# Patient Record
Sex: FEMALE | Race: WHITE | NOT HISPANIC OR LATINO | Employment: OTHER | URBAN - METROPOLITAN AREA
[De-identification: names, ages, dates, MRNs, and addresses within clinical notes are randomized per-mention and may not be internally consistent; named-entity substitution may affect disease eponyms.]

---

## 2020-05-01 ENCOUNTER — OFFICE VISIT - HEALTHEAST (OUTPATIENT)
Dept: FAMILY MEDICINE | Facility: CLINIC | Age: 65
End: 2020-05-01

## 2020-05-01 DIAGNOSIS — M10.9 ACUTE GOUT OF RIGHT KNEE, UNSPECIFIED CAUSE: ICD-10-CM

## 2021-06-04 VITALS
HEART RATE: 102 BPM | RESPIRATION RATE: 16 BRPM | DIASTOLIC BLOOD PRESSURE: 82 MMHG | OXYGEN SATURATION: 95 % | TEMPERATURE: 98.3 F | SYSTOLIC BLOOD PRESSURE: 172 MMHG | WEIGHT: 105 LBS

## 2021-06-18 NOTE — PATIENT INSTRUCTIONS - HE
Patient Instructions by Сергей Hopson PA-C at 5/1/2020  1:30 PM     Author: Сергей Hopson PA-C Service: -- Author Type: Physician Assistant    Filed: 5/1/2020  1:57 PM Encounter Date: 5/1/2020 Status: Signed    : Сергей Hopson PA-C (Physician Assistant)       You were seen today for acute gout. With treatment, symptoms should be improving in 48-72 hours (or longer if symptoms have been present for several days).     Symptom management:  - If already taking a urate-lowering medication, may continue without interruption.  - Take prescribed prednisone as instructed  - May apply ice to the affected area for 10-15 minutes each hour as needed.  - Rest involved joint.    Reasons to return for immediate re-evaluation:  - Develop a fever of 100.4 or higher  - No improvement of symptoms after 5 days  - Redness and/or pain continues to spread  - Develop drainage from the knee    Patient Education     Gout Diet  Gout is a painful condition caused by an excess of uric acid, a waste product made by the body. Uric acid forms crystals that collect in the joints. The immune response to these crystals brings on symptoms of joint pain and swelling. This is called a gout attack. Often, medications and diet changes are combined to manage gout. Below are some guidelines for changing your diet to help you manage gout and prevent attacks. Your health care provider will help you determine the best eating plan for you.     Eating to manage gout  Weight loss for those who are overweight may help reduce gout attacks.  Eat less of these foods  Eating too many foods containing purines may raise the levels of uric acid in your body. This raises your risk for a gout attack. Try to limit these foods and drinks:    Alcohol, such as beer and red wine. You may be told to avoid alcohol completely.    Soft drinks that contain sugar or high fructose corn syrup    Certain fish, including anchovies, sardines, fish eggs, and  herring    Shellfish    Certain meats, such as red meat, hot dogs, luncheon meats, and turkey    Organ meats, such as liver, kidneys, and sweetbreads    Legumes, such as dried beans and peas    Other high fat foods such as gravy, whole milk, and high fat cheeses    Vegetables such as asparagus, cauliflower, spinach, and mushrooms used to be thought to contribute to an increased risk for a gout attack, but recent studies show that high purine vegetables don't increase the risk for a gout attack.  Eat more of these foods  Other foods may be helpful for people with gout. Add some of these foods to your diet:    Cherries contain chemicals that may lower uric acid.    Omega fatty acids. These are found in some fatty fish such as salmon, certain oils (flax, olive, or nut), and nuts themselves. Omega fatty acids may help prevent inflammation due to gout.    Dairy products that are low-fat or fat-free, such as cheese and yogurt    Complex carbohydrate foods, including whole grains, brown rice, oats, and beans    Coffee, in moderation    Water, approximately 64 ounces per day  Follow-up care  Follow up with your healthcare provider as advised.  When to seek medical advice  Call your healthcare provider right away if any of these occur:    Return of gout symptoms, usually at night:    Severe pain, swelling, and heat in a joint, especially the base of the big toe    Affected joint is hard to move    Skin of the affected joint is purple or red    Fever of 100.4 F (38 C) or higher    Pain that doesn't get better even with prescribed medicine   Date Last Reviewed: 1/12/2016 2000-2017 [employer]. [address] [place] [address], [place] [address]. All rights reserved. This information is not intended as a substitute for professional medical care. Always follow your healthcare professional's instructions.

## 2021-06-28 ENCOUNTER — COMMUNICATION - HEALTHEAST (OUTPATIENT)
Dept: SCHEDULING | Facility: CLINIC | Age: 66
End: 2021-06-28

## 2021-06-29 DIAGNOSIS — J44.9 COPD (CHRONIC OBSTRUCTIVE PULMONARY DISEASE) (H): Primary | ICD-10-CM

## 2021-06-29 NOTE — PROGRESS NOTES
Progress Notes by Сергей Hopson PA-C at 5/1/2020  1:30 PM     Author: Сергей Hopson PA-C Service: -- Author Type: Physician Assistant    Filed: 5/1/2020  3:20 PM Encounter Date: 5/1/2020 Status: Signed    : Сергей Hopson PA-C (Physician Assistant)         Assessment & Plan:       1. Acute gout of right knee, unspecified cause  predniSONE (DELTASONE) 20 MG tablet      Medical Decision Making  Patient presents with right knee pain most consistent with a gout flare.  She has no initial trauma or injury to the right knee to suspect a fracture.  She further has no joint laxity to suggest a torn or injured ligament or meniscus.  Did also consider possible DVT, however concern is low as patient has no significant swelling or tenderness in the posterior calf or the popliteal region of the knee.  Patient's tenderness is instead localized to the anterior medial knee with increased warmth to touch consistent with gout.  There are no signs for cellulitis as there is no significant erythema in the region as well as no red flag symptoms for septic arthritis such as fevers, nausea, vomiting, or significant loss of range of motion in the knee joint.  Will treat patient with oral prednisone.  Continue with conservative management of over-the-counter acetaminophen, cold compresses, light compression, and leg elevation.  Did provide education materials and discussed appropriate diet.  Discussed signs of worsening symptoms and when to follow-up with PCP if no symptom improvement.    Patient Instructions   You were seen today for acute gout. With treatment, symptoms should be improving in 48-72 hours (or longer if symptoms have been present for several days).     Symptom management:  - If already taking a urate-lowering medication, may continue without interruption.  - Take prescribed prednisone as instructed  - May apply ice to the affected area for 10-15 minutes each hour as needed.  - Rest involved  joint.    Reasons to return for immediate re-evaluation:  - Develop a fever of 100.4 or higher  - No improvement of symptoms after 5 days  - Redness and/or pain continues to spread  - Develop drainage from the knee    Patient Education     Gout Diet  Gout is a painful condition caused by an excess of uric acid, a waste product made by the body. Uric acid forms crystals that collect in the joints. The immune response to these crystals brings on symptoms of joint pain and swelling. This is called a gout attack. Often, medications and diet changes are combined to manage gout. Below are some guidelines for changing your diet to help you manage gout and prevent attacks. Your health care provider will help you determine the best eating plan for you.     Eating to manage gout  Weight loss for those who are overweight may help reduce gout attacks.  Eat less of these foods  Eating too many foods containing purines may raise the levels of uric acid in your body. This raises your risk for a gout attack. Try to limit these foods and drinks:    Alcohol, such as beer and red wine. You may be told to avoid alcohol completely.    Soft drinks that contain sugar or high fructose corn syrup    Certain fish, including anchovies, sardines, fish eggs, and herring    Shellfish    Certain meats, such as red meat, hot dogs, luncheon meats, and turkey    Organ meats, such as liver, kidneys, and sweetbreads    Legumes, such as dried beans and peas    Other high fat foods such as gravy, whole milk, and high fat cheeses    Vegetables such as asparagus, cauliflower, spinach, and mushrooms used to be thought to contribute to an increased risk for a gout attack, but recent studies show that high purine vegetables don't increase the risk for a gout attack.  Eat more of these foods  Other foods may be helpful for people with gout. Add some of these foods to your diet:    Cherries contain chemicals that may lower uric acid.    Omega fatty acids.  These are found in some fatty fish such as salmon, certain oils (flax, olive, or nut), and nuts themselves. Omega fatty acids may help prevent inflammation due to gout.    Dairy products that are low-fat or fat-free, such as cheese and yogurt    Complex carbohydrate foods, including whole grains, brown rice, oats, and beans    Coffee, in moderation    Water, approximately 64 ounces per day  Follow-up care  Follow up with your healthcare provider as advised.  When to seek medical advice  Call your healthcare provider right away if any of these occur:    Return of gout symptoms, usually at night:    Severe pain, swelling, and heat in a joint, especially the base of the big toe    Affected joint is hard to move    Skin of the affected joint is purple or red    Fever of 100.4 F (38 C) or higher    Pain that doesn't get better even with prescribed medicine   Date Last Reviewed: 1/12/2016 2000-2017 [employer]. [address] [place] [address]. All rights reserved. This information is not intended as a substitute for professional medical care. Always follow your healthcare professional's instructions.                   Subjective:       Jasmyn Green is a 64 y.o. female here for evaluation of right knee pain.  Onset of symptoms was 2 weeks ago and has been unchanged since then.  Patient states that she woke up one morning with the pain, and does not recall any injury or trauma to the right knee.  She has not had symptoms like this before.  She has no history of knee fracture or knee surgery in the past.  Associated symptoms include swelling at the right knee, tenderness to palpation, and increased warmth to touch.  She denies erythematous rash and fevers.  The pain is provoked when patient is bearing weight or and the patient is flexing/extending the right knee.  She has been alternating Tylenol ibuprofen with mild improvement of symptoms.  Patient is also tried cold compresses and wrapping  the knee with an Ace wrap.  She has no history of gout in the past.    The following portions of the patient's history were reviewed and updated as appropriate: allergies, current medications and problem list.    Review of Systems  A 12 point comprehensive review of systems was negative except as noted.     Allergies  No Known Allergies    No family history on file.    Social History     Socioeconomic History   ? Marital status:      Spouse name: None   ? Number of children: None   ? Years of education: None   ? Highest education level: None   Occupational History   ? None   Social Needs   ? Financial resource strain: None   ? Food insecurity     Worry: None     Inability: None   ? Transportation needs     Medical: None     Non-medical: None   Tobacco Use   ? Smoking status: Current Every Day Smoker   ? Smokeless tobacco: Never Used   Substance and Sexual Activity   ? Alcohol use: None   ? Drug use: None   ? Sexual activity: None   Lifestyle   ? Physical activity     Days per week: None     Minutes per session: None   ? Stress: None   Relationships   ? Social connections     Talks on phone: None     Gets together: None     Attends Taoism service: None     Active member of club or organization: None     Attends meetings of clubs or organizations: None     Relationship status: None   ? Intimate partner violence     Fear of current or ex partner: None     Emotionally abused: None     Physically abused: None     Forced sexual activity: None   Other Topics Concern   ? None   Social History Narrative   ? None         Objective:       /82 (Patient Site: Left Arm, Patient Position: Sitting, Cuff Size: Adult Regular)   Pulse (!) 102   Temp 98.3  F (36.8  C) (Oral)   Resp 16   Wt 105 lb (47.6 kg)   SpO2 95%   General appearance: alert, appears stated age, cooperative, no distress and non-toxic  Extremities: Right lower extremity: Tenderness to palpation over the medial anterior knee; no joint laxity;  full range of motion of the right knee with pain during flexion and extension; patient denies significant tenderness to the right calf and to the posterior knee  Skin: Right knee: Moderate increased swelling and increased warmth to touch over the medial knee, no erythema, skin intact, no drainage; no other rashes or lesions

## 2021-07-01 ENCOUNTER — COMMUNICATION - HEALTHEAST (OUTPATIENT)
Dept: SCHEDULING | Facility: CLINIC | Age: 66
End: 2021-07-01

## 2021-07-02 ENCOUNTER — COMMUNICATION - HEALTHEAST (OUTPATIENT)
Dept: RESPIRATORY THERAPY | Facility: HOSPITAL | Age: 66
End: 2021-07-02

## 2021-07-04 NOTE — TELEPHONE ENCOUNTER
Telephone Encounter by Lacey Johnson at 7/1/2021 11:30 AM     Author: Lacey Johnson Service: -- Author Type: Patient Access    Filed: 7/1/2021 11:33 AM Encounter Date: 7/1/2021 Status: Signed    : Lacey Johnson (Patient Access)       New Appointment Needed  What is the reason for the visit:    Inpatient/ED Follow Up Appt Request  At what hospital or facility were you seen?: [facility]   What is the reason you were seen?: COPD   What date were you admitted?: date: 6/27/2021  What date were you discharged?: date: 7/01/2021  What was the recommended timeframe for your follow up appointment?: 7 days  Provider Preference: recommended Dr Barrios per  at [facility]   How soon do you need to be seen?: next week  Waitlist offered?: No  Okay to leave a detailed message:  Yes

## 2021-07-04 NOTE — TELEPHONE ENCOUNTER
Telephone Encounter by Lorna Rosenthal MA at 7/1/2021  1:44 PM     Author: Lorna Rosenthal MA Service: -- Author Type: Medical Assistant    Filed: 7/1/2021  1:45 PM Encounter Date: 7/1/2021 Status: Signed    : Lorna Rosenthal MA (Medical Assistant)       Pt scheduled for INF with J Luis Calle on 7/6. Pt will also need an establish care appt. Left detailed message for patient on her VM.

## 2021-07-04 NOTE — TELEPHONE ENCOUNTER
Telephone Encounter by Minerva Decker LRT at 7/2/2021 12:23 PM     Author: Minerva Decker LRT Service: -- Author Type: Respiratory Therapist    Filed: 7/2/2021 12:27 PM Encounter Date: 7/2/2021 Status: Signed    : Minerva Decker LRT (Respiratory Therapist)       Spoke with Jasmyn. She is feeling well today, back to her norm as far as her breathing is concerned,and in her green zone. She is able to get and take her medications and is compliant in taking them.  Reviewed COPD Action Plan.  Jasmyn had no problems or questions for me today.    MARCELO Chatman, TTS, Chronic Pulmonary Disease Specialist

## 2021-07-05 ENCOUNTER — COMMUNICATION - HEALTHEAST (OUTPATIENT)
Dept: RESPIRATORY THERAPY | Facility: HOSPITAL | Age: 66
End: 2021-07-05

## 2021-07-05 PROBLEM — J96.01 ACUTE RESPIRATORY FAILURE WITH HYPOXIA (H): Status: ACTIVE | Noted: 2021-06-28

## 2021-07-05 PROBLEM — I16.0 ASYMPTOMATIC HYPERTENSIVE URGENCY: Status: ACTIVE | Noted: 2021-06-30

## 2021-07-05 PROBLEM — D72.820 LYMPHOCYTOSIS: Status: ACTIVE | Noted: 2021-06-28

## 2021-07-05 PROBLEM — Z87.39 HISTORY OF GOUT: Status: ACTIVE | Noted: 2021-06-27

## 2021-07-05 PROBLEM — F17.200 SMOKER: Chronic | Status: ACTIVE | Noted: 2021-06-27

## 2021-07-05 PROBLEM — I25.10 CORONARY ARTERY CALCIFICATION: Chronic | Status: ACTIVE | Noted: 2021-06-27

## 2021-07-05 NOTE — TELEPHONE ENCOUNTER
Telephone Encounter by Minerva Decker LRT at 7/5/2021  5:09 PM     Author: Minerva Decker LRT Service: -- Author Type: Respiratory Therapist    Filed: 7/5/2021  5:16 PM Encounter Date: 7/5/2021 Status: Signed    : Minerva Decker LRT (Respiratory Therapist)       Spoke with Jasmyn . She is feeling well today and in the green zone. She has not gotten her new inhalers yet due to excessive cost. Her daughter is checking into ordering them from [employer] and she has an appointment with her doctor tomorrow to try to figure something out. Reviewed COPD Action Plan.

## 2021-07-08 ENCOUNTER — COMMUNICATION - HEALTHEAST (OUTPATIENT)
Dept: RESPIRATORY THERAPY | Facility: HOSPITAL | Age: 66
End: 2021-07-08

## 2021-07-08 NOTE — TELEPHONE ENCOUNTER
Telephone Encounter by Aidee Luna LRT at 7/8/2021 10:35 AM     Author: Aidee Luna LRT Service: -- Author Type: Respiratory Therapist    Filed: 7/8/2021 10:35 AM Encounter Date: 7/8/2021 Status: Signed    : Aidee Luna LRT (Respiratory Therapist)       Left message for them call back with any questions they may have, our phone number, reminders, and when we plan to call again.    MARCELO Whitfield, Chronic Pulmonary Disease Specialist

## 2021-07-13 ENCOUNTER — PATIENT OUTREACH (OUTPATIENT)
Dept: NURSING | Facility: CLINIC | Age: 66
End: 2021-07-13

## 2021-07-13 NOTE — LETTER
[facility]  Complex Care Plan  About Me:    Patient Name:  Jasmyn Gong    YOB: 1955  Age:         65 year old   [place] MRN:    [id number] Telephone Information:  Home Phone [phone]   Mobile [phone]       Address:  [address] [address] Email address:  No e-mail address on record      Emergency Contact(s)    Name Relationship Lgl Grd Work Phone Home Phone Mobile Phone   1. RADHA GONG* Other   [phone]    2. DECLINE,PER PT Declined               Primary language:  English     needed? Data Unavailable   [place] [facility]:  [phone] op. 1  Other communication barriers: None  Preferred Method of Communication:     Current living arrangement: I live in a private home  Mobility Status/ Medical Equipment:      Health Maintenance  Health Maintenance Reviewed: Up to date    My Access Plan  Medical Emergency 911   Primary Clinic Line [facility] [phone]   24 Hour Appointment Line [phone] or  [phone] ([phone]) (toll-free)   24 Hour Nurse Line [phone] (toll-free)   Preferred Urgent Care     Preferred Hospital [facility]  [phone]   Preferred Pharmacy No Pharmacies Listed   Behavioral Health Crisis Line The National Suicide Prevention Lifeline at 1-833.789.7078 or 777             My Care Team Members  Patient Care Team       Relationship Specialty Notifications Start End    Jean Marie Calle, CNP PCP - General Nurse Practitioner - Family  7/13/21      [address] [facility] [address]    Jaz Gallagher, HU Lead Care Coordinator Primary Care - [facility] Admissions 7/13/21     Phone:          Edelmira Morales Community Health Worker  Admissions 7/13/21     [phone]            My Care Plans  Self Management and Treatment Plan  Goals and (Comments)  Goals        General     Financial Wellbeing (pt-stated)      Notes - Note created  7/16/2021   9:50 AM by Jaz Gallagher RN     Goal Statement: I would like to apply for Medicare in the next 3 months.  Date Goal set: 7/13/21  Barriers: resources  Strengths: motivated and family support  Date to Achieve By: 3 months  Patient expressed understanding of goal: yes  Action steps to achieve this goal:  1. My daughter/family will call [facility] [phone] to get information regarding insurance plans.  I understand that [facility] is unable to recommend insurance plans.  My family will help me with this process.  I do not have to wait until open enrollment.         Improve chronic symptoms (pt-stated)      Notes - Note created  7/16/2021  9:44 AM by Jaz Gallagher, RN     Goal Statement: I would like to take my blood pressure on a daily basis over the next 2 weeks to bring to my 7/26 doctors appointment.  Date Goal set: 7/13/21  Barriers:   Strengths: motivated  Date to Achieve By: 7/26/21  Patient expressed understanding of goal: yes  Action steps to achieve this goal:  1. I will take my blood pressure on a daily basis and keep a log to bring to my appointment on 7/26.  Jacqueline Donaldson will notify me if I need to continue writing my blood pressure down after 7/26.         Medical (pt-stated)      Notes - Note edited  7/16/2021  9:37 AM by Jaz Gallagher RN     Goal Statement: I would like to Establish Care with Jacqueline Donaldson 7/26 @ 7:00.  Date Goal set: 7/13/21  Barriers:   Strengths: motivated  Date to Achieve By: 7/26/21  Patient expressed understanding of goal: yes  Action steps to achieve this goal:  1. I will attend my 7/26/21 appointment to establish care with a new PCP.  2. I will speak with my Community Healthcare Worker and or RN regarding any recommendations made by Jacqueline for follow up.           Other (pt-stated)      Notes - Note created  7/16/2021  9:40 AM by Jaz Gallagher, HU     Goal Statement: I would like to schedule the following recommended tests I am overdue for as recommended by my PCP  within the next year.  Date Goal set: 7/13/21  Barriers: recent hospital admission, works  Strengths: family support  Date to Achieve By: 1 year  Patient expressed understanding of goal: yes  Action steps to achieve this goal:  1. I will schedule a colonoscopy.  2. I will schedule a mammogram.  3. I will speak with my Community Healthcare Worker at outreach telephone calls to notify her if I am having difficulty getting these tests scheduled.              Action Plans on File:                       Advance Care Plans/Directives Type:        My Medical and Care Information  Problem List   Patient Active Problem List   Diagnosis     COPD exacerbation (H)     History of gout     Smoker     Coronary artery calcification     Acute respiratory failure with hypoxia (H)     Lymphocytosis     Essential hypertension     Asymptomatic hypertensive urgency      Current Medications and Allergies:  See printed Medication Report.    Care Coordination Start Date: 7/13/2021   Frequency of Care Coordination: monthly   Form Last Updated: 07/16/2021

## 2021-07-13 NOTE — LETTER
[facility] CARE COORDINATION  [address]  [place] [address]    July 16, 2021    Jasmyn Green  [address] [address]      Dear Jasmyn,    I am a clinic care coordinator who works with Jean Marie Calle CNP at [facility]. I wanted to thank you for spending the time to talk with me.  Below is a description of clinic care coordination and how I can further assist you.      The clinic care coordination team is made up of a registered nurse,  and community health worker who understand the health care system. The goal of clinic care coordination is to help you manage your health and improve access to the health care system in the most efficient manner. The team can assist you in meeting your health care goals by providing education, coordinating services, strengthening the communication among your providers and supporting you with any resource needs.    Please feel free to contact the Community Health Worker Edelmira Morales at [phone] with any questions or concerns. We are focused on providing you with the highest-quality healthcare experience possible and that all starts with you.     Sincerely,     Jaz Gallagher RN  Clinic Care Coordination    Enclosed: I have enclosed a copy of the Complex Care Plan. This has helpful information and goals that we have talked about. Please keep this in an easy to access place to use as needed.

## 2021-07-16 ENCOUNTER — TELEPHONE (OUTPATIENT)
Dept: RESPIRATORY THERAPY | Facility: HOSPITAL | Age: 66
End: 2021-07-16

## 2021-07-16 SDOH — ECONOMIC STABILITY: INCOME INSECURITY: IN THE LAST 12 MONTHS, WAS THERE A TIME WHEN YOU WERE NOT ABLE TO PAY THE MORTGAGE OR RENT ON TIME?: NO

## 2021-07-16 SDOH — ECONOMIC STABILITY: TRANSPORTATION INSECURITY
IN THE PAST 12 MONTHS, HAS THE LACK OF TRANSPORTATION KEPT YOU FROM MEDICAL APPOINTMENTS OR FROM GETTING MEDICATIONS?: NO

## 2021-07-16 SDOH — ECONOMIC STABILITY: TRANSPORTATION INSECURITY
IN THE PAST 12 MONTHS, HAS LACK OF TRANSPORTATION KEPT YOU FROM MEETINGS, WORK, OR FROM GETTING THINGS NEEDED FOR DAILY LIVING?: NO

## 2021-07-16 SDOH — ECONOMIC STABILITY: FOOD INSECURITY: WITHIN THE PAST 12 MONTHS, YOU WORRIED THAT YOUR FOOD WOULD RUN OUT BEFORE YOU GOT MONEY TO BUY MORE.: NEVER TRUE

## 2021-07-16 SDOH — ECONOMIC STABILITY: FOOD INSECURITY: WITHIN THE PAST 12 MONTHS, THE FOOD YOU BOUGHT JUST DIDN'T LAST AND YOU DIDN'T HAVE MONEY TO GET MORE.: NEVER TRUE

## 2021-07-16 NOTE — PROGRESS NOTES
Clinic Care Coordination Contact    Clinic Care Coordination Contact  OUTREACH    Referral Information:  Referral Source: PCP    Primary Diagnosis: COPD    Chief Complaint   Patient presents with     Clinic Care Coordination - Initial        Clinic Utilization  Difficulty keeping appointments:: No  No-Show Concerns: No  No PCP office visit in Past Year: No  Utilization    Hospital Admissions  1             ED Visits  1             No Show Count (past year)  0                Current as of: 7/14/2021 11:47 AM              Clinical Concerns:  Current Medical Concerns:  COPD    Current Behavioral Concerns: N/A    Education Provided to patient: yes   Pain  Pain (GOAL):: No  Health Maintenance Reviewed: Up to date  Clinical Pathway: None    Medication Management:  Medication review status: Medications reviewed and no changes reported per patient.        Patient currently getting her prescriptions in [place] due to cost.  Writer gave patient and daughter the phone number to [facility] as an additional resource.  Patient takes her pills out of the bottles on a daily basis.     Functional Status:  Dependent ADLs:: Independent    Living Situation:  Current living arrangement:: I live in a private home    Lifestyle & Psychosocial Needs:    Social Determinants of Health     Tobacco Use: Medium Risk     Smoking Tobacco Use: Former Smoker     Smokeless Tobacco Use: Never Used   Alcohol Use:      Frequency of Alcohol Consumption:      Average Number of Drinks:      Frequency of Binge Drinking:    Financial Resource Strain:      Difficulty of Paying Living Expenses:    Food Insecurity: No Food Insecurity     Worried About Running Out of Food in the Last Year: Never true     Ran Out of Food in the Last Year: Never true   Transportation Needs: No Transportation Needs     Lack of Transportation (Medical): No     Lack of Transportation (Non-Medical): No   Physical Activity:      Days of Exercise per Week:      Minutes of  Exercise per Session:    Stress:      Feeling of Stress :    Social Connections:      Frequency of Communication with Friends and Family:      Frequency of Social Gatherings with Friends and Family:      Attends Faith Services:      Active Member of Clubs or Organizations:      Attends Club or Organization Meetings:      Marital Status:    Intimate Partner Violence:      Fear of Current or Ex-Partner:      Emotionally Abused:      Physically Abused:      Sexually Abused:    Depression: Not at risk     PHQ-2 Score: 0   Housing Stability: Low Risk      Unable to Pay for Housing in the Last Year: No     Number of Places Lived in the Last Year: 1     Unstable Housing in the Last Year: No     Diet:: Regular  Inadequate nutrition (GOAL):: No  Tube Feeding: No  Inadequate activity/exercise (GOAL):: No  Significant changes in sleep pattern (GOAL): No  Transportation means:: Regular car     Faith or spiritual beliefs that impact treatment:: No  Mental health DX:: No  Mental health management concern (GOAL):: No  Chemical Dependency Status: No Current Concerns  Informal Support system:: Family, Friends          Resources and Interventions:  Current Resources:      Community Resources: None  Supplies used at home:: None  Equipment Currently Used at Home: none  Employment Status: employed part-time              Referrals Placed: None    65 yr F PMH: COPD, gout.  Hospitalized 6/27-7/01 for pneumonia, COPD. 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  Returned to work this week.  States she was pretty tired but is returning again tomorrow.  Daughter Samra is also on the phone.  Questions about Medicare as patient has never applied.  Writer consulted with [facility] prior to call.  Gave them the phone number to [employer].  Informed them to call for information about insurance/Medicare.  Instructed that we are unable to tell them 'which program to pick'.  Both stated an understanding.  Open Enrollment between Oct-Dec however because  Stefania had never applied that she could call and apply now.  She currently lives with her sister and brother-in-law.  She drives her self for work, appointments, etc.  See above for medication information.  Denies food insecurity.  Reports her BP today and yesterday as 125/63 HR 66 and 108/60 HR 56.  Writer instructed for her to keep a daily log for the next 2 weeks until 7/26 as she is new to BP medication.  Stefania stated an understanding.  Goals created with assistance of patient.  Enrolled for Goals as listed below.     Goals:   Goals        General     Financial Wellbeing (pt-stated)      Notes - Note created  7/16/2021  9:50 AM by Jaz Gallagher, RN     Goal Statement: I would like to apply for Medicare in the next 3 months.  Date Goal set: 7/13/21  Barriers: resources  Strengths: motivated and family support  Date to Achieve By: 3 months  Patient expressed understanding of goal: yes  Action steps to achieve this goal:  1. My daughter/family will call [facility] [phone] to get information regarding insurance plans.  I understand that [facility] is unable to recommend insurance plans.  My family will help me with this process.  I do not have to wait until open enrollment.         Improve chronic symptoms (pt-stated)      Notes - Note created  7/16/2021  9:44 AM by Jaz Gallagher, RN     Goal Statement: I would like to take my blood pressure on a daily basis over the next 2 weeks to bring to my 7/26 doctors appointment.  Date Goal set: 7/13/21  Barriers:   Strengths: motivated  Date to Achieve By: 7/26/21  Patient expressed understanding of goal: yes  Action steps to achieve this goal:  1. I will take my blood pressure on a daily basis and keep a log to bring to my appointment on 7/26.  Jacqueline Donaldson will notify me if I need to continue writing my blood pressure down after 7/26.         Medical (pt-stated)      Notes - Note edited  7/16/2021  9:37 AM by Jaz Gallagher, RN     Goal Statement: I would like to  Establish Care with Jacqueline Mendoza 7/26 @ 7:00.  Date Goal set: 7/13/21  Barriers:   Strengths: motivated  Date to Achieve By: 7/26/21  Patient expressed understanding of goal: yes  Action steps to achieve this goal:  1. I will attend my 7/26/21 appointment to establish care with a new PCP.  2. I will speak with my Community Healthcare Worker and or RN regarding any recommendations made by Jacqueline for follow up.           Other (pt-stated)      Notes - Note created  7/16/2021  9:40 AM by Jaz Gallagher RN     Goal Statement: I would like to schedule the following recommended tests I am overdue for as recommended by my PCP within the next year.  Date Goal set: 7/13/21  Barriers: recent hospital admission, works  Strengths: family support  Date to Achieve By: 1 year  Patient expressed understanding of goal: yes  Action steps to achieve this goal:  1. I will schedule a colonoscopy.  2. I will schedule a mammogram.  3. I will speak with my Community Healthcare Worker at outreach telephone calls to notify her if I am having difficulty getting these tests scheduled.             Patient/Caregiver understanding: yes    Outreach Frequency: monthly  Future Appointments              In 1 week Miesha Donaldson CNP [facility], [facility]    In 3 weeks JESUS PFT  1 M [facility], [facility]    In 3 weeks Safia Rodriguez MD M [facility], [facility]          Plan: Delegation: NONE

## 2021-07-16 NOTE — TELEPHONE ENCOUNTER
COPD Education follow up call:  Left message with call back number should pt have questions or concerns and COPD Action Plan reminder.  Minerva Decker, RT, TTS, Chronic Pulmonary Disease Specialist

## 2021-07-21 ENCOUNTER — TELEPHONE (OUTPATIENT)
Dept: RESPIRATORY THERAPY | Facility: HOSPITAL | Age: 66
End: 2021-07-21

## 2021-07-21 NOTE — TELEPHONE ENCOUNTER
Left message with number and plan to call next week.  Aidee Luna, RT, TTS, Chronic Pulmonary Disease Specialist

## 2021-08-02 ENCOUNTER — TELEPHONE (OUTPATIENT)
Dept: RESPIRATORY THERAPY | Facility: CLINIC | Age: 66
End: 2021-08-02

## 2021-08-02 NOTE — TELEPHONE ENCOUNTER
Left message for them call back with any questions they may have, our phone number, reminders, and when we plan to call again.    Aidee Luna, RT, Chronic Pulmonary Disease Specialist

## 2021-08-03 ENCOUNTER — TELEPHONE (OUTPATIENT)
Dept: RESPIRATORY THERAPY | Facility: HOSPITAL | Age: 66
End: 2021-08-03

## 2021-08-03 NOTE — TELEPHONE ENCOUNTER
Stefania had some questions regarding her upcoming appointments for lung testing and Pulmonary.  She also has been smoke free since 6/27/2021 and doing really well.  Aidee Luna, RT, TTS, Chronic Pulmonary Disease Specialist

## 2021-08-09 ENCOUNTER — OFFICE VISIT (OUTPATIENT)
Dept: INTERNAL MEDICINE | Facility: CLINIC | Age: 66
End: 2021-08-09
Payer: COMMERCIAL

## 2021-08-09 VITALS
HEART RATE: 84 BPM | WEIGHT: 99.5 LBS | SYSTOLIC BLOOD PRESSURE: 160 MMHG | BODY MASS INDEX: 18.33 KG/M2 | DIASTOLIC BLOOD PRESSURE: 76 MMHG

## 2021-08-09 DIAGNOSIS — Z78.0 ASYMPTOMATIC POSTMENOPAUSAL STATUS: ICD-10-CM

## 2021-08-09 DIAGNOSIS — Z13.29 SCREENING FOR ENDOCRINE, NUTRITIONAL, METABOLIC AND IMMUNITY DISORDER: ICD-10-CM

## 2021-08-09 DIAGNOSIS — M40.00 ACQUIRED POSTURAL KYPHOSIS: ICD-10-CM

## 2021-08-09 DIAGNOSIS — I25.10 CORONARY ARTERY CALCIFICATION: ICD-10-CM

## 2021-08-09 DIAGNOSIS — J44.1 COPD EXACERBATION (H): ICD-10-CM

## 2021-08-09 DIAGNOSIS — Z13.0 SCREENING FOR ENDOCRINE, NUTRITIONAL, METABOLIC AND IMMUNITY DISORDER: ICD-10-CM

## 2021-08-09 DIAGNOSIS — Z87.39 HISTORY OF GOUT: ICD-10-CM

## 2021-08-09 DIAGNOSIS — I10 ESSENTIAL HYPERTENSION: ICD-10-CM

## 2021-08-09 DIAGNOSIS — E55.9 VITAMIN D DEFICIENCY: Primary | ICD-10-CM

## 2021-08-09 DIAGNOSIS — J96.01 ACUTE RESPIRATORY FAILURE WITH HYPOXIA (H): ICD-10-CM

## 2021-08-09 DIAGNOSIS — F17.200 SMOKER: ICD-10-CM

## 2021-08-09 DIAGNOSIS — Z76.89 ENCOUNTER TO ESTABLISH CARE: ICD-10-CM

## 2021-08-09 DIAGNOSIS — Z13.21 SCREENING FOR ENDOCRINE, NUTRITIONAL, METABOLIC AND IMMUNITY DISORDER: ICD-10-CM

## 2021-08-09 DIAGNOSIS — Z13.228 SCREENING FOR ENDOCRINE, NUTRITIONAL, METABOLIC AND IMMUNITY DISORDER: ICD-10-CM

## 2021-08-09 LAB
ALBUMIN SERPL-MCNC: 3.7 G/DL (ref 3.5–5)
ALP SERPL-CCNC: 116 U/L (ref 45–120)
ALT SERPL W P-5'-P-CCNC: 29 U/L (ref 0–45)
ANION GAP SERPL CALCULATED.3IONS-SCNC: 8 MMOL/L (ref 5–18)
AST SERPL W P-5'-P-CCNC: 26 U/L (ref 0–40)
BASOPHILS # BLD AUTO: 0 10E3/UL (ref 0–0.2)
BASOPHILS NFR BLD AUTO: 0 %
BILIRUB SERPL-MCNC: 0.3 MG/DL (ref 0–1)
BUN SERPL-MCNC: 17 MG/DL (ref 8–22)
CALCIUM SERPL-MCNC: 9.4 MG/DL (ref 8.5–10.5)
CHLORIDE BLD-SCNC: 106 MMOL/L (ref 98–107)
CHOLEST SERPL-MCNC: 225 MG/DL
CO2 SERPL-SCNC: 26 MMOL/L (ref 22–31)
CREAT SERPL-MCNC: 0.63 MG/DL (ref 0.6–1.1)
EOSINOPHIL # BLD AUTO: 0.3 10E3/UL (ref 0–0.7)
EOSINOPHIL NFR BLD AUTO: 3 %
ERYTHROCYTE [DISTWIDTH] IN BLOOD BY AUTOMATED COUNT: 13.5 % (ref 10–15)
FASTING STATUS PATIENT QL REPORTED: YES
GFR SERPL CREATININE-BSD FRML MDRD: >90 ML/MIN/1.73M2
GLUCOSE BLD-MCNC: 100 MG/DL (ref 70–125)
HCT VFR BLD AUTO: 39.2 % (ref 35–47)
HDLC SERPL-MCNC: 44 MG/DL
HGB BLD-MCNC: 12.4 G/DL (ref 11.7–15.7)
IMM GRANULOCYTES # BLD: 0 10E3/UL
IMM GRANULOCYTES NFR BLD: 0 %
LDLC SERPL CALC-MCNC: 157 MG/DL
LYMPHOCYTES # BLD AUTO: 1.8 10E3/UL (ref 0.8–5.3)
LYMPHOCYTES NFR BLD AUTO: 16 %
MCH RBC QN AUTO: 28.6 PG (ref 26.5–33)
MCHC RBC AUTO-ENTMCNC: 31.6 G/DL (ref 31.5–36.5)
MCV RBC AUTO: 90 FL (ref 78–100)
MONOCYTES # BLD AUTO: 0.8 10E3/UL (ref 0–1.3)
MONOCYTES NFR BLD AUTO: 7 %
NEUTROPHILS # BLD AUTO: 8.3 10E3/UL (ref 1.6–8.3)
NEUTROPHILS NFR BLD AUTO: 74 %
PLATELET # BLD AUTO: 318 10E3/UL (ref 150–450)
POTASSIUM BLD-SCNC: 4.9 MMOL/L (ref 3.5–5)
PROT SERPL-MCNC: 6.3 G/DL (ref 6–8)
RBC # BLD AUTO: 4.34 10E6/UL (ref 3.8–5.2)
SODIUM SERPL-SCNC: 140 MMOL/L (ref 136–145)
TRIGL SERPL-MCNC: 119 MG/DL
WBC # BLD AUTO: 11.2 10E3/UL (ref 4–11)

## 2021-08-09 PROCEDURE — 85025 COMPLETE CBC W/AUTO DIFF WBC: CPT | Performed by: NURSE PRACTITIONER

## 2021-08-09 PROCEDURE — 82306 VITAMIN D 25 HYDROXY: CPT | Performed by: NURSE PRACTITIONER

## 2021-08-09 PROCEDURE — 80053 COMPREHEN METABOLIC PANEL: CPT | Performed by: NURSE PRACTITIONER

## 2021-08-09 PROCEDURE — 36415 COLL VENOUS BLD VENIPUNCTURE: CPT | Performed by: NURSE PRACTITIONER

## 2021-08-09 PROCEDURE — 80061 LIPID PANEL: CPT | Performed by: NURSE PRACTITIONER

## 2021-08-09 PROCEDURE — 99214 OFFICE O/P EST MOD 30 MIN: CPT | Performed by: NURSE PRACTITIONER

## 2021-08-09 NOTE — PATIENT INSTRUCTIONS
Continue watching your blood pressures. Goals are less than 140/90; really I would like it under 135/85.    Your labs are processing, I will release results on my chart once they are back.     Continue your current Lisinopril dosing.    Call [phone] to set up your bone density scan.    I will see you back in three months for your physical, before then if anything comes up.    Keep your Pulmonology appointment as scheduled.   Patient Education     Prevention Guidelines, Women Ages 65 and Older  Screening tests and vaccines are an important part of managing your health. A screening test is done to find possible disorders or diseases in people who don't have any symptoms. The goal is to find a disease early so lifestyle changes can be made and you can be watched more closely to reduce the risk of disease, or to detect it early enough to treat it most effectively. Screening tests are not considered diagnostic, but are used to determine if more testing is needed. Health counseling is essential, too. Below are guidelines for these, for women ages 65 and older. Talk with your healthcare provider to make sure you re up to date on what you need.  Screening Who needs it How often   Type 2 diabetes or prediabetes All women beginning at age 45 and women without symptoms at any age who are overweight or obese and have 1 or more additional risk factors for diabetes At least every 3 years   Type 2 diabetes All women with prediabetes Every year   Unhealthy alcohol use All women in this age group At routine exams   Blood pressure All women in this age group Yearly checkup if your blood pressure is normal  Normal blood pressure is less than 120/80 mm Hg  If your blood pressure reading is higher than normal, follow the advice of your healthcare provider   Breast cancer All women of average risk Mammograms should be done every 1 or 2 years. Talk with your healthcare provider about your risk factors and how often you need the test  and for how long.   Cervical cancer Only women who had abnormal screening results before age 65 Talk with your healthcare provider   Chlamydia Women at increased risk for infection At routine exams   Colorectal cancer All women at average risk in this age group through age 75 who are in good health. For women ages 76 to 85, talk with your healthcare provider about whether to continue screening. For women 85 and older, screening is not needed. Multiple tests are available and are used at different times. Possible tests include:    Flexible sigmoidoscopy every 5 years, or    Colonoscopy every 10 years, or    CT colonography (virtual colonoscopy) every 5 years, or    Yearly fecal occult blood test, or    Yearly fecal immunochemical test every year, or    Stool DNA test, every 3 years  If you choose a test other than a colonoscopy and have an abnormal test result, you will need to follow-up with a colonoscopy. Talk with your healthcare provider which test is best for you.  Some people should be screened using a different schedule because of their personal or family health history. Talk with your healthcare provider about your health history.   Depression All women in this age group At routine exams   Gonorrhea Sexually active women at increased risk for infection At yearly routine exams   Hepatitis C Anyone at increased risk; 1 time for those born between 1945 and 1965 At routine exams   High cholesterol or triglycerides All women in this age group who are at risk for coronary artery disease At least every 5 years; talk with your healthcare provider about your risk   HIV Women at increased risk for infection At routine exams; talk with your healthcare provider about your risk   Lung cancer Adults ages 55 to 74 who are in fairly good health and are at higher risk for lung cancer    Currently smoke or have quit within the past 15 years    30-pack year smoking history  Eligibility criteria and age limit (possibly up to age  80) may vary across major organizations Yearly lung cancer screening with a low dose CT scan (LDCT); talk with your healthcare provider   Obesity All women in this age group At yearly routine exams   Osteoporosis All women in this age group Routinely done every 2 years, but repeat as advised by your healthcare provider   Syphilis Women at increased risk for infection At routine exams; talk with your healthcare provider   Thyroid-stimulating hormone (TSH) Only women in this age group with symptoms of thyroid dysfunction Talk with your healthcare provider   Tuberculosis Women at increased risk for infection Talk with your healthcare provider   Vision All women in this age group Every 1 to 2 years; if you have a chronic health condition, ask your healthcare provider if you need exams more often   Vaccine Who needs it How often   Chickenpox (varicella) All women in this age group who have no record of this infection or vaccine 2 doses; second dose should be given at least 4 weeks after the first dose   Hepatitis A Women at increased risk for infection 2 or 3 doses (depending on the vaccine) given at least 6 months apart; talk with your healthcare provider   Hepatitis B Women at increased risk for infection 2 or 3 doses (depending on the vaccine); second dose should be given 1 month after the first dose; if a the third dose, it should be given at least 2 months after the second dose and at least 4 months after the first dose   Haemophilus influenza type B (HIB) Women at increased risk for infection 1 to 3 doses; talk with your healthcare provider   Influenza (flu) All women in this age group Once a year   Pneumococcal conjugate vaccine (PCV13) and pneumococcal polysaccharide vaccine (PPSV23) All women in this age group  PPSV 23: women who have not been vaccinated or have not had infection  PCV 13: women at increased risk for infection PPSV: 1 dose at age 65 or older  PCV 13: 1 dose at age 65 or older; talk with your  healthcare provider   Tetanus/diphtheria/pertussis (Td/Tdap) booster All women in this age group Td every 10 years, or a 1-time dose of Tdap instead of a Td booster after age 18, then Td every 10 years   Zoster (shingles) All women in this age group 2 vaccines are available:    Recombinant zoster vaccine (RZV; Shigrix) is recommended as the preferred shingles vaccine. It's given in a series of 2 doses. The 2nd dose is given 2 to 6 months after the first. This is given even if you've had shingles before or had a previous zoster live vaccine.    Zoster live vaccine live (ZVL; Zostavax) may be given to healthy adults over age 60. It's given in one dose.   Counseling Who needs it How often   Diet and exercise Women who are overweight or obese When diagnosed, and then at routine exams   Fall prevention (exercise and vitamin D supplements) All women in this age group At yearly routine exams   Sexually transmitted infection prevention Women at increased risk for infection-talk with your healthcare provider At routine exams   Use of daily aspirin Women up to age 70 who are at high risk for cardiovascular problems and not at increased risk for bleeding as identified by your healthcare provider When your risk is known   Use of tobacco and the health effects it can cause All women in this age group Every exam   [employer] last reviewed this educational content on 7/1/2020 2000-2021 [employer]. All rights reserved. This information is not intended as a substitute for professional medical care. Always follow your healthcare professional's instructions.

## 2021-08-09 NOTE — PROGRESS NOTES
Clinic Note    Assessment:     Assessment and Plan:  1. Encounter to establish care: Care established today.     2. Essential hypertension: She continues on Lisinopril. Goals are less than 140/90. She will start monitoring her blood pressure at home again. Daughter to update via my chart if over goal. Will recheck labs today.   - Comprehensive metabolic panel (BMP + Alb, Alk Phos, ALT, AST, Total. Bili, TP); Future  - Continue Lisinopril.  - Goals less than 140/90.  - Update provider if running over goal.    3. Acute respiratory failure with hypoxia (H)/COPD exacerbation (H): Was inpatient 06/27-07/01 for Emphysema and Pneumonia. Breathing has been better at home. She is using her inhaler once daily. She stopped smoking. She establishes care with a pulmonologist tomorrow. Lungs are clear today. Stable.     4. Acquired postural kyphosis: Noted on exam. Likely some osteoporosis. Referral placed for DXA scan.   - DX Hip/Pelvis/Spine; Future    5. Coronary artery calcification: hx of this. She is fasted. Will check lipids.     6. Smoker: She stopped smoking 06/27/21. Commended her on this.     7. Screening for endocrine, nutritional, metabolic and immunity disorder: Labs ordered today.   - CBC with platelets and differential; Future  - Lipid panel reflex to direct LDL Fasting; Future  - Vitamin D deficiency screening; Future    8. History of gout: Hx of this 6 months ago. Off of medications. Was in her knee. Stable.     9. Asymptomatic postmenopausal status: Will check a Vitamin D and Dxa scan.   - Vitamin D deficiency screening; Future  - DX Hip/Pelvis/Spine; Future       Patient Instructions     Continue watching your blood pressures. Goals are less than 140/90; really I would like it under 135/85.    Your labs are processing, I will release results on my chart once they are back.     Continue your current Lisinopril dosing.    Call [phone] to set up your bone density scan.    I will see you back in three months  for your physical, before then if anything comes up.    Keep your Pulmonology appointment as scheduled.   Patient Education     Prevention Guidelines, Women Ages 65 and Older  Screening tests and vaccines are an important part of managing your health. A screening test is done to find possible disorders or diseases in people who don't have any symptoms. The goal is to find a disease early so lifestyle changes can be made and you can be watched more closely to reduce the risk of disease, or to detect it early enough to treat it most effectively. Screening tests are not considered diagnostic, but are used to determine if more testing is needed. Health counseling is essential, too. Below are guidelines for these, for women ages 65 and older. Talk with your healthcare provider to make sure you re up to date on what you need.  Screening Who needs it How often   Type 2 diabetes or prediabetes All women beginning at age 45 and women without symptoms at any age who are overweight or obese and have 1 or more additional risk factors for diabetes At least every 3 years   Type 2 diabetes All women with prediabetes Every year   Unhealthy alcohol use All women in this age group At routine exams   Blood pressure All women in this age group Yearly checkup if your blood pressure is normal  Normal blood pressure is less than 120/80 mm Hg  If your blood pressure reading is higher than normal, follow the advice of your healthcare provider   Breast cancer All women of average risk Mammograms should be done every 1 or 2 years. Talk with your healthcare provider about your risk factors and how often you need the test and for how long.   Cervical cancer Only women who had abnormal screening results before age 65 Talk with your healthcare provider   Chlamydia Women at increased risk for infection At routine exams   Colorectal cancer All women at average risk in this age group through age 75 who are in good health. For women ages 76 to 85,  talk with your healthcare provider about whether to continue screening. For women 85 and older, screening is not needed. Multiple tests are available and are used at different times. Possible tests include:    Flexible sigmoidoscopy every 5 years, or    Colonoscopy every 10 years, or    CT colonography (virtual colonoscopy) every 5 years, or    Yearly fecal occult blood test, or    Yearly fecal immunochemical test every year, or    Stool DNA test, every 3 years  If you choose a test other than a colonoscopy and have an abnormal test result, you will need to follow-up with a colonoscopy. Talk with your healthcare provider which test is best for you.  Some people should be screened using a different schedule because of their personal or family health history. Talk with your healthcare provider about your health history.   Depression All women in this age group At routine exams   Gonorrhea Sexually active women at increased risk for infection At yearly routine exams   Hepatitis C Anyone at increased risk; 1 time for those born between 1945 and 1965 At routine exams   High cholesterol or triglycerides All women in this age group who are at risk for coronary artery disease At least every 5 years; talk with your healthcare provider about your risk   HIV Women at increased risk for infection At routine exams; talk with your healthcare provider about your risk   Lung cancer Adults ages 55 to 74 who are in fairly good health and are at higher risk for lung cancer    Currently smoke or have quit within the past 15 years    30-pack year smoking history  Eligibility criteria and age limit (possibly up to age 80) may vary across major organizations Yearly lung cancer screening with a low dose CT scan (LDCT); talk with your healthcare provider   Obesity All women in this age group At yearly routine exams   Osteoporosis All women in this age group Routinely done every 2 years, but repeat as advised by your healthcare provider    Syphilis Women at increased risk for infection At routine exams; talk with your healthcare provider   Thyroid-stimulating hormone (TSH) Only women in this age group with symptoms of thyroid dysfunction Talk with your healthcare provider   Tuberculosis Women at increased risk for infection Talk with your healthcare provider   Vision All women in this age group Every 1 to 2 years; if you have a chronic health condition, ask your healthcare provider if you need exams more often   Vaccine Who needs it How often   Chickenpox (varicella) All women in this age group who have no record of this infection or vaccine 2 doses; second dose should be given at least 4 weeks after the first dose   Hepatitis A Women at increased risk for infection 2 or 3 doses (depending on the vaccine) given at least 6 months apart; talk with your healthcare provider   Hepatitis B Women at increased risk for infection 2 or 3 doses (depending on the vaccine); second dose should be given 1 month after the first dose; if a the third dose, it should be given at least 2 months after the second dose and at least 4 months after the first dose   Haemophilus influenza type B (HIB) Women at increased risk for infection 1 to 3 doses; talk with your healthcare provider   Influenza (flu) All women in this age group Once a year   Pneumococcal conjugate vaccine (PCV13) and pneumococcal polysaccharide vaccine (PPSV23) All women in this age group  PPSV 23: women who have not been vaccinated or have not had infection  PCV 13: women at increased risk for infection PPSV: 1 dose at age 65 or older  PCV 13: 1 dose at age 65 or older; talk with your healthcare provider   Tetanus/diphtheria/pertussis (Td/Tdap) booster All women in this age group Td every 10 years, or a 1-time dose of Tdap instead of a Td booster after age 18, then Td every 10 years   Zoster (shingles) All women in this age group 2 vaccines are available:    Recombinant zoster vaccine (RZV; Shigrix) is  recommended as the preferred shingles vaccine. It's given in a series of 2 doses. The 2nd dose is given 2 to 6 months after the first. This is given even if you've had shingles before or had a previous zoster live vaccine.    Zoster live vaccine live (ZVL; Zostavax) may be given to healthy adults over age 60. It's given in one dose.   Counseling Who needs it How often   Diet and exercise Women who are overweight or obese When diagnosed, and then at routine exams   Fall prevention (exercise and vitamin D supplements) All women in this age group At yearly routine exams   Sexually transmitted infection prevention Women at increased risk for infection-talk with your healthcare provider At routine exams   Use of daily aspirin Women up to age 70 who are at high risk for cardiovascular problems and not at increased risk for bleeding as identified by your healthcare provider When your risk is known   Use of tobacco and the health effects it can cause All women in this age group Every exam   [employer] last reviewed this educational content on 7/1/2020 2000-2021 [employer]. All rights reserved. This information is not intended as a substitute for professional medical care. Always follow your healthcare professional's instructions.             Return in about 3 months (around 11/9/2021) for Routine preventive, Follow up.         Subjective:      Jasmyn Green is a 65 year old female presents today to establish care with her Daughter Julissa.    She was recently in the hospital from for Emphysema, Pneumonia, and acute respiratory failure. She was inpatient at [facility] from 06/27-07/01/21. She followed up with J Luis Calle CNP on 07/06/21 for post hospital discharge follow up.    She stopped smoking on 06/27/21. She reports previously, starting to smoke at age 14, was smoking 1 pack per day. The past 5 years she smoked 1 pack every other day.    She denies any drug or alcohol use.    She reports that her  breathing has been doing much better. She is seeing Pulmonology tomorrow.    She reports that the air quality has been more difficult for her breathing, also her AC in her car went out, so driving to and from work has made her somewhat more short of breath she is using her inhaler 1-2 times per day.    She reports no previous surgical history.    She had three children, all doing well. She is .    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    She is on her feet constantly at work, does not exercise.    She reports that her blood pressure has looked better at home. She stopped monitoring it. She is tolerating the Lisinopril well.    She offers no new concerns today.     The following portions of the patient's history were reviewed and updated as appropriate.    Review of Systems:    Review is otherwise negative except for what is mentioned above.     Social Hx:    History   Smoking Status     Former Smoker     Types: Cigarettes     Quit date: 6/27/2021   Smokeless Tobacco     Never Used     Comment: updated 8/3/2021 by TTS         Objective:     Vitals:    08/09/21 0707 08/09/21 0713   BP: (!) 164/80 (!) 160/76   Pulse: 84    Weight: 45.1 kg (99 lb 8 oz)        Exam:  General: No apparent distress. Calm. Alert and Oriented X3. Pt behavior is appropriate.  Head:Atraumatic. Normocephalic.  Neck: Supple. No adenopathy.  Eyes: PERRLA, No discharge. No strabismus. No nystagmus.  Ears: TMs pearly gray with landmarks visible.   Nose/Mouth/Throat: Patent nares, no oral lesions, pharynx clear and without exudate. Uvula mid-line. Nasal septum mid-line. Clear turbinates.   Chest/Lungs: Clear to auscultation, normal respiratory effort and rate.   Heart/Pulses: Regular rate and rhythm, no murmurs, gallops, or rubs. Capillary refill <2 seconds. No edema.   Musculoskeletal: Walks without difficulty. Kyphosis in thoracic spine.   Neurologic: Interactive, alert, no focal findings.  Skin: Warm, dry. Normal hair pattern.Normal skin  maurisio.       Patient Active Problem List   Diagnosis     COPD exacerbation (H)     History of gout     Smoker     Coronary artery calcification     Acute respiratory failure with hypoxia (H)     Lymphocytosis     Essential hypertension     Asymptomatic hypertensive urgency     Current Outpatient Medications   Medication Sig Dispense Refill     aspirin 81 mg chewable tablet [ASPIRIN 81 MG CHEWABLE TABLET] Chew 1 tablet (81 mg total) daily.  0     ibuprofen (ADVIL,MOTRIN) 200 MG tablet [IBUPROFEN (ADVIL,MOTRIN) 200 MG TABLET] Take 400 mg by mouth every 6 (six) hours as needed for pain.       ipratropium-albuteroL (COMBIVENT RESPIMAT)  mcg/actuation Mist inhaler [IPRATROPIUM-ALBUTEROL (COMBIVENT RESPIMAT)  MCG/ACTUATION MIST INHALER] Inhale 1 puff 4 (four) times a day as needed (wheezing or shortness of breath). 1 Inhaler 3     lisinopriL (PRINIVIL,ZESTRIL) 20 MG tablet [LISINOPRIL (PRINIVIL,ZESTRIL) 20 MG TABLET] Take 1 tablet (20 mg total) by mouth daily. 60 tablet 0     Miesha Donaldson, Adult-Geriatric Nurse Practitioner  [facility] - Internal Medicine Team     8/9/2021

## 2021-08-10 ENCOUNTER — OFFICE VISIT (OUTPATIENT)
Dept: PULMONOLOGY | Facility: OTHER | Age: 66
End: 2021-08-10
Payer: COMMERCIAL

## 2021-08-10 ENCOUNTER — HOSPITAL ENCOUNTER (OUTPATIENT)
Dept: RESPIRATORY THERAPY | Facility: HOSPITAL | Age: 66
Discharge: HOME | End: 2021-08-10
Admitting: INTERNAL MEDICINE
Payer: COMMERCIAL

## 2021-08-10 VITALS
SYSTOLIC BLOOD PRESSURE: 148 MMHG | DIASTOLIC BLOOD PRESSURE: 70 MMHG | OXYGEN SATURATION: 93 % | HEIGHT: 62 IN | HEART RATE: 113 BPM | BODY MASS INDEX: 18.77 KG/M2 | WEIGHT: 102 LBS

## 2021-08-10 DIAGNOSIS — J44.9 CHRONIC OBSTRUCTIVE PULMONARY DISEASE, UNSPECIFIED COPD TYPE (H): Primary | ICD-10-CM

## 2021-08-10 DIAGNOSIS — J44.9 COPD (CHRONIC OBSTRUCTIVE PULMONARY DISEASE) (H): ICD-10-CM

## 2021-08-10 LAB
DEPRECATED CALCIDIOL+CALCIFEROL SERPL-MC: 18 UG/L (ref 30–80)
HGB BLD-MCNC: 12.4 G/DL (ref 11.7–15.7)

## 2021-08-10 PROCEDURE — 99204 OFFICE O/P NEW MOD 45 MIN: CPT | Performed by: INTERNAL MEDICINE

## 2021-08-10 PROCEDURE — 94726 PLETHYSMOGRAPHY LUNG VOLUMES: CPT | Mod: 26 | Performed by: INTERNAL MEDICINE

## 2021-08-10 PROCEDURE — 94640 AIRWAY INHALATION TREATMENT: CPT

## 2021-08-10 PROCEDURE — 94726 PLETHYSMOGRAPHY LUNG VOLUMES: CPT

## 2021-08-10 PROCEDURE — 36415 COLL VENOUS BLD VENIPUNCTURE: CPT | Performed by: INTERNAL MEDICINE

## 2021-08-10 PROCEDURE — 94060 EVALUATION OF WHEEZING: CPT

## 2021-08-10 PROCEDURE — 94729 DIFFUSING CAPACITY: CPT

## 2021-08-10 PROCEDURE — 94640 AIRWAY INHALATION TREATMENT: CPT | Mod: 26 | Performed by: INTERNAL MEDICINE

## 2021-08-10 PROCEDURE — 94729 DIFFUSING CAPACITY: CPT | Mod: 26 | Performed by: INTERNAL MEDICINE

## 2021-08-10 PROCEDURE — 94060 EVALUATION OF WHEEZING: CPT | Mod: 26 | Performed by: INTERNAL MEDICINE

## 2021-08-10 PROCEDURE — 250N000009 HC RX 250: Performed by: INTERNAL MEDICINE

## 2021-08-10 PROCEDURE — 85018 HEMOGLOBIN: CPT | Performed by: INTERNAL MEDICINE

## 2021-08-10 PROCEDURE — 999N000157 HC STATISTIC RCP TIME EA 10 MIN

## 2021-08-10 RX ORDER — ALBUTEROL SULFATE 0.83 MG/ML
2.5 SOLUTION RESPIRATORY (INHALATION) EVERY 6 HOURS PRN
Status: DISCONTINUED | OUTPATIENT
Start: 2021-08-10 | End: 2021-08-10

## 2021-08-10 RX ORDER — ALBUTEROL SULFATE 90 UG/1
2 AEROSOL, METERED RESPIRATORY (INHALATION) EVERY 6 HOURS
Qty: 18 G | Refills: 4 | Status: SHIPPED | OUTPATIENT
Start: 2021-08-10 | End: 2021-12-13

## 2021-08-10 RX ORDER — ALBUTEROL SULFATE 0.83 MG/ML
2.5 SOLUTION RESPIRATORY (INHALATION)
Status: COMPLETED | OUTPATIENT
Start: 2021-08-10 | End: 2021-08-10

## 2021-08-10 RX ADMIN — ALBUTEROL SULFATE 2.5 MG: 2.5 SOLUTION RESPIRATORY (INHALATION) at 07:57

## 2021-08-10 ASSESSMENT — MIFFLIN-ST. JEOR: SCORE: 950.92

## 2021-08-10 NOTE — PROGRESS NOTES
RESPIRATORY CARE NOTE    Complete Pulmonary Function Test completed by patient.  Good patient effort and cooperation. Albuterol 2.5 mg neb given for bronchodilation.  The results of this test meet the ATS standards for acceptability and repeatability, except Dlco Iv < 90% of Vc. PT left in no distress.    Addie Tran, RT  8/10/2021

## 2021-08-10 NOTE — PROGRESS NOTES
"Pulmonary Clinic Consult Note  Date of Service: 08/10/2021    Reason for Consultation: copd    History:     HPI: Patient is a pleasant 65-year-old female, ex smoker (quit on 6/2021), who was referred here for evaluation of COPD.    Patient was hospitalized on 6/2021 at [facility] with a COPD exacerbation and pneumonia.  She was treated with antibiotics and steroids.  She was discharged home on amoxicillin as well as azithromycin and prednisone burst.  In addition she was given Combivent Respimat  mcg/actuation mist inhaler 1 puff 4 times daily as needed.  She had an echocardiogram that showed preserved ejection fraction.  CTA was done that was negative for pulmonary embolus but showed emphysematous changes.  Patient was then referred here for further evaluation.  At baseline, patient is able to walk 5-10 blocks.  She is able to go up 1 flight of stairs.  She has never had previous hospitalizations for COPD. She occasionally wheezes. She has an occasional cough, that is much improved since quitting smoking. With respect to inhalers, she is on combivent once a day.  She uses her Combivent 1-2 times a day.    PMHx/PSHx:  COPD  Gout    Social Hx:   Tobacco: 1 pack/day. Started at age 14. Quit 3 months.  Smoked for about 50 years.  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   Travel: no recent travel.  Birds: 3 dogs    Review of Systems - 10 point review of system negative except for what is mentioned in the HPI.    Exam/Data:   BP (!) 148/70   Pulse 113   Ht 1.575 m (5' 2\")   Wt 46.3 kg (102 lb)   SpO2 93%   Breastfeeding No   BMI 18.66 kg/m      EXAM:  GEN: comfortable, NAD  HEENT: NCAT, EMOI  CVS: S1S2, RRR  Lung: diminished bilaterally  Abd: soft, BS  Ext: no c/c/e  Neuro: nonfocal  Skin: no visible rash  Musculoskeletal: FROM all extremities  Psych: appropriate    DATA    Labs: reviewed in EMR and outside records where pertinent.     PFTs done on 8/10/2021: Severe " COPD.    CT chest done on 6/7/2021:  IMPRESSION:  1.  Negative for pulmonary embolism.  2.  Emphysema. Lower lobe bronchial wall thickening may be infectious or inflammatory. No airspace consolidation.  3.  Severe coronary artery disease.    Assessment/Plan:   Jasmyn Green is a 65 year old female, ex-smoker, with severe COPD recent hospitalization on 6/2021 at [facility] for COPD exacerbation.  Patient is not on home oxygen.    Recommendations:  Albuterol inhaler  Okay to stop Combivent for now and monitor. Gets her medications from [facility].  Congratulated on smoking cessation  Vaccinated for COVID-19 however is waiting for her second shot  Encouraged to remain active  Walking desat next visit  Meets criteria for low dose CT.  Will discuss next visit    F/u 3 months    Safia Rodriguez MD  Pulmonary and Critical Care Medicine    Family History   Problem Relation Age of Onset     Chronic Obstructive Pulmonary Disease Sister      Diabetes Mother      Heart Disease Mother      Lung Cancer Mother      Heart Disease Father      No Known Allergies    Medications:     Current Outpatient Medications   Medication Sig Dispense Refill     aspirin 81 mg chewable tablet [ASPIRIN 81 MG CHEWABLE TABLET] Chew 1 tablet (81 mg total) daily.  0     ibuprofen (ADVIL,MOTRIN) 200 MG tablet [IBUPROFEN (ADVIL,MOTRIN) 200 MG TABLET] Take 400 mg by mouth every 6 (six) hours as needed for pain.       ipratropium-albuteroL (COMBIVENT RESPIMAT)  mcg/actuation Mist inhaler [IPRATROPIUM-ALBUTEROL (COMBIVENT RESPIMAT)  MCG/ACTUATION MIST INHALER] Inhale 1 puff 4 (four) times a day as needed (wheezing or shortness of breath). 1 Inhaler 3     lisinopriL (PRINIVIL,ZESTRIL) 20 MG tablet [LISINOPRIL (PRINIVIL,ZESTRIL) 20 MG TABLET] Take 1 tablet (20 mg total) by mouth daily. 60 tablet 0       Much or all of the text in this note was generated through the use of the Dragon Dictate voice-to-text software. Errors in spelling or words  which seem out of context are unintentional. Sound alike errors, in particular, may have escaped editing.

## 2021-08-11 ENCOUNTER — MYC MEDICAL ADVICE (OUTPATIENT)
Dept: INTERNAL MEDICINE | Facility: CLINIC | Age: 66
End: 2021-08-11

## 2021-08-11 DIAGNOSIS — Z12.11 SCREENING FOR COLON CANCER: ICD-10-CM

## 2021-08-11 DIAGNOSIS — E78.00 HYPERCHOLESTEREMIA: Primary | ICD-10-CM

## 2021-08-11 DIAGNOSIS — Z12.31 VISIT FOR SCREENING MAMMOGRAM: ICD-10-CM

## 2021-08-11 LAB
DLCOCOR-%PRED-PRE: 30 %
DLCOCOR-PRE: 5.47 ML/MIN/MMHG
DLCOUNC-%PRED-PRE: 29 %
DLCOUNC-PRE: 5.29 ML/MIN/MMHG
DLCOUNC-PRED: 17.73 ML/MIN/MMHG
ERV-%PRED-PRE: 69 %
ERV-PRE: 0.68 L
ERV-PRED: 0.98 L
EXPTIME-PRE: 8.08 SEC
FEF2575-%PRED-POST: 16 %
FEF2575-%PRED-PRE: 13 %
FEF2575-POST: 0.31 L/SEC
FEF2575-PRE: 0.25 L/SEC
FEF2575-PRED: 1.85 L/SEC
FEFMAX-%PRED-PRE: 31 %
FEFMAX-PRE: 1.72 L/SEC
FEFMAX-PRED: 5.41 L/SEC
FEV1-%PRED-PRE: 29 %
FEV1-PRE: 0.61 L
FEV1FEV6-PRE: 37 %
FEV1FEV6-PRED: 80 %
FEV1FVC-PRE: 36 %
FEV1FVC-PRED: 79 %
FEV1SVC-PRE: 31 %
FEV1SVC-PRED: 76 %
FIFMAX-PRE: 1.85 L/SEC
FRCPLETH-%PRED-PRE: 219 %
FRCPLETH-PRE: 5.56 L
FRCPLETH-PRED: 2.54 L
FVC-%PRED-PRE: 64 %
FVC-PRE: 1.71 L
FVC-PRED: 2.64 L
IC-%PRED-PRE: 74 %
IC-PRE: 1.32 L
IC-PRED: 1.76 L
RVPLETH-%PRED-PRE: 259 %
RVPLETH-PRE: 4.88 L
RVPLETH-PRED: 1.88 L
TLCPLETH-%PRED-PRE: 155 %
TLCPLETH-PRE: 6.88 L
TLCPLETH-PRED: 4.44 L
VA-%PRED-PRE: 77 %
VA-PRE: 3.21 L
VC-%PRED-PRE: 72 %
VC-PRE: 2 L
VC-PRED: 2.74 L

## 2021-08-11 RX ORDER — ERGOCALCIFEROL 1.25 MG/1
50000 CAPSULE, LIQUID FILLED ORAL WEEKLY
Qty: 12 CAPSULE | Refills: 0 | Status: SHIPPED | OUTPATIENT
Start: 2021-08-11 | End: 2021-11-02

## 2021-08-12 ENCOUNTER — PATIENT OUTREACH (OUTPATIENT)
Dept: CARE COORDINATION | Facility: CLINIC | Age: 66
End: 2021-08-12

## 2021-08-12 NOTE — PROGRESS NOTES
Clinic Care Coordination Contact  [facility]/Voicemail    Clinical Data: Care Coordinator Outreach  Outreach attempted x 1.  Left message on patient's voicemail with call back information and requested return call.  Plan: Care Coordinator will try to reach patient again in 10 business days.    Next CHW outreach date: 8/25/21    Plan: Delegation: NONE

## 2021-08-19 ENCOUNTER — ANCILLARY PROCEDURE (OUTPATIENT)
Dept: BONE DENSITY | Facility: CLINIC | Age: 66
End: 2021-08-19
Attending: NURSE PRACTITIONER
Payer: COMMERCIAL

## 2021-08-19 DIAGNOSIS — M40.00 ACQUIRED POSTURAL KYPHOSIS: ICD-10-CM

## 2021-08-19 DIAGNOSIS — Z78.0 ASYMPTOMATIC POSTMENOPAUSAL STATUS: ICD-10-CM

## 2021-08-19 PROCEDURE — 77080 DXA BONE DENSITY AXIAL: CPT | Mod: TC | Performed by: RADIOLOGY

## 2021-08-21 ENCOUNTER — HEALTH MAINTENANCE LETTER (OUTPATIENT)
Age: 66
End: 2021-08-21

## 2021-08-23 RX ORDER — ATORVASTATIN CALCIUM 20 MG/1
20 TABLET, FILM COATED ORAL DAILY
Qty: 90 TABLET | Refills: 3 | Status: SHIPPED | OUTPATIENT
Start: 2021-08-23 | End: 2022-08-15

## 2021-08-27 ENCOUNTER — PATIENT OUTREACH (OUTPATIENT)
Dept: CARE COORDINATION | Facility: CLINIC | Age: 66
End: 2021-08-27

## 2021-08-27 ENCOUNTER — MYC MEDICAL ADVICE (OUTPATIENT)
Dept: INTERNAL MEDICINE | Facility: CLINIC | Age: 66
End: 2021-08-27

## 2021-08-27 DIAGNOSIS — M81.0 AGE-RELATED OSTEOPOROSIS WITHOUT CURRENT PATHOLOGICAL FRACTURE: Primary | ICD-10-CM

## 2021-08-27 NOTE — PROGRESS NOTES
Clinic Care Coordination Contact    Clinical Data: Care Coordinator Outreach  Outreach attempted x 2.  Left message on patient's voicemail with call back information and requested return call.  Plan: Care Coordinator will rount patients chart to [facility] SW to review for disenrollment.

## 2021-09-03 ENCOUNTER — PATIENT OUTREACH (OUTPATIENT)
Dept: NURSING | Facility: CLINIC | Age: 66
End: 2021-09-03

## 2021-09-03 NOTE — PROGRESS NOTES
Clinic Care Coordination Contact    Care Coordination Clinician Chart Review  Situation: Patient chart reviewed by care coordinator.       Background: Care Coordination initial assessment and enrollment to Care Coordination was 7/13/21.   Patient centered goals were developed with participation from patient and daughter.  RN CC handed patient off to CHW for continued outreach every 30 days.        Assessment: Per chart review, patient outreach completed by CC CHW on 8/12 & 8/27 unsuccessfully.  Patient is not actively working to accomplish goals.  Patient's goal remains appropriate and relevant at this time.   Patient is not due for updated Plan of Care.  Annual assessment will be due 7/13/22.      Goals        Financial Wellbeing (pt-stated)       Goal Statement: I would like to apply for Medicare in the next 3 months.  Date Goal set: 7/13/21  Barriers: resources  Strengths: motivated and family support  Date to Achieve By: 3 months  Patient expressed understanding of goal: yes  Action steps to achieve this goal:  1. My daughter/family will call [facility] [phone] to get information regarding insurance plans.  I understand that [facility] is unable to recommend insurance plans.  My family will help me with this process.  I do not have to wait until open enrollment.           Improve chronic symptoms (pt-stated)       Goal Statement: I would like to take my blood pressure on a daily basis over the next 2 weeks to bring to my 7/26 doctors appointment.  Date Goal set: 7/13/21  Barriers:   Strengths: motivated  Date to Achieve By: 7/26/21  Patient expressed understanding of goal: yes  Action steps to achieve this goal:  1. I will take my blood pressure on a daily basis and keep a log to bring to my appointment on 7/26.  Jacqueline Donaldson will notify me if I need to continue writing my blood pressure down after 7/26.           Medical (pt-stated)       Goal Statement: I would like to Establish Care with Jacqueline  Mendoza 7/26 @ 7:00.  Date Goal set: 7/13/21  Barriers:   Strengths: motivated  Date to Achieve By: 7/26/21  Patient expressed understanding of goal: yes  Action steps to achieve this goal:  1. I will attend my 7/26/21 appointment to establish care with a new PCP.  2. I will speak with my Community [facility] Worker and or RN regarding any recommendations made by Jacqueline for follow up.             Other (pt-stated)       Goal Statement: I would like to schedule the following recommended tests I am overdue for as recommended by my PCP within the next year.  Date Goal set: 7/13/21  Barriers: recent hospital admission, works  Strengths: family support  Date to Achieve By: 1 year  Patient expressed understanding of goal: yes  Action steps to achieve this goal:  1. I will schedule a colonoscopy.  2. I will schedule a mammogram.  3. I will speak with my Community [facility] Worker at outreach telephone calls to notify her if I am having difficulty getting these tests scheduled.                 Plan/Recommendations: The patient will continue working with Care Coordination to achieve goals as above.  CHW will involve RN CC as needed or if patient is ready to move to maintenance.  RN CC will continue to monitor progress to goals and CHW outreaches every 6 weeks.   Plan of Care updated and mailed to patient: No    Staff message received from CHW 8/27 regarding unsuccessful outreach X 2 8/12 & 8/27.  Would like for CHW to attempt to outreach to daughter instead.

## 2021-09-07 ENCOUNTER — TELEPHONE (OUTPATIENT)
Dept: RESPIRATORY THERAPY | Facility: HOSPITAL | Age: 66
End: 2021-09-07

## 2021-09-07 ENCOUNTER — MYC MEDICAL ADVICE (OUTPATIENT)
Dept: INTERNAL MEDICINE | Facility: CLINIC | Age: 66
End: 2021-09-07

## 2021-09-07 ENCOUNTER — HOSPITAL ENCOUNTER (OUTPATIENT)
Dept: MAMMOGRAPHY | Facility: CLINIC | Age: 66
Discharge: HOME | End: 2021-09-07
Attending: NURSE PRACTITIONER | Admitting: NURSE PRACTITIONER
Payer: COMMERCIAL

## 2021-09-07 DIAGNOSIS — Z12.31 VISIT FOR SCREENING MAMMOGRAM: ICD-10-CM

## 2021-09-07 DIAGNOSIS — I10 ESSENTIAL HYPERTENSION: ICD-10-CM

## 2021-09-07 PROCEDURE — 77067 SCR MAMMO BI INCL CAD: CPT

## 2021-09-07 NOTE — TELEPHONE ENCOUNTER
Spoke with Jasmyn, doing well, no questions for me to day. no issues getting and/or taking their medications, reviewed their action plan, in their green zone.  Reminded when we will call again and to call before if there is a question.  Aidee Luna, RT, Chronic Pulmonary Disease Specialist

## 2021-09-08 RX ORDER — LISINOPRIL 20 MG/1
20 TABLET ORAL DAILY
Qty: 60 TABLET | Refills: 3 | Status: SHIPPED | OUTPATIENT
Start: 2021-09-08 | End: 2022-05-09

## 2021-09-16 ENCOUNTER — PATIENT OUTREACH (OUTPATIENT)
Dept: NURSING | Facility: CLINIC | Age: 66
End: 2021-09-16

## 2021-09-16 NOTE — PROGRESS NOTES
Clinic Care Coordination Contact    Community Health Worker Follow Up    Care Gaps:     Health Maintenance Due   Topic Date Due     ADVANCE CARE PLANNING  Never done     COPD ACTION PLAN  Never done     COLORECTAL CANCER SCREENING  Never done     HEPATITIS C SCREENING  Never done     ZOSTER IMMUNIZATION (1 of 2) Never done     DTAP/TDAP/TD IMMUNIZATION (2 - Td or Tdap) 06/04/2011     MEDICARE ANNUAL WELLNESS VISIT  Never done     FALL RISK ASSESSMENT  Never done     Pneumococcal Vaccine: 65+ Years (1 of 1 - PPSV23) Never done     INFLUENZA VACCINE (1) 09/01/2021       Patient accepted scheduling phone number for [facility]  to schedule independently     Goals:   Goals Addressed as of 9/16/2021 at 12:17 PM                    Today       Financial Wellbeing (pt-stated)   20%    Added 7/16/21 by Jaz Gallagher RN      Goal Statement: I would like to apply for Medicare in the next 3 months.  Date Goal set: 7/13/21  Barriers: resources  Strengths: motivated and family support  Date to Achieve By: 3 months  Patient expressed understanding of goal: yes  Action steps to achieve this goal:  1. My daughter/family will call [facility] [phone] to get information regarding insurance plans.  I understand that [facility] is unable to recommend insurance plans.  My family will help me with this process.  I do not have to wait until open enrollment. I Julissa let [facility] know that Jasmyn had a phone appt with them and has not rescheduled yet. She will reschedule soon.    Goal Updated: 9/16/21         Medical (pt-stated)       Added 9/16/21 by Edelmira Morales      Goal Statement: I will continue to follow up with doctors for my new Diagnosis of Osteoporosis.   Date Goal set: 9/16/21  Barriers: N/A  Strengths: Motivated  Date to Achieve By: 11/1/21  Patient expressed understanding of goal: Yes  Action steps to achieve this goal:  1. I will see an Endocrinologist. Appointment scheduled October 31st.             Other (pt-stated)   50%    Added 7/16/21 by Jaz Gallagher RN      Goal Statement: I would like to schedule the following recommended tests I am overdue for as recommended by my PCP within the next year.  Date Goal set: 7/13/21  Barriers: recent hospital admission, works  Strengths: family support  Date to Achieve By: 1 year  Patient expressed understanding of goal: yes  Action steps to achieve this goal:  1. I will schedule a colonoscopy. Scheduled for October.  2. I will schedule a mammogram. Completed  3. I will speak with my Community Healthcare Worker at outreach telephone calls to notify her if I am having difficulty getting these tests scheduled. Continuous (MB)    Goal Updated: 9/16/21            Intervention and Education during outreach: CHW gave patients enmanuel Nuñez phone number for Senior Linkage Line.    CHW Plan: CHW will follow up with patients enmanuel Costa in 1 month.

## 2021-09-21 ENCOUNTER — OFFICE VISIT (OUTPATIENT)
Dept: FAMILY MEDICINE | Facility: CLINIC | Age: 66
End: 2021-09-21
Payer: COMMERCIAL

## 2021-09-21 ENCOUNTER — TELEPHONE (OUTPATIENT)
Dept: ENDOCRINOLOGY | Facility: CLINIC | Age: 66
End: 2021-09-21

## 2021-09-21 ENCOUNTER — DOCUMENTATION ONLY (OUTPATIENT)
Dept: ENDOCRINOLOGY | Facility: CLINIC | Age: 66
End: 2021-09-21

## 2021-09-21 ENCOUNTER — LAB (OUTPATIENT)
Dept: LAB | Facility: CLINIC | Age: 66
End: 2021-09-21
Payer: COMMERCIAL

## 2021-09-21 ENCOUNTER — OFFICE VISIT (OUTPATIENT)
Dept: ENDOCRINOLOGY | Facility: CLINIC | Age: 66
End: 2021-09-21
Attending: NURSE PRACTITIONER
Payer: COMMERCIAL

## 2021-09-21 VITALS
DIASTOLIC BLOOD PRESSURE: 71 MMHG | HEART RATE: 107 BPM | BODY MASS INDEX: 18.47 KG/M2 | RESPIRATION RATE: 25 BRPM | SYSTOLIC BLOOD PRESSURE: 121 MMHG | WEIGHT: 101 LBS | OXYGEN SATURATION: 93 % | TEMPERATURE: 98.4 F

## 2021-09-21 VITALS
HEART RATE: 104 BPM | BODY MASS INDEX: 18.66 KG/M2 | WEIGHT: 102 LBS | DIASTOLIC BLOOD PRESSURE: 60 MMHG | SYSTOLIC BLOOD PRESSURE: 144 MMHG

## 2021-09-21 DIAGNOSIS — M81.0 AGE-RELATED OSTEOPOROSIS WITHOUT CURRENT PATHOLOGICAL FRACTURE: ICD-10-CM

## 2021-09-21 DIAGNOSIS — E55.9 VITAMIN D DEFICIENCY: ICD-10-CM

## 2021-09-21 DIAGNOSIS — E78.00 HYPERCHOLESTEREMIA: ICD-10-CM

## 2021-09-21 DIAGNOSIS — F17.201 TOBACCO USE DISORDER, SEVERE, IN EARLY REMISSION: ICD-10-CM

## 2021-09-21 DIAGNOSIS — J44.9 CHRONIC OBSTRUCTIVE PULMONARY DISEASE, UNSPECIFIED COPD TYPE (H): Primary | ICD-10-CM

## 2021-09-21 DIAGNOSIS — I25.10 CORONARY ARTERY CALCIFICATION: Chronic | ICD-10-CM

## 2021-09-21 LAB
ALBUMIN SERPL-MCNC: 3.2 G/DL (ref 3.5–5)
ALP SERPL-CCNC: 124 U/L (ref 45–120)
ALT SERPL W P-5'-P-CCNC: 26 U/L (ref 0–45)
AST SERPL W P-5'-P-CCNC: 22 U/L (ref 0–40)
BILIRUB DIRECT SERPL-MCNC: 0.1 MG/DL
BILIRUB SERPL-MCNC: 0.3 MG/DL (ref 0–1)
CHOLEST SERPL-MCNC: 136 MG/DL
FASTING STATUS PATIENT QL REPORTED: YES
HDLC SERPL-MCNC: 36 MG/DL
IGA SERPL-MCNC: 322 MG/DL (ref 65–400)
LDLC SERPL CALC-MCNC: 82 MG/DL
PHOSPHATE SERPL-MCNC: 3.7 MG/DL (ref 2.5–4.5)
PROT SERPL-MCNC: 5.9 G/DL (ref 6–8)
TRIGL SERPL-MCNC: 88 MG/DL
TSH SERPL DL<=0.005 MIU/L-ACNC: 1.06 UIU/ML (ref 0.3–5)

## 2021-09-21 PROCEDURE — 84100 ASSAY OF PHOSPHORUS: CPT

## 2021-09-21 PROCEDURE — 81376 HLA II TYPING 1 LOCUS LR: CPT

## 2021-09-21 PROCEDURE — 80061 LIPID PANEL: CPT

## 2021-09-21 PROCEDURE — 84443 ASSAY THYROID STIM HORMONE: CPT

## 2021-09-21 PROCEDURE — 99213 OFFICE O/P EST LOW 20 MIN: CPT | Performed by: FAMILY MEDICINE

## 2021-09-21 PROCEDURE — 82784 ASSAY IGA/IGD/IGG/IGM EACH: CPT

## 2021-09-21 PROCEDURE — 36415 COLL VENOUS BLD VENIPUNCTURE: CPT

## 2021-09-21 PROCEDURE — 99205 OFFICE O/P NEW HI 60 MIN: CPT | Performed by: INTERNAL MEDICINE

## 2021-09-21 PROCEDURE — 99000 SPECIMEN HANDLING OFFICE-LAB: CPT

## 2021-09-21 PROCEDURE — 82306 VITAMIN D 25 HYDROXY: CPT

## 2021-09-21 PROCEDURE — 86256 FLUORESCENT ANTIBODY TITER: CPT | Mod: 90

## 2021-09-21 PROCEDURE — 83516 IMMUNOASSAY NONANTIBODY: CPT | Mod: 59

## 2021-09-21 PROCEDURE — 84165 PROTEIN E-PHORESIS SERUM: CPT

## 2021-09-21 PROCEDURE — 80076 HEPATIC FUNCTION PANEL: CPT

## 2021-09-21 RX ORDER — PREDNISONE 10 MG/1
TABLET ORAL
Qty: 32 TABLET | Refills: 0 | Status: SHIPPED | OUTPATIENT
Start: 2021-09-21 | End: 2021-10-18

## 2021-09-21 RX ORDER — PREDNISONE 10 MG/1
TABLET ORAL
Qty: 30 TABLET | Refills: 0 | Status: SHIPPED | OUTPATIENT
Start: 2021-09-21 | End: 2021-10-18

## 2021-09-21 RX ORDER — DOXYCYCLINE HYCLATE 100 MG
100 TABLET ORAL 2 TIMES DAILY
Qty: 20 TABLET | Refills: 0 | Status: SHIPPED | OUTPATIENT
Start: 2021-09-21 | End: 2021-10-01

## 2021-09-21 NOTE — PROGRESS NOTES
Julissa is calling to inform Dr Pickens that her mother is taking 20 mcg of the Vitamin D and as for Calcium, she is actually taking Calcium carbonate and not citrate, and she is taking 600 mg.

## 2021-09-21 NOTE — TELEPHONE ENCOUNTER
Julissa-daughter called.     Patient saw Dr. Pickens this morning and he wanted to know how much Vit D and calcium she was taking.    Julissa is calling to inform Dr Pickens that her mother is taking 20 mcg of the Vitamin D and as for Calcium, she is actually taking Calcium carbonate and not citrate, and she is taking 600 mg.    Julissa @ 605

## 2021-09-21 NOTE — LETTER
9/21/2021         RE: Jasmyn Green  [address] [place] [address]        Dear Colleague,    Thank you for referring your patient, Jasmyn Green, to the [facility]. Please see a copy of my visit note below.    Subjective:    New patient    Jasmyn Green is a 66 year old female who presents to discuss osteoporosis.     Recently diagnosed with osteoporosis following her first DEXA, done 8/19/2021, that showed L1-L4 T-score -2.6, lumbar TBS T-score -4.6, and her lowest T-score was -4.4 at the right total hip.     No prior low impact fractures. No falls. No prior nephrolithiasis.     Significant history of tobacco use and quit in 7/2021. Menopause in her early to mid 40s without subsequent HRT. We reviewed the standard osteoporosis dictation template and did not identify other risk factors for osteoporosis.    No prior pharmacologic therapy to reduce fracture risk.    She has taken six 50,000 international unit(s) of vitamin D once weekly and has 6 doses to go. She also takes an additional calcium/vitamin D combination tablet, 1 tablet BID. Each tablet 500 mg of calcium citrate and not sure how much vitamin D is in each tablet. 2 glasses of milk/day. Also an additional serving of dairy per day.     8/2021: GFR >90, serum calcium normal (no prior hypercalcemia), alkaline phosphatase in the high end of the normal range, 25-OH vitamin D 18 micrograms/L (ref range 30 - 80), Hgb and MCV normal    CT chest 6/2021: no vertebral compression fractures     Imaging notes severe coronary artery calcification but no prior ASCVD event. Also has COPD.     No history of malignancy. No history of radiation therapy.     1 year ago she had all teeth removed. No plan for upcoming tooth extractions.     Objective:    BMI 18.66 kg/m2. Thoracic kyphosis. Thyroid exam normal. Edentulous. Steady on her feet without a gait aid.     Assessment/Plan:    # Osteoporosis  # Vitamin D  deficiency    Given the severity of her osteoporosis with her markedly reduced T-scores and her very low trabecular bone score I think it is very reasonable to initiate anabolic therapy.  We will be cautious with romosozumab-aqqg given her severe coronary artery atherosclerosis and multiple risk factors for CAD.  I recommend either Forteo or Tymlos as first-line.  We will first perform a screen for secondary etiologies first and I have ordered urine and serum protein electrophoresis, phosphorus, vitamin D, TSH, celiac screen, and 24-hour urine collection for calcium and creatinine.  She will continue on her current dose of calcium and vitamin D for now.  We will meet to again review options when test results are back.    60 minutes spent on the date of the encounter doing chart review, history and exam, documentation and further activities as noted above.       Again, thank you for allowing me to participate in the care of your patient.        Sincerely,        Rickie Pickens MD

## 2021-09-21 NOTE — PROGRESS NOTES
S: Very pleasant 66-year-old female presents with mild cough, rhinorrhea and yellow sputum for 4 days.  ROS: Negative for fever.  Currently using albuterol 6 times a day.  Negative for vomiting or diarrhea.  SH: She is stop smoking for 3 months.    Meds: Albuterol, Combivent, lisinopril, vitamin D    O: Blood pressure 120/71, pulse 107 respiration 25 temperature 98.4, O2 sat 93% on room air  NAD  HEENT-  --TMs clear  --Sclera and conjunctiva non-injected  --Pharynx mildly-erythematous  --No rhinorrhea  Neck-  --Supple, no meningeal signs  --No cervical lymphadenopathy  Lungs--  --remarkably diminished consistent with COPD  Heart-  --Regular rate and rhythm  Skin-  --Pink and dry    A: COPD exacerbation    P: Prednisone taper  Doxycycline 100 twice daily 10 days  Increase rest  Return if not improving  Congratulated her on smoking cessation.

## 2021-09-21 NOTE — PROGRESS NOTES
Subjective:    New patient    Jasmyn Green is a 66 year old female who presents to discuss osteoporosis.     Recently diagnosed with osteoporosis following her first DEXA, done 8/19/2021, that showed L1-L4 T-score -2.6, lumbar TBS T-score -4.6, and her lowest T-score was -4.4 at the right total hip.     No prior low impact fractures. No falls. No prior nephrolithiasis.     Significant history of tobacco use and quit in 7/2021. Menopause in her early to mid 40s without subsequent HRT. We reviewed the standard osteoporosis dictation template and did not identify other risk factors for osteoporosis.    No prior pharmacologic therapy to reduce fracture risk.    She has taken six 50,000 international unit(s) of vitamin D once weekly and has 6 doses to go. She also takes an additional calcium/vitamin D combination tablet, 1 tablet BID. Each tablet 500 mg of calcium citrate and not sure how much vitamin D is in each tablet. 2 glasses of milk/day. Also an additional serving of dairy per day.     8/2021: GFR >90, serum calcium normal (no prior hypercalcemia), alkaline phosphatase in the high end of the normal range, 25-OH vitamin D 18 micrograms/L (ref range 30 - 80), Hgb and MCV normal    CT chest 6/2021: no vertebral compression fractures     Imaging notes severe coronary artery calcification but no prior ASCVD event. Also has COPD.     No history of malignancy. No history of radiation therapy.     1 year ago she had all teeth removed. No plan for upcoming tooth extractions.     Objective:    BMI 18.66 kg/m2. Thoracic kyphosis. Thyroid exam normal. Edentulous. Steady on her feet without a gait aid.     Assessment/Plan:    # Osteoporosis  # Vitamin D deficiency    Given the severity of her osteoporosis with her markedly reduced T-scores and her very low trabecular bone score I think it is very reasonable to initiate anabolic therapy.  We will be cautious with romosozumab-aqqg given her severe coronary artery  atherosclerosis and multiple risk factors for CAD.  I recommend either Forteo or Tymlos as first-line.  We will first perform a screen for secondary etiologies first and I have ordered urine and serum protein electrophoresis, phosphorus, vitamin D, TSH, celiac screen, and 24-hour urine collection for calcium and creatinine.  She will continue on her current dose of calcium and vitamin D for now.  We will meet to again review options when test results are back.    60 minutes spent on the date of the encounter doing chart review, history and exam, documentation and further activities as noted above.

## 2021-09-22 LAB
GLIADIN IGA SER-ACNC: 71 U/ML
GLIADIN IGG SER-ACNC: 19 U/ML
TOTAL PROTEIN SERUM FOR ELP: 6 G/DL (ref 6–8)
TTG IGA SER-ACNC: 2.9 U/ML
TTG IGG SER-ACNC: <0.6 U/ML

## 2021-09-23 LAB
DEPRECATED CALCIDIOL+CALCIFEROL SERPL-MC: 59 UG/L (ref 30–80)
ENDOMYSIUM IGA TITR SER IF: NORMAL {TITER}

## 2021-09-24 LAB
ALBUMIN PERCENT: 58.8 % (ref 51–67)
ALBUMIN SERPL ELPH-MCNC: 3.5 G/DL (ref 3.2–4.7)
ALPHA 1 PERCENT: 3.6 % (ref 2–4)
ALPHA 2 PERCENT: 14.9 % (ref 5–13)
ALPHA1 GLOB SERPL ELPH-MCNC: 0.2 G/DL (ref 0.1–0.3)
ALPHA2 GLOB SERPL ELPH-MCNC: 0.9 G/DL (ref 0.4–0.9)
B-GLOBULIN SERPL ELPH-MCNC: 0.9 G/DL (ref 0.7–1.2)
BETA PERCENT: 15.6 % (ref 10–17)
GAMMA GLOB SERPL ELPH-MCNC: 0.4 G/DL (ref 0.6–1.4)
GAMMA GLOBULIN PERCENT: 7.1 % (ref 9–20)
PATH ICD:: ABNORMAL
PROT PATTERN SERPL ELPH-IMP: ABNORMAL
REVIEWING PATHOLOGIST: ABNORMAL
TOTAL PROTEIN SERUM FOR ELP (SYNCED VALUE): 6 G/DL

## 2021-09-28 DIAGNOSIS — Z11.59 ENCOUNTER FOR SCREENING FOR OTHER VIRAL DISEASES: ICD-10-CM

## 2021-09-29 ENCOUNTER — MYC MEDICAL ADVICE (OUTPATIENT)
Dept: INTERNAL MEDICINE | Facility: CLINIC | Age: 66
End: 2021-09-29

## 2021-09-29 NOTE — LETTER
[facility]  [address] [address] [phone]          September 30, 2021    RE:  Jasmyn Green                                                                                                                                                       [facility] [place] [address]            To whom it may concern:    Jasmyn Green is under my professional care for her health.    Please allow the patient to wear a regular surgical mask while at work, and not an N95. She has trouble breathing with the N95.      Sincerely,        Miesha Donaldson CNP

## 2021-09-30 ENCOUNTER — LAB (OUTPATIENT)
Dept: LAB | Facility: CLINIC | Age: 66
End: 2021-09-30
Payer: COMMERCIAL

## 2021-09-30 DIAGNOSIS — E78.00 HYPERCHOLESTEREMIA: ICD-10-CM

## 2021-09-30 DIAGNOSIS — M81.0 AGE-RELATED OSTEOPOROSIS WITHOUT CURRENT PATHOLOGICAL FRACTURE: ICD-10-CM

## 2021-09-30 LAB
CALCIUM 24H UR-MRATE: 0.11 G/SPEC
CALCIUM UR-MCNC: 6 MG/DL
CALCIUM/CREAT UR: 0.16 G/G CR
COLLECT DURATION TIME UR: 24 H
CREAT 24H UR-MRATE: 0.68 G/SPEC (ref 0.8–1.8)
CREAT UR-MCNC: 38 MG/DL
SPECIMEN VOL UR: 1800 ML

## 2021-09-30 PROCEDURE — 82340 ASSAY OF CALCIUM IN URINE: CPT

## 2021-09-30 PROCEDURE — 81050 URINALYSIS VOLUME MEASURE: CPT

## 2021-10-01 LAB
DQA1* LOCUS: NORMAL
DQA1*: NORMAL
DQB1* LOCUS: NORMAL
DRSSO TEST METHOD: NORMAL
ZZZDRSSO COMMENTS: NORMAL

## 2021-10-05 ENCOUNTER — OFFICE VISIT (OUTPATIENT)
Dept: ENDOCRINOLOGY | Facility: CLINIC | Age: 66
End: 2021-10-05
Payer: COMMERCIAL

## 2021-10-05 ENCOUNTER — TELEPHONE (OUTPATIENT)
Dept: SURGERY | Facility: CLINIC | Age: 66
End: 2021-10-05

## 2021-10-05 VITALS
SYSTOLIC BLOOD PRESSURE: 118 MMHG | BODY MASS INDEX: 19.15 KG/M2 | HEART RATE: 100 BPM | DIASTOLIC BLOOD PRESSURE: 58 MMHG | WEIGHT: 104.7 LBS

## 2021-10-05 DIAGNOSIS — J43.9 PULMONARY EMPHYSEMA, UNSPECIFIED EMPHYSEMA TYPE (H): ICD-10-CM

## 2021-10-05 DIAGNOSIS — K90.0 CELIAC DISEASE: ICD-10-CM

## 2021-10-05 DIAGNOSIS — R74.8 ALKALINE PHOSPHATASE ELEVATION: ICD-10-CM

## 2021-10-05 DIAGNOSIS — I25.10 ATHEROSCLEROSIS OF NATIVE CORONARY ARTERY OF NATIVE HEART WITHOUT ANGINA PECTORIS: ICD-10-CM

## 2021-10-05 DIAGNOSIS — M81.8 OTHER OSTEOPOROSIS WITHOUT CURRENT PATHOLOGICAL FRACTURE: ICD-10-CM

## 2021-10-05 DIAGNOSIS — R77.8 ABNORMAL SPEP: ICD-10-CM

## 2021-10-05 DIAGNOSIS — E55.9 VITAMIN D DEFICIENCY: ICD-10-CM

## 2021-10-05 PROCEDURE — 99215 OFFICE O/P EST HI 40 MIN: CPT | Performed by: INTERNAL MEDICINE

## 2021-10-05 RX ORDER — ABALOPARATIDE 2000 UG/ML
80 INJECTION, SOLUTION SUBCUTANEOUS DAILY
Qty: 3.12 ML | Refills: 3 | Status: SHIPPED | OUTPATIENT
Start: 2021-10-05 | End: 2022-02-07

## 2021-10-05 NOTE — TELEPHONE ENCOUNTER
I called pt and left a detailed message for her. Provided my direct number to call if she has any additional questions.

## 2021-10-05 NOTE — PROGRESS NOTES
Subjective:    Established patient    Jasmyn Green is a 66 year old female who presents to review test results. We performed a comprehensive evaluation for secondary causes of osteoporosis with the following abnormal findings: mildly elevated alkaline phosphatase, possible Celiac disease, and abnormal SPEP. Otherwise, secondary evaluation was normal.     Objective:    Appears well. BMI 19.15 kg/m2. No splenomegaly.     Assessment/Plan:    # Osteoporosis, glucocorticoid associated    She has had significant glucocorticoid exposure over the years in the setting of COPD.     Rx Tymlos and plan to start Tymlos or Forteo, as long as there is insurance coverage and workup is reassuring against Paget's disease of bone. If not, will plan for Reclast. Avoid PO bisphosphonate due to concern for malabsorption given vitamin D deficiency and possible Celiac disease. Given severe coronary atherosclerosis on imaging, will avoid romosozumab.     # Vitamin D deficiency    Resolved with supplementation of 50,000 international unit(s) of D2 weekly x6 and starting vitamin D 40 mcg (1600 international units) daily. She also takes calcium carbonate 600 mg daily and has excellent dietary calcium intake.      # Possible Celiac disease    Celiac screen ordered in setting of vitamin D deficiency and severe osteoporosis without obvious cause. She is positive for DQB1*02 and/or DQB1*03(08), she has elevated deamidated gliadin Abs, normal TTG Abs (with normal IgA). No diarrhea, no abdominal pain, no anemia. No known Celiac disease in the family.     Referred to GI.      # Mildly elevated alkaline phosphatase    Other LFTs normal. If Tymlos/Forteo is approved, before starting therapy, we should exclude Paget's disease of bone first and will start with a bone specific alkaline phosphatase. Imaging to date has not shown any Pagetoid lesions.     # Reduced gamma globulin fraction on SPEP     There is a decrease in the gamma globulin fraction  that can seen with protracted chronic inflammatory conditions or certain lymphoproliferative disorders.    She does have COPD, a chronic inflammatory condition. No 'B' symptoms and she has no splenomegaly on exam which is reassuring.     Most recent CBC with differential 8/2021 showed mild leukocytosis and was otherwise unremarkable. Note abnormal CBC from 6/2021 was in the setting of pneumonia.     Reasonable to follow CBC with differential and consider repeat SPEP along with UPEP and serum and urine light chains.    This is unrelated to her osteoporosis, and I will let her PCP Miesha Donaldson know and defer further management/investigatoin of this to her.    I discussed this in detail with Jasmyn.     45 minutes spent on the date of the encounter doing chart review, history and exam, documentation and further activities as noted above.

## 2021-10-05 NOTE — LETTER
10/5/2021         RE: Jasmyn Green  [address] [place] [address]        Dear Colleague,    Thank you for referring your patient, Jasmyn Green, to the [facility]. Please see a copy of my visit note below.    Subjective:    Established patient    Jasmyn Green is a 66 year old female who presents to review test results. We performed a comprehensive evaluation for secondary causes of osteoporosis with the following abnormal findings: mildly elevated alkaline phosphatase, possible Celiac disease, and abnormal SPEP. Otherwise, secondary evaluation was normal.     Objective:    Appears well. BMI 19.15 kg/m2. No splenomegaly.     Assessment/Plan:    # Osteoporosis, glucocorticoid associated    She has had significant glucocorticoid exposure over the years in the setting of COPD.     Rx Tymlos and plan to start Tymlos or Forteo, as long as there is insurance coverage and workup is reassuring against Paget's disease of bone. If not, will plan for Reclast. Avoid PO bisphosphonate due to concern for malabsorption given vitamin D deficiency and possible Celiac disease. Given severe coronary atherosclerosis on imaging, will avoid romosozumab.     # Vitamin D deficiency    Resolved with supplementation of 50,000 international unit(s) of D2 weekly x6 and starting vitamin D 40 mcg (1600 international units) daily. She also takes calcium carbonate 600 mg daily and has excellent dietary calcium intake.      # Possible Celiac disease    Celiac screen ordered in setting of vitamin D deficiency and severe osteoporosis without obvious cause. She is positive for DQB1*02 and/or DQB1*03(08), she has elevated deamidated gliadin Abs, normal TTG Abs (with normal IgA). No diarrhea, no abdominal pain, no anemia. No known Celiac disease in the family.     Referred to GI.      # Mildly elevated alkaline phosphatase    Other LFTs normal. If Tymlos/Forteo is approved, before starting therapy, we  should exclude Paget's disease of bone first and will start with a bone specific alkaline phosphatase. Imaging to date has not shown any Pagetoid lesions.     # Reduced gamma globulin fraction on SPEP     There is a decrease in the gamma globulin fraction that can seen with protracted chronic inflammatory conditions or certain lymphoproliferative disorders.    She does have COPD, a chronic inflammatory condition. No 'B' symptoms and she has no splenomegaly on exam which is reassuring.     Most recent CBC with differential 8/2021 showed mild leukocytosis and was otherwise unremarkable. Note abnormal CBC from 6/2021 was in the setting of pneumonia.     Reasonable to follow CBC with differential and consider repeat SPEP along with UPEP and serum and urine light chains.    This is unrelated to her osteoporosis, and I will let her PCP Miesha Donaldson know and defer further management/investigatoin of this to her.    I discussed this in detail with Jasmyn.     45 minutes spent on the date of the encounter doing chart review, history and exam, documentation and further activities as noted above.             Again, thank you for allowing me to participate in the care of your patient.        Sincerely,        Rickie Pickens MD

## 2021-10-05 NOTE — TELEPHONE ENCOUNTER
Patient states she needs to do prep for a procedure with Dr. Hester. Wondering if there is something besides gatorade that can be taken. Can you assist?    Thank you

## 2021-10-06 ENCOUNTER — TELEPHONE (OUTPATIENT)
Dept: ENDOCRINOLOGY | Facility: CLINIC | Age: 66
End: 2021-10-06

## 2021-10-06 NOTE — TELEPHONE ENCOUNTER
PA Initiation    Medication: Tymlos - pending   Insurance Company: [employer] [place] - Phone [phone] Fax [phone]  Pharmacy Filling the Rx: [facility] - [place], [place] - [facility] [address]  Filling Pharmacy Phone: [phone]  Filling Pharmacy Fax:    Start Date: 10/6/2021

## 2021-10-08 NOTE — TELEPHONE ENCOUNTER
LMPTCB to inform PA approved with $0 copay and that [facility] will be filling and shipping medication to her. Sent detailed mychart as well.

## 2021-10-08 NOTE — TELEPHONE ENCOUNTER
Prior Authorization Approval    Authorization Effective Date: 10/6/2021  Authorization Expiration Date: 10/6/2023  Medication: Tymlos - approved  Approved Dose/Quantity: 1 pen for 30 days  Reference #: [id number]   Insurance Company: [employer] [place] - Phone [phone] Fax [phone]  Expected CoPay: $0     CoPay Card Available: Yes    Foundation Assistance Needed: no  Which Pharmacy is filling the prescription (Not needed for infusion/clinic administered): [facility] - [place], [place] - [address]  Pharmacy Notified: Yes  Patient Notified: Yes

## 2021-10-10 DIAGNOSIS — M81.8 OTHER OSTEOPOROSIS WITHOUT CURRENT PATHOLOGICAL FRACTURE: Primary | ICD-10-CM

## 2021-10-10 DIAGNOSIS — R74.8 ALKALINE PHOSPHATASE ELEVATION: ICD-10-CM

## 2021-10-12 ENCOUNTER — TELEPHONE (OUTPATIENT)
Dept: RESPIRATORY THERAPY | Facility: HOSPITAL | Age: 66
End: 2021-10-12

## 2021-10-14 ENCOUNTER — LAB (OUTPATIENT)
Dept: LAB | Facility: CLINIC | Age: 66
End: 2021-10-14
Payer: COMMERCIAL

## 2021-10-14 DIAGNOSIS — R74.8 ALKALINE PHOSPHATASE ELEVATION: ICD-10-CM

## 2021-10-14 DIAGNOSIS — M81.8 OTHER OSTEOPOROSIS WITHOUT CURRENT PATHOLOGICAL FRACTURE: ICD-10-CM

## 2021-10-14 PROCEDURE — 84080 ASSAY ALKALINE PHOSPHATASES: CPT | Mod: 90

## 2021-10-14 PROCEDURE — 82523 COLLAGEN CROSSLINKS: CPT | Mod: 90

## 2021-10-14 PROCEDURE — 99000 SPECIMEN HANDLING OFFICE-LAB: CPT

## 2021-10-14 PROCEDURE — 36415 COLL VENOUS BLD VENIPUNCTURE: CPT

## 2021-10-15 LAB — ALP BONE SERPL-MCNC: 18.2 UG/L

## 2021-10-16 ENCOUNTER — HEALTH MAINTENANCE LETTER (OUTPATIENT)
Age: 66
End: 2021-10-16

## 2021-10-16 LAB — COLLAGEN CTX SERPL-MCNC: 287 PG/ML

## 2021-10-18 ENCOUNTER — OFFICE VISIT (OUTPATIENT)
Dept: INTERNAL MEDICINE | Facility: CLINIC | Age: 66
End: 2021-10-18
Payer: COMMERCIAL

## 2021-10-18 ENCOUNTER — LAB (OUTPATIENT)
Dept: LAB | Facility: CLINIC | Age: 66
End: 2021-10-18
Attending: SURGERY

## 2021-10-18 VITALS
OXYGEN SATURATION: 95 % | DIASTOLIC BLOOD PRESSURE: 76 MMHG | WEIGHT: 105 LBS | HEART RATE: 90 BPM | SYSTOLIC BLOOD PRESSURE: 120 MMHG | HEIGHT: 62 IN | BODY MASS INDEX: 19.32 KG/M2

## 2021-10-18 DIAGNOSIS — Z12.11 ENCOUNTER FOR SCREENING COLONOSCOPY: ICD-10-CM

## 2021-10-18 DIAGNOSIS — Z87.891 HX OF SMOKING: ICD-10-CM

## 2021-10-18 DIAGNOSIS — M40.00 ACQUIRED POSTURAL KYPHOSIS: ICD-10-CM

## 2021-10-18 DIAGNOSIS — Z01.818 PREOPERATIVE EXAMINATION: ICD-10-CM

## 2021-10-18 DIAGNOSIS — I10 ESSENTIAL HYPERTENSION: ICD-10-CM

## 2021-10-18 DIAGNOSIS — E78.00 HYPERCHOLESTEREMIA: ICD-10-CM

## 2021-10-18 DIAGNOSIS — Z11.59 ENCOUNTER FOR SCREENING FOR OTHER VIRAL DISEASES: ICD-10-CM

## 2021-10-18 DIAGNOSIS — Z71.89 ADVANCED DIRECTIVES, COUNSELING/DISCUSSION: ICD-10-CM

## 2021-10-18 DIAGNOSIS — R25.2 LEG CRAMPING: ICD-10-CM

## 2021-10-18 LAB
ANION GAP SERPL CALCULATED.3IONS-SCNC: 11 MMOL/L (ref 5–18)
BASOPHILS # BLD AUTO: 0 10E3/UL (ref 0–0.2)
BASOPHILS NFR BLD AUTO: 0 %
BUN SERPL-MCNC: 19 MG/DL (ref 8–22)
CALCIUM SERPL-MCNC: 9.3 MG/DL (ref 8.5–10.5)
CHLORIDE BLD-SCNC: 105 MMOL/L (ref 98–107)
CO2 SERPL-SCNC: 23 MMOL/L (ref 22–31)
CREAT SERPL-MCNC: 0.68 MG/DL (ref 0.6–1.1)
EOSINOPHIL # BLD AUTO: 0.3 10E3/UL (ref 0–0.7)
EOSINOPHIL NFR BLD AUTO: 4 %
ERYTHROCYTE [DISTWIDTH] IN BLOOD BY AUTOMATED COUNT: 13.3 % (ref 10–15)
GFR SERPL CREATININE-BSD FRML MDRD: 90 ML/MIN/1.73M2
GLUCOSE BLD-MCNC: 89 MG/DL (ref 70–125)
HCT VFR BLD AUTO: 36.3 % (ref 35–47)
HGB BLD-MCNC: 11.6 G/DL (ref 11.7–15.7)
IMM GRANULOCYTES # BLD: 0 10E3/UL
IMM GRANULOCYTES NFR BLD: 0 %
LYMPHOCYTES # BLD AUTO: 1.5 10E3/UL (ref 0.8–5.3)
LYMPHOCYTES NFR BLD AUTO: 18 %
MAGNESIUM SERPL-MCNC: 2 MG/DL (ref 1.8–2.6)
MCH RBC QN AUTO: 28.7 PG (ref 26.5–33)
MCHC RBC AUTO-ENTMCNC: 32 G/DL (ref 31.5–36.5)
MCV RBC AUTO: 90 FL (ref 78–100)
MONOCYTES # BLD AUTO: 0.7 10E3/UL (ref 0–1.3)
MONOCYTES NFR BLD AUTO: 9 %
NEUTROPHILS # BLD AUTO: 5.7 10E3/UL (ref 1.6–8.3)
NEUTROPHILS NFR BLD AUTO: 69 %
PLATELET # BLD AUTO: 341 10E3/UL (ref 150–450)
POTASSIUM BLD-SCNC: 4.4 MMOL/L (ref 3.5–5)
RBC # BLD AUTO: 4.04 10E6/UL (ref 3.8–5.2)
SODIUM SERPL-SCNC: 139 MMOL/L (ref 136–145)
WBC # BLD AUTO: 8.2 10E3/UL (ref 4–11)

## 2021-10-18 PROCEDURE — 85025 COMPLETE CBC W/AUTO DIFF WBC: CPT | Performed by: NURSE PRACTITIONER

## 2021-10-18 PROCEDURE — 99214 OFFICE O/P EST MOD 30 MIN: CPT | Mod: 25 | Performed by: NURSE PRACTITIONER

## 2021-10-18 PROCEDURE — U0005 INFEC AGEN DETEC AMPLI PROBE: HCPCS

## 2021-10-18 PROCEDURE — 83735 ASSAY OF MAGNESIUM: CPT | Performed by: NURSE PRACTITIONER

## 2021-10-18 PROCEDURE — 90471 IMMUNIZATION ADMIN: CPT | Performed by: NURSE PRACTITIONER

## 2021-10-18 PROCEDURE — 80048 BASIC METABOLIC PNL TOTAL CA: CPT | Performed by: NURSE PRACTITIONER

## 2021-10-18 PROCEDURE — 90662 IIV NO PRSV INCREASED AG IM: CPT | Performed by: NURSE PRACTITIONER

## 2021-10-18 PROCEDURE — 36415 COLL VENOUS BLD VENIPUNCTURE: CPT | Performed by: NURSE PRACTITIONER

## 2021-10-18 PROCEDURE — U0003 INFECTIOUS AGENT DETECTION BY NUCLEIC ACID (DNA OR RNA); SEVERE ACUTE RESPIRATORY SYNDROME CORONAVIRUS 2 (SARS-COV-2) (CORONAVIRUS DISEASE [COVID-19]), AMPLIFIED PROBE TECHNIQUE, MAKING USE OF HIGH THROUGHPUT TECHNOLOGIES AS DESCRIBED BY CMS-2020-01-R: HCPCS

## 2021-10-18 ASSESSMENT — MIFFLIN-ST. JEOR: SCORE: 959.53

## 2021-10-18 NOTE — PATIENT INSTRUCTIONS
Hold your Vitamins the AM of the colonoscopy.    COVID testing as scheduled.    Follow up if having new symptoms before your procedure.     Patient Education   Colonoscopy  What You Should Know  Please arrive with an adult who can drive you home after the exam and stay with you for the next six hours. The medicines used in the exam will make you sleepy. You will not be able to drive. You cannot take a bus or taxi by yourself.  What is a colonoscopy?  A colonoscopy lets the doctor look inside your large intestine (colon). The doctor guides a scope, or small camera, through the entire colon. The scope is attached to a long, flexible tube. The image from the scope appears on a large TV screen.   The scope will send air into your colon. This   opens the colon so the doctor can see it better on the screen.  The exam looks for defects such as inflamed tissue, polyps, ulcers (sores), diverticula (pouches in the wall of the colon) and early signs of cancer.  How do I prepare for the exam?  Read your guidelines for cleansing the colon. It is highly important that you follow the directions closely. If your colon is empty and clean, your exam will be more thorough and take less time.   Be sure to tell your doctor or nurse if you have ever had eye, lung or heart disease (including endocarditis or an artificial valve); diabetes; hepatitis; or any allergies to medicines.  Plan to arrive on time for your exam.    Dress in comfortable, loose clothing.    Bring your insurance card. Leave your purse, billfold, credit cards and other valuables at home.    Bring a list of your medicines and known allergies. If you have a pacemaker or ICD, please bring your information card.    We do our best to stay on time, but there may be a delay. Please bring something to pass the time, such as a newspaper or book.  What happens when I arrive?    You will fill out some paperwork, then we will take you to a private area. A nurse will check your  records and ask you questions.    You will change into a hospital gown. We may ask you to remove any jewelry.    We will review the risks and benefits of the exam. You will need to sign a consent form.    We will insert an IV (thin tube) into a vein   in your hand or arm. We will use this to give   you medicine.  What happens during the exam?  The exam takes from 15 minutes to one hour.   You may ask questions of the doctor.    You will lie on your left side on a table.    You will receive medicines to relieve pain and help you relax.    The doctor will insert the scope into your rectum (bottom). The scope is on a long, thin tube.   The doctor will slowly guide the scope into your colon. The tube bends, so it can move through the curves of your colon.    We may ask you to change positions to help the doctor move the scope.  If we see any polyps (growths), we will remove them. We do this because polyps can cause bleeding or turn into cancer. For some polyps, we will take tissue (a biopsy) to test in the lab. Most polyps turn out to be benign (not cancer).   Will it hurt?  You should feel little or no discomfort. The air will make you will feel full, and you will notice some pressure as the tube passes through your colon. Tell your doctor or nurse if you feel any pain. If you have cramps, breathe slowly to help your body relax.   What happens after the exam?    We will take you to the recovery area. We will watch you for up to one hour or until most of the medicine has worn off.    We will remove the IV. You will receive a report about your exam.    You may feel bloated or crampy for a short time because of the air that was injected. You will need to be passing air before you go home.    Your first meal should be light. Slowly return to normal meals, unless your doctor gives you other orders. Take it easy the rest of the day.    If you had a polyp removed:  ? Avoid heavy exercise for one week (no heavy lifting, sports  or other activity).  ? Avoid nuts and popcorn for one week.  ? You may have bleeding for several days after your exam. Call your doctor if bleeding is heavy or you have black or bloody stools (bowel movements).  ? If you normally take aspirin or blood thinners, ask your doctor when you can start taking them again.    You may receive more than one bill for your exam. (Separate charges may come from the clinic, doctor, lab, etc.).  What if I need to change my appointment?  If you cannot keep your appointment, please call us as soon as you can. Our center is very busy, and we will need to contact the patient who comes after you.     [facility]: [phone]    [facility]: [phone]    [facility]: [phone]    [facility]:   [phone]    [facility]: Call your clinic    [facility]: Call your clinic    [facility]: [phone]    [facility]: [phone]    Your clinic: ______________________________  For informational purposes only. Not to replace the advice of your health care provider.   Copyright   2007 [employer] [facility]. All rights reserved. Developed in   collaboration with [facility]. [id number] - REV 3/18

## 2021-10-18 NOTE — H&P (VIEW-ONLY)
[facility]  [address] [facility] [address]  Phone: [phone]  Fax: [phone]  Primary Provider: Miesha Donaldson    PREOPERATIVE EVALUATION:  Today's date: 10/18/2021    Jasmyn Green is a 66 year old female who presents for a preoperative evaluation.    Surgical Information:  Surgery/Procedure: Colonoscopy  Surgery Location: [facility]  Surgeon: Dr. Hester  Surgery Date: 10/21/21  Time of Surgery:   Where patient plans to recover: At home with family  Fax number for surgical facility: Note does not need to be faxed, will be available electronically in Epic.    Type of Anesthesia Anticipated: to be determined    Assessment & Plan     The proposed surgical procedure is considered LOW risk.    Preoperative examination: No NSAIDS 7 days before procedure. Hold Vitamins the AM of procedure. Tylenol as needed for pain control, COVID testing is scheduled this morning. Follow up prior to procedure if having new symptoms.   - CBC with platelets and differential    Encounter for screening colonoscopy: To be done.     Advanced directives, counseling/discussion: She would like to be a DNR/DNI; life choices filled out with daughter and the patient today in office.     Leg cramping: She is having about three cups of water per day. Will check electrolytes and kidney function. Push fluids.  - Basic metabolic panel  (Ca, Cl, CO2, Creat, Gluc, K, Na, BUN)  - Magnesium  - Push water intake, goals are 8 glasses per day.  - Follow up if symptoms persist or worsen.     Essential hypertension: She continues on lisinopril. Blood pressure today in office was 120/76. Stable.    Hypercholesteremia: She continues on atorvastatin. Stable.     Acquired postural kyphosis: known kyphosis.     Hx of smoking: She stopped smoking 06/27/21.    Risks and Recommendations:  The patient has the following additional risks and recommendations for perioperative  complications:  Pulmonary:    - Active nicotine user, advised smoking cessation   - Known COPD from smoking.    Medication Instructions:  Hold Vitamins the AM of surgery.    RECOMMENDATION:  APPROVAL GIVEN to proceed with proposed procedure, without further diagnostic evaluation.    Subjective     HPI related to upcoming procedure: The patient presents today for a preoperative physical.    She will be having a colonoscopy done at [facility].    This is a screening colonoscopy.    She denies any issues with anesthesia in the past.    She does have known COPD, she continues to use her inhalers as prescribed. She stopped smoking in June.     She reports that she has had increased leg cramps, last week more so than this week. She is on her feet all day working on a kitchen. She is drinking about three glasses of water per day. Encouraged her to push her fluids, we will check her electrolytes today.    Advanced directive filled out in clinic today. She is a DNR/DNI.    Discussed holding her Vitamins the AM of surgery. No NSAIDS 7 days prior.    COVID testing is scheduled for 10am today.    Preop Questions 10/18/2021   1. Have you ever had a heart attack or stroke? No   2. Have you ever had surgery on your heart or blood vessels, such as a stent placement, a coronary artery bypass, or surgery on an artery in your head, neck, heart, or legs? No   3. Do you have chest pain with activity? No   4. Do you have a history of  heart failure? No   5. Do you currently have a cold, bronchitis or symptoms of other infection? No   6. Do you have a cough, shortness of breath, or wheezing? YES - Wheezing, chronic cough, has COPD.    7. Do you or anyone in your family have previous history of blood clots? No   8. Do you or does anyone in your family have a serious bleeding problem such as prolonged bleeding following surgeries or cuts? No   9. Have you ever had problems with anemia or been told to take iron pills? No   10.  Have you had any abnormal blood loss such as black, tarry or bloody stools, or abnormal vaginal bleeding? No   11. Have you ever had a blood transfusion? No   12. Are you willing to have a blood transfusion if it is medically needed before, during, or after your surgery? Yes   13. Have you or any of your relatives ever had problems with anesthesia? No   14. Do you have sleep apnea, excessive snoring or daytime drowsiness? No   15. Do you have any artifical heart valves or other implanted medical devices like a pacemaker, defibrillator, or continuous glucose monitor? No   16. Do you have artificial joints? No   17. Are you allergic to latex? No       Health Care Directive:  Patient does not have a Health Care Directive or Living Will: Patient states has Advance Directive and will bring in a copy to clinic. DNR/DNI.     Preoperative Review of :   reviewed - no record of controlled substances prescribed.      Review of Systems  Constitutional, neuro, ENT, endocrine, pulmonary, cardiac, gastrointestinal, genitourinary, musculoskeletal, integument and psychiatric systems are negative, except as otherwise noted.    Patient Active Problem List    Diagnosis Date Noted     Essential hypertension 06/30/2021     Priority: Medium     Asymptomatic hypertensive urgency 06/30/2021     Priority: Medium     Acute respiratory failure with hypoxia (H) 06/28/2021     Priority: Medium     Lymphocytosis 06/28/2021     Priority: Medium     COPD exacerbation (H) 06/27/2021     Priority: Medium     History of gout 06/27/2021     Priority: Medium     Smoker 06/27/2021     Priority: Medium     She is going to quit admit COPD (6/27/2021)         Coronary artery calcification 06/27/2021     Priority: Medium     IMPRESSION: CTA chest 6/27/2021  1.  Negative for pulmonary embolism.  2.  Emphysema. Lower lobe bronchial wall thickening may be infectious or   inflammatory. No airspace consolidation.  3.  Severe coronary artery disease.           Past Medical History:   Diagnosis Date     COPD (chronic obstructive pulmonary disease) (H)      Gout      Past Surgical History:   Procedure Laterality Date     VAGINAL DELIVERY      x 3 ; remote     Current Outpatient Medications   Medication Sig Dispense Refill     Abaloparatide (TYMLOS) 3120 MCG/1.56ML SOPN injection Inject 0.04 mLs (80 mcg) Subcutaneous daily 3.12 mL 3     albuterol (PROAIR HFA/PROVENTIL HFA/VENTOLIN HFA) 108 (90 Base) MCG/ACT inhaler Inhale 2 puffs into the lungs every 6 hours 18 g 4     aspirin 81 mg chewable tablet [ASPIRIN 81 MG CHEWABLE TABLET] Chew 1 tablet (81 mg total) daily.  0     atorvastatin (LIPITOR) 20 MG tablet Take 1 tablet (20 mg) by mouth daily 90 tablet 3     Calcium Carb-Cholecalciferol (CALCIUM 500+D3 PO)        ibuprofen (ADVIL,MOTRIN) 200 MG tablet [IBUPROFEN (ADVIL,MOTRIN) 200 MG TABLET] Take 400 mg by mouth every 6 (six) hours as needed for pain.       ipratropium-albuteroL (COMBIVENT RESPIMAT)  mcg/actuation Mist inhaler [IPRATROPIUM-ALBUTEROL (COMBIVENT RESPIMAT)  MCG/ACTUATION MIST INHALER] Inhale 1 puff 4 (four) times a day as needed (wheezing or shortness of breath). 1 Inhaler 3     lisinopril (ZESTRIL) 20 MG tablet Take 1 tablet (20 mg) by mouth daily 60 tablet 3     vitamin D2 (ERGOCALCIFEROL) 50011 units (1250 mcg) capsule Take 1 capsule (50,000 Units) by mouth once a week 12 capsule 0       No Known Allergies     Social History     Tobacco Use     Smoking status: Former Smoker     Types: Cigarettes     Quit date: 2021     Years since quittin.3     Smokeless tobacco: Never Used     Tobacco comment: updated 8/3/2021 by TTS   Substance Use Topics     Alcohol use: Not Currently     Family History   Problem Relation Age of Onset     Chronic Obstructive Pulmonary Disease Sister      Diabetes Mother      Heart Disease Mother      Lung Cancer Mother      Heart Disease Father      History   Drug Use Unknown         Objective     /76 (BP  "Location: Right arm, Patient Position: Sitting, Cuff Size: Adult Regular)   Pulse 90   Ht 1.575 m (5' 2\")   Wt 47.6 kg (105 lb)   SpO2 95%   BMI 19.20 kg/m      Physical Exam  GENERAL APPEARANCE: healthy, alert and no distress  HENT: ear canals and TM's normal and nose and mouth without ulcers or lesions  RESP: lungs clear to auscultation - no rales, rhonchi or wheezes  CV: regular rate and rhythm, normal S1 S2, no S3 or S4 and no murmur, click or rub   ABDOMEN: soft, nontender, no HSM or masses and bowel sounds normal  NEURO: Normal strength and tone, sensory exam grossly normal, mentation intact and speech normal    Recent Labs   Lab Test 08/10/21  0735 08/09/21  0742 07/06/21  1142 06/30/21  0555 06/30/21  0555 06/28/21  0603 06/27/21  1301 06/27/21  1301   HGB 12.4 12.4  --   --   --    < >   < >  --    PLT  --  318  --   --  406  --    < >  --    INR  --   --   --   --   --   --   --  1.32*   NA  --  140 140   < > 142  --    < >  --    POTASSIUM  --  4.9 5.0   < > 4.0  --    < >  --    CR  --  0.63 0.59*   < > 0.58*  --    < >  --     < > = values in this interval not displayed.        Diagnostics:  Recent Results (from the past 24 hour(s))   CBC with platelets and differential    Collection Time: 10/18/21  8:58 AM   Result Value Ref Range    WBC Count 8.2 4.0 - 11.0 10e3/uL    RBC Count 4.04 3.80 - 5.20 10e6/uL    Hemoglobin 11.6 (L) 11.7 - 15.7 g/dL    Hematocrit 36.3 35.0 - 47.0 %    MCV 90 78 - 100 fL    MCH 28.7 26.5 - 33.0 pg    MCHC 32.0 31.5 - 36.5 g/dL    RDW 13.3 10.0 - 15.0 %    Platelet Count 341 150 - 450 10e3/uL    % Neutrophils 69 %    % Lymphocytes 18 %    % Monocytes 9 %    % Eosinophils 4 %    % Basophils 0 %    % Immature Granulocytes 0 %    Absolute Neutrophils 5.7 1.6 - 8.3 10e3/uL    Absolute Lymphocytes 1.5 0.8 - 5.3 10e3/uL    Absolute Monocytes 0.7 0.0 - 1.3 10e3/uL    Absolute Eosinophils 0.3 0.0 - 0.7 10e3/uL    Absolute Basophils 0.0 0.0 - 0.2 10e3/uL    Absolute Immature " Granulocytes 0.0 <=0.0 10e3/uL      No EKG required for low risk surgery (cataract, skin procedure, breast biopsy, etc).  EKG on 06/27 was normal.   Echocardiogram on 06/28/21 showed mild hyperdynamic left ventricle with an ejection fraction of 65-70%. No valvular disease, normal right ventricle.     Revised Cardiac Risk Index (RCRI):  The patient has the following serious cardiovascular risks for perioperative complications:   - No serious cardiac risks = 0 points     RCRI Interpretation: 0 points: Class I (very low risk - 0.4% complication rate)           Signed Electronically by: Miesha Donaldson CNP  Copy of this evaluation report is provided to requesting physician.

## 2021-10-18 NOTE — PROGRESS NOTES
[facility]  [address] [facility] [address]  Phone: [phone]  Fax: [phone]  Primary Provider: Miesha Donaldson    PREOPERATIVE EVALUATION:  Today's date: 10/18/2021    Jasmyn Green is a 66 year old female who presents for a preoperative evaluation.    Surgical Information:  Surgery/Procedure: Colonoscopy  Surgery Location: [facility]  Surgeon: Dr. Hester  Surgery Date: 10/21/21  Time of Surgery:   Where patient plans to recover: At home with family  Fax number for surgical facility: Note does not need to be faxed, will be available electronically in Epic.    Type of Anesthesia Anticipated: to be determined    Assessment & Plan     The proposed surgical procedure is considered LOW risk.    Preoperative examination: No NSAIDS 7 days before procedure. Hold Vitamins the AM of procedure. Tylenol as needed for pain control, COVID testing is scheduled this morning. Follow up prior to procedure if having new symptoms.   - CBC with platelets and differential    Encounter for screening colonoscopy: To be done.     Advanced directives, counseling/discussion: She would like to be a DNR/DNI; life choices filled out with daughter and the patient today in office.     Leg cramping: She is having about three cups of water per day. Will check electrolytes and kidney function. Push fluids.  - Basic metabolic panel  (Ca, Cl, CO2, Creat, Gluc, K, Na, BUN)  - Magnesium  - Push water intake, goals are 8 glasses per day.  - Follow up if symptoms persist or worsen.     Essential hypertension: She continues on lisinopril. Blood pressure today in office was 120/76. Stable.    Hypercholesteremia: She continues on atorvastatin. Stable.     Acquired postural kyphosis: known kyphosis.     Hx of smoking: She stopped smoking 06/27/21.    Risks and Recommendations:  The patient has the following additional risks and recommendations for perioperative  complications:  Pulmonary:    - Active nicotine user, advised smoking cessation   - Known COPD from smoking.    Medication Instructions:  Hold Vitamins the AM of surgery.    RECOMMENDATION:  APPROVAL GIVEN to proceed with proposed procedure, without further diagnostic evaluation.    Subjective     HPI related to upcoming procedure: The patient presents today for a preoperative physical.    She will be having a colonoscopy done at [facility].    This is a screening colonoscopy.    She denies any issues with anesthesia in the past.    She does have known COPD, she continues to use her inhalers as prescribed. She stopped smoking in June.     She reports that she has had increased leg cramps, last week more so than this week. She is on her feet all day working on a kitchen. She is drinking about three glasses of water per day. Encouraged her to push her fluids, we will check her electrolytes today.    Advanced directive filled out in clinic today. She is a DNR/DNI.    Discussed holding her Vitamins the AM of surgery. No NSAIDS 7 days prior.    COVID testing is scheduled for 10am today.    Preop Questions 10/18/2021   1. Have you ever had a heart attack or stroke? No   2. Have you ever had surgery on your heart or blood vessels, such as a stent placement, a coronary artery bypass, or surgery on an artery in your head, neck, heart, or legs? No   3. Do you have chest pain with activity? No   4. Do you have a history of  heart failure? No   5. Do you currently have a cold, bronchitis or symptoms of other infection? No   6. Do you have a cough, shortness of breath, or wheezing? YES - Wheezing, chronic cough, has COPD.    7. Do you or anyone in your family have previous history of blood clots? No   8. Do you or does anyone in your family have a serious bleeding problem such as prolonged bleeding following surgeries or cuts? No   9. Have you ever had problems with anemia or been told to take iron pills? No   10.  Have you had any abnormal blood loss such as black, tarry or bloody stools, or abnormal vaginal bleeding? No   11. Have you ever had a blood transfusion? No   12. Are you willing to have a blood transfusion if it is medically needed before, during, or after your surgery? Yes   13. Have you or any of your relatives ever had problems with anesthesia? No   14. Do you have sleep apnea, excessive snoring or daytime drowsiness? No   15. Do you have any artifical heart valves or other implanted medical devices like a pacemaker, defibrillator, or continuous glucose monitor? No   16. Do you have artificial joints? No   17. Are you allergic to latex? No       Health Care Directive:  Patient does not have a Health Care Directive or Living Will: Patient states has Advance Directive and will bring in a copy to clinic. DNR/DNI.     Preoperative Review of :   reviewed - no record of controlled substances prescribed.      Review of Systems  Constitutional, neuro, ENT, endocrine, pulmonary, cardiac, gastrointestinal, genitourinary, musculoskeletal, integument and psychiatric systems are negative, except as otherwise noted.    Patient Active Problem List    Diagnosis Date Noted     Essential hypertension 06/30/2021     Priority: Medium     Asymptomatic hypertensive urgency 06/30/2021     Priority: Medium     Acute respiratory failure with hypoxia (H) 06/28/2021     Priority: Medium     Lymphocytosis 06/28/2021     Priority: Medium     COPD exacerbation (H) 06/27/2021     Priority: Medium     History of gout 06/27/2021     Priority: Medium     Smoker 06/27/2021     Priority: Medium     She is going to quit admit COPD (6/27/2021)         Coronary artery calcification 06/27/2021     Priority: Medium     IMPRESSION: CTA chest 6/27/2021  1.  Negative for pulmonary embolism.  2.  Emphysema. Lower lobe bronchial wall thickening may be infectious or   inflammatory. No airspace consolidation.  3.  Severe coronary artery disease.           Past Medical History:   Diagnosis Date     COPD (chronic obstructive pulmonary disease) (H)      Gout      Past Surgical History:   Procedure Laterality Date     VAGINAL DELIVERY      x 3 ; remote     Current Outpatient Medications   Medication Sig Dispense Refill     Abaloparatide (TYMLOS) 3120 MCG/1.56ML SOPN injection Inject 0.04 mLs (80 mcg) Subcutaneous daily 3.12 mL 3     albuterol (PROAIR HFA/PROVENTIL HFA/VENTOLIN HFA) 108 (90 Base) MCG/ACT inhaler Inhale 2 puffs into the lungs every 6 hours 18 g 4     aspirin 81 mg chewable tablet [ASPIRIN 81 MG CHEWABLE TABLET] Chew 1 tablet (81 mg total) daily.  0     atorvastatin (LIPITOR) 20 MG tablet Take 1 tablet (20 mg) by mouth daily 90 tablet 3     Calcium Carb-Cholecalciferol (CALCIUM 500+D3 PO)        ibuprofen (ADVIL,MOTRIN) 200 MG tablet [IBUPROFEN (ADVIL,MOTRIN) 200 MG TABLET] Take 400 mg by mouth every 6 (six) hours as needed for pain.       ipratropium-albuteroL (COMBIVENT RESPIMAT)  mcg/actuation Mist inhaler [IPRATROPIUM-ALBUTEROL (COMBIVENT RESPIMAT)  MCG/ACTUATION MIST INHALER] Inhale 1 puff 4 (four) times a day as needed (wheezing or shortness of breath). 1 Inhaler 3     lisinopril (ZESTRIL) 20 MG tablet Take 1 tablet (20 mg) by mouth daily 60 tablet 3     vitamin D2 (ERGOCALCIFEROL) 73191 units (1250 mcg) capsule Take 1 capsule (50,000 Units) by mouth once a week 12 capsule 0       No Known Allergies     Social History     Tobacco Use     Smoking status: Former Smoker     Types: Cigarettes     Quit date: 2021     Years since quittin.3     Smokeless tobacco: Never Used     Tobacco comment: updated 8/3/2021 by TTS   Substance Use Topics     Alcohol use: Not Currently     Family History   Problem Relation Age of Onset     Chronic Obstructive Pulmonary Disease Sister      Diabetes Mother      Heart Disease Mother      Lung Cancer Mother      Heart Disease Father      History   Drug Use Unknown         Objective     /76 (BP  "Location: Right arm, Patient Position: Sitting, Cuff Size: Adult Regular)   Pulse 90   Ht 1.575 m (5' 2\")   Wt 47.6 kg (105 lb)   SpO2 95%   BMI 19.20 kg/m      Physical Exam  GENERAL APPEARANCE: healthy, alert and no distress  HENT: ear canals and TM's normal and nose and mouth without ulcers or lesions  RESP: lungs clear to auscultation - no rales, rhonchi or wheezes  CV: regular rate and rhythm, normal S1 S2, no S3 or S4 and no murmur, click or rub   ABDOMEN: soft, nontender, no HSM or masses and bowel sounds normal  NEURO: Normal strength and tone, sensory exam grossly normal, mentation intact and speech normal    Recent Labs   Lab Test 08/10/21  0735 08/09/21  0742 07/06/21  1142 06/30/21  0555 06/30/21  0555 06/28/21  0603 06/27/21  1301 06/27/21  1301   HGB 12.4 12.4  --   --   --    < >   < >  --    PLT  --  318  --   --  406  --    < >  --    INR  --   --   --   --   --   --   --  1.32*   NA  --  140 140   < > 142  --    < >  --    POTASSIUM  --  4.9 5.0   < > 4.0  --    < >  --    CR  --  0.63 0.59*   < > 0.58*  --    < >  --     < > = values in this interval not displayed.        Diagnostics:  Recent Results (from the past 24 hour(s))   CBC with platelets and differential    Collection Time: 10/18/21  8:58 AM   Result Value Ref Range    WBC Count 8.2 4.0 - 11.0 10e3/uL    RBC Count 4.04 3.80 - 5.20 10e6/uL    Hemoglobin 11.6 (L) 11.7 - 15.7 g/dL    Hematocrit 36.3 35.0 - 47.0 %    MCV 90 78 - 100 fL    MCH 28.7 26.5 - 33.0 pg    MCHC 32.0 31.5 - 36.5 g/dL    RDW 13.3 10.0 - 15.0 %    Platelet Count 341 150 - 450 10e3/uL    % Neutrophils 69 %    % Lymphocytes 18 %    % Monocytes 9 %    % Eosinophils 4 %    % Basophils 0 %    % Immature Granulocytes 0 %    Absolute Neutrophils 5.7 1.6 - 8.3 10e3/uL    Absolute Lymphocytes 1.5 0.8 - 5.3 10e3/uL    Absolute Monocytes 0.7 0.0 - 1.3 10e3/uL    Absolute Eosinophils 0.3 0.0 - 0.7 10e3/uL    Absolute Basophils 0.0 0.0 - 0.2 10e3/uL    Absolute Immature " Granulocytes 0.0 <=0.0 10e3/uL      No EKG required for low risk surgery (cataract, skin procedure, breast biopsy, etc).  EKG on 06/27 was normal.   Echocardiogram on 06/28/21 showed mild hyperdynamic left ventricle with an ejection fraction of 65-70%. No valvular disease, normal right ventricle.     Revised Cardiac Risk Index (RCRI):  The patient has the following serious cardiovascular risks for perioperative complications:   - No serious cardiac risks = 0 points     RCRI Interpretation: 0 points: Class I (very low risk - 0.4% complication rate)           Signed Electronically by: Miesha Donaldson CNP  Copy of this evaluation report is provided to requesting physician.

## 2021-10-19 ENCOUNTER — DOCUMENTATION ONLY (OUTPATIENT)
Dept: ENDOCRINOLOGY | Facility: CLINIC | Age: 66
End: 2021-10-19

## 2021-10-19 ENCOUNTER — PATIENT OUTREACH (OUTPATIENT)
Dept: CARE COORDINATION | Facility: CLINIC | Age: 66
End: 2021-10-19

## 2021-10-19 DIAGNOSIS — M81.8 OTHER OSTEOPOROSIS WITHOUT CURRENT PATHOLOGICAL FRACTURE: Primary | ICD-10-CM

## 2021-10-19 LAB — SARS-COV-2 RNA RESP QL NAA+PROBE: NEGATIVE

## 2021-10-19 ASSESSMENT — MIFFLIN-ST. JEOR: SCORE: 959.53

## 2021-10-19 NOTE — PROGRESS NOTES
# Osteoporosis    Bone turnover markers, i.e. bone specific alkaline phosphatase and beta CTX, returned in the normal range for a postmenopausal woman.  This, along with the absence of Paget's disease of bone on imaging is reassuring that her mildly elevated alkaline phosphatase is not indicative of Paget's disease of bone.  At this point will initiate therapy with Tymlos.  I placed orders to see Jasmyn back in 1 year with labs and a DEXA.

## 2021-10-19 NOTE — PROGRESS NOTES
Clinic Care Coordination Contact  RUST/Miguel    Clinical Data: Care Coordinator Outreach  Outreach attempted x 1.  Left message on patients daughter Julissa worthy with call back information and requested return call.  Plan: Care Coordinator will try to reach patient again in 10 business days.    Next CHW outreach date: 11/3/21

## 2021-10-20 ENCOUNTER — ANESTHESIA EVENT (OUTPATIENT)
Dept: SURGERY | Facility: AMBULATORY SURGERY CENTER | Age: 66
End: 2021-10-20
Payer: COMMERCIAL

## 2021-10-21 ENCOUNTER — HOSPITAL ENCOUNTER (OUTPATIENT)
Facility: AMBULATORY SURGERY CENTER | Age: 66
End: 2021-10-21
Attending: SURGERY
Payer: COMMERCIAL

## 2021-10-21 ENCOUNTER — TELEPHONE (OUTPATIENT)
Dept: ENDOCRINOLOGY | Facility: CLINIC | Age: 66
End: 2021-10-21

## 2021-10-21 ENCOUNTER — PATIENT OUTREACH (OUTPATIENT)
Dept: NURSING | Facility: CLINIC | Age: 66
End: 2021-10-21

## 2021-10-21 ENCOUNTER — ANESTHESIA (OUTPATIENT)
Dept: SURGERY | Facility: AMBULATORY SURGERY CENTER | Age: 66
End: 2021-10-21
Payer: COMMERCIAL

## 2021-10-21 VITALS
RESPIRATION RATE: 16 BRPM | SYSTOLIC BLOOD PRESSURE: 143 MMHG | WEIGHT: 105 LBS | HEIGHT: 62 IN | DIASTOLIC BLOOD PRESSURE: 64 MMHG | TEMPERATURE: 97.1 F | OXYGEN SATURATION: 95 % | BODY MASS INDEX: 19.32 KG/M2 | HEART RATE: 110 BPM

## 2021-10-21 PROCEDURE — 45378 DIAGNOSTIC COLONOSCOPY: CPT | Performed by: SURGERY

## 2021-10-21 RX ORDER — LIDOCAINE 40 MG/G
CREAM TOPICAL
Status: CANCELLED | OUTPATIENT
Start: 2021-10-21

## 2021-10-21 RX ORDER — SODIUM CHLORIDE, SODIUM LACTATE, POTASSIUM CHLORIDE, CALCIUM CHLORIDE 600; 310; 30; 20 MG/100ML; MG/100ML; MG/100ML; MG/100ML
INJECTION, SOLUTION INTRAVENOUS CONTINUOUS
Status: DISCONTINUED | OUTPATIENT
Start: 2021-10-21 | End: 2021-10-22 | Stop reason: HOSPADM

## 2021-10-21 RX ORDER — ONDANSETRON 2 MG/ML
4 INJECTION INTRAMUSCULAR; INTRAVENOUS EVERY 30 MIN PRN
Status: DISCONTINUED | OUTPATIENT
Start: 2021-10-21 | End: 2021-10-22 | Stop reason: HOSPADM

## 2021-10-21 RX ORDER — ONDANSETRON 4 MG/1
4 TABLET, ORALLY DISINTEGRATING ORAL EVERY 30 MIN PRN
Status: DISCONTINUED | OUTPATIENT
Start: 2021-10-21 | End: 2021-10-22 | Stop reason: HOSPADM

## 2021-10-21 RX ORDER — LIDOCAINE HYDROCHLORIDE 20 MG/ML
INJECTION, SOLUTION INFILTRATION; PERINEURAL PRN
Status: DISCONTINUED | OUTPATIENT
Start: 2021-10-21 | End: 2021-10-21

## 2021-10-21 RX ORDER — FENTANYL CITRATE 50 UG/ML
25 INJECTION, SOLUTION INTRAMUSCULAR; INTRAVENOUS EVERY 5 MIN PRN
Status: DISCONTINUED | OUTPATIENT
Start: 2021-10-21 | End: 2021-10-22 | Stop reason: HOSPADM

## 2021-10-21 RX ORDER — PROPOFOL 10 MG/ML
INJECTION, EMULSION INTRAVENOUS CONTINUOUS PRN
Status: DISCONTINUED | OUTPATIENT
Start: 2021-10-21 | End: 2021-10-21

## 2021-10-21 RX ORDER — FENTANYL CITRATE 50 UG/ML
25 INJECTION, SOLUTION INTRAMUSCULAR; INTRAVENOUS
Status: DISCONTINUED | OUTPATIENT
Start: 2021-10-21 | End: 2021-10-22 | Stop reason: HOSPADM

## 2021-10-21 RX ORDER — MEPERIDINE HYDROCHLORIDE 25 MG/ML
12.5 INJECTION INTRAMUSCULAR; INTRAVENOUS; SUBCUTANEOUS
Status: DISCONTINUED | OUTPATIENT
Start: 2021-10-21 | End: 2021-10-22 | Stop reason: HOSPADM

## 2021-10-21 RX ORDER — LIDOCAINE 40 MG/G
CREAM TOPICAL
Status: DISCONTINUED | OUTPATIENT
Start: 2021-10-21 | End: 2021-10-22 | Stop reason: HOSPADM

## 2021-10-21 RX ORDER — HYDROMORPHONE HCL IN WATER/PF 6 MG/30 ML
0.2 PATIENT CONTROLLED ANALGESIA SYRINGE INTRAVENOUS EVERY 5 MIN PRN
Status: DISCONTINUED | OUTPATIENT
Start: 2021-10-21 | End: 2021-10-22 | Stop reason: HOSPADM

## 2021-10-21 RX ORDER — OXYCODONE HYDROCHLORIDE 5 MG/1
5 TABLET ORAL EVERY 4 HOURS PRN
Status: DISCONTINUED | OUTPATIENT
Start: 2021-10-21 | End: 2021-10-22 | Stop reason: HOSPADM

## 2021-10-21 RX ORDER — SODIUM CHLORIDE, SODIUM LACTATE, POTASSIUM CHLORIDE, CALCIUM CHLORIDE 600; 310; 30; 20 MG/100ML; MG/100ML; MG/100ML; MG/100ML
INJECTION, SOLUTION INTRAVENOUS CONTINUOUS
Status: CANCELLED | OUTPATIENT
Start: 2021-10-21

## 2021-10-21 RX ADMIN — PROPOFOL 100 MCG/KG/MIN: 10 INJECTION, EMULSION INTRAVENOUS at 09:03

## 2021-10-21 RX ADMIN — SODIUM CHLORIDE, SODIUM LACTATE, POTASSIUM CHLORIDE, CALCIUM CHLORIDE: 600; 310; 30; 20 INJECTION, SOLUTION INTRAVENOUS at 08:38

## 2021-10-21 RX ADMIN — SODIUM CHLORIDE, SODIUM LACTATE, POTASSIUM CHLORIDE, CALCIUM CHLORIDE: 600; 310; 30; 20 INJECTION, SOLUTION INTRAVENOUS at 08:57

## 2021-10-21 RX ADMIN — LIDOCAINE HYDROCHLORIDE 3 ML: 20 INJECTION, SOLUTION INFILTRATION; PERINEURAL at 09:02

## 2021-10-21 ASSESSMENT — MIFFLIN-ST. JEOR: SCORE: 959.53

## 2021-10-21 ASSESSMENT — COPD QUESTIONNAIRES: COPD: 1

## 2021-10-21 ASSESSMENT — LIFESTYLE VARIABLES: TOBACCO_USE: 1

## 2021-10-21 NOTE — INTERVAL H&P NOTE
I have reviewed the surgical (or preoperative) H&P that is linked to this encounter, and examined the patient. There are no significant changes.  Denies having a colonoscopy in the past.  No family history of CRC or IBD.    Wilma Hester, DO  General Surgeon  [facility]  Surgery Clinic - [address]  [place] [address]?  Office: [phone]  Employed by - [employer]

## 2021-10-21 NOTE — ANESTHESIA POSTPROCEDURE EVALUATION
Patient: Jasmyn Green    Procedure: Procedure(s):  COLONOSCOPY       Diagnosis:Colon cancer screening [Z12.11]  Diagnosis Additional Information: No value filed.    Anesthesia Type:  MAC    Note:  Disposition: Outpatient   Postop Pain Control: Uneventful            Sign Out: Well controlled pain   PONV: No   Neuro/Psych: Uneventful            Sign Out: Acceptable/Baseline neuro status   Airway/Respiratory: Uneventful            Sign Out: Acceptable/Baseline resp. status   CV/Hemodynamics: Uneventful            Sign Out: Acceptable CV status; No obvious hypovolemia; No obvious fluid overload   Other NRE: NONE   DID A NON-ROUTINE EVENT OCCUR? No           Last vitals:  Vitals Value Taken Time   /78 10/21/21 1012   Temp 97.1  F (36.2  C) 10/21/21 0926   Pulse 77 10/21/21 1013   Resp 16 10/21/21 0926   SpO2 96 % 10/21/21 1013   Vitals shown include unvalidated device data.    Electronically Signed By: Danny Kay MD  October 21, 2021  10:19 AM

## 2021-10-21 NOTE — TELEPHONE ENCOUNTER
----- Message from Rickie Pickens MD sent at 10/19/2021  3:05 PM CDT -----  Regarding: patient scheduling  I ordered labs and DEXA to be done in 1 year.  I will see her in clinic afterward. -[place]

## 2021-10-21 NOTE — LETTER
[facility] COORDINATION  [address] [place] [address]    October 21, 2021        Jasmyn Green  [facility] [place] [address]          Dear Jasmyn,     Yesenia is an updated Patient Centered Plan of Care for your continued enrollment in Care Coordination. Please let us know if you have additional questions, concerns, or goals that we can assist with.    Sincerely,    Jaz Gallagher RN  Clinic Care Coordination

## 2021-10-21 NOTE — OP NOTE
COLONOSCOPY      Pre-op Dx:              Colorectal cancer screening      Post-op Dx:            Same      Procedure:             Colonoscopy      PCP:                      Miesha Donaldson      Findings:                Normal colon      Specimen:             None      Complications:      None apparent      Indication for procedure:    This is a 66-year-old female who presents for screening colonoscopy.  She has never had a colonoscopy in the past.  She denies a family history of colorectal cancer or inflammatory bowel disease.    Procedure:    After informed consent was obtained, the patient was brought to the endoscopy suite, placed in a lateral position, and given MAC anesthesia by the CRNA.  Digital rectal exam was performed, which failed to reveal any palpable masses.  A well lubricated colonoscope was advanced atraumatically into the anus taken all way up and around to the cecum.  The ileocecal valve and the appendiceal orifice were clearly identified.  The scope was slowly drawn back over a period of greater than 6 minutes.  No lesions were seen in the cecum or the ascending colon.  No lesions seen in the transverse colon, descending or sigmoid colon.  There was no evidence of colitis, arteriovenous malformations, or diverticulosis.  The scope was drawn back into the rectum and retroflexed without evidence for any significant hemorrhoidal disease.  The [place] Prep score was a 9.  The scope was then completely withdrawn, the patient was awoken, and transferred to the recovery area in stable condition.       Recommendations:   Next Colonoscopy in 10 years      Wilma Hester DO  General Surgeon  [facility]  Surgery [address]  [address] [place] [id number]  Office: [phone]  Employed by - [employer]

## 2021-10-21 NOTE — LETTER
[facility]  Patient Centered Plan of Care  About Me:        Patient Name:  Jasmyn Green    YOB: 1955  Age:         66 year old   Andrzej MRN:    [id number] Telephone Information:  Home Phone [phone]   Mobile [phone]       Address:  [address] [place] [address] Email address:  [email or web address]      Emergency Contact(s)    Name Relationship Lgl Grd Work Phone Home Phone Mobile Phone   1. DAVID NUÑEZ Daughter   [phone]    2. FARIDEH,JESSICA Other   [phone]            Primary language:  English     needed? No   [employer]:  [phone] op. 1  Other communication barriers:None    Preferred Method of Communication:     Current living arrangement: I live in a private home    Mobility Status/ Medical Equipment: No data recorded      Health Maintenance  Health Maintenance Reviewed: Up to date      My Access Plan  Medical Emergency 911   Primary Clinic Line [facility] - [phone]   24 Hour Appointment Line [phone] or  [phone]) (toll-free)   24 Hour Nurse Line [phone] (toll-free)   Preferred Urgent Care No data recorded   Preferred [facility]  [phone]     Preferred Pharmacy [facility] [address] [facility] - [place], [place] - [address]     Behavioral Health Crisis Line The National Suicide Prevention Lifeline at 1-691.988.5087 or 911             My Care Team Members  Patient Care Team       Relationship Specialty Notifications Start End    Miesha Donaldson CNP PCP - General Nurse Practitioner - Gerontology  8/9/21     Phone: [phone] Fax: [phone]         [facility]  [place] [address]    Jaz Gallagher, RN Lead Care Coordinator Primary Care - CC Admissions 7/13/21              Edelmira Morales Community Health Worker  Admissions 7/13/21     [phone]    Aidee Luna, RT Chronic Pulmonary Disease Specialist Respiratory  Therapy Admissions 7/21/21     COPD & tobacco cessation     Ph: [phone]    Miesha Donaldson CNP Assigned PCP   7/22/21     Phone: [phone] Fax: [phone] [address] [place] [address]    Safia Rodriguez MD Assigned Pulmonology Provider   8/15/21     Phone: [phone] Fax: [phone] [address] [facility] [address] [facility] [address]    Rickie Pickens MD Assigned Endocrinology Provider   9/26/21     Phone: [phone] Fax: [phone]         [facility] [address]            My Care Plans  Self Management and Treatment Plan  Goals and (Comments)  Goals        General     Financial Wellbeing (pt-stated)      Notes - Note edited  9/16/2021 12:14 PM by Edelmira Morales     Goal Statement: I would like to apply for Medicare in the next 3 months.  Date Goal set: 7/13/21  Barriers: resources  Strengths: motivated and family support  Date to Achieve By: 3 months  Patient expressed understanding of goal: yes  Action steps to achieve this goal:  1. My daughter/family will call [facility] [phone] to get information regarding insurance plans.  I understand that [facility] is unable to recommend insurance plans.  My family will help me with this process.  I do not have to wait until open enrollment. I Julissa let Jenny [facility] know that Jasmyn had a phone appt with them and has not rescheduled yet. She will reschedule soon.    Goal Updated: 9/16/21       Medical (pt-stated)      Notes - Note created  9/16/2021 12:11 PM by Edelmira Morales     Goal Statement: I will continue to follow up with doctors for my new Diagnosis of Osteoporosis.   Date Goal set: 9/16/21  Barriers: N/A  Strengths: Motivated  Date to Achieve By: 11/1/21  Patient expressed understanding of goal: Yes  Action steps to achieve this goal:  1. I will see an Endocrinologist. Appointment scheduled October 31st.          Other (pt-stated)      Notes - Note edited  9/16/2021 12:17 PM by Carmen  Edelmira BERNARD     Goal Statement: I would like to schedule the following recommended tests I am overdue for as recommended by my PCP within the next year.  Date Goal set: 7/13/21  Barriers: recent hospital admission, works  Strengths: family support  Date to Achieve By: 1 year  Patient expressed understanding of goal: yes  Action steps to achieve this goal:  1. I will schedule a colonoscopy. Scheduled for October.  2. I will schedule a mammogram. Completed  3. I will speak with my Community Healthcare Worker at outreach telephone calls to notify her if I am having difficulty getting these tests scheduled. Continuous (MB)    Goal Updated: 9/16/21             Action Plans on File:                       Advance Care Plans/Directives Type:   No data recorded    My Medical and Care Information  Problem List   Patient Active Problem List   Diagnosis     COPD exacerbation (H)     History of gout     Smoker     Coronary artery calcification     Acute respiratory failure with hypoxia (H)     Lymphocytosis     Essential hypertension     Asymptomatic hypertensive urgency      Current Medications and Allergies:  See printed Medication Report.    Care Coordination Start Date: 7/13/2021   Frequency of Care Coordination: monthly     Form Last Updated: 10/21/2021

## 2021-10-21 NOTE — PROGRESS NOTES
Clinic Care Coordination Contact    Care Coordination Clinician Chart Review  Situation: Patient chart reviewed by care coordinator.       Background: Care Coordination initial assessment and enrollment to Care Coordination was 7/13/21.   Patient centered goals were developed with participation from patient.  RN CC handed patient off to CHW for continued outreach every 30 days.        Assessment: Per chart review, patient outreach completed by CC CHW on 10/19/21.  Patient is not actively working to accomplish goals.  CHW has started to outreach to daughter instead of patient.  Patient's goals remain appropriate and relevant at this time.   Patient is due for updated Plan of Care.  Annual assessment will be due 7/13/22.      Goals        Financial Wellbeing (pt-stated)       Goal Statement: I would like to apply for Medicare in the next 3 months.  Date Goal set: 7/13/21  Barriers: resources  Strengths: motivated and family support  Date to Achieve By: 3 months  Patient expressed understanding of goal: yes  Action steps to achieve this goal:  1. My daughter/family will call [facility] [phone] to get information regarding insurance plans.  I understand that [facility] is unable to recommend insurance plans.  My family will help me with this process.  I do not have to wait until open enrollment. I Julissa let Jenny W know that Jasmyn had a phone appt with them and has not rescheduled yet. She will reschedule soon.    Goal Updated: 9/16/21         Medical (pt-stated)       Goal Statement: I will continue to follow up with doctors for my new Diagnosis of Osteoporosis.   Date Goal set: 9/16/21  Barriers: N/A  Strengths: Motivated  Date to Achieve By: 11/1/21  Patient expressed understanding of goal: Yes  Action steps to achieve this goal:  1. I will see an Endocrinologist. Appointment scheduled October 31st.            Other (pt-stated)       Goal Statement: I would like to schedule the following recommended tests I am  overdue for as recommended by my PCP within the next year.  Date Goal set: 7/13/21  Barriers: recent hospital admission, works  Strengths: family support  Date to Achieve By: 1 year  Patient expressed understanding of goal: yes  Action steps to achieve this goal:  1. I will schedule a colonoscopy. Scheduled for October.  2. I will schedule a mammogram. Completed  3. I will speak with my Community Healthcare Worker at outreach telephone calls to notify her if I am having difficulty getting these tests scheduled. Continuous (MB)    Goal Updated: 9/16/21                Plan/Recommendations: The patient will continue working with Care Coordination to achieve goals as above.  CHW will involve RN CC as needed or if patient is ready to move to maintenance.  RN CC will continue to monitor progress to goals and CHW outreaches every 6 weeks.   Plan of Care updated and mailed to patient: Yes, 10/21/21

## 2021-10-21 NOTE — TELEPHONE ENCOUNTER
[facility] Call Center    Phone Message    May a detailed message be left on voicemail: yes     Reason for Call: Other: Pt returned missed call from clinic. Pt was told that per message from provider he wanted to have her scheduled for labs and a dexa scan with provider after for about a year from now. Pt didn't want to schedule those appts yet because she already has a lot of appts and those are very far out. Pt would like a call back to schedule them sooner to the time they are needing to be done. Please review, and follow up with Pt as needed.     Action Taken: Message routed to:  Other: endocrine    Travel Screening: Not Applicable

## 2021-10-21 NOTE — ANESTHESIA PREPROCEDURE EVALUATION
Anesthesia Pre-Procedure Evaluation    Patient: Jasmyn Green   MRN: [id number] : 1955        Preoperative Diagnosis: Colon cancer screening [Z12.11]    Procedure : Procedure(s):  COLONOSCOPY          Past Medical History:   Diagnosis Date     Arthritis      COPD (chronic obstructive pulmonary disease) (H)      Coronary artery disease      Dyspnea on exertion      Gout      Hypertension       Past Surgical History:   Procedure Laterality Date     VAGINAL DELIVERY      x 3 ; remote     WISDOM TOOTH EXTRACTION        No Known Allergies   Social History     Tobacco Use     Smoking status: Former Smoker     Types: Cigarettes     Quit date: 2021     Years since quittin.3     Smokeless tobacco: Never Used     Tobacco comment: updated 8/3/2021 by TTS   Substance Use Topics     Alcohol use: Not Currently      Wt Readings from Last 1 Encounters:   10/21/21 47.6 kg (105 lb)        Anesthesia Evaluation   Pt has had prior anesthetic.     No history of anesthetic complications       ROS/MED HX  ENT/Pulmonary:     (+) tobacco use, COPD,  (-) sleep apnea and recent URI   Neurologic:    (-) no CVA   Cardiovascular:     (+) hypertension--CAD ---LOPEZ.     METS/Exercise Tolerance:     Hematologic:       Musculoskeletal:       GI/Hepatic:       Renal/Genitourinary:       Endo:    (-) Type I DM and Type II DM   Psychiatric/Substance Use:       Infectious Disease:       Malignancy:       Other:            Physical Exam    Airway        Mallampati: II   TM distance: > 3 FB   Neck ROM: full     Respiratory Devices and Support         Dental       (+) upper braces and upper dentures      Cardiovascular          Rhythm and rate: regular and normal     Pulmonary           breath sounds clear to auscultation           OUTSIDE LABS:  CBC:   Lab Results   Component Value Date    WBC 8.2 10/18/2021    WBC 11.2 (H) 2021    HGB 11.6 (L) 10/18/2021    HGB 12.4 08/10/2021    HCT 36.3 10/18/2021    HCT 39.2 2021      10/18/2021     08/09/2021     BMP:   Lab Results   Component Value Date     10/18/2021     08/09/2021    POTASSIUM 4.4 10/18/2021    POTASSIUM 4.9 08/09/2021    CHLORIDE 105 10/18/2021    CHLORIDE 106 08/09/2021    CO2 23 10/18/2021    CO2 26 08/09/2021    BUN 19 10/18/2021    BUN 17 08/09/2021    CR 0.68 10/18/2021    CR 0.63 08/09/2021    GLC 89 10/18/2021     08/09/2021     COAGS:   Lab Results   Component Value Date    PTT 28 06/27/2021    INR 1.32 (H) 06/27/2021     POC: No results found for: BGM, HCG, HCGS  HEPATIC:   Lab Results   Component Value Date    ALBUMIN 3.2 (L) 09/21/2021    PROTTOTAL 5.9 (L) 09/21/2021    ALT 26 09/21/2021    AST 22 09/21/2021    ALKPHOS 124 (H) 09/21/2021    BILITOTAL 0.3 09/21/2021     OTHER:   Lab Results   Component Value Date    LACT 1.4 06/28/2021    FLOR 9.3 10/18/2021    PHOS 3.7 09/21/2021    MAG 2.0 10/18/2021    TSH 1.06 09/21/2021       Anesthesia Plan    ASA Status:  3      Anesthesia Type: MAC.     - Reason for MAC: chronic cardiopulmonary disease, straight local not clinically adequate              Consents    Anesthesia Plan(s) and associated risks, benefits, and realistic alternatives discussed. Questions answered and patient/representative(s) expressed understanding.     - Discussed with:  Patient         Postoperative Care            Comments:                Danny Kay MD

## 2021-10-21 NOTE — ANESTHESIA CARE TRANSFER NOTE
Patient: Jasmyn Green    Procedure: Procedure(s):  COLONOSCOPY       Diagnosis: Colon cancer screening [Z12.11]  Diagnosis Additional Information: No value filed.    Anesthesia Type:   MAC     Note:    Oropharynx: oropharynx clear of all foreign objects  Level of Consciousness: drowsy  Oxygen Supplementation: face mask    Independent Airway: airway patency satisfactory and stable  Dentition: dentition unchanged  Vital Signs Stable: post-procedure vital signs reviewed and stable  Report to RN Given: handoff report given  Patient transferred to: Phase II    Handoff Report: Identifed the Patient, Identified the Reponsible Provider, Reviewed the pertinent medical history, Discussed the surgical course, Reviewed Intra-OP anesthesia mangement and issues during anesthesia, Set expectations for post-procedure period and Allowed opportunity for questions and acknowledgement of understanding      Vitals:  Vitals Value Taken Time   BP 93/50 10/21/21 0926   Temp 36.2  C (97.1  F) 10/21/21 0926   Pulse 73 10/21/21 0926   Resp 16 10/21/21 0926   SpO2 100 % 10/21/21 0926       Electronically Signed By: Danny Kay MD  October 21, 2021  9:30 AM  
Stable

## 2021-11-01 ENCOUNTER — TELEPHONE (OUTPATIENT)
Dept: RESPIRATORY THERAPY | Facility: HOSPITAL | Age: 66
End: 2021-11-01

## 2021-11-02 ENCOUNTER — OFFICE VISIT (OUTPATIENT)
Dept: PULMONOLOGY | Facility: OTHER | Age: 66
End: 2021-11-02
Payer: COMMERCIAL

## 2021-11-02 ENCOUNTER — DOCUMENTATION ONLY (OUTPATIENT)
Dept: PULMONOLOGY | Facility: OTHER | Age: 66
End: 2021-11-02

## 2021-11-02 VITALS
OXYGEN SATURATION: 92 % | SYSTOLIC BLOOD PRESSURE: 152 MMHG | WEIGHT: 103.6 LBS | DIASTOLIC BLOOD PRESSURE: 74 MMHG | HEART RATE: 92 BPM | BODY MASS INDEX: 18.95 KG/M2

## 2021-11-02 DIAGNOSIS — Z87.891 PERSONAL HISTORY OF TOBACCO USE: Primary | ICD-10-CM

## 2021-11-02 DIAGNOSIS — J43.9 PULMONARY EMPHYSEMA, UNSPECIFIED EMPHYSEMA TYPE (H): ICD-10-CM

## 2021-11-02 PROCEDURE — G0296 VISIT TO DETERM LDCT ELIG: HCPCS | Performed by: INTERNAL MEDICINE

## 2021-11-02 PROCEDURE — 99214 OFFICE O/P EST MOD 30 MIN: CPT | Mod: 25 | Performed by: INTERNAL MEDICINE

## 2021-11-02 NOTE — NURSING NOTE
Oxygen saturation walk test    Patient oxygen saturation on RA at rest is 95%.  Oxygen saturation while ambulating 300ft on RA is 82%.    Oxygen pulse dose testing  While ambulating 300ft on 2LPM at pulse dose, oxygen saturation is 94%.      DME Provider: Juana    Patient is ambulatory within his/her home.

## 2021-11-02 NOTE — PATIENT INSTRUCTIONS

## 2021-11-02 NOTE — PROGRESS NOTES
PULMONARY CLINIC FOLLOW UP NOTE    History:     HPI: Jasmyn Green is a 66 year old female, smoker quit in 6/2021, who is here for follow-up of COPD.  Patient was hospitalized for COPD on 6/2021 after which she established care with pulmonary clinic.    Interval History: Patient is here for her scheduled follow-up.  She has known COPD for which she is on albuterol inhaler and combivent.  She continues to stay off cigarettes. She has an occasional cough. Her blood pressure was noted to be elevated in clinic.  At baseline, pt is able to walk about 1-2 blocks.    PMHx/PSHx:  COPD  Gout     Social Hx:   Tobacco: 1 pack/day. Started at age 14. Quit 3 months.  Smoked for about 50 years.  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   Travel: no recent travel.  Birds: 3 dogs    ROS: 10 point review of system done. Pertinent findings are noted in the HPI.    Exam/Data:   BP (!) 166/80   Pulse 92   Wt 47 kg (103 lb 9.6 oz)   SpO2 92%   BMI 18.95 kg/m  , Body mass index is 18.95 kg/m .    GEN: comfortable, NAD  HEENT: NCAT, EMOI  CVS: S1S2, RRR  Lung: diminished bilaterally  Abd: soft, BS  Ext: no c/c/e  Neuro: nonfocal  Skin: no visible rash  Musculoskeletal: FROM all extremities  Psych: appropriate    Data:     Labs personally reviewed.      PFTs done on 8/10/2021: Severe COPD.     CT chest done on 6/7/2021:  IMPRESSION:  1.  Negative for pulmonary embolism.  2.  Emphysema. Lower lobe bronchial wall thickening may be infectious or inflammatory. No airspace consolidation.  3.  Severe coronary artery disease.          Assessment/Plan:     Jasmyn Green is a 66 year old female, ex-smoker, with severe COPD recent hospitalization on 6/2021 at [facility] for COPD exacerbation.  Patient is not on home oxygen.  However, patient demonstrated significant desaturation to 82% while ambulating in the clinic on room air.    Recommendations:  Start home oxygen at 2 L/min pulse dose  Continue  albuterol  Continue Combivent -we will change to Spiriva at this point  Vaccinated for COVID-19   Encouraged to remain active   Up-to-date on vaccinations    Due to desaturation of SaO2 less than or equal to 88% on room air at rest from COPD, home oxygen therapy will benefit my patient's condition.  The patient has tried (or considered) other medications with limited success and oxygen is still required. The patient is mobile in the home and requires portability.    FOLLOW UP: 3 months    Safia Rodriguez MD  Pulmonary and Critical Care Medicine  Electronically Signed on 11/02/2021    Current Outpatient Medications   Medication Sig Dispense Refill     Abaloparatide (TYMLOS) 3120 MCG/1.56ML SOPN injection Inject 0.04 mLs (80 mcg) Subcutaneous daily 3.12 mL 3     albuterol (PROAIR HFA/PROVENTIL HFA/VENTOLIN HFA) 108 (90 Base) MCG/ACT inhaler Inhale 2 puffs into the lungs every 6 hours 18 g 4     aspirin 81 mg chewable tablet [ASPIRIN 81 MG CHEWABLE TABLET] Chew 1 tablet (81 mg total) daily.  0     atorvastatin (LIPITOR) 20 MG tablet Take 1 tablet (20 mg) by mouth daily 90 tablet 3     Calcium Carb-Cholecalciferol (CALCIUM 500+D3 PO)        ibuprofen (ADVIL,MOTRIN) 200 MG tablet [IBUPROFEN (ADVIL,MOTRIN) 200 MG TABLET] Take 400 mg by mouth every 6 (six) hours as needed for pain.       ipratropium-albuteroL (COMBIVENT RESPIMAT)  mcg/actuation Mist inhaler [IPRATROPIUM-ALBUTEROL (COMBIVENT RESPIMAT)  MCG/ACTUATION MIST INHALER] Inhale 1 puff 4 (four) times a day as needed (wheezing or shortness of breath). 1 Inhaler 3     lisinopril (ZESTRIL) 20 MG tablet Take 1 tablet (20 mg) by mouth daily 60 tablet 3     No Known Allergies    Meds and Allergies: See EHR for the updated medication list and Allergies. These were reviewed.     Much or all of the text in this note was generated through the use of the Dragon Dictate voice-to-text software. Errors in spelling or words which seem out of context are unintentional.  Sound alike errors, in particular, may have escaped editing.      Lung Cancer Screening Shared Decision Making Visit     Jasmyn Green is eligible for lung cancer screening on the basis of the information provided in my signed lung cancer screening order.     I have discussed with patient the risks and benefits of screening for lung cancer with low-dose CT.     The risks include:  radiation exposure: one low dose chest CT has as much ionizing radiation as about 15 chest x-rays or 6 months of background radiation living in [place]    false positives: 96% of positive findings/nodules are NOT cancer, but some might still require additional diagnostic evaluation, including biopsy  over-diagnosis: some slow growing cancers that might never have been clinically significant will be detected and treated unnecessarily     The benefit of early detection of lung cancer is contingent upon adherence to annual screening or more frequent follow up if indicated.     Furthermore, reaping the benefits of screening requires Jasmyn Green to be willing and physically able to undergo diagnostic procedures, if indicated. Although no specific guide is available for determining severity of comorbidities, it is reasonable to withhold screening in patients who have greater mortality risk from other diseases.     We did discuss that the only way to prevent lung cancer is to not smoke. Smoking cessation counseling was not given.      I did not offer risk estimation using a calculator such as this one:    ShouldIScreen

## 2021-11-02 NOTE — PROGRESS NOTES
DME Provider: Juana  Date Faxed: 11/2/21  Ordering Provider: Dr. Rodriguez  Equipment ordered: oxygen with activity (pt want small POC/[employer])    (please call pt's daughter Julissa at [phone])

## 2021-11-03 ENCOUNTER — MEDICAL CORRESPONDENCE (OUTPATIENT)
Dept: HEALTH INFORMATION MANAGEMENT | Facility: CLINIC | Age: 66
End: 2021-11-03
Payer: COMMERCIAL

## 2021-11-04 ENCOUNTER — PATIENT OUTREACH (OUTPATIENT)
Dept: NURSING | Facility: CLINIC | Age: 66
End: 2021-11-04

## 2021-11-04 DIAGNOSIS — R11.0 NAUSEA: Primary | ICD-10-CM

## 2021-11-04 RX ORDER — ONDANSETRON 4 MG/1
4 TABLET, ORALLY DISINTEGRATING ORAL EVERY 8 HOURS PRN
Qty: 10 TABLET | Refills: 0 | Status: SHIPPED | OUTPATIENT
Start: 2021-11-04 | End: 2022-08-08

## 2021-11-08 ENCOUNTER — DOCUMENTATION ONLY (OUTPATIENT)
Dept: OTHER | Facility: CLINIC | Age: 66
End: 2021-11-08
Payer: COMMERCIAL

## 2021-11-10 ENCOUNTER — OFFICE VISIT (OUTPATIENT)
Dept: INTERNAL MEDICINE | Facility: CLINIC | Age: 66
End: 2021-11-10
Payer: COMMERCIAL

## 2021-11-10 VITALS
WEIGHT: 102 LBS | DIASTOLIC BLOOD PRESSURE: 56 MMHG | BODY MASS INDEX: 18.77 KG/M2 | OXYGEN SATURATION: 94 % | HEART RATE: 88 BPM | HEIGHT: 62 IN | SYSTOLIC BLOOD PRESSURE: 104 MMHG

## 2021-11-10 DIAGNOSIS — E78.00 HYPERCHOLESTEREMIA: ICD-10-CM

## 2021-11-10 DIAGNOSIS — J43.9 PULMONARY EMPHYSEMA, UNSPECIFIED EMPHYSEMA TYPE (H): ICD-10-CM

## 2021-11-10 DIAGNOSIS — Z00.00 MEDICARE ANNUAL WELLNESS VISIT, SUBSEQUENT: Primary | ICD-10-CM

## 2021-11-10 DIAGNOSIS — I10 ESSENTIAL HYPERTENSION: ICD-10-CM

## 2021-11-10 DIAGNOSIS — M81.0 AGE-RELATED OSTEOPOROSIS WITHOUT CURRENT PATHOLOGICAL FRACTURE: ICD-10-CM

## 2021-11-10 DIAGNOSIS — Z87.891 HX OF SMOKING: ICD-10-CM

## 2021-11-10 DIAGNOSIS — M40.00 ACQUIRED POSTURAL KYPHOSIS: ICD-10-CM

## 2021-11-10 PROCEDURE — 90732 PPSV23 VACC 2 YRS+ SUBQ/IM: CPT | Performed by: NURSE PRACTITIONER

## 2021-11-10 PROCEDURE — 90472 IMMUNIZATION ADMIN EACH ADD: CPT | Performed by: NURSE PRACTITIONER

## 2021-11-10 PROCEDURE — 90715 TDAP VACCINE 7 YRS/> IM: CPT | Performed by: NURSE PRACTITIONER

## 2021-11-10 PROCEDURE — 90471 IMMUNIZATION ADMIN: CPT | Performed by: NURSE PRACTITIONER

## 2021-11-10 PROCEDURE — 99397 PER PM REEVAL EST PAT 65+ YR: CPT | Mod: 25 | Performed by: NURSE PRACTITIONER

## 2021-11-10 SDOH — ECONOMIC STABILITY: FOOD INSECURITY: WITHIN THE PAST 12 MONTHS, YOU WORRIED THAT YOUR FOOD WOULD RUN OUT BEFORE YOU GOT MONEY TO BUY MORE.: NEVER TRUE

## 2021-11-10 SDOH — HEALTH STABILITY: PHYSICAL HEALTH: ON AVERAGE, HOW MANY DAYS PER WEEK DO YOU ENGAGE IN MODERATE TO STRENUOUS EXERCISE (LIKE A BRISK WALK)?: 5 DAYS

## 2021-11-10 SDOH — HEALTH STABILITY: PHYSICAL HEALTH: ON AVERAGE, HOW MANY MINUTES DO YOU ENGAGE IN EXERCISE AT THIS LEVEL?: 30 MIN

## 2021-11-10 SDOH — ECONOMIC STABILITY: INCOME INSECURITY: HOW HARD IS IT FOR YOU TO PAY FOR THE VERY BASICS LIKE FOOD, HOUSING, MEDICAL CARE, AND HEATING?: NOT HARD AT ALL

## 2021-11-10 SDOH — ECONOMIC STABILITY: FOOD INSECURITY: WITHIN THE PAST 12 MONTHS, THE FOOD YOU BOUGHT JUST DIDN'T LAST AND YOU DIDN'T HAVE MONEY TO GET MORE.: NEVER TRUE

## 2021-11-10 SDOH — ECONOMIC STABILITY: INCOME INSECURITY: IN THE LAST 12 MONTHS, WAS THERE A TIME WHEN YOU WERE NOT ABLE TO PAY THE MORTGAGE OR RENT ON TIME?: NO

## 2021-11-10 ASSESSMENT — LIFESTYLE VARIABLES
HOW OFTEN DO YOU HAVE SIX OR MORE DRINKS ON ONE OCCASION: NEVER
HOW MANY STANDARD DRINKS CONTAINING ALCOHOL DO YOU HAVE ON A TYPICAL DAY: 1 OR 2
HOW OFTEN DO YOU HAVE A DRINK CONTAINING ALCOHOL: MONTHLY OR LESS

## 2021-11-10 ASSESSMENT — MIFFLIN-ST. JEOR: SCORE: 945.92

## 2021-11-10 ASSESSMENT — SOCIAL DETERMINANTS OF HEALTH (SDOH)
DO YOU BELONG TO ANY CLUBS OR ORGANIZATIONS SUCH AS CHURCH GROUPS UNIONS, FRATERNAL OR ATHLETIC GROUPS, OR SCHOOL GROUPS?: NO
HOW OFTEN DO YOU GET TOGETHER WITH FRIENDS OR RELATIVES?: ONCE A WEEK
IN A TYPICAL WEEK, HOW MANY TIMES DO YOU TALK ON THE PHONE WITH FAMILY, FRIENDS, OR NEIGHBORS?: MORE THAN THREE TIMES A WEEK
HOW OFTEN DO YOU ATTEND CHURCH OR RELIGIOUS SERVICES?: NEVER

## 2021-11-10 ASSESSMENT — ACTIVITIES OF DAILY LIVING (ADL): CURRENT_FUNCTION: NO ASSISTANCE NEEDED

## 2021-11-10 NOTE — PROGRESS NOTES
"SUBJECTIVE:   Jasmyn Green is a 66 year old female who presents for Preventive Visit.    Patient has been advised of split billing requirements and indicates understanding: Yes   Are you in the first 12 months of your Medicare coverage?  No    The patient presents today with her Daughter for her physical.    She reports seeing Pulmonology, and she will start using oxygen for when she is out and about. Her oxygen saturation dipped to 82% when walking into her Pulmonology appointment. She would like a handicap car permit to use as well as she oftentimes is very short of breath by the time she tena and reaches the door.    She would like a tetanus and PPSV23 shot today.    Labs were recently done. Discussed that the Endocrinologist she saw, was suggesting that she see GI for possible Celiac disease. She is not having any symptoms of celiac disease. We will defer this at this time.    She will be starting treatment for her Osteoporosis.    Colonoscopy is due 10/21/2031; Mammogram is due 09/07/22, Dexa is up to date. No further pap smears.    She denies new concerns today.     Healthy Habits:    In general, how would you rate your overall health?  Good    Frequency of exercise:  None    Do you usually eat at least 4 servings of fruit and vegetables a day, include whole grains    & fiber and avoid regularly eating high fat or \"junk\" foods?  No    Taking medications regularly:  Yes    Medication side effects:  None    Ability to successfully perform activities of daily living:  No assistance needed    Home Safety:  No safety concerns identified    Hearing Impairment:  No hearing concerns    In the past 6 months, have you been bothered by leaking of urine?  No    In general, how would you rate your overall mental or emotional health?  Good      PHQ-2 Total Score:    Additional concerns today:  No    Do you feel safe in your environment? Yes    Have you ever done Advance Care Planning? (For example, a Health " Directive, POLST, or a discussion with a medical provider or your loved ones about your wishes): Yes, patient states has an Advance Care Planning document and will bring a copy to the clinic.      Fall risk  Fallen 2 or more times in the past year?: No  Any fall with injury in the past year?: No    Cognitive Screening   1) Repeat 3 items (Leader, Season, Table)    2) Clock draw: NORMAL  3) 3 item recall: Recalls 3 objects  Results: 3 items recalled: COGNITIVE IMPAIRMENT LESS LIKELY    Mini-CogTM Copyright S Mathieu. Licensed by the author for use in [facility] [employer]; reprinted with permission (francisca@[facility]). All rights reserved.      Do you have sleep apnea, excessive snoring or daytime drowsiness?: no    Reviewed and updated as needed this visit by clinical staff  Tobacco  Allergies  Meds             Reviewed and updated as needed this visit by Provider               Social History     Tobacco Use     Smoking status: Former Smoker     Types: Cigarettes     Quit date: 2021     Years since quittin.3     Smokeless tobacco: Never Used     Tobacco comment: updated 8/3/2021 by TTS   Substance Use Topics     Alcohol use: Not Currently     If you drink alcohol do you typically have >3 drinks per day or >7 drinks per week? No    Alcohol Use 11/10/2021   Prescreen: >3 drinks/day or >7 drinks/week? Not Applicable   Prescreen: >3 drinks/day or >7 drinks/week? -     Current providers sharing in care for this patient include:   Patient Care Team:  Miesha Donaldson CNP as PCP - General (Nurse Practitioner - Gerontology)  Jaz Gallagher RN as Lead Care Coordinator (Primary Care - CC)  Edelmira Morales as Community Health Worker  Aidee Luna RT as Chronic Pulmonary Disease Specialist (Respiratory Therapy)  Miesha Donaldson CNP as Assigned PCP  Safia Rodriguez MD as Assigned Pulmonology Provider  Rickie Pickens MD as Assigned Endocrinology Provider    The following health maintenance items are  "reviewed in Epic and correct as of today:  Health Maintenance Due   Topic Date Due     COPD ACTION PLAN  Never done     HEPATITIS C SCREENING  Never done     ZOSTER IMMUNIZATION (1 of 2) Never done     FALL RISK ASSESSMENT  Never done     Lab work is in process  Pneumonia Vaccine:Adults age 65+ who received their first dose of Pneumovax (PPSV23) prior to age 65 years: Should be given PCV 13 > 1 year after their most recent PPSV23 AND should be given a another dose of PPSV23 > 5 years after their most recent dose of PPSV23    Breast CA Risk Assessment (FHS-7) 11/10/2021   Do you have a family history of breast, colon, or ovarian cancer? No / Unknown       Mammogram Screening: Recommended mammography every 1-2 years with patient discussion and risk factor consideration  Pertinent mammograms are reviewed under the imaging tab.    Review of Systems  Constitutional, HEENT, cardiovascular, pulmonary, GI, , musculoskeletal, neuro, skin, endocrine and psych systems are negative, except as otherwise noted.    OBJECTIVE:   /56 (BP Location: Right arm, Patient Position: Sitting, Cuff Size: Adult Small)   Pulse 88   Ht 1.575 m (5' 2\")   Wt 46.3 kg (102 lb)   SpO2 94%   BMI 18.66 kg/m   Estimated body mass index is 18.66 kg/m  as calculated from the following:    Height as of this encounter: 1.575 m (5' 2\").    Weight as of this encounter: 46.3 kg (102 lb).  Physical Exam  GENERAL: healthy, alert and no distress  EYES: Eyes grossly normal to inspection, PERRL and conjunctivae and sclerae normal  HENT: ear canals and TM's normal, nose and mouth without ulcers or lesions  NECK: no adenopathy, no asymmetry, masses, or scars and thyroid normal to palpation  RESP: lungs clear to auscultation - no rales, rhonchi or wheezes  BREAST: normal without masses, tenderness or nipple discharge and no palpable axillary masses or adenopathy  CV: regular rate and rhythm, normal S1 S2, no S3 or S4, no murmur, click or rub, no " "peripheral edema and peripheral pulses strong  ABDOMEN: soft, nontender, no hepatosplenomegaly, no masses and bowel sounds normal  MS: no gross musculoskeletal defects noted, no edema  SKIN: no suspicious lesions or rashes  NEURO: Normal strength and tone, mentation intact and speech normal  PSYCH: mentation appears normal, affect normal/bright    Diagnostic Test Results: Colonoscopy, Mammo  Labs reviewed in Epic    ASSESSMENT / PLAN:   Jasmyn was seen today for physical.    Diagnoses and all orders for this visit:    Medicare annual wellness visit, subsequent: Completed today. PPSV23 and Tdap given today. No labs are needed.     Essential hypertension: Blood pressure today in office was 104/56. She continues on Lisinopril. Stable.     Hypercholesteremia: She continues on Atorvastatin. Stable.    Pulmonary emphysema, unspecified emphysema type (H): She continues on inhalers, to start oxygen. Managed per Pulmonology. Stable.     Age-related osteoporosis without current pathological fracture/Acquired postural kyphosis: She will be starting treatment for osteoporosis. Managed per Endocrinology. She continues on calcium and vitamin D.     Hx of smoking: Stopped smoking earlier this year.     Other orders  -     REVIEW OF HEALTH MAINTENANCE PROTOCOL ORDERS  -     TDAP VACCINE (Adacel, Boostrix)  [[id number]]  -     PPSV23, IM/SUBQ (2+ YRS) - [id number]        Patient has been advised of split billing requirements and indicates understanding: Yes  COUNSELING:  Reviewed preventive health counseling, as reflected in patient instructions       Regular exercise       Healthy diet/nutrition    Estimated body mass index is 18.66 kg/m  as calculated from the following:    Height as of this encounter: 1.575 m (5' 2\").    Weight as of this encounter: 46.3 kg (102 lb).        She reports that she quit smoking about 4 months ago. Her smoking use included cigarettes. She has never used smokeless tobacco.      Appropriate preventive " services were discussed with this patient, including applicable screening as appropriate for cardiovascular disease, diabetes, osteopenia/osteoporosis, and glaucoma.  As appropriate for age/gender, discussed screening for colorectal cancer, prostate cancer, breast cancer, and cervical cancer. Checklist reviewing preventive services available has been given to the patient.    Reviewed patients plan of care and provided an AVS. The Intermediate Care Plan ( asthma action plan, low back pain action plan, and migraine action plan) for Jasmyn meets the Care Plan requirement. This Care Plan has been established and reviewed with the Patient.    Counseling Resources:  ATP IV Guidelines  Pooled Cohorts Equation Calculator  Breast Cancer Risk Calculator  Breast Cancer: Medication to Reduce Risk  FRAX Risk Assessment  ICSI Preventive Guidelines  Dietary Guidelines for Americans, 2010  USDA's MyPlate  ASA Prophylaxis  Lung CA Screening    Miesha Donaldson CNP  [facility]    Identified Health Risks:

## 2021-11-10 NOTE — PATIENT INSTRUCTIONS
You received your PPSV23 shot and Tdap booster today.    It is okay to skip the GI appointment for celiac disease.    I will see you back in 3 months for follow up, before then if anything comes up.

## 2021-11-26 ENCOUNTER — PATIENT OUTREACH (OUTPATIENT)
Dept: NURSING | Facility: CLINIC | Age: 66
End: 2021-11-26
Payer: COMMERCIAL

## 2021-11-26 NOTE — PROGRESS NOTES
Clinic Care Coordination Contact    Community Health Worker Follow Up    Care Gaps:     Health Maintenance Due   Topic Date Due     COPD ACTION PLAN  Never done     ZOSTER IMMUNIZATION (1 of 2) Never done       Patient accepted scheduling phone number for [facility]  to schedule independently     Goals:   Goals Addressed as of 11/26/2021 at 10:41 AM                    11/4/21 9/16/21       Financial Wellbeing (pt-stated)   10%  20%    Added 7/16/21 by Jaz Gallagher RN      Goal Statement: I would like to apply for Medicare in the next 3 months.  Date Goal set: 7/13/21  Barriers: resources  Strengths: motivated and family support  Date to Achieve By: 3 months  Patient expressed understanding of goal: yes  Action steps to achieve this goal:  1. My daughter/family will call [facility] [phone] to get information regarding insurance plans.  I understand that [facility] is unable to recommend insurance plans.  My family will help me with this process.  I do not have to wait until open enrollment. CONSTANTINE Costa let [facility] CHW know that Jasmyn had a phone appt with them and has not rescheduled yet. She will reschedule soon. Continuous (MB)    Goal Updated: 11/26/21            Intervention and Education during outreach: N/A    CHW Plan: CHW will follow up with patients daughter Julissa in 1 month.

## 2021-11-30 ENCOUNTER — OFFICE VISIT (OUTPATIENT)
Dept: FAMILY MEDICINE | Facility: CLINIC | Age: 66
End: 2021-11-30
Payer: COMMERCIAL

## 2021-11-30 VITALS
OXYGEN SATURATION: 93 % | SYSTOLIC BLOOD PRESSURE: 120 MMHG | BODY MASS INDEX: 18.47 KG/M2 | WEIGHT: 101 LBS | RESPIRATION RATE: 20 BRPM | DIASTOLIC BLOOD PRESSURE: 62 MMHG | TEMPERATURE: 97.2 F | HEART RATE: 112 BPM

## 2021-11-30 DIAGNOSIS — J44.1 COPD EXACERBATION (H): Primary | ICD-10-CM

## 2021-11-30 PROCEDURE — 99214 OFFICE O/P EST MOD 30 MIN: CPT | Performed by: PHYSICIAN ASSISTANT

## 2021-11-30 RX ORDER — PREDNISONE 20 MG/1
40 TABLET ORAL DAILY
Qty: 10 TABLET | Refills: 0 | Status: SHIPPED | OUTPATIENT
Start: 2021-11-30 | End: 2021-12-05

## 2021-11-30 RX ORDER — AZITHROMYCIN 250 MG/1
TABLET, FILM COATED ORAL
Qty: 6 TABLET | Refills: 0 | Status: SHIPPED | OUTPATIENT
Start: 2021-11-30 | End: 2021-12-05

## 2021-11-30 NOTE — PATIENT INSTRUCTIONS
Patient Education     COPD Flare-Up    You have had a flare-up of your COPD.  COPD (chronic obstructive pulmonary disease) is a common lung disease. It causes your airways to get irritated and narrower. This makes it harder for you to breathe. Emphysema and chronic bronchitis are both types of COPD. This is a long-term (chronic) condition. This means you always have it. Sometimes it gets worse. When this happens, it's called a flare-up.   Symptoms of COPD  People with COPD may have symptoms most of the time. In a flare-up, your symptoms get worse. These symptoms may mean you are having a flare-up:     Shortness of breath, shallow or rapid breathing, or wheezing that gets worse    Lung infection    Cough that gets worse    More mucus (or sputum), thicker mucus, or mucus of a different color    Tiredness, less energy, or trouble doing your normal activities    Fever    Chest tightness    Your symptoms don t get better even when you use your normal medicines, inhalers, and nebulizer    Trouble talking    You feel confused  Causes of flare-ups  Unfortunately, a flare-up can happen even if you did everything right. And even if you followed your healthcare provider s instructions. Some causes of flare-ups are:     Cold weather    Smoking or secondhand smoke    Use of e-cigarettes or vaping products    Colds, the flu, or respiratory infections    Air pollution    Sudden change in the weather    Dust, vapors, gases, irritating chemicals, or strong fumes    Not taking your medicines as prescribed    Indoor pollution such as burning wood, smoke from home cooking, or heating fuels  Home care  Here are some things you can do at home to treat a flare-up:    Keep calm and try not to panic. This makes it harder to breathe, and keeps you from doing the right things.    Don t smoke or be around others who are smoking. If you smoke, quit. Smoking is the main cause of COPD. Quitting will help you be able to better manage your COPD.  Don't use e-cigarettes or vaping products either. Ask your healthcare provider about ways to help you quit smoking.    Try to drink more fluids than normal during a flare-up, unless your healthcare provider has told you not to because of heart and kidney problems. More fluids can help loosen the mucus.    Eat a healthy, balanced diet. This is important to staying as healthy as possible. So is trying to stay at your ideal weight. Being overweight or underweight can affect your health. Make sure you have a lot of fruits and vegetables every day. And also eat balanced portions of whole grains, lean meats and fish, and low-fat dairy products.    Use your inhalers and nebulizer, if you have one, as you have been told to. When using a metered dose inhaler or nebulizer, it's very important to use the proper techniques. If you have any questions about how to use your device, contact your healthcare provider or refer to the user manual.    If you were given antibiotics, take them until they are used up or your provider tells you to stop. It s important to finish the antibiotics, even though you feel better. This will make sure the infection has cleared.    If you were given a steroid, finish it even if you feel better.    Learn the names of your medicines, as well as how and when to use them. Talk with your provider about other conditions you have and their treatment and how it may affect your COPD.    Oxygen may be prescribed if tests show that your blood contains too little oxygen. Ask your provider about long-term oxygen therapy.    Coping tips for shortness of breath include:  ? Exercise. Try to be as active as possible. This will improve energy levels and strengthen your muscles, so you can do more.  ? Breathing methods. Ask your healthcare provider or nurse show you how to do pursed-lip breathing.  ? Balance rest and activity. Each day, try to balance rest periods with activity. For example, you might start the day  with getting dressed and eating breakfast. Then you can relax and read the paper. After that, take a brief walk. And then sit with your feet up for a while.  ? Pulmonary rehab (rehabilitation).  Community-based and home-based programs work as well as hospital-based programs as long as they are as often and as intense. Standard home-based pulmonary rehab programs help shortness of breath in people with COPD. Supervised, traditional pulmonary rehab remains the best option for people with COPD. These programs help with managing your disease, and also help with breathing methods, exercise, support, and counseling. To find one, ask your provider or call your local hospital. Also talk with your healthcare provider about which rehab or self-management program is best for you.  Preventing a flare-up  Flare-ups happen. But the best way to treat one is to prevent it before it starts. Here are some pointers:     Don t smoke or be around others who are smoking. Avoid using e-cigarettes due to their harmful side effects.    Take your medicines as discussed with your healthcare provider.    Talk with your provider about getting a flu shot every year. Also find out if you need a pneumonia shot.    If there is a weather advisory warning to stay indoors, try to stay inside when possible.    Try to eat healthy, exercise, and get plenty of sleep.    Try to stay away from things that normally set you off. These include dust, chemical fumes, hairsprays, or strong perfumes.  Follow-up care  Follow up with your healthcare provider, or as advised.   If a culture was done, you will be told if your treatment needs to be changed. You can call as directed for the results.   If X-rays were done, you will be told of any new findings that may affect your care.   During each appointment, talk with your healthcare provider about your ability to:     [place] in your normal environment    Correctly use inhaler (or your medicine delivery systems)    [place]  with other conditions you have and their treatments and how they may affect your COPD  Call 911  Call 911 if any of these occur:     Wheezing or shortness or breath does not get better with treatment    Chest pain or chest tightness    Feeling lightheaded or dizzy    You have trouble breathing    You feel confused or it s hard to wake you up    You faint or lose consciousness    You have a rapid heart rate    You have new pain in your chest, arm, shoulder, neck, or upper back  When to seek medical advice  Call your healthcare provider right away if any of these occur:    Fever of 100.4 F (38 C) or higher, or as directed by your healthcare provider    Coughing up lots of dark-colored or bloody mucus (sputum)    You don't start to get better within 24 hours    Swelling of your ankles gets worse    Weakness  Tunde last reviewed this educational content on 4/1/2019 2000-2021 [employer]. All rights reserved. This information is not intended as a substitute for professional medical care. Always follow your healthcare professional's instructions.

## 2021-11-30 NOTE — PROGRESS NOTES
Assessment & Plan:      Problem List Items Addressed This Visit        Respiratory    COPD exacerbation (H) - Primary (Chronic)    Relevant Medications    azithromycin (ZITHROMAX) 250 MG tablet    predniSONE (DELTASONE) 20 MG tablet        Medical Decision Making  Patient presents with acute onset cough and shortness of breath after cough that started 1 to 2 weeks ago.  Patient symptoms appear consistent with COPD exacerbation.  Patient has no significant fevers and no signs of rhonchi or rales on exam to make concerns for pneumonia low.  We will treat patient with azithromycin and a short course of oral steroids.  Recommend she continue her at home inhalers as previously instructed.  Discussed signs of worsening symptoms and when to follow-up with PCP if no symptom improvement.     Subjective:      Jasmyn Green is a 66 year old female with history of COPD, here for evaluation of cough and shortness of breath.  Patient has had cough and fevers that started 1 to 2 weeks ago.  Her fevers have since improved and she was feeling better until she developed a more productive cough of green mucus and worsening shortness of breath over the last couple days.  Patient does have inhalers that she uses at home for COPD that have been providing some temporary relief.  She states her symptoms feel very similar to her recent diagnosis of COPD exacerbation on 9/21.     The following portions of the patient's history were reviewed and updated as appropriate: allergies, current medications, and problem list.     Review of Systems  Pertinent items are noted in HPI.    Allergies  No Known Allergies    Family History   Problem Relation Age of Onset     Chronic Obstructive Pulmonary Disease Sister      Diabetes Mother      Heart Disease Mother      Lung Cancer Mother      Heart Disease Father        Social History     Tobacco Use     Smoking status: Former Smoker     Types: Cigarettes     Quit date: 6/27/2021     Years since  quittin.4     Smokeless tobacco: Never Used     Tobacco comment: updated 8/3/2021 by TTS   Substance Use Topics     Alcohol use: Not Currently        Objective:      /62   Pulse 112   Temp 97.2  F (36.2  C)   Resp 20   Wt 45.8 kg (101 lb)   SpO2 93%   BMI 18.47 kg/m    General appearance - alert, well appearing, and in no distress and non-toxic  Ears - bilateral TM's and external ear canals normal  Nose - normal and patent, no erythema, discharge or polyps  Mouth - mucous membranes moist, pharynx normal without lesions  Neck - supple, no significant adenopathy  Chest - Reduced lung sounds throughout, no obvious wheezing, rhonchi, rales  Heart - normal rate, regular rhythm, normal S1, S2, no murmurs, rubs, clicks or gallops    The use of Dragon/[employer] dictation services was used to construct the content of this note; any grammatical errors are non-intentional. Please contact the author directly if you are in need of any clarification.

## 2021-12-01 ENCOUNTER — TELEPHONE (OUTPATIENT)
Dept: RESPIRATORY THERAPY | Facility: CLINIC | Age: 66
End: 2021-12-01
Payer: COMMERCIAL

## 2021-12-01 NOTE — TELEPHONE ENCOUNTER
Spoke with Jasmyn, doing okay, went to urgent care last night, they started her on antibiotic and prednisone, no other questions for me to day. no issues getting and/or taking their medications, reviewed their action plan, in their yellow zone.  Reminded when we will call again and to call before if there is a question.  Aidee Luna, RT, Chronic Pulmonary Disease Specialist

## 2021-12-09 ENCOUNTER — PATIENT OUTREACH (OUTPATIENT)
Dept: CARE COORDINATION | Facility: CLINIC | Age: 66
End: 2021-12-09
Payer: COMMERCIAL

## 2021-12-09 NOTE — PROGRESS NOTES
Clinic Care Coordination Contact    Care Coordination Clinician Chart Review  Situation: Patient chart reviewed by care coordinator.       Background: Care Coordination initial assessment and enrollment to Care Coordination was 7/13/21.   Patient centered goals were developed with participation from patient.  RN CC handed patient off to CHW for continued outreach every 30 days.        Assessment: Per chart review, patient outreach completed by CC CHW on 11/26/21.  Patient is actively working to accomplish goal.  Patient's goal remains appropriate and relevant at this time.   Patient is not due for updated Plan of Care.  Annual assessment will be due 7/13/22.      Goals        Financial Wellbeing (pt-stated)       Goal Statement: I would like to apply for Medicare in the next 6 months.  Date Goal set: 7/13/21  Barriers: resources  Strengths: motivated and family support  Date to Achieve By: 6 months  Patient expressed understanding of goal: yes  Action steps to achieve this goal:  1. My daughter/family will call [facility] [phone] to get information regarding insurance plans.  I understand that [facility] is unable to recommend insurance plans.  My family will help me with this process.  I do not have to wait until open enrollment. I Julissa let Jenny CHW know that Jasmyn had a phone appt with them and has not rescheduled yet. She will reschedule soon. Continuous (MB)    Goal Updated: 11/26/21                Plan/Recommendations: The patient will continue working with Care Coordination to achieve goal as above.  CHW will involve RN CC as needed or if patient is ready to move to maintenance.  RN CC will continue to monitor progress to goals and CHW outreaches every 6 weeks.   Plan of Care updated and mailed to patient: Franci and BETTINA 10/21/21

## 2021-12-13 DIAGNOSIS — J44.9 CHRONIC OBSTRUCTIVE PULMONARY DISEASE, UNSPECIFIED COPD TYPE (H): ICD-10-CM

## 2021-12-13 RX ORDER — ALBUTEROL SULFATE 90 UG/1
2 AEROSOL, METERED RESPIRATORY (INHALATION) EVERY 6 HOURS
Qty: 18 G | Refills: 11 | Status: SHIPPED | OUTPATIENT
Start: 2021-12-13 | End: 2022-11-07

## 2021-12-29 ENCOUNTER — PATIENT OUTREACH (OUTPATIENT)
Dept: CARE COORDINATION | Facility: CLINIC | Age: 66
End: 2021-12-29
Payer: COMMERCIAL

## 2021-12-29 NOTE — PROGRESS NOTES
Clinic Care Coordination Contact  [facility]/Miguel    Clinical Data: Care Coordinator Outreach  Outreach attempted x 1.  Left message on patients daughter Julissa worthy with call back information and requested return call.  Plan: Care Coordinator will try to reach patient again in 10 business days.    Next CHW outreach date: 1/12/22    [facility] Worker  [facility] Care Coordination     Office: [phone]

## 2022-01-04 ENCOUNTER — TELEPHONE (OUTPATIENT)
Dept: RESPIRATORY THERAPY | Facility: HOSPITAL | Age: 67
End: 2022-01-04
Payer: COMMERCIAL

## 2022-01-06 ENCOUNTER — TELEPHONE (OUTPATIENT)
Dept: ENDOCRINOLOGY | Facility: CLINIC | Age: 67
End: 2022-01-06
Payer: COMMERCIAL

## 2022-01-07 NOTE — TELEPHONE ENCOUNTER
PA Initiation    Medication: tymlos  Insurance Company: [employer] - Phone [phone] Fax [phone]  Pharmacy Filling the Rx: [facility] - [place], [place] - [facility] [address] AT PORTAL TO REGISTERED [facility] SITES  Filling Pharmacy Phone:    Filling Pharmacy Fax:    Start Date: 1/6/2022

## 2022-01-11 ENCOUNTER — TELEPHONE (OUTPATIENT)
Dept: RESPIRATORY THERAPY | Facility: HOSPITAL | Age: 67
End: 2022-01-11
Payer: COMMERCIAL

## 2022-01-11 ENCOUNTER — TELEPHONE (OUTPATIENT)
Dept: PULMONOLOGY | Facility: OTHER | Age: 67
End: 2022-01-11
Payer: COMMERCIAL

## 2022-01-11 DIAGNOSIS — J44.1 COPD EXACERBATION (H): Primary | ICD-10-CM

## 2022-01-11 RX ORDER — PREDNISONE 20 MG/1
TABLET ORAL
Qty: 10 TABLET | Refills: 0 | Status: SHIPPED | OUTPATIENT
Start: 2022-01-11 | End: 2022-02-07

## 2022-01-11 RX ORDER — AZITHROMYCIN 250 MG/1
TABLET, FILM COATED ORAL
Qty: 6 TABLET | Refills: 0 | Status: SHIPPED | OUTPATIENT
Start: 2022-01-11 | End: 2022-01-16

## 2022-01-11 NOTE — TELEPHONE ENCOUNTER
Stefania called, she is in her yellow zone, gave her the number for the pulmonary clinic to call. Answered her questions.  Aidee Luna, RT, Chronic Pulmonary Disease Specialist

## 2022-01-11 NOTE — TELEPHONE ENCOUNTER
Phone call from patient asking to start her COPD action plan.    Is having increased coughing and yellowish phlegm.  Back hurts when coughing so she feels she has an infection going on.       Will send her action plan:  Prednisone 40mg daily x 5 days and Zpack

## 2022-01-14 NOTE — TELEPHONE ENCOUNTER
PRIOR AUTHORIZATION DENIED    Medication: tymlos- PA Denied    Denial Date: 1/11/2022    Denial Rational: formulary item is Teriparatide     Appeal Information: [facility] [phone]  If you choose to change pt to teriparatide therapy please send new order to [facility]  or fax   As [facility] is not contracted with pts new insurance plan.

## 2022-01-17 ENCOUNTER — TELEPHONE (OUTPATIENT)
Dept: ENDOCRINOLOGY | Facility: CLINIC | Age: 67
End: 2022-01-17
Payer: COMMERCIAL

## 2022-01-17 DIAGNOSIS — M81.8 OTHER OSTEOPOROSIS WITHOUT CURRENT PATHOLOGICAL FRACTURE: Primary | ICD-10-CM

## 2022-01-17 RX ORDER — TERIPARATIDE 250 UG/ML
20 INJECTION, SOLUTION SUBCUTANEOUS DAILY
Qty: 7.44 ML | Refills: 3 | OUTPATIENT
Start: 2022-01-17 | End: 2022-01-28

## 2022-01-17 NOTE — TELEPHONE ENCOUNTER
PA Initiation    Medication: Forteo PA Initiated  Insurance Company: [employer] - Phone [phone] Fax [phone]  [place] [facility] [place]one [place]UNC[place]  Filling Pharmacy Phone:    Filling Pharmacy Fax:    Start Date: 1/17/2022    [id number]

## 2022-01-18 ENCOUNTER — PATIENT OUTREACH (OUTPATIENT)
Dept: CARE COORDINATION | Facility: CLINIC | Age: 67
End: 2022-01-18
Payer: COMMERCIAL

## 2022-01-18 NOTE — PROGRESS NOTES
Clinic Care Coordination Contact  [facility]/Voicemail    Clinical Data: Care Coordinator Outreach  Outreach attempted x 2.  Left message on patient's voicemail with call back information and requested return call.  Plan: Care Coordinator will rount patients chart to CCC RN to review for disenrollment.    Edelmira Morales  Community Health Worker  [facility] Care Coordination   Office: [phone]

## 2022-01-26 ENCOUNTER — PATIENT OUTREACH (OUTPATIENT)
Dept: CARE COORDINATION | Facility: CLINIC | Age: 67
End: 2022-01-26
Payer: COMMERCIAL

## 2022-01-26 NOTE — LETTER
[facility]  [address] [address]  Phone: [phone]      January 26, 2022      Jasmyn Green  [facility] [place] [address]    Dear Jasmyn,    We have been trying to reach you to introduce you to [facility] s Care Coordination program.  The goal of care coordination is to help you manage your health and improve access to the [facility] system in the most efficient manner.  The Care Coordinator is a nurse who understands the healthcare system and will assist you in improving your access to care.     As your Physician and Care Coordinator we partner to help you achieve your health care goals.     We will continue to reach out; however, if you are able to call your Care Coordinator Edelmira Morales at [phone], that would be appreciated.  We at [facility] are focused on providing you with the highest-quality healthcare experience possible.      It is a pleasure to partner with you as we work towards achieving your optimal state of wellness.        Sincerely,        Jaz Gallagher RN         [address] / [place] [address]

## 2022-01-26 NOTE — PROGRESS NOTES
Clinic Care Coordination Contact    Situation: Patient chart reviewed by care coordinator.    Background: Patient Enrolled to [facility] 7/13/21.  Seeking assistance with contacting [employer] to apply for Medicare.  All information given to daughter how to apply for Medicare and how to contact [employer].    Assessment: Have been unable to reach patient or daughter.    Plan/Recommendations: Will send unreachable letter and un-enroll from [facility].    No further outreach at this time.

## 2022-01-26 NOTE — LETTER
[facility]  [address] [place] [address]    January 26, 2022    Jasmyn Green  [address] [place] [address]      Dear Jasmyn,    I have been unsuccessful in reaching you since our last contact. At this time the Care Coordination team will make no further attempts to reach you, however this does not change your ability to continue receiving care from your providers at your primary care clinic. If you need additional support from a care coordinator in the future please contact Edelmira Morales at [phone]      All of us at [facility]  are invested in your health and are here to assist you in meeting your goals.     Sincerely,    Clinic Care Coordination

## 2022-01-27 ENCOUNTER — PATIENT OUTREACH (OUTPATIENT)
Dept: CARE COORDINATION | Facility: CLINIC | Age: 67
End: 2022-01-27
Payer: COMMERCIAL

## 2022-01-28 ENCOUNTER — TELEPHONE (OUTPATIENT)
Dept: ENDOCRINOLOGY | Facility: CLINIC | Age: 67
End: 2022-01-28
Payer: COMMERCIAL

## 2022-01-28 DIAGNOSIS — M81.8 OTHER OSTEOPOROSIS WITHOUT CURRENT PATHOLOGICAL FRACTURE: ICD-10-CM

## 2022-01-28 RX ORDER — TERIPARATIDE 250 UG/ML
20 INJECTION, SOLUTION SUBCUTANEOUS DAILY
Qty: 7.44 ML | Refills: 3 | Status: SHIPPED | OUTPATIENT
Start: 2022-01-28 | End: 2022-02-16

## 2022-01-28 NOTE — TELEPHONE ENCOUNTER
[facility]    Phone Message    May a detailed message be left on voicemail: yes     Reason for Call: Medication Refill Request    Has the patient contacted the pharmacy for the refill? Yes   Name of medication being requested: Teriparatide, Recombinant, (FORTEO) 620 MCG/2.48ML SOPN injection    Provider who prescribed the medication: Celio    Pharmacy:  [facility]  Ph: [phone]  Fax: [phone]    Date medication is needed: whenever possible- Leslie from pharmacy stated they received the referral, but they need an actual Rx sent.      Action Taken: Message routed to:  Other: endo    Travel Screening: Not Applicable

## 2022-01-31 ENCOUNTER — MEDICAL CORRESPONDENCE (OUTPATIENT)
Dept: HEALTH INFORMATION MANAGEMENT | Facility: CLINIC | Age: 67
End: 2022-01-31
Payer: COMMERCIAL

## 2022-02-03 ENCOUNTER — TELEPHONE (OUTPATIENT)
Dept: RESPIRATORY THERAPY | Facility: CLINIC | Age: 67
End: 2022-02-03
Payer: COMMERCIAL

## 2022-02-07 ENCOUNTER — OFFICE VISIT (OUTPATIENT)
Dept: INTERNAL MEDICINE | Facility: CLINIC | Age: 67
End: 2022-02-07
Payer: COMMERCIAL

## 2022-02-07 VITALS
DIASTOLIC BLOOD PRESSURE: 81 MMHG | OXYGEN SATURATION: 92 % | BODY MASS INDEX: 18.4 KG/M2 | WEIGHT: 100 LBS | HEIGHT: 62 IN | HEART RATE: 94 BPM | SYSTOLIC BLOOD PRESSURE: 132 MMHG

## 2022-02-07 DIAGNOSIS — I10 ESSENTIAL HYPERTENSION: ICD-10-CM

## 2022-02-07 DIAGNOSIS — Z23 HIGH PRIORITY FOR 2019-NCOV VACCINE: ICD-10-CM

## 2022-02-07 DIAGNOSIS — D64.9 LOW HEMOGLOBIN: ICD-10-CM

## 2022-02-07 DIAGNOSIS — J43.9 PULMONARY EMPHYSEMA, UNSPECIFIED EMPHYSEMA TYPE (H): ICD-10-CM

## 2022-02-07 DIAGNOSIS — M81.0 AGE-RELATED OSTEOPOROSIS WITHOUT CURRENT PATHOLOGICAL FRACTURE: ICD-10-CM

## 2022-02-07 DIAGNOSIS — Z87.891 HX OF SMOKING: ICD-10-CM

## 2022-02-07 LAB — HGB BLD-MCNC: 11 G/DL (ref 11.7–15.7)

## 2022-02-07 PROCEDURE — 85018 HEMOGLOBIN: CPT | Performed by: NURSE PRACTITIONER

## 2022-02-07 PROCEDURE — 91306 COVID-19,PF,MODERNA (18+ YRS BOOSTER .25ML): CPT | Performed by: NURSE PRACTITIONER

## 2022-02-07 PROCEDURE — 36415 COLL VENOUS BLD VENIPUNCTURE: CPT | Performed by: NURSE PRACTITIONER

## 2022-02-07 PROCEDURE — 99214 OFFICE O/P EST MOD 30 MIN: CPT | Performed by: NURSE PRACTITIONER

## 2022-02-07 PROCEDURE — 0064A COVID-19,PF,MODERNA (18+ YRS BOOSTER .25ML): CPT | Performed by: NURSE PRACTITIONER

## 2022-02-07 ASSESSMENT — ACTIVITIES OF DAILY LIVING (ADL): CURRENT_FUNCTION: NO ASSISTANCE NEEDED

## 2022-02-07 ASSESSMENT — MIFFLIN-ST. JEOR: SCORE: 936.85

## 2022-02-07 NOTE — PROGRESS NOTES
Clinic Note    Assessment:     Assessment and Plan:  1. Essential hypertension: Blood pressure today was 152/84; 132/81. She continues on Lisinopril 20mg daily. Stable.     2. Hx of smoking: stopped smoking 7 months ago, commended her on this.     3. Pulmonary emphysema, unspecified emphysema type (H): Managed per Pulmonology. Recent exacerbation was treated on 01/11/22, breathing has much improved. She continues on albuterol, combivent. Stable.     4. Low hemoglobin: Last hemoglobin was mildly low at 11.6. Will recheck levels today.   - Hemoglobin; Future    5. Age-related osteoporosis without current pathological fracture: Managed per Endocrinology. In the process of getting Forteo approved. Stable.     6. High priority for 2019-nCoV vaccine: booster given today.   - COVID-19,PF,MODERNA (18+ Yrs BOOSTER .25mL)       Patient Instructions   Continue your current medications.    Follow up in 6 months for a recheck, before then if anything comes up.   Patient Education     Uncontrolled High Blood Pressure (Established)    Your blood pressure was unusually high today. Your blood pressure may be high for several reasons. For example, this can occur if you ve missed doses of your blood pressure medicine. Or it can happen if you are taking other medicines such as some asthma inhalers, decongestants, diet pills, and illegal drugs like cocaine and amphetamine.   Other causes of high blood pressure include:     Weight gain    Too much salt in your diet    Smoking    Caffeine    Lack of exercise    Intense pain    Becoming upset. This means you feel fear, anger, or another strong emotion.  Blood pressure measurements are given as 2 numbers. Systolic blood pressure is the upper number. This is the pressure when the heart contracts. Diastolic blood pressure is the lower number. This is the pressure when the heart relaxes between beats. You will see your blood pressure readings written together. For example, a person with a  systolic pressure of 118 and a diastolic pressure of 78 will have 118/78 written in the medical record. To be diagnosed with high blood pressure, your numbers must be higher than the normal range when tested over a period of time.   Blood pressure is categorized as normal, elevated, or stage 1 or stage 2 high blood pressure:     Normal blood pressure is systolic of less than 120 and diastolic of less than 80 (120/80)    Elevated blood pressure is systolic of 120 to 129 and diastolic less than 80    Stage 1 high blood pressure is systolic of 130 to 139 or diastolic between 80 to 89    Stage 2 high blood pressure is when systolic is 140 or higher or the diastolic is 90 or higher  Uncontrolled high blood pressure can cause serious health problems. It raises your risk for heart attack, stroke, and heart failure. But you can do many things to manage your blood pressure. In general, if you have high blood pressure, keeping your blood pressure below 130/80 mmHg may help prevent these problems. Your healthcare provider may prescribe medicine to help control blood pressure if lifestyle changes are not enough.   Home care  It s important to take steps to lower your blood pressure. If you are taking blood pressure medicine, the guidelines below may help you need less or no medicines in the future.     Start a weight-loss program if you are overweight.    Cut back on the amount of salt in your diet:  ? Don't have high-salt foods such as olives, pickles, smoked meats, canned soups, deli meats, or salted potato chips.  ? Don t add salt to your food at the table.  ? Use only small amounts of salt when cooking.    Start an exercise program. Talk with your healthcare provider about what exercise program is best for you. It doesn t have to be difficult. Even brisk walking for 20 minutes 3 times a week is a good form of exercise.    Don't use medicines that stimulate the heart. This includes many over-the-counter cold and sinus  decongestant pills and sprays, as well as diet pills. Check the warnings about high blood pressure on the label. Before purchasing any over-the-counter medicines or supplements, always ask the pharmacist about the product's potential interaction with your high blood pressure and your medicines.    Stimulants such as amphetamine or cocaine could be lethal for someone with high blood pressure. Never take these.    Limit how much caffeine you drink. Consider switching to noncaffeinated beverages.    Stop smoking. If you are a long-time smoker, this can be hard. Enroll in a stop-smoking program to make it more likely that you will succeed. Talk with your provider about ways to quit.    Learn how to handle stress better. This is an important part of any program to lower blood pressure. Learn ways to relax. These include meditation, yoga, and biofeedback.    If medicines were prescribed, take them exactly as directed. Missing doses may cause your blood pressure to get out of control. Don't stop taking your medicines, even if you feel better or you feel like you don't need them anymore. Talk with your healthcare provider.    If you miss a dose or doses of your medicines, check with your healthcare provider or pharmacist about what to do.    Consider buying an automatic blood pressure machine. Your provider may advise a certain type. These are available at most pharmacies. It's ideal to measure your blood pressure twice a day, once in the morning, and once in the late afternoon. Try to be consistent. Check your blood pressure around the same time each day for a good comparison. Keep a written record of your home blood pressure readings and take the record to your medical appointments.  Here are some other guidelines on home blood pressure monitoring from the American [employer] Association.     Don't smoke or drink coffee for 30 minutes.    Go to the bathroom before the test.    Relax for 5 minutes before taking the  measurement.    Sit correctly. Be sure your back is supported. Don't sit on a couch or soft chair. Uncross your feet and place them flat on the floor. Place your arm on a solid, flat surface like a table with the upper arm at heart level. Make certain the middle of the cuff is directly above the bend of the elbow. Check the monitor's instruction manual for an illustration.    Take multiple readings. When you measure, take 2 or 3 readings one minute apart and record all of the results.    Take your blood pressure at the same time every day, or as your healthcare provider recommends.    Record the date, time, and blood pressure reading.    Take the record with you to your next appointment. If your blood pressure monitor has a built-in memory, simply take the monitor with you to your next appointment.    Call your provider if you have several high readings. Don't be frightened by a single high reading, but if you get several high readings, check in with your healthcare provider.    Note: When blood pressure reaches a systolic (top number) of 180 or higher or a diastolic (bottom number) of 110 or higher, you need emergency medical treatment. Call your healthcare provider right away.  Follow-up care  Regular visits to your own healthcare provider for blood pressure and medicine checks are an important part of your care. Make a follow-up appointment as directed. Bring the record of your home blood pressure readings to the appointment.   When to seek medical advice  Call your healthcare provider right away if any of these occur:    Blood pressure reaches a systolic (top number) of 180 or higher or diastolic (bottom number) of 110 or higher, emergency medical treatment is required.    Chest, arm, shoulder, neck, or upper back pain    Shortness of breath    Severe headache    Throbbing or rushing sound in the ears    Nosebleed that comes back or doesn't go away    Extreme drowsiness, confusion, or fainting    Dizziness or  "dizziness with spinning sensation (vertigo)    Weakness in an arm or leg or on one side of the face    Trouble speaking or seeing   [employer] last reviewed this educational content on 10/1/2019    6424-2559 [employer]. All rights reserved. This information is not intended as a substitute for professional medical care. Always follow your healthcare professional's instructions.             Return in about 6 months (around 8/7/2022) for Follow up.         Subjective:      Jasmyn Green is a 66 year old female presents today for follow up with her daughter.    She was recently treated for a COPD exacerbation through Pulmonology, on 01/11/22. She reports that her breathing has been much better. She is due for follow up with Pulmonology next week. She still is not smoking, she stopped 7 months ago.     She is trying to get Forteo approved to treat her osteoporosis. Her insurance changed at the beginning of the year, and the Forteo requires a prior authorization, which per chart review is being worked on currently.     She is fasted today, we will check her hemoglobin as it was low in October.    She reports that she did take her medications this morning, blood pressure was a little elevated on her first check.    She denies any new concerns today.     The following portions of the patient's history were reviewed and updated as appropriate.    Review of Systems:    Review is otherwise negative except for what is mentioned above.     Social Hx:    History   Smoking Status     Former Smoker     Types: Cigarettes     Quit date: 6/27/2021   Smokeless Tobacco     Never Used     Comment: updated 8/3/2021 by TTS         Objective:     Vitals:    02/07/22 0916 02/07/22 0933   BP: (!) 152/84 132/81   BP Location: Right arm Right arm   Patient Position: Sitting Sitting   Cuff Size: Adult Regular Adult Regular   Pulse: 94    SpO2: 92%    Weight: 45.4 kg (100 lb)    Height: 1.575 m (5' 2\")        Exam:  General: No " apparent distress. Calm. Alert and Oriented X3. Pt behavior is appropriate.  Head:Atraumatic. Normocephalic.  Chest/Lungs: Clear to auscultation, normal respiratory effort and rate.   Heart/Pulses: Regular rate and rhythm, no murmurs, gallops, or rubs. Capillary refill <2 seconds. No edema.    Musculoskeletal: Walks without difficulty.   Neurologic: Interactive, alert, no focal findings.  Skin: Warm, dry. Normal skin turgor.       Patient Active Problem List   Diagnosis     COPD exacerbation (H)     History of gout     Coronary artery calcification     Acute respiratory failure with hypoxia (H)     Lymphocytosis     Essential hypertension     Asymptomatic hypertensive urgency     Current Outpatient Medications   Medication Sig Dispense Refill     albuterol (PROAIR HFA/PROVENTIL HFA/VENTOLIN HFA) 108 (90 Base) MCG/ACT inhaler Inhale 2 puffs into the lungs every 6 hours 18 g 11     aspirin 81 mg chewable tablet [ASPIRIN 81 MG CHEWABLE TABLET] Chew 1 tablet (81 mg total) daily.  0     atorvastatin (LIPITOR) 20 MG tablet Take 1 tablet (20 mg) by mouth daily 90 tablet 3     Calcium Carb-Cholecalciferol (CALCIUM 500+D3 PO)        ibuprofen (ADVIL,MOTRIN) 200 MG tablet [IBUPROFEN (ADVIL,MOTRIN) 200 MG TABLET] Take 400 mg by mouth every 6 (six) hours as needed for pain.       ipratropium-albuteroL (COMBIVENT RESPIMAT)  mcg/actuation Mist inhaler [IPRATROPIUM-ALBUTEROL (COMBIVENT RESPIMAT)  MCG/ACTUATION MIST INHALER] Inhale 1 puff 4 (four) times a day as needed (wheezing or shortness of breath). 1 Inhaler 3     lisinopril (ZESTRIL) 20 MG tablet Take 1 tablet (20 mg) by mouth daily 60 tablet 3     ondansetron (ZOFRAN-ODT) 4 MG ODT tab Take 1 tablet (4 mg) by mouth every 8 hours as needed for nausea 10 tablet 0     Teriparatide, Recombinant, (FORTEO) 620 MCG/2.48ML SOPN injection Inject 0.08 mLs (20 mcg) Subcutaneous daily 7.44 mL 3     Miesha Donaldson Adult-Geriatric Nurse Practitioner  [facility] -  Internal Medicine Team     2/7/2022

## 2022-02-07 NOTE — PATIENT INSTRUCTIONS
Continue your current medications.    Follow up in 6 months for a recheck, before then if anything comes up.   Patient Education     Uncontrolled High Blood Pressure (Established)    Your blood pressure was unusually high today. Your blood pressure may be high for several reasons. For example, this can occur if you ve missed doses of your blood pressure medicine. Or it can happen if you are taking other medicines such as some asthma inhalers, decongestants, diet pills, and illegal drugs like cocaine and amphetamine.   Other causes of high blood pressure include:     Weight gain    Too much salt in your diet    Smoking    Caffeine    Lack of exercise    Intense pain    Becoming upset. This means you feel fear, anger, or another strong emotion.  Blood pressure measurements are given as 2 numbers. Systolic blood pressure is the upper number. This is the pressure when the heart contracts. Diastolic blood pressure is the lower number. This is the pressure when the heart relaxes between beats. You will see your blood pressure readings written together. For example, a person with a systolic pressure of 118 and a diastolic pressure of 78 will have 118/78 written in the medical record. To be diagnosed with high blood pressure, your numbers must be higher than the normal range when tested over a period of time.   Blood pressure is categorized as normal, elevated, or stage 1 or stage 2 high blood pressure:     Normal blood pressure is systolic of less than 120 and diastolic of less than 80 (120/80)    Elevated blood pressure is systolic of 120 to 129 and diastolic less than 80    Stage 1 high blood pressure is systolic of 130 to 139 or diastolic between 80 to 89    Stage 2 high blood pressure is when systolic is 140 or higher or the diastolic is 90 or higher  Uncontrolled high blood pressure can cause serious health problems. It raises your risk for heart attack, stroke, and heart failure. But you can do many things to manage  your blood pressure. In general, if you have high blood pressure, keeping your blood pressure below 130/80 mmHg may help prevent these problems. Your healthcare provider may prescribe medicine to help control blood pressure if lifestyle changes are not enough.   Home care  It s important to take steps to lower your blood pressure. If you are taking blood pressure medicine, the guidelines below may help you need less or no medicines in the future.     Start a weight-loss program if you are overweight.    Cut back on the amount of salt in your diet:  ? Don't have high-salt foods such as olives, pickles, smoked meats, canned soups, deli meats, or salted potato chips.  ? Don t add salt to your food at the table.  ? Use only small amounts of salt when cooking.    Start an exercise program. Talk with your healthcare provider about what exercise program is best for you. It doesn t have to be difficult. Even brisk walking for 20 minutes 3 times a week is a good form of exercise.    Don't use medicines that stimulate the heart. This includes many over-the-counter cold and sinus decongestant pills and sprays, as well as diet pills. Check the warnings about high blood pressure on the label. Before purchasing any over-the-counter medicines or supplements, always ask the pharmacist about the product's potential interaction with your high blood pressure and your medicines.    Stimulants such as amphetamine or cocaine could be lethal for someone with high blood pressure. Never take these.    Limit how much caffeine you drink. Consider switching to noncaffeinated beverages.    Stop smoking. If you are a long-time smoker, this can be hard. Enroll in a stop-smoking program to make it more likely that you will succeed. Talk with your provider about ways to quit.    Learn how to handle stress better. This is an important part of any program to lower blood pressure. Learn ways to relax. These include meditation, yoga, and  biofeedback.    If medicines were prescribed, take them exactly as directed. Missing doses may cause your blood pressure to get out of control. Don't stop taking your medicines, even if you feel better or you feel like you don't need them anymore. Talk with your healthcare provider.    If you miss a dose or doses of your medicines, check with your healthcare provider or pharmacist about what to do.    Consider buying an automatic blood pressure machine. Your provider may advise a certain type. These are available at most pharmacies. It's ideal to measure your blood pressure twice a day, once in the morning, and once in the late afternoon. Try to be consistent. Check your blood pressure around the same time each day for a good comparison. Keep a written record of your home blood pressure readings and take the record to your medical appointments.  Here are some other guidelines on home blood pressure monitoring from the American Heart Association.     Don't smoke or drink coffee for 30 minutes.    Go to the bathroom before the test.    Relax for 5 minutes before taking the measurement.    Sit correctly. Be sure your back is supported. Don't sit on a couch or soft chair. Uncross your feet and place them flat on the floor. Place your arm on a solid, flat surface like a table with the upper arm at heart level. Make certain the middle of the cuff is directly above the bend of the elbow. Check the monitor's instruction manual for an illustration.    Take multiple readings. When you measure, take 2 or 3 readings one minute apart and record all of the results.    Take your blood pressure at the same time every day, or as your healthcare provider recommends.    Record the date, time, and blood pressure reading.    Take the record with you to your next appointment. If your blood pressure monitor has a built-in memory, simply take the monitor with you to your next appointment.    Call your provider if you have several high  readings. Don't be frightened by a single high reading, but if you get several high readings, check in with your healthcare provider.    Note: When blood pressure reaches a systolic (top number) of 180 or higher or a diastolic (bottom number) of 110 or higher, you need emergency medical treatment. Call your healthcare provider right away.  Follow-up care  Regular visits to your own healthcare provider for blood pressure and medicine checks are an important part of your care. Make a follow-up appointment as directed. Bring the record of your home blood pressure readings to the appointment.   When to seek medical advice  Call your healthcare provider right away if any of these occur:    Blood pressure reaches a systolic (top number) of 180 or higher or diastolic (bottom number) of 110 or higher, emergency medical treatment is required.    Chest, arm, shoulder, neck, or upper back pain    Shortness of breath    Severe headache    Throbbing or rushing sound in the ears    Nosebleed that comes back or doesn't go away    Extreme drowsiness, confusion, or fainting    Dizziness or dizziness with spinning sensation (vertigo)    Weakness in an arm or leg or on one side of the face    Trouble speaking or seeing   [employer] last reviewed this educational content on 10/1/2019    6376-7446 [employer]. All rights reserved. This information is not intended as a substitute for professional medical care. Always follow your healthcare professional's instructions.

## 2022-02-09 NOTE — TELEPHONE ENCOUNTER
Prior Authorization Approval    Authorization Effective Date: 2/3/2022  Authorization Expiration Date: 2/3/2023  Medication: [employer] - Approved  Approved Dose/Quantity: 620 MCG/2.48ML SOPN   Reference #: [id number]   Insurance Company: [employer] - Phone [phone] Fax [phone]  Expected CoPay:       CoPay Card Available:      Foundation Assistance Needed:    Which Pharmacy is filling the prescription (Not needed for infusion/clinic administered): [facility]  Pharmacy Notified: Yes  Patient Notified: Yes

## 2022-02-17 PROBLEM — F17.200 SMOKER: Chronic | Status: RESOLVED | Noted: 2021-06-27 | Resolved: 2021-11-10

## 2022-02-18 ENCOUNTER — OFFICE VISIT (OUTPATIENT)
Dept: PULMONOLOGY | Facility: OTHER | Age: 67
End: 2022-02-18
Payer: COMMERCIAL

## 2022-02-18 VITALS
HEART RATE: 96 BPM | DIASTOLIC BLOOD PRESSURE: 66 MMHG | BODY MASS INDEX: 18.66 KG/M2 | SYSTOLIC BLOOD PRESSURE: 118 MMHG | RESPIRATION RATE: 24 BRPM | WEIGHT: 102 LBS | OXYGEN SATURATION: 99 %

## 2022-02-18 DIAGNOSIS — J44.9 CHRONIC OBSTRUCTIVE PULMONARY DISEASE, UNSPECIFIED COPD TYPE (H): ICD-10-CM

## 2022-02-18 DIAGNOSIS — J44.1 COPD EXACERBATION (H): ICD-10-CM

## 2022-02-18 DIAGNOSIS — J96.01 ACUTE RESPIRATORY FAILURE WITH HYPOXIA (H): ICD-10-CM

## 2022-02-18 PROCEDURE — 99213 OFFICE O/P EST LOW 20 MIN: CPT | Performed by: INTERNAL MEDICINE

## 2022-02-18 RX ORDER — IPRATROPIUM BROMIDE AND ALBUTEROL 20; 100 UG/1; UG/1
1 SPRAY, METERED RESPIRATORY (INHALATION) 2 TIMES DAILY
Qty: 4 G | Refills: 11 | Status: SHIPPED | OUTPATIENT
Start: 2022-02-18 | End: 2023-01-06

## 2022-02-18 NOTE — PROGRESS NOTES
PULMONARY CLINIC FOLLOW UP NOTE    History:     HPI: Jasmyn Green is a 66 year old female, smoker quit in 6/2021, who is here for follow-up of COPD.  Patient was hospitalized for COPD on 6/2021 after which she established care with pulmonary clinic.    Interval History: Patient is here for her scheduled follow-up. She notes that she was diagnosed with COVID-19 pneumonia last month. She was hospitalized however. She had 2 exacerbations of her COPD since was last seen in clinic approximately 4 months ago. She feels she is at her baseline. She has occasional cough mostly in the morning. She is on albuterol and Combivent however uses her Combivent as needed. She continues to stay off cigarettes. 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 Patient is on oxygen at 2 L/min pulsed dose that she uses with exertion    PMHx/PSHx:  COPD  Gout     Social Hx:   Tobacco: 1 pack/day. Started at age 14. Quit 3 months.  Smoked for about 50 years. Quit June 2021.  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   Travel: no recent travel.  Birds: 3 dogs    ROS: 10 point review of system done. Pertinent findings are noted in the HPI.    Exam/Data:   /66   Pulse 96   Resp 24   Wt 46.3 kg (102 lb)   SpO2 99%   BMI 18.66 kg/m  , Body mass index is 18.66 kg/m .    GEN: comfortable, NAD  HEENT: NCAT, EMOI  CVS: S1S2, RRR  Lung: diminished bilaterally  Abd: soft, BS  Ext: no c/c/e  Neuro: nonfocal  Skin: no visible rash  Musculoskeletal: FROM all extremities  Psych: appropriate    Data:     Labs personally reviewed.      PFTs done on 8/10/2021: Severe COPD.     CT chest done on 6/7/2021:  IMPRESSION:  1.  Negative for pulmonary embolism.  2.  Emphysema. Lower lobe bronchial wall thickening may be infectious or inflammatory. No airspace consolidation.  3.  Severe coronary artery disease.    Assessment/Plan:     Jasmyn Green is a 66 year old female, ex-smoker, with severe COPD recent hospitalization on  6/2021 at [facility] for COPD exacerbation.  Patient is not on home oxygen.  However, patient demonstrated significant desaturation to 82% while ambulating in the clinic on room air.    Recommendations:  Start home oxygen at 2 L/min pulse dose  Continue albuterol  Would like to stay on Combivent (only one she can afford). Instructed to use twice daily.  Vaccinated for COVID-19 and has had COVID-19 infection.  Encouraged to remain active   Up-to-date on vaccinations    FOLLOW UP: 4 months with low-dose CT scan of the chest    Safia Rodriguez MD  Pulmonary and Critical Care Medicine  Electronically Signed on 02/18/2022    Current Outpatient Medications   Medication Sig Dispense Refill     albuterol (PROAIR HFA/PROVENTIL HFA/VENTOLIN HFA) 108 (90 Base) MCG/ACT inhaler Inhale 2 puffs into the lungs every 6 hours 18 g 11     aspirin 81 mg chewable tablet [ASPIRIN 81 MG CHEWABLE TABLET] Chew 1 tablet (81 mg total) daily.  0     atorvastatin (LIPITOR) 20 MG tablet Take 1 tablet (20 mg) by mouth daily 90 tablet 3     Calcium Carb-Cholecalciferol (CALCIUM 500+D3 PO)        calcium carbonate 600 mg-vitamin D 400 units (CALTRATE) 600-400 MG-UNIT per tablet Take 1 tablet by mouth 2 times daily With calcium       ibuprofen (ADVIL,MOTRIN) 200 MG tablet [IBUPROFEN (ADVIL,MOTRIN) 200 MG TABLET] Take 400 mg by mouth every 6 (six) hours as needed for pain.       ipratropium-albuteroL (COMBIVENT RESPIMAT)  mcg/actuation Mist inhaler [IPRATROPIUM-ALBUTEROL (COMBIVENT RESPIMAT)  MCG/ACTUATION MIST INHALER] Inhale 1 puff 4 (four) times a day as needed (wheezing or shortness of breath). 1 Inhaler 3     lisinopril (ZESTRIL) 20 MG tablet Take 1 tablet (20 mg) by mouth daily 60 tablet 3     ondansetron (ZOFRAN-ODT) 4 MG ODT tab Take 1 tablet (4 mg) by mouth every 8 hours as needed for nausea 10 tablet 0     Teriparatide, Recombinant, (FORTEO) 620 MCG/2.48ML SOPN injection Inject 0.08 mLs (20 mcg) Subcutaneous daily (Patient  not taking: Reported on 2/18/2022) 7.44 mL 3     No Known Allergies    Meds and Allergies: See EHR for the updated medication list and Allergies. These were reviewed.     Much or all of the text in this note was generated through the use of the Dragon Dictate voice-to-text software. Errors in spelling or words which seem out of context are unintentional. Sound alike errors, in particular, may have escaped editing.      Lung Cancer Screening Shared Decision Making Visit     Jasmyn Green is eligible for lung cancer screening on the basis of the information provided in my signed lung cancer screening order.     I have discussed with patient the risks and benefits of screening for lung cancer with low-dose CT.     The risks include:  radiation exposure: one low dose chest CT has as much ionizing radiation as about 15 chest x-rays or 6 months of background radiation living in [place]    false positives: 96% of positive findings/nodules are NOT cancer, but some might still require additional diagnostic evaluation, including biopsy  over-diagnosis: some slow growing cancers that might never have been clinically significant will be detected and treated unnecessarily     The benefit of early detection of lung cancer is contingent upon adherence to annual screening or more frequent follow up if indicated.     Furthermore, reaping the benefits of screening requires Jasmyn Green to be willing and physically able to undergo diagnostic procedures, if indicated. Although no specific guide is available for determining severity of comorbidities, it is reasonable to withhold screening in patients who have greater mortality risk from other diseases.     We did discuss that the only way to prevent lung cancer is to not smoke. Smoking cessation counseling was not given.      I did not offer risk estimation using a calculator such as this one:    ShouldIScreen

## 2022-02-24 ENCOUNTER — TELEPHONE (OUTPATIENT)
Dept: PULMONOLOGY | Facility: OTHER | Age: 67
End: 2022-02-24
Payer: COMMERCIAL

## 2022-02-24 DIAGNOSIS — J44.1 COPD EXACERBATION (H): Primary | ICD-10-CM

## 2022-02-24 RX ORDER — AZITHROMYCIN 250 MG/1
TABLET, FILM COATED ORAL
Qty: 6 TABLET | Refills: 0 | Status: SHIPPED | OUTPATIENT
Start: 2022-02-24 | End: 2022-03-01

## 2022-02-24 RX ORDER — PREDNISONE 20 MG/1
TABLET ORAL
Qty: 10 TABLET | Refills: 0 | Status: SHIPPED | OUTPATIENT
Start: 2022-02-24 | End: 2022-05-20

## 2022-02-24 NOTE — TELEPHONE ENCOUNTER
Phone call from Stefania. States that she has an infection/exacerbation of her COPD.  Asking for antibiotic.  As back pain between the shoulder blades and is coughing up green phlegm.  This all started yesterday.    Will send prednisone 40mg daily x 5 days and Zpack.

## 2022-03-02 ENCOUNTER — TELEPHONE (OUTPATIENT)
Dept: RESPIRATORY THERAPY | Facility: CLINIC | Age: 67
End: 2022-03-02
Payer: COMMERCIAL

## 2022-04-01 ENCOUNTER — TELEPHONE (OUTPATIENT)
Dept: RESPIRATORY THERAPY | Facility: CLINIC | Age: 67
End: 2022-04-01
Payer: COMMERCIAL

## 2022-04-29 ENCOUNTER — TELEPHONE (OUTPATIENT)
Dept: RESPIRATORY THERAPY | Facility: CLINIC | Age: 67
End: 2022-04-29
Payer: COMMERCIAL

## 2022-05-06 DIAGNOSIS — I10 ESSENTIAL HYPERTENSION: ICD-10-CM

## 2022-05-09 RX ORDER — LISINOPRIL 20 MG/1
TABLET ORAL
Qty: 60 TABLET | Refills: 3 | Status: SHIPPED | OUTPATIENT
Start: 2022-05-09 | End: 2022-12-02

## 2022-05-09 NOTE — TELEPHONE ENCOUNTER
"Routing refill request to provider for review/approval because:  Early refill request.     Last Written Prescription Date:  9/8/2021  Last Fill Quantity: 60,  # refills: 3  Last office visit provider:  2/7/2022     Requested Prescriptions   Pending Prescriptions Disp Refills     lisinopril (ZESTRIL) 20 MG tablet [Pharmacy Med Name: LISINOPRIL 20MG TABLETS] 60 tablet 3     Sig: TAKE 1 TABLET(20 MG) BY MOUTH DAILY       ACE Inhibitors (Including Combos) Protocol Passed - 5/9/2022 11:12 AM        Passed - Blood pressure under 140/90 in past 12 months     BP Readings from Last 3 Encounters:   02/18/22 118/66   02/07/22 132/81   11/30/21 120/62                 Passed - Recent (12 mo) or future (30 days) visit within the authorizing provider's specialty     Patient has had an office visit with the authorizing provider or a provider within the authorizing providers department within the previous 12 mos or has a future within next 30 days. See \"Patient Info\" tab in inbasket, or \"Choose Columns\" in Meds & Orders section of the refill encounter.              Passed - Medication is active on med list        Passed - Patient is age 18 or older        Passed - No active pregnancy on record        Passed - Normal serum creatinine on file in past 12 months     Recent Labs   Lab Test 10/18/21  0858   CR 0.68       Ok to refill medication if creatinine is low          Passed - Normal serum potassium on file in past 12 months     Recent Labs   Lab Test 10/18/21  0858   POTASSIUM 4.4             Passed - No positive pregnancy test within past 12 months             Ivett Carlson RN 05/09/22 11:13 AM  "

## 2022-05-20 ENCOUNTER — TELEPHONE (OUTPATIENT)
Dept: PULMONOLOGY | Facility: OTHER | Age: 67
End: 2022-05-20
Payer: COMMERCIAL

## 2022-05-20 DIAGNOSIS — J44.1 COPD EXACERBATION (H): ICD-10-CM

## 2022-05-20 RX ORDER — AZITHROMYCIN 250 MG/1
TABLET, FILM COATED ORAL
Qty: 6 TABLET | Refills: 0 | Status: SHIPPED | OUTPATIENT
Start: 2022-05-20 | End: 2022-05-25

## 2022-05-20 RX ORDER — PREDNISONE 20 MG/1
TABLET ORAL
Qty: 10 TABLET | Refills: 0 | Status: SHIPPED | OUTPATIENT
Start: 2022-05-20 | End: 2022-07-11

## 2022-05-20 NOTE — TELEPHONE ENCOUNTER
"Phone call from Jasmyn.  States she \"thinks she has an infection again, congested, back pain\".  Asking to start her emergency meds.        Will send prescription for prednisone 40mg daily x 5 and Zpack to her pharmacy.  "

## 2022-06-02 ENCOUNTER — TELEPHONE (OUTPATIENT)
Dept: RESPIRATORY THERAPY | Facility: CLINIC | Age: 67
End: 2022-06-02
Payer: COMMERCIAL

## 2022-06-02 ENCOUNTER — HOSPITAL ENCOUNTER (OUTPATIENT)
Dept: CT IMAGING | Facility: CLINIC | Age: 67
Discharge: HOME | End: 2022-06-02
Attending: INTERNAL MEDICINE | Admitting: INTERNAL MEDICINE
Payer: COMMERCIAL

## 2022-06-02 DIAGNOSIS — Z87.891 PERSONAL HISTORY OF TOBACCO USE: ICD-10-CM

## 2022-06-02 PROCEDURE — 71271 CT THORAX LUNG CANCER SCR C-: CPT

## 2022-06-29 ENCOUNTER — OFFICE VISIT (OUTPATIENT)
Dept: PULMONOLOGY | Facility: OTHER | Age: 67
End: 2022-06-29
Payer: COMMERCIAL

## 2022-06-29 VITALS
OXYGEN SATURATION: 99 % | HEART RATE: 104 BPM | BODY MASS INDEX: 17.38 KG/M2 | WEIGHT: 95 LBS | SYSTOLIC BLOOD PRESSURE: 112 MMHG | DIASTOLIC BLOOD PRESSURE: 60 MMHG

## 2022-06-29 DIAGNOSIS — J41.0 SIMPLE CHRONIC BRONCHITIS (H): Primary | ICD-10-CM

## 2022-06-29 DIAGNOSIS — K44.9 HIATAL HERNIA: ICD-10-CM

## 2022-06-29 DIAGNOSIS — R06.09 DOE (DYSPNEA ON EXERTION): ICD-10-CM

## 2022-06-29 PROCEDURE — 99214 OFFICE O/P EST MOD 30 MIN: CPT | Performed by: INTERNAL MEDICINE

## 2022-06-29 RX ORDER — FLUTICASONE PROPIONATE AND SALMETEROL 113; 14 UG/1; UG/1
1 POWDER, METERED RESPIRATORY (INHALATION) 2 TIMES DAILY
Qty: 1 EACH | Refills: 11 | Status: SHIPPED | OUTPATIENT
Start: 2022-06-29 | End: 2023-04-21

## 2022-06-29 NOTE — PROGRESS NOTES
LUNG NODULE & INTERVENTIONAL PULMONARY CLINIC  [facility], [facility], [facility]     Jasmyn Green MRN# [id number]   Age: 66 year old YOB: 1955       Requesting Physician: No referring provider defined for this encounter.       Assessment and Plan:    1.  Group D COPD.  Start air duo in addition to Combivent.  Financial issues affect inhaler choice for her.  She will continue albuterol as needed.     Action plan 20 mg of prednisone daily for 7 days.    2.  Negative lung cancer screening CT.  She will repeat in 1 year.  She continues to abstain from smoking.    3.  Hiatal hernia.  Not having significant reflux or heartburn.  No significant dysphagia.  We will follow her clinically no referral at this time.             History:     Jasmyn Green is a 66 year old female with sig h/o for COPD who is here for evaluation/followup of same.  She had a lung cancer screening CT since her last visit.  She had 2 exacerbations both seemingly related to the weather giving her cold-especially changes in the weather.  No other other exposures.  She is still working.      - My interpretation of the images relevant for this visit includes: CT chest with some mild bronchial wall thickening, no significant nodules.           Past Medical History:      Past Medical History:   Diagnosis Date     Arthritis      COPD (chronic obstructive pulmonary disease) (H)      Coronary artery disease      Dyspnea on exertion      Gout      Hypertension            Past Surgical History:      Past Surgical History:   Procedure Laterality Date     COLONOSCOPY N/A 10/21/2021    Procedure: COLONOSCOPY;  Surgeon: Wilma Hester DO;  Location: [facility]     VAGINAL DELIVERY      x 3 ; remote     WISDOM TOOTH EXTRACTION            Social History:     Social History     Tobacco Use     Smoking status: Former Smoker     Types: Cigarettes     Quit date: 6/27/2021     Years since  quittin.0     Smokeless tobacco: Never Used     Tobacco comment: updated 8/3/2021 by TTS   Substance Use Topics     Alcohol use: Not Currently          Family History:     Family History   Problem Relation Age of Onset     Chronic Obstructive Pulmonary Disease Sister      Diabetes Mother      Heart Disease Mother      Lung Cancer Mother      Heart Disease Father            Allergies:    No Known Allergies       Medications:     Current Outpatient Medications   Medication Sig     albuterol (PROAIR HFA/PROVENTIL HFA/VENTOLIN HFA) 108 (90 Base) MCG/ACT inhaler Inhale 2 puffs into the lungs every 6 hours     aspirin 81 mg chewable tablet [ASPIRIN 81 MG CHEWABLE TABLET] Chew 1 tablet (81 mg total) daily.     atorvastatin (LIPITOR) 20 MG tablet Take 1 tablet (20 mg) by mouth daily     Calcium Carb-Cholecalciferol (CALCIUM 500+D3 PO) Take 1 tablet by mouth daily     calcium carbonate 600 mg-vitamin D 400 units (CALTRATE) 600-400 MG-UNIT per tablet Take 1 tablet by mouth 2 times daily With calcium     fluticasone-salmeterol (AIRDUO RESPICLICK) 113-14 MCG/ACT inhaler Inhale 1 puff into the lungs 2 times daily     ibuprofen (ADVIL,MOTRIN) 200 MG tablet [IBUPROFEN (ADVIL,MOTRIN) 200 MG TABLET] Take 400 mg by mouth every 6 (six) hours as needed for pain.     ipratropium-albuterol (COMBIVENT RESPIMAT)  MCG/ACT inhaler Inhale 1 puff into the lungs 2 times daily     lisinopril (ZESTRIL) 20 MG tablet TAKE 1 TABLET(20 MG) BY MOUTH DAILY     Teriparatide, Recombinant, (FORTEO) 620 MCG/2.48ML SOPN injection Inject 0.08 mLs (20 mcg) Subcutaneous daily     ondansetron (ZOFRAN-ODT) 4 MG ODT tab Take 1 tablet (4 mg) by mouth every 8 hours as needed for nausea     predniSONE (DELTASONE) 20 MG tablet Take 2 tabs daily x 5 days     No current facility-administered medications for this visit.          Review of Systems:     See HPI         Physical Exam:   /60 (BP Location: Left arm, Patient Position: Chair, Cuff Size: Adult  Regular)   Pulse 104   Wt 43.1 kg (95 lb)   SpO2 99%   BMI 17.38 kg/m      Constitutional - looks well, in no apparent distress  Eyes - no redness or discharge  Respiratory -breathing appears comfortable. No wheeze or rhonchi.   Cardiac -- Normal rate, rhythm.   Skin - No appreciable discoloration or lesions (very limited exam)  Neurological - No apparent tremors. Speech fluent and articlate  Psychiatric - no signs of delirium or anxiety          Current Laboratory Data:   All laboratory and imaging data reviewed.    No results found for this or any previous visit (from the past 24 hour(s)).

## 2022-06-29 NOTE — PATIENT INSTRUCTIONS
1) Start air duo (goodrx) twice daily    -gargle and rinse out your mouth after each use.    2) Continue combivent twice daily.    3) Albuterol as needed.    4) You can have your work fax us at [phone] with your form.    5) Your lung CT scan doesn't show any cancer.  We will repeat in 1 year.

## 2022-06-29 NOTE — PROGRESS NOTES
"LUNG NODULE & INTERVENTIONAL PULMONARY CLINIC  CLINICS & SURGERY CENTER, [facility], [facility]     Jasmyn Green MRN# [id number]   Age: 66 year old YOB: 1955       Requesting Physician: No referring provider defined for this encounter.       Assessment and Plan:    1. {rcn1:004462} {rcn5:319234::\"solitary\"} pulmonary {rcn11:547291}. Given the characteristics on current/previous imaging and risk factors; I would classify this to be {rcnodulerisk:428873} risk for cancer. ***    2. ***    3.***             History:     Jasmyn Green is a 66 year old female with sig h/o for *** who is here for evaluation/followup of {rcn11:446614}.      - My interpretation of the images relevant for this visit includes: ***   - My interpretation of the PFT's relevant for this visit includes: {rcpft:141947}            Past Medical History:      Past Medical History:   Diagnosis Date     Arthritis      COPD (chronic obstructive pulmonary disease) (H)      Coronary artery disease      Dyspnea on exertion      Gout      Hypertension            Past Surgical History:      Past Surgical History:   Procedure Laterality Date     COLONOSCOPY N/A 10/21/2021    Procedure: COLONOSCOPY;  Surgeon: Wilma Hester DO;  Location: [facility]     VAGINAL DELIVERY      x 3 ; remote     WISDOM TOOTH EXTRACTION            Social History:     Social History     Tobacco Use     Smoking status: Former Smoker     Types: Cigarettes     Quit date: 2021     Years since quittin.0     Smokeless tobacco: Never Used     Tobacco comment: updated 8/3/2021 by TTS   Substance Use Topics     Alcohol use: Not Currently          Family History:     Family History   Problem Relation Age of Onset     Chronic Obstructive Pulmonary Disease Sister      Diabetes Mother      Heart Disease Mother      Lung Cancer Mother      Heart Disease Father            Allergies:    No Known Allergies       Medications: "     Current Outpatient Medications   Medication Sig     albuterol (PROAIR HFA/PROVENTIL HFA/VENTOLIN HFA) 108 (90 Base) MCG/ACT inhaler Inhale 2 puffs into the lungs every 6 hours     aspirin 81 mg chewable tablet [ASPIRIN 81 MG CHEWABLE TABLET] Chew 1 tablet (81 mg total) daily.     atorvastatin (LIPITOR) 20 MG tablet Take 1 tablet (20 mg) by mouth daily     Calcium Carb-Cholecalciferol (CALCIUM 500+D3 PO) Take 1 tablet by mouth daily     calcium carbonate 600 mg-vitamin D 400 units (CALTRATE) 600-400 MG-UNIT per tablet Take 1 tablet by mouth 2 times daily With calcium     ibuprofen (ADVIL,MOTRIN) 200 MG tablet [IBUPROFEN (ADVIL,MOTRIN) 200 MG TABLET] Take 400 mg by mouth every 6 (six) hours as needed for pain.     ipratropium-albuterol (COMBIVENT RESPIMAT)  MCG/ACT inhaler Inhale 1 puff into the lungs 2 times daily     lisinopril (ZESTRIL) 20 MG tablet TAKE 1 TABLET(20 MG) BY MOUTH DAILY     Teriparatide, Recombinant, (FORTEO) 620 MCG/2.48ML SOPN injection Inject 0.08 mLs (20 mcg) Subcutaneous daily     ondansetron (ZOFRAN-ODT) 4 MG ODT tab Take 1 tablet (4 mg) by mouth every 8 hours as needed for nausea     predniSONE (DELTASONE) 20 MG tablet Take 2 tabs daily x 5 days     No current facility-administered medications for this visit.          Review of Systems:     See HPI         Physical Exam:   /60 (BP Location: Left arm, Patient Position: Chair, Cuff Size: Adult Regular)   Pulse 104   Wt 43.1 kg (95 lb)   SpO2 99%   BMI 17.38 kg/m      Constitutional - looks well, in no apparent distress  Eyes - no redness or discharge  Respiratory -breathing appears comfortable. No wheeze or rhonchi.   Cardiac -- Normal rate, rhythm.   Skin - No appreciable discoloration or lesions (very limited exam)  Neurological - No apparent tremors. Speech fluent and articlate  Psychiatric - no signs of delirium or anxiety          Current Laboratory Data:   All laboratory and imaging data reviewed.    No results found  for this or any previous visit (from the past 24 hour(s)).

## 2022-07-01 ENCOUNTER — TELEPHONE (OUTPATIENT)
Dept: RESPIRATORY THERAPY | Facility: HOSPITAL | Age: 67
End: 2022-07-01

## 2022-07-11 ENCOUNTER — TELEPHONE (OUTPATIENT)
Dept: PULMONOLOGY | Facility: OTHER | Age: 67
End: 2022-07-11

## 2022-07-11 DIAGNOSIS — J44.1 COPD EXACERBATION (H): ICD-10-CM

## 2022-07-11 RX ORDER — PREDNISONE 20 MG/1
TABLET ORAL
Qty: 7 TABLET | Refills: 0 | Status: SHIPPED | OUTPATIENT
Start: 2022-07-11 | End: 2022-08-08

## 2022-07-11 RX ORDER — AZITHROMYCIN 250 MG/1
TABLET, FILM COATED ORAL
Qty: 6 TABLET | Refills: 0 | Status: SHIPPED | OUTPATIENT
Start: 2022-07-11 | End: 2022-07-16

## 2022-07-11 NOTE — TELEPHONE ENCOUNTER
"Phone call from Jasmyn. States that she  \"thinks she has an infection again\".  Coughing up yellow to greenish colored phlegm.  Asking to start her emergency meds.        Will send prescription for prednisone 20mg daily x 7 days and Zpack.     "

## 2022-08-08 ENCOUNTER — OFFICE VISIT (OUTPATIENT)
Dept: INTERNAL MEDICINE | Facility: CLINIC | Age: 67
End: 2022-08-08
Payer: COMMERCIAL

## 2022-08-08 VITALS
SYSTOLIC BLOOD PRESSURE: 130 MMHG | HEART RATE: 82 BPM | HEIGHT: 62 IN | BODY MASS INDEX: 17.48 KG/M2 | DIASTOLIC BLOOD PRESSURE: 78 MMHG | WEIGHT: 95 LBS

## 2022-08-08 DIAGNOSIS — Z87.891 HX OF SMOKING: ICD-10-CM

## 2022-08-08 DIAGNOSIS — E43 SEVERE PROTEIN-CALORIE MALNUTRITION (H): ICD-10-CM

## 2022-08-08 DIAGNOSIS — D64.9 LOW HEMOGLOBIN: ICD-10-CM

## 2022-08-08 DIAGNOSIS — J43.9 PULMONARY EMPHYSEMA, UNSPECIFIED EMPHYSEMA TYPE (H): Primary | ICD-10-CM

## 2022-08-08 DIAGNOSIS — I10 ESSENTIAL HYPERTENSION: ICD-10-CM

## 2022-08-08 DIAGNOSIS — E55.9 VITAMIN D DEFICIENCY: ICD-10-CM

## 2022-08-08 LAB
DEPRECATED CALCIDIOL+CALCIFEROL SERPL-MC: 32 UG/L (ref 20–75)
IRON BINDING CAPACITY (ROCHE): 428 UG/DL (ref 240–430)
IRON SATN MFR SERPL: 7 % (ref 15–46)
IRON SERPL-MCNC: 28 UG/DL (ref 37–145)
VIT B12 SERPL-MCNC: 363 PG/ML (ref 232–1245)

## 2022-08-08 PROCEDURE — 83550 IRON BINDING TEST: CPT | Performed by: NURSE PRACTITIONER

## 2022-08-08 PROCEDURE — 82607 VITAMIN B-12: CPT | Performed by: NURSE PRACTITIONER

## 2022-08-08 PROCEDURE — 99214 OFFICE O/P EST MOD 30 MIN: CPT | Performed by: NURSE PRACTITIONER

## 2022-08-08 PROCEDURE — 82306 VITAMIN D 25 HYDROXY: CPT | Performed by: NURSE PRACTITIONER

## 2022-08-08 PROCEDURE — 83540 ASSAY OF IRON: CPT | Performed by: NURSE PRACTITIONER

## 2022-08-08 PROCEDURE — 36415 COLL VENOUS BLD VENIPUNCTURE: CPT | Performed by: NURSE PRACTITIONER

## 2022-08-08 NOTE — PATIENT INSTRUCTIONS
Your labs are processing, I will release results on my chart once they are back.    Try to eat at least two meals per day, all days of the week. Try to get in one supplement per day, or shoot for eating that third meal.     Follow up in 6 months for a recheck, before then if anything comes up.

## 2022-08-08 NOTE — LETTER
[facility]  [address] [id number] [phone]          August 8, 2022    RE:  Jasmyn Green                                                                                                                                                       [facility] [place] [address]            To whom it may concern:    Jasmyn Green is under my professional care for her Emphysema. Please allow her to not wear a mask when using the steam table and toaster at work, this makes it very difficult for her to breathe.    If you have any questions, please reach out to me to discuss this further.     Sincerely,        Miesha Fernando RN, CNP

## 2022-08-08 NOTE — PROGRESS NOTES
Assessment & Plan     Pulmonary emphysema, unspecified emphysema type (H): She is managed per Pulmonology. She continues on oxygen 2-3 liters via nasal cannula when ambulating. She continues on Albuterol, combivent, and airduo. Stable.     Essential hypertension: Blood pressure today was 130/78. She continues on lisinopril 20mg daily. Stable.     Hx of smoking: quit smoking over a year ago.     Severe protein-calorie malnutrition (H): BMI today was 17.38. She is eating one meal per day, some days two. Discussed importance of trying to eat two meals per day, using supplements as needed to help support her calorie intake. She will try to eat more.       Return in about 6 months (around 2/8/2023) for Follow up.    JASON Galo [facility]    Daniela Vines is a 66 year old accompanied by her daughter, presenting for the following health issues:  Follow Up (6 month follow up)      The patient presents today for follow up with her Daughter.    She reports that she has been doing well overall. She started on the Air Duo inhaler, and her breathing has been much better. She reports that she currently has been using 2-3 liters of oxygen, depending if she is walking or performing tasks. She reports she continues to be very short of breath with walking, but this overall has been stable.    She works in a kitchen at work, and she has found that wearing a mask and using the steam table, and industrial toaster at work make it hard for her to breathe. She gets more short of breath with this. Will write a letter requesting her employer to allow her to not wear a mask when performing these two tasks.    She is due for a hemoglobin recheck today.    She denies any other concerns today.        Review of Systems   Constitutional, HEENT, cardiovascular, pulmonary, GI, , musculoskeletal, neuro, skin, endocrine and psych systems are negative, except as otherwise noted.      Objective   "  /78 (BP Location: Right arm, Patient Position: Sitting)   Pulse 82   Ht 1.575 m (5' 2\")   Wt 43.1 kg (95 lb)   BMI 17.38 kg/m    Body mass index is 17.38 kg/m .  Physical Exam   GENERAL: healthy, alert and no distress  RESP: lungs clear to auscultation - no rales, rhonchi or wheezes  CV: regular rate and rhythm, normal S1 S2, no S3 or S4, no murmur, click or rub, no peripheral edema and peripheral pulses strong  MS: no gross musculoskeletal defects noted, no edema  SKIN: no suspicious lesions or rashes  NEURO: Normal strength and tone, mentation intact and speech normal  PSYCH: mentation appears normal, affect normal/bright        .  ..  "

## 2022-08-14 DIAGNOSIS — E78.00 HYPERCHOLESTEREMIA: ICD-10-CM

## 2022-08-15 RX ORDER — ATORVASTATIN CALCIUM 20 MG/1
20 TABLET, FILM COATED ORAL DAILY
Qty: 90 TABLET | Refills: 3 | Status: SHIPPED | OUTPATIENT
Start: 2022-08-15 | End: 2023-07-31

## 2022-08-15 NOTE — TELEPHONE ENCOUNTER
"Last Written Prescription Date:  8/23/21  Last Fill Quantity: 90,  # refills: 3   Last office visit provider:   8/8/22    Requested Prescriptions   Pending Prescriptions Disp Refills     atorvastatin (LIPITOR) 20 MG tablet 90 tablet 3     Sig: Take 1 tablet (20 mg) by mouth daily       Statins Protocol Passed - 8/14/2022 11:30 AM        Passed - LDL on file in past 12 months     Recent Labs   Lab Test 09/21/21  0952   LDL 82             Passed - No abnormal creatine kinase in past 12 months     No lab results found.             Passed - Recent (12 mo) or future (30 days) visit within the authorizing provider's specialty     Patient has had an office visit with the authorizing provider or a provider within the authorizing providers department within the previous 12 mos or has a future within next 30 days. See \"Patient Info\" tab in inbasket, or \"Choose Columns\" in Meds & Orders section of the refill encounter.              Passed - Medication is active on med list        Passed - Patient is age 18 or older        Passed - No active pregnancy on record        Passed - No positive pregnancy test in past 12 months             Wilma Smith RN 08/15/22 12:12 PM  "

## 2022-09-29 ENCOUNTER — HOSPITAL ENCOUNTER (OUTPATIENT)
Dept: MAMMOGRAPHY | Facility: CLINIC | Age: 67
Discharge: HOME | End: 2022-09-29
Attending: NURSE PRACTITIONER | Admitting: NURSE PRACTITIONER
Payer: COMMERCIAL

## 2022-09-29 DIAGNOSIS — Z12.31 VISIT FOR SCREENING MAMMOGRAM: ICD-10-CM

## 2022-09-29 PROCEDURE — 77067 SCR MAMMO BI INCL CAD: CPT

## 2022-10-03 ENCOUNTER — ANCILLARY PROCEDURE (OUTPATIENT)
Dept: BONE DENSITY | Facility: CLINIC | Age: 67
End: 2022-10-03
Attending: INTERNAL MEDICINE
Payer: COMMERCIAL

## 2022-10-03 PROCEDURE — 77080 DXA BONE DENSITY AXIAL: CPT | Mod: TC | Performed by: RADIOLOGY

## 2022-10-04 ENCOUNTER — OFFICE VISIT (OUTPATIENT)
Dept: PULMONOLOGY | Facility: OTHER | Age: 67
End: 2022-10-04
Payer: COMMERCIAL

## 2022-10-04 VITALS
SYSTOLIC BLOOD PRESSURE: 126 MMHG | BODY MASS INDEX: 17.7 KG/M2 | OXYGEN SATURATION: 99 % | DIASTOLIC BLOOD PRESSURE: 70 MMHG | HEART RATE: 98 BPM | WEIGHT: 96.8 LBS

## 2022-10-04 DIAGNOSIS — J96.11 CHRONIC RESPIRATORY FAILURE WITH HYPOXIA (H): ICD-10-CM

## 2022-10-04 DIAGNOSIS — J41.0 SIMPLE CHRONIC BRONCHITIS (H): Primary | ICD-10-CM

## 2022-10-04 PROCEDURE — 99214 OFFICE O/P EST MOD 30 MIN: CPT | Performed by: INTERNAL MEDICINE

## 2022-10-04 RX ORDER — PREDNISONE 20 MG/1
20 TABLET ORAL DAILY
Qty: 7 TABLET | Refills: 3 | Status: SHIPPED | OUTPATIENT
Start: 2022-10-04 | End: 2023-04-21

## 2022-10-04 RX ORDER — AZITHROMYCIN 250 MG/1
TABLET, FILM COATED ORAL
Qty: 6 TABLET | Refills: 3 | Status: SHIPPED | OUTPATIENT
Start: 2022-10-04 | End: 2022-10-09

## 2022-10-04 NOTE — PROGRESS NOTES
LUNG NODULE & INTERVENTIONAL PULMONARY CLINIC  CLINICS & SURGERY CENTER, [facility]     Jasmyn Green MRN# [id number]   Age: 66 year old YOB: 1955       Requesting Physician: No referring provider defined for this encounter.       Assessment and Plan:    1.  Group D COPD.  Air duo and combivent.  For cost issues.    Action plan 20 mg of prednisone daily for 7 days. Zpack.    2.  Negative lung cancer screening CT.  She will repeat in 1 year.  She continues to abstain from smoking.    3. Chronic hpyoxic respiratory failure.               History:     Jasmyn Green is a 66 year old female with sig h/o for COPD who is here for evaluation/followup of same. She is doing well since her last visit. No exacerbations.    She is using her oxygen for mobility and is doing well with this.      - My interpretation of the images relevant for this visit includes: CT chest with some mild bronchial wall thickening, no significant nodules.           Past Medical History:      Past Medical History:   Diagnosis Date     Arthritis      COPD (chronic obstructive pulmonary disease) (H)      Coronary artery disease      Dyspnea on exertion      Gout      Hypertension            Past Surgical History:      Past Surgical History:   Procedure Laterality Date     COLONOSCOPY N/A 10/21/2021    Procedure: COLONOSCOPY;  Surgeon: Wilma Hester DO;  Location: [facility]     VAGINAL DELIVERY      x 3 ; remote     WISDOM TOOTH EXTRACTION            Social History:     Social History     Tobacco Use     Smoking status: Former Smoker     Types: Cigarettes     Quit date: 2021     Years since quittin.2     Smokeless tobacco: Never Used     Tobacco comment: updated 8/3/2021 by TTS   Substance Use Topics     Alcohol use: Not Currently          Family History:     Family History   Problem Relation Age of Onset     Chronic Obstructive Pulmonary Disease Sister      Diabetes  Mother      Heart Disease Mother      Lung Cancer Mother      Heart Disease Father            Allergies:    No Known Allergies       Medications:     Current Outpatient Medications   Medication Sig     albuterol (PROAIR HFA/PROVENTIL HFA/VENTOLIN HFA) 108 (90 Base) MCG/ACT inhaler Inhale 2 puffs into the lungs every 6 hours     aspirin 81 mg chewable tablet [ASPIRIN 81 MG CHEWABLE TABLET] Chew 1 tablet (81 mg total) daily.     atorvastatin (LIPITOR) 20 MG tablet Take 1 tablet (20 mg) by mouth daily     azithromycin (ZITHROMAX) 250 MG tablet Take 2 tablets (500 mg) by mouth daily for 1 day, THEN 1 tablet (250 mg) daily for 4 days.     Calcium Carb-Cholecalciferol (CALCIUM 500+D3 PO) Take 1 tablet by mouth daily     calcium carbonate 600 mg-vitamin D 400 units (CALTRATE) 600-400 MG-UNIT per tablet Take 1 tablet by mouth 2 times daily With calcium     fluticasone-salmeterol (AIRDUO RESPICLICK) 113-14 MCG/ACT inhaler Inhale 1 puff into the lungs 2 times daily     ibuprofen (ADVIL,MOTRIN) 200 MG tablet [IBUPROFEN (ADVIL,MOTRIN) 200 MG TABLET] Take 400 mg by mouth every 6 (six) hours as needed for pain.     ipratropium-albuterol (COMBIVENT RESPIMAT)  MCG/ACT inhaler Inhale 1 puff into the lungs 2 times daily     lisinopril (ZESTRIL) 20 MG tablet TAKE 1 TABLET(20 MG) BY MOUTH DAILY     predniSONE (DELTASONE) 20 MG tablet Take 1 tablet (20 mg) by mouth daily     Teriparatide, Recombinant, (FORTEO) 620 MCG/2.48ML SOPN injection Inject 0.08 mLs (20 mcg) Subcutaneous daily     No current facility-administered medications for this visit.          Review of Systems:     See HPI         Physical Exam:   /70 (BP Location: Right arm)   Pulse 98   Wt 43.9 kg (96 lb 12.8 oz)   SpO2 99%   BMI 17.70 kg/m      Constitutional - looks well, in no apparent distress  Eyes - no redness or discharge  Respiratory -breathing appears comfortable. No wheeze or rhonchi.   Cardiac -- Normal rate, rhythm.   Skin - No appreciable  discoloration or lesions (very limited exam)  Neurological - No apparent tremors. Speech fluent and articlate  Psychiatric - no signs of delirium or anxiety          Current Laboratory Data:   All laboratory and imaging data reviewed.    No results found for this or any previous visit (from the past 24 hour(s)).

## 2022-10-27 ENCOUNTER — MYC MEDICAL ADVICE (OUTPATIENT)
Dept: INTERNAL MEDICINE | Facility: CLINIC | Age: 67
End: 2022-10-27

## 2022-11-07 DIAGNOSIS — J44.9 CHRONIC OBSTRUCTIVE PULMONARY DISEASE, UNSPECIFIED COPD TYPE (H): ICD-10-CM

## 2022-11-07 RX ORDER — ALBUTEROL SULFATE 90 UG/1
2 AEROSOL, METERED RESPIRATORY (INHALATION) EVERY 6 HOURS
Qty: 18 G | Refills: 11 | Status: SHIPPED | OUTPATIENT
Start: 2022-11-07 | End: 2023-11-08

## 2022-11-08 ENCOUNTER — TELEPHONE (OUTPATIENT)
Dept: ENDOCRINOLOGY | Facility: CLINIC | Age: 67
End: 2022-11-08

## 2022-11-08 DIAGNOSIS — M81.8 OTHER OSTEOPOROSIS WITHOUT CURRENT PATHOLOGICAL FRACTURE: Primary | ICD-10-CM

## 2022-11-08 NOTE — TELEPHONE ENCOUNTER
Called and LM for patient to return call. Please assist patient with a non fasting lab appointment prior to her follow up visit with Dr. Pickens next Tuesday, 11/15/22.

## 2022-11-10 ENCOUNTER — LAB (OUTPATIENT)
Dept: LAB | Facility: CLINIC | Age: 67
End: 2022-11-10
Payer: COMMERCIAL

## 2022-11-10 DIAGNOSIS — M81.8 OTHER OSTEOPOROSIS WITHOUT CURRENT PATHOLOGICAL FRACTURE: ICD-10-CM

## 2022-11-10 LAB
ALBUMIN SERPL BCG-MCNC: 3.6 G/DL (ref 3.5–5.2)
CALCIUM SERPL-MCNC: 9.1 MG/DL (ref 8.8–10.2)
CREAT SERPL-MCNC: 0.87 MG/DL (ref 0.51–0.95)
GFR SERPL CREATININE-BSD FRML MDRD: 72 ML/MIN/1.73M2

## 2022-11-10 PROCEDURE — 82310 ASSAY OF CALCIUM: CPT

## 2022-11-10 PROCEDURE — 36415 COLL VENOUS BLD VENIPUNCTURE: CPT

## 2022-11-10 PROCEDURE — 82040 ASSAY OF SERUM ALBUMIN: CPT

## 2022-11-10 PROCEDURE — 82565 ASSAY OF CREATININE: CPT

## 2022-11-10 PROCEDURE — 82306 VITAMIN D 25 HYDROXY: CPT

## 2022-11-14 LAB
DEPRECATED CALCIDIOL+CALCIFEROL SERPL-MC: <34 UG/L (ref 20–75)
VITAMIN D2 SERPL-MCNC: <5 UG/L
VITAMIN D3 SERPL-MCNC: 29 UG/L

## 2022-11-15 ENCOUNTER — OFFICE VISIT (OUTPATIENT)
Dept: ENDOCRINOLOGY | Facility: CLINIC | Age: 67
End: 2022-11-15
Payer: COMMERCIAL

## 2022-11-15 VITALS
HEIGHT: 62 IN | DIASTOLIC BLOOD PRESSURE: 62 MMHG | BODY MASS INDEX: 17.15 KG/M2 | WEIGHT: 93.2 LBS | HEART RATE: 96 BPM | SYSTOLIC BLOOD PRESSURE: 120 MMHG

## 2022-11-15 DIAGNOSIS — E44.0 MODERATE PROTEIN-CALORIE MALNUTRITION (H): ICD-10-CM

## 2022-11-15 DIAGNOSIS — R77.8 ABNORMAL SPEP: ICD-10-CM

## 2022-11-15 DIAGNOSIS — F17.201 TOBACCO USE DISORDER, SEVERE, IN EARLY REMISSION: ICD-10-CM

## 2022-11-15 DIAGNOSIS — M81.8 OTHER OSTEOPOROSIS WITHOUT CURRENT PATHOLOGICAL FRACTURE: Primary | ICD-10-CM

## 2022-11-15 PROCEDURE — 99214 OFFICE O/P EST MOD 30 MIN: CPT | Performed by: INTERNAL MEDICINE

## 2022-11-15 NOTE — PROGRESS NOTES
Subjective:    Established patient, the following is a comprehensive summary of her Endocrine care to date.     Jasmyn Green is a 67 year old female who presents to discuss osteoporosis. We previously reviewed the standard osteoporosis dictation template.      DEXA 10/3/2022:  -L1 - L4 T-score -1.6 with 14.6% significant BMD improvement compared to 8/2021  -Lowest T-score at the hips is -3.7 at the left and right total hips, at the right total hip (only that is reported) is a 20.4% significant improvement compared to 8/2021  -TBS T-score L1 - L4 -4.2     CT chest 6/2022: no vertebral compression fractures     No prior pharmacologic therapy to reduce fracture risk before starting Tymlos 10/2021.     10/2021: She started Tymlos   2/2022: Because of insurance coverage we started Forteo instead, there was a likely 3 week lapse between stopping Tymlos and starting Forteo; Forteo is well tolerated      No prior low impact fractures. No falls. No prior nephrolithiasis. Significant GC exposure in the setting of COPD over the years.      Significant history of tobacco use and quit in 7/2021.      As of 11/2022 Jasmyn has completed a complete secondary evaluation for causes of increased fracture risk with the following notable findings:   -No prior hypercalcemia  -alkaline phosphatase has been mildly elevated but subsequently bone specific alkaline phosphatase normal 10/2021  -11/2022: vitamin D 29 (ref range 20 - 75 ug/L)  -9/2021: 24 hour urine calcium 108 mg (1.8 L collection)   -Abnormal Celiac screen and SPEP - see below    Imaging notes severe coronary artery calcification but no prior ASCVD event.      No history of malignancy. No history of radiation therapy.      2020 she had all teeth removed. No plan for upcoming tooth extractions. She has a hiatal hernia.     She currently takes a calcium vitamin D supplement twice daily and has 2 to 3 glasses of milk per day.    Objective:    Pleasant and conversational.  On  supplemental oxygen.  BMI 17.05 kg/meters    9/2021: BMI 18.66 kg/m2. Thoracic kyphosis. Thyroid exam normal. Edentulous. Steady on her feet without a gait aid.     Assessment/Plan:    # Osteoporosis, glucocorticoid associated    She has done very well with Forteo.  We will continue it for a 2-year course.  Return to see me in September 2023 with labs ordered at that time.  We reviewed at that time we will transition to antiresorptive therapy.    We reviewed supplementation of calcium and vitamin D in detail.  She will add a multivitamin to her current regimen.     # Possible Celiac disease  # BMI 17 kg/m2     Celiac screen was previously ordered in setting of vitamin D deficiency and severe osteoporosis without obvious cause. She is positive for DQB1*02 and/or DQB1*03(08), she has elevated deamidated gliadin Abs, normal TTG Abs (with normal IgA). No diarrhea, no abdominal pain. She is anemic. No known Celiac disease in the family.      I referred her to GI 10/2021. She met with her PCP 11/2021 and they decided to defer GI evaluation.      # Reduced gamma globulin fraction on SPEP     9/2021: SPEP - The gamma globulin fraction is decreased. Decreases in gamma globulin occur most frequently in association with protracted chronic inflammatory conditions or certain lymphoproliferative disorders. No UPEP.     Per my review 10/2021: She does have COPD, a chronic inflammatory condition. No 'B' symptoms and she has no splenomegaly on exam which is reassuring. Most recent CBC with differential 8/2021 showed mild leukocytosis and was otherwise unremarkable. Note abnormal CBC from 6/2021 was in the setting of pneumonia. This is unrelated to her osteoporosis, further management per her PCP.     I did reach out to her PCP, Miesha Fernando CNP today (11/15/2022) re the abnormalities on SPEP and her Celiac screen and defer to her expertise re need for further evaluation of these.    30 minutes spent on the date of the encounter  doing chart review, history and exam, documentation and further activities as noted above.

## 2022-11-15 NOTE — LETTER
11/15/2022         RE: Jasmyn Green   [address] [place] [address]        Dear Colleague,    Thank you for referring your patient, Jasmyn Green, to the [facility]. Please see a copy of my visit note below.    Subjective:    Established patient, the following is a comprehensive summary of her Endocrine care to date.     Jasmyn Green is a 67 year old female who presents to discuss osteoporosis. We previously reviewed the standard osteoporosis dictation template.      DEXA 10/3/2022:  -L1 - L4 T-score -1.6 with 14.6% significant BMD improvement compared to 8/2021  -Lowest T-score at the hips is -3.7 at the left and right total hips, at the right total hip (only that is reported) is a 20.4% significant improvement compared to 8/2021  -TBS T-score L1 - L4 -4.2     CT chest 6/2022: no vertebral compression fractures     No prior pharmacologic therapy to reduce fracture risk before starting Tymlos 10/2021.     10/2021: She started Tymlos   2/2022: Because of insurance coverage we started Forteo instead, there was a likely 3 week lapse between stopping Tymlos and starting Forteo; Forteo is well tolerated      No prior low impact fractures. No falls. No prior nephrolithiasis. Significant GC exposure in the setting of COPD over the years.      Significant history of tobacco use and quit in 7/2021.      As of 11/2022 Jasmyn has completed a complete secondary evaluation for causes of increased fracture risk with the following notable findings:   -No prior hypercalcemia  -alkaline phosphatase has been mildly elevated but subsequently bone specific alkaline phosphatase normal 10/2021  -11/2022: vitamin D 29 (ref range 20 - 75 ug/L)  -9/2021: 24 hour urine calcium 108 mg (1.8 L collection)   -Abnormal Celiac screen and SPEP - see below    Imaging notes severe coronary artery calcification but no prior ASCVD event.      No history of malignancy. No history of radiation therapy.       2020 she had all teeth removed. No plan for upcoming tooth extractions. She has a hiatal hernia.     She currently takes a calcium vitamin D supplement twice daily and has 2 to 3 glasses of milk per day.    Objective:    Pleasant and conversational.  On supplemental oxygen.  BMI 17.05 kg/meters    9/2021: BMI 18.66 kg/m2. Thoracic kyphosis. Thyroid exam normal. Edentulous. Steady on her feet without a gait aid.     Assessment/Plan:    # Osteoporosis, glucocorticoid associated    She has done very well with Forteo.  We will continue it for a 2-year course.  Return to see me in September 2023 with labs ordered at that time.  We reviewed at that time we will transition to antiresorptive therapy.    We reviewed supplementation of calcium and vitamin D in detail.  She will add a multivitamin to her current regimen.     # Possible Celiac disease  # BMI 17 kg/m2     Celiac screen was previously ordered in setting of vitamin D deficiency and severe osteoporosis without obvious cause. She is positive for DQB1*02 and/or DQB1*03(08), she has elevated deamidated gliadin Abs, normal TTG Abs (with normal IgA). No diarrhea, no abdominal pain. She is anemic. No known Celiac disease in the family.      I referred her to GI 10/2021. She met with her PCP 11/2021 and they decided to defer GI evaluation.      # Reduced gamma globulin fraction on SPEP     9/2021: SPEP - The gamma globulin fraction is decreased. Decreases in gamma globulin occur most frequently in association with protracted chronic inflammatory conditions or certain lymphoproliferative disorders. No UPEP.     Per my review 10/2021: She does have COPD, a chronic inflammatory condition. No 'B' symptoms and she has no splenomegaly on exam which is reassuring. Most recent CBC with differential 8/2021 showed mild leukocytosis and was otherwise unremarkable. Note abnormal CBC from 6/2021 was in the setting of pneumonia. This is unrelated to her osteoporosis, further  management per her PCP.     I did reach out to her PCP, Miesha Fernando CNP today (11/15/2022) re the abnormalities on SPEP and her Celiac screen and defer to her expertise re need for further evaluation of these.    30 minutes spent on the date of the encounter doing chart review, history and exam, documentation and further activities as noted above.       Again, thank you for allowing me to participate in the care of your patient.        Sincerely,        Rickie Pickens MD

## 2022-11-17 ENCOUNTER — TELEPHONE (OUTPATIENT)
Dept: FAMILY MEDICINE | Facility: OTHER | Age: 67
End: 2022-11-17

## 2022-11-17 NOTE — TELEPHONE ENCOUNTER
----- Message from Rickie Pickens MD sent at 11/15/2022  9:40 AM CST -----  Regarding: mutual patient  I saw Jasmyn back today for osteoporosis.  She has done very well with Forteo.  I will continue to follow her for osteoporosis.    Last year because of her BMI and severity of osteoporosis we did check a celiac screen and monoclonal gammopathy screen.  She notes today despite significant appetite she has had trouble gaining weight and BMI is ~17.  I do think this is most likely due to COPD and the abnormality on her SPEP could also very well be due to COPD but may be worth having her see GI with the question of possible Celiac and repeating an SPEP.  I will defer to your expertise on this.    Thanks, Palomo Pickens

## 2022-12-01 DIAGNOSIS — I10 ESSENTIAL HYPERTENSION: ICD-10-CM

## 2022-12-02 RX ORDER — LISINOPRIL 20 MG/1
TABLET ORAL
Qty: 60 TABLET | Refills: 3 | Status: SHIPPED | OUTPATIENT
Start: 2022-12-02 | End: 2023-07-31

## 2022-12-02 NOTE — TELEPHONE ENCOUNTER
"Routing refill request to provider for review/approval because:  Labs not current:  k    Last Written Prescription Date:  5/9/22  Last Fill Quantity: 60,  # refills: 3   Last office visit provider:  8/8/22     Requested Prescriptions   Pending Prescriptions Disp Refills     lisinopril (ZESTRIL) 20 MG tablet [Pharmacy Med Name: LISINOPRIL 20MG TABLETS] 60 tablet 3     Sig: TAKE 1 TABLET(20 MG) BY MOUTH DAILY       ACE Inhibitors (Including Combos) Protocol Failed - 12/1/2022  1:21 PM        Failed - Normal serum potassium on file in past 12 months     Recent Labs   Lab Test 10/18/21  0858   POTASSIUM 4.4             Passed - Blood pressure under 140/90 in past 12 months     BP Readings from Last 3 Encounters:   11/15/22 120/62   10/04/22 126/70   08/08/22 130/78                 Passed - Recent (12 mo) or future (30 days) visit within the authorizing provider's specialty     Patient has had an office visit with the authorizing provider or a provider within the authorizing providers department within the previous 12 mos or has a future within next 30 days. See \"Patient Info\" tab in inbasket, or \"Choose Columns\" in Meds & Orders section of the refill encounter.              Passed - Medication is active on med list        Passed - Patient is age 18 or older        Passed - No active pregnancy on record        Passed - Normal serum creatinine on file in past 12 months     Recent Labs   Lab Test 11/10/22  0823   CR 0.87       Ok to refill medication if creatinine is low          Passed - No positive pregnancy test within past 12 months             Jasmyn Frye RN 12/02/22 2:20 PM  "

## 2022-12-20 DIAGNOSIS — M81.8 OTHER OSTEOPOROSIS WITHOUT CURRENT PATHOLOGICAL FRACTURE: ICD-10-CM

## 2022-12-20 RX ORDER — TERIPARATIDE 250 UG/ML
INJECTION, SOLUTION SUBCUTANEOUS
Qty: 3 ML | Refills: 2 | Status: SHIPPED | OUTPATIENT
Start: 2022-12-20 | End: 2023-05-03

## 2022-12-28 ENCOUNTER — MEDICAL CORRESPONDENCE (OUTPATIENT)
Dept: HEALTH INFORMATION MANAGEMENT | Facility: CLINIC | Age: 67
End: 2022-12-28

## 2023-01-06 DIAGNOSIS — J96.01 ACUTE RESPIRATORY FAILURE WITH HYPOXIA (H): ICD-10-CM

## 2023-01-06 DIAGNOSIS — J44.1 COPD EXACERBATION (H): ICD-10-CM

## 2023-01-06 RX ORDER — IPRATROPIUM BROMIDE AND ALBUTEROL 20; 100 UG/1; UG/1
1 SPRAY, METERED RESPIRATORY (INHALATION) 2 TIMES DAILY
Qty: 4 G | Refills: 11 | Status: SHIPPED | OUTPATIENT
Start: 2023-01-06 | End: 2023-11-08

## 2023-01-06 NOTE — TELEPHONE ENCOUNTER
Printing out prescription for combivent for Dr. Levin to sign. Mail back to Jasmyn when signed and then she faxes it to [facility].

## 2023-01-10 NOTE — TELEPHONE ENCOUNTER
Hard copy of prescription for Combivent signed by Dr. Levin and mailed to patient at the address listed on her chart.

## 2023-02-04 ENCOUNTER — HEALTH MAINTENANCE LETTER (OUTPATIENT)
Age: 68
End: 2023-02-04

## 2023-02-08 ENCOUNTER — OFFICE VISIT (OUTPATIENT)
Dept: INTERNAL MEDICINE | Facility: CLINIC | Age: 68
End: 2023-02-08
Payer: COMMERCIAL

## 2023-02-08 VITALS
HEIGHT: 62 IN | RESPIRATION RATE: 22 BRPM | TEMPERATURE: 97.1 F | WEIGHT: 95 LBS | SYSTOLIC BLOOD PRESSURE: 130 MMHG | BODY MASS INDEX: 17.48 KG/M2 | HEART RATE: 86 BPM | DIASTOLIC BLOOD PRESSURE: 70 MMHG

## 2023-02-08 DIAGNOSIS — R25.2 LEG CRAMPING: ICD-10-CM

## 2023-02-08 DIAGNOSIS — E43 SEVERE PROTEIN-CALORIE MALNUTRITION (H): ICD-10-CM

## 2023-02-08 DIAGNOSIS — D64.9 LOW HEMOGLOBIN: ICD-10-CM

## 2023-02-08 DIAGNOSIS — I10 ESSENTIAL HYPERTENSION: ICD-10-CM

## 2023-02-08 DIAGNOSIS — L60.3 DYSTROPHIC NAIL: ICD-10-CM

## 2023-02-08 DIAGNOSIS — J41.0 SIMPLE CHRONIC BRONCHITIS (H): ICD-10-CM

## 2023-02-08 DIAGNOSIS — D50.8 IRON DEFICIENCY ANEMIA SECONDARY TO INADEQUATE DIETARY IRON INTAKE: ICD-10-CM

## 2023-02-08 PROBLEM — J96.01 ACUTE RESPIRATORY FAILURE WITH HYPOXIA (H): Status: RESOLVED | Noted: 2021-06-28 | Resolved: 2023-02-08

## 2023-02-08 LAB
ANION GAP SERPL CALCULATED.3IONS-SCNC: 8 MMOL/L (ref 7–15)
BASOPHILS # BLD AUTO: 0.1 10E3/UL (ref 0–0.2)
BASOPHILS NFR BLD AUTO: 1 %
BUN SERPL-MCNC: 31.5 MG/DL (ref 8–23)
CALCIUM SERPL-MCNC: 9.3 MG/DL (ref 8.8–10.2)
CHLORIDE SERPL-SCNC: 109 MMOL/L (ref 98–107)
CREAT SERPL-MCNC: 1.11 MG/DL (ref 0.51–0.95)
DEPRECATED HCO3 PLAS-SCNC: 24 MMOL/L (ref 22–29)
EOSINOPHIL # BLD AUTO: 0.5 10E3/UL (ref 0–0.7)
EOSINOPHIL NFR BLD AUTO: 6 %
ERYTHROCYTE [DISTWIDTH] IN BLOOD BY AUTOMATED COUNT: 13.9 % (ref 10–15)
FERRITIN SERPL-MCNC: 43 NG/ML (ref 11–328)
GFR SERPL CREATININE-BSD FRML MDRD: 54 ML/MIN/1.73M2
GLUCOSE SERPL-MCNC: 99 MG/DL (ref 70–99)
HCT VFR BLD AUTO: 39.6 % (ref 35–47)
HGB BLD-MCNC: 12.5 G/DL (ref 11.7–15.7)
IMM GRANULOCYTES # BLD: 0 10E3/UL
IMM GRANULOCYTES NFR BLD: 0 %
IRON BINDING CAPACITY (ROCHE): 311 UG/DL (ref 240–430)
IRON SATN MFR SERPL: 32 % (ref 15–46)
IRON SERPL-MCNC: 101 UG/DL (ref 37–145)
LYMPHOCYTES # BLD AUTO: 1.6 10E3/UL (ref 0.8–5.3)
LYMPHOCYTES NFR BLD AUTO: 17 %
MAGNESIUM SERPL-MCNC: 2.4 MG/DL (ref 1.7–2.3)
MCH RBC QN AUTO: 29.6 PG (ref 26.5–33)
MCHC RBC AUTO-ENTMCNC: 31.6 G/DL (ref 31.5–36.5)
MCV RBC AUTO: 94 FL (ref 78–100)
MONOCYTES # BLD AUTO: 0.8 10E3/UL (ref 0–1.3)
MONOCYTES NFR BLD AUTO: 8 %
NEUTROPHILS # BLD AUTO: 6.6 10E3/UL (ref 1.6–8.3)
NEUTROPHILS NFR BLD AUTO: 69 %
PLATELET # BLD AUTO: 328 10E3/UL (ref 150–450)
POTASSIUM SERPL-SCNC: 5 MMOL/L (ref 3.4–5.3)
RBC # BLD AUTO: 4.22 10E6/UL (ref 3.8–5.2)
SODIUM SERPL-SCNC: 141 MMOL/L (ref 136–145)
WBC # BLD AUTO: 9.5 10E3/UL (ref 4–11)

## 2023-02-08 PROCEDURE — 82728 ASSAY OF FERRITIN: CPT | Performed by: NURSE PRACTITIONER

## 2023-02-08 PROCEDURE — 99214 OFFICE O/P EST MOD 30 MIN: CPT | Performed by: NURSE PRACTITIONER

## 2023-02-08 PROCEDURE — 80048 BASIC METABOLIC PNL TOTAL CA: CPT | Performed by: NURSE PRACTITIONER

## 2023-02-08 PROCEDURE — 36415 COLL VENOUS BLD VENIPUNCTURE: CPT | Performed by: NURSE PRACTITIONER

## 2023-02-08 PROCEDURE — 85025 COMPLETE CBC W/AUTO DIFF WBC: CPT | Performed by: NURSE PRACTITIONER

## 2023-02-08 PROCEDURE — 83550 IRON BINDING TEST: CPT | Performed by: NURSE PRACTITIONER

## 2023-02-08 PROCEDURE — 83735 ASSAY OF MAGNESIUM: CPT | Performed by: NURSE PRACTITIONER

## 2023-02-08 PROCEDURE — 83540 ASSAY OF IRON: CPT | Performed by: NURSE PRACTITIONER

## 2023-02-08 RX ORDER — FERROUS GLUCONATE 324(38)MG
324 TABLET ORAL
Status: CANCELLED | COMMUNITY
Start: 2023-02-08

## 2023-02-08 NOTE — PROGRESS NOTES
Assessment & Plan     Simple chronic bronchitis (H): Managed per Pulmonology. Continued on inhalers. Stable.     Essential hypertension: Blood pressure today in office was 130/70. She continues on Lisinopril. Stable.     Severe protein-calorie malnutrition (H): BMI today was 17.38. Discussed dietary intake, options given to increase calories.     Low hemoglobin/Iron deficiency anemia secondary to inadequate dietary iron intake: Hx of this, will recheck Iron today. Will need iron refilled if she still is needing to take this.   - CBC with platelets and differential  - Ferritin  - Iron and iron binding capacity    Leg cramping: Worsening at night time. Will check her electrolytes, magnesium. Discussed starting over the counter Magnesium Glycinate and topical theraworx.   - Basic metabolic panel  (Ca, Cl, CO2, Creat, Gluc, K, Na, BUN)  - Magnesium    Dystrophic nail: Podiatry referral placed.   - Orthopedic  Referral      Return in about 6 months (around 8/8/2023) for Follow up, Routine preventive.    Miesha Fernando CNP  [facility]    Daniela Vines is a 67 year old accompanied by her daughter, presenting for the following health issues:  Follow Up (6 month follow up)      History of Present Illness       Reason for visit:  Follow up    She eats 0-1 servings of fruits and vegetables daily.She consumes 1 sweetened beverage(s) daily.She exercises with enough effort to increase her heart rate 10 to 19 minutes per day.  She exercises with enough effort to increase her heart rate 3 or less days per week.   She is taking medications regularly.     The patient presents today for follow up of multiple medical issues.    She reports that she did start a multivitamin, but it is making her nauseous. She is taking a One a Day Women's. Discussed taking this with food, or trying it before bedtime to help with this.    She tried ensure and boost, and hated them. Discussed trying  "premier protein drinks, goals are to get as many calories into her as we can.    Her breathing has been good lately, no increased shortness of breath.    We will recheck her blood work today.    She would also like to see Podiatry for her toenails.    Review of Systems   Constitutional, HEENT, cardiovascular, pulmonary, GI, , musculoskeletal, neuro, skin, endocrine and psych systems are negative, except as otherwise noted.      Objective    /70 (BP Location: Right arm, Patient Position: Sitting)   Pulse 86   Temp 97.1  F (36.2  C)   Resp 22   Ht 1.575 m (5' 2\")   Wt 43.1 kg (95 lb)   BMI 17.38 kg/m    Body mass index is 17.38 kg/m .  Physical Exam   GENERAL: healthy, alert and no distress  RESP: lungs clear to auscultation - no rales, rhonchi or wheezes  CV: regular rate and rhythm, normal S1 S2, no S3 or S4, no murmur, click or rub, no peripheral edema and peripheral pulses strong  MS: no gross musculoskeletal defects noted, no edema  SKIN: no suspicious lesions or rashes  NEURO: Normal strength and tone, mentation intact and speech normal  PSYCH: mentation appears normal, affect normal/bright      "

## 2023-02-08 NOTE — PATIENT INSTRUCTIONS
Start Magnesium Glycinate 400mg daily at bedtime to help with the muscle cramping in your legs. Get this anywhere over the counter.    Theraworx topical solution to the cramping areas as needed as well. You can get this over the counter anywhere.    Take your multivitamin at night time with a small snack.    Try a premier protein shake daily; the more calories we can get into you on a daily basis the better.    Your labs are processing, I will release results on my chart once they are back.      Podiatry referral placed call [phone] to set this visit up.    See you back in 6 months, before then if anything comes up.

## 2023-02-15 ENCOUNTER — TELEPHONE (OUTPATIENT)
Dept: ENDOCRINOLOGY | Facility: CLINIC | Age: 68
End: 2023-02-15
Payer: COMMERCIAL

## 2023-02-21 NOTE — TELEPHONE ENCOUNTER
PA Initiation    Medication: Teriparatide renewal pa pending  Insurance Company: [employer] - Phone [phone] Fax [phone]  Pharmacy Filling the Rx:    Filling Pharmacy Phone:    Filling Pharmacy Fax:    Start Date: 2/21/2023

## 2023-02-22 NOTE — TELEPHONE ENCOUNTER
Prior Authorization Approval    Authorization Effective Date: 2/21/2023  Authorization Expiration Date: 1/21/2024  Medication: Teriparatide renewal pa approved  Approved Dose/Quantity: 2.48 per 28 days  Reference #: [id number]   Insurance Company: [employer] - Phone [phone] Fax [phone]  Expected CoPay: unable to determine     CoPay Card Available:      Foundation Assistance Needed:    Which Pharmacy is filling the prescription (Not needed for infusion/clinic administered): [facility] LEONEL [facility]ARC[facility] - [facility]BD[facility]  Pharmacy Notified:    Patient Notified: Yes via [email or web address]

## 2023-03-07 ENCOUNTER — OFFICE VISIT (OUTPATIENT)
Dept: PODIATRY | Facility: CLINIC | Age: 68
End: 2023-03-07
Attending: NURSE PRACTITIONER
Payer: COMMERCIAL

## 2023-03-07 VITALS — HEART RATE: 98 BPM | OXYGEN SATURATION: 96 %

## 2023-03-07 DIAGNOSIS — B35.1 ONYCHOMYCOSIS: Primary | ICD-10-CM

## 2023-03-07 PROCEDURE — 11721 DEBRIDE NAIL 6 OR MORE: CPT | Mod: Q8 | Performed by: PODIATRIST

## 2023-03-07 PROCEDURE — 99203 OFFICE O/P NEW LOW 30 MIN: CPT | Mod: 25 | Performed by: PODIATRIST

## 2023-03-07 ASSESSMENT — PAIN SCALES - GENERAL: PAINLEVEL: NO PAIN (0)

## 2023-03-07 NOTE — LETTER
3/7/2023         RE: Jasmyn Green  [address] [place] [address]        Dear Colleague,    Thank you for referring your patient, Jasmyn Green, to the [facility]. Please see a copy of my visit note below.          FOOT AND ANKLE SURGERY/PODIATRY CONSULT NOTE         ASSESSMENT:   Onychomycosis  Hammertoe      TREATMENT:  -I discussed with the patient she does have a fungal infection on nails on both feet.  We reviewed oral antifungal medication including risk for elevated liver enzymes.  She would not like to pursue oral antifungal medication at this time.    -I recommend regular debridement of the nails every 10 weeks to reduce height of the nails which will likely decrease her symptoms.    -Debrided nails 1 through 10 in length and thickness.    -Referred to local routine foot care providers to reduce height and length of nails.    -Patient's questions invited and answered.  She was encouraged to call my office with any further questions or concerns.     Pavan Bee DPM  [facility]/Foot & Ankle Surgery      HPI: I was asked to see Jasmyn Green today for painful nails of both feet.  Patient states that she has fungal nails in both feet and would like to discuss treatment options.  She has been trimming her nails herself but is having trouble reducing the height of the nails.    Past Medical History:   Diagnosis Date     Arthritis      COPD (chronic obstructive pulmonary disease) (H)      Coronary artery disease      Dyspnea on exertion      Gout      Hypertension          Social History     Socioeconomic History     Marital status:      Spouse name: Not on file     Number of children: Not on file     Years of education: Not on file     Highest education level: Not on file   Occupational History     Not on file   Tobacco Use     Smoking status: Former     Types: Cigarettes     Quit date: 2021     Years since quittin.6      Smokeless tobacco: Never     Tobacco comments:     updated 8/3/2021 by TTS   Vaping Use     Vaping Use: Never used   Substance and Sexual Activity     Alcohol use: Not Currently     Drug use: Never     Sexual activity: Not Currently   Other Topics Concern     Not on file   Social History Narrative     many years 3 grown children. 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 Last cigarette a week ago.non drinker  Her primary MD (Wilda) retired several years ago/ tends to avoid doctors/medical care in general       Social Determinants of Health     Financial Resource Strain: Low Risk      Difficulty of Paying Living Expenses: Not hard at all   Food Insecurity: No Food Insecurity     Worried About Running Out of Food in the Last Year: Never true     Ran Out of Food in the Last Year: Never true   Transportation Needs: No Transportation Needs     Lack of Transportation (Medical): No     Lack of Transportation (Non-Medical): No   Physical Activity: Not on file   Stress: Not on file   Social Connections: Not on file   Intimate Partner Violence: Not on file   Housing Stability: Not on file          No Known Allergies      MEDICATIONS:   Current Outpatient Medications   Medication     albuterol (PROAIR HFA/PROVENTIL HFA/VENTOLIN HFA) 108 (90 Base) MCG/ACT inhaler     aspirin 81 mg chewable tablet     atorvastatin (LIPITOR) 20 MG tablet     Calcium Carb-Cholecalciferol (CALCIUM 500+D3 PO)     calcium carbonate 600 mg-vitamin D 400 units (CALTRATE) 600-400 MG-UNIT per tablet     fluticasone-salmeterol (AIRDUO RESPICLICK) 113-14 MCG/ACT inhaler     ibuprofen (ADVIL,MOTRIN) 200 MG tablet     ipratropium-albuterol (COMBIVENT RESPIMAT)  MCG/ACT inhaler     lisinopril (ZESTRIL) 20 MG tablet     predniSONE (DELTASONE) 20 MG tablet     Teriparatide, Recombinant, 620 MCG/2.48ML SOPN injection     No current facility-administered medications for this visit.        Family History   Problem Relation Age of Onset      Chronic Obstructive Pulmonary Disease Sister      Diabetes Mother      Heart Disease Mother      Lung Cancer Mother      Heart Disease Father           Review of Systems - 10 point Review of Systems is negative except for fungal nails which is noted in HPI.    OBJECTIVE:  Appearance: alert, well appearing, and in no distress.    VITAL SIGNS: Pulse 98   SpO2 96%       General appearance: Patient is alert and fully cooperative with history & exam.  No sign of distress is noted during the visit.     Psychiatric: Affect is pleasant & appropriate.  Patient appears motivated to improve health.     Respiratory: Breathing is regular & unlabored while sitting.     HEENT: Hearing is intact to spoken word.  Speech is clear.  No gross evidence of visual impairment that would impact ambulation.      Vascular: Posterior tibial pulses none palpable. There is no appreciable edema noted.  Dermatologic: Nails 1-10 elongated, thick, yellow with subungal debris. Trophic changes noted including diminished hair growth and shiny skin.  Neurologic: All epicritic and proprioceptive sensations are grossly intact bilateral.  Musculoskeletal: Contracted digits bilateral.            Again, thank you for allowing me to participate in the care of your patient.        Sincerely,        Pavan Bee DPM

## 2023-03-07 NOTE — PATIENT INSTRUCTIONS
Podiatry Clinics that offer foot and toenail care  [facility]  [facility]  [phone]    Foot and Ankle Clinics, [facility]  [phone]    [facility]  [place] - Dr. Pyle on Tuesdays  [phone]    [facility]  [place]  [phone]    [facility]  [facility]  [phone]    [facility]  [phone]    [facility]  [phone]      [facility]  *Nurse comes to your home for nail care.  Sherry [place] RN Foot Specialist  [phone]

## 2023-03-07 NOTE — PROGRESS NOTES
FOOT AND ANKLE SURGERY/PODIATRY CONSULT NOTE         ASSESSMENT:   Onychomycosis  Dara      TREATMENT:  -I discussed with the patient she does have a fungal infection on nails on both feet.  We reviewed oral antifungal medication including risk for elevated liver enzymes.  She would not like to pursue oral antifungal medication at this time.    -I recommend regular debridement of the nails every 10 weeks to reduce height of the nails which will likely decrease her symptoms.    -Debrided nails 1 through 10 in length and thickness.    -Referred to local routine foot care providers to reduce height and length of nails.    -Patient's questions invited and answered.  She was encouraged to call my office with any further questions or concerns.     Pavan Bee DPM  [facility] Podiatry/Foot & Ankle Surgery      HPI: I was asked to see Jasmyn Green today for painful nails of both feet.  Patient states that she has fungal nails in both feet and would like to discuss treatment options.  She has been trimming her nails herself but is having trouble reducing the height of the nails.    Past Medical History:   Diagnosis Date     Arthritis      COPD (chronic obstructive pulmonary disease) (H)      Coronary artery disease      Dyspnea on exertion      Gout      Hypertension          Social History     Socioeconomic History     Marital status:      Spouse name: Not on file     Number of children: Not on file     Years of education: Not on file     Highest education level: Not on file   Occupational History     Not on file   Tobacco Use     Smoking status: Former     Types: Cigarettes     Quit date: 2021     Years since quittin.6     Smokeless tobacco: Never     Tobacco comments:     updated 8/3/2021 by TTS   Vaping Use     Vaping Use: Never used   Substance and Sexual Activity     Alcohol use: Not Currently     Drug use: Never     Sexual activity: Not Currently   Other Topics Concern      Not on file   Social History Narrative     many years 3 grown children. 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 Last cigarette a week ago.non drinker  Her primary MD (Wilda) retired several years ago/ tends to avoid doctors/medical care in general       Social Determinants of Health     Financial Resource Strain: Low Risk      Difficulty of Paying Living Expenses: Not hard at all   Food Insecurity: No Food Insecurity     Worried About Running Out of Food in the Last Year: Never true     Ran Out of Food in the Last Year: Never true   Transportation Needs: No Transportation Needs     Lack of Transportation (Medical): No     Lack of Transportation (Non-Medical): No   Physical Activity: Not on file   Stress: Not on file   Social Connections: Not on file   Intimate Partner Violence: Not on file   Housing Stability: Not on file          No Known Allergies      MEDICATIONS:   Current Outpatient Medications   Medication     albuterol (PROAIR HFA/PROVENTIL HFA/VENTOLIN HFA) 108 (90 Base) MCG/ACT inhaler     aspirin 81 mg chewable tablet     atorvastatin (LIPITOR) 20 MG tablet     Calcium Carb-Cholecalciferol (CALCIUM 500+D3 PO)     calcium carbonate 600 mg-vitamin D 400 units (CALTRATE) 600-400 MG-UNIT per tablet     fluticasone-salmeterol (AIRDUO RESPICLICK) 113-14 MCG/ACT inhaler     ibuprofen (ADVIL,MOTRIN) 200 MG tablet     ipratropium-albuterol (COMBIVENT RESPIMAT)  MCG/ACT inhaler     lisinopril (ZESTRIL) 20 MG tablet     predniSONE (DELTASONE) 20 MG tablet     Teriparatide, Recombinant, 620 MCG/2.48ML SOPN injection     No current facility-administered medications for this visit.        Family History   Problem Relation Age of Onset     Chronic Obstructive Pulmonary Disease Sister      Diabetes Mother      Heart Disease Mother      Lung Cancer Mother      Heart Disease Father           Review of Systems - 10 point Review of Systems is negative except for fungal nails which is noted in  HPI.    OBJECTIVE:  Appearance: alert, well appearing, and in no distress.    VITAL SIGNS: Pulse 98   SpO2 96%       General appearance: Patient is alert and fully cooperative with history & exam.  No sign of distress is noted during the visit.     Psychiatric: Affect is pleasant & appropriate.  Patient appears motivated to improve health.     Respiratory: Breathing is regular & unlabored while sitting.     HEENT: Hearing is intact to spoken word.  Speech is clear.  No gross evidence of visual impairment that would impact ambulation.      Vascular: Posterior tibial pulses none palpable. There is no appreciable edema noted.  Dermatologic: Nails 1-10 elongated, thick, yellow with subungal debris. Trophic changes noted including diminished hair growth and shiny skin.  Neurologic: All epicritic and proprioceptive sensations are grossly intact bilateral.  Musculoskeletal: Contracted digits bilateral.

## 2023-03-31 ENCOUNTER — TELEPHONE (OUTPATIENT)
Dept: PULMONOLOGY | Facility: CLINIC | Age: 68
End: 2023-03-31
Payer: COMMERCIAL

## 2023-03-31 DIAGNOSIS — J44.1 COPD EXACERBATION (H): Primary | Chronic | ICD-10-CM

## 2023-03-31 DIAGNOSIS — R06.02 SHORTNESS OF BREATH: ICD-10-CM

## 2023-03-31 RX ORDER — PREDNISONE 20 MG/1
20 TABLET ORAL DAILY
Qty: 7 TABLET | Refills: 0 | Status: SHIPPED | OUTPATIENT
Start: 2023-03-31 | End: 2023-04-07

## 2023-03-31 RX ORDER — CEFPODOXIME PROXETIL 200 MG/1
200 TABLET, FILM COATED ORAL 2 TIMES DAILY
Qty: 14 TABLET | Refills: 0 | Status: SHIPPED | OUTPATIENT
Start: 2023-03-31 | End: 2023-04-07

## 2023-03-31 NOTE — TELEPHONE ENCOUNTER
Per Dr. Hinton's verbal orders:  Cefpodoxime  200mg x 7 days, prednisone 20mg x 7 days, chest xray, and follow up appointment with CNPon Monday or Tuesday.  Scheduled patient for Tuesday 4//4/23 with CNP.

## 2023-03-31 NOTE — TELEPHONE ENCOUNTER
Jasmyn called today to report her action plan of the Zpack and prednisone did not work. She finished both on Wednesday 3/29/23. Reports yellow/gray colored phlegm, shortness of breath and increase in oxygen need from 2 liters to 3 liters, however, she does not have a pulse oxymetry meter. She had a fever last week but denies fever now. Has tested negative for covid. Will consult with doctor of the day, Dr. Hinton, for next plan of action.

## 2023-04-03 NOTE — PROGRESS NOTES
Pulmonary Clinic Follow-Up        Assessment/Plan:     Jasmyn Green is a 67 year old female with sig h/o for COPD who is here for acute visit.    COPD - GOLD D, with exacerbation  Chronic hypoxic respiratory failure  Emphysema  Severe obstructive lung disease (FEV1 29%), with hyperinflation and air-trapping.  Severe diffusion capacity defect (DLCO 30%).  Radiographic emphysema.  Continues on Airduo and combivent BID, d/t cost issues.  Patient with exacerbation symptoms (increased SOB, oxygen requirements, and mucous), not resolved w/action plan.  Now on cepodoxime + prednisone.  CXR done today without consolidation.  Family and patient concerned with amount of exacerbations and frequent antibiotic use.  We discussed multiple options and researched online during exam, but LAMA or combo inhalers w/LAMA is not an option - financially.  Sats 98% on 3L in clinic today, LS clear.  Plan:  - Increase Airduo (fluticasone/salmeterol) respiclick to 232/14, one puff BID, rinse/gargle after use.  - Increase Combivent to one puff QID.  - Extend prednisone - 10mg x7 days after 20mg dosing is done  - We discussed nebulized medications, patient would like to discuss coverage of neb machine with insurance company first, and will let us know if she would like these ordered.  - She is UTD with annual influenza vaccine and COVID vaccine, due for booster.  Due for prevnar 20, offer at next visit when not acutely ill.  - Action plan: prednisone 40mg x5 days, + augmentin 875/125 BID x5 days    Nicotine dependence, in remission  Former smoker, quit in 2021.  LDCT for lung cancer screening 6/2022, LungRADS category 1.  Plan:  - due for LDCT 6/2023 - can be ordered at next visit.    Follow-up  - with Dr Levin on 4/21/23      Tata Lainez, CNP  Pulmonary Medicine  [facility]  [phone]               History:     Jasmyn Green is a 66 year old female with sig h/o for COPD who is here for  acute visit.    She contacted our clinic on 3/31/23 stating she had initiated her action plan (prednisone + azithromycin) and continues to have increased sputum, increased SOB and oxygen requirements, negative for COVID.  Dr Hinton prescribed her cefpodoxime, prednisone x7 days and ordered CXR.    Today she presents to clinic for evaluation, she has two days left of prednisone and antibiotic.  She continues to cough up phlegm which is now a grey/yellow color, improved from green/yellow last week.  She feels the mucous amount is decreasing now.  She is usually on 2L oxygen, now on 3L continuous.  She denies fever, chest pain, muscle aches.  She endorses some night sweats and chest congestion.  She has not tried any OTC medications.    Patient supplied answers from flow sheet for:  COPD Assessment Test (CAT)  2009 [employer]. All rights reserved.      11/2/2021     9:00 AM 6/29/2022     8:00 AM 10/4/2022     9:00 AM   COPD assessment test (CAT)   Cough 3 3 3   Phlegm 3 3 2   Chest tightness 0 0 0   Walk up hill 5 5 5   Limited activities 0 3 0   Leaving my home 0 0 0   Sleep 0 0 1   Energy 2 4 0   Total Score 13 18 11      CAT Key:  The CAT consist of 8 items which are each scored 0-5. The total score ranges from 0-40 with higher scores representing a poorer health status. When interpreting CAT scores, the individual s disease severity should be considered.   Low impact  (1-9)  Medium impact  (10-20)  High impact  (21-30)  Very high impact  (31-40)     ROS:     6-point ROS performed and is negative aside from those listed in HPI.         Past Medical History:      Past Medical History:   Diagnosis Date     Arthritis      COPD (chronic obstructive pulmonary disease) (H)      Coronary artery disease      Dyspnea on exertion      Gout      Hypertension            Past Surgical History:      Past Surgical History:   Procedure Laterality Date     COLONOSCOPY N/A 10/21/2021    Procedure: COLONOSCOPY;  Surgeon: Wilma Hester DO;   Location: [place] [facility]     VAGINAL DELIVERY      x 3 ; remote     WISDOM TOOTH EXTRACTION            Social History:     Social History     Tobacco Use     Smoking status: Former     Types: Cigarettes     Quit date: 2021     Years since quittin.7     Smokeless tobacco: Never     Tobacco comments:     updated 8/3/2021 by TTS   Vaping Use     Vaping status: Never Used   Substance Use Topics     Alcohol use: Not Currently          Family History:     Family History   Problem Relation Age of Onset     Chronic Obstructive Pulmonary Disease Sister      Diabetes Mother      Heart Disease Mother      Lung Cancer Mother      Heart Disease Father            Allergies:    No Known Allergies       Medications:     Current Outpatient Medications   Medication Sig     albuterol (PROAIR HFA/PROVENTIL HFA/VENTOLIN HFA) 108 (90 Base) MCG/ACT inhaler Inhale 2 puffs into the lungs every 6 hours     aspirin 81 mg chewable tablet [ASPIRIN 81 MG CHEWABLE TABLET] Chew 1 tablet (81 mg total) daily.     atorvastatin (LIPITOR) 20 MG tablet Take 1 tablet (20 mg) by mouth daily     Calcium Carb-Cholecalciferol (CALCIUM 500+D3 PO) Take 1 tablet by mouth daily     calcium carbonate 600 mg-vitamin D 400 units (CALTRATE) 600-400 MG-UNIT per tablet Take 1 tablet by mouth 2 times daily With calcium     cefpodoxime (VANTIN) 200 MG tablet Take 1 tablet (200 mg) by mouth 2 times daily for 7 days     fluticasone-salmeterol (AIRDUO RESPICLICK) 113-14 MCG/ACT inhaler Inhale 1 puff into the lungs 2 times daily     fluticasone-salmeterol (AIRDUO RESPICLICK) 232-14 MCG/ACT inhaler Inhale 1 puff into the lungs 2 times daily     ibuprofen (ADVIL,MOTRIN) 200 MG tablet [IBUPROFEN (ADVIL,MOTRIN) 200 MG TABLET] Take 400 mg by mouth every 6 (six) hours as needed for pain.     ipratropium-albuterol (COMBIVENT RESPIMAT)  MCG/ACT inhaler Inhale 1 puff into the lungs 2 times daily     lisinopril (ZESTRIL) 20 MG tablet TAKE 1 TABLET(20 MG) BY MOUTH  DAILY     predniSONE (DELTASONE) 10 MG tablet Take 1 tablet (10 mg) by mouth daily for 7 days     predniSONE (DELTASONE) 20 MG tablet Take 1 tablet (20 mg) by mouth daily     Teriparatide, Recombinant, 620 MCG/2.48ML SOPN injection INJECT 20 MCG UNDER THE SKIN 1 TIME A DAY. DISCARD PEN 28 DAYS AFTER INITIAL USE.     predniSONE (DELTASONE) 20 MG tablet Take 1 tablet (20 mg) by mouth daily for 7 days     No current facility-administered medications for this visit.            Physical Exam:   /64 (BP Location: Left arm, Patient Position: Chair, Cuff Size: Adult Regular)   Pulse 85   Temp 97.6  F (36.4  C) (Oral)   Wt 43.2 kg (95 lb 3.2 oz)   SpO2 98%   BMI 17.41 kg/m      Constitutional - adult female, appears in NAD  Eyes - no redness or discharge, mask in place  Respiratory - CTA bilaterally, decreased air movement throughout.  No wheezes or rhonchi.  Respirations even and unlabored.  On 3L oxygen via NC.  Intermittent congested cough during exam.  Cardiac -- RRR, no M/G/R  Neurological - alert, answering questions appropriately          Current Data:       EXAM: XR CHEST 2 VIEWS  LOCATION: [facility]  DATE/TIME: 4/4/2023 6:54 AM  INDICATION:  COPD exacerbation (H), Shortness of breath  COMPARISON: 06/27/2021.                                              IMPRESSION: Heart is normal in size. Lungs are hyperinflated suggesting COPD. Minimal bibasilar atelectasis. Possible trace bilateral effusions. Lungs are otherwise clear.    Chest CT 6/2/22:  IMPRESSION:  1.  Negative for lung cancer screening purposes.  2.  Debris throughout the airways with bronchial wall thickening. Bronchitis is suspected.  3.  Severe coronary artery calcification.  4.  Small to moderate sized hiatal hernia.    PFTs 8/2021:  The FVC, FEV1 and FEV1/FVC ratio are reduced, but the ZCI19-84% is within normal limits.  The inspiratory flow rates are reduced.  The FVC is reduced relative to the SVC indicating air  trapping.  The TLC, RV, FRC and RV/TLC ratio are all increased   indicating overinflation and air trapping.  Following administration of bronchodilators, there is no significant response.  The diffusing capacity is severely reduced when corrected to hemoglobin.   IMPRESSION:   Very severe Airflow Obstruction. No significant postbronchodilator response.   Mild hyperinflation and moderate airtrapping.   Diffusion capacity was severely reduced when corrected to hemoglobin.

## 2023-04-04 ENCOUNTER — OFFICE VISIT (OUTPATIENT)
Dept: PULMONOLOGY | Facility: CLINIC | Age: 68
End: 2023-04-04
Payer: COMMERCIAL

## 2023-04-04 ENCOUNTER — HOSPITAL ENCOUNTER (OUTPATIENT)
Dept: RADIOLOGY | Facility: CLINIC | Age: 68
Discharge: HOME | End: 2023-04-04
Attending: INTERNAL MEDICINE | Admitting: INTERNAL MEDICINE
Payer: COMMERCIAL

## 2023-04-04 VITALS
HEART RATE: 85 BPM | WEIGHT: 95.2 LBS | BODY MASS INDEX: 17.41 KG/M2 | SYSTOLIC BLOOD PRESSURE: 120 MMHG | DIASTOLIC BLOOD PRESSURE: 64 MMHG | TEMPERATURE: 97.6 F | OXYGEN SATURATION: 98 %

## 2023-04-04 DIAGNOSIS — R06.02 SHORTNESS OF BREATH: ICD-10-CM

## 2023-04-04 DIAGNOSIS — J44.9 COPD, GROUP D, BY GOLD 2017 CLASSIFICATION (H): ICD-10-CM

## 2023-04-04 DIAGNOSIS — J44.1 COPD EXACERBATION (H): Chronic | ICD-10-CM

## 2023-04-04 DIAGNOSIS — J96.11 CHRONIC RESPIRATORY FAILURE WITH HYPOXIA (H): ICD-10-CM

## 2023-04-04 DIAGNOSIS — J44.1 COPD EXACERBATION (H): Primary | ICD-10-CM

## 2023-04-04 PROCEDURE — 71046 X-RAY EXAM CHEST 2 VIEWS: CPT

## 2023-04-04 PROCEDURE — 99214 OFFICE O/P EST MOD 30 MIN: CPT | Performed by: NURSE PRACTITIONER

## 2023-04-04 RX ORDER — FLUTICASONE PROPIONATE AND SALMETEROL 232; 14 UG/1; UG/1
1 POWDER, METERED RESPIRATORY (INHALATION) 2 TIMES DAILY
Qty: 1 EACH | Refills: 11 | Status: SHIPPED | OUTPATIENT
Start: 2023-04-04 | End: 2024-03-22

## 2023-04-04 RX ORDER — PREDNISONE 10 MG/1
10 TABLET ORAL DAILY
Qty: 7 TABLET | Refills: 0 | Status: SHIPPED | OUTPATIENT
Start: 2023-04-04 | End: 2023-04-11

## 2023-04-04 NOTE — PATIENT INSTRUCTIONS
It was a pleasure to see you in clinic today.   Here is what we discussed:    Increase Airduo to 232/14, one puff twice daily, rinse/gargle after use.  Increase Combivent to one puff four times daily.  Start prednisone 10mg x7 days, after you are done with the 20mg dosing that was previously ordered.  Call you insurance company to ask about the coverage of a nebulizer machine, and let us know if you would like us to order that.  Call us if you do not start improving within the next week.  Follow-up as planned with Dr Levin later in April.    Tata Lainez, CNP  Pulmonary Medicine  [facility]  [phone]

## 2023-04-21 ENCOUNTER — OFFICE VISIT (OUTPATIENT)
Dept: PULMONOLOGY | Facility: CLINIC | Age: 68
End: 2023-04-21
Payer: COMMERCIAL

## 2023-04-21 VITALS
SYSTOLIC BLOOD PRESSURE: 116 MMHG | BODY MASS INDEX: 17.34 KG/M2 | HEART RATE: 86 BPM | DIASTOLIC BLOOD PRESSURE: 70 MMHG | OXYGEN SATURATION: 99 % | WEIGHT: 94.8 LBS

## 2023-04-21 DIAGNOSIS — R06.02 SHORTNESS OF BREATH: ICD-10-CM

## 2023-04-21 DIAGNOSIS — J96.11 CHRONIC RESPIRATORY FAILURE WITH HYPOXIA (H): ICD-10-CM

## 2023-04-21 DIAGNOSIS — J44.1 COPD EXACERBATION (H): Primary | ICD-10-CM

## 2023-04-21 PROCEDURE — 99214 OFFICE O/P EST MOD 30 MIN: CPT | Performed by: INTERNAL MEDICINE

## 2023-04-21 NOTE — PROGRESS NOTES
LUNG NODULE & INTERVENTIONAL PULMONARY CLINIC  [facility], [facility]     Jasmyn Green MRN# [id number]   Age: 66 year old YOB: 1955       Requesting Physician: No referring provider defined for this encounter.       Assessment and Plan:    1.  Group E COPD.  Air duo in addition to Combivent.  Inhaled steroid appropriately increased at recent visit.  She has had more issues with exacerbations recently.  Somewhat increased eosinophils.  We will lengthen prednisone taper.    If she has persistent exacerbations we can consider azithromycin or low-dose prednisone.  She has kind of a bronchitis phenotype so more aggressive pulmonary hygiene could also be an option.    Action plan 20 mg of prednisone daily for 7 days then 10 mg daily for 7 days.    2.  Negative lung cancer screening CT. she is having quite a bit of issues currently.  I would like to hold off any imaging and we can reconsider in the summer.    3.  Hiatal hernia.  Not having significant reflux or heartburn.  No significant dysphagia.  We will follow her clinically no referral at this time.    4.  Chronic hypoxic respiratory failure.  2 L/min with activity.  She uses a concentrator.  She rents it from [email or web address].             History:     Jasmyn Green is a 67 year old female with sig h/o for COPD who is here for evaluation/followup of same.  Over the last few months she had 2 exacerbations both seemingly related to URI..  No other other exposures.  She is still working.  They are very thoughtful at her job about changes in her work to allow her to continue.    No reflux, no heartburn, no sinus issues.    - My interpretation of the images relevant for this visit includes: Negative from a lung RADS standpoint.           Past Medical History:      Past Medical History:   Diagnosis Date     Arthritis      COPD (chronic obstructive pulmonary disease) (H)      Coronary artery disease       Dyspnea on exertion      Gout      Hypertension            Past Surgical History:      Past Surgical History:   Procedure Laterality Date     COLONOSCOPY N/A 10/21/2021    Procedure: COLONOSCOPY;  Surgeon: Wilma Hester DO;  Location: [place] [facility]     VAGINAL DELIVERY      x 3 ; remote     WISDOM TOOTH EXTRACTION            Social History:     Social History     Tobacco Use     Smoking status: Former     Types: Cigarettes     Quit date: 2021     Years since quittin.8     Smokeless tobacco: Never     Tobacco comments:     updated 8/3/2021 by TTS   Vaping Use     Vaping status: Never Used   Substance Use Topics     Alcohol use: Not Currently          Family History:     Family History   Problem Relation Age of Onset     Chronic Obstructive Pulmonary Disease Sister      Diabetes Mother      Heart Disease Mother      Lung Cancer Mother      Heart Disease Father            Allergies:    No Known Allergies       Medications:     Current Outpatient Medications   Medication Sig     albuterol (PROAIR HFA/PROVENTIL HFA/VENTOLIN HFA) 108 (90 Base) MCG/ACT inhaler Inhale 2 puffs into the lungs every 6 hours     aspirin 81 mg chewable tablet [ASPIRIN 81 MG CHEWABLE TABLET] Chew 1 tablet (81 mg total) daily.     atorvastatin (LIPITOR) 20 MG tablet Take 1 tablet (20 mg) by mouth daily     Calcium Carb-Cholecalciferol (CALCIUM 500+D3 PO) Take 1 tablet by mouth daily     calcium carbonate 600 mg-vitamin D 400 units (CALTRATE) 600-400 MG-UNIT per tablet Take 1 tablet by mouth 2 times daily With calcium     fluticasone-salmeterol (AIRDUO RESPICLICK) 232-14 MCG/ACT inhaler Inhale 1 puff into the lungs 2 times daily     ibuprofen (ADVIL,MOTRIN) 200 MG tablet [IBUPROFEN (ADVIL,MOTRIN) 200 MG TABLET] Take 400 mg by mouth every 6 (six) hours as needed for pain.     ipratropium-albuterol (COMBIVENT RESPIMAT)  MCG/ACT inhaler Inhale 1 puff into the lungs 2 times daily     lisinopril (ZESTRIL) 20 MG tablet TAKE 1  TABLET(20 MG) BY MOUTH DAILY     Teriparatide, Recombinant, 620 MCG/2.48ML SOPN injection INJECT 20 MCG UNDER THE SKIN 1 TIME A DAY. DISCARD PEN 28 DAYS AFTER INITIAL USE.     fluticasone-salmeterol (AIRDUO RESPICLICK) 113-14 MCG/ACT inhaler Inhale 1 puff into the lungs 2 times daily     predniSONE (DELTASONE) 20 MG tablet Take 1 tablet (20 mg) by mouth daily     No current facility-administered medications for this visit.          Review of Systems:     See HPI         Physical Exam:   There were no vitals taken for this visit.    Constitutional - looks well, in no apparent distress  Eyes - no redness or discharge  Respiratory -breathing appears comfortable. No wheeze or rhonchi.   Cardiac -- Normal rate, rhythm.   Skin - No appreciable discoloration or lesions (very limited exam)  Neurological - No apparent tremors. Speech fluent and articlate  Psychiatric - no signs of delirium or anxiety          Current Laboratory Data:   All laboratory and imaging data reviewed.    No results found for this or any previous visit (from the past 24 hour(s)).

## 2023-04-21 NOTE — PATIENT INSTRUCTIONS
I'm glad you are feeling better.    If you get another flares please call in and we will send in some prednisone.    If you have further frequent chest colds or flares we can consider 3 times per week azithromycin or daily low dose prednisone.

## 2023-05-03 DIAGNOSIS — M81.8 OTHER OSTEOPOROSIS WITHOUT CURRENT PATHOLOGICAL FRACTURE: ICD-10-CM

## 2023-05-03 RX ORDER — TERIPARATIDE 250 UG/ML
INJECTION, SOLUTION SUBCUTANEOUS
Qty: 2.48 ML | Refills: 3 | Status: SHIPPED | OUTPATIENT
Start: 2023-05-03 | End: 2023-09-05

## 2023-05-03 NOTE — TELEPHONE ENCOUNTER
Requested Prescriptions   Signed Prescriptions Disp Refills    Teriparatide, Recombinant, 620 MCG/2.48ML SOPN injection 2.48 mL 3     Sig: INJECT 20 MCG UNDER THE SKIN 1 TIME A DAY. DISCARD PEN 28 DAYS AFTER INITIAL USE.       There is no refill protocol information for this order

## 2023-05-31 ENCOUNTER — MYC MEDICAL ADVICE (OUTPATIENT)
Dept: PULMONOLOGY | Facility: CLINIC | Age: 68
End: 2023-05-31
Payer: COMMERCIAL

## 2023-05-31 DIAGNOSIS — J44.1 COPD EXACERBATION (H): Primary | ICD-10-CM

## 2023-05-31 RX ORDER — PREDNISONE 20 MG/1
TABLET ORAL
Qty: 10 TABLET | Refills: 0 | Status: SHIPPED | OUTPATIENT
Start: 2023-05-31 | End: 2023-08-09

## 2023-06-05 ENCOUNTER — OFFICE VISIT (OUTPATIENT)
Dept: FAMILY MEDICINE | Facility: CLINIC | Age: 68
End: 2023-06-05
Payer: COMMERCIAL

## 2023-06-05 ENCOUNTER — ANCILLARY PROCEDURE (OUTPATIENT)
Dept: GENERAL RADIOLOGY | Facility: CLINIC | Age: 68
End: 2023-06-05
Attending: PHYSICIAN ASSISTANT
Payer: COMMERCIAL

## 2023-06-05 VITALS
OXYGEN SATURATION: 93 % | DIASTOLIC BLOOD PRESSURE: 75 MMHG | TEMPERATURE: 97.7 F | RESPIRATION RATE: 20 BRPM | SYSTOLIC BLOOD PRESSURE: 139 MMHG | HEART RATE: 102 BPM

## 2023-06-05 DIAGNOSIS — R06.02 SOB (SHORTNESS OF BREATH): ICD-10-CM

## 2023-06-05 DIAGNOSIS — J44.1 COPD EXACERBATION (H): Primary | ICD-10-CM

## 2023-06-05 DIAGNOSIS — R00.0 TACHYCARDIA: ICD-10-CM

## 2023-06-05 PROCEDURE — 94640 AIRWAY INHALATION TREATMENT: CPT | Performed by: PHYSICIAN ASSISTANT

## 2023-06-05 PROCEDURE — 99215 OFFICE O/P EST HI 40 MIN: CPT | Mod: 25 | Performed by: PHYSICIAN ASSISTANT

## 2023-06-05 PROCEDURE — 71046 X-RAY EXAM CHEST 2 VIEWS: CPT | Mod: TC | Performed by: RADIOLOGY

## 2023-06-05 RX ORDER — PREDNISONE 20 MG/1
TABLET ORAL
Qty: 14 TABLET | Refills: 0 | Status: SHIPPED | OUTPATIENT
Start: 2023-06-05 | End: 2023-06-16

## 2023-06-05 RX ORDER — IPRATROPIUM BROMIDE AND ALBUTEROL SULFATE 2.5; .5 MG/3ML; MG/3ML
1 SOLUTION RESPIRATORY (INHALATION) EVERY 6 HOURS PRN
Qty: 90 ML | Refills: 0 | Status: SHIPPED | OUTPATIENT
Start: 2023-06-05 | End: 2023-08-09

## 2023-06-05 RX ORDER — DOXYCYCLINE 100 MG/1
100 CAPSULE ORAL 2 TIMES DAILY
Qty: 14 CAPSULE | Refills: 0 | Status: SHIPPED | OUTPATIENT
Start: 2023-06-05 | End: 2023-06-12

## 2023-06-05 RX ORDER — IPRATROPIUM BROMIDE AND ALBUTEROL SULFATE 2.5; .5 MG/3ML; MG/3ML
3 SOLUTION RESPIRATORY (INHALATION) ONCE
Status: COMPLETED | OUTPATIENT
Start: 2023-06-05 | End: 2023-06-05

## 2023-06-05 RX ADMIN — IPRATROPIUM BROMIDE AND ALBUTEROL SULFATE 3 ML: 2.5; .5 SOLUTION RESPIRATORY (INHALATION) at 11:54

## 2023-06-05 NOTE — PROGRESS NOTES
Chief Complaint   Patient presents with     URI     Has COPD has had cough congestion for 1 week  was given antibiotic  Augmentin and taking prednisone  on last day of it but not better using her oxygen daily        ASSESSMENT/PLAN:  Jasmyn was seen today for uri.    Diagnoses and all orders for this visit:    SOB (shortness of breath)  -     XR Chest 2 Views; Future  -     ipratropium - albuterol 0.5 mg/2.5 mg/3 mL (DUONEB) neb solution 3 mL        Danny Daley PA-C  {2021 E&M time:360978}    SUBJECTIVE:  Jasmyn is a 67 year old female who presents to urgent care with ***.    ROS: Pertinent ROS neg other than the symptoms noted above in the HPI.     OBJECTIVE:  /75   Pulse 111   Temp 97.7  F (36.5  C) (Oral)   Resp 20   SpO2 96%    {Exam List (Optional):835616}    DIAGNOSTICS  {Diagnostic Test Results (Optional):702521}  No results found for any visits on 06/05/23.     Current Outpatient Medications   Medication     albuterol (PROAIR HFA/PROVENTIL HFA/VENTOLIN HFA) 108 (90 Base) MCG/ACT inhaler     amoxicillin-clavulanate (AUGMENTIN) 875-125 MG tablet     aspirin 81 mg chewable tablet     atorvastatin (LIPITOR) 20 MG tablet     Calcium Carb-Cholecalciferol (CALCIUM 500+D3 PO)     calcium carbonate 600 mg-vitamin D 400 units (CALTRATE) 600-400 MG-UNIT per tablet     fluticasone-salmeterol (AIRDUO RESPICLICK) 232-14 MCG/ACT inhaler     ibuprofen (ADVIL,MOTRIN) 200 MG tablet     ipratropium-albuterol (COMBIVENT RESPIMAT)  MCG/ACT inhaler     lisinopril (ZESTRIL) 20 MG tablet     predniSONE (DELTASONE) 20 MG tablet     Teriparatide, Recombinant, 620 MCG/2.48ML SOPN injection     No current facility-administered medications for this visit.      Patient Active Problem List   Diagnosis     COPD exacerbation (H)     History of gout     Coronary artery calcification     Lymphocytosis     Essential hypertension     Asymptomatic hypertensive urgency      Past Medical History:   Diagnosis Date     Arthritis   "    COPD (chronic obstructive pulmonary disease) (H)      Coronary artery disease      Dyspnea on exertion      Gout      Hypertension      Past Surgical History:   Procedure Laterality Date     COLONOSCOPY N/A 10/21/2021    Procedure: COLONOSCOPY;  Surgeon: Wilma Hester DO;  Location: [place] [facility]     VAGINAL DELIVERY      x 3 ; remote     WISDOM TOOTH EXTRACTION       Family History   Problem Relation Age of Onset     Chronic Obstructive Pulmonary Disease Sister      Diabetes Mother      Heart Disease Mother      Lung Cancer Mother      Heart Disease Father      Social History     Tobacco Use     Smoking status: Former     Types: Cigarettes     Quit date: 2021     Years since quittin.9     Smokeless tobacco: Never     Tobacco comments:     updated 8/3/2021 by TTS   Vaping Use     Vaping status: Never Used   Substance Use Topics     Alcohol use: Not Currently        {AMBULATORY ATTESTATION (Optional):073448}  {Diagnosis or treatment significantly limited by social determinants (optional):017125::\"Diagnosis or treatment significantly limited by social determinants of health - ***\"}    The plan of care was discussed with the patient. They understand and agree with the course of treatment prescribed. A printed summary was given including instructions and medications.  The use of Dragon/[employer] dictation services may have been used to construct the content in this note; any grammatical or spelling errors are non-intentional. Please contact the author of this note directly if you are in need of any clarification.     "

## 2023-06-05 NOTE — PROGRESS NOTES
Chief Complaint   Patient presents with     URI     Has COPD has had cough congestion for 1 week  was given antibiotic  Augmentin and taking prednisone  on last day of it but not better using her oxygen daily        ASSESSMENT/PLAN:  Jasmyn was seen today for uri.    Diagnoses and all orders for this visit:    COPD exacerbation (H)  -     predniSONE (DELTASONE) 20 MG tablet; Take 2 tablets (40 mg) by mouth daily for 4 days, THEN 1 tablet (20 mg) daily for 4 days, THEN 0.5 tablets (10 mg) daily for 3 days.  -     doxycycline hyclate (VIBRAMYCIN) 100 MG capsule; Take 1 capsule (100 mg) by mouth 2 times daily for 7 days  -     Primary Care Referral; Future  -     Nebulizer and Supplies Order for DME - ONLY FOR DME  -     ipratropium - albuterol 0.5 mg/2.5 mg/3 mL (DUONEB) 0.5-2.5 (3) MG/3ML neb solution; Take 1 vial (3 mLs) by nebulization every 6 hours as needed for shortness of breath, wheezing or cough    SOB (shortness of breath)  -     XR Chest 2 Views; Future  -     ipratropium - albuterol 0.5 mg/2.5 mg/3 mL (DUONEB) neb solution 3 mL    Tachycardia  -     Primary Care Referral; Future     Differential: COPD exacerbation, Pneumonia, Bronchitis, Viral illness, PE arrhythmia, MI among others    Patient initially tachycardic at 111.  Chest x-ray unchanged since previous.  Not in respiratory distress while wearing oxygen and overall appears well.  Her symptoms have improved since beginning the Augmentin and prednisone.  She has needed a longer course of steroids for COPD exacerbations in the past.  She is not currently taking any nebulizers.  She was given a nebulizer here and her heart rate dropped down to  and she also reported symptomatic improvement.  Given this I think is much more likely that this is a COPD exacerbation versus something such as PE.  We did discuss this in the clinic.  It is reasonable to discharge her on longer course of steroids, Doxy, DuoNebs at home and was given strict precautions if  she is not improving within 24 hours or has a sustained heart rate above 100 that she needs to go to the emergency room.  Also helping her schedule follow appointment with her PCP to be rechecked in 3 to 7 days    Danny Daley PA-C  Patient was seen in conjunction with Kayleen Bradley DNP student  60 minutes spent by me on the date of the encounter doing chart review, history and exam, documentation and further activities per the note    SUBJECTIVE:  Jasmyn is a 67 year old female past medical history of COPD who presents to urgent care with concerns for respiratory infection. Pt utilizes o2 at home as needed, mostly if she has a cold or other respiratory illness. Pt has a history of COPD, diagnosed 2 years ago. Symptoms began 5/28- cough, increasing SOB with increasing o2 needs, R ear pain and nasal congestion. Pt reports her lowest sats were into the 60s early on in the course of her illness and she has required oxygen up to 4LPM. Historically, she has not needed to go above 2LPM. Pt did a 5 day course of steroids and abx starting 5/31 that finished this morning. Reports her ear pain has since resolved, but she is still SOB frequently and requiring oxygen >75% of her day.  If she sitting down not moving she does not need as much oxygen.  The cough is yellow to green and productive.   No fevers or chills, chest pain, leg swelling, weight gain, orthopnea, recent travel or injuries.  No history of DVT.  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    ROS: Pertinent ROS neg other than the symptoms noted above in the HPI.     OBJECTIVE:  /75   Pulse 111   Temp 97.7  F (36.5  C) (Oral)   Resp 20   SpO2 96%    GENERAL: healthy, alert and no distress  NECK: no adenopathy, no asymmetry, masses, or scars  CV: regular rate and rhythm, normal S1 S2, no S3 or S4, no murmur, click or rub, no peripheral edema.  RESP: Dimished breath sounds throughout, barrel chest, breath sounds and increased after  neb  ABDOMEN: soft, nontender  MS: no gross musculoskeletal defects noted, no edema, nontender, no palpable cords    DIAGNOSTICS  Xray - Reviewed and interpreted by me.  Unchanged since x-ray taken 2 months ago.  No acute opacities or evidence of pneumonia  No results found for any visits on 06/05/23.     Current Outpatient Medications   Medication     albuterol (PROAIR HFA/PROVENTIL HFA/VENTOLIN HFA) 108 (90 Base) MCG/ACT inhaler     amoxicillin-clavulanate (AUGMENTIN) 875-125 MG tablet     aspirin 81 mg chewable tablet     atorvastatin (LIPITOR) 20 MG tablet     Calcium Carb-Cholecalciferol (CALCIUM 500+D3 PO)     calcium carbonate 600 mg-vitamin D 400 units (CALTRATE) 600-400 MG-UNIT per tablet     fluticasone-salmeterol (AIRDUO RESPICLICK) 232-14 MCG/ACT inhaler     ibuprofen (ADVIL,MOTRIN) 200 MG tablet     ipratropium-albuterol (COMBIVENT RESPIMAT)  MCG/ACT inhaler     lisinopril (ZESTRIL) 20 MG tablet     predniSONE (DELTASONE) 20 MG tablet     Teriparatide, Recombinant, 620 MCG/2.48ML SOPN injection     No current facility-administered medications for this visit.      Patient Active Problem List   Diagnosis     COPD exacerbation (H)     History of gout     Coronary artery calcification     Lymphocytosis     Essential hypertension     Asymptomatic hypertensive urgency      Past Medical History:   Diagnosis Date     Arthritis      COPD (chronic obstructive pulmonary disease) (H)      Coronary artery disease      Dyspnea on exertion      Gout      Hypertension      Past Surgical History:   Procedure Laterality Date     COLONOSCOPY N/A 10/21/2021    Procedure: COLONOSCOPY;  Surgeon: Wilma Hester DO;  Location: [facility]     VAGINAL DELIVERY      x 3 ; remote     WISDOM TOOTH EXTRACTION       Family History   Problem Relation Age of Onset     Chronic Obstructive Pulmonary Disease Sister      Diabetes Mother      Heart Disease Mother      Lung Cancer Mother      Heart Disease Father      Social  History     Tobacco Use     Smoking status: Former     Types: Cigarettes     Quit date: 2021     Years since quittin.9     Smokeless tobacco: Never     Tobacco comments:     updated 8/3/2021 by TTS   Vaping Use     Vaping status: Never Used   Substance Use Topics     Alcohol use: Not Currently              The plan of care was discussed with the patient. They understand and agree with the course of treatment prescribed. A printed summary was given including instructions and medications.  The use of Dragon/[employer] dictation services may have been used to construct the content in this note; any grammatical or spelling errors are non-intentional. Please contact the author of this note directly if you are in need of any clarification.

## 2023-06-05 NOTE — LETTER
June 5, 2023      Jasmyn Green  [address] [place] [address]        To Whom It May Concern:    Jasmyn Green  was seen on 6/5.  Please excuse her  until 6/9/23 due to illness.        Sincerely,        Danny Daley PA-C

## 2023-06-07 ENCOUNTER — OFFICE VISIT (OUTPATIENT)
Dept: INTERNAL MEDICINE | Facility: CLINIC | Age: 68
End: 2023-06-07
Payer: COMMERCIAL

## 2023-06-07 VITALS
RESPIRATION RATE: 20 BRPM | SYSTOLIC BLOOD PRESSURE: 120 MMHG | OXYGEN SATURATION: 95 % | BODY MASS INDEX: 17.85 KG/M2 | WEIGHT: 97 LBS | DIASTOLIC BLOOD PRESSURE: 60 MMHG | TEMPERATURE: 98.3 F | HEIGHT: 62 IN | HEART RATE: 116 BPM

## 2023-06-07 DIAGNOSIS — J44.1 COPD EXACERBATION (H): ICD-10-CM

## 2023-06-07 DIAGNOSIS — R91.8 PULMONARY NODULES: ICD-10-CM

## 2023-06-07 DIAGNOSIS — E43 SEVERE PROTEIN-CALORIE MALNUTRITION (H): ICD-10-CM

## 2023-06-07 PROCEDURE — 99214 OFFICE O/P EST MOD 30 MIN: CPT | Performed by: NURSE PRACTITIONER

## 2023-06-07 ASSESSMENT — PATIENT HEALTH QUESTIONNAIRE - PHQ9
SUM OF ALL RESPONSES TO PHQ QUESTIONS 1-9: 9
10. IF YOU CHECKED OFF ANY PROBLEMS, HOW DIFFICULT HAVE THESE PROBLEMS MADE IT FOR YOU TO DO YOUR WORK, TAKE CARE OF THINGS AT HOME, OR GET ALONG WITH OTHER PEOPLE: SOMEWHAT DIFFICULT
SUM OF ALL RESPONSES TO PHQ QUESTIONS 1-9: 9

## 2023-06-07 NOTE — PROGRESS NOTES
"  Assessment & Plan     COPD exacerbation (H): She is on day three of five of prednisone. Is on doxycycline. Continue nebs every 6 hours until prednisone is done with, then use PRN. Oxygen to keep saturations above 90%. Return to work when back at baseline, not requiring continuous oxygen.     Pulmonary nodules: Yearly lung cancer screening is due in June. Ordered this today.   - CT Chest Lung Cancer Scrn Low Dose wo    Severe protein-calorie malnutrition (H): She has gained two pounds, up to 97. She continues to eat what she can, and supplement with protein shakes.     Miesha Fernando, JASON  [facility]    Daniela Vines is a 67 year old, presenting for the following health issues:  Follow Up (Walk in care- COPD)        6/7/2023     3:35 PM   Additional Questions   Roomed by Vivi BOSS     The patient presents today for post urgent care follow up.    She was seen on 06/05/23 in Urgent Care for a COPD exacerbation. She started on oral prednisone, doxycycline, and nebs. She is doing much better.    She continues to need oxygen 2 liters when seated, and 3 liters with activity.    This all started with cold like symptoms two weeks ago, and progressively got worse over the weekend.    Her cough went from a greenish-yellow color, to white.     She denies a fever or chills, she is eating and drinking normally at home.    She will need a work note. Discussed she should not got back to work until she isn't requiring oxygen continuously (oxygen saturations above 90% on room air) and her medication is finished.     Review of Systems   Constitutional, HEENT, cardiovascular, pulmonary, GI, , musculoskeletal, neuro, skin, endocrine and psych systems are negative, except as otherwise noted.      Objective    /60 (BP Location: Right arm, Patient Position: Sitting)   Pulse 116   Temp 98.3  F (36.8  C)   Resp 20   Ht 1.575 m (5' 2\")   Wt 44 kg (97 lb)   SpO2 95%   BMI 17.74 " kg/m    Body mass index is 17.74 kg/m .  Physical Exam   GENERAL: healthy, alert and no distress  EYES: Eyes grossly normal to inspection  NECK: no adenopathy  RESP: Bilateral upper lobe wheezes, decreased effort, clear bases  CV: regular rate and rhythm, normal S1 S2, no S3 or S4, no murmur, click or rub, no peripheral edema and peripheral pulses strong  MS: no gross musculoskeletal defects noted  SKIN: no suspicious lesions or rashes  NEURO: Normal strength and tone, mentation intact and speech normal  PSYCH: mentation appears normal, affect normal/bright

## 2023-06-07 NOTE — PATIENT INSTRUCTIONS
Rest, push fluids.    Finish the prednisone out, 2 more days of every 6 hour neb treatments.  Then you can move your nebulizer treatments to as needed.    Finish out the doxycycline.    I have written your letter for work, return on 6/21/2023.    2 weeks from now.  If you are not off of continuous oxygen by this time we will adjust your end date and return date to work.    I will see you back in 2 months for recheck, please let me know before then if anything comes up.    Call [phone] to set up your chest CT.

## 2023-06-07 NOTE — LETTER
June 7, 2023      Jasmyn Green  [address] [place] [address]        To Whom It May Concern:    Jasmyn Green was seen in our clinic today. She may return to work on 06/21/23.      Sincerely,        Miesha Fernando, CNP

## 2023-06-15 ENCOUNTER — HOSPITAL ENCOUNTER (OUTPATIENT)
Dept: CT IMAGING | Facility: CLINIC | Age: 68
Discharge: HOME | End: 2023-06-15
Attending: NURSE PRACTITIONER | Admitting: NURSE PRACTITIONER
Payer: COMMERCIAL

## 2023-06-15 DIAGNOSIS — R91.8 PULMONARY NODULES: ICD-10-CM

## 2023-06-15 PROCEDURE — 71271 CT THORAX LUNG CANCER SCR C-: CPT

## 2023-07-29 DIAGNOSIS — I10 ESSENTIAL HYPERTENSION: ICD-10-CM

## 2023-07-29 NOTE — TELEPHONE ENCOUNTER
"Routing refill request to provider for review/approval because:  Labs out of range:  cr 1.11    Last Written Prescription Date:  1]2/2/2022  Last Fill Quantity: 60,  # refills: 3   Last office visit provider:  6/7/2023     Requested Prescriptions   Pending Prescriptions Disp Refills    lisinopril (ZESTRIL) 20 MG tablet [Pharmacy Med Name: LISINOPRIL 20MG TABLETS] 60 tablet 3     Sig: TAKE 1 TABLET(20 MG) BY MOUTH DAILY       ACE Inhibitors (Including Combos) Protocol Failed - 7/29/2023 11:50 AM        Failed - Normal serum creatinine on file in past 12 months     Recent Labs   Lab Test 02/08/23  0956   CR 1.11*       Ok to refill medication if creatinine is low          Passed - Blood pressure under 140/90 in past 12 months     BP Readings from Last 3 Encounters:   06/07/23 120/60   06/05/23 139/75   04/21/23 116/70                 Passed - Recent (12 mo) or future (30 days) visit within the authorizing provider's specialty     Patient has had an office visit with the authorizing provider or a provider within the authorizing providers department within the previous 12 mos or has a future within next 30 days. See \"Patient Info\" tab in inbasket, or \"Choose Columns\" in Meds & Orders section of the refill encounter.              Passed - Medication is active on med list        Passed - Patient is age 18 or older        Passed - No active pregnancy on record        Passed - Normal serum potassium on file in past 12 months     Recent Labs   Lab Test 02/08/23  0956   POTASSIUM 5.0             Passed - No positive pregnancy test within past 12 months             Eileen Parkinson RN 07/29/23 11:51 AM  "

## 2023-07-31 DIAGNOSIS — E78.00 HYPERCHOLESTEREMIA: ICD-10-CM

## 2023-07-31 RX ORDER — LISINOPRIL 20 MG/1
TABLET ORAL
Qty: 60 TABLET | Refills: 3 | Status: SHIPPED | OUTPATIENT
Start: 2023-07-31 | End: 2024-03-14

## 2023-07-31 RX ORDER — ATORVASTATIN CALCIUM 20 MG/1
20 TABLET, FILM COATED ORAL DAILY
Qty: 90 TABLET | Refills: 0 | Status: SHIPPED | OUTPATIENT
Start: 2023-07-31 | End: 2023-08-14

## 2023-07-31 NOTE — TELEPHONE ENCOUNTER
"Routing refill request to provider for review/approval because:  Labs not current:  LDL    Last Written Prescription Date:  8/15/22  Last Fill Quantity: 90,  # refills: 3   Last office visit provider:  6/7/23     Requested Prescriptions   Pending Prescriptions Disp Refills     atorvastatin (LIPITOR) 20 MG tablet 90 tablet 3     Sig: Take 1 tablet (20 mg) by mouth daily       Statins Protocol Failed - 7/31/2023 12:40 PM        Failed - LDL on file in past 12 months     Recent Labs   Lab Test 09/21/21  0952   LDL 82             Passed - No abnormal creatine kinase in past 12 months     No lab results found.             Passed - Recent (12 mo) or future (30 days) visit within the authorizing provider's specialty     Patient has had an office visit with the authorizing provider or a provider within the authorizing providers department within the previous 12 mos or has a future within next 30 days. See \"Patient Info\" tab in inbasket, or \"Choose Columns\" in Meds & Orders section of the refill encounter.              Passed - Medication is active on med list        Passed - Patient is age 18 or older        Passed - No active pregnancy on record        Passed - No positive pregnancy test in past 12 months             Claudette Stovall RN 07/31/23 12:40 PM  "

## 2023-08-07 ENCOUNTER — LAB (OUTPATIENT)
Dept: LAB | Facility: CLINIC | Age: 68
End: 2023-08-07
Payer: COMMERCIAL

## 2023-08-07 DIAGNOSIS — E78.00 HYPERCHOLESTEREMIA: ICD-10-CM

## 2023-08-07 DIAGNOSIS — M81.8 OTHER OSTEOPOROSIS WITHOUT CURRENT PATHOLOGICAL FRACTURE: ICD-10-CM

## 2023-08-07 LAB
ALBUMIN SERPL BCG-MCNC: 4.2 G/DL (ref 3.5–5.2)
CALCIUM SERPL-MCNC: 9.5 MG/DL (ref 8.8–10.2)
CREAT SERPL-MCNC: 0.99 MG/DL (ref 0.51–0.95)
GFR SERPL CREATININE-BSD FRML MDRD: 61 ML/MIN/1.73M2

## 2023-08-07 PROCEDURE — 82306 VITAMIN D 25 HYDROXY: CPT

## 2023-08-07 PROCEDURE — 82565 ASSAY OF CREATININE: CPT

## 2023-08-07 PROCEDURE — 80061 LIPID PANEL: CPT

## 2023-08-07 PROCEDURE — 82040 ASSAY OF SERUM ALBUMIN: CPT

## 2023-08-07 PROCEDURE — 36415 COLL VENOUS BLD VENIPUNCTURE: CPT

## 2023-08-07 PROCEDURE — 82310 ASSAY OF CALCIUM: CPT

## 2023-08-08 LAB
DEPRECATED CALCIDIOL+CALCIFEROL SERPL-MC: <35 UG/L (ref 20–75)
VITAMIN D2 SERPL-MCNC: <5 UG/L
VITAMIN D3 SERPL-MCNC: 30 UG/L

## 2023-08-09 ENCOUNTER — OFFICE VISIT (OUTPATIENT)
Dept: INTERNAL MEDICINE | Facility: CLINIC | Age: 68
End: 2023-08-09
Payer: COMMERCIAL

## 2023-08-09 VITALS
BODY MASS INDEX: 17.94 KG/M2 | HEIGHT: 61 IN | HEART RATE: 123 BPM | RESPIRATION RATE: 20 BRPM | OXYGEN SATURATION: 94 % | DIASTOLIC BLOOD PRESSURE: 60 MMHG | WEIGHT: 95 LBS | TEMPERATURE: 98.1 F | SYSTOLIC BLOOD PRESSURE: 108 MMHG

## 2023-08-09 DIAGNOSIS — E78.00 HYPERCHOLESTEREMIA: ICD-10-CM

## 2023-08-09 DIAGNOSIS — J41.0 SIMPLE CHRONIC BRONCHITIS (H): ICD-10-CM

## 2023-08-09 DIAGNOSIS — D50.8 IRON DEFICIENCY ANEMIA SECONDARY TO INADEQUATE DIETARY IRON INTAKE: ICD-10-CM

## 2023-08-09 DIAGNOSIS — M81.0 SENILE OSTEOPOROSIS: ICD-10-CM

## 2023-08-09 DIAGNOSIS — Z00.00 MEDICARE ANNUAL WELLNESS VISIT, SUBSEQUENT: Primary | ICD-10-CM

## 2023-08-09 DIAGNOSIS — R91.8 PULMONARY NODULES: ICD-10-CM

## 2023-08-09 DIAGNOSIS — I10 ESSENTIAL HYPERTENSION: ICD-10-CM

## 2023-08-09 LAB
CHOLEST SERPL-MCNC: 163 MG/DL
HDLC SERPL-MCNC: 44 MG/DL
LDLC SERPL CALC-MCNC: 99 MG/DL
NONHDLC SERPL-MCNC: 119 MG/DL
TRIGL SERPL-MCNC: 99 MG/DL

## 2023-08-09 PROCEDURE — 90677 PCV20 VACCINE IM: CPT | Performed by: NURSE PRACTITIONER

## 2023-08-09 PROCEDURE — 90471 IMMUNIZATION ADMIN: CPT | Performed by: NURSE PRACTITIONER

## 2023-08-09 PROCEDURE — 99397 PER PM REEVAL EST PAT 65+ YR: CPT | Mod: 25 | Performed by: NURSE PRACTITIONER

## 2023-08-09 PROCEDURE — 99214 OFFICE O/P EST MOD 30 MIN: CPT | Mod: 25 | Performed by: NURSE PRACTITIONER

## 2023-08-09 RX ORDER — ATORVASTATIN CALCIUM 20 MG/1
20 TABLET, FILM COATED ORAL DAILY
Qty: 90 TABLET | Refills: 3 | Status: CANCELLED | OUTPATIENT
Start: 2023-08-09

## 2023-08-09 RX ORDER — PREDNISONE 20 MG/1
40 TABLET ORAL DAILY
Qty: 10 TABLET | Refills: 4 | Status: SHIPPED | OUTPATIENT
Start: 2023-08-09 | End: 2024-01-09

## 2023-08-09 RX ORDER — DOXYCYCLINE 100 MG/1
100 CAPSULE ORAL 2 TIMES DAILY
Qty: 20 CAPSULE | Refills: 3 | Status: SHIPPED | OUTPATIENT
Start: 2023-08-09 | End: 2024-01-08

## 2023-08-09 RX ORDER — IPRATROPIUM BROMIDE AND ALBUTEROL SULFATE 2.5; .5 MG/3ML; MG/3ML
1 SOLUTION RESPIRATORY (INHALATION) EVERY 6 HOURS PRN
Qty: 90 ML | Refills: 4 | Status: ON HOLD | OUTPATIENT
Start: 2023-08-09 | End: 2024-02-15

## 2023-08-09 ASSESSMENT — ENCOUNTER SYMPTOMS
JOINT SWELLING: 0
NERVOUS/ANXIOUS: 1
DYSURIA: 0
NAUSEA: 0
DIARRHEA: 0
HEMATOCHEZIA: 0
HEMATURIA: 0
ARTHRALGIAS: 0
CONSTIPATION: 0
HEADACHES: 0
FREQUENCY: 0
CHILLS: 0
SORE THROAT: 0
SHORTNESS OF BREATH: 0
BREAST MASS: 0
PALPITATIONS: 0
MYALGIAS: 0
COUGH: 0
FEVER: 0
DIZZINESS: 0
ABDOMINAL PAIN: 0
PARESTHESIAS: 0
EYE PAIN: 0
HEARTBURN: 0
WEAKNESS: 0

## 2023-08-09 ASSESSMENT — ACTIVITIES OF DAILY LIVING (ADL): CURRENT_FUNCTION: NO ASSISTANCE NEEDED

## 2023-08-09 NOTE — PROGRESS NOTES
"SUBJECTIVE:   Jasmyn is a 67 year old who presents for Preventive Visit.      8/9/2023     9:07 AM   Additional Questions   Roomed by Vivi AVELAR       Are you in the first 12 months of your Medicare coverage?  No    The patient presents today for her Medicare Annual Wellness visit. She reports that her breathing has been much better. She is only using oxygen with movement, or when it is humid outside.    Discussed having a back up COPD exacerbation plan in place. Will order Doxycycline and Prednisone so she has this on hand if needed with refills. Will also refill her Duonebs so she has these on hand.    She reports that she is not fasted today, she was on Monday when she had labs drawn for Endocrinology. Will add on a lipid profile today.    She continues on daily Forteo injections for her osteoporosis, she is hoping she can change her treatment for her bones, so she doesn't have to give herself the shots anymore.    She denies other concerns today.    She does admit to being anxious today, home heart rates have been within normal limits.     Healthy Habits:     In general, how would you rate your overall health?  Fair    Frequency of exercise:  None    Do you usually eat at least 4 servings of fruit and vegetables a day, include whole grains    & fiber and avoid regularly eating high fat or \"junk\" foods?  No    Taking medications regularly:  Yes    Medication side effects:  Not applicable    Ability to successfully perform activities of daily living:  No assistance needed    Home Safety:  No safety concerns identified    Hearing Impairment:  No hearing concerns    In the past 6 months, have you been bothered by leaking of urine?  No    In general, how would you rate your overall mental or emotional health?  Excellent    Additional concerns today:  No        Have you ever done Advance Care Planning? (For example, a Health Directive, POLST, or a discussion with a medical provider or your loved ones about your wishes): " Yes, advance care planning is on file.      Fall risk  Fallen 2 or more times in the past year?: No  Any fall with injury in the past year?: No    Cognitive Screening   1) Repeat 3 items (Leader, Season, Table)    2) Clock draw: NORMAL  3) 3 item recall: Recalls 3 objects  Results: 3 items recalled: COGNITIVE IMPAIRMENT LESS LIKELY    Mini-CogTM Copyright YAYO Murdock. Licensed by the author for use in [facility]; reprinted with permission (soob@[facility]). All rights reserved.      Do you have sleep apnea, excessive snoring or daytime drowsiness? : no    Reviewed and updated as needed this visit by clinical staff   Tobacco  Allergies  Meds              Reviewed and updated as needed this visit by Provider                 Social History     Tobacco Use    Smoking status: Former     Types: Cigarettes     Quit date: 2021     Years since quittin.1    Smokeless tobacco: Never    Tobacco comments:     updated 8/3/2021 by TTS   Substance Use Topics    Alcohol use: Not Currently             2023     9:16 AM   Alcohol Use   Prescreen: >3 drinks/day or >7 drinks/week? Not Applicable     Do you have a current opioid prescription? No  Do you use any other controlled substances or medications that are not prescribed by a provider? None    Current providers sharing in care for this patient include:   Patient Care Team:  Miesha Fernando CNP as PCP - General (Nurse Practitioner - Gerontology)  Aidee Luna RT as Chronic Pulmonary Disease Specialist (Respiratory Therapy)  Miesha Fernando CNP as Assigned PCP  Rickie Pickens MD as Assigned Endocrinology Provider  Ruben Levin MD as Assigned Pulmonology Provider  Pavan Bee DPM as Assigned Surgical Provider  Ruben Levin MD as MD (Critical Care)    The following health maintenance items are reviewed in Epic and correct as of today:  Health Maintenance   Topic Date Due    COPD ACTION PLAN  Never done    ZOSTER  IMMUNIZATION (1 of 2) Never done    COVID-19 Vaccine (3 - Moderna risk series) 03/07/2022    INFLUENZA VACCINE (1) 09/01/2023    ANNUAL REVIEW OF HM ORDERS  02/08/2024    LUNG CANCER SCREENING  06/15/2024    MEDICARE ANNUAL WELLNESS VISIT  08/09/2024    FALL RISK ASSESSMENT  08/09/2024    MAMMO SCREENING  09/29/2024    LIPID  08/07/2028    ADVANCE CARE PLANNING  08/09/2028    COLORECTAL CANCER SCREENING  10/21/2031    DTAP/TDAP/TD IMMUNIZATION (3 - Td or Tdap) 11/10/2031    DEXA  10/03/2037    SPIROMETRY  Completed    PHQ-2 (once per calendar year)  Completed    Pneumococcal Vaccine: 65+ Years  Completed    IPV IMMUNIZATION  Aged Out    MENINGITIS IMMUNIZATION  Aged Out    HEPATITIS C SCREENING  Discontinued     Added lipids on to her lab work from Monday.    Mammogram Screening: Mammogram Screening: Recommended mammography every 1-2 years with patient discussion and risk factor consideration        11/10/2021     9:07 AM   Breast CA Risk Assessment (FHS-7)   Do you have a family history of breast, colon, or ovarian cancer? No / Unknown       click delete button to remove this line now  Mammogram Screening: Recommended mammography every 1-2 years with patient discussion and risk factor consideration  Pertinent mammograms are reviewed under the imaging tab.    Review of Systems   Constitutional:  Negative for chills and fever.   HENT:  Negative for congestion, ear pain, hearing loss and sore throat.    Eyes:  Negative for pain and visual disturbance.   Respiratory:  Negative for cough and shortness of breath.    Cardiovascular:  Negative for chest pain, palpitations and peripheral edema.   Gastrointestinal:  Negative for abdominal pain, constipation, diarrhea, heartburn, hematochezia and nausea.   Breasts:  Negative for tenderness, breast mass and discharge.   Genitourinary:  Negative for dysuria, frequency, genital sores, hematuria, pelvic pain, urgency, vaginal bleeding and vaginal discharge.   Musculoskeletal:   "Negative for arthralgias, joint swelling and myalgias.   Skin:  Negative for rash.   Neurological:  Negative for dizziness, weakness, headaches and paresthesias.   Psychiatric/Behavioral:  Negative for mood changes. The patient is nervous/anxious.      OBJECTIVE:   /60 (BP Location: Right arm, Patient Position: Sitting)   Pulse (!) 123   Temp 98.1  F (36.7  C)   Resp 20   Ht 1.549 m (5' 1\")   Wt 43.1 kg (95 lb)   SpO2 94%   BMI 17.95 kg/m   Estimated body mass index is 17.95 kg/m  as calculated from the following:    Height as of this encounter: 1.549 m (5' 1\").    Weight as of this encounter: 43.1 kg (95 lb).  Physical Exam  GENERAL APPEARANCE: healthy, alert and no distress  EYES: Eyes grossly normal to inspection, PERRL and conjunctivae and sclerae normal  HENT: ear canals and TM's normal, nose and mouth without ulcers or lesions, oropharynx clear and oral mucous membranes moist  NECK: no adenopathy, no asymmetry, masses, or scars and thyroid normal to palpation  RESP: lungs clear to auscultation - no rales, rhonchi or wheezes  CV: regular rate and rhythm, normal S1 S2, no S3 or S4, no murmur, click or rub, no peripheral edema and peripheral pulses strong  ABDOMEN: soft, nontender, no hepatosplenomegaly, no masses and bowel sounds normal  MS: no musculoskeletal defects are noted and gait is age appropriate without ataxia  SKIN: no suspicious lesions or rashes  NEURO: Normal strength and tone, sensory exam grossly normal, mentation intact and speech normal  PSYCH: mentation appears normal and affect normal/bright    Diagnostic Test Results: Mammogram, Chest CT, Dexa  Labs reviewed in Epic    ASSESSMENT / PLAN:   Jasmyn was seen today for wellness visit.    Diagnoses and all orders for this visit:    Medicare annual wellness visit, subsequent: Completed today. Labs added on. Mammogram is due next year.     Essential hypertension: Blood pressure today was 108/60. She continues on Lisinopril. Stable. "     Hypercholesteremia: She continues on atorvastatin. Stable.   -     Lipid panel reflex to direct LDL Fasting; Future    Simple chronic bronchitis (H): COPD exacerbation plan ordered with Doxycycline, Prednisone, and Duonebs. She will update provider if using. She continues on Oxygen as needed. She continues on inhalers.  -     ipratropium - albuterol 0.5 mg/2.5 mg/3 mL (DUONEB) 0.5-2.5 (3) MG/3ML neb solution; Take 1 vial (3 mLs) by nebulization every 6 hours as needed for shortness of breath, wheezing or cough  -     doxycycline hyclate (VIBRAMYCIN) 100 MG capsule; Take 1 capsule (100 mg) by mouth 2 times daily Start if having a COPD exacerbation  -     predniSONE (DELTASONE) 20 MG tablet; Take 2 tablets (40 mg) by mouth daily Start if having a COPD exacerbation    Iron deficiency anemia secondary to inadequate dietary iron intake: She continues to eat iron rich sources of foods.     Pulmonary nodules: CT done in June 2023, has been stable.     Senile osteoporosis: Is on calcium, vitamin D, and Forteo daily injectable. Managed per Endocrinology.     Other orders  -     Pneumococcal 20 Valent Conjugate (PCV20)    Patient has been advised of split billing requirements and indicates understanding: Yes      COUNSELING:  Reviewed preventive health counseling, as reflected in patient instructions  Special attention given to:       Regular exercise       Healthy diet/nutrition       Vision screening       Hearing screening        She reports that she quit smoking about 2 years ago. Her smoking use included cigarettes. She has never used smokeless tobacco.      Appropriate preventive services were discussed with this patient, including applicable screening as appropriate for cardiovascular disease, diabetes, osteopenia/osteoporosis, and glaucoma.  As appropriate for age/gender, discussed screening for colorectal cancer, prostate cancer, breast cancer, and cervical cancer. Checklist reviewing preventive services  available has been given to the patient.    Reviewed patients plan of care and provided an AVS. The Complex Care Plan (for patients with higher acuity and needing more deliberate coordination of services) for Jasmyn meets the Care Plan requirement. This Care Plan has been established and reviewed with the Patient.          Miesha Fernando [facility]    Identified Health Risks:  I have reviewed Opioid Use Disorder and Substance Use Disorder risk factors and made any needed referrals.

## 2023-08-09 NOTE — PATIENT INSTRUCTIONS
You received your Prevnar 20 shot today.    I will update you of your cholesterol results once they are back.    COPD action plan meds ordered. Take the Doxycycline and Prednisone if starting to feel sick and have trouble breathing.    Refilled your nebs and your atorvastatin today.    I will see you back in 6 months for follow up, please let me know before then if anything comes up.    No aleve, advil, ibuprofen with your kidney function. Tylenol is okay as needed for pain or fever.     You are due for the shingles shots, get these at a local pharmacy.

## 2023-08-14 ENCOUNTER — MEDICAL CORRESPONDENCE (OUTPATIENT)
Dept: HEALTH INFORMATION MANAGEMENT | Facility: CLINIC | Age: 68
End: 2023-08-14
Payer: COMMERCIAL

## 2023-08-14 DIAGNOSIS — E78.00 HYPERCHOLESTEREMIA: ICD-10-CM

## 2023-08-14 RX ORDER — ATORVASTATIN CALCIUM 20 MG/1
20 TABLET, FILM COATED ORAL DAILY
Qty: 90 TABLET | Refills: 2 | Status: SHIPPED | OUTPATIENT
Start: 2023-08-14 | End: 2024-03-14

## 2023-08-14 NOTE — TELEPHONE ENCOUNTER
Patient states atorvastatin was sent incorrect pharmacy. [facility] to [facility]. New refill needed.

## 2023-08-14 NOTE — PROGRESS NOTES
Subjective:    Established patient, the following is a comprehensive summary of her Endocrine care to date.     Jasmyn Green is a 67 year old female who presents to discuss osteoporosis. We previously reviewed the standard osteoporosis dictation template.      DEXA 10/3/2022:  -L1 - L4 T-score -1.6 with 14.6% significant BMD improvement compared to 8/2021  -Lowest T-score at the hips is -3.7 at the left and right total hips, at the right total hip (only that is reported) is a 20.4% significant improvement compared to 8/2021  -TBS T-score L1 - L4 -4.2      CT chest 6/2023: no vertebral compression fractures     No prior pharmacologic therapy to reduce fracture risk before starting Tymlos 10/2021.     10/2021: She started Tymlos   2/2022: Because of insurance coverage we started Forteo instead, there was a likely 3 week lapse between stopping Tymlos and starting Forteo; Forteo is well tolerated    2/2023: Switched from Forteo to teriparatide and there was an ~30 day lapse in treatment   8/15/2023: she continues on teriparatide, well tolerated     No prior low impact fractures. No falls. No prior nephrolithiasis. Significant GC exposure in the setting of COPD over the years.      Significant history of tobacco use and quit in 7/2021.      As of 11/2022 Jasmyn has completed a complete secondary evaluation for causes of increased fracture risk with the following notable findings:   -No prior hypercalcemia  -alkaline phosphatase has been mildly elevated but subsequently bone specific alkaline phosphatase normal 10/2021  -8/2023: 25-OH vitamin D low normal   -9/2021: 24 hour urine calcium 108 mg (1.8 L collection)   -Abnormal Celiac screen - see below    Imaging notes severe coronary artery calcification but no prior ASCVD event.      No history of malignancy. No history of radiation therapy.      2020 she had all teeth removed. No recent/plans for dental procedures. She has a hiatal hernia.     She takes vitamin D, a  combination calcium - vitamin D supplement, and a MVI.     Objective:    On supplemental oxygen.  BMI 17.1 kg/meter squared (stable).     9/2021: BMI 18.66 kg/m2. Thoracic kyphosis. Thyroid exam normal. Edentulous. Steady on her feet without a gait aid.     Assessment/Plan:    # Osteoporosis, glucocorticoid associated    She will complete approximately 2 years of anabolic therapy in October 2023.  At that point we will transition to IV zoledronic acid 5 mg given annually, anticipate likely 3 doses before a drug holiday.  We reviewed potential adverse effects of zoledronic acid in detail including osteonecrosis of the jaw, atypical femoral fracture, and flulike reaction.  She knows to take Tylenol around the time of the infusion.    We will update a DEXA in the fall of 2023 to establish a new baseline after completing anabolic therapy.  This is ordered.    We also reviewed optimization of calcium and vitamin D and I gave her the handout on dietary sources of calcium.    Return to see me in the fall 2024 prior to Reclast dose #2.  When her labs return I will place orders for a DEXA and bone labs to be done in the fall of 2024.    # Possible Celiac disease  # BMI 17 kg/m2     Celiac screen was previously ordered in setting of vitamin D deficiency and severe osteoporosis without obvious cause. She is positive for DQB1*02 and/or DQB1*03(08), she has elevated deamidated gliadin Abs, normal TTG Abs (with normal IgA). No diarrhea, no abdominal pain. She has been anemic. No known Celiac disease in the family.      I referred her to GI 10/2021. She met with her PCP 11/2021 and they decided to defer GI evaluation.     We reviewed this again today and she is interested in a GI referral and I placed this.    30 minutes spent on the date of the encounter doing chart review, history and exam, documentation and further activities as noted above.

## 2023-08-14 NOTE — TELEPHONE ENCOUNTER
Prescription approved per [facility] Refill Protocol.  Stephania Cifuentes RN

## 2023-08-15 ENCOUNTER — OFFICE VISIT (OUTPATIENT)
Dept: ENDOCRINOLOGY | Facility: CLINIC | Age: 68
End: 2023-08-15
Payer: COMMERCIAL

## 2023-08-15 VITALS
HEIGHT: 62 IN | HEART RATE: 92 BPM | DIASTOLIC BLOOD PRESSURE: 68 MMHG | BODY MASS INDEX: 17.32 KG/M2 | WEIGHT: 94.1 LBS | SYSTOLIC BLOOD PRESSURE: 128 MMHG

## 2023-08-15 DIAGNOSIS — Z78.0 POST-MENOPAUSAL: ICD-10-CM

## 2023-08-15 DIAGNOSIS — Z86.39 HISTORY OF VITAMIN D DEFICIENCY: ICD-10-CM

## 2023-08-15 DIAGNOSIS — M81.0 AGE-RELATED OSTEOPOROSIS WITHOUT CURRENT PATHOLOGICAL FRACTURE: Primary | ICD-10-CM

## 2023-08-15 DIAGNOSIS — E44.0 MODERATE PROTEIN-CALORIE MALNUTRITION (H): ICD-10-CM

## 2023-08-15 DIAGNOSIS — K90.0 CELIAC DISEASE: ICD-10-CM

## 2023-08-15 DIAGNOSIS — F17.201 TOBACCO USE DISORDER, SEVERE, IN EARLY REMISSION: ICD-10-CM

## 2023-08-15 PROCEDURE — 99214 OFFICE O/P EST MOD 30 MIN: CPT | Performed by: INTERNAL MEDICINE

## 2023-08-15 RX ORDER — ZOLEDRONIC ACID 5 MG/100ML
5 INJECTION, SOLUTION INTRAVENOUS ONCE
Status: CANCELLED
Start: 2023-10-25

## 2023-08-15 RX ORDER — HEPARIN SODIUM,PORCINE 10 UNIT/ML
5-20 VIAL (ML) INTRAVENOUS DAILY PRN
Status: CANCELLED | OUTPATIENT
Start: 2023-10-25

## 2023-08-15 RX ORDER — ALBUTEROL SULFATE 0.83 MG/ML
2.5 SOLUTION RESPIRATORY (INHALATION)
Status: CANCELLED | OUTPATIENT
Start: 2023-10-25

## 2023-08-15 RX ORDER — DIPHENHYDRAMINE HYDROCHLORIDE 50 MG/ML
50 INJECTION INTRAMUSCULAR; INTRAVENOUS
Status: CANCELLED
Start: 2023-10-25

## 2023-08-15 RX ORDER — ALBUTEROL SULFATE 90 UG/1
1-2 AEROSOL, METERED RESPIRATORY (INHALATION)
Status: CANCELLED
Start: 2023-10-25

## 2023-08-15 RX ORDER — HEPARIN SODIUM (PORCINE) LOCK FLUSH IV SOLN 100 UNIT/ML 100 UNIT/ML
5 SOLUTION INTRAVENOUS
Status: CANCELLED | OUTPATIENT
Start: 2023-10-25

## 2023-08-15 RX ORDER — METHYLPREDNISOLONE SODIUM SUCCINATE 125 MG/2ML
125 INJECTION, POWDER, LYOPHILIZED, FOR SOLUTION INTRAMUSCULAR; INTRAVENOUS
Status: CANCELLED
Start: 2023-10-25

## 2023-08-15 RX ORDER — EPINEPHRINE 1 MG/ML
0.3 INJECTION, SOLUTION, CONCENTRATE INTRAVENOUS EVERY 5 MIN PRN
Status: CANCELLED | OUTPATIENT
Start: 2023-10-25

## 2023-08-15 NOTE — LETTER
8/15/2023         RE: Jasmyn Green  [address] [place] [address]        Dear Colleague,    Thank you for referring your patient, Jasmyn Green, to the [facility]. Please see a copy of my visit note below.    Subjective:    Established patient, the following is a comprehensive summary of her Endocrine care to date.     Jasmyn Green is a 67 year old female who presents to discuss osteoporosis. We previously reviewed the standard osteoporosis dictation template.      DEXA 10/3/2022:  -L1 - L4 T-score -1.6 with 14.6% significant BMD improvement compared to 8/2021  -Lowest T-score at the hips is -3.7 at the left and right total hips, at the right total hip (only that is reported) is a 20.4% significant improvement compared to 8/2021  -TBS T-score L1 - L4 -4.2      CT chest 6/2023: no vertebral compression fractures     No prior pharmacologic therapy to reduce fracture risk before starting Tymlos 10/2021.     10/2021: She started Tymlos   2/2022: Because of insurance coverage we started Forteo instead, there was a likely 3 week lapse between stopping Tymlos and starting Forteo; Forteo is well tolerated    2/2023: Switched from Forteo to teriparatide and there was an ~30 day lapse in treatment   8/15/2023: she continues on teriparatide, well tolerated     No prior low impact fractures. No falls. No prior nephrolithiasis. Significant GC exposure in the setting of COPD over the years.      Significant history of tobacco use and quit in 7/2021.      As of 11/2022 Jasmyn has completed a complete secondary evaluation for causes of increased fracture risk with the following notable findings:   -No prior hypercalcemia  -alkaline phosphatase has been mildly elevated but subsequently bone specific alkaline phosphatase normal 10/2021  -8/2023: 25-OH vitamin D low normal   -9/2021: 24 hour urine calcium 108 mg (1.8 L collection)   -Abnormal Celiac screen - see below    Imaging notes  severe coronary artery calcification but no prior ASCVD event.      No history of malignancy. No history of radiation therapy.      2020 she had all teeth removed. No recent/plans for dental procedures. She has a hiatal hernia.     She takes vitamin D, a combination calcium - vitamin D supplement, and a MVI.     Objective:    On supplemental oxygen.  BMI 17.1 kg/meter squared (stable).     9/2021: BMI 18.66 kg/m2. Thoracic kyphosis. Thyroid exam normal. Edentulous. Steady on her feet without a gait aid.     Assessment/Plan:    # Osteoporosis, glucocorticoid associated    She will complete approximately 2 years of anabolic therapy in October 2023.  At that point we will transition to IV zoledronic acid 5 mg given annually, anticipate likely 3 doses before a drug holiday.  We reviewed potential adverse effects of zoledronic acid in detail including osteonecrosis of the jaw, atypical femoral fracture, and flulike reaction.  She knows to take Tylenol around the time of the infusion.    We will update a DEXA in the fall of 2023 to establish a new baseline after completing anabolic therapy.  This is ordered.    We also reviewed optimization of calcium and vitamin D and I gave her the handout on dietary sources of calcium.    Return to see me in the fall 2024 prior to Reclast dose #2.  When her labs return I will place orders for a DEXA and bone labs to be done in the fall of 2024.    # Possible Celiac disease  # BMI 17 kg/m2     Celiac screen was previously ordered in setting of vitamin D deficiency and severe osteoporosis without obvious cause. She is positive for DQB1*02 and/or DQB1*03(08), she has elevated deamidated gliadin Abs, normal TTG Abs (with normal IgA). No diarrhea, no abdominal pain. She has been anemic. No known Celiac disease in the family.      I referred her to GI 10/2021. She met with her PCP 11/2021 and they decided to defer GI evaluation.     We reviewed this again today and she is interested in a GI  referral and I placed this.    30 minutes spent on the date of the encounter doing chart review, history and exam, documentation and further activities as noted above.          Again, thank you for allowing me to participate in the care of your patient.        Sincerely,        Rickie Pickens MD

## 2023-09-05 DIAGNOSIS — M81.8 OTHER OSTEOPOROSIS WITHOUT CURRENT PATHOLOGICAL FRACTURE: ICD-10-CM

## 2023-09-05 RX ORDER — TERIPARATIDE 250 UG/ML
INJECTION, SOLUTION SUBCUTANEOUS
Qty: 2.48 ML | Refills: 0 | Status: SHIPPED | OUTPATIENT
Start: 2023-09-05 | End: 2023-10-06

## 2023-10-23 ENCOUNTER — ANCILLARY PROCEDURE (OUTPATIENT)
Dept: BONE DENSITY | Facility: CLINIC | Age: 68
End: 2023-10-23
Attending: INTERNAL MEDICINE
Payer: COMMERCIAL

## 2023-10-23 DIAGNOSIS — M81.0 AGE-RELATED OSTEOPOROSIS WITHOUT CURRENT PATHOLOGICAL FRACTURE: ICD-10-CM

## 2023-10-23 PROCEDURE — 77080 DXA BONE DENSITY AXIAL: CPT | Mod: TC | Performed by: PHYSICIAN ASSISTANT

## 2023-10-26 ENCOUNTER — LAB (OUTPATIENT)
Dept: INFUSION THERAPY | Facility: CLINIC | Age: 68
End: 2023-10-26
Attending: INTERNAL MEDICINE
Payer: COMMERCIAL

## 2023-10-26 ENCOUNTER — TRANSFERRED RECORDS (OUTPATIENT)
Dept: MULTI SPECIALTY CLINIC | Facility: CLINIC | Age: 68
End: 2023-10-26

## 2023-10-26 VITALS
RESPIRATION RATE: 20 BRPM | HEART RATE: 93 BPM | OXYGEN SATURATION: 99 % | SYSTOLIC BLOOD PRESSURE: 137 MMHG | TEMPERATURE: 97.8 F | DIASTOLIC BLOOD PRESSURE: 73 MMHG

## 2023-10-26 VITALS — BODY MASS INDEX: 17.19 KG/M2 | HEIGHT: 62 IN | WEIGHT: 93.4 LBS

## 2023-10-26 DIAGNOSIS — M81.0 AGE-RELATED OSTEOPOROSIS WITHOUT CURRENT PATHOLOGICAL FRACTURE: Primary | ICD-10-CM

## 2023-10-26 LAB
CALCIUM SERPL-MCNC: 9.2 MG/DL (ref 8.8–10.2)
CREAT SERPL-MCNC: 0.87 MG/DL (ref 0.51–0.95)
EGFRCR SERPLBLD CKD-EPI 2021: 71 ML/MIN/1.73M2

## 2023-10-26 PROCEDURE — 82310 ASSAY OF CALCIUM: CPT | Performed by: INTERNAL MEDICINE

## 2023-10-26 PROCEDURE — 36415 COLL VENOUS BLD VENIPUNCTURE: CPT | Performed by: INTERNAL MEDICINE

## 2023-10-26 PROCEDURE — 96365 THER/PROPH/DIAG IV INF INIT: CPT

## 2023-10-26 PROCEDURE — 250N000011 HC RX IP 250 OP 636: Mod: JZ | Performed by: INTERNAL MEDICINE

## 2023-10-26 PROCEDURE — 82565 ASSAY OF CREATININE: CPT | Performed by: INTERNAL MEDICINE

## 2023-10-26 PROCEDURE — 258N000003 HC RX IP 258 OP 636: Performed by: INTERNAL MEDICINE

## 2023-10-26 RX ORDER — DIPHENHYDRAMINE HYDROCHLORIDE 50 MG/ML
50 INJECTION INTRAMUSCULAR; INTRAVENOUS
Status: CANCELLED
Start: 2023-10-26

## 2023-10-26 RX ORDER — METHYLPREDNISOLONE SODIUM SUCCINATE 125 MG/2ML
125 INJECTION, POWDER, LYOPHILIZED, FOR SOLUTION INTRAMUSCULAR; INTRAVENOUS
Status: CANCELLED
Start: 2023-10-26

## 2023-10-26 RX ORDER — HEPARIN SODIUM (PORCINE) LOCK FLUSH IV SOLN 100 UNIT/ML 100 UNIT/ML
5 SOLUTION INTRAVENOUS
Status: CANCELLED | OUTPATIENT
Start: 2023-10-26

## 2023-10-26 RX ORDER — EPINEPHRINE 1 MG/ML
0.3 INJECTION, SOLUTION INTRAMUSCULAR; SUBCUTANEOUS EVERY 5 MIN PRN
Status: CANCELLED | OUTPATIENT
Start: 2023-10-26

## 2023-10-26 RX ORDER — ZOLEDRONIC ACID 5 MG/100ML
5 INJECTION, SOLUTION INTRAVENOUS ONCE
Status: CANCELLED
Start: 2023-10-26

## 2023-10-26 RX ORDER — HEPARIN SODIUM,PORCINE 10 UNIT/ML
5-20 VIAL (ML) INTRAVENOUS DAILY PRN
Status: CANCELLED | OUTPATIENT
Start: 2023-10-26

## 2023-10-26 RX ORDER — METHYLPREDNISOLONE SODIUM SUCCINATE 125 MG/2ML
125 INJECTION, POWDER, LYOPHILIZED, FOR SOLUTION INTRAMUSCULAR; INTRAVENOUS
Status: DISCONTINUED | OUTPATIENT
Start: 2023-10-26 | End: 2023-10-26

## 2023-10-26 RX ORDER — EPINEPHRINE 1 MG/ML
0.3 INJECTION, SOLUTION INTRAMUSCULAR; SUBCUTANEOUS EVERY 5 MIN PRN
Status: DISCONTINUED | OUTPATIENT
Start: 2023-10-26 | End: 2023-10-26

## 2023-10-26 RX ORDER — ALBUTEROL SULFATE 0.83 MG/ML
2.5 SOLUTION RESPIRATORY (INHALATION)
Status: CANCELLED | OUTPATIENT
Start: 2023-10-26

## 2023-10-26 RX ORDER — ALBUTEROL SULFATE 90 UG/1
1-2 AEROSOL, METERED RESPIRATORY (INHALATION)
Status: CANCELLED
Start: 2023-10-26

## 2023-10-26 RX ORDER — ZOLEDRONIC ACID 5 MG/100ML
5 INJECTION, SOLUTION INTRAVENOUS ONCE
Status: COMPLETED | OUTPATIENT
Start: 2023-10-26 | End: 2023-10-26

## 2023-10-26 RX ORDER — DIPHENHYDRAMINE HYDROCHLORIDE 50 MG/ML
50 INJECTION INTRAMUSCULAR; INTRAVENOUS
Status: DISCONTINUED | OUTPATIENT
Start: 2023-10-26 | End: 2023-10-26

## 2023-10-26 RX ORDER — ALBUTEROL SULFATE 90 UG/1
1-2 AEROSOL, METERED RESPIRATORY (INHALATION)
Status: DISCONTINUED | OUTPATIENT
Start: 2023-10-26 | End: 2023-10-26

## 2023-10-26 RX ORDER — ALBUTEROL SULFATE 0.83 MG/ML
2.5 SOLUTION RESPIRATORY (INHALATION)
Status: DISCONTINUED | OUTPATIENT
Start: 2023-10-26 | End: 2023-10-26

## 2023-10-26 RX ADMIN — ZOLEDRONIC ACID 5 MG: 5 INJECTION, SOLUTION INTRAVENOUS at 10:20

## 2023-10-26 RX ADMIN — SODIUM CHLORIDE 250 ML: 9 INJECTION, SOLUTION INTRAVENOUS at 10:20

## 2023-10-26 NOTE — PROGRESS NOTES
"Infusion Nursing Note:  Jasmyn Green presents today for Reclast (first).    Patient seen by provider today: No    Note: Pt arrives ambulatory with daughter to [facility]. Pt on 2L O2 vis NC and reports she uses with walking. Discussed process of today's visit and potential side effects. Pt confirms that she's taking calcium/jenna D. Pt denies any bone pain or recent fractures. Pt reports taking \"2 tylenol\" per Providers recommendation; pt unsure of dosage.    Intravenous Access:  Peripheral IV placed in right forearm.    Treatment Conditions:  Lab Results   Component Value Date     02/08/2023    POTASSIUM 5.0 02/08/2023    MAG 2.4 (H) 02/08/2023    CR 0.87 10/26/2023    FLOR 9.2 10/26/2023    BILITOTAL 0.3 09/21/2021    ALBUMIN 4.2 08/07/2023    ALT 26 09/21/2021    AST 22 09/21/2021       Results reviewed, labs MET treatment parameters, ok to proceed with treatment.      Post Infusion Assessment:  Patient tolerated infusion without incident; per order, infused over 60minutes. Concomitant NS.    Site patent and intact, free from redness, edema or discomfort.  No evidence of extravasations.  Access discontinued per protocol.     Discharge Plan:   Pt given AVS. Encouraged pt to drink   Patient discharged in stable condition accompanied by: daughter.  Departure Mode: Ambulatory.      Mai Duff RN    "

## 2023-11-08 ENCOUNTER — OFFICE VISIT (OUTPATIENT)
Dept: PULMONOLOGY | Facility: CLINIC | Age: 68
End: 2023-11-08
Payer: COMMERCIAL

## 2023-11-08 VITALS
SYSTOLIC BLOOD PRESSURE: 118 MMHG | WEIGHT: 89.6 LBS | BODY MASS INDEX: 16.28 KG/M2 | DIASTOLIC BLOOD PRESSURE: 62 MMHG | HEART RATE: 96 BPM | OXYGEN SATURATION: 99 %

## 2023-11-08 DIAGNOSIS — J44.9 COPD, GROUP D, BY GOLD 2017 CLASSIFICATION (H): Primary | ICD-10-CM

## 2023-11-08 DIAGNOSIS — J96.11 CHRONIC RESPIRATORY FAILURE WITH HYPOXIA (H): ICD-10-CM

## 2023-11-08 DIAGNOSIS — J43.8 OTHER EMPHYSEMA (H): ICD-10-CM

## 2023-11-08 PROCEDURE — 99214 OFFICE O/P EST MOD 30 MIN: CPT | Performed by: NURSE PRACTITIONER

## 2023-11-08 RX ORDER — IPRATROPIUM BROMIDE AND ALBUTEROL 20; 100 UG/1; UG/1
1 SPRAY, METERED RESPIRATORY (INHALATION) 2 TIMES DAILY
Qty: 4 G | Refills: 11 | Status: ON HOLD | OUTPATIENT
Start: 2023-11-08 | End: 2024-02-15

## 2023-11-08 RX ORDER — ALBUTEROL SULFATE 90 UG/1
2 AEROSOL, METERED RESPIRATORY (INHALATION) EVERY 6 HOURS
Qty: 18 G | Refills: 11 | Status: SHIPPED | OUTPATIENT
Start: 2023-11-08 | End: 2024-05-10

## 2023-11-08 NOTE — PROGRESS NOTES
Pulmonary Clinic Follow-Up        Assessment/Plan:     Jasmyn Green is a 68 year old female with sig h/o for COPD who is here for follow-up.    COPD - GOLD E  Chronic hypoxic respiratory failure  Emphysema  Severe obstructive lung disease (FEV1 29%), with hyperinflation and air-trapping.  Severe diffusion capacity defect (DLCO 30%).  Radiographic emphysema.  Continues on Airduo and combivent, d/t cost issues.  She reports COVID infection since last visit, required her action plan, but otherwise did well.  CAT score 15 today, improved from 20 previously.  Plan:  - continue Airduo (fluticasone/salmeterol) respiclick 232/14, one puff BID, rinse/gargle after use.  - continue Combivent BID, okay to increase to QID if needed.  - continue Albuterol HFA or Duonebs PRN  - continue oxygen 3L pulse dose with activity, to keep oxygen saturations 88-92%.  - She is UTD with COVID vaccine and pneumococcal vaccine.  Due for annual influenza vaccine, she will receive this through work.  - Action plan: prednisone 40mg x5 days, + augmentin 875/125 BID x5 days    Nicotine dependence, in remission  Former smoker, quit in 2021.  LDCT for lung cancer screening 6/2023, LungRADS category 2.  Plan:  - due for LDCT 6/2024    Follow-up  - 6 months.      Tata Lainez, CNP  Pulmonary Medicine  [facility]  [phone]               History:     Jasmyn Green is a 68 year old female with sig h/o for COPD who is here for follow-up.    Since last visit (4/2023), she feels breathing is stable.  Exacerbation in June, treated with prednisone and doxycycline.  COVID recently, did action plan.  Increased oxygen use during time, but recovered well.  3L pulse dose with activity.  Oxygen saturations at home 89-90%.  Using Airduo BID, rinses mouth.  Using Combivent BID.    Patient supplied answers from flow sheet for:  COPD Assessment Test (CAT)  2009 [employer]. All rights reserved.      11/2/2021     9:00 AM  2022     8:00 AM 10/4/2022     9:00 AM 2023     7:00 AM 2023     9:37 AM   COPD assessment test (CAT)   Cough 3 3 3 4 3   Phlegm 3 3 2 4 1   Chest tightness 0 0 0 2 0   Walk up hill 5 5 5 5 4   Limited activities 0 3 0 4 4   Leaving my home 0 0 0 0 0   Sleep 0 0 1 0 1   Energy 2 4 0 1 2   Total Score 13 18 11 20 15      CAT Key:  The CAT consist of 8 items which are each scored 0-5. The total score ranges from 0-40 with higher scores representing a poorer health status. When interpreting CAT scores, the individual s disease severity should be considered.   Low impact  (1-9)  Medium impact  (10-20)  High impact  (21-30)  Very high impact  (31-40)     ROS:     6-point ROS performed and is negative aside from those listed in HPI.         Past Medical History:      Past Medical History:   Diagnosis Date    Arthritis     COPD (chronic obstructive pulmonary disease) (H)     Coronary artery disease     Dyspnea on exertion     Gout     Hypertension            Past Surgical History:      Past Surgical History:   Procedure Laterality Date    COLONOSCOPY N/A 10/21/2021    Procedure: COLONOSCOPY;  Surgeon: Wilma Hester DO;  Location: [facility]    VAGINAL DELIVERY      x 3 ; remote    WISDOM TOOTH EXTRACTION            Social History:     Social History     Tobacco Use    Smoking status: Former     Types: Cigarettes     Quit date: 2021     Years since quittin.3    Smokeless tobacco: Never    Tobacco comments:     updated 8/3/2021 by TTS   Substance Use Topics    Alcohol use: Not Currently          Family History:     Family History   Problem Relation Age of Onset    Chronic Obstructive Pulmonary Disease Sister     Diabetes Mother     Heart Disease Mother     Lung Cancer Mother     Heart Disease Father            Allergies:    No Known Allergies       Medications:     Current Outpatient Medications   Medication Sig    albuterol (PROAIR HFA/PROVENTIL HFA/VENTOLIN HFA) 108 (90 Base) MCG/ACT  inhaler Inhale 2 puffs into the lungs every 6 hours    aspirin 81 mg chewable tablet [ASPIRIN 81 MG CHEWABLE TABLET] Chew 1 tablet (81 mg total) daily.    atorvastatin (LIPITOR) 20 MG tablet Take 1 tablet (20 mg) by mouth daily    calcium carbonate 600 mg-vitamin D 400 units (CALTRATE) 600-400 MG-UNIT per tablet Take 1 tablet by mouth 2 times daily With calcium    fluticasone-salmeterol (AIRDUO RESPICLICK) 232-14 MCG/ACT inhaler Inhale 1 puff into the lungs 2 times daily    ipratropium - albuterol 0.5 mg/2.5 mg/3 mL (DUONEB) 0.5-2.5 (3) MG/3ML neb solution Take 1 vial (3 mLs) by nebulization every 6 hours as needed for shortness of breath, wheezing or cough    ipratropium-albuterol (COMBIVENT RESPIMAT)  MCG/ACT inhaler Inhale 1 puff into the lungs 2 times daily    lisinopril (ZESTRIL) 20 MG tablet TAKE 1 TABLET(20 MG) BY MOUTH DAILY    doxycycline hyclate (VIBRAMYCIN) 100 MG capsule Take 1 capsule (100 mg) by mouth 2 times daily Start if having a COPD exacerbation (Patient not taking: Reported on 11/8/2023)    predniSONE (DELTASONE) 20 MG tablet Take 2 tablets (40 mg) by mouth daily Start if having a COPD exacerbation (Patient not taking: Reported on 11/8/2023)     No current facility-administered medications for this visit.            Physical Exam:   /62 (BP Location: Left arm, Patient Position: Chair, Cuff Size: Adult Small)   Pulse 96   Wt 40.6 kg (89 lb 9.6 oz)   SpO2 99%   BMI 16.28 kg/m      Constitutional - adult female, appears in NAD.  Daughter present.  Respiratory - CTA bilaterally, decreased air movement throughout.  No wheezes or rhonchi.  Respirations even and unlabored.  On 3L pulse dose oxygen via NC.    Cardiac -- RRR, no M/G/R  Neurological - alert, answering questions appropriately          Current Data:     Chest CT 6/2023:  IMPRESSION:  1.  Negative for lung cancer screening purposes.  2.  New mild consolidation in the left upper lobe compatible with infectious / inflammatory  change.    EXAM: XR CHEST 2 VIEWS  LOCATION: [facility]  DATE/TIME: 4/4/2023 6:54 AM  INDICATION:  COPD exacerbation (H), Shortness of breath  COMPARISON: 06/27/2021.                                              IMPRESSION: Heart is normal in size. Lungs are hyperinflated suggesting COPD. Minimal bibasilar atelectasis. Possible trace bilateral effusions. Lungs are otherwise clear.    Chest CT 6/2/22:  IMPRESSION:  1.  Negative for lung cancer screening purposes.  2.  Debris throughout the airways with bronchial wall thickening. Bronchitis is suspected.  3.  Severe coronary artery calcification.  4.  Small to moderate sized hiatal hernia.    PFTs 8/2021:  The FVC, FEV1 and FEV1/FVC ratio are reduced, but the OSU43-96% is within normal limits.  The inspiratory flow rates are reduced.  The FVC is reduced relative to the SVC indicating air trapping.  The TLC, RV, FRC and RV/TLC ratio are all increased   indicating overinflation and air trapping.  Following administration of bronchodilators, there is no significant response.  The diffusing capacity is severely reduced when corrected to hemoglobin.   IMPRESSION:   Very severe Airflow Obstruction. No significant postbronchodilator response.   Mild hyperinflation and moderate airtrapping.   Diffusion capacity was severely reduced when corrected to hemoglobin.

## 2023-11-08 NOTE — PATIENT INSTRUCTIONS
It was a pleasure to see you in clinic today.   Here is what we discussed:    Continue Airduo one puff twice daily, rinse/gargle after use.  Continue Combivent twice daily, you can increase this to four times daily.  Start your action plan (prednisone + antibiotic) if you have another exacerbation.  Your next chest CT is due next June.  Call my nurse, Jamie ([phone]) with any change or worsening of your breathing.  Follow-up in 6 months.    Tata Lainez, CNP  Pulmonary Medicine  [facility]  [phone]

## 2023-11-13 ENCOUNTER — MEDICAL CORRESPONDENCE (OUTPATIENT)
Dept: HEALTH INFORMATION MANAGEMENT | Facility: CLINIC | Age: 68
End: 2023-11-13
Payer: COMMERCIAL

## 2023-11-22 ENCOUNTER — MYC MEDICAL ADVICE (OUTPATIENT)
Dept: ADMINISTRATIVE | Facility: CLINIC | Age: 68
End: 2023-11-22
Payer: COMMERCIAL

## 2023-12-11 ENCOUNTER — NURSE TRIAGE (OUTPATIENT)
Dept: INTERNAL MEDICINE | Facility: CLINIC | Age: 68
End: 2023-12-11
Payer: COMMERCIAL

## 2023-12-11 DIAGNOSIS — U07.1 INFECTION DUE TO 2019 NOVEL CORONAVIRUS: Primary | ICD-10-CM

## 2023-12-11 RX ORDER — GUAIFENESIN 600 MG/1
1200 TABLET, EXTENDED RELEASE ORAL 2 TIMES DAILY
Qty: 60 TABLET | Refills: 0 | Status: SHIPPED | OUTPATIENT
Start: 2023-12-11 | End: 2024-02-21

## 2023-12-11 NOTE — TELEPHONE ENCOUNTER
RN COVID TREATMENT VISIT  12/11/23      The patient has been triaged and does not require a higher level of care.    Jasmyn Green  68 year old  Current weight? 93 lbs     Has the patient been seen by a primary care provider at an [facility] or [facility] within the past two years? Yes.   Have you been in close proximity to/do you have a known exposure to a person with a confirmed case of influenza? No.     General treatment eligibility:  Date of positive COVID test (PCR or at home)?  12/8    Are you or have you been hospitalized for this COVID-19 infection? No.   Have you received monoclonal antibodies or antiviral treatment for COVID-19 since this positive test? No.   Do you have any of the following conditions that place you at risk of being very sick from COVID-19?   - Age 50 years or older  Yes, patient has at least one high risk condition as noted above.     Current COVID symptoms:   - fever or chills  - cough  - muscle or body aches  - headache  - sore throat  Yes. Patient has at least one symptom as selected.     How many days since symptoms started? 5 days or less. Established patient, 12 years or older weighing at least 88.2 lbs, who has symptoms that started in the past 5 days, has not been hospitalized nor received treatment already, and is at risk for being very sick from COVID-19.     Treatment eligibility by RN:  Are you currently pregnant or nursing? No  Do you have a clinically significant hypersensitivity to nirmatrelvir or ritonavir, or toxic epidermal necrolysis (TEN) or Chaney-Ronnie Syndrome? No  Do you have a history of hepatitis, any hepatic impairment on the Problem List (such as Child-Jacobsen Class C, cirrhosis, fatty liver disease, alcoholic liver disease), or was the last liver lab (hepatic panel, ALT, AST, ALK Phos, bilirubin) elevated in the past 6 months? No  Do you have any history of severe renal impairment (eGFR < 30mL/min)? No    Is patient eligible  to continue? Yes, patient meets all eligibility requirements for the RN COVID treatment (as denoted by all no responses above).     Current Outpatient Medications   Medication Sig Dispense Refill    guaiFENesin (MUCINEX) 600 MG 12 hr tablet Take 2 tablets (1,200 mg) by mouth 2 times daily 60 tablet 0    nirmatrelvir and ritonavir (PAXLOVID) 150 mg/100 mg therapy pack Take 2 tablets by mouth 2 times daily for 5 days 20 tablet 0    albuterol (PROAIR HFA/PROVENTIL HFA/VENTOLIN HFA) 108 (90 Base) MCG/ACT inhaler Inhale 2 puffs into the lungs every 6 hours 18 g 11    aspirin 81 mg chewable tablet [ASPIRIN 81 MG CHEWABLE TABLET] Chew 1 tablet (81 mg total) daily.  0    atorvastatin (LIPITOR) 20 MG tablet Take 1 tablet (20 mg) by mouth daily 90 tablet 2    calcium carbonate 600 mg-vitamin D 400 units (CALTRATE) 600-400 MG-UNIT per tablet Take 1 tablet by mouth 2 times daily With calcium      doxycycline hyclate (VIBRAMYCIN) 100 MG capsule Take 1 capsule (100 mg) by mouth 2 times daily Start if having a COPD exacerbation (Patient not taking: Reported on 11/8/2023) 20 capsule 3    fluticasone-salmeterol (AIRDUO RESPICLICK) 232-14 MCG/ACT inhaler Inhale 1 puff into the lungs 2 times daily 1 each 11    ipratropium - albuterol 0.5 mg/2.5 mg/3 mL (DUONEB) 0.5-2.5 (3) MG/3ML neb solution Take 1 vial (3 mLs) by nebulization every 6 hours as needed for shortness of breath, wheezing or cough 90 mL 4    ipratropium-albuterol (COMBIVENT RESPIMAT)  MCG/ACT inhaler Inhale 1 puff into the lungs 2 times daily 4 g 11    lisinopril (ZESTRIL) 20 MG tablet TAKE 1 TABLET(20 MG) BY MOUTH DAILY 60 tablet 3    predniSONE (DELTASONE) 20 MG tablet Take 2 tablets (40 mg) by mouth daily Start if having a COPD exacerbation (Patient not taking: Reported on 11/8/2023) 10 tablet 4       Medications from List 1 of the standing order (on medications that exclude the use of Paxlovid) that patient is taking: NONE. Is patient taking Saskia's Wort?  No  Is patient taking Saskia's Wort or any meds from List 1? No.   Medications from List 2 of the standing order (on meds that provider needs to adjust) that patient is taking: NONE. Is patient on any of the meds from List 2? No.   Medications from List 3 of standing order (on meds that a RN needs to adjust) that patient is taking: atorvastatin (Lipitor): Instructed patient to stop atorvastatin while taking Paxlovid and restart atorvastatin 1 day after the completion of Paxlovid.  Is patient on any meds from List 3? Yes. Patient is on meds from list 3. No meds require a provider visit and at least one med required RN to adjust.     Paxlovid has an approximate 90% reduction in hospitalization. Paxlovid can possibly cause altered sense of taste, diarrhea (loose, watery stools), high blood pressure, muscle aches.     Would patient like a Paxlovid prescription?   Yes.   Lab Results   Component Value Date    GFRESTIMATED 71 10/26/2023       Was last eGFR reduced? Yes, eGFR 30-59 and will require reduced renal function dose of Paxlovid. Prescription sent to [place] pharmacy.   MD prescribed and sent to pharmacy    Temporary change to home medications: atorvastatin (Lipitor): Instructed patient to stop atorvastatin while taking Paxlovid and restart atorvastatin 1 day after the completion of Paxlovid.     All medication adjustments (holds, etc) were discussed with the patient and patient was asked to repeat back (teachback) their med adjustment.  Did patient understand med adjustment? Yes, patient repeated back and understood correctly.        Reviewed the following instructions with the patient:    Paxlovid (nimatrelvir and ritonavir)    How it works  Two medicines (nirmatrelvir and ritonavir) are taken together. They stop the virus from growing. Less amount of virus is easier for your body to fight.    How to take  Medicine comes in a daily container with both medicine tablets. Take by mouth twice daily (once in the  morning, once at night) for 5 days.  The number of tablets to take varies by patient.  Don't chew or break capsules. Swallow whole.    When to take  Take as soon as possible after positive COVID-19 test result, and within 5 days of your first symptoms.    Possible side effects  Can cause altered sense of taste, diarrhea (loose, watery stools), high blood pressure, muscle aches.    Noah Elizabeth RN

## 2023-12-11 NOTE — TELEPHONE ENCOUNTER
Nurse Triage SBAR    Is this a 2nd Level Triage? YES, LICENSED PRACTITIONER REVIEW IS REQUIRED    Situation:  Positive for Covid 12/8.     Background: Symptoms started 12/7, tested positive 12/8  Hx of COPD and uses oxygen as needed.     Assessment:   Pt is 68 y.o.     Report fever started since Friday, this morning was 99.3  Report headache, productive cough with clear and white like color.   Report muscle ache and an increase of a sore throat.   Report O2 sats are in 80s and 90s which is her normal range.     Pt report that she is staying hydrated and urinating without difficulty.   Denies sob, wheezing, or retraction.  Pt report using oxygen as needed and currently not using.   Pt report using over the counter med to help with cough but does not help.     No additional symptoms.     Protocol Recommended Disposition:   Discuss With PCP And Callback By Nurse Within 1 Hour    Recommendation: Care advise reviewed. Red flags reviewed. Routed to pcp to advise.      Routed to provider    Does the patient meet one of the following criteria for ADS visit consideration? No   Reason for Disposition   HIGH RISK patient (e.g., weak immune system, age > 64 years, obesity with BMI of 30 or higher, pregnant, chronic lung disease or other chronic medical condition) and COVID symptoms (e.g., cough, fever)  (Exceptions: Already seen by doctor or NP/PA and no new or worsening symptoms.)    Additional Information   Negative: SEVERE difficulty breathing (e.g., struggling for each breath, speaks in single words)   Negative: Difficult to awaken or acting confused (e.g., disoriented, slurred speech)   Negative: Bluish (or gray) lips or face now   Negative: Shock suspected (e.g., cold/pale/clammy skin, too weak to stand, low BP, rapid pulse)   Negative: Sounds like a life-threatening emergency to the triager   Negative: Diagnosed or suspected COVID-19 and symptoms lasting 3 or more weeks   Negative: COVID-19 exposure and no symptoms    "Negative: COVID-19 vaccine reaction suspected (e.g., fever, headache, muscle aches) occurring 1 to 3 days after getting vaccine   Negative: COVID-19 vaccine, questions about   Negative: Lives with someone known to have influenza (flu test positive) and flu-like symptoms (e.g., cough, runny nose, sore throat, SOB; with or without fever)   Negative: Possible COVID-19 symptoms and triager concerned about severity of symptoms or other causes   Negative: COVID-19 and breastfeeding, questions about   Negative: SEVERE or constant chest pain or pressure  (Exception: Mild central chest pain, present only when coughing.)   Negative: MODERATE difficulty breathing (e.g., speaks in phrases, SOB even at rest, pulse 100-120)   Negative: Headache and stiff neck (can't touch chin to chest)   Negative: Oxygen level (e.g., pulse oximetry) 90% or lower   Negative: Chest pain or pressure  (Exception: MILD central chest pain, present only when coughing.)   Negative: Drinking very little and dehydration suspected (e.g., no urine > 12 hours, very dry mouth, very lightheaded)   Negative: Patient sounds very sick or weak to the triager   Negative: MILD difficulty breathing (e.g., minimal/no SOB at rest, SOB with walking, pulse <100)   Negative: Fever > 103 F (39.4 C)   Negative: Fever > 101 F (38.3 C) and over 60 years of age   Negative: Fever > 100.0 F (37.8 C) and bedridden (e.g., CVA, chronic illness, recovering from surgery)    Answer Assessment - Initial Assessment Questions  1. COVID-19 DIAGNOSIS: \"How do you know that you have COVID?\" (e.g., positive lab test or self-test, diagnosed by doctor or NP/PA, symptoms after exposure).      Tested positive Friday 12/85  2. COVID-19 EXPOSURE: \"Was there any known exposure to COVID before the symptoms began?\" CDC Definition of close contact: within 6 feet (2 meters) for a total of 15 minutes or more over a 24-hour period.       At home.   3. ONSET: \"When did the COVID-19 symptoms start?\"       " "12/7   4. WORST SYMPTOM: \"What is your worst symptom?\" (e.g., cough, fever, shortness of breath, muscle aches)      Sore throat. Tempt: 99.3 this morning. Muscle ache. Denies sob.   5. COUGH: \"Do you have a cough?\" If Yes, ask: \"How bad is the cough?\"        Productive cough, white milky like color.   6. FEVER: \"Do you have a fever?\" If Yes, ask: \"What is your temperature, how was it measured, and when did it start?\"      Yes, started on Friday.   7. RESPIRATORY STATUS: \"Describe your breathing?\" (e.g., normal; shortness of breath, wheezing, unable to speak)     Hx of COPD, able to speak in sentences.   8. BETTER-SAME-WORSE: \"Are you getting better, staying the same or getting worse compared to yesterday?\"  If getting worse, ask, \"In what way?\"      The same.   9. OTHER SYMPTOMS: \"Do you have any other symptoms?\"  (e.g., chills, fatigue, headache, loss of smell or taste, muscle pain, sore throat)      Sore throat and HA   10. HIGH RISK DISEASE: \"Do you have any chronic medical problems?\" (e.g., asthma, heart or lung disease, weak immune system, obesity, etc.)        None Hx COPD   11. VACCINE: \"Have you had the COVID-19 vaccine?\" If Yes, ask: \"Which one, how many shots, when did you get it?\"        Vaccinated.   12. PREGNANCY: \"Is there any chance you are pregnant?\" \"When was your last menstrual period?\"        NA  13. O2 SATURATION MONITOR:  \"Do you use an oxygen saturation monitor (pulse oximeter) at home?\" If Yes, ask \"What is your reading (oxygen level) today?\" \"What is your usual oxygen saturation reading?\" (e.g., 95%)       80s and 90s, pt uses oxygen as needed    Protocols used: Coronavirus (COVID-19) Diagnosed or [id number]    "

## 2023-12-11 NOTE — TELEPHONE ENCOUNTER
Wellington Lovid ordered. Hold Atorvastatin and Air Duo while on PaxLovid.    Rest, push fluids. Mucinex ordered to help with secretions. Use nebs.    ER if symptoms worsen.

## 2023-12-11 NOTE — TELEPHONE ENCOUNTER
COVID Positive/Requesting COVID treatment    Patient is positive for COVID and requesting treatment options.    Date of positive COVID test (PCR or at home)? Friday 12/8 [facility] test [facility]  Current COVID symptoms: cough, headache, and sore throat Fever- also has COPD  Date COVID symptoms began: Thursday 12/7 had headache and sore throat    Message should be routed to clinic RN pool. Best phone number to use for call back: [phone]

## 2024-01-08 DIAGNOSIS — J41.0 SIMPLE CHRONIC BRONCHITIS (H): ICD-10-CM

## 2024-01-08 RX ORDER — DOXYCYCLINE 100 MG/1
100 CAPSULE ORAL 2 TIMES DAILY
Qty: 20 CAPSULE | Refills: 0 | Status: ON HOLD | OUTPATIENT
Start: 2024-01-08 | End: 2024-02-08

## 2024-01-09 ENCOUNTER — TELEPHONE (OUTPATIENT)
Dept: INTERNAL MEDICINE | Facility: CLINIC | Age: 69
End: 2024-01-09

## 2024-01-09 DIAGNOSIS — J41.0 SIMPLE CHRONIC BRONCHITIS (H): ICD-10-CM

## 2024-01-09 RX ORDER — PREDNISONE 20 MG/1
40 TABLET ORAL DAILY
Qty: 10 TABLET | Refills: 4 | Status: ON HOLD | OUTPATIENT
Start: 2024-01-09 | End: 2024-02-15

## 2024-01-30 RX ORDER — DOXYCYCLINE 100 MG/1
100 CAPSULE ORAL 2 TIMES DAILY
Qty: 20 CAPSULE | Refills: 0 | OUTPATIENT
Start: 2024-01-30

## 2024-01-30 NOTE — TELEPHONE ENCOUNTER
Routing refill request to provider for review/approval because:  Drug not on the [facility] refill protocol   PCP no longer with [facility]    Call with pt, who is requesting prescription to be refilled for COPD exacerbation she is currently experiencing.   Last Written Prescription Date:  1/9/24  Last Fill Quantity: 20,  # refills: 0   Last office visit: 8/9/2023    Future Office Visit:   Next 5 appointments (look out 90 days)      Feb 09, 2024  9:30 AM  (Arrive by 9:10 AM)  Provider Visit with JIMENEZ Oro CNP  [facility] ([facility] ) [address] [address] [phone]

## 2024-01-30 NOTE — TELEPHONE ENCOUNTER
[facility] fax received on 01/29/2024 for refill on T'd up medication.     Rx was sent on 01/08/2024 but only 20 capsules were sent to pharmacy.    This may be a duplicate fax? It was sitting on fax by . TC will send to nurse pool for approval/denial.    Britni Lewis

## 2024-01-30 NOTE — TELEPHONE ENCOUNTER
Outgoing call, relayed provider message.     Appointment made on 2/1/24 with available provider.   Pt is currently on oxygen and using her inhaler.     UC was recommended. Pt refused at this time and will be coming in on Thursday 2/1.     doxycycline hyclate (VIBRAMYCIN) 100 MG capsule was reorder on 1/8/24 for COPD exacerbation.   predniSONE (DELTASONE) 20 MG tablet  was ordered on 1/9/24 with 4 refills.     No further questions.     Pay P. RN

## 2024-02-01 ENCOUNTER — OFFICE VISIT (OUTPATIENT)
Dept: FAMILY MEDICINE | Facility: CLINIC | Age: 69
End: 2024-02-01
Payer: COMMERCIAL

## 2024-02-01 VITALS
RESPIRATION RATE: 20 BRPM | DIASTOLIC BLOOD PRESSURE: 68 MMHG | SYSTOLIC BLOOD PRESSURE: 109 MMHG | HEART RATE: 124 BPM | BODY MASS INDEX: 17.28 KG/M2 | OXYGEN SATURATION: 91 % | WEIGHT: 93.9 LBS | HEIGHT: 62 IN | TEMPERATURE: 97.8 F

## 2024-02-01 DIAGNOSIS — J44.1 COPD EXACERBATION (H): Primary | ICD-10-CM

## 2024-02-01 DIAGNOSIS — R00.0 TACHYCARDIA: ICD-10-CM

## 2024-02-01 PROCEDURE — 99214 OFFICE O/P EST MOD 30 MIN: CPT | Performed by: NURSE PRACTITIONER

## 2024-02-01 RX ORDER — DOXYCYCLINE 100 MG/1
100 CAPSULE ORAL 2 TIMES DAILY
Qty: 20 CAPSULE | Refills: 0 | Status: CANCELLED | OUTPATIENT
Start: 2024-02-01

## 2024-02-01 NOTE — PROGRESS NOTES
Assessment & Plan         COPD exacerbation (H)    Patient overall does appear well today-increasing O2 requirements, with shortness of breath and decreased O2 saturation. I do think we are looking at COPD exacerbation even with normal lung sounds.   Follow recommend pulmonology NP as below:   - continue Airduo (fluticasone/salmeterol) respiclick 232/14, one puff BID, rinse/gargle after use.  - continue Combivent BID, okay to increase to QID if needed.  - continue Albuterol HFA or Duonebs PRN  - continue oxygen 3L pulse dose with activity, to keep oxygen saturations 88-92%.  - Action plan: prednisone 40mg x5 days, + augmentin 875/125 BID x5 days    Prednisone has already been sent in to pharmacy for patient. I will add augmentin per action plan laid out by pulmonology    If worsening or not improving needs to be seen in the ED for further evaluation.  Discussed if requiring increasing oxygen or decreased o2 sats she needs to be seen.    - amoxicillin-clavulanate (AUGMENTIN) 875-125 MG tablet; Take 1 tablet by mouth 2 times daily for 5 days                  Daniela Vines is a 68 year old, presenting for the following health issues:  Refill Request      2/1/2024     8:25 AM   Additional Questions   Roomed by Betito CALVERT     History of Present Illness       Reason for visit:  Have perscription refill    She eats 2-3 servings of fruits and vegetables daily.She consumes 2 sweetened beverage(s) daily.She exercises with enough effort to increase her heart rate 9 or less minutes per day.  She exercises with enough effort to increase her heart rate 3 or less days per week.      Has not started antibiotic or prednisone yet.  Has been using albuterol every 4-6 hours.  Duoneb has been doing every 4-6 hours too.  Patient has been doing increased oxygen-usually on 2 liters, now requiring 3 L  Reports feels like when she has previously had COPD exacerbation.                Objective    /68   Pulse (!) 132   Temp 97.8  F  "(36.6  C) (Oral)   Resp 20   Ht 1.58 m (5' 2.21\")   Wt 42.6 kg (93 lb 14.4 oz)   SpO2 (!) 89%   BMI 17.06 kg/m    Body mass index is 17.06 kg/m .  Physical Exam  Constitutional:       Appearance: Normal appearance.   HENT:      Right Ear: Tympanic membrane normal.      Left Ear: Tympanic membrane normal.      Nose: Nose normal.      Right Sinus: No maxillary sinus tenderness or frontal sinus tenderness.      Left Sinus: No maxillary sinus tenderness or frontal sinus tenderness.      Mouth/Throat:      Mouth: Mucous membranes are moist.      Pharynx: No posterior oropharyngeal erythema.      Tonsils: No tonsillar exudate. 1+ on the right. 1+ on the left.   Cardiovascular:      Rate and Rhythm: Regular rhythm. Tachycardia present.      Heart sounds: Normal heart sounds.   Pulmonary:      Effort: No respiratory distress.      Breath sounds: Decreased breath sounds present. No wheezing.   Lymphadenopathy:      Cervical: No cervical adenopathy.   Neurological:      General: No focal deficit present.      Mental Status: She is alert and oriented to person, place, and time.   Psychiatric:         Mood and Affect: Mood normal.                    Signed Electronically by: JIMENEZ VALDEZ CNP    "

## 2024-02-08 ENCOUNTER — HOSPITAL ENCOUNTER (EMERGENCY)
Facility: CLINIC | Age: 69
Discharge: ANOTHER HOSPITAL | End: 2024-02-08
Attending: EMERGENCY MEDICINE | Admitting: EMERGENCY MEDICINE
Payer: COMMERCIAL

## 2024-02-08 ENCOUNTER — HOSPITAL ENCOUNTER (INPATIENT)
Facility: CLINIC | Age: 69
LOS: 7 days | Discharge: HOME | End: 2024-02-15
Attending: INTERNAL MEDICINE | Admitting: STUDENT IN AN ORGANIZED HEALTH CARE EDUCATION/TRAINING PROGRAM
Payer: COMMERCIAL

## 2024-02-08 ENCOUNTER — APPOINTMENT (OUTPATIENT)
Dept: RADIOLOGY | Facility: CLINIC | Age: 69
End: 2024-02-08
Attending: EMERGENCY MEDICINE
Payer: COMMERCIAL

## 2024-02-08 ENCOUNTER — APPOINTMENT (OUTPATIENT)
Dept: CT IMAGING | Facility: CLINIC | Age: 69
End: 2024-02-08
Attending: EMERGENCY MEDICINE
Payer: COMMERCIAL

## 2024-02-08 VITALS
DIASTOLIC BLOOD PRESSURE: 70 MMHG | HEART RATE: 128 BPM | BODY MASS INDEX: 17.56 KG/M2 | SYSTOLIC BLOOD PRESSURE: 119 MMHG | HEIGHT: 61 IN | TEMPERATURE: 98.8 F | OXYGEN SATURATION: 95 % | RESPIRATION RATE: 36 BRPM | WEIGHT: 93 LBS

## 2024-02-08 DIAGNOSIS — I25.84 CORONARY ARTERY DISEASE DUE TO CALCIFIED CORONARY LESION: ICD-10-CM

## 2024-02-08 DIAGNOSIS — J44.1 COPD EXACERBATION (H): Chronic | ICD-10-CM

## 2024-02-08 DIAGNOSIS — J44.1 COPD EXACERBATION (H): ICD-10-CM

## 2024-02-08 DIAGNOSIS — I25.10 CORONARY ARTERY DISEASE DUE TO CALCIFIED CORONARY LESION: ICD-10-CM

## 2024-02-08 DIAGNOSIS — R91.8 LUNG MASS: Primary | ICD-10-CM

## 2024-02-08 DIAGNOSIS — C34.90 MALIGNANT NEOPLASM OF BRONCHUS AND LUNG (H): ICD-10-CM

## 2024-02-08 PROBLEM — I10 ESSENTIAL HYPERTENSION: Status: ACTIVE | Noted: 2021-06-30

## 2024-02-08 LAB
ANION GAP SERPL CALCULATED.3IONS-SCNC: 13 MMOL/L (ref 7–15)
ATRIAL RATE - MUSE: 131 BPM
BASE EXCESS BLDV CALC-SCNC: -0.1 MMOL/L (ref -3–3)
BASOPHILS # BLD AUTO: 0 10E3/UL (ref 0–0.2)
BASOPHILS NFR BLD AUTO: 0 %
BUN SERPL-MCNC: 29 MG/DL (ref 8–23)
CALCIUM SERPL-MCNC: 9.2 MG/DL (ref 8.8–10.2)
CHLORIDE SERPL-SCNC: 104 MMOL/L (ref 98–107)
CREAT SERPL-MCNC: 0.9 MG/DL (ref 0.51–0.95)
DEPRECATED HCO3 PLAS-SCNC: 22 MMOL/L (ref 22–29)
DIASTOLIC BLOOD PRESSURE - MUSE: NORMAL MMHG
EGFRCR SERPLBLD CKD-EPI 2021: 68 ML/MIN/1.73M2
EOSINOPHIL # BLD AUTO: 0.1 10E3/UL (ref 0–0.7)
EOSINOPHIL NFR BLD AUTO: 1 %
ERYTHROCYTE [DISTWIDTH] IN BLOOD BY AUTOMATED COUNT: 13.8 % (ref 10–15)
FLUAV RNA SPEC QL NAA+PROBE: NEGATIVE
FLUBV RNA RESP QL NAA+PROBE: NEGATIVE
GLUCOSE SERPL-MCNC: 148 MG/DL (ref 70–99)
HCO3 BLDV-SCNC: 25 MMOL/L (ref 21–28)
HCT VFR BLD AUTO: 35.5 % (ref 35–47)
HGB BLD-MCNC: 11.1 G/DL (ref 11.7–15.7)
HOLD SPECIMEN: NORMAL
IMM GRANULOCYTES # BLD: 0.2 10E3/UL
IMM GRANULOCYTES NFR BLD: 1 %
INTERPRETATION ECG - MUSE: NORMAL
LACTATE SERPL-SCNC: 0.8 MMOL/L (ref 0.7–2)
LYMPHOCYTES # BLD AUTO: 0.7 10E3/UL (ref 0.8–5.3)
LYMPHOCYTES NFR BLD AUTO: 3 %
MAGNESIUM SERPL-MCNC: 2.1 MG/DL (ref 1.7–2.3)
MCH RBC QN AUTO: 30 PG (ref 26.5–33)
MCHC RBC AUTO-ENTMCNC: 31.3 G/DL (ref 31.5–36.5)
MCV RBC AUTO: 96 FL (ref 78–100)
MONOCYTES # BLD AUTO: 0.9 10E3/UL (ref 0–1.3)
MONOCYTES NFR BLD AUTO: 4 %
NEUTROPHILS # BLD AUTO: 20.9 10E3/UL (ref 1.6–8.3)
NEUTROPHILS NFR BLD AUTO: 91 %
NRBC # BLD AUTO: 0 10E3/UL
NRBC BLD AUTO-RTO: 0 /100
NT-PROBNP SERPL-MCNC: 774 PG/ML (ref 0–900)
O2/TOTAL GAS SETTING VFR VENT: 92 %
OXYHGB MFR BLDV: 76 % (ref 70–75)
P AXIS - MUSE: 82 DEGREES
PCO2 BLDV: 41 MM HG (ref 40–50)
PH BLDV: 7.39 [PH] (ref 7.32–7.43)
PLATELET # BLD AUTO: 296 10E3/UL (ref 150–450)
PO2 BLDV: 41 MM HG (ref 25–47)
POTASSIUM SERPL-SCNC: 4.8 MMOL/L (ref 3.4–5.3)
PR INTERVAL - MUSE: 116 MS
QRS DURATION - MUSE: 64 MS
QT - MUSE: 280 MS
QTC - MUSE: 413 MS
R AXIS - MUSE: 96 DEGREES
RBC # BLD AUTO: 3.7 10E6/UL (ref 3.8–5.2)
RSV RNA SPEC NAA+PROBE: NEGATIVE
SAO2 % BLDV: 76.5 % (ref 70–75)
SARS-COV-2 RNA RESP QL NAA+PROBE: NEGATIVE
SODIUM SERPL-SCNC: 139 MMOL/L (ref 135–145)
SYSTOLIC BLOOD PRESSURE - MUSE: NORMAL MMHG
T AXIS - MUSE: 61 DEGREES
TROPONIN T SERPL HS-MCNC: 28 NG/L
VENTRICULAR RATE- MUSE: 131 BPM
WBC # BLD AUTO: 22.8 10E3/UL (ref 4–11)

## 2024-02-08 PROCEDURE — 85025 COMPLETE CBC W/AUTO DIFF WBC: CPT | Performed by: EMERGENCY MEDICINE

## 2024-02-08 PROCEDURE — 250N000009 HC RX 250: Performed by: EMERGENCY MEDICINE

## 2024-02-08 PROCEDURE — 84484 ASSAY OF TROPONIN QUANT: CPT | Performed by: EMERGENCY MEDICINE

## 2024-02-08 PROCEDURE — 87637 SARSCOV2&INF A&B&RSV AMP PRB: CPT | Performed by: EMERGENCY MEDICINE

## 2024-02-08 PROCEDURE — 250N000011 HC RX IP 250 OP 636: Performed by: STUDENT IN AN ORGANIZED HEALTH CARE EDUCATION/TRAINING PROGRAM

## 2024-02-08 PROCEDURE — 96365 THER/PROPH/DIAG IV INF INIT: CPT | Mod: 59

## 2024-02-08 PROCEDURE — 99207 PR NOT IN PERSON INPATIENT CONSULT STATISTICAL MARKER: CPT | Performed by: NURSE PRACTITIONER

## 2024-02-08 PROCEDURE — 71260 CT THORAX DX C+: CPT

## 2024-02-08 PROCEDURE — 87040 BLOOD CULTURE FOR BACTERIA: CPT | Mod: 91 | Performed by: EMERGENCY MEDICINE

## 2024-02-08 PROCEDURE — 250N000009 HC RX 250: Performed by: STUDENT IN AN ORGANIZED HEALTH CARE EDUCATION/TRAINING PROGRAM

## 2024-02-08 PROCEDURE — 258N000003 HC RX IP 258 OP 636: Performed by: EMERGENCY MEDICINE

## 2024-02-08 PROCEDURE — 93005 ELECTROCARDIOGRAM TRACING: CPT | Performed by: EMERGENCY MEDICINE

## 2024-02-08 PROCEDURE — 94640 AIRWAY INHALATION TREATMENT: CPT

## 2024-02-08 PROCEDURE — 36415 COLL VENOUS BLD VENIPUNCTURE: CPT | Performed by: EMERGENCY MEDICINE

## 2024-02-08 PROCEDURE — 96375 TX/PRO/DX INJ NEW DRUG ADDON: CPT | Mod: 59

## 2024-02-08 PROCEDURE — 99285 EMERGENCY DEPT VISIT HI MDM: CPT | Mod: 25

## 2024-02-08 PROCEDURE — 82805 BLOOD GASES W/O2 SATURATION: CPT | Performed by: EMERGENCY MEDICINE

## 2024-02-08 PROCEDURE — 999N000157 HC STATISTIC RCP TIME EA 10 MIN

## 2024-02-08 PROCEDURE — 120N000001 HC R&B MED SURG/OB

## 2024-02-08 PROCEDURE — 250N000013 HC RX MED GY IP 250 OP 250 PS 637: Performed by: STUDENT IN AN ORGANIZED HEALTH CARE EDUCATION/TRAINING PROGRAM

## 2024-02-08 PROCEDURE — 83880 ASSAY OF NATRIURETIC PEPTIDE: CPT | Performed by: EMERGENCY MEDICINE

## 2024-02-08 PROCEDURE — 83605 ASSAY OF LACTIC ACID: CPT | Performed by: EMERGENCY MEDICINE

## 2024-02-08 PROCEDURE — 80048 BASIC METABOLIC PNL TOTAL CA: CPT | Performed by: EMERGENCY MEDICINE

## 2024-02-08 PROCEDURE — 99223 1ST HOSP IP/OBS HIGH 75: CPT | Performed by: STUDENT IN AN ORGANIZED HEALTH CARE EDUCATION/TRAINING PROGRAM

## 2024-02-08 PROCEDURE — 71045 X-RAY EXAM CHEST 1 VIEW: CPT

## 2024-02-08 PROCEDURE — 83735 ASSAY OF MAGNESIUM: CPT | Performed by: EMERGENCY MEDICINE

## 2024-02-08 PROCEDURE — 250N000011 HC RX IP 250 OP 636: Performed by: EMERGENCY MEDICINE

## 2024-02-08 PROCEDURE — 250N000013 HC RX MED GY IP 250 OP 250 PS 637: Performed by: EMERGENCY MEDICINE

## 2024-02-08 RX ORDER — ACETAMINOPHEN 500 MG
1000 TABLET ORAL EVERY 6 HOURS PRN
Status: DISCONTINUED | OUTPATIENT
Start: 2024-02-08 | End: 2024-02-15 | Stop reason: HOSPADM

## 2024-02-08 RX ORDER — IPRATROPIUM BROMIDE AND ALBUTEROL 20; 100 UG/1; UG/1
1 SPRAY, METERED RESPIRATORY (INHALATION) 2 TIMES DAILY PRN
Status: ON HOLD | COMMUNITY
End: 2024-02-15

## 2024-02-08 RX ORDER — LISINOPRIL 20 MG/1
20 TABLET ORAL DAILY
Status: DISCONTINUED | OUTPATIENT
Start: 2024-02-09 | End: 2024-02-15 | Stop reason: HOSPADM

## 2024-02-08 RX ORDER — CALCIUM CARBONATE 500 MG/1
1000 TABLET, CHEWABLE ORAL 4 TIMES DAILY PRN
Status: DISCONTINUED | OUTPATIENT
Start: 2024-02-08 | End: 2024-02-15 | Stop reason: HOSPADM

## 2024-02-08 RX ORDER — AMOXICILLIN 250 MG
1 CAPSULE ORAL 2 TIMES DAILY PRN
Status: DISCONTINUED | OUTPATIENT
Start: 2024-02-08 | End: 2024-02-15 | Stop reason: HOSPADM

## 2024-02-08 RX ORDER — GUAIFENESIN/DEXTROMETHORPHAN 100-10MG/5
10 SYRUP ORAL EVERY 4 HOURS PRN
Status: DISCONTINUED | OUTPATIENT
Start: 2024-02-08 | End: 2024-02-15 | Stop reason: HOSPADM

## 2024-02-08 RX ORDER — AZITHROMYCIN 500 MG/5ML
500 INJECTION, POWDER, LYOPHILIZED, FOR SOLUTION INTRAVENOUS ONCE
Status: COMPLETED | OUTPATIENT
Start: 2024-02-08 | End: 2024-02-08

## 2024-02-08 RX ORDER — IPRATROPIUM BROMIDE AND ALBUTEROL SULFATE 2.5; .5 MG/3ML; MG/3ML
3 SOLUTION RESPIRATORY (INHALATION)
Status: DISCONTINUED | OUTPATIENT
Start: 2024-02-08 | End: 2024-02-15 | Stop reason: HOSPADM

## 2024-02-08 RX ORDER — ENOXAPARIN SODIUM 100 MG/ML
30 INJECTION SUBCUTANEOUS EVERY 24 HOURS
Status: DISCONTINUED | OUTPATIENT
Start: 2024-02-08 | End: 2024-02-15 | Stop reason: HOSPADM

## 2024-02-08 RX ORDER — METHYLPREDNISOLONE SODIUM SUCCINATE 125 MG/2ML
60 INJECTION, POWDER, LYOPHILIZED, FOR SOLUTION INTRAMUSCULAR; INTRAVENOUS EVERY 12 HOURS
Status: DISCONTINUED | OUTPATIENT
Start: 2024-02-08 | End: 2024-02-10

## 2024-02-08 RX ORDER — LIDOCAINE 40 MG/G
CREAM TOPICAL
Status: DISCONTINUED | OUTPATIENT
Start: 2024-02-08 | End: 2024-02-15 | Stop reason: HOSPADM

## 2024-02-08 RX ORDER — IPRATROPIUM BROMIDE AND ALBUTEROL SULFATE 2.5; .5 MG/3ML; MG/3ML
3 SOLUTION RESPIRATORY (INHALATION) ONCE
Status: COMPLETED | OUTPATIENT
Start: 2024-02-08 | End: 2024-02-08

## 2024-02-08 RX ORDER — ACETAMINOPHEN 325 MG/1
975 TABLET ORAL ONCE
Status: COMPLETED | OUTPATIENT
Start: 2024-02-08 | End: 2024-02-08

## 2024-02-08 RX ORDER — ATORVASTATIN CALCIUM 10 MG/1
20 TABLET, FILM COATED ORAL DAILY
Status: DISCONTINUED | OUTPATIENT
Start: 2024-02-08 | End: 2024-02-15 | Stop reason: HOSPADM

## 2024-02-08 RX ORDER — CEFTRIAXONE 1 G/1
1 INJECTION, POWDER, FOR SOLUTION INTRAMUSCULAR; INTRAVENOUS ONCE
Status: COMPLETED | OUTPATIENT
Start: 2024-02-08 | End: 2024-02-08

## 2024-02-08 RX ORDER — AMOXICILLIN 250 MG
2 CAPSULE ORAL 2 TIMES DAILY PRN
Status: DISCONTINUED | OUTPATIENT
Start: 2024-02-08 | End: 2024-02-15 | Stop reason: HOSPADM

## 2024-02-08 RX ORDER — IOPAMIDOL 755 MG/ML
75 INJECTION, SOLUTION INTRAVASCULAR ONCE
Status: COMPLETED | OUTPATIENT
Start: 2024-02-08 | End: 2024-02-08

## 2024-02-08 RX ORDER — METHYLPREDNISOLONE SODIUM SUCCINATE 125 MG/2ML
125 INJECTION, POWDER, LYOPHILIZED, FOR SOLUTION INTRAMUSCULAR; INTRAVENOUS ONCE
Status: COMPLETED | OUTPATIENT
Start: 2024-02-08 | End: 2024-02-08

## 2024-02-08 RX ADMIN — IPRATROPIUM BROMIDE AND ALBUTEROL SULFATE 3 ML: .5; 3 SOLUTION RESPIRATORY (INHALATION) at 13:40

## 2024-02-08 RX ADMIN — IOPAMIDOL 75 ML: 755 INJECTION, SOLUTION INTRAVENOUS at 11:11

## 2024-02-08 RX ADMIN — IPRATROPIUM BROMIDE AND ALBUTEROL SULFATE 3 ML: .5; 3 SOLUTION RESPIRATORY (INHALATION) at 09:22

## 2024-02-08 RX ADMIN — CEFTRIAXONE 1 G: 1 INJECTION, POWDER, FOR SOLUTION INTRAMUSCULAR; INTRAVENOUS at 14:02

## 2024-02-08 RX ADMIN — ENOXAPARIN SODIUM 30 MG: 30 INJECTION SUBCUTANEOUS at 20:52

## 2024-02-08 RX ADMIN — ATORVASTATIN CALCIUM 20 MG: 10 TABLET, FILM COATED ORAL at 21:49

## 2024-02-08 RX ADMIN — ACETAMINOPHEN 975 MG: 325 TABLET ORAL at 10:17

## 2024-02-08 RX ADMIN — AZITHROMYCIN 500 MG: 500 INJECTION, POWDER, LYOPHILIZED, FOR SOLUTION INTRAVENOUS at 15:15

## 2024-02-08 RX ADMIN — METHYLPREDNISOLONE SODIUM SUCCINATE 62.5 MG: 125 INJECTION, POWDER, FOR SOLUTION INTRAMUSCULAR; INTRAVENOUS at 20:52

## 2024-02-08 RX ADMIN — IPRATROPIUM BROMIDE AND ALBUTEROL SULFATE 3 ML: .5; 3 SOLUTION RESPIRATORY (INHALATION) at 19:06

## 2024-02-08 RX ADMIN — METHYLPREDNISOLONE SODIUM SUCCINATE 125 MG: 125 INJECTION, POWDER, FOR SOLUTION INTRAMUSCULAR; INTRAVENOUS at 09:30

## 2024-02-08 ASSESSMENT — ACTIVITIES OF DAILY LIVING (ADL)
ADLS_ACUITY_SCORE: 23
ADLS_ACUITY_SCORE: 23
ADLS_ACUITY_SCORE: 35
ADLS_ACUITY_SCORE: 23
ADLS_ACUITY_SCORE: 35

## 2024-02-08 ASSESSMENT — ENCOUNTER SYMPTOMS
SHORTNESS OF BREATH: 1
ABDOMINAL PAIN: 0
VOMITING: 1
COUGH: 1

## 2024-02-08 NOTE — ED NOTES
Pt with a known history of COPD who uses home oxygen presents with increased work of breathing since 0300 today.  Used a nebulizer at home x 1 with some improvement but was upping her oxygen at home to 4 L and still felt air hungry.  Here with her daughter.

## 2024-02-08 NOTE — PROGRESS NOTES
"[facility]  Transfer Triage Note    Date of call: 02/08/24  Time of call: 1:43 PM    Current Patient Location:   ED -10  Current Level of Care: ED    Vitals: Temp: 99.2  F (37.3  C) Temp src: Temporal BP: 139/62 Pulse: (!) 135   Resp: (!) 31 SpO2: 94 % Height: 154.9 cm (5' 1\") Weight: 42.2 kg (93 lb)  O2 Device: Nasal cannula at Oxygen Delivery: 3 LPM  FiO2 (%): 28 %  Diagnosis: Acute hypoxic respiratory failure, sepsis, new diagnosis of suspected lung cancer  Reason for requested transfer:  need for higher level of care.    Isolation Needs: None    Care everywhere has been updated and reviewed: Yes  Necessary images have been sent through PACS: Yes    If patient is transferring for specialty care or specific procedure, the specialist required has participated in the transfer call and agreed with need for transfer and anticipated timeline: No -- will need oncology    Transfer accepted: No.    Rationale for declining transfer or suggested follow-up: no bed availability.  Has failed outpatient management and in the setting of decline and now with concerns for primary lung cancer, meets in patient criteria.  Would bring to inpatient --tele.  No beds available at present.  If we have beds, happy to accept.  Can also put on the wait list.      Recommendations for Management and Stabilization: Not needed    Additional Comments: Agree with plan of care as current in the ED.   Happy to accept if we can obtain a tele bed.     JIMENEZ Amaral CNP  "

## 2024-02-08 NOTE — PROGRESS NOTES
Pt seen in the ED. Pt received duoneb x1. BS diminished pre and post. Pt remains on 3 lpm NC 92%. Pt wears 2-3 lpm at home.    Geetha Payan, RT

## 2024-02-08 NOTE — H&P
[facility]    History and Physical - Hospitalist Service       Date of Admission:  2/8/2024    Assessment & Plan      Jasmyn Green is a 68 year old female admitted on 2/8/2024. She presents with worsening shortness of breath.      COPD exacerbation (H)    Lung mass    COPD - GOLD E    Chronic hypoxic respiratory failure    Emphysema    Assessment: follows with pulmonology at [facility]. Has severe obstructive lung disease (FEV1 29%), with hyperinflation and air-trapping.  Severe diffusion capacity defect (DLCO 30%). Radiographic emphysema.  Presenting with 1 day history of worsening dyspnea both with exertion and at rest and requiring higher supplemental oxygen at home.  CT on admission showed bulky AP and left hilar adenopathy with irregular pleural-based anterior left upper lobe nodule with adjacent smaller satellite nodules. Findings suspicious for a primary lung cancer. Nodular thickening of the left adrenal gland is likely unchanged. Extensive emphysema. Of note she had a CT chest as part of lung cancer screening last summer that showed no evidence of malignancy.    Plan:   -Admit to inpatient  -Pulmonology consulted  -Pedro 4 times daily  -IV Solu-Medrol  -Supplemental O2 as needed with oximetry  -Follow vitals/Temp  -IR consult for biopsy      Essential hypertension    HLD    Assessment/Plan: Continue prior to admission aspirin/lisinopril and Lipitor            Diet: Combination Diet Regular Diet Adult    DVT Prophylaxis: Pneumatic Compression Devices  Vick Catheter: Not present  Lines: None     Cardiac Monitoring: None  Code Status: Full Code      Clinically Significant Risk Factors Present on Admission                # Drug Induced Platelet Defect: home medication list includes an antiplatelet medication   # Hypertension: Noted on problem list      # Cachexia: Estimated body mass index is 17.57 kg/m  as calculated from the following:    Height as of an earlier encounter on  "2/8/24: 1.549 m (5' 1\").    Weight as of an earlier encounter on 2/8/24: 42.2 kg (93 lb).       # COPD: noted on problem list        Disposition Plan      Expected Discharge Date: 02/10/2024                  Francisco Gomes MD  Hospitalist Service  [facility]  Securely message with [employer] (more info)  Text page via [employer] Paging/Directory     ______________________________________________________________________    Chief Complaint     SOB    History is obtained from the patient    History of Present Illness     Jasmyn Green is a 68 year old female with past medical history of COPD, coronary artery disease, hypertension who presents for evaluation of shortness of breath.    Patient is chronically on 2 to 3 L of supplemental oxygen.  She notes that over the past day, she has had increasing shortness of breath/labored breathing along with palpitations.  She also endorsed onset of new right-sided chest pain that started around 2 AM on the morning of admission.  Pain is described as achy in sensation.  She needed up to 4 L supplemental oxygen to improve her O2 sats.  She notes that she has a chronic cough that is unchanged.  She has not had any recent fevers or chills.  She just finished a course of steroids and antibiotics last week to treat a mild COPD exacerbation.  She has no nausea/vomiting or abdominal pain.  She has no worsening lower extremity edema/PND or orthopnea.  She has no prior history of congestive heart failure.  She has no recent episodes of bloody stools, weight loss or night sweats.  She has no urinary complaints of urgency or frequency, no dysuria or hematuria.  At this time she has no other complaints.      Past Medical History    Past Medical History:   Diagnosis Date    Arthritis     COPD (chronic obstructive pulmonary disease) (H)     Coronary artery disease     Dyspnea on exertion     Gout     Hypertension        Past Surgical History   Past Surgical History: "   Procedure Laterality Date    COLONOSCOPY N/A 10/21/2021    Procedure: COLONOSCOPY;  Surgeon: Wilma Hester DO;  Location: [facility]    VAGINAL DELIVERY      x 3 ; remote    WISDOM TOOTH EXTRACTION         Prior to Admission Medications   Prior to Admission Medications   Prescriptions Last Dose Informant Patient Reported? Taking?   albuterol (PROAIR HFA/PROVENTIL HFA/VENTOLIN HFA) 108 (90 Base) MCG/ACT inhaler   No No   Sig: Inhale 2 puffs into the lungs every 6 hours   aspirin 81 mg chewable tablet   No No   Sig: [ASPIRIN 81 MG CHEWABLE TABLET] Chew 1 tablet (81 mg total) daily.   atorvastatin (LIPITOR) 20 MG tablet   No No   Sig: Take 1 tablet (20 mg) by mouth daily   calcium carbonate 600 mg-vitamin D 400 units (CALTRATE) 600-400 MG-UNIT per tablet   Yes No   Sig: Take 1 tablet by mouth 2 times daily With calcium   doxycycline hyclate (VIBRAMYCIN) 100 MG capsule   No No   Sig: Take 1 capsule (100 mg) by mouth 2 times daily Start if having a COPD exacerbation   fluticasone-salmeterol (AIRDUO RESPICLICK) 232-14 MCG/ACT inhaler   No No   Sig: Inhale 1 puff into the lungs 2 times daily   guaiFENesin (MUCINEX) 600 MG 12 hr tablet   No No   Sig: Take 2 tablets (1,200 mg) by mouth 2 times daily   ipratropium - albuterol 0.5 mg/2.5 mg/3 mL (DUONEB) 0.5-2.5 (3) MG/3ML neb solution   No No   Sig: Take 1 vial (3 mLs) by nebulization every 6 hours as needed for shortness of breath, wheezing or cough   ipratropium-albuterol (COMBIVENT RESPIMAT)  MCG/ACT inhaler   No No   Sig: Inhale 1 puff into the lungs 2 times daily   lisinopril (ZESTRIL) 20 MG tablet   No No   Sig: TAKE 1 TABLET(20 MG) BY MOUTH DAILY   predniSONE (DELTASONE) 20 MG tablet   No No   Sig: Take 2 tablets (40 mg) by mouth daily Start if having a COPD exacerbation      Facility-Administered Medications: None        Review of Systems    The 10 point Review of Systems is negative other than noted in the HPI or here.     Social History   I have  reviewed this patient's social history and updated it with pertinent information if needed.  Social History     Tobacco Use    Smoking status: Former     Types: Cigarettes     Quit date: 2021     Years since quittin.6    Smokeless tobacco: Never    Tobacco comments:     updated 8/3/2021 by TTS   Vaping Use    Vaping Use: Never used   Substance Use Topics    Alcohol use: Not Currently    Drug use: Never         Family History   I have reviewed this patient's family history and updated it with pertinent information if needed.  Family History   Problem Relation Age of Onset    Chronic Obstructive Pulmonary Disease Sister     Diabetes Mother     Heart Disease Mother     Lung Cancer Mother     Heart Disease Father          Allergies   No Known Allergies  ------------------------------------------------------------------------     Physical Exam   Vital Signs: Temp: 97.6  F (36.4  C) Temp src: Oral BP: 130/84 Pulse: (!) 133   Resp: 28 SpO2: 96 % O2 Device: Nasal cannula Oxygen Delivery: 5 LPM  Weight: 0 lbs 0 oz    Constitutional: awake, alert, cooperative, no apparent distress.   Eyes: Lids and lashes normal, pupils equal, round and reactive to light   ENT: Normocephalic, without obvious abnormality, atraumatic, sinuses nontender on palpation   Hematologic / Lymphatic: no cervical lymphadenopathy   Respiratory: CTA bilaterally, decreased air movement throughout.  No wheezes or rhonchi.  Respirations even and unlabored.  On 5L pulse dose oxygen via NC.     Cardiovascular: Tachycardic regular rhythm, S1 and S2 with no m/r/g   GI: Normal bowel sounds, soft, non-distended, non-tender. Skin: normal skin color, texture, turgor   Musculoskeletal: There is no redness, warmth, or swelling of the joints. Full range of motion noted.   Neurologic: Awake, alert, oriented to name, place and time. Cranial nerves II-XII are grossly intact. Motor is 5 out of 5 bilaterally. Sensory is intact.   Neuropsychiatric: normal mood and  affect    ----------------------------------------------------------------------------------------------------------------------------------    Medical Decision Making       ------------------ MEDICAL DECISION MAKING ------------------------------------------------------------------------------------------------------  High Complexity Decision Making       Data   ------------------------- PAST 24 HR DATA REVIEWED -----------------------------------------------    I have personally reviewed the following data over the past 24 hrs:    22.8 (H)  \   11.1 (L)   / 296     139 104 29.0 (H) /  148 (H)   4.8 22 0.90 \     Trop: 28 (H) BNP: 774     Procal: N/A CRP: N/A Lactic Acid: 0.8         Imaging results reviewed over the past 24 hrs:   Recent Results (from the past 24 hour(s))   XR Chest Port 1 View    Narrative    EXAM: XR CHEST PORT 1 VIEW  LOCATION: [facility]  DATE: 2/8/2024    INDICATION: sob  COMPARISON: 06/05/2023      Impression    IMPRESSION: Left midlung 1.8 cm nodular opacity. Increased prominence left hilum could represent adenopathy or mass. CT CHEST WITH CONTRAST RECOMMENDED.    Heart size appears normal. No pulmonary vascular congestion. However, there is increased interstitial prominence which could represent interstitial edema. No definite pleural effusion.   CT Chest w Contrast    Narrative    EXAM: CT CHEST W CONTRAST  LOCATION: [facility]  DATE: 2/8/2024    INDICATION: Left hilar fullness and left midlung nodule on recent chest x-ray.  COMPARISON: Chest x-ray earlier today, low-dose chest CT 06/15/2023  TECHNIQUE: CT chest with IV contrast. Multiplanar reformats were obtained. Dose reduction techniques were used.    CONTRAST: 75 ML ISOVUE 370    FINDINGS:   LUNGS AND PLEURA: There is an irregular low dense pleural-based opacity anteriorly in the mid to left upper lobe with a few adjacent proximal satellite nodules. This measures 2.2 x 1.5 cm (axial  image 104). Minimal, irregular subpleural plaque-like   opacity further inferiorly in the left lower lobe (axial image 99). No effusion. Extensive centrilobular emphysema throughout. Resolution of the previously noted left upper lobe pneumonia.    MEDIASTINUM/AXILLAE: Bulky, heterogeneously enhancing adenopathy in the AP window and left hilum. [place] mass in the left AP window measures 6 x 3 cm (axial image 60). This encases and narrows the left upper lobe pulmonary vein which remains patent. No   supraclavicular or axillary adenopathy. No central pulmonary emboli. Aorta and branches are normal. Small hiatal hernia.    CORONARY ARTERY CALCIFICATION: Severe.    UPPER ABDOMEN: Slight nodular thickening of the left adrenal gland measuring 8 to 9 mm, likely unchanged. No liver lesions.    MUSCULOSKELETAL: No suspicious lesions.      Impression    IMPRESSION:   1.  Bulky AP and left hilar adenopathy with irregular pleural-based anterior left upper lobe nodule with adjacent smaller satellite nodules. Findings suspicious for a primary lung cancer.  2.  Nodular thickening of the left adrenal gland is likely unchanged.  3.  Patient needs PET/CT for further evaluation.  4.  Extensive emphysema.  5.  Findings discussed by the undersigned with Dr. Ramos at 1141.     ------------------------- ENCOUNTER LABS ----------------------------------------------------------------  Recent Labs   Lab 02/08/24  0910   WBC 22.8*   HGB 11.1*   MCV 96         POTASSIUM 4.8   CHLORIDE 104   CO2 22   BUN 29.0*   CR 0.90   ANIONGAP 13   FLOR 9.2   *       Most Recent 3 CBC's:  Recent Labs   Lab Test 02/08/24  0910 02/08/23  0956 02/07/22  1002 10/18/21  0858   WBC 22.8* 9.5  --  8.2   HGB 11.1* 12.5 11.0* 11.6*   MCV 96 94  --  90    328  --  341     Most Recent 3 BMP's:  Recent Labs   Lab Test 02/08/24  0910 10/26/23  0934 08/07/23  0815 02/08/23  0956 11/10/22  0823 10/18/21  0858     --   --  141  --  139    POTASSIUM 4.8  --   --  5.0  --  4.4   CHLORIDE 104  --   --  109*  --  105   CO2 22  --   --  24  --  23   BUN 29.0*  --   --  31.5*  --  19   CR 0.90 0.87 0.99* 1.11*   < > 0.68   ANIONGAP 13  --   --  8  --  11   FLOR 9.2 9.2 9.5 9.3   < > 9.3   *  --   --  99  --  89    < > = values in this interval not displayed.     Most Recent 2 LFT's:  Recent Labs   Lab Test 09/21/21  0952 08/09/21  0742   AST 22 26   ALT 26 29   ALKPHOS 124* 116   BILITOTAL 0.3 0.3     Most Recent 3 INR's:  Recent Labs   Lab Test 06/27/21  1301   INR 1.32*     Most Recent 3 Troponin's:No lab results found.  Most Recent 3 BNP's:  Recent Labs   Lab Test 02/08/24  0910   NTBNPI 774     Most Recent D-dimer:No lab results found.  Most Recent Cholesterol Panel:  Recent Labs   Lab Test 08/07/23  0815   CHOL 163   LDL 99   HDL 44*   TRIG 99     Most Recent 6 Bacteria Isolates From Any Culture (See EPIC Reports for Culture Details):No lab results found.  Most Recent TSH and T4:  Recent Labs   Lab Test 09/21/21  0952   TSH 1.06     Most Recent Urinalysis:No lab results found.  Most Recent ESR & CRP:No lab results found.

## 2024-02-08 NOTE — ED NOTES
Pt taken to the bathroom via W/C on 3 liters of 02.  Upon return to the room Sa02 at 82% and slowly increased to low 90's at rest.  Provider aware.

## 2024-02-08 NOTE — ED PROVIDER NOTES
EMERGENCY DEPARTMENT ENCOUNTER      NAME: Jasmyn Green  AGE: 68 year old female  YOB: 1955  MRN: [id number]  EVALUATION DATE & TIME: 2024  8:51 AM    PCP: Miesha Fernando    ED PROVIDER: Momo Ramos MD    Chief Complaint   Patient presents with    Shortness of Breath    Chest Pain     FINAL IMPRESSION:  1. Malignant neoplasm of bronchus and lung (H)    2. COPD exacerbation (H)      ED COURSE & MEDICAL DECISION MAKIN:55 AM Met with the patient and performed my initial exam.  10:36 AM Rechecked and updated patient.  12:08 PM Rechecked and updated patient on plan for admission. Patient and patient's daughter agreed to transfer to other facility for admission.  2:16 PM Spoke to Dr. Christian, [facility] Hospitalist, regarding plan for admission. Patient will be admitted.  2:19 PM Rechecked and updated patient on plan for admission to [facility].    Pertinent Labs & Imaging studies reviewed. (See chart for details)  Very pleasant 68-year-old female presenting the emergency room with her daughter for increased shortness of breath.  Patient has COPD.  End-stage.  Chronically on 2 L as needed.  She reports over the last several months intermittent significant episodes with COPD exacerbations requiring oral prednisone multiple times a month to help manage her symptomatology.  Presents Emergency Department today with increasing shortness of breath and wheezing not responding to her nebulization treatments at home.  Was last on antibiotics and steroids 1 week ago.  Has had progressive decline per her report since completing those medications.  In the emergency department patient was noted to be tachypneic.  Diffuse expiratory wheezing.  She was tachycardic.  Sinus tachycardia on ECG.  Afebrile at 9.2.  Respiratory 40.  Oxygen saturation in the mid 80s on 2 L nasal cannula.  She was moved up to 4 L nasal cannula and currently her O2 saturation is 91%.  Patient was noted to have a  leukocytosis on her blood work.  High-sensitivity troponin T at 28.  pH normal on blood gas.  Viral studies negative.  Lactic acid negative.  BNP negative.  Chest x-ray was concerning for new findings of potential malignancy with recommendations for CT scan of the chest.  CT scan of the chest was performed and unfortunately demonstrated probable primary lung cancer.  Take into account patient's clinical history it is likely that she has had escalated issues with her COPD secondary to this developing malignancy.  Persistent shortness of breath despite steroids and neb treatments here in the emergency department her tachycardia did improve with her respiratory interventions but still tachycardic to the 110 115 region.  In light of her persistent symptoms I do think the patient will require admission to the hospital.  We are currently working to ascertain if there is any beds available in the system appropriate for this patient for transfer given boarding capacity issues in the emergency department I did discuss this and get approval with the patient and daughter to pursue transfer.  If no beds and available in the [facility] system patient will be admitted to boarding status here at [facility].    2:52 PM  We are able to secure an appropriate bed at [facility] I discussed this with the patient and her daughters and they are in support of the transfer and admission to [facility].  Currently awaiting EMS transport.  EMTALA completed.  Discussed with hospitalist.  Patient is persistently tachycardic that is escalated with any ambulation but overall is improved at this time.       Medical Decision Making  Obtained supplemental history:Supplemental history obtained?: Documented in chart  Reviewed external records: External records reviewed?: Documented in chart  Care impacted by chronic illness:Chronic Lung Disease, Heart Disease, and Hyperlipidemia  Care significantly affected by social determinants of health:N/A  Did  you consider but not order tests?: Work up considered but not performed and documented in chart, if applicable  Did you interpret images independently?: Independent interpretation of ECG and images noted in documentation, when applicable.  Consultation discussion with other provider:Did you involve another provider (consultant, , pharmacy, etc.)?: I discussed the care with another health care provider, see documentation for details.  Admit.    At the conclusion of the encounter I discussed the results of all of the tests and the disposition. The questions were answered. The patient or family acknowledged understanding and was agreeable with the care plan.     MEDICATIONS GIVEN IN THE EMERGENCY:  Medications   cefTRIAXone (ROCEPHIN) 1 g vial to attach to  mL bag for ADULTS or NS 50 mL bag for PEDS (1 g Intravenous $New Bag 2/8/24 1402)   azithromycin 500 mg (ZITHROMAX) in 0.9% NaCl 250 mL intermittent infusion 500 mg (has no administration in time range)   methylPREDNISolone sodium succinate (solu-MEDROL) injection 125 mg (125 mg Intravenous $Given 2/8/24 0930)   ipratropium - albuterol 0.5 mg/2.5 mg/3 mL (DUONEB) neb solution 3 mL (3 mLs Nebulization $Given 2/8/24 0922)   acetaminophen (TYLENOL) tablet 975 mg (975 mg Oral $Given 2/8/24 1017)   iopamidol (ISOVUE-370) solution 75 mL (75 mLs Intravenous $Given 2/8/24 1111)   ipratropium - albuterol 0.5 mg/2.5 mg/3 mL (DUONEB) neb solution 3 mL (3 mLs Nebulization $Given 2/8/24 1340)       NEW PRESCRIPTIONS STARTED AT TODAY'S ER VISIT  New Prescriptions    No medications on file          =================================================================    HPI    Patient information was obtained from: Patient and patient's daughter    Use of : N/A        Jasmyn Green is a 68 year old female with a pertinent history of COPD, CAD, coronary artery calcification, and HTN, who presents to this ED via walk-in for evaluation of shortness of breath and  "chest pain.    Patient reports labored breathing, palpitations, increased shortness of breath, and sudden onset of right-sided chest pain that started 0200. Describes chest pain as \"achy\" feeling, but not sharp.     Notes she couldn't catch her breath, so she used her nebulizer and increased oxygen from 2 L to 3 L, and then to 4 L with slight relief. At baseline, she is on 2-3 L of oxygen only with exertion and during the night. She went to sleep feeling fine with no fevers the past few days. She has a productive cough with a lot of phlegm, noting it is not yellow or green. Reports she just finished a course of steroids and antibiotics last week to help treat COPD exacerbation. Denies abdominal pain or bilateral leg swelling. States denies a history of cardiac problems.    Per patient's daughter, patient has frequent COPD exacerbation over the past 6 months and vomits a couple times a week the past few weeks. Her Pulmonology provider is Dr. Rodriguez. No other reported complaints or concerns at this time.        REVIEW OF SYSTEMS   Review of Systems   Respiratory:  Positive for cough (productive, with a lot of phlegm) and shortness of breath (increased).    Cardiovascular:  Positive for chest pain (Right-sided chest, achy feeling). Negative for leg swelling (bilateral).   Gastrointestinal:  Positive for vomiting (intermittent). Negative for abdominal pain.   All other systems reviewed and are negative.      PAST MEDICAL HISTORY:  Past Medical History:   Diagnosis Date    Arthritis     COPD (chronic obstructive pulmonary disease) (H)     Coronary artery disease     Dyspnea on exertion     Gout     Hypertension        PAST SURGICAL HISTORY:  Past Surgical History:   Procedure Laterality Date    COLONOSCOPY N/A 10/21/2021    Procedure: COLONOSCOPY;  Surgeon: Wilma Hester DO;  Location: [facility]    VAGINAL DELIVERY      x 3 ; remote    WISDOM TOOTH EXTRACTION             CURRENT MEDICATIONS:    albuterol " (PROAIR HFA/PROVENTIL HFA/VENTOLIN HFA) 108 (90 Base) MCG/ACT inhaler  aspirin 81 mg chewable tablet  atorvastatin (LIPITOR) 20 MG tablet  calcium carbonate 600 mg-vitamin D 400 units (CALTRATE) 600-400 MG-UNIT per tablet  doxycycline hyclate (VIBRAMYCIN) 100 MG capsule  fluticasone-salmeterol (AIRDUO RESPICLICK) 232-14 MCG/ACT inhaler  guaiFENesin (MUCINEX) 600 MG 12 hr tablet  ipratropium - albuterol 0.5 mg/2.5 mg/3 mL (DUONEB) 0.5-2.5 (3) MG/3ML neb solution  ipratropium-albuterol (COMBIVENT RESPIMAT)  MCG/ACT inhaler  lisinopril (ZESTRIL) 20 MG tablet  predniSONE (DELTASONE) 20 MG tablet        ALLERGIES:  No Known Allergies    FAMILY HISTORY:  Family History   Problem Relation Age of Onset    Chronic Obstructive Pulmonary Disease Sister     Diabetes Mother     Heart Disease Mother     Lung Cancer Mother     Heart Disease Father        SOCIAL HISTORY:   Social History     Socioeconomic History    Marital status:    Tobacco Use    Smoking status: Former     Types: Cigarettes     Quit date: 2021     Years since quittin.6    Smokeless tobacco: Never    Tobacco comments:     updated 8/3/2021 by TTS   Vaping Use    Vaping Use: Never used   Substance and Sexual Activity    Alcohol use: Not Currently    Drug use: Never    Sexual activity: Not Currently   Social History Narrative     many years 3 grown children. 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 Last cigarette a week ago.non drinker  Her primary MD (Wilda) retired several years ago/ tends to avoid doctors/medical care in general       Social Determinants of Health     Financial Resource Strain: Low Risk  (2024)    Financial Resource Strain     Within the past 12 months, have you or your family members you live with been unable to get utilities (heat, electricity) when it was really needed?: No   Food Insecurity: Low Risk  (2024)    Food Insecurity     Within the past 12 months, did you worry that your food would run out  "before you got money to buy more?: No     Within the past 12 months, did the food you bought just not last and you didn t have money to get more?: No   Transportation Needs: Low Risk  (2/1/2024)    Transportation Needs     Within the past 12 months, has lack of transportation kept you from medical appointments, getting your medicines, non-medical meetings or appointments, work, or from getting things that you need?: No   Physical Activity: Sufficiently Active (11/10/2021)    Exercise Vital Sign     Days of Exercise per Week: 5 days     Minutes of Exercise per Session: 30 min   Stress: No Stress Concern Present (11/10/2021)    [employer] - Occupational Stress Questionnaire     Feeling of Stress : Not at all   Social Connections: Socially Isolated (11/10/2021)    Social Connection and Isolation Panel [NHANES]     Frequency of Communication with Friends and Family: More than three times a week     Frequency of Social Gatherings with Friends and Family: Once a week     Attends Jain Services: Never     Active Member of Clubs or Organizations: No     Marital Status:    Interpersonal Safety: Low Risk  (2/1/2024)    Interpersonal Safety     Do you feel physically and emotionally safe where you currently live?: Yes     Within the past 12 months, have you been hit, slapped, kicked or otherwise physically hurt by someone?: No     Within the past 12 months, have you been humiliated or emotionally abused in other ways by your partner or ex-partner?: No   Housing Stability: Low Risk  (2/1/2024)    Housing Stability     Do you have housing? : Yes     Are you worried about losing your housing?: No       VITALS:  BP (!) 144/84   Pulse (!) 138   Temp 99.2  F (37.3  C) (Temporal)   Resp 22   Ht 1.549 m (5' 1\")   Wt 42.2 kg (93 lb)   SpO2 98%   BMI 17.57 kg/m      PHYSICAL EXAM    PHYSICAL EXAM    Constitutional: Elderly female, appears to be in respiratory distress.  HENT: " Normocephalic, Atraumatic, Bilateral external ears normal, Oropharynx normal, mucous membranes moist, Nose normal. Neck-  Normal range of motion, No tenderness, Supple, No stridor.   Eyes: PERRL, EOMI, Conjunctiva normal, No discharge.   Respiratory: Patient is tachypneic, decreased breath sounds throughout, scattered expiratory wheezes, prolonged expiration.  Cardiovascular: Tachycardic, distal perfusion normal  GI:  Soft, No tenderness, No masses, No flank tenderness. No rebound or guarding.  : No cva tenderness    Musculoskeletal: 2+ DP pulses. No edema. No cyanosis. Good range of motion in all major joints. No tenderness to palpation. No tenderness of the CTLS spine.   Integument: Warm, Dry, No erythema, No rash. No petechiae.   Neurologic: Alert & oriented x 3, Normal motor function, Normal sensory function, No focal deficits noted.   Psychiatric: Affect normal, Judgment normal, Mood normal. Cooperative.        LAB:  All pertinent labs reviewed and interpreted.  Results for orders placed or performed during the hospital encounter of 02/08/24   XR Chest Port 1 View    Impression    IMPRESSION: Left midlung 1.8 cm nodular opacity. Increased prominence left hilum could represent adenopathy or mass. CT CHEST WITH CONTRAST RECOMMENDED.    Heart size appears normal. No pulmonary vascular congestion. However, there is increased interstitial prominence which could represent interstitial edema. No definite pleural effusion.   CT Chest w Contrast    Impression    IMPRESSION:   1.  Bulky AP and left hilar adenopathy with irregular pleural-based anterior left upper lobe nodule with adjacent smaller satellite nodules. Findings suspicious for a primary lung cancer.  2.  Nodular thickening of the left adrenal gland is likely unchanged.  3.  Patient needs PET/CT for further evaluation.  4.  Extensive emphysema.  5.  Findings discussed by the undersigned with Dr. Ramos at 1141.   Basic metabolic panel   Result Value Ref  Range    Sodium 139 135 - 145 mmol/L    Potassium 4.8 3.4 - 5.3 mmol/L    Chloride 104 98 - 107 mmol/L    Carbon Dioxide (CO2) 22 22 - 29 mmol/L    Anion Gap 13 7 - 15 mmol/L    Urea Nitrogen 29.0 (H) 8.0 - 23.0 mg/dL    Creatinine 0.90 0.51 - 0.95 mg/dL    GFR Estimate 68 >60 mL/min/1.73m2    Calcium 9.2 8.8 - 10.2 mg/dL    Glucose 148 (H) 70 - 99 mg/dL   Result Value Ref Range    Troponin T, High Sensitivity 28 (H) <=14 ng/L   Result Value Ref Range    Magnesium 2.1 1.7 - 2.3 mg/dL   Nt probnp inpatient (BNP)   Result Value Ref Range    N terminal Pro BNP Inpatient 774 0 - 900 pg/mL   Symptomatic Influenza A/B, RSV, & SARS-CoV2 PCR (COVID-19) Nasopharyngeal    Specimen: Nasopharyngeal; Swab   Result Value Ref Range    Influenza A PCR Negative Negative    Influenza B PCR Negative Negative    RSV PCR Negative Negative    SARS CoV2 PCR Negative Negative   CBC with platelets and differential   Result Value Ref Range    WBC Count 22.8 (H) 4.0 - 11.0 10e3/uL    RBC Count 3.70 (L) 3.80 - 5.20 10e6/uL    Hemoglobin 11.1 (L) 11.7 - 15.7 g/dL    Hematocrit 35.5 35.0 - 47.0 %    MCV 96 78 - 100 fL    MCH 30.0 26.5 - 33.0 pg    MCHC 31.3 (L) 31.5 - 36.5 g/dL    RDW 13.8 10.0 - 15.0 %    Platelet Count 296 150 - 450 10e3/uL    % Neutrophils 91 %    % Lymphocytes 3 %    % Monocytes 4 %    % Eosinophils 1 %    % Basophils 0 %    % Immature Granulocytes 1 %    NRBCs per 100 WBC 0 <1 /100    Absolute Neutrophils 20.9 (H) 1.6 - 8.3 10e3/uL    Absolute Lymphocytes 0.7 (L) 0.8 - 5.3 10e3/uL    Absolute Monocytes 0.9 0.0 - 1.3 10e3/uL    Absolute Eosinophils 0.1 0.0 - 0.7 10e3/uL    Absolute Basophils 0.0 0.0 - 0.2 10e3/uL    Absolute Immature Granulocytes 0.2 <=0.4 10e3/uL    Absolute NRBCs 0.0 10e3/uL   Extra Blood Culture Bottle   Result Value Ref Range    Hold Specimen [facility]    Extra Blue Top Tube   Result Value Ref Range    Hold Specimen [facility]    Extra Red Top Tube   Result Value Ref Range    Hold Specimen [facility]    Extra Green Top  (Lithium Heparin) ON ICE   Result Value Ref Range    Hold Specimen [facility]    Extra Heparinized Syringe   Result Value Ref Range    Hold Specimen     Blood gas venous   Result Value Ref Range    pH Venous 7.39 7.32 - 7.43    pCO2 Venous 41 40 - 50 mm Hg    pO2 Venous 41 25 - 47 mm Hg    Bicarbonate Venous 25 21 - 28 mmol/L    Base Excess/Deficit Venous -0.1 -3.0 - 3.0 mmol/L    FIO2 92     Oxyhemoglobin Venous 76 (H) 70 - 75 %    O2 Sat, Venous 76.5 (H) 70.0 - 75.0 %   Lactic acid whole blood   Result Value Ref Range    Lactic Acid 0.8 0.7 - 2.0 mmol/L   Extra Blood Culture Bottle   Result Value Ref Range    Hold Specimen [facility]        RADIOLOGY:  Reviewed all pertinent imaging. Please see official radiology report.  CT Chest w Contrast   Final Result   IMPRESSION:    1.  Bulky AP and left hilar adenopathy with irregular pleural-based anterior left upper lobe nodule with adjacent smaller satellite nodules. Findings suspicious for a primary lung cancer.   2.  Nodular thickening of the left adrenal gland is likely unchanged.   3.  Patient needs PET/CT for further evaluation.   4.  Extensive emphysema.   5.  Findings discussed by the undersigned with Dr. Ramos at 1141.      XR Chest Port 1 View   Final Result   IMPRESSION: Left midlung 1.8 cm nodular opacity. Increased prominence left hilum could represent adenopathy or mass. CT CHEST WITH CONTRAST RECOMMENDED.      Heart size appears normal. No pulmonary vascular congestion. However, there is increased interstitial prominence which could represent interstitial edema. No definite pleural effusion.          EKG:    Performed at: 2/8/2024 08:51:57    Impression: Sinus tachycardia. Rightward axis. Borderline ECG.    Rate: 131 BPM  Rhythm: Sinus tachycardia  Axis: 96  WA Interval: 116 ms  QRS Interval: 64 ms  QTc Interval: 413 ms  ST Changes: No ST changes.  Comparison: When compared with ECG of 6/27/2021 18:21, no significant change was found.    I have independently reviewed  and interpreted the EKG(s) documented above.    I, Annie Luo, am serving as a scribe to document services personally performed by Momo Ramos MD, based on my observation and the provider's statements to me. I, Momo Ramos MD, attest that Annie Luo is acting in a scribe capacity, has observed my performance of the services and has documented them in accordance with my direction.    Momo Ramos MD  [facility] EMERGENCY ROOM  [address] [id number] [phone]      Momo Ramos MD  02/08/24 8652

## 2024-02-08 NOTE — ED TRIAGE NOTES
The patient presents to the ED with daughter with concerns for worsening shortness of breath since 0200. She is also reporting mid to right-sided chest pain that radiates to her right shoulder blade. Hx of COPD and wears O2 @ 2-3L/NC but she had to increase to 4L.     Triage Assessment (Adult)       Row Name 02/08/24 0849          Triage Assessment    Airway WDL WDL        Respiratory WDL    Respiratory WDL X;rhythm/pattern     Rhythm/Pattern, Respiratory pursed lip breathing;tachypneic;shortness of breath        Skin Circulation/Temperature WDL    Skin Circulation/Temperature WDL WDL        Cardiac WDL    Cardiac WDL X        Peripheral/Neurovascular WDL    Peripheral Neurovascular WDL WDL        Cognitive/Neuro/Behavioral WDL    Cognitive/Neuro/Behavioral WDL WDL

## 2024-02-09 DIAGNOSIS — R59.1 LYMPHADENOPATHY: Primary | ICD-10-CM

## 2024-02-09 LAB
ALBUMIN SERPL BCG-MCNC: 3.1 G/DL (ref 3.5–5.2)
ALBUMIN UR-MCNC: 10 MG/DL
ALP SERPL-CCNC: 115 U/L (ref 40–150)
ALT SERPL W P-5'-P-CCNC: 46 U/L (ref 0–50)
ANION GAP SERPL CALCULATED.3IONS-SCNC: 9 MMOL/L (ref 7–15)
APPEARANCE UR: CLEAR
AST SERPL W P-5'-P-CCNC: 17 U/L (ref 0–45)
BILIRUB DIRECT SERPL-MCNC: <0.2 MG/DL (ref 0–0.3)
BILIRUB SERPL-MCNC: <0.2 MG/DL
BILIRUB UR QL STRIP: NEGATIVE
BUN SERPL-MCNC: 25 MG/DL (ref 8–23)
CALCIUM SERPL-MCNC: 8.2 MG/DL (ref 8.8–10.2)
CHLORIDE SERPL-SCNC: 105 MMOL/L (ref 98–107)
COLOR UR AUTO: ABNORMAL
CREAT SERPL-MCNC: 0.69 MG/DL (ref 0.51–0.95)
DEPRECATED HCO3 PLAS-SCNC: 25 MMOL/L (ref 22–29)
EGFRCR SERPLBLD CKD-EPI 2021: >90 ML/MIN/1.73M2
ERYTHROCYTE [DISTWIDTH] IN BLOOD BY AUTOMATED COUNT: 13.7 % (ref 10–15)
GLUCOSE SERPL-MCNC: 180 MG/DL (ref 70–99)
GLUCOSE UR STRIP-MCNC: >=1000 MG/DL
HCT VFR BLD AUTO: 30.2 % (ref 35–47)
HGB BLD-MCNC: 9.4 G/DL (ref 11.7–15.7)
HGB UR QL STRIP: NEGATIVE
KETONES UR STRIP-MCNC: NEGATIVE MG/DL
L PNEUMO1 AG UR QL IA: NEGATIVE
LACTATE SERPL-SCNC: 2.8 MMOL/L (ref 0.7–2)
LACTATE SERPL-SCNC: 3.7 MMOL/L (ref 0.7–2)
LEUKOCYTE ESTERASE UR QL STRIP: NEGATIVE
MCH RBC QN AUTO: 30.2 PG (ref 26.5–33)
MCHC RBC AUTO-ENTMCNC: 31.1 G/DL (ref 31.5–36.5)
MCV RBC AUTO: 97 FL (ref 78–100)
MUCOUS THREADS #/AREA URNS LPF: PRESENT /LPF
NITRATE UR QL: NEGATIVE
PH UR STRIP: 5.5 [PH] (ref 5–7)
PLATELET # BLD AUTO: 245 10E3/UL (ref 150–450)
POTASSIUM SERPL-SCNC: 4.6 MMOL/L (ref 3.4–5.3)
PROCALCITONIN SERPL IA-MCNC: 4.09 NG/ML
PROT SERPL-MCNC: 5.9 G/DL (ref 6.4–8.3)
RBC # BLD AUTO: 3.11 10E6/UL (ref 3.8–5.2)
RBC URINE: <1 /HPF
S PNEUM AG SPEC QL: NEGATIVE
SODIUM SERPL-SCNC: 139 MMOL/L (ref 135–145)
SP GR UR STRIP: 1.03 (ref 1–1.03)
SQUAMOUS EPITHELIAL: <1 /HPF
TRANSITIONAL EPI: <1 /HPF
TROPONIN T SERPL HS-MCNC: 15 NG/L
UROBILINOGEN UR STRIP-MCNC: NORMAL MG/DL
WBC # BLD AUTO: 14.2 10E3/UL (ref 4–11)
WBC URINE: <1 /HPF

## 2024-02-09 PROCEDURE — 94640 AIRWAY INHALATION TREATMENT: CPT | Mod: 76

## 2024-02-09 PROCEDURE — 84145 PROCALCITONIN (PCT): CPT | Performed by: INTERNAL MEDICINE

## 2024-02-09 PROCEDURE — 120N000001 HC R&B MED SURG/OB

## 2024-02-09 PROCEDURE — 84484 ASSAY OF TROPONIN QUANT: CPT | Performed by: HOSPITALIST

## 2024-02-09 PROCEDURE — 83605 ASSAY OF LACTIC ACID: CPT | Performed by: HOSPITALIST

## 2024-02-09 PROCEDURE — 250N000011 HC RX IP 250 OP 636: Performed by: HOSPITALIST

## 2024-02-09 PROCEDURE — 258N000003 HC RX IP 258 OP 636: Performed by: INTERNAL MEDICINE

## 2024-02-09 PROCEDURE — 87899 AGENT NOS ASSAY W/OPTIC: CPT | Performed by: HOSPITALIST

## 2024-02-09 PROCEDURE — 36415 COLL VENOUS BLD VENIPUNCTURE: CPT | Performed by: STUDENT IN AN ORGANIZED HEALTH CARE EDUCATION/TRAINING PROGRAM

## 2024-02-09 PROCEDURE — 80053 COMPREHEN METABOLIC PANEL: CPT | Performed by: STUDENT IN AN ORGANIZED HEALTH CARE EDUCATION/TRAINING PROGRAM

## 2024-02-09 PROCEDURE — 99233 SBSQ HOSP IP/OBS HIGH 50: CPT | Performed by: HOSPITALIST

## 2024-02-09 PROCEDURE — 250N000011 HC RX IP 250 OP 636: Performed by: STUDENT IN AN ORGANIZED HEALTH CARE EDUCATION/TRAINING PROGRAM

## 2024-02-09 PROCEDURE — 250N000009 HC RX 250: Performed by: STUDENT IN AN ORGANIZED HEALTH CARE EDUCATION/TRAINING PROGRAM

## 2024-02-09 PROCEDURE — 258N000003 HC RX IP 258 OP 636: Performed by: HOSPITALIST

## 2024-02-09 PROCEDURE — 94640 AIRWAY INHALATION TREATMENT: CPT

## 2024-02-09 PROCEDURE — 36415 COLL VENOUS BLD VENIPUNCTURE: CPT | Performed by: HOSPITALIST

## 2024-02-09 PROCEDURE — 250N000013 HC RX MED GY IP 250 OP 250 PS 637: Performed by: STUDENT IN AN ORGANIZED HEALTH CARE EDUCATION/TRAINING PROGRAM

## 2024-02-09 PROCEDURE — 85027 COMPLETE CBC AUTOMATED: CPT | Performed by: STUDENT IN AN ORGANIZED HEALTH CARE EDUCATION/TRAINING PROGRAM

## 2024-02-09 PROCEDURE — 81001 URINALYSIS AUTO W/SCOPE: CPT | Performed by: HOSPITALIST

## 2024-02-09 PROCEDURE — 250N000013 HC RX MED GY IP 250 OP 250 PS 637: Performed by: INTERNAL MEDICINE

## 2024-02-09 PROCEDURE — 99222 1ST HOSP IP/OBS MODERATE 55: CPT | Performed by: INTERNAL MEDICINE

## 2024-02-09 PROCEDURE — 82248 BILIRUBIN DIRECT: CPT | Performed by: HOSPITALIST

## 2024-02-09 PROCEDURE — 80048 BASIC METABOLIC PNL TOTAL CA: CPT | Performed by: STUDENT IN AN ORGANIZED HEALTH CARE EDUCATION/TRAINING PROGRAM

## 2024-02-09 PROCEDURE — 999N000157 HC STATISTIC RCP TIME EA 10 MIN

## 2024-02-09 RX ORDER — AZITHROMYCIN 250 MG/1
250 TABLET, FILM COATED ORAL DAILY
Qty: 4 TABLET | Refills: 0 | Status: DISCONTINUED | OUTPATIENT
Start: 2024-02-09 | End: 2024-02-11

## 2024-02-09 RX ORDER — LIDOCAINE 40 MG/G
CREAM TOPICAL
Status: DISCONTINUED | OUTPATIENT
Start: 2024-02-09 | End: 2024-02-10

## 2024-02-09 RX ORDER — SODIUM CHLORIDE 9 MG/ML
INJECTION, SOLUTION INTRAVENOUS CONTINUOUS
Status: DISCONTINUED | OUTPATIENT
Start: 2024-02-09 | End: 2024-02-12

## 2024-02-09 RX ORDER — CEFTRIAXONE 2 G/1
2 INJECTION, POWDER, FOR SOLUTION INTRAMUSCULAR; INTRAVENOUS EVERY 24 HOURS
Status: DISCONTINUED | OUTPATIENT
Start: 2024-02-09 | End: 2024-02-12

## 2024-02-09 RX ORDER — LIDOCAINE 40 MG/G
CREAM TOPICAL
Status: CANCELLED | OUTPATIENT
Start: 2024-02-09

## 2024-02-09 RX ADMIN — SODIUM CHLORIDE: 9 INJECTION, SOLUTION INTRAVENOUS at 19:25

## 2024-02-09 RX ADMIN — SODIUM CHLORIDE 500 ML: 9 INJECTION, SOLUTION INTRAVENOUS at 17:55

## 2024-02-09 RX ADMIN — METHYLPREDNISOLONE SODIUM SUCCINATE 62.5 MG: 125 INJECTION, POWDER, FOR SOLUTION INTRAMUSCULAR; INTRAVENOUS at 08:08

## 2024-02-09 RX ADMIN — CEFTRIAXONE SODIUM 2 G: 2 INJECTION, POWDER, FOR SOLUTION INTRAMUSCULAR; INTRAVENOUS at 19:24

## 2024-02-09 RX ADMIN — METHYLPREDNISOLONE SODIUM SUCCINATE 62.5 MG: 125 INJECTION, POWDER, FOR SOLUTION INTRAMUSCULAR; INTRAVENOUS at 20:03

## 2024-02-09 RX ADMIN — AZITHROMYCIN DIHYDRATE 250 MG: 250 TABLET, FILM COATED ORAL at 16:29

## 2024-02-09 RX ADMIN — ATORVASTATIN CALCIUM 20 MG: 10 TABLET, FILM COATED ORAL at 20:04

## 2024-02-09 RX ADMIN — ENOXAPARIN SODIUM 30 MG: 30 INJECTION SUBCUTANEOUS at 20:04

## 2024-02-09 RX ADMIN — IPRATROPIUM BROMIDE AND ALBUTEROL SULFATE 3 ML: .5; 3 SOLUTION RESPIRATORY (INHALATION) at 08:45

## 2024-02-09 RX ADMIN — IPRATROPIUM BROMIDE AND ALBUTEROL SULFATE 3 ML: .5; 3 SOLUTION RESPIRATORY (INHALATION) at 11:55

## 2024-02-09 RX ADMIN — LISINOPRIL 20 MG: 20 TABLET ORAL at 08:09

## 2024-02-09 RX ADMIN — SODIUM CHLORIDE 500 ML: 9 INJECTION, SOLUTION INTRAVENOUS at 21:42

## 2024-02-09 RX ADMIN — IPRATROPIUM BROMIDE AND ALBUTEROL SULFATE 3 ML: .5; 3 SOLUTION RESPIRATORY (INHALATION) at 15:45

## 2024-02-09 ASSESSMENT — ACTIVITIES OF DAILY LIVING (ADL)
ADLS_ACUITY_SCORE: 29
ADLS_ACUITY_SCORE: 28
ADLS_ACUITY_SCORE: 25
ADLS_ACUITY_SCORE: 29
ADLS_ACUITY_SCORE: 28
ADLS_ACUITY_SCORE: 23
ADLS_ACUITY_SCORE: 23
ADLS_ACUITY_SCORE: 25

## 2024-02-09 NOTE — PROVIDER NOTIFICATION
MD Notification    Notified Person: MD    Notified Person Name: Dr. Turner     Notification Date/Time: 16:11 2/9/2024     Notification Interaction: dwight    Purpose of Notification: Pt's heart rate is sustaining >120 (120-128). She's not on tele but we have her on a continuous pulse. Any interventions?    Orders Received: MD ordered telemetry     Comments: sepsis BPA fired    Paged again 5:14 PM   Lactic acid 3.7    MD ordered: NS at 75mL/hr, 500 mL NS bolus, UA/legionella, IV rocephin

## 2024-02-09 NOTE — UTILIZATION REVIEW
"  Admission Status; Secondary Review Determination         Under the authority of the Utilization Management Committee, the utilization review process indicated a secondary review on the above patient.  The review outcome is based on review of the medical records, discussions with staff, and applying clinical experience noted on the date of the review.        (xxx)      Inpatient Status Appropriate - This patient's medical care is consistent with medical management for inpatient care and reasonable inpatient medical practice.      () Observation Status Appropriate - This patient does not meet hospital inpatient criteria and is placed in observation status. If this patient's primary payer is Medicare and was admitted as an inpatient, Condition Code 44 should be used and patient status changed to \"observation\".   () Admission Status NOT Appropriate - This patient's medical care is not consistent with medical management for Inpatient or Observation Status.          RATIONALE FOR DETERMINATION     Jasmyn Green is a 68 year old female with severe obstructive lung disease on oxygen 2 LPM who was admitted on 2/8/2024 with worsening dyspnea both with exertion and at rest and requiring higher supplemental oxygen at home.  CT on admission showed bulky AP and left hilar adenopathy with irregular pleural-based anterior left upper lobe nodule with adjacent smaller satellite nodules. Findings suspicious for a primary lung cancer. Nodular thickening of the left adrenal gland is likely unchanged. Extensive emphysema.  She is admitted for close clinical monitoring, IV steroids, duonebs, increased oxygen requirement (now requiring 3-5 LPM), and consultations with IR and pulmonology.  It is reasonable to anticipate a hospital stay of at least 2 midnights.  IP status is appropriate.    The severity of illness, intensity of service provided, expected LOS and risk for adverse outcome make the care complex, high risk and appropriate " for hospital admission.        The information on this document is developed by the utilization review team in order for the business office to ensure compliance.  This only denotes the appropriateness of proper admission status and does not reflect the quality of care rendered.         The definitions of Inpatient Status and Observation Status used in making the determination above are those provided in the CMS Coverage Manual, Chapter 1 and Chapter 6, section 70.4.      Sincerely,     Imelda Varela MD  Physician Advisor   Utilization Review/ Case Management  [facility].

## 2024-02-09 NOTE — PLAN OF CARE
2/8/2024 2300 - 2/9/2024 0730    Orientation: A&Ox4  Activity: SBA  Diet/BS Checks: regular  Tele: N/A  IV Access/Drains: L PIV; leaking slightly  Pain Management: Denied  Abnormal VS/Results: VSS except tachycardic. On 3L NC at rest, needs up to 5L with activity  Bowel/Bladder: Continent of B/B  Skin/Wounds: Scattered bruising  Consults: Pulm, IR  D/C Disposition: pending progress/plan

## 2024-02-09 NOTE — PLAN OF CARE
19:00-23:00  Orientation: A&Ox4  Activity: SBA  Diet/BS Checks: regular  Tele: N/A  IV Access/Drains: L PIV  Pain Management: Denied  Abnormal VS/Results: VSS except tachycardic. On 3L NC  Bowel/Bladder: Continent of B/B  Skin/Wounds: Scattered bruising  Consults: Pulm, IR  D/C Disposition: Plan pending  Other Info: Wears dentures

## 2024-02-09 NOTE — PLAN OF CARE
Goal Outcome Evaluation:  RD assessed and discussed methods to increase PO. Added ONS for patient to trial. See assessment note date 2/9 for full details.

## 2024-02-09 NOTE — PROGRESS NOTES
[facility]    Hospitalist Progress Note    Date of Admission:  2/8/2024    Assessment & Plan   Jasmyn Green is a 68 year old female admitted on 2/8/2024. She presents with worsening shortness of breath.     Acute COPD exacerbation (H)  Sepsis possibly due to pneumonia  Acute on chronic hypoxic hypercapnic respiratory failure secondary to above  Lung mass with lymphadenopathy concerning for primary lung malignancy  Former smoker  Sinus tachycardia  Lactic acidosis  Patient follows with pulmonology at [facility]. Has severe obstructive lung disease (FEV1 29%), with hyperinflation and air-trapping.  Severe diffusion capacity defect (DLCO 30%). Radiographic emphysema.  Presenting with 1 day history of worsening dyspnea both with exertion and at rest and requiring higher supplemental oxygen at home.  CT on admission showed bulky AP and left hilar adenopathy with irregular pleural-based anterior left upper lobe nodule with adjacent smaller satellite nodules. Findings suspicious for a primary lung cancer. Nodular thickening of the left adrenal gland is likely unchanged. Extensive emphysema. Of note, she had a CT chest as part of lung cancer screening last summer that showed no evidence of malignancy.  Admission labs: WBC 22.8, Hb 11.1, lactic acid 0.8.  -Admit to inpatient  -Pulmonology consulted  -DuoNebs 4 times daily, as needed albuterol nebs  -IV Solu-Medrol  -Supplemental O2 as needed with oximetry  -Patient with sinus tachycardia at admission.  EKG obtained that confirmed this, rates in the 130s.  Improved for a while and then sustaining again in the 120s in the afternoon of 2/9.  Sepsis BPA fired, repeat lactic acid elevated to 3.7.  Procalcitonin added on this morning elevated at 4.09.  Given 500 cc IV NS bolus, started on maintenance IV NS at 75 cc/h.  BP stable.  Placed on telemetry.  Blood cultures already obtained at admission NGTD.  She had received 1 g IV ceftriaxone in the ED, also  "started on azithromycin.  Continue on 2 g IV ceftriaxone every 24 hours, oral azithromycin daily to cover possible pneumonia causing sepsis.  Added on LFTs.  Check sputum culture, Legionella antigen, UA with reflex to culture.  Administer more IVF as needed, repeat lactic acid in 3 hours.  -Discussed with pulmonology: Plan for EBUS with biopsy as outpatient.  Outpatient PET scan ordered.  Transition methylprednisolone to prednisone likely on 2/10.     Essential hypertension  HLD  -Continue prior to admission aspirin and Lipitor  -Hold PTA lisinopril in setting of likely sepsis     Elevated troponin  Troponin 28 at admission.  Recheck on 2/9 pending.  No chest pain.  -Telemetry     Diet: Combination Diet Regular Diet Adult    DVT Prophylaxis: Pneumatic Compression Devices  Vick Catheter: Not present  Lines: None     Cardiac Monitoring: None  Code Status: Full Code         Disposition: Possible discharge home in the next 2 days pending clinical course.    Medical Decision Making       Please see A&P for additional details of medical decision making.        Clinically Significant Risk Factors                  # Hypertension: Noted on problem list        # Cachexia: Estimated body mass index is 16.91 kg/m  as calculated from the following:    Height as of this encounter: 1.549 m (5' 1\").    Weight as of this encounter: 40.6 kg (89 lb 8.1 oz)., PRESENT ON ADMISSION  # Moderate Malnutrition: based on nutrition assessment, PRESENT ON ADMISSION   # COPD: noted on problem list          Deb Turner MD  Text Page (7am - 6pm, M-F)  [facility]  Securely message with the Vocera Web Console (learn more here)  Text page via [employer] Paging/Directory      Interval History   Patient was seen early afternoon-she was sitting up in bed, dyspneic with conversation but tells me that breathing is better than when she came in.  Family at bedside.  She denies pain anywhere.  Was anxious regarding plans for biopsy. "  Denied any fevers or chills.  Is coughing, occasionally productive of phlegm, no hemoptysis.    -Data reviewed today: I reviewed all new labs and imaging results over the last 24 hours. I personally reviewed EKG with result as noted above, CXR with result as noted above, and CT scan with result as noted above    Physical Exam   Temp: 98.9  F (37.2  C) Temp src: Oral BP: 130/77 Pulse: (!) 121   Resp: 20 SpO2: 95 % O2 Device: Nasal cannula Oxygen Delivery: 2 LPM  Vitals:    02/09/24 0633   Weight: 40.6 kg (89 lb 8.1 oz)     Vital Signs with Ranges  Temp:  [97.7  F (36.5  C)-99.1  F (37.3  C)] 98.9  F (37.2  C)  Pulse:  [101-128] 121  Resp:  [19-25] 20  BP: (119-144)/(24-77) 130/77  SpO2:  [90 %-98 %] 95 %  I/O last 3 completed shifts:  In: 480 [P.O.:480]  Out: -     Constitutional: Alert, not in distress but dyspneic with conversation  Respiratory: Mildly labored breathing, poor air movement bilaterally, no significant wheezes or crackles, was on nasal cannula oxygen  Cardiovascular: Heart sounds regular rate and rhythm, tachycardic, no murmurs, no leg edema  GI: Abdomen is soft, non distended, non tender. Normal BS  Skin/Integumen: no rashes, no pressure sores  Neuro: alert, converses appropriately, moving all extremities, fluent speech, no facial asymmetry  Psych: mood and affect appropriate      Medications    sodium chloride        atorvastatin  20 mg Oral Daily    azithromycin  250 mg Oral Daily    cefTRIAXone  2 g Intravenous Q24H    enoxaparin ANTICOAGULANT  30 mg Subcutaneous Q24H    ipratropium - albuterol 0.5 mg/2.5 mg/3 mL  3 mL Nebulization 4x daily    lisinopril  20 mg Oral Daily    methylPREDNISolone  62.5 mg Intravenous Q12H    sodium chloride (PF)  3 mL Intracatheter Q8H    sodium chloride (PF)  3 mL Intracatheter Q8H    sodium chloride 0.9%  500 mL Intravenous Once       Data   Recent Labs   Lab 02/09/24  0625 02/08/24  0910   WBC 14.2* 22.8*   HGB 9.4* 11.1*   MCV 97 96    296    139    POTASSIUM 4.6 4.8   CHLORIDE 105 104   CO2 25 22   BUN 25.0* 29.0*   CR 0.69 0.90   ANIONGAP 9 13   FLOR 8.2* 9.2   * 148*       Imaging  No results found for this or any previous visit (from the past 24 hour(s)).

## 2024-02-09 NOTE — PLAN OF CARE
Goal Outcome Evaluation:         2/8 [id number]    Orientation: A&Ox4  Activity: SBA  Diet/BS Checks: Regular  Tele: N/A  IV Access/Drains: L PIV SL  Pain Management: Denies  Abnormal VS/Results: Hypertensive and tachycardic, on 3L O2  Bowel/Bladder: Continent of B/B  Skin/Wounds: Scattered bruising from being on blood thinner, otherwise intact  Consults: Pulmonology, IR  D/C Disposition: TBD  Other Info: Pt transferred from [facility] approximately 1700

## 2024-02-09 NOTE — PHARMACY-ADMISSION MEDICATION HISTORY
Pharmacist Admission Medication History    Admission medication history is complete. The information provided in this note is only as accurate as the sources available at the time of the update.    Information Source(s): Patient and CareEverywhere/SureScripts via in-person    Pertinent Information: Pt recently completed 5 day RX for prednisone and Augmentin    Changes made to PTA medication list:  Added: Additional order for PRN Combivent  Deleted: None  Changed: Albuterol Inh to PRN, Mucinex to PRN    Allergies reviewed with patient and updates made in EHR:  Done by RN    Medication History Completed By: Amanda Erazo RP 2/8/2024 9:21 PM    PTA Med List   Medication Sig Last Dose    albuterol (PROAIR HFA/PROVENTIL HFA/VENTOLIN HFA) 108 (90 Base) MCG/ACT inhaler Inhale 2 puffs into the lungs every 6 hours (Patient taking differently: Inhale 2 puffs into the lungs every 6 hours as needed) PRN    aspirin 81 mg chewable tablet [ASPIRIN 81 MG CHEWABLE TABLET] Chew 1 tablet (81 mg total) daily. 2/7/2024    atorvastatin (LIPITOR) 20 MG tablet Take 1 tablet (20 mg) by mouth daily 2/7/2024    calcium carbonate 600 mg-vitamin D 400 units (CALTRATE) 600-400 MG-UNIT per tablet Take 1 tablet by mouth 2 times daily With calcium 2/7/2024    fluticasone-salmeterol (AIRDUO RESPICLICK) 232-14 MCG/ACT inhaler Inhale 1 puff into the lungs 2 times daily 2/7/2024    guaiFENesin (MUCINEX) 600 MG 12 hr tablet Take 2 tablets (1,200 mg) by mouth 2 times daily (Patient taking differently: Take 1,200 mg by mouth 2 times daily as needed for cough or congestion) PRN    ipratropium - albuterol 0.5 mg/2.5 mg/3 mL (DUONEB) 0.5-2.5 (3) MG/3ML neb solution Take 1 vial (3 mLs) by nebulization every 6 hours as needed for shortness of breath, wheezing or cough PRN    ipratropium-albuterol (COMBIVENT RESPIMAT)  MCG/ACT inhaler Inhale 1 puff into the lungs 2 times daily as needed for shortness of breath, wheezing or cough Can take 2 doses if  needed in addition to current twice daily scheduled doses PRN    ipratropium-albuterol (COMBIVENT RESPIMAT)  MCG/ACT inhaler Inhale 1 puff into the lungs 2 times daily 2/7/2024    lisinopril (ZESTRIL) 20 MG tablet TAKE 1 TABLET(20 MG) BY MOUTH DAILY 2/7/2024

## 2024-02-09 NOTE — CONSULTS
"CLINICAL NUTRITION SERVICES  -  ASSESSMENT NOTE    RECOMMENDATIONS FOR MD/PROVIDER TO ORDER:   - Consider enteral nutrition support if within pt's GOC to support intake and minimize wt loss. Please consult if want to start tube feeding.     Recommendations Ordered by Registered Dietitian (RD):   - RDN ordered supplements for pt to trial   - Encourage small frequent meals      MALNUTRITION:  % Weight Loss:  > 2% in 1 week (severe malnutrition)   % Intake:  </= 50% for >/= 1 month (severe malnutrition)  Subcutaneous Fat Loss:  Orbital region mild depletion and Upper arm region mild depletion  Muscle Loss:  Temporal region mild depletion, Clavicle bone region moderate depletion, Acromion bone region moderate depletion, Patellar region mild depletion, and Anterior thigh region mild depletion  Fluid Retention:  None noted    Malnutrition Diagnosis: Moderate malnutrition  In Context of:  Acute illness or injury  Chronic illness or disease        REASON FOR ASSESSMENT  Jasmyn Green is a 68 year old female seen by Registered Dietitian for positive MST, and MD consult for \"significant wt loss\" and \"possible malignancy\".     Admitted on 2/8/24 with worsening SOB.   CT findings on admission found lung mass, suspicious for primary lung cancer    PMH: COPD (severe), emphysema, gout, osteoporosis      NUTRITION HISTORY  Information obtained from patient and two daughters.   - Family reports that she eats sporadically, on a good day she will have 2-3 small meals. She lives with her son-in-law who will cook her one meal/day, her work also provides 1 meal/day. However, if she doesn't like the options she won't eat.   - Pt expressed that she does not like ONS. RDN offered thinning the supplements with milk or blending them with ice cream, which she was open to trying. She has tried muscle milk protein powder at home but doesn't drink it often.   - Reported UBW of  lbs  - Pt's daughters expressed concern for their mother's " "intake and poor appetite, they say she is a very picky eater. They expressed interest in a feeding tube to help with intake and to help her gain wt.     CURRENT NUTRITION ORDERS  Diet Order:  Regular     Current Intake/Tolerance: No intake documented per flowsheets. Per health touch, ordered dinner last night and breakfast this morning. Pt said she will pick at food, but does not have any appetite. Denies n/v with eating.     Food Allergies/Intolerances: NKFA    Chewing/Swallowing: noted dentures    Labs: reviewed    Medications: reviewed    Skin: no edema or wounds noted    I/Os: no BM noted      ANTHROPOMETRICS  Height: 5'1\"  Weight: 89 lbs 8.11 oz (2/8)  Body mass index is 16.91 kg/m .  IBW: 105 lbs (85%)  Weight History:   Wt Readings from Last 10 Encounters:   02/09/24 40.6 kg (89 lb 8.1 oz)   02/08/24 42.2 kg (93 lb)   02/01/24 42.6 kg (93 lb 14.4 oz)   11/08/23 40.6 kg (89 lb 9.6 oz)   10/26/23 42.4 kg (93 lb 6.4 oz)   08/15/23 42.7 kg (94 lb 1.6 oz)   08/09/23 43.1 kg (95 lb)   06/07/23 44 kg (97 lb)   04/21/23 43 kg (94 lb 12.8 oz)   04/04/23 43.2 kg (95 lb 3.2 oz)    - Limited wts per chart review.   - Per CE: 8/15/23 - 94 lbs, 11/8/23 - 89 lbs, 2/1/24 - 93 lbs. This is a 4% wt loss in one week.       ASSESSED NUTRITION NEEDS PER APPROVED PRACTICE GUIDELINES:    Dosing Weight 40.6 kg (2/8)  Estimated Energy Needs: [id number] kcals (30-35-45 Kcal/Kg)  Justification: underweight and COPD  Estimated Protein Needs: 6181 grams protein (1.5-2 g pro/Kg)  Justification: Repletion and hypercatabolism with critical illness  Estimated Fluid Needs: 1 mL/kcal or per provider      NUTRITION DIAGNOSIS:  Inadequate oral intake related to COPD exacerbation as evidenced by pt report of intake, 4% wt loss in one week, and muscle and fat wasting.       NUTRITION INTERVENTIONS  Recommendations / Nutrition Prescription  See above.       Implementation  - Nutrition education: discussed ways to add calories to foods (I.e., " heavy cream, oil, butter, adding condiments to foods), and emphasized small frequent meals   - Medical Food Supplement and Multivitamin/Mineral  - Consult if TF within pt's GOC    Nutrition Goals  Consumer 50-75% of 4-6 small meals/snacks a day   Maintain wt >40.5 kg      MONITORING AND EVALUATION:  Progress towards goals will be monitored and evaluated per protocol and Practice Guidelines      Rachel Figueroa, MS, RD, LD  Clinical Dietitian  Pager: [phone]

## 2024-02-09 NOTE — CONSULTS
IR consult received. CT reviewed. Patient has probable left lung cancer with hilar and mediastinal lymphadenopathy. Also on ASA. Will need 3-5 day hold of ASA prior to any lung biopsy. Also will need outpatient PET. Based on PET, will most likely need to see if pulmonary can biopsy mediastinal lymph nodes rather than lung lesion. There may be distant sites of disease on PET that would then be biopsied. No indication for inpatient lung biopsy at this time.    Sherif Mahajan MD

## 2024-02-09 NOTE — CONSULTS
[facility]    Pulmonary, Allergy, Critical Care and Sleep Medicine    Pulmonary Consult Note            Reason for Consult: Abnormal chest CT   Primary Pulmonologist: Dr. Ruben Levin     Assessment/Plan: Jasmyn Green is a 68 year old year old female with medical history significant for COPD (chronic use of 2 L supplemental oxygen at nighttime), coronary artery disease, hypertension who was admitted on 2/8/2024 with worsening shortness of breath, chest pain, and increasing oxygen requirements concerning for COPD exacerbation.  Chest x-ray imaging on arrival was concerning for a left hilar fullness and CT chest showed a pleural-based left upper lobe density as well as a new left hilar mass that measures 6 x 3 cm.  These findings are concerning for a fairly rapidly progressive malignancy given these findings were not present on her June 2023 lung cancer screening CT. She is having symptoms of a COPD exacerbation (increased cough, shortness of breath) and fortunately has had improvement with addition of scheduled bronchodilators and anti-inflammatory.  Nodular density in left upper lobe may be inflammatory versus malignant versus infectious she remains afebrile with improving leukocytosis with current therapy suggesting a lobar pneumonia is less likely.    COPD (FEV1 0.61 L, 2 L supplemental O2 at nighttime), with exacerbation   New L hilar mass with bulky lymphadenopathy (6 x 3 cm), new ARIANNE pleural-based nodule (2.2 x 1.5 cm)  Prior tobacco use (50 pack years, quit 2021)  Coronary artery disease  HTN     Recommendations as follows:   - plan for EBUS procedure as outpatient (scheduling team will reach out to patient, d/w primary pulmonologist, Dr. Levin)   - agree with outpatient PET scan (ordered)  - continue scheduled bronchodilators  - transition methylprednisolone to prednisone tomorrow (40 mg daily x 5 days followed by 20 mg x 2 days then 10 mg x 2 days then discontinue)  -  azithromycin x 5 days   - procalcitonin added on   - continue airduo BID, combivent BID, albuterol prn upon discharge   - family request for SW and nutrition consult (ordered)    Thank you for involving us in the care of this patient.       Heavenly Mendes MD  Pulmonary, Allergy, Critical Care, and Sleep Medicine   [facility]   Pager: [phone]    This note was created using dictation software and may contain errors.  Please contact the creator for any clarifications that are needed.      Chief Complaint: SOB     HPI:  Jasmyn Green is a 68 year old year old female with medical history significant for COPD (chronic use of 2 L supplemental oxygen at nighttime), coronary artery disease, hypertension who was admitted on 2/8/2024 with worsening shortness of breath, chest pain, and increasing oxygen requirements concerning for COPD exacerbation.  Patient reports worsening shortness of breath, increased cough for one day. She typically uses 2 L supplemental O2 at nighttime only but had to use up to 4 L. She recently completed a 5-day course of prednisone 1 week prior.  She denies any recent fevers or sick contacts.      She has had COVID x 2 in January during which time she lost significant weight, approximately 10 pounds.  She has been gradually gaining this back.  She is followed in the pulmonary nodule clinic by Dr. Levin and had a lung cancer screening chest CT in June 2023 which did not show any nodules or masses.  There was an area of consolidation in the left upper lobe.  On admission she underwent chest x-ray which showed left hilar fullness and a CT chest with contrast was obtained.  This identified a 6 x 3 cm left hilar mass as well as bulky lymphadenopathy, a pleural-based density in the left upper lobe that is approximately 2.2 x 1.5 cm as well as extensive centrilobular emphysema.    Patient's 2 daughters are present at bedside and also provide history: Jasmyn has been working up until last week  when she became sick.  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  She can typically walk approximately 25 to 50 feet without stopping and she is able to go up and down 1 flight of stairs.  She chronically has a baseline cough.  She is using air duo, Combivent, albuterol as needed.  She does typically have a home supply of prednisone for COPD exacerbations.    Review of Systems: 13 point review of systems is negative except pertinent items are noted in HPI.    Past Medical History:   Past Medical History:   Diagnosis Date    Arthritis     COPD (chronic obstructive pulmonary disease) (H)     Coronary artery disease     Dyspnea on exertion     Gout     Hypertension      Patient Active Problem List   Diagnosis    COPD exacerbation (H)    History of gout    Coronary artery calcification    Lymphocytosis    Essential hypertension    Asymptomatic hypertensive urgency    Age-related osteoporosis without current pathological fracture    Lung mass       Past Medications:   Current Facility-Administered Medications   Medication    acetaminophen (TYLENOL) tablet 1,000 mg    atorvastatin (LIPITOR) tablet 20 mg    calcium carbonate (TUMS) chewable tablet 1,000 mg    enoxaparin ANTICOAGULANT (LOVENOX) injection 30 mg    guaiFENesin-dextromethorphan (ROBITUSSIN DM) 100-10 MG/5ML syrup 10 mL    ipratropium - albuterol 0.5 mg/2.5 mg/3 mL (DUONEB) neb solution 3 mL    lidocaine (LMX4) cream    lidocaine 1 % 0.1-1 mL    lisinopril (ZESTRIL) tablet 20 mg    methylPREDNISolone sodium succinate (solu-MEDROL) injection 62.5 mg    senna-docusate (SENOKOT-S/PERICOLACE) 8.6-50 MG per tablet 1 tablet    Or    senna-docusate (SENOKOT-S/PERICOLACE) 8.6-50 MG per tablet 2 tablet    sodium chloride (PF) 0.9% PF flush 3 mL    sodium chloride (PF) 0.9% PF flush 3 mL       Allergies: No Known Allergies    Social History:    Social History     Socioeconomic History    Marital status:      Spouse name: Not on file    Number of children: Not on  file    Years of education: Not on file    Highest education level: Not on file   Occupational History    Not on file   Tobacco Use    Smoking status: Former     Types: Cigarettes     Quit date: 2021     Years since quittin.6    Smokeless tobacco: Never    Tobacco comments:     updated 8/3/2021 by TTS   Vaping Use    Vaping Use: Never used   Substance and Sexual Activity    Alcohol use: Not Currently    Drug use: Never    Sexual activity: Not Currently   Other Topics Concern    Not on file   Social History Narrative     many years 3 grown children. 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 Last cigarette a week ago.non drinker  Her primary MD (Wilda) retired several years ago/ tends to avoid doctors/medical care in general       Social Determinants of Health     Financial Resource Strain: Low Risk  (2024)    Financial Resource Strain     Within the past 12 months, have you or your family members you live with been unable to get utilities (heat, electricity) when it was really needed?: No   Food Insecurity: Low Risk  (2024)    Food Insecurity     Within the past 12 months, did you worry that your food would run out before you got money to buy more?: No     Within the past 12 months, did the food you bought just not last and you didn t have money to get more?: No   Transportation Needs: Low Risk  (2024)    Transportation Needs     Within the past 12 months, has lack of transportation kept you from medical appointments, getting your medicines, non-medical meetings or appointments, work, or from getting things that you need?: No   Physical Activity: Sufficiently Active (11/10/2021)    Exercise Vital Sign     Days of Exercise per Week: 5 days     Minutes of Exercise per Session: 30 min   Stress: No Stress Concern Present (11/10/2021)    [employer] [place] [employer] - Occupational Stress Questionnaire     Feeling of Stress : Not at all   Social Connections: Socially Isolated  (11/10/2021)    Social Connection and Isolation Panel [NHANES]     Frequency of Communication with Friends and Family: More than three times a week     Frequency of Social Gatherings with Friends and Family: Once a week     Attends Rastafari Services: Never     Active Member of Clubs or Organizations: No     Attends Club or Organization Meetings: Not on file     Marital Status:    Interpersonal Safety: Low Risk  (2/1/2024)    Interpersonal Safety     Do you feel physically and emotionally safe where you currently live?: Yes     Within the past 12 months, have you been hit, slapped, kicked or otherwise physically hurt by someone?: No     Within the past 12 months, have you been humiliated or emotionally abused in other ways by your partner or ex-partner?: No   Housing Stability: Low Risk  (2/1/2024)    Housing Stability     Do you have housing? : Yes     Are you worried about losing your housing?: No       Family History:    Family History   Problem Relation Age of Onset    Chronic Obstructive Pulmonary Disease Sister     Diabetes Mother     Heart Disease Mother     Lung Cancer Mother     Heart Disease Father        Physical Exam:  /62 (BP Location: Right arm)   Pulse 104   Temp 99.1  F (37.3  C) (Oral)   Resp 24   Wt 40.6 kg (89 lb 8.1 oz)   SpO2 96%   BMI 16.91 kg/m    General appearance: No apparent distress, well groomed.  Alert and oriented x 3.  Pleasant female.  Head: Normocephalic, atraumatic.    Eyes: PERRL  Nose: Nares widely patent.  No exudate.  No septal deviation noted.  Mouth: MMM.  No lesions or other abnormalities noted.    Neck: Supple.   Cardiac: Regular rate and rhythm.  No murmurs.  Radial pulses are strong and symmetric.  Pulmonary: Distant breath sounds bilaterally.  Symmetric air movement, lungs clear to auscultation bilaterally.  Mildly prolonged expiratory phase of respiration.   Musculoskeletal: No LE edema noted.  No clubbing or cyanosis.  Skin: Warm, dry,  "intact.  Neurologic: Non-focal. CN II-XII grossly intact  Psychiatric: maintains good eye contact with normal affect and speech.      Data:     All laboratory and imaging data reviewed.      CMP  Recent Labs   Lab 02/09/24  0625 02/08/24  0910    139   POTASSIUM 4.6 4.8   CHLORIDE 105 104   CO2 25 22   ANIONGAP 9 13   * 148*   BUN 25.0* 29.0*   CR 0.69 0.90   GFRESTIMATED >90 68   FLOR 8.2* 9.2   MAG  --  2.1     CBC  Recent Labs   Lab 02/09/24  0625 02/08/24  0910   WBC 14.2* 22.8*   RBC 3.11* 3.70*   HGB 9.4* 11.1*   HCT 30.2* 35.5   MCV 97 96   MCH 30.2 30.0   MCHC 31.1* 31.3*   RDW 13.7 13.8    296     INRNo lab results found in last 7 days.  Arterial Blood Gas  Recent Labs   Lab 02/08/24  0916   O2PER 92     Urine Studies  No lab results found.  CMV viral loads  No lab results found.  No results found for: \"CMQNT\", \"CMVQ\"  EBV viral loads   No lab results found.  No results found for: \"EBVDN\", \"EBRES\", \"EBVSP\", \"EBVPC\", \"EBVPCR\"  Respiratory Virus Testing    No results found for: \"RS\", \"FLUAG\"   Sputum Cultures in the last 3 months:    No results found for: \"SDES\" No results found for: \"CULT\"         Latest Ref Rng & Units 8/10/2021     7:38 AM   PFT   FVC L 1.71    FEV1 L 0.61    FVC% % 64    FEV1% % 29      CT chest-personally reviewed: 2/8/2024-6 x 3 cm left hilar mass as well as bulky lymphadenopathy, a pleural-based density in the left upper lobe that is approximately 2.2 x 1.5 cm as well as extensive centrilobular emphysema.    CT chest - 6/15/23 -personally reviewed and I agree with radiologist read of:  FINDINGS:  NODULES: Tiny calcified granulomas, incidental.     LUNGS AND PLEURA: Retained airway secretions. Severe emphysema. New mild areas of left upper lobe patchy consolidation. Mild airway thickening.     MEDIASTINUM: No adenopathy. Nonaneurysmal aorta with mild atherosclerosis.     CORONARY ARTERY CALCIFICATION: Severe.     LIMITED UPPER ABDOMEN: Severe abdominal aortic " atherosclerosis.     MUSCULOSKELETAL: Bony demineralization degenerative changes. Exaggerated thoracic kyphosis, as before.                                                                      IMPRESSION:     1.  Negative for lung cancer screening purposes.     2.  New mild consolidation in the left upper lobe compatible with infectious / inflammatory change.      Pertinent previous documents, labs, and imaging personally reviewed.    Heavenly Mendes MD

## 2024-02-10 LAB
ANION GAP SERPL CALCULATED.3IONS-SCNC: 8 MMOL/L (ref 7–15)
BUN SERPL-MCNC: 32.3 MG/DL (ref 8–23)
CALCIUM SERPL-MCNC: 7.8 MG/DL (ref 8.8–10.2)
CHLORIDE SERPL-SCNC: 114 MMOL/L (ref 98–107)
CREAT SERPL-MCNC: 0.73 MG/DL (ref 0.51–0.95)
DEPRECATED HCO3 PLAS-SCNC: 23 MMOL/L (ref 22–29)
EGFRCR SERPLBLD CKD-EPI 2021: 88 ML/MIN/1.73M2
ERYTHROCYTE [DISTWIDTH] IN BLOOD BY AUTOMATED COUNT: 14.1 % (ref 10–15)
GLUCOSE SERPL-MCNC: 273 MG/DL (ref 70–99)
HCT VFR BLD AUTO: 30.4 % (ref 35–47)
HGB BLD-MCNC: 9.4 G/DL (ref 11.7–15.7)
LACTATE SERPL-SCNC: 1.2 MMOL/L (ref 0.7–2)
MCH RBC QN AUTO: 30.4 PG (ref 26.5–33)
MCHC RBC AUTO-ENTMCNC: 30.9 G/DL (ref 31.5–36.5)
MCV RBC AUTO: 98 FL (ref 78–100)
PLATELET # BLD AUTO: 256 10E3/UL (ref 150–450)
POTASSIUM SERPL-SCNC: 4.3 MMOL/L (ref 3.4–5.3)
RBC # BLD AUTO: 3.09 10E6/UL (ref 3.8–5.2)
SODIUM SERPL-SCNC: 145 MMOL/L (ref 135–145)
WBC # BLD AUTO: 21.1 10E3/UL (ref 4–11)

## 2024-02-10 PROCEDURE — 999N000157 HC STATISTIC RCP TIME EA 10 MIN

## 2024-02-10 PROCEDURE — 80048 BASIC METABOLIC PNL TOTAL CA: CPT | Performed by: HOSPITALIST

## 2024-02-10 PROCEDURE — 250N000011 HC RX IP 250 OP 636: Performed by: STUDENT IN AN ORGANIZED HEALTH CARE EDUCATION/TRAINING PROGRAM

## 2024-02-10 PROCEDURE — 85027 COMPLETE CBC AUTOMATED: CPT | Performed by: HOSPITALIST

## 2024-02-10 PROCEDURE — 120N000001 HC R&B MED SURG/OB

## 2024-02-10 PROCEDURE — 250N000013 HC RX MED GY IP 250 OP 250 PS 637: Performed by: STUDENT IN AN ORGANIZED HEALTH CARE EDUCATION/TRAINING PROGRAM

## 2024-02-10 PROCEDURE — 94640 AIRWAY INHALATION TREATMENT: CPT | Mod: 76

## 2024-02-10 PROCEDURE — 99233 SBSQ HOSP IP/OBS HIGH 50: CPT | Performed by: HOSPITALIST

## 2024-02-10 PROCEDURE — 250N000013 HC RX MED GY IP 250 OP 250 PS 637: Performed by: INTERNAL MEDICINE

## 2024-02-10 PROCEDURE — 258N000003 HC RX IP 258 OP 636: Performed by: HOSPITALIST

## 2024-02-10 PROCEDURE — 94640 AIRWAY INHALATION TREATMENT: CPT

## 2024-02-10 PROCEDURE — 87205 SMEAR GRAM STAIN: CPT | Performed by: HOSPITALIST

## 2024-02-10 PROCEDURE — 83605 ASSAY OF LACTIC ACID: CPT | Performed by: INTERNAL MEDICINE

## 2024-02-10 PROCEDURE — 250N000009 HC RX 250: Performed by: STUDENT IN AN ORGANIZED HEALTH CARE EDUCATION/TRAINING PROGRAM

## 2024-02-10 PROCEDURE — 36415 COLL VENOUS BLD VENIPUNCTURE: CPT | Performed by: HOSPITALIST

## 2024-02-10 PROCEDURE — 250N000011 HC RX IP 250 OP 636: Performed by: HOSPITALIST

## 2024-02-10 PROCEDURE — 36415 COLL VENOUS BLD VENIPUNCTURE: CPT | Performed by: INTERNAL MEDICINE

## 2024-02-10 RX ORDER — ONDANSETRON 2 MG/ML
4 INJECTION INTRAMUSCULAR; INTRAVENOUS EVERY 6 HOURS PRN
Status: DISCONTINUED | OUTPATIENT
Start: 2024-02-10 | End: 2024-02-15 | Stop reason: HOSPADM

## 2024-02-10 RX ORDER — PREDNISONE 20 MG/1
40 TABLET ORAL DAILY
Status: DISCONTINUED | OUTPATIENT
Start: 2024-02-11 | End: 2024-02-15 | Stop reason: HOSPADM

## 2024-02-10 RX ADMIN — AZITHROMYCIN DIHYDRATE 250 MG: 250 TABLET, FILM COATED ORAL at 16:55

## 2024-02-10 RX ADMIN — IPRATROPIUM BROMIDE AND ALBUTEROL SULFATE 3 ML: .5; 3 SOLUTION RESPIRATORY (INHALATION) at 10:59

## 2024-02-10 RX ADMIN — IPRATROPIUM BROMIDE AND ALBUTEROL SULFATE 3 ML: .5; 3 SOLUTION RESPIRATORY (INHALATION) at 15:30

## 2024-02-10 RX ADMIN — CEFTRIAXONE SODIUM 2 G: 2 INJECTION, POWDER, FOR SOLUTION INTRAMUSCULAR; INTRAVENOUS at 16:55

## 2024-02-10 RX ADMIN — IPRATROPIUM BROMIDE AND ALBUTEROL SULFATE 3 ML: .5; 3 SOLUTION RESPIRATORY (INHALATION) at 19:21

## 2024-02-10 RX ADMIN — ENOXAPARIN SODIUM 30 MG: 30 INJECTION SUBCUTANEOUS at 19:59

## 2024-02-10 RX ADMIN — SODIUM CHLORIDE: 9 INJECTION, SOLUTION INTRAVENOUS at 16:55

## 2024-02-10 RX ADMIN — METHYLPREDNISOLONE SODIUM SUCCINATE 62.5 MG: 125 INJECTION, POWDER, FOR SOLUTION INTRAMUSCULAR; INTRAVENOUS at 08:17

## 2024-02-10 RX ADMIN — IPRATROPIUM BROMIDE AND ALBUTEROL SULFATE 3 ML: .5; 3 SOLUTION RESPIRATORY (INHALATION) at 07:14

## 2024-02-10 RX ADMIN — ONDANSETRON 4 MG: 2 INJECTION INTRAMUSCULAR; INTRAVENOUS at 08:37

## 2024-02-10 RX ADMIN — SODIUM CHLORIDE: 9 INJECTION, SOLUTION INTRAVENOUS at 04:09

## 2024-02-10 RX ADMIN — ATORVASTATIN CALCIUM 20 MG: 10 TABLET, FILM COATED ORAL at 19:59

## 2024-02-10 ASSESSMENT — ACTIVITIES OF DAILY LIVING (ADL)
ADLS_ACUITY_SCORE: 29

## 2024-02-10 NOTE — PLAN OF CARE
2/9/2024 1900 - 2/10/2024 0730    Orientation: A&Ox4  Activity: SBA  Diet/BS Checks: regular  Tele: sinus tachy  IV Access/Drains: PIV infusing NS at 75mL/hr. Repeated 500mL bolus given for elevated lactic of 2.8, recheck: 1.2;   Pain Management: Denied  Abnormal VS/Results: Tmax 99.1. Tachycardic- MD notified as sustaining >120's. On 2L NC entire shift. LS dim to LLL,  LOPEZ/frequent non-productive cough, declined interventions for cough.  Bowel/Bladder: Continent; no BM this shift  Skin/Wounds: Scattered bruising  Consults: Pulm, IR  D/C Disposition: 1-2 days pending progress/plan

## 2024-02-10 NOTE — PROGRESS NOTES
Sepsis Evaluation     I was called to see Jasmyn Green due to abnormal vital signs triggering the Sepsis SIRS screening alert. She is known to have an infection.     PHYSICAL EXAM  Vital Signs:  Temp: 98.9  F (37.2  C) Temp src: Oral BP: 130/77 Pulse: (!) 121   Resp: 20 SpO2: 95 % O2 Device: Nasal cannula Oxygen Delivery: 2 LPM    General: chronically ill appearing  Mental Status: baseline mental status.     Remainder of physical exam is significant: See progress note    DATA  Lactic Acid   Date Value Ref Range Status   02/09/2024 3.7 (H) 0.7 - 2.0 mmol/L Final   02/08/2024 0.8 0.7 - 2.0 mmol/L Final       ASSESSMENT AND PLAN  Jasmyn Green meets SIRS criteria but does NOT have a lactate >2 or other evidence of acute organ damage.  These vital sign, lab and physical exam findings constitute a diagnosis of severe SEPSIS.         Anti-infectives (From now, onward)      Start     Dose/Rate Route Frequency Ordered Stop    02/09/24 1730  cefTRIAXone (ROCEPHIN) 2 g vial to attach to  ml bag for ADULTS or NS 50 ml bag for PEDS         2 g  over 30 Minutes Intravenous EVERY 24 HOURS 02/09/24 1729      02/09/24 1600  azithromycin (ZITHROMAX) tablet 250 mg         250 mg Oral DAILY 02/09/24 1516 02/13/24 1559          Current antibiotic coverage is appropriate for source of infection.     Disposition: The patient will remain on the current unit. We will continue to monitor this patient closely..  Deb Turner MD  02/09/24, 6:06 PM    Sepsis Criteria   Sepsis: The body's generalized inflammatory state as a response to an infection. Sepsis Predictive Model includes >80 variable to alert to potential sepsis.  Severe Sepsis: Sepsis plus one or more variables of acute organ dysfunction (Note: lactic acid >2 or acute encephalopathy each qualify as organ dysfunction)  Septic Shock: Sepsis AND hypotension despite adequate volume resuscitation with crystalloid or lactic acid >=4  Note: HYPOTENSION is defined as 2  BP readings measured 3 hrs apart that have a SBP <90, MAP <65, or decrease >40 mmHg, occurring 6 hrs before or after t-zero

## 2024-02-10 NOTE — PLAN OF CARE
Goal Outcome Evaluation:    Orientation: A&Ox4  Activity: SBA  Diet/BS Checks: regular  Tele:   IV Access/Drains: New PIV placed, infusing NS at 75mL/hr, 500mL bolus and IV rocephin given  Pain Management: Denied  Abnormal VS/Results: Tmax 99.1. Tachycardic- MD notified as sustaining >120's. On 2L NC entire shift. LS dim to LLL, c/o LOPEZ and frequent non-productive cough. Lactic 3.7- MD paged, see notes.   Bowel/Bladder: Continent; BM today and voiding adequately. UA sent, results pending.   Skin/Wounds: Scattered bruising  Consults: Pulm, IR  D/C Disposition: 1-2 days     Onset: yesterday    Location / description: Pt reports blood sugar between 300-400, pt reports he has been giving himself extra insulin every 2-3 hours, last glucometer reading is 340, pt warm transferred to PCP office.     Precipitating Factors: as above    Pain Scale (1-10), 10 highest: 0/10    Associated Symptoms: as above    What  improves / worsens symptoms: Nothing    Symptom specific medications: see med list    Recent visits (last 3-4 weeks) for same reason or recent surgery:  None    PLAN:  Paged Provider- pt warm transferred to PCP office.     Patient/Caller agrees to follow recommendations.    Reason for Disposition  • [1] Blood glucose > 300 mg/dL (16.7 mmol/L) AND [2] two or more times in a row    Protocols used: DIABETES - HIGH BLOOD SUGAR-A-AH

## 2024-02-10 NOTE — PROVIDER NOTIFICATION
MD Notification    Notified Person: MD    Notified Person Name: Dr. Pavon    Notification Date/Time: 2/9/2024 @ 2119    Notification Interaction: Bvents messaging    Purpose of Notification: Patient had a lactic of 3.7 earlier today.  She was given a 500mL bolus and the recheck is now 2.8.  Would you like any other interventions?    Orders Received:  500 mL NS bolus now and repeat lactic at midnight    Comments:

## 2024-02-10 NOTE — PROGRESS NOTES
Hospitalist Progress Note    Date of Admission:  2/8/2024    Assessment & Plan   Jasmyn Green is a 68 year old female admitted on 2/8/2024. She presents with worsening shortness of breath.     Acute COPD exacerbation (H)  Sepsis possibly due to pneumonia  Acute on chronic hypoxic hypercapnic respiratory failure secondary to above  Lung mass with lymphadenopathy concerning for primary lung malignancy  Former smoker  Sinus tachycardia  Lactic acidosis  Patient [facility] with [facility]ology at [facility]. Has severe obstructive lung disease (FEV1 29%), with hyperinflation and air-trapping.  Severe diffusion capacity defect (DLCO 30%). Radiographic emphysema.  Presenting with 1 day history of worsening dyspnea both with exertion and at rest and requiring higher supplemental oxygen at home.  CT on admission showed bulky AP and left hilar adenopathy with irregular pleural-based anterior left upper lobe nodule with adjacent smaller satellite nodules. Findings suspicious for a primary lung cancer. Nodular thickening of the left adrenal gland is likely unchanged. Extensive emphysema. Of note, she had a CT chest as part of lung cancer screening last summer that showed no evidence of malignancy.  Admission labs: WBC 22.8, Hb 11.1, lactic acid 0.8.  -Admit to inpatient  -Pulmonology consulted  -DuoNebs 4 times daily, as needed albuterol nebs  -IV Solu-Medrol  -Supplemental O2 as needed with oximetry  -2/9: Patient with sinus tachycardia at admission.  EKG obtained that confirmed this, rates in the 130s.  Improved for a while and then sustaining again in the 120s in the afternoon of 2/9.  Sepsis BPA fired, repeat lactic acid elevated to 3.7.  Procalcitonin added on this morning elevated at 4.09.  Given 500 cc IV NS bolus, started on maintenance IV NS at 75 cc/h.  BP stable.  Placed on telemetry.  Blood cultures already obtained at admission NGTD.  She had received 1 g IV ceftriaxone in the ED,  also started on azithromycin.  Continue on 2 g IV ceftriaxone every 24 hours, oral azithromycin daily to cover possible pneumonia causing sepsis.  Added on LFTs.  Check sputum culture, Legionella antigen, UA with reflex to culture.  Administer more IVF as needed, repeat lactic acid in 3 hours.  -Discussed with pulmonology: Plan for EBUS with biopsy as outpatient.  Outpatient PET scan ordered.  Transition methylprednisolone to prednisone likely on 2/10.  -2/10: Lactic acidosis resolved.  Afebrile overnight.  Continues on IV hydration.  Strep and Legionella antigens negative.  Sputum culture pending.  Continue on IV ceftriaxone and azithromycin.  Remains tachycardic.  Resume lisinopril.  -Leukocytosis likely due to steroids.  Monitor.    Essential hypertension  HLD  -Continue prior to admission aspirin and Lipitor     Elevated troponin  Troponin 28 at admission.  Recheck troponin remained flat.  No chest pain.  -Telemetry    Intermittent nausea  Patient reports having occasional random episodes of nausea about 2 or 3 times a month that come on without any trigger.  She had an episode of nausea with emesis on 2/10.  Continue to monitor.  Has history of hiatal hernia but nothing visualized on chest CT at admission.  Continue to monitor at this time.  If issues with persistent nausea, may need further abdominal imaging, GI workup.  -As needed Zofran available.     Diet: Combination Diet Regular Diet Adult    DVT Prophylaxis: Pneumatic Compression Devices  Vick Catheter: Not present  Lines: None     Cardiac Monitoring: None  Code Status: Full Code         Disposition: Possible discharge home in the next 2 days pending clinical course.    Medical Decision Making       Please see A&P for additional details of medical decision making.        Clinically Significant Risk Factors              # Hypoalbuminemia: Lowest albumin = 3.1 g/dL at 2/9/2024  4:49 PM, will monitor as appropriate     # Hypertension: Noted on problem list  "       # Cachexia: Estimated body mass index is 16.91 kg/m  as calculated from the following:    Height as of this encounter: 1.549 m (5' 1\").    Weight as of this encounter: 40.6 kg (89 lb 8.1 oz)., PRESENT ON ADMISSION  # Moderate Malnutrition: based on nutrition assessment, PRESENT ON ADMISSION   # COPD: noted on problem list          Deb Turner MD  Text Page (7am - 6pm, M-F)  [facility]  Securely message with the Vocera Web Console (learn more here)  Text page via [employer] Paging/Directory      Interval History   Patient was nauseous and vomited this morning.  Feeling better when seen later in the day.  Has remained afebrile.  Remains on 2 L of oxygen, remains tachycardic.  Had some transient left lower chest/upper abdominal pain that lasted less than 10 minutes, likely muscle pull.  No fevers.  Discussed at length with patient's daughter on phone.    -Data reviewed today: I reviewed all new labs and imaging results over the last 24 hours. I personally reviewed EKG with result as noted above, CXR with result as noted above, and CT scan with result as noted above    Physical Exam   Temp: 97.7  F (36.5  C) Temp src: Oral BP: (!) 173/92 Pulse: (!) 131   Resp: 20 SpO2: 93 % O2 Device: Nasal cannula Oxygen Delivery: 2 LPM  Vitals:    02/09/24 0633   Weight: 40.6 kg (89 lb 8.1 oz)     Vital Signs with Ranges  Temp:  [97.7  F (36.5  C)-98.9  F (37.2  C)] 97.7  F (36.5  C)  Pulse:  [104-131] 131  Resp:  [19-20] 20  BP: (127-173)/(24-92) 173/92  SpO2:  [93 %-98 %] 93 %  I/O last 3 completed shifts:  In: 300 [P.O.:300]  Out: 1380 [Urine:1350; Emesis/NG output:30]    Constitutional: Alert, not in distress but dyspneic with conversation  Respiratory: Mildly labored breathing, poor air movement bilaterally, no significant wheezes or crackles, was on nasal cannula oxygen  Cardiovascular: Heart sounds regular rate and rhythm, tachycardic, no murmurs, no leg edema  GI: Abdomen is soft, non distended, " non tender. Normal BS  Skin/Integumen: no rashes, no pressure sores  Neuro: alert, converses appropriately, moving all extremities, fluent speech, no facial asymmetry  Psych: mood and affect appropriate      Medications    sodium chloride 75 mL/hr at 02/10/24 0409      atorvastatin  20 mg Oral Daily    azithromycin  250 mg Oral Daily    cefTRIAXone  2 g Intravenous Q24H    enoxaparin ANTICOAGULANT  30 mg Subcutaneous Q24H    ipratropium - albuterol 0.5 mg/2.5 mg/3 mL  3 mL Nebulization 4x daily    [Held by provider] lisinopril  20 mg Oral Daily    [START ON 2/11/2024] predniSONE  40 mg Oral Daily    sodium chloride (PF)  3 mL Intracatheter Q8H       Data   Recent Labs   Lab 02/10/24  0917 02/09/24  1649 02/09/24  0625 02/08/24  0910   WBC 21.1*  --  14.2* 22.8*   HGB 9.4*  --  9.4* 11.1*   MCV 98  --  97 96     --  245 296     --  139 139   POTASSIUM 4.3  --  4.6 4.8   CHLORIDE 114*  --  105 104   CO2 23  --  25 22   BUN 32.3*  --  25.0* 29.0*   CR 0.73  --  0.69 0.90   ANIONGAP 8  --  9 13   FLOR 7.8*  --  8.2* 9.2   *  --  180* 148*   ALBUMIN  --  3.1*  --   --    PROTTOTAL  --  5.9*  --   --    BILITOTAL  --  <0.2  --   --    ALKPHOS  --  115  --   --    ALT  --  46  --   --    AST  --  17  --   --        Imaging  No results found for this or any previous visit (from the past 24 hour(s)).

## 2024-02-10 NOTE — PROVIDER NOTIFICATION
MD Notification    Notified Person: MD    Notified Person Name: Dr. Turner    Notification Date/Time: 2/10/2024 8:14am    Notification Interaction: dwight    Purpose of Notification: Pt is nauseated and had a small amount of clear emesis. No PRN antiemetics. Can you please order?    Orders Received: PRN IV zofran ordered    Comments:

## 2024-02-11 ENCOUNTER — APPOINTMENT (OUTPATIENT)
Dept: CT IMAGING | Facility: CLINIC | Age: 69
End: 2024-02-11
Attending: HOSPITALIST
Payer: COMMERCIAL

## 2024-02-11 LAB
ANION GAP SERPL CALCULATED.3IONS-SCNC: 5 MMOL/L (ref 7–15)
BUN SERPL-MCNC: 30.6 MG/DL (ref 8–23)
CALCIUM SERPL-MCNC: 7.7 MG/DL (ref 8.8–10.2)
CHLORIDE SERPL-SCNC: 116 MMOL/L (ref 98–107)
CREAT SERPL-MCNC: 0.67 MG/DL (ref 0.51–0.95)
DEPRECATED HCO3 PLAS-SCNC: 24 MMOL/L (ref 22–29)
EGFRCR SERPLBLD CKD-EPI 2021: >90 ML/MIN/1.73M2
GLUCOSE SERPL-MCNC: 97 MG/DL (ref 70–99)
LACTATE SERPL-SCNC: 0.8 MMOL/L (ref 0.7–2)
LIPASE SERPL-CCNC: 63 U/L (ref 13–60)
POTASSIUM SERPL-SCNC: 4.9 MMOL/L (ref 3.4–5.3)
SODIUM SERPL-SCNC: 145 MMOL/L (ref 135–145)
WBC # BLD AUTO: 16 10E3/UL (ref 4–11)

## 2024-02-11 PROCEDURE — 250N000011 HC RX IP 250 OP 636: Performed by: STUDENT IN AN ORGANIZED HEALTH CARE EDUCATION/TRAINING PROGRAM

## 2024-02-11 PROCEDURE — 94640 AIRWAY INHALATION TREATMENT: CPT

## 2024-02-11 PROCEDURE — 250N000013 HC RX MED GY IP 250 OP 250 PS 637: Performed by: STUDENT IN AN ORGANIZED HEALTH CARE EDUCATION/TRAINING PROGRAM

## 2024-02-11 PROCEDURE — 250N000011 HC RX IP 250 OP 636: Performed by: INTERNAL MEDICINE

## 2024-02-11 PROCEDURE — 80048 BASIC METABOLIC PNL TOTAL CA: CPT | Performed by: HOSPITALIST

## 2024-02-11 PROCEDURE — 83605 ASSAY OF LACTIC ACID: CPT | Performed by: HOSPITALIST

## 2024-02-11 PROCEDURE — 36415 COLL VENOUS BLD VENIPUNCTURE: CPT | Performed by: HOSPITALIST

## 2024-02-11 PROCEDURE — 250N000011 HC RX IP 250 OP 636: Performed by: HOSPITALIST

## 2024-02-11 PROCEDURE — 94640 AIRWAY INHALATION TREATMENT: CPT | Mod: 76

## 2024-02-11 PROCEDURE — 250N000009 HC RX 250: Performed by: INTERNAL MEDICINE

## 2024-02-11 PROCEDURE — 999N000157 HC STATISTIC RCP TIME EA 10 MIN

## 2024-02-11 PROCEDURE — 99233 SBSQ HOSP IP/OBS HIGH 50: CPT | Performed by: HOSPITALIST

## 2024-02-11 PROCEDURE — 250N000013 HC RX MED GY IP 250 OP 250 PS 637: Performed by: HOSPITALIST

## 2024-02-11 PROCEDURE — 258N000003 HC RX IP 258 OP 636: Performed by: HOSPITALIST

## 2024-02-11 PROCEDURE — 250N000012 HC RX MED GY IP 250 OP 636 PS 637: Performed by: HOSPITALIST

## 2024-02-11 PROCEDURE — 120N000001 HC R&B MED SURG/OB

## 2024-02-11 PROCEDURE — 83690 ASSAY OF LIPASE: CPT | Performed by: HOSPITALIST

## 2024-02-11 PROCEDURE — 74177 CT ABD & PELVIS W/CONTRAST: CPT

## 2024-02-11 PROCEDURE — 85048 AUTOMATED LEUKOCYTE COUNT: CPT | Performed by: HOSPITALIST

## 2024-02-11 PROCEDURE — 250N000009 HC RX 250: Performed by: STUDENT IN AN ORGANIZED HEALTH CARE EDUCATION/TRAINING PROGRAM

## 2024-02-11 RX ORDER — SULFAMETHOXAZOLE/TRIMETHOPRIM 800-160 MG
1 TABLET ORAL 2 TIMES DAILY
Status: DISCONTINUED | OUTPATIENT
Start: 2024-02-11 | End: 2024-02-12

## 2024-02-11 RX ORDER — IOPAMIDOL 755 MG/ML
51 INJECTION, SOLUTION INTRAVASCULAR ONCE
Status: COMPLETED | OUTPATIENT
Start: 2024-02-11 | End: 2024-02-11

## 2024-02-11 RX ADMIN — ENOXAPARIN SODIUM 30 MG: 30 INJECTION SUBCUTANEOUS at 19:58

## 2024-02-11 RX ADMIN — PREDNISONE 40 MG: 20 TABLET ORAL at 09:19

## 2024-02-11 RX ADMIN — CEFTRIAXONE SODIUM 2 G: 2 INJECTION, POWDER, FOR SOLUTION INTRAMUSCULAR; INTRAVENOUS at 17:06

## 2024-02-11 RX ADMIN — SODIUM CHLORIDE: 9 INJECTION, SOLUTION INTRAVENOUS at 06:31

## 2024-02-11 RX ADMIN — IPRATROPIUM BROMIDE AND ALBUTEROL SULFATE 3 ML: .5; 3 SOLUTION RESPIRATORY (INHALATION) at 20:11

## 2024-02-11 RX ADMIN — ONDANSETRON 4 MG: 2 INJECTION INTRAMUSCULAR; INTRAVENOUS at 12:38

## 2024-02-11 RX ADMIN — SODIUM CHLORIDE 60 ML: 9 INJECTION, SOLUTION INTRAVENOUS at 15:19

## 2024-02-11 RX ADMIN — IPRATROPIUM BROMIDE AND ALBUTEROL SULFATE 3 ML: .5; 3 SOLUTION RESPIRATORY (INHALATION) at 11:16

## 2024-02-11 RX ADMIN — LISINOPRIL 20 MG: 20 TABLET ORAL at 09:19

## 2024-02-11 RX ADMIN — SULFAMETHOXAZOLE AND TRIMETHOPRIM 1 TABLET: 800; 160 TABLET ORAL at 19:58

## 2024-02-11 RX ADMIN — SODIUM CHLORIDE: 9 INJECTION, SOLUTION INTRAVENOUS at 19:43

## 2024-02-11 RX ADMIN — IOPAMIDOL 51 ML: 755 INJECTION, SOLUTION INTRAVENOUS at 15:18

## 2024-02-11 RX ADMIN — IPRATROPIUM BROMIDE AND ALBUTEROL SULFATE 3 ML: .5; 3 SOLUTION RESPIRATORY (INHALATION) at 07:52

## 2024-02-11 RX ADMIN — IPRATROPIUM BROMIDE AND ALBUTEROL SULFATE 3 ML: .5; 3 SOLUTION RESPIRATORY (INHALATION) at 15:43

## 2024-02-11 RX ADMIN — ATORVASTATIN CALCIUM 20 MG: 10 TABLET, FILM COATED ORAL at 19:58

## 2024-02-11 ASSESSMENT — ACTIVITIES OF DAILY LIVING (ADL)
ADLS_ACUITY_SCORE: 28
ADLS_ACUITY_SCORE: 29
ADLS_ACUITY_SCORE: 28
ADLS_ACUITY_SCORE: 29
ADLS_ACUITY_SCORE: 29
ADLS_ACUITY_SCORE: 28
ADLS_ACUITY_SCORE: 28
ADLS_ACUITY_SCORE: 29
ADLS_ACUITY_SCORE: 28
ADLS_ACUITY_SCORE: 28

## 2024-02-11 NOTE — PLAN OF CARE
2/10/2024 3939 - [id number]    Orientation: A&Ox4  Activity: SBA  Diet/BS Checks: Tolerating regular diet without problems; no reports of nausea this shift  Tele: ST  IV Access/Drains: PIV infusing NS at 75mL/hr  Pain Management: Denies  Abnormal VS/Results: VSS; 2L NC at rest, 5-6L with any activity, LOPEZ; tachycardic  Bowel/Bladder: continent; voiding adequately, no BM this shift  Skin/Wounds: Scattered bruising  Consults: Pulm, IR  D/C Disposition: pending progress/plan  Other information:  transition to PO prednisone in the AM

## 2024-02-11 NOTE — CONSULTS
Care Management Initial Consult    General Information  Assessment completed with: Patient, Family, Patient and daughter Karie  Type of CM/SW Visit: Initial Assessment    Primary Care Provider verified and updated as needed: Yes   Readmission within the last 30 days: no previous admission in last 30 days      Reason for Consult: discharge planning  Advance Care Planning:            Communication Assessment  Patient's communication style: spoken language (English or Bilingual)    Hearing Difficulty or Deaf: no   Wear Glasses or Blind: yes    Cognitive  Cognitive/Neuro/Behavioral: WDL                      Living Environment:   People in home: sibling(s)     Current living Arrangements: house      Able to return to prior arrangements: yes  Living Arrangement Comments: Patient lives in the basement of her sisters home. They are worried she will be unable to do steps for much longer.    Family/Social Support:  Care provided by: self  Provides care for: no one  Marital Status:   Children, Sibling(s)          Description of Support System: Supportive, Involved    Support Assessment: Adequate family and caregiver support, Adequate social supports    Current Resources:   Patient receiving home care services: No     Community Resources: None  Equipment currently used at home: none  Supplies currently used at home:      Employment/Financial:  Employment Status: employed full-time     Employment/ Comments: Per daughter this will force patient into [employer]  Financial Concerns:             Does the patient's insurance plan have a 3 day qualifying hospital stay waiver?  No    Lifestyle & Psychosocial Needs:  Social Determinants of Health     Food Insecurity: Low Risk  (2/1/2024)    Food Insecurity     Within the past 12 months, did you worry that your food would run out before you got money to buy more?: No     Within the past 12 months, did the food you bought just not last and you didn t have money to get  more?: No   Depression: Not at risk (2/1/2024)    PHQ-2     PHQ-2 Score: 1   Housing Stability: Low Risk  (2/1/2024)    Housing Stability     Do you have housing? : Yes     Are you worried about losing your housing?: No   Tobacco Use: Medium Risk (2/1/2024)    Patient History     Smoking Tobacco Use: Former     Smokeless Tobacco Use: Never     Passive Exposure: Not on file   Financial Resource Strain: Low Risk  (2/1/2024)    Financial Resource Strain     Within the past 12 months, have you or your family members you live with been unable to get utilities (heat, electricity) when it was really needed?: No   Alcohol Use: Not At Risk (11/10/2021)    AUDIT-C     Frequency of Alcohol Consumption: Monthly or less     Average Number of Drinks: 1 or 2     Frequency of Binge Drinking: Never   Transportation Needs: Low Risk  (2/1/2024)    Transportation Needs     Within the past 12 months, has lack of transportation kept you from medical appointments, getting your medicines, non-medical meetings or appointments, work, or from getting things that you need?: No   Physical Activity: Sufficiently Active (11/10/2021)    Exercise Vital Sign     Days of Exercise per Week: 5 days     Minutes of Exercise per Session: 30 min   Interpersonal Safety: Low Risk  (2/1/2024)    Interpersonal Safety     Do you feel physically and emotionally safe where you currently live?: Yes     Within the past 12 months, have you been hit, slapped, kicked or otherwise physically hurt by someone?: No     Within the past 12 months, have you been humiliated or emotionally abused in other ways by your partner or ex-partner?: No   Stress: No Stress Concern Present (11/10/2021)    [employer] - Occupational Stress Questionnaire     Feeling of Stress : Not at all   Social Connections: Socially Isolated (11/10/2021)    Social Connection and Isolation Panel [NHANES]     Frequency of Communication with Friends and Family: More than three  times a week     Frequency of Social Gatherings with Friends and Family: Once a week     Attends Jew Services: Never     Active Member of Clubs or Organizations: No     Attends Club or Organization Meetings: Not on file     Marital Status:        Functional Status:  Prior to admission patient needed assistance:              Mental Health Status:          Chemical Dependency Status:                Values/Beliefs:  Spiritual, Cultural Beliefs, Jew Practices, Values that affect care:                 Additional Information:  Writer received consult. Per H&P, patient is a 68 year old female admitted on 2/8/2024. She presents with worsening shortness of breath. Writer received note that patients daughter would like to talk with SW.     Writer briefly met with patient while she was walking with a nursing assistant and introduced self and role. Writer called patients daughter Karie and introduced self and role. Kaire states that patient is currently living with her sister in the basement and they are worried she will not be able to do many steps much longer. Karie would like cancer resources and grants for patient as well as hospital  to help patient sign up for Social Security and Medicare. Writer explained that because those take time and follow up the Outpatient  can follow up on those items. Writer provided Karie the number to [facility] as well as [facility] to discuss housing, possible MA, social Security, and financial assistance. Karie was very thankful for this. Writer let her know that our team will follow through her hospital stay and assist in discharge planning when the time comes.     YURY Dumas

## 2024-02-11 NOTE — PLAN OF CARE
Goal Outcome Evaluation:    Orientation: A&Ox4  Activity: SBA  Diet/BS Checks: regular. C/o nausea with small amount of emesis this AM, PRN zofran effective  Tele: ST  IV Access/Drains: PIV infusing NS at 75mL/hr with intermittent rocephin  Pain Management: Denies  Abnormal VS/Results: WBC 21.1. HR up to 188 with activity, otherwise 100-120. On 2L NC entire shift. LS dim to LLL,  LOPEZ/frequent congested cough, sputum sample pending  Bowel/Bladder: continent  Skin/Wounds: Scattered bruising  Consults: Pulm, IR  D/C Disposition: 1-2 days pending progress/plan

## 2024-02-11 NOTE — PLAN OF CARE
Goal Outcome Evaluation:       Patient remains stable with vitals in the normal range. On 2 LPM of oxygen saturating 97%. Alert and oriented.  Denies pain. Up in the room with assist of one.

## 2024-02-11 NOTE — PROGRESS NOTES
[facility]    Hospitalist Progress Note    Date of Admission:  2/8/2024    Assessment & Plan   Jasmyn Green is a 68 year old female admitted on 2/8/2024. She presents with worsening shortness of breath.     Acute COPD exacerbation (H)  Sepsis possibly due to pneumonia, Stenotrophomonas maltophilia in sputum culture  Acute on chronic hypoxic hypercapnic respiratory failure secondary to above  Lung mass with lymphadenopathy concerning for primary lung malignancy  Former smoker  Sinus tachycardia  Lactic acidosis  Patient follows with pulmonology at [facility]. Has severe obstructive lung disease (FEV1 29%), with hyperinflation and air-trapping.  Severe diffusion capacity defect (DLCO 30%). Radiographic emphysema.  Presenting with 1 day history of worsening dyspnea both with exertion and at rest and requiring higher supplemental oxygen at home.  CT on admission showed bulky AP and left hilar adenopathy with irregular pleural-based anterior left upper lobe nodule with adjacent smaller satellite nodules. Findings suspicious for a primary lung cancer. Nodular thickening of the left adrenal gland is likely unchanged. Extensive emphysema. Of note, she had a CT chest as part of lung cancer screening last summer that showed no evidence of malignancy.  Admission labs: WBC 22.8, Hb 11.1, lactic acid 0.8.  -Admit to inpatient  -Pulmonology consulted  -DuoNebs 4 times daily, as needed albuterol nebs  -IV Solu-Medrol  -Supplemental O2 as needed with oximetry  -2/9: Patient with sinus tachycardia at admission.  EKG obtained that confirmed this, rates in the 130s.  Improved for a while and then sustaining again in the 120s in the afternoon of 2/9.  Sepsis BPA fired, repeat lactic acid elevated to 3.7.  Procalcitonin added on this morning elevated at 4.09.  Given 500 cc IV NS bolus, started on maintenance IV NS at 75 cc/h.  BP stable.  Placed on telemetry.  Blood cultures already obtained at admission NGTD.   She had received 1 g IV ceftriaxone in the ED, also started on azithromycin.  Continue on 2 g IV ceftriaxone every 24 hours, oral azithromycin daily to cover possible pneumonia causing sepsis.  Added on LFTs.  Check sputum culture, Legionella antigen, UA with reflex to culture.  Administer more IVF as needed, repeat lactic acid in 3 hours.  -Discussed with pulmonology: Plan for EBUS with biopsy as outpatient.  Outpatient PET scan ordered.  Transition methylprednisolone to prednisone likely on 2/10.  -2/10: Lactic acidosis resolved.  Afebrile overnight.  Continues on IV hydration.  Strep and Legionella antigens negative.  Sputum culture pending.  Continue on IV ceftriaxone and azithromycin.  Remains tachycardic.  Resume lisinopril.  -Leukocytosis likely due to steroids.  Monitor.  -2/11: WBC improved to 16.  Sputum culture growing Stenotrophomonas maltophilia.  Will treat as true infection given elevated procalcitonin, fever and immunocompromise state while on steroids and likely has active malignancy.  Continue on ceftriaxone, switch azithromycin to Bactrim to cover the stenotrophomonas.  Await sensitivities.    Essential hypertension  HLD  -Continue prior to admission aspirin and Lipitor     Elevated troponin  Troponin 28 at admission.  Recheck troponin remained flat.  No chest pain.  -Telemetry    Intermittent nausea  Patient reports having occasional random episodes of nausea about 2 or 3 times a month that come on without any trigger.  She had an episode of nausea with emesis on 2/10.  Continue to monitor.  Has history of hiatal hernia but nothing visualized on chest CT at admission.    -As needed Zofran available.   -2/11: She again became nauseated after lunch and vomited.  Will obtain CT abdomen for pelvis with contrast for further eval.  Consider GI consult pending progress.    Diet: Combination Diet Regular Diet Adult    DVT Prophylaxis: Pneumatic Compression Devices  Vick Catheter: Not present  Lines:  None     Cardiac Monitoring: None  Code Status: Full Code         Disposition: Possible discharge home in the next 1-2 days pending clinical course.    Medical Decision Making       Please see A&P for additional details of medical decision making.        Clinically Significant Risk Factors              # Hypoalbuminemia: Lowest albumin = 3.1 g/dL at 2/9/2024  4:49 PM, will monitor as appropriate     # Hypertension: Noted on problem list         # Moderate Malnutrition: based on nutrition assessment, PRESENT ON ADMISSION   # COPD: noted on problem list          Deb Turner MD  Text Page (7am - 6pm, M-F)  [facility]  Securely message with the Vocera Web Console (learn more here)  Text page via [employer] Paging/Directory      Interval History   Patient was stable overnight.  This morning tolerated breakfast well.  However after few bites of lunch she apparently became randomly nauseous and vomited.  Also this morning when she coughed up phlegm, she thought it might have been blood-tinged.  She denies any chest pain or abdominal pain.  Agrees to CT scan of abdomen.  Had bowel movement this morning.  No fever.  Breathing is stable.    -Data reviewed today: I reviewed all new labs and imaging results over the last 24 hours. I personally reviewed EKG with result as noted above, CXR with result as noted above, and CT scan with result as noted above    Physical Exam   Temp: 98.3  F (36.8  C) Temp src: Oral BP: (!) 146/73 Pulse: 117   Resp: 22 SpO2: 94 % O2 Device: Nasal cannula Oxygen Delivery: 2 LPM  Vitals:    02/09/24 0633 02/11/24 0424   Weight: 40.6 kg (89 lb 8.1 oz) 46 kg (101 lb 8 oz)     Vital Signs with Ranges  Temp:  [97  F (36.1  C)-98.3  F (36.8  C)] 98.3  F (36.8  C)  Pulse:  [107-188] 117  Resp:  [18-22] 22  BP: (131-156)/(71-87) 146/73  SpO2:  [94 %-97 %] 94 %  I/O last 3 completed shifts:  In: 2951 [P.O.:360; I.V.:2591]  Out: 1650 [Urine:1650]    Constitutional: Alert, not in  distress but dyspneic with conversation  Respiratory: Mildly labored breathing, poor air movement bilaterally, no significant wheezes or crackles, was on nasal cannula oxygen  Cardiovascular: Heart sounds regular rate and rhythm, tachycardic, no murmurs, no leg edema  GI: Abdomen is soft, non distended, non tender. Normal BS  Skin/Integumen: no rashes, no pressure sores  Neuro: alert, converses appropriately, moving all extremities, fluent speech, no facial asymmetry  Psych: mood and affect appropriate      Medications    sodium chloride 75 mL/hr at 02/11/24 0631      atorvastatin  20 mg Oral Daily    azithromycin  250 mg Oral Daily    cefTRIAXone  2 g Intravenous Q24H    enoxaparin ANTICOAGULANT  30 mg Subcutaneous Q24H    ipratropium - albuterol 0.5 mg/2.5 mg/3 mL  3 mL Nebulization 4x daily    lisinopril  20 mg Oral Daily    predniSONE  40 mg Oral Daily    sodium chloride (PF)  3 mL Intracatheter Q8H       Data   Recent Labs   Lab 02/11/24  0812 02/10/24  0917 02/09/24  1649 02/09/24  0625 02/08/24  0910   WBC 16.0* 21.1*  --  14.2* 22.8*   HGB  --  9.4*  --  9.4* 11.1*   MCV  --  98  --  97 96   PLT  --  256  --  245 296    145  --  139 139   POTASSIUM 4.9 4.3  --  4.6 4.8   CHLORIDE 116* 114*  --  105 104   CO2 24 23  --  25 22   BUN 30.6* 32.3*  --  25.0* 29.0*   CR 0.67 0.73  --  0.69 0.90   ANIONGAP 5* 8  --  9 13   FLOR 7.7* 7.8*  --  8.2* 9.2   GLC 97 273*  --  180* 148*   ALBUMIN  --   --  3.1*  --   --    PROTTOTAL  --   --  5.9*  --   --    BILITOTAL  --   --  <0.2  --   --    ALKPHOS  --   --  115  --   --    ALT  --   --  46  --   --    AST  --   --  17  --   --        Imaging  No results found for this or any previous visit (from the past 24 hour(s)).

## 2024-02-12 ENCOUNTER — APPOINTMENT (OUTPATIENT)
Dept: GENERAL RADIOLOGY | Facility: CLINIC | Age: 69
End: 2024-02-12
Attending: HOSPITALIST
Payer: COMMERCIAL

## 2024-02-12 LAB
ANION GAP SERPL CALCULATED.3IONS-SCNC: 6 MMOL/L (ref 7–15)
BUN SERPL-MCNC: 25.6 MG/DL (ref 8–23)
CALCIUM SERPL-MCNC: 8.1 MG/DL (ref 8.8–10.2)
CHLORIDE SERPL-SCNC: 111 MMOL/L (ref 98–107)
CREAT SERPL-MCNC: 0.7 MG/DL (ref 0.51–0.95)
DEPRECATED HCO3 PLAS-SCNC: 25 MMOL/L (ref 22–29)
EGFRCR SERPLBLD CKD-EPI 2021: >90 ML/MIN/1.73M2
GLUCOSE SERPL-MCNC: 187 MG/DL (ref 70–99)
NT-PROBNP SERPL-MCNC: 2768 PG/ML (ref 0–900)
POTASSIUM SERPL-SCNC: 5.5 MMOL/L (ref 3.4–5.3)
SODIUM SERPL-SCNC: 142 MMOL/L (ref 135–145)
TROPONIN T SERPL HS-MCNC: 25 NG/L

## 2024-02-12 PROCEDURE — 120N000001 HC R&B MED SURG/OB

## 2024-02-12 PROCEDURE — 71045 X-RAY EXAM CHEST 1 VIEW: CPT

## 2024-02-12 PROCEDURE — 82565 ASSAY OF CREATININE: CPT | Performed by: HOSPITALIST

## 2024-02-12 PROCEDURE — 250N000013 HC RX MED GY IP 250 OP 250 PS 637: Performed by: HOSPITALIST

## 2024-02-12 PROCEDURE — 82374 ASSAY BLOOD CARBON DIOXIDE: CPT | Performed by: HOSPITALIST

## 2024-02-12 PROCEDURE — 250N000012 HC RX MED GY IP 250 OP 636 PS 637: Performed by: HOSPITALIST

## 2024-02-12 PROCEDURE — 36415 COLL VENOUS BLD VENIPUNCTURE: CPT | Performed by: HOSPITALIST

## 2024-02-12 PROCEDURE — 94640 AIRWAY INHALATION TREATMENT: CPT | Mod: 76

## 2024-02-12 PROCEDURE — 250N000011 HC RX IP 250 OP 636: Performed by: HOSPITALIST

## 2024-02-12 PROCEDURE — 84484 ASSAY OF TROPONIN QUANT: CPT | Performed by: HOSPITALIST

## 2024-02-12 PROCEDURE — 250N000011 HC RX IP 250 OP 636: Performed by: STUDENT IN AN ORGANIZED HEALTH CARE EDUCATION/TRAINING PROGRAM

## 2024-02-12 PROCEDURE — 999N000157 HC STATISTIC RCP TIME EA 10 MIN

## 2024-02-12 PROCEDURE — 250N000009 HC RX 250: Performed by: STUDENT IN AN ORGANIZED HEALTH CARE EDUCATION/TRAINING PROGRAM

## 2024-02-12 PROCEDURE — 250N000013 HC RX MED GY IP 250 OP 250 PS 637: Performed by: STUDENT IN AN ORGANIZED HEALTH CARE EDUCATION/TRAINING PROGRAM

## 2024-02-12 PROCEDURE — 94640 AIRWAY INHALATION TREATMENT: CPT

## 2024-02-12 PROCEDURE — 99233 SBSQ HOSP IP/OBS HIGH 50: CPT | Performed by: HOSPITALIST

## 2024-02-12 PROCEDURE — 83880 ASSAY OF NATRIURETIC PEPTIDE: CPT | Performed by: HOSPITALIST

## 2024-02-12 PROCEDURE — 258N000003 HC RX IP 258 OP 636: Performed by: HOSPITALIST

## 2024-02-12 RX ORDER — SULFAMETHOXAZOLE/TRIMETHOPRIM 800-160 MG
1 TABLET ORAL 3 TIMES DAILY
Status: DISCONTINUED | OUTPATIENT
Start: 2024-02-12 | End: 2024-02-12

## 2024-02-12 RX ORDER — ALPRAZOLAM 0.25 MG
0.25 TABLET ORAL
Status: DISCONTINUED | OUTPATIENT
Start: 2024-02-12 | End: 2024-02-15 | Stop reason: HOSPADM

## 2024-02-12 RX ORDER — FUROSEMIDE 10 MG/ML
20 INJECTION INTRAMUSCULAR; INTRAVENOUS ONCE
Status: COMPLETED | OUTPATIENT
Start: 2024-02-12 | End: 2024-02-12

## 2024-02-12 RX ORDER — LEVOFLOXACIN 750 MG/1
750 TABLET, FILM COATED ORAL EVERY EVENING
Status: DISCONTINUED | OUTPATIENT
Start: 2024-02-12 | End: 2024-02-13

## 2024-02-12 RX ADMIN — SULFAMETHOXAZOLE AND TRIMETHOPRIM 1 TABLET: 800; 160 TABLET ORAL at 15:36

## 2024-02-12 RX ADMIN — IPRATROPIUM BROMIDE AND ALBUTEROL SULFATE 3 ML: .5; 3 SOLUTION RESPIRATORY (INHALATION) at 07:13

## 2024-02-12 RX ADMIN — ATORVASTATIN CALCIUM 20 MG: 10 TABLET, FILM COATED ORAL at 20:56

## 2024-02-12 RX ADMIN — IPRATROPIUM BROMIDE AND ALBUTEROL SULFATE 3 ML: .5; 3 SOLUTION RESPIRATORY (INHALATION) at 15:24

## 2024-02-12 RX ADMIN — FUROSEMIDE 20 MG: 10 INJECTION, SOLUTION INTRAMUSCULAR; INTRAVENOUS at 15:40

## 2024-02-12 RX ADMIN — LEVOFLOXACIN 750 MG: 750 TABLET, FILM COATED ORAL at 20:56

## 2024-02-12 RX ADMIN — PREDNISONE 40 MG: 20 TABLET ORAL at 10:54

## 2024-02-12 RX ADMIN — MELATONIN 5 MG TABLET 5 MG: at 20:56

## 2024-02-12 RX ADMIN — SULFAMETHOXAZOLE AND TRIMETHOPRIM 1 TABLET: 800; 160 TABLET ORAL at 10:54

## 2024-02-12 RX ADMIN — ENOXAPARIN SODIUM 30 MG: 30 INJECTION SUBCUTANEOUS at 20:56

## 2024-02-12 RX ADMIN — SODIUM CHLORIDE: 9 INJECTION, SOLUTION INTRAVENOUS at 05:52

## 2024-02-12 RX ADMIN — IPRATROPIUM BROMIDE AND ALBUTEROL SULFATE 3 ML: .5; 3 SOLUTION RESPIRATORY (INHALATION) at 20:11

## 2024-02-12 RX ADMIN — IPRATROPIUM BROMIDE AND ALBUTEROL SULFATE 3 ML: .5; 3 SOLUTION RESPIRATORY (INHALATION) at 11:04

## 2024-02-12 RX ADMIN — LISINOPRIL 20 MG: 20 TABLET ORAL at 10:54

## 2024-02-12 ASSESSMENT — ACTIVITIES OF DAILY LIVING (ADL)
ADLS_ACUITY_SCORE: 25
ADLS_ACUITY_SCORE: 26
ADLS_ACUITY_SCORE: 25
ADLS_ACUITY_SCORE: 25
ADLS_ACUITY_SCORE: 28
ADLS_ACUITY_SCORE: 25
ADLS_ACUITY_SCORE: 28
ADLS_ACUITY_SCORE: 25
ADLS_ACUITY_SCORE: 26
ADLS_ACUITY_SCORE: 26

## 2024-02-12 NOTE — PROGRESS NOTES
Switched antibiotics to levofloxacin [facility]    Hospitalist Progress Note    Date of Admission:  2/8/2024    Assessment & Plan   Jasmyn Green is a 68 year old female admitted on 2/8/2024. She presents with worsening shortness of breath.     Acute COPD exacerbation (H)  Sepsis possibly due to pneumonia, Stenotrophomonas maltophilia in sputum culture  Acute on chronic hypoxic hypercapnic respiratory failure secondary to above  Lung mass with lymphadenopathy concerning for primary lung malignancy  Former smoker  Sinus tachycardia  Lactic acidosis  Pulmonary edema, iatrogenic likely due to IVF  Patient follows with pulmonology at [facility]. Has severe obstructive lung disease (FEV1 29%), with hyperinflation and air-trapping.  Severe diffusion capacity defect (DLCO 30%). Radiographic emphysema.  Presenting with 1 day history of worsening dyspnea both with exertion and at rest and requiring higher supplemental oxygen at home.  CT on admission showed bulky AP and left hilar adenopathy with irregular pleural-based anterior left upper lobe nodule with adjacent smaller satellite nodules. Findings suspicious for a primary lung cancer. Nodular thickening of the left adrenal gland is likely unchanged. Extensive emphysema. Of note, she had a CT chest as part of lung cancer screening last summer that showed no evidence of malignancy.  Admission labs: WBC 22.8, Hb 11.1, lactic acid 0.8.  -Admit to inpatient  -Pulmonology consulted  -DuoNebs 4 times daily, as needed albuterol nebs  -IV Solu-Medrol  -Supplemental O2 as needed with oximetry  -2/9: Patient with sinus tachycardia at admission.  EKG obtained that confirmed this, rates in the 130s.  Improved for a while and then sustaining again in the 120s in the afternoon of 2/9.  Sepsis BPA fired, repeat lactic acid elevated to 3.7.  Procalcitonin added on this morning elevated at 4.09.  Given 500 cc IV NS bolus, started on maintenance IV NS at 75 cc/h.  BP  stable.  Placed on telemetry.  Blood cultures already obtained at admission NGTD.  She had received 1 g IV ceftriaxone in the ED, also started on azithromycin.  Continue on 2 g IV ceftriaxone every 24 hours, oral azithromycin daily to cover possible pneumonia causing sepsis.  Added on LFTs.  Check sputum culture, Legionella antigen, UA with reflex to culture.  Administer more IVF as needed, repeat lactic acid in 3 hours.  -Discussed with pulmonology: Plan for EBUS with biopsy as outpatient.  Outpatient PET scan ordered.  Transition methylprednisolone to prednisone likely on 2/10.  -2/10: Lactic acidosis resolved.  Afebrile overnight.  Continues on IV hydration.  Strep and Legionella antigens negative.  Sputum culture pending.  Continue on IV ceftriaxone and azithromycin.  Remains tachycardic.  Resume lisinopril.  -Leukocytosis likely due to steroids.  Monitor.  -2/11: WBC improved to 16.  Sputum culture growing Stenotrophomonas maltophilia.  Will treat as true infection given elevated procalcitonin, fever and immunocompromise state while on steroids and likely has active malignancy.  Continue on ceftriaxone, switch azithromycin to Bactrim to cover the stenotrophomonas.  Await sensitivities.  -2/12: Feeling more dyspneic, O2 requirements mostly stable.  CXR pending.  Noted BNP elevated to 2768, it was 774 on 2/8.  Likely due to IVF over last 2 days.  Stop IVF.  Administer 20 Mg IV Lasix.  Troponin stable..  Discontinue ceftriaxone and Bactrim given hyperkalemia.    Essential hypertension  HLD  -Continue prior to admission aspirin and Lipitor     Elevated troponin  Troponin 28 at admission.  Recheck troponin remained flat.  No chest pain.  -Telemetry    Intermittent nausea  Patient reports having occasional random episodes of nausea about 2 or 3 times a month that come on without any trigger.  She had an episode of nausea with emesis on 2/10.  Continue to monitor.  Has history of hiatal hernia but nothing visualized  on chest CT at admission.    -As needed Zofran available.   -2/11: She again became nauseated after lunch and vomited.  CT abdomen pelvis obtained-no acute findings.   -2/12: No nausea today.  If recurrent nausea, consider GI consult.  Noted edema and subcutaneous tissues on abdominal CT-likely due to hypoalbuminemia and malnutrition.  Getting Lasix today.    Hyperkalemia  Noted potassium 5.5 on 2/12.  Recheck tomorrow.  Hold lisinopril.    Diet: Combination Diet Regular Diet Adult    DVT Prophylaxis: Pneumatic Compression Devices  Vick Catheter: Not present  Lines: None     Cardiac Monitoring: None  Code Status: Full Code         Disposition: Possible discharge home in the next 1-2 days pending clinical course.    Medical Decision Making       Please see A&P for additional details of medical decision making.        Clinically Significant Risk Factors        # Hyperkalemia: Highest K = 5.5 mmol/L in last 2 days, will monitor as appropriate       # Hypoalbuminemia: Lowest albumin = 3.1 g/dL at 2/9/2024  4:49 PM, will monitor as appropriate     # Hypertension: Noted on problem list         # Moderate Malnutrition: based on nutrition assessment, PRESENT ON ADMISSION   # COPD: noted on problem list          Deb Turner MD  Text Page (7am - 6pm, M-F)  [facility]  Securely message with the Vocera Web Console (learn more here)  Text page via [employer] Paging/Directory      Interval History   Patient is not sleeping well at night.  Asks for sleeping medication.  Is also feeling much more short of breath today.  She had gotten up and moved around this morning but tells me that she did not move more than usual.  Feeling quite weak and tired.  No fevers, no chest pain.  Discussed at length with patient and her sister on phone.    -Data reviewed today: I reviewed all new labs and imaging results over the last 24 hours. I personally reviewed EKG with result as noted above, CXR with result as noted  above, and CT scan with result as noted above    Physical Exam   Temp: 98.3  F (36.8  C) Temp src: Oral BP: 138/75 Pulse: 116   Resp: 22 SpO2: 95 % O2 Device: Nasal cannula Oxygen Delivery: 2 LPM  Vitals:    02/09/24 0633 02/11/24 0424   Weight: 40.6 kg (89 lb 8.1 oz) 46 kg (101 lb 8 oz)     Vital Signs with Ranges  Temp:  [96.9  F (36.1  C)-98.3  F (36.8  C)] 98.3  F (36.8  C)  Pulse:  [105-117] 116  Resp:  [18-22] 22  BP: (138-169)/(73-95) 138/75  SpO2:  [94 %-99 %] 95 %  I/O last 3 completed shifts:  In: 814 [P.O.:360; I.V.:454]  Out: 1675 [Urine:1675]    Constitutional: Alert, not in distress but more dyspneic with conversation, appears cachectic on exam/thin and frail  Respiratory: Mildly labored breathing, poor air movement bilaterally, no significant wheezes or crackles, was on nasal cannula oxygen  Cardiovascular: Heart sounds regular rate and rhythm, tachycardic, no murmurs, no leg edema  GI: Abdomen is soft, non distended, non tender. Normal BS  Skin/Integumen: no rashes, no pressure sores  Neuro: alert, converses appropriately, moving all extremities, fluent speech, no facial asymmetry  Psych: mood and affect appropriate      Medications        atorvastatin  20 mg Oral Daily    cefTRIAXone  2 g Intravenous Q24H    enoxaparin ANTICOAGULANT  30 mg Subcutaneous Q24H    ipratropium - albuterol 0.5 mg/2.5 mg/3 mL  3 mL Nebulization 4x daily    lisinopril  20 mg Oral Daily    melatonin  5 mg Oral At Bedtime    predniSONE  40 mg Oral Daily    sodium chloride (PF)  3 mL Intracatheter Q8H    sulfamethoxazole-trimethoprim  1 tablet Oral BID       Data   Recent Labs   Lab 02/12/24  1340 02/11/24  0812 02/10/24  0917 02/09/24  1649 02/09/24  0625 02/08/24  0910   WBC  --  16.0* 21.1*  --  14.2* 22.8*   HGB  --   --  9.4*  --  9.4* 11.1*   MCV  --   --  98  --  97 96   PLT  --   --  256  --  245 296    145 145  --  139 139   POTASSIUM 5.5* 4.9 4.3  --  4.6 4.8   CHLORIDE 111* 116* 114*  --  105 104   CO2 25 24  23  --  25 22   BUN 25.6* 30.6* 32.3*  --  25.0* 29.0*   CR 0.70 0.67 0.73  --  0.69 0.90   ANIONGAP 6* 5* 8  --  9 13   FLOR 8.1* 7.7* 7.8*  --  8.2* 9.2   * 97 273*  --  180* 148*   ALBUMIN  --   --   --  3.1*  --   --    PROTTOTAL  --   --   --  5.9*  --   --    BILITOTAL  --   --   --  <0.2  --   --    ALKPHOS  --   --   --  115  --   --    ALT  --   --   --  46  --   --    AST  --   --   --  17  --   --    LIPASE  --  63*  --   --   --   --        Imaging  Recent Results (from the past 24 hour(s))   CT Abdomen Pelvis w Contrast    Narrative    EXAM: CT ABDOMEN PELVIS W CONTRAST  LOCATION: [facility]  DATE: 2/11/2024    INDICATION: Intermittent nausea, vomiting, concern for lung cancer that is being worked up now  COMPARISON: 02/08/2024  TECHNIQUE: CT scan of the abdomen and pelvis was performed following injection of IV contrast. Multiplanar reformats were obtained. Dose reduction techniques were used.  CONTRAST: 51 mL Isovue 370    FINDINGS:   LOWER CHEST: Advanced emphysematous changes in the visualized lung bases.    HEPATOBILIARY: Subcentimeter low-attenuation lesion in the anterior left hepatic lobe compatible with a small benign cyst.    PANCREAS: Normal.    SPLEEN: Normal.    ADRENAL GLANDS: Normal.    KIDNEYS/BLADDER: Small benign cyst in the right kidney lower pole. No follow-up needed. Mild cortical thinning or scarring in both kidneys.    BOWEL: Stomach is unremarkable. The small and large intestines are normal caliber. Mildly increased stool throughout the colon. Mild sigmoid diverticulosis without active inflammation.    LYMPH NODES: Normal.    VASCULATURE: Diffuse calcified atheromatous plaque in the abdominal aorta with a small infrarenal abdominal aortic aneurysm measuring 2.3 cm in diameter. Extensive arterial calcified plaque in the iliac arteries.    PELVIC ORGANS: Not well visualized.    MUSCULOSKELETAL: No suspicious lytic or sclerotic lesions. Generalized  edema seen within the adipose tissues.      Impression    IMPRESSION:   1.  No evidence of malignancy in the abdomen or pelvis.  2.  Generalized edema seen within the adipose tissues may indicate volume overload, right heart failure, or third spacing due to other etiology.  3.  Atherosclerosis. Small infrarenal abdominal aortic aneurysm measuring 2.3 cm.

## 2024-02-12 NOTE — PROGRESS NOTES
Patient is alert and oriented x4. VSS ex BP slightly elevated, on 2 L oxygen at rest and 5-6 L with activity. NS at 75 mL/hr. No issues overnight. Rested between cares.

## 2024-02-13 ENCOUNTER — APPOINTMENT (OUTPATIENT)
Dept: PHYSICAL THERAPY | Facility: CLINIC | Age: 69
End: 2024-02-13
Attending: HOSPITALIST
Payer: COMMERCIAL

## 2024-02-13 LAB
ANION GAP SERPL CALCULATED.3IONS-SCNC: 5 MMOL/L (ref 7–15)
BACTERIA BLD CULT: NO GROWTH
BACTERIA BLD CULT: NO GROWTH
BACTERIA SPT CULT: ABNORMAL
BACTERIA SPT CULT: ABNORMAL
BUN SERPL-MCNC: 23.2 MG/DL (ref 8–23)
CALCIUM SERPL-MCNC: 8.9 MG/DL (ref 8.8–10.2)
CHLORIDE SERPL-SCNC: 105 MMOL/L (ref 98–107)
CREAT SERPL-MCNC: 0.89 MG/DL (ref 0.51–0.95)
DEPRECATED HCO3 PLAS-SCNC: 28 MMOL/L (ref 22–29)
EGFRCR SERPLBLD CKD-EPI 2021: 69 ML/MIN/1.73M2
ERYTHROCYTE [DISTWIDTH] IN BLOOD BY AUTOMATED COUNT: 13.9 % (ref 10–15)
GLUCOSE SERPL-MCNC: 77 MG/DL (ref 70–99)
GRAM STAIN RESULT: ABNORMAL
HCT VFR BLD AUTO: 28.9 % (ref 35–47)
HGB BLD-MCNC: 9.1 G/DL (ref 11.7–15.7)
LACTATE SERPL-SCNC: 0.8 MMOL/L (ref 0.7–2)
LACTATE SERPL-SCNC: 3.1 MMOL/L (ref 0.7–2)
MCH RBC QN AUTO: 30.1 PG (ref 26.5–33)
MCHC RBC AUTO-ENTMCNC: 31.5 G/DL (ref 31.5–36.5)
MCV RBC AUTO: 96 FL (ref 78–100)
PLATELET # BLD AUTO: 227 10E3/UL (ref 150–450)
POTASSIUM SERPL-SCNC: 5.7 MMOL/L (ref 3.4–5.3)
POTASSIUM SERPL-SCNC: 5.7 MMOL/L (ref 3.4–5.3)
RBC # BLD AUTO: 3.02 10E6/UL (ref 3.8–5.2)
SODIUM SERPL-SCNC: 138 MMOL/L (ref 135–145)
WBC # BLD AUTO: 17.5 10E3/UL (ref 4–11)

## 2024-02-13 PROCEDURE — 85027 COMPLETE CBC AUTOMATED: CPT | Performed by: HOSPITALIST

## 2024-02-13 PROCEDURE — 250N000009 HC RX 250: Performed by: STUDENT IN AN ORGANIZED HEALTH CARE EDUCATION/TRAINING PROGRAM

## 2024-02-13 PROCEDURE — 36415 COLL VENOUS BLD VENIPUNCTURE: CPT | Performed by: HOSPITALIST

## 2024-02-13 PROCEDURE — 99233 SBSQ HOSP IP/OBS HIGH 50: CPT | Performed by: HOSPITALIST

## 2024-02-13 PROCEDURE — 94640 AIRWAY INHALATION TREATMENT: CPT | Mod: 76

## 2024-02-13 PROCEDURE — 80048 BASIC METABOLIC PNL TOTAL CA: CPT | Performed by: HOSPITALIST

## 2024-02-13 PROCEDURE — 999N000157 HC STATISTIC RCP TIME EA 10 MIN

## 2024-02-13 PROCEDURE — 250N000012 HC RX MED GY IP 250 OP 636 PS 637: Performed by: HOSPITALIST

## 2024-02-13 PROCEDURE — 250N000011 HC RX IP 250 OP 636: Performed by: STUDENT IN AN ORGANIZED HEALTH CARE EDUCATION/TRAINING PROGRAM

## 2024-02-13 PROCEDURE — 97110 THERAPEUTIC EXERCISES: CPT | Mod: GP

## 2024-02-13 PROCEDURE — 83605 ASSAY OF LACTIC ACID: CPT | Performed by: HOSPITALIST

## 2024-02-13 PROCEDURE — 84132 ASSAY OF SERUM POTASSIUM: CPT | Performed by: HOSPITALIST

## 2024-02-13 PROCEDURE — 250N000013 HC RX MED GY IP 250 OP 250 PS 637: Performed by: HOSPITALIST

## 2024-02-13 PROCEDURE — 97161 PT EVAL LOW COMPLEX 20 MIN: CPT | Mod: GP

## 2024-02-13 PROCEDURE — 94640 AIRWAY INHALATION TREATMENT: CPT

## 2024-02-13 PROCEDURE — 97530 THERAPEUTIC ACTIVITIES: CPT | Mod: GP

## 2024-02-13 PROCEDURE — 250N000013 HC RX MED GY IP 250 OP 250 PS 637: Performed by: STUDENT IN AN ORGANIZED HEALTH CARE EDUCATION/TRAINING PROGRAM

## 2024-02-13 PROCEDURE — 258N000003 HC RX IP 258 OP 636: Performed by: HOSPITALIST

## 2024-02-13 PROCEDURE — 120N000001 HC R&B MED SURG/OB

## 2024-02-13 RX ORDER — LEVOFLOXACIN 750 MG/1
750 TABLET, FILM COATED ORAL
Status: DISCONTINUED | OUTPATIENT
Start: 2024-02-14 | End: 2024-02-15 | Stop reason: HOSPADM

## 2024-02-13 RX ORDER — LABETALOL 100 MG/1
100 TABLET, FILM COATED ORAL ONCE
Status: COMPLETED | OUTPATIENT
Start: 2024-02-13 | End: 2024-02-13

## 2024-02-13 RX ADMIN — IPRATROPIUM BROMIDE AND ALBUTEROL SULFATE 3 ML: .5; 3 SOLUTION RESPIRATORY (INHALATION) at 07:59

## 2024-02-13 RX ADMIN — ATORVASTATIN CALCIUM 20 MG: 10 TABLET, FILM COATED ORAL at 20:38

## 2024-02-13 RX ADMIN — PREDNISONE 40 MG: 20 TABLET ORAL at 09:32

## 2024-02-13 RX ADMIN — IPRATROPIUM BROMIDE AND ALBUTEROL SULFATE 3 ML: .5; 3 SOLUTION RESPIRATORY (INHALATION) at 10:45

## 2024-02-13 RX ADMIN — SODIUM CHLORIDE 250 ML: 9 INJECTION, SOLUTION INTRAVENOUS at 11:16

## 2024-02-13 RX ADMIN — MELATONIN 5 MG TABLET 5 MG: at 20:38

## 2024-02-13 RX ADMIN — IPRATROPIUM BROMIDE AND ALBUTEROL SULFATE 3 ML: .5; 3 SOLUTION RESPIRATORY (INHALATION) at 19:56

## 2024-02-13 RX ADMIN — LABETALOL HYDROCHLORIDE 100 MG: 100 TABLET, FILM COATED ORAL at 18:18

## 2024-02-13 RX ADMIN — ENOXAPARIN SODIUM 30 MG: 30 INJECTION SUBCUTANEOUS at 20:38

## 2024-02-13 ASSESSMENT — ACTIVITIES OF DAILY LIVING (ADL)
ADLS_ACUITY_SCORE: 25

## 2024-02-13 NOTE — PLAN OF CARE
Orientation: A&Ox4  Activity: SBA   Diet/BS Checks: Reg  Tele: NSR  IV Access/Drains: L PIV SL  Pain Management: Denies  Abnormal VS/Results: Tachy, HTN, K 5.5, trop 25, BNP 2768. 2L O2 at rest, 5-6L with ambulation   Bowel/Bladder: Cont b/b  Skin/Wounds:   Consults: Pulm, PT, SW  D/C Disposition: Per MD note: possible discharge home in the next 1-2 days pending clinical course    Other Info:   -Chest xray completed 2/12, see results

## 2024-02-13 NOTE — PLAN OF CARE
Goal Outcome Evaluation:    Orientation: A&Ox4  Activity: SBA in Rm  Diet/BS Checks: Reg  Tele: Sinus Tach  IV Access/Drains: PIV SL, intermittent abx  Pain Management: denies pain   Abnormal VS/Results: Lactic 3.1, now 0.8, K+ 5.7  Bowel/Bladder: Continent of B/B; urinary frequency d/t lasix yesterday  Skin/Wounds: scattered bruising  Consults: pulmonolgy, RT following  D/C Disposition: pending  Other Info: BP elevated; PRN labetolol ordered, nebs QID

## 2024-02-13 NOTE — PLAN OF CARE
Goal Outcome Evaluation:      Plan of Care Reviewed With: patient    Overall Patient Progress: improvingOverall Patient Progress: improving    Outcome Evaluation: 1. PO - meet estimated needs  2. Weight - maintain    - Added a Cherry Gel+ with dinner  - Encouraged to keep up the good work with po intake, while emphasizing the importance of protein to preserve lean muscle mass

## 2024-02-13 NOTE — PLAN OF CARE
Physical Therapy Discharge Summary    Reason for therapy discharge:    All goals and outcomes met, no further needs identified.    Progress towards therapy goal(s). See goals on Care Plan in Epic electronic health record for goal details.  Goals met      Therapy recommendation(s):    Pt mobilizing at recent baseline, will DC to [facility], no further skilled IP PT needs. Recommend pt continues to ambulate during IP stay. Pt to obtain 4ww online at DC, discussed with pt

## 2024-02-13 NOTE — PLAN OF CARE
Goal Outcome Evaluation:     Orientation: A&Ox4  Activity: SBA  Diet/BS Checks: Regular diet  Tele: Sinus tach  IV Access/Drains: PIV SL  Pain Management: Denies  Abnormal VS/Results: VSS, 2L O2 via NC, 5-6L w/ activity. ProBNP 2,768, K+ 5.5  Bowel/Bladder: WNL  Skin/Wounds: Scattered bruising  Consults: Pulm, IR  D/C Disposition: pending progress/plan  Other information: Continues w/ congested cough, dyspnea w/ exertion. IV lasix given today. IVF discontinued per provider.

## 2024-02-13 NOTE — PROVIDER NOTIFICATION
MD Notification    Notified Person: MD    Notified Person Name: Dr. Turner     Notification Date/Time: 2/13/2024 @ 0945    Notification Interaction: [employer] messaging    Purpose of Notification: Sepsis protocol fired for patient in Rm [id number]. WBC improved, but still high. Tachy; patient's baseline. Afebrile.     Orders Received:    Comments:

## 2024-02-13 NOTE — PROGRESS NOTES
CLINICAL NUTRITION SERVICES - REASSESSMENT NOTE    Recommendations Ordered by Registered Dietitian (RD):   Added cherry Gel+ with dinner  Encouraged to keep up the good work with po intake, while emphasizing the importance of protein to preserve lean muscle mass   Malnutrition: 2/9   % Weight Loss:  > 2% in 1 week (severe malnutrition)   % Intake:  </= 50% for >/= 1 month (severe malnutrition)  Subcutaneous Fat Loss:  Orbital region mild depletion and Upper arm region mild depletion  Muscle Loss:  Temporal region mild depletion, Clavicle bone region moderate depletion, Acromion bone region moderate depletion, Patellar region mild depletion, and Anterior thigh region mild depletion  Fluid Retention:  None noted     Malnutrition Diagnosis: Moderate malnutrition  In Context of:  Acute illness or injury on Chronic illness or disease     EVALUATION OF PROGRESS TOWARD GOALS   Diet:  Regular, Ensure clear mixed berry (0.5 portion) with breakfast, and Jermain Ensure shake @ 2 pm snack    Intake/Tolerance:  Jasmyn was resting in bed while reporting that her po intake has improved but some meals/days are better than others. For example, she reported to eat a good breakfast but lunch wasn't so great and missing items she order didn't help (didn't receive lettuce on her sandwich). She said she really likes the Ensure clear mixed berry and Ensure shake. She was also interested in the Cherry Gel+ with dinner.   - Flow sheets show a good or fair appetite and x0-3/day intakes of %.   - [employer] (meal ordering system) shows pt ordering a 3-day average of 2885 kcal and 122 g PRO.    ASSESSED NUTRITION NEEDS:  Dosing Weight 40.6 kg (2/8)  Estimated Energy Needs: [phone] kcals (30-35-45 Kcal/Kg)  Justification: underweight and COPD  Estimated Protein Needs: 6181 grams protein (1.5-2 g pro/Kg)  Justification: Repletion and hypercatabolism with critical illness  Estimated Fluid Needs: 1 mL/kcal or per provider    NEW  FINDINGS:   Weight:   02/13/24 0615 44.4 kg (97 lb 12.8 oz) Standing scale   02/11/24 0424 46 kg (101 lb 8 oz) Standing scale   02/09/24 0633 40.6 kg (89 lb 8.1 oz) Bed scale     GI: LBM x1 on yesterday    Meds:    atorvastatin  20 mg Oral Daily    enoxaparin ANTICOAGULANT  30 mg Subcutaneous Q24H    ipratropium - albuterol 0.5 mg/2.5 mg/3 mL  3 mL Nebulization 4x daily    [START ON 2/14/2024] levofloxacin  750 mg Oral or NG Tube Q48H    [Held by provider] lisinopril  20 mg Oral Daily    melatonin  5 mg Oral At Bedtime    predniSONE  40 mg Oral Daily    sodium chloride (PF)  3 mL Intracatheter Q8H        Labs:   Electrolytes  Sodium (mmol/L)   Date Value   02/13/2024 138     Potassium (mmol/L)   Date Value   02/13/2024 5.7 (H)   10/18/2021 4.4     Magnesium (mg/dL)   Date Value   02/08/2024 2.1     Phosphorus (mg/dL)   Date Value   09/21/2021 3.7    Blood Glucose  Glucose (mg/dL)   Date Value   02/13/2024 77   10/18/2021 89    Renal  Urea Nitrogen (mg/dL)   Date Value   02/13/2024 23.2 (H)   10/18/2021 19     Creatinine (mg/dL)   Date Value   02/13/2024 0.89         Previous Goals:   Consumer 50-75% of 4-6 small meals/snacks a day   Evaluation: Not met  Maintain wt >40.5 kg  Evaluation: Met    Previous Nutrition Diagnosis:   Inadequate oral intake related to COPD exacerbation as evidenced by pt report of intake, 4% wt loss in one week, and muscle and fat wasting.   Evaluation: No change; updated    CURRENT NUTRITION DIAGNOSIS  Inadequate oral intake related to variable appetite as evidenced by % po intake of 0-3 meals/day, 4% wt loss in one week, mild fat loss, and mild muscle wasting.     INTERVENTIONS  Recommendations / Nutrition Prescription  Added a Cherry Gel+ with dinner  Encouraged to keep up the good work with po intake, while emphasizing the importance of protein to preserve lean muscle mass    Implementation  Medical nutrition supplement therapy    Goals  PO - meet estimated needs  Weight -  maintain    MONITORING AND EVALUATION:  Progress towards goals will be monitored and evaluated per protocol and Practice Guidelines    Mitesh Arango RD, LD

## 2024-02-13 NOTE — PLAN OF CARE
02/13/24 1108   Appointment Info   Signing Clinician's Name / Credentials (PT) Olga Lidia Christiansen DPT   Living Environment   Living Environment Comments Pt will DC to dtr home with 2 AGGIE   Self-Care   Usual Activity Tolerance fair   Current Activity Tolerance fair   Fall history within last six months no   Activity/Exercise/Self-Care Comment IND at baseline, home O2 goes to 5 L, pt uses 4-5 L at home w/ activity 3L at rest   General Information   Onset of Illness/Injury or Date of Surgery 02/08/24   Referring Physician Deb Turner MD   Pertinent History of Current Problem (include personal factors and/or comorbidities that impact the POC) Jasmyn Green is a 68 year old female admitted on 2/8/2024. She presents with worsening shortness of breath.   Existing Precautions/Restrictions oxygen therapy device and L/min   Cognition   Affect/Mental Status (Cognition) WNL   Pain Assessment   Patient Currently in Pain No   Posture    Posture Forward head position   Range of Motion (ROM)   ROM Comment WFL   Strength (Manual Muscle Testing)   Strength Comments BLE > 3/5 strength. Use of BUE push off to complete STS transfer   Bed Mobility   Comment, (Bed Mobility) IND   Transfers   Comment, (Transfers) mod I BUE push off   Gait/Stairs (Locomotion)   Comment, (Gait/Stairs) 3' eval, guarded, no AD, SBA   Balance   Balance Comments Fair-good dynamic balance no AD   Sensory Examination   Sensory Perception Comments intact to light touch   Clinical Impression   Criteria for Skilled Therapeutic Intervention Yes, treatment indicated   PT Diagnosis (PT) Impaired IND with community distance ambulation, imparied activity tolerance   Influenced by the following impairments decreased activity tolerance, impaired gross functional strength, impaired high level dynamic balance   Functional limitations due to impairments see above   Clinical Presentation (PT Evaluation Complexity) stable   Clinical Presentation Rationale  clinical judgement   Clinical Decision Making (Complexity) low complexity   Planned Therapy Interventions (PT) gait training;home exercise program;patient/family education;transfer training;progressive activity/exercise   Risk & Benefits of therapy have been explained evaluation/treatment results reviewed;care plan/treatment goals reviewed;risks/benefits reviewed;patient   PT Total Evaluation Time   PT Eval, Low Complexity Minutes (38863) 10   Physical Therapy Goals   PT Frequency One time eval and treatment only   PT Predicted Duration/Target Date for Goal Attainment 02/13/24   PT Goals Transfers;Gait;Aerobic Activity   PT: Transfers Modified independent;Sit to/from stand;Bed to/from chair   PT: Gait Independent;100 feet   PT: Perform aerobic activity with stable cardiovascular response 5 minutes;intermittent activity;ambulation   Interventions   Interventions Quick Adds Therapeutic Activity;Therapeutic Procedure   Therapeutic Procedure/Exercise   Ther. Procedure: strength, endurance, ROM, flexibillity Minutes (55709) 10   Symptoms Noted During/After Treatment fatigue   Treatment Detail/Skilled Intervention Pt engaged in functional hallway ambulation to improve gross strength and activity tolerance. Pt required 6L supp O2, sats 90% with fatigue, HR inc to 140 bpm. Pt ambulated 150', 3 standing rest breaks, PLB technique. NO LOB   Therapeutic Activity   Therapeutic Activities: dynamic activities to improve functional performance Minutes (10063) 10   Treatment Detail/Skilled Intervention Pt seated upon arrival. Performed STS with BUE push off, no AD. PT managed O2 during session. Reviewed PLB technique, pacing activities at DC, obtaining 4ww for energy conservation pt verbalized agreement and will purchase online for energy conservation vs balance. Reviewed importance of ambulating 3-4x/day in hallway, performing seated LAQ, AP for fluid movmenet during IP stay, pt verbalized understanding. Appears close to dilshad  for mobility, declining  PT at DC for activity progression. No further skilled IP PT needs as pt mobilizing at recent baseline. Pt in agreement   PT Discharge Planning   PT Plan DC   PT Discharge Recommendation (DC Rec) home with assist   PT Rationale for DC Rec Pt mobilizing at recent baseline, will DC to [facility], no further skilled IP PT needs. Recommend pt continues to ambulate during IP stay. Pt to obtain 4ww online at DC, discussed with pt   Total Session Time   Timed Code Treatment Minutes 20   Total Session Time (sum of timed and untimed services) 30

## 2024-02-13 NOTE — PROGRESS NOTES
Switched antibiotics to levofloxacin [facility]    Hospitalist Progress Note    Date of Admission:  2/8/2024    Assessment & Plan   Jasmyn Green is a 68 year old female admitted on 2/8/2024. She presents with worsening shortness of breath.     Acute COPD exacerbation (H)  Sepsis possibly due to pneumonia, Stenotrophomonas maltophilia in sputum culture  Acute on chronic hypoxic hypercapnic respiratory failure secondary to above  Lung mass with lymphadenopathy concerning for primary lung malignancy  Former smoker  Sinus tachycardia  Lactic acidosis  Pulmonary edema, iatrogenic likely due to IVF  Patient follows with pulmonology at [facility]. Has severe obstructive lung disease (FEV1 29%), with hyperinflation and air-trapping.  Severe diffusion capacity defect (DLCO 30%). Radiographic emphysema.  Presenting with 1 day history of worsening dyspnea both with exertion and at rest and requiring higher supplemental oxygen at home.  CT on admission showed bulky AP and left hilar adenopathy with irregular pleural-based anterior left upper lobe nodule with adjacent smaller satellite nodules. Findings suspicious for a primary lung cancer. Nodular thickening of the left adrenal gland is likely unchanged. Extensive emphysema. Of note, she had a CT chest as part of lung cancer screening last summer that showed no evidence of malignancy.  Admission labs: WBC 22.8, Hb 11.1, lactic acid 0.8.  -Admit to inpatient  -Pulmonology consulted  -DuoNebs 4 times daily, as needed albuterol nebs  -IV Solu-Medrol  -Supplemental O2 as needed with oximetry  -2/9: Patient with sinus tachycardia at admission.  EKG obtained that confirmed this, rates in the 130s.  Improved for a while and then sustaining again in the 120s in the afternoon of 2/9.  Sepsis BPA fired, repeat lactic acid elevated to 3.7.  Procalcitonin added on this morning elevated at 4.09.  Given 500 cc IV NS bolus, started on maintenance IV NS at 75 cc/h.  BP  stable.  Placed on telemetry.  Blood cultures already obtained at admission NGTD.  She had received 1 g IV ceftriaxone in the ED, also started on azithromycin.  Continue on 2 g IV ceftriaxone every 24 hours, oral azithromycin daily to cover possible pneumonia causing sepsis.  Added on LFTs.  Check sputum culture, Legionella antigen, UA with reflex to culture.  Administer more IVF as needed, repeat lactic acid in 3 hours.  -Discussed with pulmonology: Plan for EBUS with biopsy as outpatient.  Outpatient PET scan ordered.  Transition methylprednisolone to prednisone likely on 2/10.  -2/10: Lactic acidosis resolved.  Afebrile overnight.  Continues on IV hydration.  Strep and Legionella antigens negative.  Sputum culture pending.  Continue on IV ceftriaxone and azithromycin.  Remains tachycardic.  Resume lisinopril.  -Leukocytosis likely due to steroids.  Monitor.  -2/11: WBC improved to 16.  Sputum culture growing Stenotrophomonas maltophilia.  Will treat as true infection given elevated procalcitonin, fever and immunocompromise state while on steroids and likely has active malignancy.  Continue on ceftriaxone, switch azithromycin to Bactrim to cover the stenotrophomonas.  Await sensitivities.  -2/12: Feeling more dyspneic, O2 requirements mostly stable.  CXR pending.  Noted BNP elevated to 2768, it was 774 on 2/8.  Likely due to IVF over last 2 days.  Stop IVF.  Administer 20 Mg IV Lasix.  Troponin stable..  Discontinue ceftriaxone and Bactrim given hyperkalemia.  -2/13: Remains quite dyspneic at rest, oxygen requirements stable.  Remains tachycardic.  Noted to have lactic acidosis again to 3.1.  Given to 50 cc IV NS bolus.  Lactic acidosis resolved.  Also is hyperkalemic at 5.7.  Will reconsult pulmonology given continued shortness of breath.  Also to ask them if EBUS biopsy should be pursued while she is inpatient given prolonged hospitalization per family request.    Essential hypertension  HLD  -Continue prior to  "admission aspirin and Lipitor     Elevated troponin  Troponin 28 at admission.  Recheck troponin remained flat.  No chest pain.  -Telemetry    Intermittent nausea  Patient reports having occasional random episodes of nausea about 2 or 3 times a month that come on without any trigger.  She had an episode of nausea with emesis on 2/10.  Continue to monitor.  Has history of hiatal hernia but nothing visualized on chest CT at admission.    -As needed Zofran available.   -2/11: She again became nauseated after lunch and vomited.  CT abdomen pelvis obtained-no acute findings.   -2/12: No nausea today.  If recurrent nausea, consider GI consult.  Noted edema and subcutaneous tissues on abdominal CT-likely due to hypoalbuminemia and malnutrition.  Getting Lasix today.  -2/13: No nausea    Hyperkalemia  Noted potassium 5.5 on 2/12.  Recheck potassium at 5.7 on 2/13.  Continue to hold lisinopril.  Received to 50 cc NS bolus on 2/13.  Recheck potassium tomorrow.    Goals of care  We discussed overall guarded prognosis with underlying severe COPD and impending diagnosis of likely lung cancer.  At this time patient wants to remain full code but will discuss further with her family.  She notes that she would not want to \"be kept on a ventilator\".    Diet: Combination Diet Regular Diet Adult    DVT Prophylaxis: Pneumatic Compression Devices  Vick Catheter: Not present  Lines: None     Cardiac Monitoring: None  Code Status: Full Code         Disposition: Possible discharge home in the next 1-2 days pending clinical course.    Medical Decision Making       Please see A&P for additional details of medical decision making.        Clinically Significant Risk Factors        # Hyperkalemia: Highest K = 5.7 mmol/L in last 2 days, will monitor as appropriate       # Hypoalbuminemia: Lowest albumin = 3.1 g/dL at 2/9/2024  4:49 PM, will monitor as appropriate     # Hypertension: Noted on problem list         # Moderate Malnutrition: based on " nutrition assessment    # COPD: noted on problem list          Deb Turner MD  Text Page (7am - 6pm, M-F)  [facility]  Securely message with the Vocera Web Console (learn more here)  Text page via [employer] Paging/Directory      Interval History   Patient has been stable overnight.  She slept better after receiving melatonin.  This morning she was struggling quite a bit with shortness of breath while at rest.  Even minimal activity leaves her very dyspneic and it takes her a long time to recover.  We discussed current guarded prognosis with severe COPD, likely lung cancer and we discussed CODE STATUS.  She will discuss further with her family regarding CODE STATUS.  At this time wants to remain full code.  Does not feel ready to discharge home given significant shortness of breath.  No fevers overnight.    -Data reviewed today: I reviewed all new labs and imaging results over the last 24 hours. I personally reviewed EKG with result as noted above, CXR with result as noted above, and CT scan with result as noted above    Physical Exam   Temp: 98.5  F (36.9  C) Temp src: Oral BP: 129/72 Pulse: (!) 122   Resp: 20 SpO2: 94 % O2 Device: Nasal cannula Oxygen Delivery: 2 LPM  Vitals:    02/09/24 0633 02/11/24 0424 02/13/24 0615   Weight: 40.6 kg (89 lb 8.1 oz) 46 kg (101 lb 8 oz) 44.4 kg (97 lb 12.8 oz)     Vital Signs with Ranges  Temp:  [97.7  F (36.5  C)-98.5  F (36.9  C)] 98.5  F (36.9  C)  Pulse:  [108-134] 122  Resp:  [20-22] 20  BP: (129-184)/(72-95) 129/72  SpO2:  [91 %-98 %] 94 %  I/O last 3 completed shifts:  In: -   Out: 2850 [Urine:2850]    Constitutional: Alert, not in distress but quite dyspneic with conversation, appears cachectic on exam/thin and frail  Respiratory: Mildly labored breathing, poor air movement bilaterally, no significant wheezes or crackles, was on nasal cannula oxygen  Cardiovascular: Heart sounds regular rate and rhythm, tachycardic, no murmurs, no leg edema  GI:  Abdomen is soft, non distended, non tender. Normal BS  Skin/Integumen: no rashes, no pressure sores  Neuro: alert, converses appropriately, moving all extremities, fluent speech, no facial asymmetry  Psych: mood and affect appropriate      Medications        atorvastatin  20 mg Oral Daily    enoxaparin ANTICOAGULANT  30 mg Subcutaneous Q24H    ipratropium - albuterol 0.5 mg/2.5 mg/3 mL  3 mL Nebulization 4x daily    [START ON 2/14/2024] levofloxacin  750 mg Oral or NG Tube Q48H    [Held by provider] lisinopril  20 mg Oral Daily    melatonin  5 mg Oral At Bedtime    predniSONE  40 mg Oral Daily    sodium chloride (PF)  3 mL Intracatheter Q8H       Data   Recent Labs   Lab 02/13/24  1305 02/13/24  0852 02/12/24  1340 02/11/24  0812 02/10/24  0917 02/09/24  1649 02/09/24  0625   WBC  --  17.5*  --  16.0* 21.1*  --  14.2*   HGB  --  9.1*  --   --  9.4*  --  9.4*   MCV  --  96  --   --  98  --  97   PLT  --  227  --   --  256  --  245   NA  --  138 142 145 145  --  139   POTASSIUM 5.7* 5.7* 5.5* 4.9 4.3  --  4.6   CHLORIDE  --  105 111* 116* 114*  --  105   CO2  --  28 25 24 23  --  25   BUN  --  23.2* 25.6* 30.6* 32.3*  --  25.0*   CR  --  0.89 0.70 0.67 0.73  --  0.69   ANIONGAP  --  5* 6* 5* 8  --  9   FLOR  --  8.9 8.1* 7.7* 7.8*  --  8.2*   GLC  --  77 187* 97 273*  --  180*   ALBUMIN  --   --   --   --   --  3.1*  --    PROTTOTAL  --   --   --   --   --  5.9*  --    BILITOTAL  --   --   --   --   --  <0.2  --    ALKPHOS  --   --   --   --   --  115  --    ALT  --   --   --   --   --  46  --    AST  --   --   --   --   --  17  --    LIPASE  --   --   --  63*  --   --   --        Imaging  Recent Results (from the past 24 hour(s))   XR Chest Port 1 View    Narrative    CHEST ONE VIEW  2/12/2024 3:19 PM     HISTORY: Shortness of breath.     COMPARISON: CT chest 2/8/2024. Chest radiograph 2/8/2024.      Impression    IMPRESSION: Emphysema. Similar left subpleural nodularity and right  perihilar adenopathy. No new focal  consolidation, pneumothorax or  significant pleural effusion. Mild basilar interstitial opacities  could reflect interstitial edema.     VITO PERRY MD         SYSTEM ID:  [id number]

## 2024-02-14 ENCOUNTER — TELEPHONE (OUTPATIENT)
Dept: PULMONOLOGY | Facility: CLINIC | Age: 69
End: 2024-02-14
Payer: COMMERCIAL

## 2024-02-14 DIAGNOSIS — R59.1 LYMPHADENOPATHY: Primary | ICD-10-CM

## 2024-02-14 LAB — POTASSIUM SERPL-SCNC: 5.2 MMOL/L (ref 3.4–5.3)

## 2024-02-14 PROCEDURE — 250N000012 HC RX MED GY IP 250 OP 636 PS 637: Performed by: HOSPITALIST

## 2024-02-14 PROCEDURE — 94640 AIRWAY INHALATION TREATMENT: CPT

## 2024-02-14 PROCEDURE — 250N000009 HC RX 250: Performed by: STUDENT IN AN ORGANIZED HEALTH CARE EDUCATION/TRAINING PROGRAM

## 2024-02-14 PROCEDURE — 999N000157 HC STATISTIC RCP TIME EA 10 MIN

## 2024-02-14 PROCEDURE — 99233 SBSQ HOSP IP/OBS HIGH 50: CPT | Performed by: HOSPITALIST

## 2024-02-14 PROCEDURE — G0463 HOSPITAL OUTPT CLINIC VISIT: HCPCS

## 2024-02-14 PROCEDURE — 36415 COLL VENOUS BLD VENIPUNCTURE: CPT | Performed by: INTERNAL MEDICINE

## 2024-02-14 PROCEDURE — 94640 AIRWAY INHALATION TREATMENT: CPT | Mod: 76

## 2024-02-14 PROCEDURE — 84132 ASSAY OF SERUM POTASSIUM: CPT | Performed by: INTERNAL MEDICINE

## 2024-02-14 PROCEDURE — 250N000013 HC RX MED GY IP 250 OP 250 PS 637: Performed by: HOSPITALIST

## 2024-02-14 PROCEDURE — 99232 SBSQ HOSP IP/OBS MODERATE 35: CPT | Performed by: INTERNAL MEDICINE

## 2024-02-14 PROCEDURE — 250N000013 HC RX MED GY IP 250 OP 250 PS 637: Performed by: STUDENT IN AN ORGANIZED HEALTH CARE EDUCATION/TRAINING PROGRAM

## 2024-02-14 PROCEDURE — 120N000001 HC R&B MED SURG/OB

## 2024-02-14 RX ORDER — LABETALOL 100 MG/1
100 TABLET, FILM COATED ORAL ONCE
Status: COMPLETED | OUTPATIENT
Start: 2024-02-14 | End: 2024-02-14

## 2024-02-14 RX ORDER — ALBUTEROL SULFATE 0.83 MG/ML
2.5 SOLUTION RESPIRATORY (INHALATION) EVERY 4 HOURS PRN
Status: DISCONTINUED | OUTPATIENT
Start: 2024-02-14 | End: 2024-02-15 | Stop reason: HOSPADM

## 2024-02-14 RX ORDER — BUDESONIDE 0.25 MG/2ML
0.25 INHALANT ORAL 2 TIMES DAILY
Status: DISCONTINUED | OUTPATIENT
Start: 2024-02-14 | End: 2024-02-14

## 2024-02-14 RX ORDER — LABETALOL HYDROCHLORIDE 5 MG/ML
10 INJECTION, SOLUTION INTRAVENOUS
Status: DISCONTINUED | OUTPATIENT
Start: 2024-02-14 | End: 2024-02-15 | Stop reason: HOSPADM

## 2024-02-14 RX ADMIN — MELATONIN 5 MG TABLET 5 MG: at 20:38

## 2024-02-14 RX ADMIN — LEVOFLOXACIN 750 MG: 750 TABLET, FILM COATED ORAL at 19:47

## 2024-02-14 RX ADMIN — IPRATROPIUM BROMIDE AND ALBUTEROL SULFATE 3 ML: .5; 3 SOLUTION RESPIRATORY (INHALATION) at 19:52

## 2024-02-14 RX ADMIN — LABETALOL HYDROCHLORIDE 100 MG: 100 TABLET, FILM COATED ORAL at 12:10

## 2024-02-14 RX ADMIN — IPRATROPIUM BROMIDE AND ALBUTEROL SULFATE 3 ML: .5; 3 SOLUTION RESPIRATORY (INHALATION) at 11:13

## 2024-02-14 RX ADMIN — ATORVASTATIN CALCIUM 20 MG: 10 TABLET, FILM COATED ORAL at 19:47

## 2024-02-14 RX ADMIN — IPRATROPIUM BROMIDE AND ALBUTEROL SULFATE 3 ML: .5; 3 SOLUTION RESPIRATORY (INHALATION) at 14:41

## 2024-02-14 RX ADMIN — IPRATROPIUM BROMIDE AND ALBUTEROL SULFATE 3 ML: .5; 3 SOLUTION RESPIRATORY (INHALATION) at 07:44

## 2024-02-14 RX ADMIN — PREDNISONE 40 MG: 20 TABLET ORAL at 08:25

## 2024-02-14 ASSESSMENT — ACTIVITIES OF DAILY LIVING (ADL)
ADLS_ACUITY_SCORE: 25
ADLS_ACUITY_SCORE: 28
ADLS_ACUITY_SCORE: 29
ADLS_ACUITY_SCORE: 28
ADLS_ACUITY_SCORE: 25
ADLS_ACUITY_SCORE: 31
ADLS_ACUITY_SCORE: 25
ADLS_ACUITY_SCORE: 31
ADLS_ACUITY_SCORE: 28
ADLS_ACUITY_SCORE: 25

## 2024-02-14 NOTE — PROGRESS NOTES
[facility]    Pulmonary, Allergy, Critical Care and Sleep Medicine    Follow-up Note  February 14, 2024         Assessment and Plan:    Jasmyn Green is a 68 year old year old female with medical history significant for COPD (chronic use of 2 L supplemental oxygen at nighttime), coronary artery disease, hypertension who was admitted on 2/8/2024 with worsening shortness of breath, chest pain, and increasing oxygen requirements concerning for COPD exacerbation.  Chest x-ray imaging on arrival was concerning for a left hilar fullness and CT chest showed a pleural-based left upper lobe density as well as a new left hilar mass that measures 6 x 3 cm.  These findings are concerning for a fairly rapidly progressive malignancy given these findings were not present on her June 2023 lung cancer screening CT. She is having symptoms of a COPD exacerbation (increased cough, shortness of breath) and fortunately has had improvement with addition of scheduled bronchodilators and anti-inflammatory.  Nodular density in left upper lobe may be inflammatory versus malignant versus infectious she remains afebrile with improving leukocytosis with current therapy suggesting a lobar pneumonia is less likely.    Pulmonary was reconsulted today to clarify the plans for getting EBUS and biopsy with the patient and her daughter.     COPD (FEV1 0.61 L, 2 L supplemental O2 at nighttime), with exacerbation   New L hilar mass with bulky lymphadenopathy (6 x 3 cm), new ARIANNE pleural-based nodule (2.2 x 1.5 cm)  Prior tobacco use (50 pack years, quit 2021)  Coronary artery disease  HTN      Recommendations as follows:   -Discussed with the patient and her daughter  the reason for her to improve from her COPD exacerbation before undergoing bronchoscopy and biopsy.  -Patient fortunately seems to be recovering from her COPD exacerbation and is down to 2 LPM.  -Discussed her case with the patient's primary pulmonologist   Ollie and she will be scheduled for the biopsy next week as an outpatient.  - agree with outpatient PET scan (ordered)  - continue scheduled bronchodilators  -Continue oral prednisone at 40 mg daily.  Will do a slow taper, 10 mg reduction in the dose every third day and then discontinue.  -Will defer to patient's regular pulmonologist, Dr. Levin about adding further therapies chronically for her frequent exacerbations including Roflumilast and chronic azithromycin.  -Continue triple inhaler therapy on discharge with air duo and add Spiriva.  -Rest of the plan as per the primary team.    KARIE Lema   of Medicine  [facility]  Pulmonary, Allergy, Critical Care and Sleep Medicine  Pager - [phone]             Interval History:     -Patient feels her shortness of breath is much better compared to yesterday.  - Supplemental O2 down to 2-2.5 LPM with O2 sats 95-97%.  - Anxious about her plans for bronchoscopy and biopsy.           Review of Systems:   C: negative for fever, chills, change in weight  INTEGUMENTARY/SKIN: no rash or obvious new lesions  ENT/MOUTH: no sore throat, new sinus pain or nasal drainage  RESP: see interval history  CV: negative for chest pain, palpitations or peripheral edema  GI: no nausea, vomiting, change in stools  MUSCULOSKELETAL: no myalgias, arthralgias          Medications:      atorvastatin  20 mg Oral Daily    enoxaparin ANTICOAGULANT  30 mg Subcutaneous Q24H    ipratropium - albuterol 0.5 mg/2.5 mg/3 mL  3 mL Nebulization 4x daily    levofloxacin  750 mg Oral or NG Tube Q48H    [Held by provider] lisinopril  20 mg Oral Daily    melatonin  5 mg Oral At Bedtime    predniSONE  40 mg Oral Daily    sodium chloride (PF)  3 mL Intracatheter Q8H     acetaminophen, ALPRAZolam, calcium carbonate, guaiFENesin-dextromethorphan, lidocaine 4%, lidocaine (buffered or not buffered), ondansetron, senna-docusate **OR** senna-docusate, sodium chloride (PF)          Physical Exam:   Temp:  [97.9  F (36.6  C)-98.2  F (36.8  C)] 97.9  F (36.6  C)  Pulse:  [] 121  Resp:  [18-24] 24  BP: (123-179)/(69-91) 144/82  SpO2:  [82 %-97 %] 97 %    Intake/Output Summary (Last 24 hours) at 2/14/2024 1319  Last data filed at 2/13/2024 1906  Gross per 24 hour   Intake 240 ml   Output 1150 ml   Net -910 ml     Constitutional:   Awake, alert and in no apparent distress     Eyes:   nonicteric     ENT:    oral mucosa moist without lesions     Neck:   Supple without supraclavicular or cervical lymphadenopathy     Lungs:   Minimal wheezes bilaterally with diminished air entry.     Cardiovascular:   Normal S1 and S2.  RRR.  No murmur, gallop or rub.     Abdomen:   NABS, soft, nontender, nondistended.  No HSM.     Musculoskeletal:   No edema     Neurologic:   Alert and conversant.     Skin:   Warm, dry.  No rash on limited exam.             Data:   All laboratory and imaging data reviewed.    CMP  Recent Labs   Lab 02/14/24  0845 02/13/24  1305 02/13/24  0852 02/12/24  1340 02/11/24  0812 02/10/24  0917 02/09/24  1649 02/09/24  0625 02/08/24  0910   NA  --   --  138 142 145 145  --    < > 139   POTASSIUM 5.2 5.7* 5.7* 5.5* 4.9 4.3  --    < > 4.8   CHLORIDE  --   --  105 111* 116* 114*  --    < > 104   CO2  --   --  28 25 24 23  --    < > 22   ANIONGAP  --   --  5* 6* 5* 8  --    < > 13   GLC  --   --  77 187* 97 273*  --    < > 148*   BUN  --   --  23.2* 25.6* 30.6* 32.3*  --    < > 29.0*   CR  --   --  0.89 0.70 0.67 0.73  --    < > 0.90   GFRESTIMATED  --   --  69 >90 >90 88  --    < > 68   FLOR  --   --  8.9 8.1* 7.7* 7.8*  --    < > 9.2   MAG  --   --   --   --   --   --   --   --  2.1   PROTTOTAL  --   --   --   --   --   --  5.9*  --   --    ALBUMIN  --   --   --   --   --   --  3.1*  --   --    BILITOTAL  --   --   --   --   --   --  <0.2  --   --    ALKPHOS  --   --   --   --   --   --  115  --   --    AST  --   --   --   --   --   --  17  --   --    ALT  --   --   --   --   --   --  46  " --   --     < > = values in this interval not displayed.     CBC  Recent Labs   Lab 02/13/24  0852 02/11/24  0812 02/10/24  0917 02/09/24  0625 02/08/24  0910   WBC 17.5* 16.0* 21.1* 14.2* 22.8*   RBC 3.02*  --  3.09* 3.11* 3.70*   HGB 9.1*  --  9.4* 9.4* 11.1*   HCT 28.9*  --  30.4* 30.2* 35.5   MCV 96  --  98 97 96   MCH 30.1  --  30.4 30.2 30.0   MCHC 31.5  --  30.9* 31.1* 31.3*   RDW 13.9  --  14.1 13.7 13.8     --  256 245 296     INRNo lab results found in last 7 days.  Arterial Blood Gas  Recent Labs   Lab 02/08/24  0916   O2PER 92     Urine Studies    Recent Labs   Lab Test 02/09/24  1931   URINEPH 5.5   NITRITE Negative   LEUKEST Negative   WBCU <1     CMV viral loads  No lab results found.  No results found for: \"CMQNT\", \"CMVQ\"  EBV viral loads   No lab results found.  No results found for: \"EBVDN\", \"EBRES\", \"EBVSP\", \"EBVPC\", \"EBVPCR\"  Respiratory Virus Testing    No results found for: \"RS\", \"FLUAG\"  Sputum Culture last 3 months:  No results found for: \"SDES\" No results found for: \"CULT\"     Attestation:    I personally spent 45 minutes on the date of the encounter doing chart review, history and exam, documentation and further activities per the note.     KARIE Lema   of Medicine  [facility]  Pulmonary, Allergy, Critical Care and Sleep Medicine  Pager - [phone]      "

## 2024-02-14 NOTE — PLAN OF CARE
Goal Outcome Evaluation:      Plan of Care Reviewed With: patient    Overall Patient Progress: no changeOverall Patient Progress: no change    Outcome Evaluation: continued oxygen needs    Pt here with COPD exacerbation, lung mass. A&Ox4. Tele sinus/sinus tach, O2 at 2-3 L at rest this shift, 5L with walks; after ambulation to bathroom needs a few minutes to recover stats. Reg diet, thin liquids. Takes pills whole.  Denies pain. Pt scoring green on the Aggression Stop Light Tool. Plan if stable , possible discharge home tomorrow 2/15, with followup for bronchoscopy with EBUS and biopsy possibly Friday 2/16 .

## 2024-02-14 NOTE — PROGRESS NOTES
Brief Pulm Note  Chart reviewed, patient discussed with pulmonary consultant.    Patient known to me with new lung mass/lymphadenopathy.  Admitted with COPD exacerbation now much improved and would like to get biopsy when possible.    She is very close to discharge, planning on tomorrow per pulm MD.    We have a last minute availability and will book for bronchoscopy with EBUS and biopsy Friday 2/16 at [facility].    If she is not healthy enough for discharge tomorrow 2/15, we should reschedule the procedure.    Ruben Levin MD

## 2024-02-14 NOTE — PLAN OF CARE
Orientation: A&Ox4  Activity: SBA   Diet/BS Checks: Reg  Tele: NSR  IV Access/Drains: L PIV SL  Pain Management: Denies  Abnormal VS/Results: Tachy, HTN, K 5.7, 2L O2 at rest, 5-6L with ambulation   Bowel/Bladder: Cont b/b  Skin/Wounds:   Consults: Pulm, PT, SW  D/C Disposition: Per MD note: possible discharge home in the next 1-2 days pending clinical course

## 2024-02-14 NOTE — PROGRESS NOTES
Switched antibiotics to levofloxacin [facility]    Hospitalist Progress Note    Date of Admission:  2/8/2024    Assessment & Plan   Jasmyn Green is a 68 year old female admitted on 2/8/2024. She presents with worsening shortness of breath.     Acute COPD exacerbation (H)  Sepsis possibly due to pneumonia, Stenotrophomonas maltophilia in sputum culture  Acute on chronic hypoxic hypercapnic respiratory failure secondary to above  Lung mass with lymphadenopathy concerning for primary lung malignancy  Former smoker  Sinus tachycardia  Lactic acidosis  Pulmonary edema, iatrogenic likely due to IVF  Patient follows with pulmonology at [facility]. Has severe obstructive lung disease (FEV1 29%), with hyperinflation and air-trapping.  Severe diffusion capacity defect (DLCO 30%). Radiographic emphysema.  Presenting with 1 day history of worsening dyspnea both with exertion and at rest and requiring higher supplemental oxygen at home.  CT on admission showed bulky AP and left hilar adenopathy with irregular pleural-based anterior left upper lobe nodule with adjacent smaller satellite nodules. Findings suspicious for a primary lung cancer. Nodular thickening of the left adrenal gland is likely unchanged. Extensive emphysema. Of note, she had a CT chest as part of lung cancer screening last summer that showed no evidence of malignancy.  Admission labs: WBC 22.8, Hb 11.1, lactic acid 0.8.  -Admit to inpatient  -Pulmonology consulted  -DuoNebs 4 times daily, as needed albuterol nebs  -IV Solu-Medrol  -Supplemental O2 as needed with oximetry  -2/9: Patient with sinus tachycardia at admission.  EKG obtained that confirmed this, rates in the 130s.  Improved for a while and then sustaining again in the 120s in the afternoon of 2/9.  Sepsis BPA fired, repeat lactic acid elevated to 3.7.  Procalcitonin added on this morning elevated at 4.09.  Given 500 cc IV NS bolus, started on maintenance IV NS at 75 cc/h.  BP  stable.  Placed on telemetry.  Blood cultures already obtained at admission NGTD.  She had received 1 g IV ceftriaxone in the ED, also started on azithromycin.  Continue on 2 g IV ceftriaxone every 24 hours, oral azithromycin daily to cover possible pneumonia causing sepsis.  Added on LFTs.  Check sputum culture, Legionella antigen, UA with reflex to culture.  Administer more IVF as needed, repeat lactic acid in 3 hours.  -Discussed with pulmonology: Plan for EBUS with biopsy as outpatient.  Outpatient PET scan ordered.  Transition methylprednisolone to prednisone likely on 2/10.  -2/10: Lactic acidosis resolved.  Afebrile overnight.  Continues on IV hydration.  Strep and Legionella antigens negative.  Sputum culture pending.  Continue on IV ceftriaxone and azithromycin.  Remains tachycardic.  Resume lisinopril.  -Leukocytosis likely due to steroids.  Monitor.  -2/11: WBC improved to 16.  Sputum culture growing Stenotrophomonas maltophilia.  Will treat as true infection given elevated procalcitonin, fever and immunocompromise state while on steroids and likely has active malignancy.  Continue on ceftriaxone, switch azithromycin to Bactrim to cover the stenotrophomonas.  Await sensitivities.  -2/12: Feeling more dyspneic, O2 requirements mostly stable.  CXR pending.  Noted BNP elevated to 2768, it was 774 on 2/8.  Likely due to IVF over last 2 days.  Stop IVF.  Administer 20 Mg IV Lasix.  Troponin stable..  Discontinue ceftriaxone and Bactrim given hyperkalemia.  -2/13: Remains quite dyspneic at rest, oxygen requirements stable.  Remains tachycardic.  Noted to have lactic acidosis again to 3.1.  Given to 50 cc IV NS bolus.  Lactic acidosis resolved.  Also is hyperkalemic at 5.7.  Will reconsult pulmonology given continued shortness of breath.  Also to ask them if EBUS biopsy should be pursued while she is inpatient given prolonged hospitalization per family request.  -2/14: Slightly better with symptoms.  Pulmonology  saw the patient.  Plan for outpatient EBUS biopsy on 2/16 at [facility] by her primary pulmonologist.  Anticipate discharge home tomorrow if stable and symptoms controlled.  Will need slow taper of her prednisone at discharge.  Plan to complete total 5-day course of levofloxacin for pneumonia.  Defer further adjustments to COPD medications to pulmonology.    Essential hypertension  HLD  -Continue prior to admission aspirin and Lipitor.  Lisinopril on hold given hyperkalemia.  She received couple doses of as needed labetalol for high blood pressure and heart rate.  Has had sinus tachycardia pretty much this entire hospitalization likely due to respiratory stress.  Last echo was in 2021.  Has been on lisinopril for many years with no issues with hyperkalemia.  Likely able to restart lisinopril at discharge with close recheck of potassium.     Elevated troponin  Troponin 28 at admission.  Recheck troponin remained flat.  No chest pain.  -Telemetry    Intermittent nausea  Patient reports having occasional random episodes of nausea about 2 or 3 times a month that come on without any trigger.  She had an episode of nausea with emesis on 2/10.  Continue to monitor.  Has history of hiatal hernia but nothing visualized on chest CT at admission.    -As needed Zofran available.   -2/11: She again became nauseated after lunch and vomited.  CT abdomen pelvis obtained-no acute findings.   -2/12: No nausea today.  If recurrent nausea, consider GI consult.  Noted edema and subcutaneous tissues on abdominal CT-likely due to hypoalbuminemia and malnutrition.  Getting Lasix today.  -2/14: No nausea over the last 2 days    Hyperkalemia  Noted potassium 5.5 on 2/12.  Recheck potassium at 5.7 on 2/13.  Continue to hold lisinopril.  Received 250 cc NS bolus on 2/13.  Recheck potassium normal on 2/14.  Likely able to restart lisinopril at discharge.  Has been on this medication chronically without any issues of hyperkalemia till  "now.    Goals of care  We discussed overall guarded prognosis with underlying severe COPD and impending diagnosis of likely lung cancer.  At this time patient wants to remain full code but will discuss further with her family.  She notes that she would not want to \"be kept on a ventilator\".    Diet: Combination Diet Regular Diet Adult    DVT Prophylaxis: Pneumatic Compression Devices  Vick Catheter: Not present  Lines: None     Cardiac Monitoring: None  Code Status: Full Code         Disposition: Possible discharge home tomorrow pending symptom control.    Medical Decision Making       Please see A&P for additional details of medical decision making.        Clinically Significant Risk Factors        # Hyperkalemia: Highest K = 5.7 mmol/L in last 2 days, will monitor as appropriate       # Hypoalbuminemia: Lowest albumin = 3.1 g/dL at 2/9/2024  4:49 PM, will monitor as appropriate     # Hypertension: Noted on problem list        # Cachexia: Estimated body mass index is 17.7 kg/m  as calculated from the following:    Height as of this encounter: 1.549 m (5' 1\").    Weight as of this encounter: 42.5 kg (93 lb 11.2 oz).   # Moderate Malnutrition: based on nutrition assessment    # COPD: noted on problem list          Deb Turner MD  Text Page (7am - 6pm, M-F)  [facility]  Securely message with the Vocera Web Console (learn more here)  Text page via [employer] Paging/Directory      Interval History   Patient has been stable overnight.  Feeling slightly better today, less dyspneic with conversation.  Met the patient along with pulmonologist, also spoke with her daughter on the phone.  Likely discharge home tomorrow if she has a good day today, plan for EBUS biopsy with primary pulmonologist on Friday.    -Data reviewed today: I reviewed all new labs and imaging results over the last 24 hours. I personally reviewed EKG with result as noted above, CXR with result as noted above, and CT scan with " result as noted above    Physical Exam   Temp: 97.6  F (36.4  C) Temp src: Oral BP: 131/67 Pulse: 106   Resp: 25 SpO2: 92 % O2 Device: Nasal cannula Oxygen Delivery: 3 LPM  Vitals:    02/11/24 0424 02/13/24 0615 02/14/24 0600   Weight: 46 kg (101 lb 8 oz) 44.4 kg (97 lb 12.8 oz) 42.5 kg (93 lb 11.2 oz)     Vital Signs with Ranges  Temp:  [97.6  F (36.4  C)-98.2  F (36.8  C)] 97.6  F (36.4  C)  Pulse:  [] 106  Resp:  [18-25] 25  BP: (123-179)/(67-91) 131/67  SpO2:  [82 %-97 %] 92 %  I/O last 3 completed shifts:  In: 240 [P.O.:240]  Out: 1150 [Urine:1150]    Constitutional: Alert, not in distress but quite dyspneic with conversation, appears cachectic on exam/thin and frail  Respiratory: Less labored breathing, poor air movement bilaterally, no significant wheezes or crackles, was on nasal cannula oxygen  Cardiovascular: Heart sounds regular rate and rhythm, tachycardic, no murmurs, no leg edema  GI: Abdomen is soft, non distended, non tender. Normal BS  Skin/Integumen: no rashes, no pressure sores  Neuro: alert, converses appropriately, moving all extremities, fluent speech, no facial asymmetry  Psych: mood and affect appropriate      Medications        atorvastatin  20 mg Oral Daily    enoxaparin ANTICOAGULANT  30 mg Subcutaneous Q24H    ipratropium - albuterol 0.5 mg/2.5 mg/3 mL  3 mL Nebulization 4x daily    levofloxacin  750 mg Oral or NG Tube Q48H    [Held by provider] lisinopril  20 mg Oral Daily    melatonin  5 mg Oral At Bedtime    predniSONE  40 mg Oral Daily    sodium chloride (PF)  3 mL Intracatheter Q8H       Data   Recent Labs   Lab 02/14/24  0845 02/13/24  1305 02/13/24  0852 02/12/24  1340 02/11/24  0812 02/10/24  0917 02/09/24  1649 02/09/24  0625   WBC  --   --  17.5*  --  16.0* 21.1*  --  14.2*   HGB  --   --  9.1*  --   --  9.4*  --  9.4*   MCV  --   --  96  --   --  98  --  97   PLT  --   --  227  --   --  256  --  245   NA  --   --  138 142 145 145  --  139   POTASSIUM 5.2 5.7* 5.7* 5.5*  4.9 4.3  --  4.6   CHLORIDE  --   --  105 111* 116* 114*  --  105   CO2  --   --  28 25 24 23  --  25   BUN  --   --  23.2* 25.6* 30.6* 32.3*  --  25.0*   CR  --   --  0.89 0.70 0.67 0.73  --  0.69   ANIONGAP  --   --  5* 6* 5* 8  --  9   FLOR  --   --  8.9 8.1* 7.7* 7.8*  --  8.2*   GLC  --   --  77 187* 97 273*  --  180*   ALBUMIN  --   --   --   --   --   --  3.1*  --    PROTTOTAL  --   --   --   --   --   --  5.9*  --    BILITOTAL  --   --   --   --   --   --  <0.2  --    ALKPHOS  --   --   --   --   --   --  115  --    ALT  --   --   --   --   --   --  46  --    AST  --   --   --   --   --   --  17  --    LIPASE  --   --   --   --  63*  --   --   --        Imaging  No results found for this or any previous visit (from the past 24 hour(s)).

## 2024-02-15 ENCOUNTER — ANESTHESIA EVENT (OUTPATIENT)
Dept: SURGERY | Facility: HOSPITAL | Age: 69
End: 2024-02-15
Payer: COMMERCIAL

## 2024-02-15 VITALS
OXYGEN SATURATION: 90 % | SYSTOLIC BLOOD PRESSURE: 111 MMHG | BODY MASS INDEX: 17.26 KG/M2 | TEMPERATURE: 97.9 F | WEIGHT: 91.4 LBS | HEART RATE: 119 BPM | RESPIRATION RATE: 16 BRPM | HEIGHT: 61 IN | DIASTOLIC BLOOD PRESSURE: 60 MMHG

## 2024-02-15 LAB — POTASSIUM SERPL-SCNC: 4.7 MMOL/L (ref 3.4–5.3)

## 2024-02-15 PROCEDURE — 250N000009 HC RX 250: Performed by: STUDENT IN AN ORGANIZED HEALTH CARE EDUCATION/TRAINING PROGRAM

## 2024-02-15 PROCEDURE — 94640 AIRWAY INHALATION TREATMENT: CPT

## 2024-02-15 PROCEDURE — 94640 AIRWAY INHALATION TREATMENT: CPT | Mod: 76

## 2024-02-15 PROCEDURE — 250N000012 HC RX MED GY IP 250 OP 636 PS 637: Performed by: HOSPITALIST

## 2024-02-15 PROCEDURE — 36415 COLL VENOUS BLD VENIPUNCTURE: CPT | Performed by: HOSPITALIST

## 2024-02-15 PROCEDURE — 84132 ASSAY OF SERUM POTASSIUM: CPT | Performed by: HOSPITALIST

## 2024-02-15 PROCEDURE — 999N000157 HC STATISTIC RCP TIME EA 10 MIN

## 2024-02-15 PROCEDURE — 99239 HOSP IP/OBS DSCHRG MGMT >30: CPT | Performed by: INTERNAL MEDICINE

## 2024-02-15 PROCEDURE — 99232 SBSQ HOSP IP/OBS MODERATE 35: CPT | Performed by: INTERNAL MEDICINE

## 2024-02-15 RX ORDER — LEVOFLOXACIN 750 MG/1
750 TABLET, FILM COATED ORAL
Qty: 1 TABLET | Refills: 0 | Status: SHIPPED | OUTPATIENT
Start: 2024-02-16 | End: 2024-02-21

## 2024-02-15 RX ORDER — PREDNISONE 10 MG/1
TABLET ORAL
Qty: 30 TABLET | Refills: 0 | Status: SHIPPED | OUTPATIENT
Start: 2024-02-15 | End: 2024-04-22

## 2024-02-15 RX ORDER — ALBUTEROL SULFATE 0.83 MG/ML
2.5 SOLUTION RESPIRATORY (INHALATION) EVERY 6 HOURS PRN
Qty: 90 ML | Refills: 1 | Status: SHIPPED | OUTPATIENT
Start: 2024-02-15 | End: 2024-05-10

## 2024-02-15 RX ORDER — ASPIRIN 81 MG/1
81 TABLET, CHEWABLE ORAL DAILY
Start: 2024-02-15 | End: 2024-05-10

## 2024-02-15 RX ADMIN — IPRATROPIUM BROMIDE AND ALBUTEROL SULFATE 3 ML: .5; 3 SOLUTION RESPIRATORY (INHALATION) at 10:28

## 2024-02-15 RX ADMIN — PREDNISONE 40 MG: 20 TABLET ORAL at 10:12

## 2024-02-15 RX ADMIN — IPRATROPIUM BROMIDE AND ALBUTEROL SULFATE 3 ML: .5; 3 SOLUTION RESPIRATORY (INHALATION) at 14:44

## 2024-02-15 RX ADMIN — IPRATROPIUM BROMIDE AND ALBUTEROL SULFATE 3 ML: .5; 3 SOLUTION RESPIRATORY (INHALATION) at 07:22

## 2024-02-15 ASSESSMENT — ACTIVITIES OF DAILY LIVING (ADL)
ADLS_ACUITY_SCORE: 25
ADLS_ACUITY_SCORE: 29
ADLS_ACUITY_SCORE: 25
ADLS_ACUITY_SCORE: 25
ADLS_ACUITY_SCORE: 29
ADLS_ACUITY_SCORE: 29
ADLS_ACUITY_SCORE: 25

## 2024-02-15 NOTE — PLAN OF CARE
Goal Outcome Evaluation:       Summary: History of COPD, CAD, hyperextention.  Primary Diagnosis: admitted for shortness of breath exacerbation of COPD. Lung mass on CT. Plan for outpatient biopsy.        ONLY POST BELOW FOR END OF SHIFT NOTE     Orientation: alert and oriented  Activity: assist of one with walker  Diet/BS Checks: regular  Tele:  sinus tachycardia.  IV Access/Drains: left forearm PIV  Pain Management: denies pain  Abnormal VS/Results: vitals are with in normal range. On 1 LPM of Oxygen. Saturates 95%. Increased need of oxygen with physical activity.   Bowel/Bladder: continent  Skin/Wounds: scattered bruise right arm ecchymosis.   Consults: pulmonology  D/C Disposition: home with home oxygen  Other Info: patient will go home this afternoon. Plan to do outpatient lung biopsy. Patient's daughter will bring oxygen from home for transportation in the afternoon.

## 2024-02-15 NOTE — DISCHARGE SUMMARY
[facility]    Discharge Summary  Hospitalist    Date of Admission:  2/8/2024  Date of Discharge:  2/15/2024  Discharging Provider: Arthur Patel MD    Discharge Diagnoses      Acute COPD exacerbation  Sepsis due to pneumonia, Stenotrophomonas maltophilia in sputum culture, POA  Acute on chronic hypoxic and hypercapnic respiratory failure secondary to above  Lung mass with lymphadenopathy concerning for primary lung malignancy  Former smoker  Sinus tachycardia  Lactic acidosis  Pulmonary edema, iatrogenic likely due to IVF-improved  Essential hypertension  HLD  Elevated troponin-most likely due to demand ischemia  Intermittent nausea-resolved  Hyperkalemia      Hospital Course:  Jasmyn Green is a 68 year old female admitted on 2/8/2024. She presents with worsening shortness of breath.     Acute COPD exacerbation (H)  Sepsis possibly due to pneumonia  Acute on chronic hypoxic hypercapnic respiratory failure secondary to above  Lung mass with lymphadenopathy concerning for primary lung malignancy  Former smoker  Sinus tachycardia  Lactic acidosis  Pulmonary edema, iatrogenic likely due to IVF  -Patient follows with pulmonology at [facility]. Has severe obstructive lung disease (FEV1 29%), with hyperinflation and air-trapping.  Severe diffusion capacity defect (DLCO 30%). Radiographic emphysema.  Presenting with 1 day history of worsening dyspnea both with exertion and at rest and requiring higher supplemental oxygen at home.  CT on admission showed bulky AP and left hilar adenopathy with irregular pleural-based anterior left upper lobe nodule with adjacent smaller satellite nodules. Findings suspicious for a primary lung cancer. Nodular thickening of the left adrenal gland is likely unchanged. Extensive emphysema. Of note, she had a CT chest as part of lung cancer screening last summer that showed no evidence of malignancy.  -Admitted with concerns for COPD acute exacerbation, was started  on IV steroids, antibiotics and nebulizers.  -Pulmonology followed  -Pro calcitonin elevated at 4.09.    -Patient was placed on empiric ceftriaxone and azithromycin for 4 days, subsequently  Sputum culture came back positive for Stenotrophomonas maltophilia.   -Discussed with pulmonology: Plan for EBUS with biopsy as outpatient.  Outpatient PET scan ordered.  She already has an appointment tomorrow for EBUS  -During the course of hospitalization on 2/12, patient had worsening dyspnea and oxygen requirement, this was suspected due to pulm edema, received IV diuretics and improved.  -Over the last 48 hours, patient has had ongoing incremental progress.  Oxygen requirement stable at 2 L.  Outpatient continuous O2 will be set up.  -Plan to complete total 5-day course of levofloxacin for pneumonia.  1 more day remaining on 2/16.    -Discussed with pulmonary on the day of discharge, they recommended adding Spiriva.  Prednisone for 2-week taper.     Essential hypertension  HLD  -Continue prior to admission aspirin and Lipitor.  Lisinopril initially on hold due to hyperkalemia.   -Has been on lisinopril for many years with no issues with hyperkalemia.  Therefore will resume lisinopril and recommend outpatient BMP in 1 week.  If she has ongoing problems with hyperkalemia, then would recommend switching to a different antihypertensive agent    Elevated troponin due to demand ischemia  Troponin 28 at admission.  Recheck troponin remained flat.  No chest pain.     Intermittent nausea  -Patient reports having occasional random episodes of nausea about 2 or 3 times a month that come on without any trigger.  She had an episode of nausea with emesis on 2/10.  Has history of hiatal hernia but nothing visualized on chest CT at admission.    -CT abdomen pelvis obtained-no acute findings.   -Improved, no nausea over the last 3 days     Hyperkalemia  -Noted potassium 5.5 on 2/12.  Recheck potassium at 5.7 on 2/13.   -Plan for lisinopril  as above     Goals of care  -Previous hospitalist discussed overall guarded prognosis with underlying severe COPD and impending diagnosis of likely lung cancer.  At this time patient wants to remain full code but will discuss further with her family.       Arthur Patel MD    Significant Results and Procedures   See below    Pending Results     Unresulted Labs Ordered in the Past 30 Days of this Admission       No orders found from 1/9/2024 to 2/9/2024.            Code Status   Full Code       Primary Care Physician   Miesha Fernando    Physical Exam   Temp: 97.8  F (36.6  C) Temp src: Oral BP: (!) 148/76 Pulse: 92   Resp: 18 SpO2: 93 % O2 Device: Nasal cannula with humidification Oxygen Delivery: 2 LPM    Constitutional: AAOX3, NAD  Respiratory: Decreased air entry in all lung fields bilaterally, slightly increased work of breathing.  Cardiovascular: RRR, No murmur  GI: Soft, Non- tender, BS- normoactive  Neuro: CN- grossly intact, Moving all 4 extremities.     Discharge Disposition   Discharged to home  Condition at discharge: Stable    Consultations This Hospital Stay   INTERVENTIONAL RADIOLOGY ADULT/PEDS IP CONSULT  PULMONARY IP CONSULT  INTERVENTIONAL RADIOLOGY ADULT/PEDS IP CONSULT  SOCIAL WORK IP CONSULT  NUTRITION SERVICES ADULT IP CONSULT  CARE MANAGEMENT / SOCIAL WORK IP CONSULT  PHYSICAL THERAPY ADULT IP CONSULT  PULMONARY IP CONSULT  PULMONARY IP CONSULT    Time Spent on this Encounter   I, Arthur Patel MD, personally saw the patient today and spent greater than 30 minutes discharging this patient.    Discharge Orders      PET Oncology (Eyes to Thighs)     Reason for your hospital stay    COPD exacerbation     Follow-up and recommended labs and tests     Follow up with primary care provider, Miesha Fernando, within 7 days for hospital follow- up.     Activity    Your activity upon discharge: activity as tolerated     Oxygen Adult/Peds    Oxygen Documentation  I certify that this patient,  Jasmyn Green has been under my care (or a nurse practitioner or physican's assistant working with me). This is the face-to-face encounter for oxygen medical necessity.      At the time of this encounter, I have reviewed the qualifying testing and have determined that supplemental oxygen is reasonable and necessary and is expected to improve the patient's condition in a home setting.       Patient has continued oxygen desaturation due to COPD J44.9.    If portability is ordered, is the patient mobile within the home? yes     Diet    Follow this diet upon discharge: Orders Placed This Encounter      Snacks/Supplements Adult: Ensure Enlive; Between Meals      Snacks/Supplements Adult: Ensure Clear; With Meals      Snacks/Supplements Adult: Gelatein Plus; With Meals      Combination Diet Regular Diet Adult     Discharge Medications   Current Discharge Medication List        START taking these medications    Details   albuterol (PROVENTIL) (2.5 MG/3ML) 0.083% neb solution Take 1 vial (2.5 mg) by nebulization every 6 hours as needed for shortness of breath, wheezing or cough  Qty: 90 mL, Refills: 1    Comments: Future refills by PCP Dr. Miesha Fernando with phone number [phone].  Associated Diagnoses: COPD exacerbation (H)      levofloxacin (LEVAQUIN) 750 MG tablet 1 tablet (750 mg) by Oral or NG Tube route every 48 hours  Qty: 1 tablet, Refills: 0    Associated Diagnoses: COPD exacerbation (H)      tiotropium (SPIRIVA RESPIMAT) 2.5 MCG/ACT inhaler Inhale 2 puffs into the lungs daily  Qty: 4 g, Refills: 1    Comments: Future refills by PCP Dr. Miesha Fernando with phone number [phone].  Associated Diagnoses: COPD exacerbation (H)           CONTINUE these medications which have CHANGED    Details   aspirin (ASA) 81 MG chewable tablet Take 1 tablet (81 mg) by mouth daily    Comments: Hold until biopsy tomorrow, resume as per outpatient pulmonary direction  Associated Diagnoses: Coronary artery  disease due to calcified coronary lesion      predniSONE (DELTASONE) 10 MG tablet 4 tabs daily for 3 days, then 3 tabs daily for 3 days, then 2 tabs daily for 3 days, then 1 tab daily for 3 days, then stop  Qty: 30 tablet, Refills: 0    Associated Diagnoses: COPD exacerbation (H)           CONTINUE these medications which have NOT CHANGED    Details   albuterol (PROAIR HFA/PROVENTIL HFA/VENTOLIN HFA) 108 (90 Base) MCG/ACT inhaler Inhale 2 puffs into the lungs every 6 hours  Qty: 18 g, Refills: 11    Comments: Pharmacy may dispense brand covered by insurance (Proair, or proventil or ventolin or generic albuterol inhaler)  Associated Diagnoses: COPD, group D, by GOLD 2017 classification (H)      atorvastatin (LIPITOR) 20 MG tablet Take 1 tablet (20 mg) by mouth daily  Qty: 90 tablet, Refills: 2    Associated Diagnoses: Hypercholesteremia      calcium carbonate 600 mg-vitamin D 400 units (CALTRATE) 600-400 MG-UNIT per tablet Take 1 tablet by mouth 2 times daily With calcium      fluticasone-salmeterol (AIRDUO RESPICLICK) 232-14 MCG/ACT inhaler Inhale 1 puff into the lungs 2 times daily  Qty: 1 each, Refills: 11    Associated Diagnoses: COPD, group D, by GOLD 2017 classification (H)      guaiFENesin (MUCINEX) 600 MG 12 hr tablet Take 2 tablets (1,200 mg) by mouth 2 times daily  Qty: 60 tablet, Refills: 0    Associated Diagnoses: Infection due to 2019 novel coronavirus      lisinopril (ZESTRIL) 20 MG tablet TAKE 1 TABLET(20 MG) BY MOUTH DAILY  Qty: 60 tablet, Refills: 3    Associated Diagnoses: Essential hypertension           STOP taking these medications       ipratropium - albuterol 0.5 mg/2.5 mg/3 mL (DUONEB) 0.5-2.5 (3) MG/3ML neb solution Comments:   Reason for Stopping:         ipratropium-albuterol (COMBIVENT RESPIMAT)  MCG/ACT inhaler Comments:   Reason for Stopping:         ipratropium-albuterol (COMBIVENT RESPIMAT)  MCG/ACT inhaler Comments:   Reason for Stopping:             Allergies   No Known  Allergies  Data   Most Recent 3 CBC's:  Recent Labs   Lab Test 02/13/24  0852 02/11/24  0812 02/10/24  0917 02/09/24  0625   WBC 17.5* 16.0* 21.1* 14.2*   HGB 9.1*  --  9.4* 9.4*   MCV 96  --  98 97     --  256 245      Most Recent 3 BMP's:  Recent Labs   Lab Test 02/15/24  0807 02/14/24  0845 02/13/24  1305 02/13/24  0852 02/12/24  1340 02/11/24  0812   NA  --   --   --  138 142 145   POTASSIUM 4.7 5.2 5.7* 5.7* 5.5* 4.9   CHLORIDE  --   --   --  105 111* 116*   CO2  --   --   --  28 25 24   BUN  --   --   --  23.2* 25.6* 30.6*   CR  --   --   --  0.89 0.70 0.67   ANIONGAP  --   --   --  5* 6* 5*   FLOR  --   --   --  8.9 8.1* 7.7*   GLC  --   --   --  77 187* 97     Most Recent 2 LFT's:  Recent Labs   Lab Test 02/09/24  1649 09/21/21  0952   AST 17 22   ALT 46 26   ALKPHOS 115 124*   BILITOTAL <0.2 0.3     Most Recent INR's and Anticoagulation Dosing History:  Anticoagulation Dose History          Latest Ref Rng & Units 6/27/2021   Recent Dosing and Labs   INR 0.90 - 1.10 1.32      Most Recent 3 Troponin's:No lab results found.  Most Recent Cholesterol Panel:  Recent Labs   Lab Test 08/07/23  0815   CHOL 163   LDL 99   HDL 44*   TRIG 99     Most Recent 6 Bacteria Isolates From Any Culture (See EPIC Reports for Culture Details):No lab results found.  Most Recent TSH, T4 and A1c Labs:  Recent Labs   Lab Test 09/21/21  0952   TSH 1.06       Results for orders placed or performed during the hospital encounter of 02/08/24   CT Abdomen Pelvis w Contrast    Narrative    EXAM: CT ABDOMEN PELVIS W CONTRAST  LOCATION: [facility]  DATE: 2/11/2024    INDICATION: Intermittent nausea, vomiting, concern for lung cancer that is being worked up now  COMPARISON: 02/08/2024  TECHNIQUE: CT scan of the abdomen and pelvis was performed following injection of IV contrast. Multiplanar reformats were obtained. Dose reduction techniques were used.  CONTRAST: 51 mL Isovue 370    FINDINGS:   LOWER CHEST:  Advanced emphysematous changes in the visualized lung bases.    HEPATOBILIARY: Subcentimeter low-attenuation lesion in the anterior left hepatic lobe compatible with a small benign cyst.    PANCREAS: Normal.    SPLEEN: Normal.    ADRENAL GLANDS: Normal.    KIDNEYS/BLADDER: Small benign cyst in the right kidney lower pole. No follow-up needed. Mild cortical thinning or scarring in both kidneys.    BOWEL: Stomach is unremarkable. The small and large intestines are normal caliber. Mildly increased stool throughout the colon. Mild sigmoid diverticulosis without active inflammation.    LYMPH NODES: Normal.    VASCULATURE: Diffuse calcified atheromatous plaque in the abdominal aorta with a small infrarenal abdominal aortic aneurysm measuring 2.3 cm in diameter. Extensive arterial calcified plaque in the iliac arteries.    PELVIC ORGANS: Not well visualized.    MUSCULOSKELETAL: No suspicious lytic or sclerotic lesions. Generalized edema seen within the adipose tissues.      Impression    IMPRESSION:   1.  No evidence of malignancy in the abdomen or pelvis.  2.  Generalized edema seen within the adipose tissues may indicate volume overload, right heart failure, or third spacing due to other etiology.  3.  Atherosclerosis. Small infrarenal abdominal aortic aneurysm measuring 2.3 cm.     XR Chest Port 1 View    Narrative    CHEST ONE VIEW  2/12/2024 3:19 PM     HISTORY: Shortness of breath.     COMPARISON: CT chest 2/8/2024. Chest radiograph 2/8/2024.      Impression    IMPRESSION: Emphysema. Similar left subpleural nodularity and right  perihilar adenopathy. No new focal consolidation, pneumothorax or  significant pleural effusion. Mild basilar interstitial opacities  could reflect interstitial edema.     VITO PERRY MD         SYSTEM ID:  [id number]

## 2024-02-15 NOTE — DISCHARGE INSTRUCTIONS
A follow-up appointment was scheduled for you [see above].  It is very important to go to it--bring these papers, an ID, and your insurance card with you.  At the visit, talk about your hospital stay.  Tell your doctor how you feel.  Show your new list of medications.  Your doctor will update records, make sure you are still doing OK, and decide if any tests or medication changes are needed.

## 2024-02-15 NOTE — PROGRESS NOTES
Oxygen Documentation  I certify that this patient, Jasmyn Green has been under my care (or a nurse practitioner or physican's assistant working with me). This is the face-to-face encounter for oxygen medical necessity.      At the time of this encounter, I have reviewed the qualifying testing and have determined that supplemental oxygen is reasonable and necessary and is expected to improve the patient's condition in a home setting.       Patient has continued oxygen desaturation due to COPD J44.9.    If portability is ordered, is the patient mobile within the home? yes

## 2024-02-15 NOTE — PLAN OF CARE
[phone]    Orientation: A&Ox4  Activity: SBA  Diet/BS Checks: regular   Tele:  NSR  IV Access/Drains: L PIV SL  Pain Management: denied pain  Abnormal VS/Results: VSS on 1L O2 NC.  Bowel/Bladder: continent of B/B. 1 BM this shift per pt report  Skin/Wounds: scattered bruising  Consults: pulmonology, SW  D/C Disposition: possibly today 2/15    Other Info:   A&Ox4. VSS on 1L NC O2. Denied pain. Up SBA overnight. Continent of B/B. 1 BM this shift per pt report. L PIV SL. On tele, NSR. Discharge pending today 2/15.

## 2024-02-15 NOTE — PROGRESS NOTES
3 p.m. - 7 p.m.  Shift was unremarkable; patient was in no acute distress; on 2 LPM supplemental oxygen; AO X 4; tachycardic.

## 2024-02-15 NOTE — PROGRESS NOTES
Care Management Discharge Note    Discharge Date: 02/15/2024       Discharge Disposition: Home    Discharge Services: None    Discharge DME: Oxygen    Discharge Transportation: family or friend will provide    Private pay costs discussed: Not applicable    Does the patient's insurance plan have a 3 day qualifying hospital stay waiver?  No    PAS Confirmation Code:    Patient/family educated on Medicare website which has current facility and service quality ratings: no    Education Provided on the Discharge Plan: Yes  Persons Notified of Discharge Plans: bedside RN  Patient/Family in Agreement with the Plan: yes    Handoff Referral Completed: Yes    Additional Information:  Patient has orders to discharge home today with orders for continuous oxygen and follow-up with PCP.  Scheduled pt to see PCP for [id number] 21, 2024  4:00 PM  (Arrive by 3:45 PM)  ED/Hospital Follow Up with Miesha Fernando CNP  and per chart review, pt has bx scheduled for tomorrow.  Met with patient to discuss discharge plan of home with bx scheduled for tomorrow, and patient will need continuous oxygen at home and PCP follow-up scheduled for 2/21.  Discussed discharge with pt and pt's daughter Stefania.  Stefania confirmed bx tomorrow and will be able to transport pt to this appt.  Discussed current home oxygen and pt shared she has PRN oxygen with [facility].  Informed them that  Home [facility] will be called to see if they can assist with pt's oxygen.  Message Margret Trujillo with [facility] and was informed that pt will need to go through [facility].  Bedside RN was provided with [facility]'s phone number to call and arrange portable oxygen for home and cont. Set up at home.      Addendum:  Called [facility] and informed them that pt is discharging home with cont. O2 and is requesting a concentrator for at home use as she only has a portable inogen.  Per [facility] they will deliver a concentrator tomorrow for pt's home use and will call pt right now  with this information.   Suzy Senior, RN  Suzy Senior RN, BSN, OCN   Inpatient Care Coordination [address]  Office: [phone]

## 2024-02-15 NOTE — PROGRESS NOTES
[facility]    Pulmonary, Allergy, Critical Care and Sleep Medicine    Follow-up Note  February 15, 2024         Assessment and Plan:   Jasmyn Green is a 68 year old year old female with medical history significant for COPD (chronic use of 2 L supplemental oxygen at nighttime), coronary artery disease, hypertension who was admitted on 2/8/2024 with worsening shortness of breath, chest pain, and increasing oxygen requirements concerning for COPD exacerbation.  Chest x-ray imaging on arrival was concerning for a left hilar fullness and CT chest showed a pleural-based left upper lobe density as well as a new left hilar mass that measures 6 x 3 cm.  These findings are concerning for a fairly rapidly progressive malignancy given these findings were not present on her June 2023 lung cancer screening CT. She is having symptoms of a COPD exacerbation (increased cough, shortness of breath) and fortunately has had improvement with addition of scheduled bronchodilators and anti-inflammatory.  Nodular density in left upper lobe may be inflammatory versus malignant versus infectious she remains afebrile with improving leukocytosis with current therapy suggesting a lobar pneumonia is less likely.     Pulmonary was reconsulted today to clarify the plans for getting EBUS and biopsy with the patient and her daughter.     COPD (FEV1 0.61 L, 2 L supplemental O2 at nighttime), with exacerbation   New L hilar mass with bulky lymphadenopathy (6 x 3 cm), new ARIANNE pleural-based nodule (2.2 x 1.5 cm)  Prior tobacco use (50 pack years, quit 2021)  Coronary artery disease  HTN      Recommendations as follows:   -Outpatient EBUS and biopsy scheduled for tomorrow 2/16.  Patient and her daughter are aware of the plan.  -Patient down to 2 LPM which is her baseline  - agree with outpatient PET scan (ordered)  - continue scheduled bronchodilators  -Plan on discharge today per the oral prednisone with 10 mg reduction every  3 days and then discontinue.    -Will defer to patient's regular pulmonologist, Dr. Levin about adding further therapies chronically for her frequent exacerbations including Roflumilast and chronic azithromycin.  -Continue triple inhaler therapy on discharge with air duo and add Spiriva.  -Rest of the plan as per the primary team.     KARIE Lema   of Medicine  [facility]  Pulmonary, Allergy, Critical Care and Sleep Medicine  Pager - [phone]           Interval History:     - Patient has been scheduled for outpatient bronchoscopy with EBUS and biopsy on 5 2/16.  - Patient continues to feel much better today.  - Currently on 2 LPM at O2 sats of 90-94%.           Review of Systems:   C: negative for fever, chills, change in weight  INTEGUMENTARY/SKIN: no rash or obvious new lesions  ENT/MOUTH: no sore throat, new sinus pain or nasal drainage  RESP: see interval history  CV: negative for chest pain, palpitations or peripheral edema  GI: no nausea, vomiting, change in stools  MUSCULOSKELETAL: no myalgias, arthralgias          Medications:      atorvastatin  20 mg Oral Daily    enoxaparin ANTICOAGULANT  30 mg Subcutaneous Q24H    ipratropium - albuterol 0.5 mg/2.5 mg/3 mL  3 mL Nebulization 4x daily    levofloxacin  750 mg Oral or NG Tube Q48H    [Held by provider] lisinopril  20 mg Oral Daily    melatonin  5 mg Oral At Bedtime    predniSONE  40 mg Oral Daily    sodium chloride (PF)  3 mL Intracatheter Q8H     acetaminophen, albuterol, ALPRAZolam, calcium carbonate, guaiFENesin-dextromethorphan, labetalol, lidocaine 4%, lidocaine (buffered or not buffered), ondansetron, senna-docusate **OR** senna-docusate, sodium chloride (PF)         Physical Exam:   Temp:  [97.6  F (36.4  C)-98.1  F (36.7  C)] 97.8  F (36.6  C)  Pulse:  [] 92  Resp:  [18-25] 18  BP: (123-148)/(67-88) 148/76  SpO2:  [82 %-97 %] 93 %    Intake/Output Summary (Last 24 hours) at 2/15/2024 1106  Last data  filed at 2/15/2024 1000  Gross per 24 hour   Intake 300 ml   Output 1150 ml   Net -850 ml     Constitutional:   Awake, alert and in no apparent distress     Eyes:   nonicteric     ENT:    oral mucosa moist without lesions     Neck:   Supple without supraclavicular or cervical lymphadenopathy     Lungs:   Good air flow.  No crackles. No rhonchi.  No wheezes.     Cardiovascular:   Normal S1 and S2.  RRR.  No murmur, gallop or rub.     Abdomen:   NABS, soft, nontender, nondistended.  No HSM.     Musculoskeletal:   No edema     Neurologic:   Alert and conversant.     Skin:   Warm, dry.  No rash on limited exam.             Data:   All laboratory and imaging data reviewed.    CMP  Recent Labs   Lab 02/15/24  0807 02/14/24  0845 02/13/24  1305 02/13/24  0852 02/12/24  1340 02/11/24  0812 02/10/24  0917 02/09/24  1649   NA  --   --   --  138 142 145 145  --    POTASSIUM 4.7 5.2 5.7* 5.7* 5.5* 4.9 4.3  --    CHLORIDE  --   --   --  105 111* 116* 114*  --    CO2  --   --   --  28 25 24 23  --    ANIONGAP  --   --   --  5* 6* 5* 8  --    GLC  --   --   --  77 187* 97 273*  --    BUN  --   --   --  23.2* 25.6* 30.6* 32.3*  --    CR  --   --   --  0.89 0.70 0.67 0.73  --    GFRESTIMATED  --   --   --  69 >90 >90 88  --    FLOR  --   --   --  8.9 8.1* 7.7* 7.8*  --    PROTTOTAL  --   --   --   --   --   --   --  5.9*   ALBUMIN  --   --   --   --   --   --   --  3.1*   BILITOTAL  --   --   --   --   --   --   --  <0.2   ALKPHOS  --   --   --   --   --   --   --  115   AST  --   --   --   --   --   --   --  17   ALT  --   --   --   --   --   --   --  46     CBC  Recent Labs   Lab 02/13/24  0852 02/11/24  0812 02/10/24  0917 02/09/24  0625   WBC 17.5* 16.0* 21.1* 14.2*   RBC 3.02*  --  3.09* 3.11*   HGB 9.1*  --  9.4* 9.4*   HCT 28.9*  --  30.4* 30.2*   MCV 96  --  98 97   MCH 30.1  --  30.4 30.2   MCHC 31.5  --  30.9* 31.1*   RDW 13.9  --  14.1 13.7     --  256 245     INRNo lab results found in last 7 days.  Arterial Blood  "GasNo lab results found in last 7 days.  Urine Studies    Recent Labs   Lab Test 02/09/24  1931   URINEPH 5.5   NITRITE Negative   LEUKEST Negative   WBCU <1     CMV viral loads  No lab results found.  No results found for: \"CMQNT\", \"CMVQ\"  EBV viral loads   No lab results found.  No results found for: \"EBVDN\", \"EBRES\", \"EBVSP\", \"EBVPC\", \"EBVPCR\"  Respiratory Virus Testing    No results found for: \"RS\", \"FLUAG\"  Sputum Culture last 3 months:  No results found for: \"SDES\" No results found for: \"CULT\"     Attestation:    I personally spent 35 minutes on the date of the encounter doing chart review, history and exam, documentation and further activities per the note.     KARIE Lema   of Medicine  [facility]  Pulmonary, Allergy, Critical Care and Sleep Medicine  Pager - [phone]      "

## 2024-02-15 NOTE — TELEPHONE ENCOUNTER
Called daughter (patient in hospital and ok to speak with daugher)  to go over EBUS education. Informed patient that procedure is scheduled for 2/16 at 11:00 am at [facility].  Instructed to arrive at 9:30am.  Discussed that an IV will be placed and IV sedation will be given.  Biopsies may be done.  Instructed to have nothing to eat after midnight. Ok to have clear liquids until 7:30 am.    Medication instructions: hold medications in the morning  Stop Aspirin, Ibuprofen, NSAIDS, coxibs (ie:celebrex)date:  does not take  Stop blood thinner date: does not take     Patient understands that they will need a ride home and someone to stay with them after the procedure.  Patient had no further questions and is agreeable to procedure.  Phone number given for questions.    Mai Green RN  Pulmonary Specialty Procedures  [facility]  [phone]

## 2024-02-16 ENCOUNTER — APPOINTMENT (OUTPATIENT)
Dept: RADIOLOGY | Facility: HOSPITAL | Age: 69
End: 2024-02-16
Attending: INTERNAL MEDICINE
Payer: COMMERCIAL

## 2024-02-16 ENCOUNTER — HOSPITAL ENCOUNTER (OUTPATIENT)
Facility: HOSPITAL | Age: 69
Discharge: HOME | End: 2024-02-16
Attending: INTERNAL MEDICINE | Admitting: INTERNAL MEDICINE
Payer: COMMERCIAL

## 2024-02-16 ENCOUNTER — ANESTHESIA (OUTPATIENT)
Dept: SURGERY | Facility: HOSPITAL | Age: 69
End: 2024-02-16
Payer: COMMERCIAL

## 2024-02-16 VITALS
TEMPERATURE: 97.4 F | DIASTOLIC BLOOD PRESSURE: 71 MMHG | SYSTOLIC BLOOD PRESSURE: 150 MMHG | HEART RATE: 117 BPM | WEIGHT: 94.2 LBS | OXYGEN SATURATION: 92 % | BODY MASS INDEX: 17.8 KG/M2 | RESPIRATION RATE: 22 BRPM

## 2024-02-16 DIAGNOSIS — J44.9 COPD, GROUP D, BY GOLD 2017 CLASSIFICATION (H): ICD-10-CM

## 2024-02-16 PROCEDURE — 370N000017 HC ANESTHESIA TECHNICAL FEE, PER MIN: Performed by: INTERNAL MEDICINE

## 2024-02-16 PROCEDURE — 250N000011 HC RX IP 250 OP 636: Performed by: NURSE ANESTHETIST, CERTIFIED REGISTERED

## 2024-02-16 PROCEDURE — 272N000001 HC OR GENERAL SUPPLY STERILE: Performed by: INTERNAL MEDICINE

## 2024-02-16 PROCEDURE — 88173 CYTOPATH EVAL FNA REPORT: CPT | Mod: TC,XS | Performed by: INTERNAL MEDICINE

## 2024-02-16 PROCEDURE — 710N000010 HC RECOVERY PHASE 1, LEVEL 2, PER MIN: Performed by: INTERNAL MEDICINE

## 2024-02-16 PROCEDURE — 250N000009 HC RX 250: Performed by: INTERNAL MEDICINE

## 2024-02-16 PROCEDURE — 88305 TISSUE EXAM BY PATHOLOGIST: CPT | Mod: 26 | Performed by: PATHOLOGY

## 2024-02-16 PROCEDURE — 258N000003 HC RX IP 258 OP 636: Performed by: ANESTHESIOLOGY

## 2024-02-16 PROCEDURE — 71045 X-RAY EXAM CHEST 1 VIEW: CPT

## 2024-02-16 PROCEDURE — 999N000290 HC STATISTIC EBUS ASSIST

## 2024-02-16 PROCEDURE — 360N000077 HC SURGERY LEVEL 4, PER MIN: Performed by: INTERNAL MEDICINE

## 2024-02-16 PROCEDURE — 94660 CPAP INITIATION&MGMT: CPT

## 2024-02-16 PROCEDURE — 88172 CYTP DX EVAL FNA 1ST EA SITE: CPT | Mod: 26 | Performed by: PATHOLOGY

## 2024-02-16 PROCEDURE — 88341 IMHCHEM/IMCYTCHM EA ADD ANTB: CPT | Mod: 26 | Performed by: PATHOLOGY

## 2024-02-16 PROCEDURE — 999N000141 HC STATISTIC PRE-PROCEDURE NURSING ASSESSMENT: Performed by: INTERNAL MEDICINE

## 2024-02-16 PROCEDURE — 88342 IMHCHEM/IMCYTCHM 1ST ANTB: CPT | Mod: 26 | Performed by: PATHOLOGY

## 2024-02-16 PROCEDURE — 88360 TUMOR IMMUNOHISTOCHEM/MANUAL: CPT | Mod: 26 | Performed by: PATHOLOGY

## 2024-02-16 PROCEDURE — 258N000003 HC RX IP 258 OP 636: Performed by: NURSE ANESTHETIST, CERTIFIED REGISTERED

## 2024-02-16 PROCEDURE — 999N000157 HC STATISTIC RCP TIME EA 10 MIN

## 2024-02-16 PROCEDURE — 88173 CYTOPATH EVAL FNA REPORT: CPT | Mod: 26 | Performed by: PATHOLOGY

## 2024-02-16 PROCEDURE — 250N000009 HC RX 250: Performed by: NURSE ANESTHETIST, CERTIFIED REGISTERED

## 2024-02-16 PROCEDURE — 710N000012 HC RECOVERY PHASE 2, PER MINUTE: Performed by: INTERNAL MEDICINE

## 2024-02-16 PROCEDURE — 88342 IMHCHEM/IMCYTCHM 1ST ANTB: CPT | Mod: TC,XU | Performed by: INTERNAL MEDICINE

## 2024-02-16 RX ORDER — DEXAMETHASONE SODIUM PHOSPHATE 10 MG/ML
INJECTION, SOLUTION INTRAMUSCULAR; INTRAVENOUS PRN
Status: DISCONTINUED | OUTPATIENT
Start: 2024-02-16 | End: 2024-02-16

## 2024-02-16 RX ORDER — PROPOFOL 10 MG/ML
INJECTION, EMULSION INTRAVENOUS PRN
Status: DISCONTINUED | OUTPATIENT
Start: 2024-02-16 | End: 2024-02-16

## 2024-02-16 RX ORDER — ONDANSETRON 4 MG/1
4 TABLET, ORALLY DISINTEGRATING ORAL EVERY 6 HOURS PRN
Status: DISCONTINUED | OUTPATIENT
Start: 2024-02-16 | End: 2024-02-16 | Stop reason: HOSPADM

## 2024-02-16 RX ORDER — HYDROMORPHONE HYDROCHLORIDE 1 MG/ML
0.2 INJECTION, SOLUTION INTRAMUSCULAR; INTRAVENOUS; SUBCUTANEOUS EVERY 5 MIN PRN
Status: DISCONTINUED | OUTPATIENT
Start: 2024-02-16 | End: 2024-02-16 | Stop reason: HOSPADM

## 2024-02-16 RX ORDER — ONDANSETRON 2 MG/ML
4 INJECTION INTRAMUSCULAR; INTRAVENOUS EVERY 30 MIN PRN
Status: DISCONTINUED | OUTPATIENT
Start: 2024-02-16 | End: 2024-02-16 | Stop reason: HOSPADM

## 2024-02-16 RX ORDER — NALOXONE HYDROCHLORIDE 0.4 MG/ML
0.4 INJECTION, SOLUTION INTRAMUSCULAR; INTRAVENOUS; SUBCUTANEOUS
Status: DISCONTINUED | OUTPATIENT
Start: 2024-02-16 | End: 2024-02-16 | Stop reason: HOSPADM

## 2024-02-16 RX ORDER — SODIUM CHLORIDE, SODIUM LACTATE, POTASSIUM CHLORIDE, CALCIUM CHLORIDE 600; 310; 30; 20 MG/100ML; MG/100ML; MG/100ML; MG/100ML
INJECTION, SOLUTION INTRAVENOUS CONTINUOUS
Status: DISCONTINUED | OUTPATIENT
Start: 2024-02-16 | End: 2024-02-16 | Stop reason: HOSPADM

## 2024-02-16 RX ORDER — FLUMAZENIL 0.1 MG/ML
0.2 INJECTION, SOLUTION INTRAVENOUS
Status: DISCONTINUED | OUTPATIENT
Start: 2024-02-16 | End: 2024-02-16 | Stop reason: HOSPADM

## 2024-02-16 RX ORDER — FENTANYL CITRATE 50 UG/ML
25 INJECTION, SOLUTION INTRAMUSCULAR; INTRAVENOUS EVERY 5 MIN PRN
Status: DISCONTINUED | OUTPATIENT
Start: 2024-02-16 | End: 2024-02-16 | Stop reason: HOSPADM

## 2024-02-16 RX ORDER — HALOPERIDOL 5 MG/ML
1 INJECTION INTRAMUSCULAR
Status: DISCONTINUED | OUTPATIENT
Start: 2024-02-16 | End: 2024-02-16 | Stop reason: HOSPADM

## 2024-02-16 RX ORDER — PROPOFOL 10 MG/ML
INJECTION, EMULSION INTRAVENOUS CONTINUOUS PRN
Status: DISCONTINUED | OUTPATIENT
Start: 2024-02-16 | End: 2024-02-16

## 2024-02-16 RX ORDER — NALOXONE HYDROCHLORIDE 0.4 MG/ML
0.2 INJECTION, SOLUTION INTRAMUSCULAR; INTRAVENOUS; SUBCUTANEOUS
Status: DISCONTINUED | OUTPATIENT
Start: 2024-02-16 | End: 2024-02-16 | Stop reason: HOSPADM

## 2024-02-16 RX ORDER — ONDANSETRON 2 MG/ML
4 INJECTION INTRAMUSCULAR; INTRAVENOUS EVERY 6 HOURS PRN
Status: DISCONTINUED | OUTPATIENT
Start: 2024-02-16 | End: 2024-02-16 | Stop reason: HOSPADM

## 2024-02-16 RX ORDER — ACETAMINOPHEN 325 MG/1
975 TABLET ORAL ONCE
Status: DISCONTINUED | OUTPATIENT
Start: 2024-02-16 | End: 2024-02-16 | Stop reason: HOSPADM

## 2024-02-16 RX ORDER — LABETALOL HYDROCHLORIDE 5 MG/ML
5 INJECTION, SOLUTION INTRAVENOUS EVERY 10 MIN PRN
Status: DISCONTINUED | OUTPATIENT
Start: 2024-02-16 | End: 2024-02-16 | Stop reason: HOSPADM

## 2024-02-16 RX ORDER — ONDANSETRON 2 MG/ML
INJECTION INTRAMUSCULAR; INTRAVENOUS PRN
Status: DISCONTINUED | OUTPATIENT
Start: 2024-02-16 | End: 2024-02-16

## 2024-02-16 RX ORDER — FENTANYL CITRATE 50 UG/ML
INJECTION, SOLUTION INTRAMUSCULAR; INTRAVENOUS PRN
Status: DISCONTINUED | OUTPATIENT
Start: 2024-02-16 | End: 2024-02-16

## 2024-02-16 RX ORDER — ONDANSETRON 4 MG/1
4 TABLET, ORALLY DISINTEGRATING ORAL EVERY 30 MIN PRN
Status: DISCONTINUED | OUTPATIENT
Start: 2024-02-16 | End: 2024-02-16 | Stop reason: HOSPADM

## 2024-02-16 RX ORDER — LIDOCAINE 40 MG/G
CREAM TOPICAL
Status: DISCONTINUED | OUTPATIENT
Start: 2024-02-16 | End: 2024-02-16 | Stop reason: HOSPADM

## 2024-02-16 RX ORDER — CARBOXYMETHYLCELLULOSE SODIUM 5 MG/ML
1 SOLUTION/ DROPS OPHTHALMIC
Status: DISCONTINUED | OUTPATIENT
Start: 2024-02-16 | End: 2024-02-16 | Stop reason: HOSPADM

## 2024-02-16 RX ORDER — MAGNESIUM HYDROXIDE 1200 MG/15ML
LIQUID ORAL PRN
Status: DISCONTINUED | OUTPATIENT
Start: 2024-02-16 | End: 2024-02-16 | Stop reason: HOSPADM

## 2024-02-16 RX ADMIN — ONDANSETRON 4 MG: 2 INJECTION INTRAMUSCULAR; INTRAVENOUS at 11:49

## 2024-02-16 RX ADMIN — PHENYLEPHRINE HYDROCHLORIDE 100 MCG: 10 INJECTION INTRAVENOUS at 11:34

## 2024-02-16 RX ADMIN — SUGAMMADEX 200 MG: 100 INJECTION, SOLUTION INTRAVENOUS at 11:52

## 2024-02-16 RX ADMIN — PROPOFOL 200 MCG/KG/MIN: 10 INJECTION, EMULSION INTRAVENOUS at 11:10

## 2024-02-16 RX ADMIN — DEXAMETHASONE SODIUM PHOSPHATE 10 MG: 10 INJECTION, SOLUTION INTRAMUSCULAR; INTRAVENOUS at 11:10

## 2024-02-16 RX ADMIN — PROPOFOL 100 MG: 10 INJECTION, EMULSION INTRAVENOUS at 11:10

## 2024-02-16 RX ADMIN — ROCURONIUM BROMIDE 40 MG: 50 INJECTION, SOLUTION INTRAVENOUS at 11:10

## 2024-02-16 RX ADMIN — PHENYLEPHRINE HYDROCHLORIDE 100 MCG: 10 INJECTION INTRAVENOUS at 11:16

## 2024-02-16 RX ADMIN — PHENYLEPHRINE HYDROCHLORIDE 200 MCG: 10 INJECTION INTRAVENOUS at 11:10

## 2024-02-16 RX ADMIN — SODIUM CHLORIDE, POTASSIUM CHLORIDE, SODIUM LACTATE AND CALCIUM CHLORIDE: 600; 310; 30; 20 INJECTION, SOLUTION INTRAVENOUS at 10:12

## 2024-02-16 RX ADMIN — FENTANYL CITRATE 50 MCG: 50 INJECTION INTRAMUSCULAR; INTRAVENOUS at 11:10

## 2024-02-16 ASSESSMENT — COPD QUESTIONNAIRES
CAT_SEVERITY: MODERATE
COPD: 1

## 2024-02-16 ASSESSMENT — LIFESTYLE VARIABLES: TOBACCO_USE: 1

## 2024-02-16 NOTE — ANESTHESIA CARE TRANSFER NOTE
Patient: Jasmyn Green    Procedure: Procedure(s):  BRONCHOSCOPY, WITH ENDOBRONCHIAL ULTRASOUND       Diagnosis: Lymphadenopathy [R59.1]  Diagnosis Additional Information: No value filed.    Anesthesia Type:   General     Note:    Oropharynx: oropharynx clear of all foreign objects  Level of Consciousness: awake    Level of Supplemental Oxygen (L/min / FiO2): 3  Independent Airway: airway patency satisfactory and stable  Dentition: dentition unchanged  Vital Signs Stable: post-procedure vital signs reviewed and stable  Report to RN Given: handoff report given  Patient transferred to: PACU    Handoff Report: Identifed the Patient, Identified the Reponsible Provider, Reviewed the pertinent medical history, Discussed the surgical course, Reviewed Intra-OP anesthesia mangement and issues during anesthesia, Set expectations for post-procedure period and Allowed opportunity for questions and acknowledgement of understanding      Vitals:  Vitals Value Taken Time   BP     Temp     Pulse 112 02/16/24 1200   Resp 36 02/16/24 1200   SpO2 88 % 02/16/24 1200   Vitals shown include unfiled device data.    Electronically Signed By: JIMENEZ Perez CRNA  February 16, 2024  12:01 PM

## 2024-02-16 NOTE — PROCEDURES
INTERVENTIONAL PULMONOLOGY       Procedure(s):    A flexible bronchoscopy  Airway exam  EBUS-TBNA (4 sites)  Therapeutic suctioning (2 sites)    Indication:  lung mass    Attending of Record:  Ruben Levin MD    Interventional Pulmonary Fellow   None    Trainees Present:   None     Medications:    General Anesthesia - See anesthesia flowsheet for details    Sedation Time:   Per Anesthesia Care Provider    Time Out:  Performed    The patient's medical record has been reviewed.  The indication for the procedure was reviewed.  The necessary history and physical examination was performed and reviewed.  The risks, benefits and alternatives of the procedure were discussed with the the patient in detail and she had the opportunity to ask questions.  I discussed in particular the potential complications including risks of minor or life-threatening bleeding and/or infection, respiratory failure, vocal cord trauma / paralysis, pneumothorax, and discomfort. Sedation risks were also discussed including abnormal heart rhythms, low blood pressure, and respiratory failure. All questions were answered to the best of my ability.  Verbal and written informed consent was obtained.  The proposed procedure and the patient's identification were verified prior to the procedure by the physician and the surgical team.    The patient was assessed for the adequacy for the procedure and to receive medications.   Mental Status:  Alert and oriented x 3  Airway examination:  Class II (Complete visualization of uvula)  Pulmonary:  Decreased breathsound throughout  CV:  RRR, no murmurs or gallops  ASA Grade:  (III)  Severe systemic disease    After clinical evaluation and reviewing the indication, risks, alternatives and benefits of the procedure the patient was deemed to be in satisfactory condition to undergo the procedure.           A Tuberculosis risk assessment was performed:  The patient has no known RISK of Tuberculosis    The  procedure was performed in a negative airflow room: The patient could not be moved to a negative airflow room because of needed OR for the procedure    Maneuvers / Procedure:      A Flexible  bronchoscope was used for the procedure. The patient was orally intubated with a # 8 ETT and the flexible scope was passed through.    Airway Examination: A complete airway examination was performed from the distal trachea to the subsegmental level in each lobe of both lungs.  Pertinent findings include purulent secretions bilaterally  .       EBUS-TBNA (Lymph Node) The EBUS scope was inserted and evaluation included:       11R and 10R could not be identified      Small normal appearing 4R biopsied 2 passes  Small normal appearing station 7 biopsied 3 passes  Large 4L with invading mass biopsied 4 passes  Large 10 L with invading mass biopsied 4 passes                                                                    Therapeutic suctionin min of operative time was spent clearing out the airway of debris, blood and mucous prior to the intervention.     Relevant Pictures      Recommendations:     -->  follow up pathology  -->  Patient dyspneic in PACU - getting bipap and will decide on admission. No pneumothorax    Ruben Levin MD

## 2024-02-16 NOTE — ANESTHESIA PROCEDURE NOTES
Airway         Procedure Start/Stop Times: 2/16/2024 11:17 AM  Staff -        Anesthesiologist:  Hannah Toth MD       CRNA: Gretta Alvarez APRN CRNA       Performed By: anesthesiologist  Consent for Airway        Urgency: elective  Indications and Patient Condition       Indications for airway management: krista-procedural       Induction type:intravenous       Mask difficulty assessment: 1 - vent by mask    Final Airway Details       Final airway type: endotracheal airway       Successful airway: ETT - single  Endotracheal Airway Details        ETT size (mm): 8.0       Cuffed: yes       Successful intubation technique: direct laryngoscopy       DL Blade Type: Escalera 2       Grade View of Cords: 1       Adjucts: stylet       Position: Right       Measured from: lips       Secured at (cm): 21       Bite block used: None    Post intubation assessment        Placement verified by: capnometry, equal breath sounds and chest rise        Number of attempts at approach: 2       Number of other approaches attempted: 0       Secured with: tape       Ease of procedure: easy       Dentition: Intact and Unchanged       Dental guard used and removed. Dental Guard Type: Standard White.    Medication(s) Administered   Medication Administration Time: 2/16/2024 11:17 AM

## 2024-02-16 NOTE — H&P
History and physical update    68-year-old woman recently discharged from COPD exacerbation.  She is feeling significantly better on the oxygen.    Regular rate and rhythm  Patient with productive cough but no significant wheezes or stridor  Abdomen soft  Normal mental status without anxiety    Labs, imaging reviewed    Patient consented for bronchoscopy with endobronchial ultrasound under general anesthesia.    Ruben Levin MD

## 2024-02-16 NOTE — DISCHARGE INSTRUCTIONS
Post Bronchoscopy Patient Instructions:    February 16, 2024  Jasmyn Pastordannielleulises    Your procedure was completed (bronchoscopy with biopsy) without any immediate complications.    You may cough up scant amount of blood for the next 12-24 hours. If you have excessive cough with blood, chest pain, shortness of breath, please report to the closest emergency room.    You may experience low grade (less than 100.5 F) fever next 24 hours, if so, you can take Tylenol. If the fever persists more than 24 hours, please contact to our office or your primary care provider.    Our office (Thoracic/Pulmonary--[phone]) will call you with the results of any samples taken during the procedure. Please note that you may get a result notification through  My Chart  before us calling you as the Laboratories are instructed to release the results as soon as they are available to the patients and providers at the same time. Please allow your provider 24 hours call you to discuss the results.    You may resume your diet as it was prior to procedure.    You may resume your medications after the procedure unless you are instructed to do differently.     Please follow instructions from the nursing staff upon discharge in terms of activity. In general, you should avoid any attention or motor skill requiring activities (e.g., driving or operating any motorized vehicle) for 24 hours as you might be still under the effect of sedation medications. Please make sure an adult to accompany you next 24 hours.     Should you have any question, please do not hesitate to call our office.    Ruben Levin MD

## 2024-02-16 NOTE — OR NURSING
EBUS balloon not used. Provider handed specimens to cytology technician for slide preparation and viewing by pathologist in OR. Specimens transported back to lab by cytology technician.

## 2024-02-16 NOTE — ANESTHESIA POSTPROCEDURE EVALUATION
Patient: Jasmyn Green    Procedure: Procedure(s):  BRONCHOSCOPY, WITH ENDOBRONCHIAL ULTRASOUND       Anesthesia Type:  General    Note:  Disposition: Outpatient   Postop Pain Control: Uneventful            Sign Out: Well controlled pain   PONV: No   Neuro/Psych: Uneventful            Sign Out: Acceptable/Baseline neuro status   Airway/Respiratory:             Sign Out: Acceptable/Baseline resp. status (did 15 min of BiPAP before discharge)   CV/Hemodynamics: Uneventful            Sign Out: Acceptable CV status; No obvious hypovolemia; No obvious fluid overload   Other NRE: NONE   DID A NON-ROUTINE EVENT OCCUR? No           Last vitals:  Vitals Value Taken Time   /71 02/16/24 1330   Temp 36.3  C (97.4  F) 02/16/24 1330   Pulse 111 02/16/24 1331   Resp 22 02/16/24 1330   SpO2 93 % 02/16/24 1332   Vitals shown include unfiled device data.    Electronically Signed By: Hannah Toth MD  February 16, 2024  4:11 PM

## 2024-02-16 NOTE — PROGRESS NOTES
Discharge Note    Patient discharged to home via private vehicle  accompanied by daughter.  IV: Discontinued  Prescriptions filled and given to patient/family.   Belongings reviewed and sent with patient.   Home medications returned to patient: NA  Equipment sent with: patient.   patient verbalizes understanding of discharge instructions. AVS given to patient.

## 2024-02-16 NOTE — ANESTHESIA PREPROCEDURE EVALUATION
Anesthesia Pre-Procedure Evaluation    Patient: Jasmyn Green   MRN: [id number] : 1955        Procedure : Procedure(s):  BRONCHOSCOPY, WITH ENDOBRONCHIAL ULTRASOUND          Past Medical History:   Diagnosis Date    Age-related osteoporosis without current pathological fracture     Arthritis     COPD (chronic obstructive pulmonary disease) (H)     Coronary artery disease     Dependence on nocturnal oxygen therapy     Dyspnea on exertion     Emphysema lung (H)     Gout     HLD (hyperlipidemia)     Hypertension     Lung mass       Past Surgical History:   Procedure Laterality Date    COLONOSCOPY N/A 10/21/2021    Procedure: COLONOSCOPY;  Surgeon: Wilma Hester DO;  Location: [place] [facility]    VAGINAL DELIVERY      x 3 ; remote    WISDOM TOOTH EXTRACTION        No Known Allergies   Social History     Tobacco Use    Smoking status: Former     Types: Cigarettes     Quit date: 2021     Years since quittin.6    Smokeless tobacco: Never    Tobacco comments:     updated 8/3/2021 by TTS   Substance Use Topics    Alcohol use: Not Currently      Wt Readings from Last 1 Encounters:   02/15/24 41.5 kg (91 lb 6.4 oz)        Anesthesia Evaluation            ROS/MED HX  ENT/Pulmonary:     (+)                tobacco use, Past use,        moderate,  COPD (recent COPD exacerbation - discharcged home 2/15/24.),              Neurologic:       Cardiovascular:     (+)  hypertension- -  CAD -  - -           LOPEZ.                           METS/Exercise Tolerance:     Hematologic:       Musculoskeletal:   (+)  arthritis,             GI/Hepatic:       Renal/Genitourinary:       Endo:       Psychiatric/Substance Use:       Infectious Disease:       Malignancy:       Other:            Physical Exam    Airway        Mallampati: III   TM distance: < 3 FB   Neck ROM: full   Mouth opening: > 3 cm    Respiratory Devices and Support     Nasal Canula 2  l/min.     Dental       (+) Edentulous      Cardiovascular         "  Rhythm and rate: regular     Pulmonary           (+) rhonchi and decreased breath sounds (decreased R>L)           OUTSIDE LABS:  CBC:   Lab Results   Component Value Date    WBC 17.5 (H) 02/13/2024    WBC 16.0 (H) 02/11/2024    HGB 9.1 (L) 02/13/2024    HGB 9.4 (L) 02/10/2024    HCT 28.9 (L) 02/13/2024    HCT 30.4 (L) 02/10/2024     02/13/2024     02/10/2024     BMP:   Lab Results   Component Value Date     02/13/2024     02/12/2024    POTASSIUM 4.7 02/15/2024    POTASSIUM 5.2 02/14/2024    CHLORIDE 105 02/13/2024    CHLORIDE 111 (H) 02/12/2024    CO2 28 02/13/2024    CO2 25 02/12/2024    BUN 23.2 (H) 02/13/2024    BUN 25.6 (H) 02/12/2024    CR 0.89 02/13/2024    CR 0.70 02/12/2024    GLC 77 02/13/2024     (H) 02/12/2024     COAGS:   Lab Results   Component Value Date    PTT 28 06/27/2021    INR 1.32 (H) 06/27/2021     POC: No results found for: \"BGM\", \"HCG\", \"HCGS\"  HEPATIC:   Lab Results   Component Value Date    ALBUMIN 3.1 (L) 02/09/2024    PROTTOTAL 5.9 (L) 02/09/2024    ALT 46 02/09/2024    AST 17 02/09/2024    ALKPHOS 115 02/09/2024    BILITOTAL <0.2 02/09/2024     OTHER:   Lab Results   Component Value Date    LACT 0.8 02/13/2024    FLOR 8.9 02/13/2024    PHOS 3.7 09/21/2021    MAG 2.1 02/08/2024    LIPASE 63 (H) 02/11/2024    TSH 1.06 09/21/2021       Anesthesia Plan    ASA Status:  4    NPO Status:  NPO Appropriate    Anesthesia Type: General (8.0 ETT).     - Airway: ETT   Induction: Intravenous, Propofol.   Maintenance: TIVA.        Consents    Anesthesia Plan(s) and associated risks, benefits, and realistic alternatives discussed. Questions answered and patient/representative(s) expressed understanding.     - Discussed:     - Discussed with:  Patient            Postoperative Care    Pain management: Multi-modal analgesia.   PONV prophylaxis: Ondansetron (or other 5HT-3), Dexamethasone or Solumedrol     Comments:               Hannah Toth MD      # " "Hyperkalemia: Highest K = 5.7 mmol/L in last 30 days, will monitor as appropriate       # Hypoalbuminemia: Lowest albumin = 3.1 g/dL in the past 30 days , will monitor as appropriate     # Cachexia: Estimated body mass index is 17.27 kg/m  as calculated from the following:    Height as of 2/9/24: 1.549 m (5' 1\").    Weight as of 2/15/24: 41.5 kg (91 lb 6.4 oz).      "

## 2024-02-16 NOTE — PROGRESS NOTES
Respiratory Therapy Procedure Note  Jasmyn Green is a 68 year old female     Summary of Care during Procedure:   Assisted Dr. Levin during EBUS/ Bronchoscopy done on 2/16/24 in the OR  EBUS/Bronchoscopy procedure was done successfully without any complication.  Pathology present for lymph node FNA. Airway managed by CRNA.      Debby Vasquez, RT 2/16/2024 2:29 PM

## 2024-02-19 ENCOUNTER — PATIENT OUTREACH (OUTPATIENT)
Dept: CARE COORDINATION | Facility: CLINIC | Age: 69
End: 2024-02-19
Payer: COMMERCIAL

## 2024-02-19 NOTE — LETTER
[facility] CARE COORDINATION  [address] [place] [address]    February 20, 2024    Jasmyn Green  [address]  [place] [address]      Dear Jasmyn,    I am a clinic care coordinator who works with Miesha Fernando CNP with the [facility]. I have been trying to reach you recently to introduce Clinic Care Coordination. Below is a description of clinic care coordination and how I can further assist you.       The clinic care coordination team is made up of a registered nurse, , financial resource worker and community health worker who understand the health care system. The goal of clinic care coordination is to help you manage your health and improve access to the health care system. Our team works alongside your provider to assist you in determining your health and social needs. We can help you obtain health care and community resources, providing you with necessary information and education. We can work with you through any barriers and develop a care plan that helps coordinate and strengthen the communication between you and your care team.  Our services are voluntary and are offered without charge to you personally.    Please feel free to contact me with any questions or concerns regarding care coordination and what we can offer.      We are focused on providing you with the highest-quality healthcare experience possible.    Sincerely,     Jaz Gallagher RN  Clinic Care Coordination  [phone]

## 2024-02-19 NOTE — PROGRESS NOTES
Clinic Care Coordination Contact  [facility]/Voicemail    Clinical Data: Care Coordinator Outreach    Outreach Documentation Number of Outreach Attempt   2/19/2024  10:28 AM 1     No answer and unable to leave a VM    Plan:  Care Coordinator will try to reach patient again in 1-2 business days.    TCM Episode created  UTC x 1

## 2024-02-20 NOTE — PROGRESS NOTES
Clinic Care Coordination Contact    Incoming call from catherine Tiwari.  She received the UTC letter from TCM call.  She is asking for an appointment with CCC SW to assist with help in the home, PCA services, etc.  Patient is now living with Karie full time since her hospital discharge.  Patient to sign a consent to communicate at her 2/21 appointment.  Otherwise patient will also be on the phone and able to give verbal consent.  Appointment scheduled 2/27 @ 9:00

## 2024-02-20 NOTE — PROGRESS NOTES
Clinic Care Coordination Contact  [facility]/Voicemail    Clinical Data: Care Coordinator Outreach    Outreach Documentation Number of Outreach Attempt   2/19/2024  10:28 AM 1   2/20/2024  10:54 AM 2       Left message on patient's voicemail with call back information and requested return call.    Plan: Care Coordinator will send unable to contact letter with care coordinator contact information via mail. Care Coordinator will do no further outreaches at this time.    TCM Episode created  UTC x 2

## 2024-02-21 ENCOUNTER — OFFICE VISIT (OUTPATIENT)
Dept: INTERNAL MEDICINE | Facility: CLINIC | Age: 69
End: 2024-02-21
Payer: COMMERCIAL

## 2024-02-21 VITALS
BODY MASS INDEX: 17.95 KG/M2 | OXYGEN SATURATION: 90 % | SYSTOLIC BLOOD PRESSURE: 132 MMHG | HEART RATE: 122 BPM | WEIGHT: 95 LBS | DIASTOLIC BLOOD PRESSURE: 70 MMHG | RESPIRATION RATE: 22 BRPM

## 2024-02-21 DIAGNOSIS — I10 BENIGN ESSENTIAL HYPERTENSION: ICD-10-CM

## 2024-02-21 DIAGNOSIS — F41.9 ANXIETY: ICD-10-CM

## 2024-02-21 DIAGNOSIS — J44.1 COPD EXACERBATION (H): ICD-10-CM

## 2024-02-21 DIAGNOSIS — R91.8 MASS OF UPPER LOBE OF LEFT LUNG: ICD-10-CM

## 2024-02-21 DIAGNOSIS — E43 SEVERE PROTEIN-CALORIE MALNUTRITION (H): ICD-10-CM

## 2024-02-21 DIAGNOSIS — F51.01 PRIMARY INSOMNIA: ICD-10-CM

## 2024-02-21 PROCEDURE — 36415 COLL VENOUS BLD VENIPUNCTURE: CPT | Performed by: NURSE PRACTITIONER

## 2024-02-21 PROCEDURE — 99214 OFFICE O/P EST MOD 30 MIN: CPT | Performed by: NURSE PRACTITIONER

## 2024-02-21 PROCEDURE — 80048 BASIC METABOLIC PNL TOTAL CA: CPT | Performed by: NURSE PRACTITIONER

## 2024-02-21 RX ORDER — HYDROXYZINE HYDROCHLORIDE 25 MG/1
25-50 TABLET, FILM COATED ORAL EVERY 8 HOURS PRN
Qty: 30 TABLET | Refills: 3 | Status: SHIPPED | OUTPATIENT
Start: 2024-02-21

## 2024-02-21 RX ORDER — DOXYCYCLINE 100 MG/1
100 CAPSULE ORAL 2 TIMES DAILY
Qty: 20 CAPSULE | Refills: 3 | Status: SHIPPED | OUTPATIENT
Start: 2024-02-21 | End: 2024-04-22

## 2024-02-21 RX ORDER — MIRTAZAPINE 7.5 MG/1
7.5 TABLET, FILM COATED ORAL AT BEDTIME
Qty: 90 TABLET | Refills: 3 | Status: SHIPPED | OUTPATIENT
Start: 2024-02-21

## 2024-02-21 NOTE — PATIENT INSTRUCTIONS
Start the mirtazapine 7.5 mg at bedtime.  This should help you with sleep, appetite, and anxiety.    Take the hydroxyzine as needed for breakthrough anxiety or to help you sleep at night as your body adjusts to you being on the mirtazapine.  The full effect of the mirtazapine can take 4 to 6 weeks or so.    Your labs are processing, I will release results on PostRank once they are back.    Refilled your COPD action plan antibiotic the doxycycline.  Get this to have it on hand.    I will see you back on 3/6/2024.  Please let us know before then if anything comes up.

## 2024-02-22 LAB
ANION GAP SERPL CALCULATED.3IONS-SCNC: 10 MMOL/L (ref 7–15)
BUN SERPL-MCNC: 43.7 MG/DL (ref 8–23)
CALCIUM SERPL-MCNC: 9 MG/DL (ref 8.8–10.2)
CHLORIDE SERPL-SCNC: 106 MMOL/L (ref 98–107)
CREAT SERPL-MCNC: 0.69 MG/DL (ref 0.51–0.95)
DEPRECATED HCO3 PLAS-SCNC: 23 MMOL/L (ref 22–29)
EGFRCR SERPLBLD CKD-EPI 2021: >90 ML/MIN/1.73M2
GLUCOSE SERPL-MCNC: 193 MG/DL (ref 70–99)
POTASSIUM SERPL-SCNC: 4.7 MMOL/L (ref 3.4–5.3)
SODIUM SERPL-SCNC: 139 MMOL/L (ref 135–145)

## 2024-02-22 NOTE — PROGRESS NOTES
Assessment & Plan     COPD exacerbation (H): Her breathing is much better than prior. She is requiring 2-3 liters of oxygen. Will renew her Doxycycline for her COPD Action plan. She will follow up with Pulmonology.   - doxycycline hyclate (VIBRAMYCIN) 100 MG capsule  Dispense: 20 capsule; Refill: 3    Mass of upper lobe of left lung: Found on Chest CT while inpatient, waiting on bronchoscopy pathology. PET scan is scheduled for 03/01/24.    Severe protein-calorie malnutrition (H24): Weight today was 95 pounds. She is doing two supplements per day and three meals. Appetite has been somewhat better, weight is stable. Will continue to monitor this closely.     Benign essential hypertension: Blood pressure today in office was 132/70. Hyperkalemia has resolved. Will continue Lisinopril.   - Basic metabolic panel  (Ca, Cl, CO2, Creat, Gluc, K, Na, BUN)    Anxiety: Long standing history of anxiety, but with the likely lung cancer diagnosis, this has worsened. Will start her on mirtazapine (sleep, anxiety, and appetite stimulant). Hydroxyzine as needed for racing mind, or help getting to sleep at night. Follow up in March.   - mirtazapine (REMERON) 7.5 MG tablet  Dispense: 90 tablet; Refill: 3  - hydrOXYzine HCl (ATARAX) 25 MG tablet  Dispense: 30 tablet; Refill: 3    Primary insomnia: She was up at 2am this morning and has not been able to take a nap. The pending diagnosis of lung cancer is making her anxiety worse. Mind is racing at night. Will start her on mirtazapine and augment with Hydroxyzine as needed. Follow up in March.   - mirtazapine (REMERON) 7.5 MG tablet  Dispense: 90 tablet; Refill: 3  - hydrOXYzine HCl (ATARAX) 25 MG tablet  Dispense: 30 tablet; Refill: 3      MED REC REQUIRED  Post Medication Reconciliation Status: discharge medications reconciled and changed, per note/orders    Subjective   Jasmyn is a 68 year old, presenting for the following health issues:  Follow Up (Hospital follow up- [place])        2/21/2024     3:50 PM   Additional Questions   Roomed by Vivi BOSS     The patient presents today for post hospital discharge follow up.    She reports overall feeling much better than she was prior. She is requiring 3 liters of oxygen when up and moving, 2 liters continuously otherwise. Her breathing is much better than prior.    She is really working on her caloric intake, she is having two ensure type supplements per day, and eating three meals per day.    She moved out of the basement of her sister's house and into her Daughter's house. She is adjusting well to this.    She reports that her COPD action plan antibiotic was discontinued, and she would like to have this one had again, will reorder this.    Her potassium was elevated in the hospital, discharge was 4.7; she has been on lisinopril for her blood pressures for years, will recheck her labs today.    While inpatient they found a left upper lobe mass:    IMPRESSION:   1.  Bulky AP and left hilar adenopathy with irregular pleural-based anterior left upper lobe nodule with adjacent smaller satellite nodules. Findings suspicious for a primary lung cancer.  2.  Nodular thickening of the left adrenal gland is likely unchanged.  3.  Patient needs PET/CT for further evaluation.  4.  Extensive emphysema.    She will be having a PET scan on 03/01/24; with her smoking history this is concerning for lung cancer. Discussed this at length today. She will pursue treatment for this, she wants to fight it.    There was no mass on her lung cancer screening CT done 06/15/2023.    Pulmonology did do a bronchoscopy and the pathology is pending from this still.     Hospital Follow-up Visit:    Hospital/Nursing Home/IP Rehab Facility: [facility]  Date of Admission: 02/08/24  Date of Discharge: 02/15/24  Reason(s) for Admission: COPD Exacerbation, Sepsis related to pneumonia, lung mass, sinus tachycardia, lactic acidosis,  essential hypertension, and hyperkalemia.     Was your hospitalization related to COVID-19? No   Problems taking medications regularly:  None  Medication changes since discharge: None  Problems adhering to non-medication therapy:  None    Summary of hospitalization:  [facility] discharge summary reviewed  Diagnostic Tests/Treatments reviewed.  Follow up needed: PET scan is scheduled for 03/01/24.  Other Healthcare Providers Involved in Patient s Care:         None  Update since discharge: stable.         Plan of care communicated with patient, family, and caregiver (Daughter was present today).                Review of Systems  Constitutional, HEENT, cardiovascular, pulmonary, GI, , musculoskeletal, neuro, skin, endocrine and psych systems are negative, except as otherwise noted.      Objective    /70 (BP Location: Right arm, Patient Position: Sitting)   Pulse (!) 122   Resp 22   Wt 43.1 kg (95 lb)   LMP  (LMP Unknown)   SpO2 90%   BMI 17.95 kg/m    Body mass index is 17.95 kg/m .  Physical Exam   GENERAL: alert and no distress  EYES: Eyes grossly normal to inspection, PERRL and conjunctivae and sclerae normal  RESP: lungs clear to auscultation - no rales, rhonchi or wheezes  CV: regular rate and rhythm, normal S1 S2, no S3 or S4, no murmur, click or rub, no peripheral edema  MS: no gross musculoskeletal defects noted, seated in a wheelchair  SKIN: Bruising on forearms from previous IV's.  NEURO: Normal strength and tone, mentation intact and speech normal  PSYCH: mentation appears normal, affect normal/bright      Signed Electronically by: Miesha Fernando, CNP

## 2024-02-25 ENCOUNTER — MYC MEDICAL ADVICE (OUTPATIENT)
Dept: INTERNAL MEDICINE | Facility: CLINIC | Age: 69
End: 2024-02-25
Payer: COMMERCIAL

## 2024-02-25 DIAGNOSIS — F40.240 CLAUSTROPHOBIA: Primary | ICD-10-CM

## 2024-02-26 RX ORDER — DIAZEPAM 5 MG
TABLET ORAL
Qty: 1 TABLET | Refills: 0 | Status: SHIPPED | OUTPATIENT
Start: 2024-02-26 | End: 2024-04-04

## 2024-02-26 NOTE — TELEPHONE ENCOUNTER
I sent a prescription for 1 tablet of Valium 5 mg to her pharmacy.  She should take this tablet 30 minutes prior to her scan.  If she does take this medication, she will need a  to take her to and from her test as driving is not recommended after using this medication.

## 2024-02-26 NOTE — TELEPHONE ENCOUNTER
RN relayed provider response. Patient had no further questions.    HU Chu  [facility]

## 2024-02-27 ENCOUNTER — PATIENT OUTREACH (OUTPATIENT)
Dept: ONCOLOGY | Facility: CLINIC | Age: 69
End: 2024-02-27
Payer: COMMERCIAL

## 2024-02-27 DIAGNOSIS — C34.90 SMALL CELL LUNG CANCER (H): Primary | ICD-10-CM

## 2024-02-27 LAB
PATH REPORT.COMMENTS IMP SPEC: ABNORMAL
PATH REPORT.COMMENTS IMP SPEC: ABNORMAL
PATH REPORT.COMMENTS IMP SPEC: YES
PATH REPORT.FINAL DX SPEC: ABNORMAL
PATH REPORT.GROSS SPEC: ABNORMAL
PATH REPORT.MICROSCOPIC SPEC OTHER STN: ABNORMAL
PATH REPORT.RELEVANT HX SPEC: ABNORMAL

## 2024-02-27 NOTE — PROGRESS NOTES
68-year-old woman with biopsy positive for small cell lung cancer.  Radiation and medical oncology consults ordered.  She has a PET on Friday.    Ruben Levin MD

## 2024-02-27 NOTE — PROGRESS NOTES
New Patient Oncology Nurse Navigator Note     Referring provider: Dr. Ruben Levin    Referring Clinic/Organization: [facility]  Referred to: Medical Oncology and Radiation Oncology  Requested provider (if applicable): First available - did not specify   Referral Received: 02/27/24       Evaluation for :   Diagnosis   C34.90 (ICD-10-CM) - Small cell lung cancer (H)     Clinical History (per Nurse review of records provided):      02/08/2024 CT Chest w/ contrast (bookmarked) showed:   IMPRESSION:   1.  Bulky AP and left hilar adenopathy with irregular pleural-based anterior left upper lobe nodule with adjacent smaller satellite nodules. Findings suspicious for a primary lung cancer.  2.  Nodular thickening of the left adrenal gland is likely unchanged.  3.  Patient needs PET/CT for further evaluation.  4.  Extensive emphysema.  5.  Findings discussed by the undersigned with Dr. Ramos at 1141    02/16/2024   Non-gynecologic Cytology (bookmarked) showed:   Final Diagnosis   Specimen A                 Interpretation:                   Suspicious for malignancy      A) MEDIASTINAL LYMPH NODE, STATION 4R, ENDOBRONCHIAL ULTRASOUND-GUIDED FINE-NEEDLE ASPIRATION:  -PREDOMINANTLY BLOOD AND LYMPHOCYTES  -RARE ATYPICAL EPITHELIOID CELLS ARE PRESENT, SUSPICIOUS FOR NEOPLASIA     B) MEDIASTINAL LYMPH NODE, STATION 7, ENDOBRONCHIAL ULTRASOUND-GUIDED FINE-NEEDLE ASPIRATION:  -BENIGN; BLOOD AND LYMPHOCYTES, OCCASIONAL BENIGN BRONCHIAL EPITHELIAL CELLS  -NO TUMOR SEEN     C) MEDIASTINAL LYMPH NODE, STATION 4L, ENDOBRONCHIAL ULTRASOUND-GUIDED FINE-NEEDLE ASPIRATION:  -METASTATIC SMALL CELL CARCINOMA OF PULMONARY ORIGIN     D) MEDIASTINAL LYMPH NODE, STATION 10 L, ENDOBRONCHIAL ULTRASOUND-GUIDED FINE-NEEDLE ASPIRATION:  -METASTATIC SMALL CELL CARCINOMA OF PULMONARY ORIGIN                 Adequacy:                 Satisfactory for evaluation        Specimen B                 Interpretation:                  Negative for  malignancy                 Adequacy:                 Satisfactory for evaluation        Specimen C                 Interpretation:                   Metastatic malignancy (Malignant)                  Adequacy:                 Satisfactory for evaluation        Specimen D                 Interpretation:                   Metastatic malignancy (Malignant), Small cell carcinoma.                  Adequacy:                 Satisfactory for evaluation        Specimen E                 Interpretation:                        Electronically signed by Jose Alejandro Adam MD on 2/27/2024 at  9:55 AM     Clinical Assessment / Barriers to Care (Per Nurse):    Tobacco History    Smoking Status  Former Quit Date  6/27/2021 Smoking Tobacco Type  Cigarettes quit in 6/27/2021   Previous Radiation Therapy: No  Records Location: [facility]   Records Needed: None  Additional testing needed prior to consult: PET (scheduled 3/1)  Referral updates and Plan:     Writer called and introduced myself and my role as nurse navigator to Jasmyn and her daughter, Julissa (consent to communicate found on file). Jasmyn agreed to schedule first available any location. Per clinic manager, Dr. Stewart's RNCC agreed to have pt scheduled 3/6 at 2:15 in a 30 min slot. Clinic address and phone number given to pt. All questions were answered. Their call was transferred to NPS to schedule med onc and rad onc appts.     IVAN MarrN, RN, OCN  [facility] Oncology Nurse Navigator  [phone] / [phone]

## 2024-02-28 ENCOUNTER — PATIENT OUTREACH (OUTPATIENT)
Dept: NURSING | Facility: CLINIC | Age: 69
End: 2024-02-28
Payer: COMMERCIAL

## 2024-02-28 ASSESSMENT — ACTIVITIES OF DAILY LIVING (ADL): DEPENDENT_IADLS:: INDEPENDENT

## 2024-02-28 NOTE — PROGRESS NOTES
Clinic Care Coordination Contact  Clinic Care Coordination Contact  OUTREACH    Referral Information:  Referral Source: PCP    Primary Diagnosis: Oncology    Chief Complaint   Patient presents with    Clinic Care Coordination - Initial        Universal Utilization:   Clinic Utilization  Difficulty keeping appointments:: No  Compliance Concerns: No  No-Show Concerns: No  No PCP office visit in Past Year: No  Utilization      No Show Count (past year)  1             ED Visits  1             Hospital Admissions  2                    Current as of: 2/27/2024  8:45 PM                Clinical Concerns:  Current Medical Concerns:    Patient Active Problem List   Diagnosis    COPD exacerbation (H)    History of gout    Coronary artery calcification    Lymphocytosis    Essential hypertension    Asymptomatic hypertensive urgency    Age-related osteoporosis without current pathological fracture    Lung mass      Current Behavioral Concerns: no current concerns      Education Provided to patient:      Speciality Care Coordinator: at your oncology appointment; be sure to ask about an 'oncology care coordinator'. This should get you connected to someone like me; but more specialized with more knowledge and resources related to cancer. Also be sure to ask about HOME CARE options through insurance.     Disability Specialists: help apply for Social Security Disability. You can apply for their assistance over the phone ([phone]) or on their website here: https://www.disabilityspecialists.net/    Long Term Care Insurance: [place]://[place].gov/commerce[place]insurance/other/long-term-care/  Long-term care insurance may be one option to cover services you might need if you are unable to care for yourself because of a prolonged illness or disability. These services may include day-to-day care in your home, at an adult day care center or in a nursing or assisted-living facility. Find out more about getting long term care insurance at the website  above.     [employer] [place]: Apply for free home delivered meals through [employer] [place] here - https://www.openarmsmn.org/    Here are some resources that are available to you and what they can help with:     CFAC (cancer financial assistance coalition) is a coalition of organizations helping cancer patients manage their financial challenges - https://www.cancerfac.org/    Cancer Legal Line - 931.806.6199   You can speak with someone regarding many different things, including employment rights, access to public benefits, insurance issues, family law matters and financial concerns. They are based in [place].     American Cancer Society - [phone] - can help with transportation to treatment  https://www.cancer.org/    Advanced Care Planning: When it comes to decisions about your health, it s important that your health care goals, values and wishes are known. In the event of a sudden injury or illness, you may not be able to communicate your choices. We encourage you to begin by thinking about what matters most to you. Have conversations with family, friends, cultural and/or lauren leaders, and your health care team. Then, share your goals and wishes for current and future healthcare so your voice is heard when treatment decisions need to be made. See the resources listed below for help with talking about and sharing what is most important to you.  You can find more information about advanced care planning here -   https://www.[facility].org/resources/patients-and-visitors/honoring-choices  Or call: [phone]  The form to complete and return can be found here:   [email or web address]     Health Maintenance Reviewed: Due/Overdue   Health Maintenance Due   Topic Date Due    COPD ACTION PLAN  Never done    ZOSTER IMMUNIZATION (1 of 2) Never done    RSV VACCINE (Pregnancy & 60+) (1 - 1-dose 60+ series) Never done    COVID-19 Vaccine (3 - Moderna risk series) 03/07/2022    INFLUENZA VACCINE (1)  09/01/2023      Clinical Pathway: None    Medication Management:  Medication review status: medication nor reviewed during call       Functional Status:  Dependent ADLs:: Independent  Dependent IADLs:: Independent  Bed or wheelchair confined:: No  Mobility Status: Independent w/Device  Fallen 2 or more times in the past year?: No  Any fall with injury in the past year?: No    Living Situation:  Current living arrangement:: I live in a private home with family  Type of residence:: Private home - stairs    Lifestyle & Psychosocial Needs:    Social Determinants of Health     Food Insecurity: Low Risk  (2/1/2024)    Food Insecurity     Within the past 12 months, did you worry that your food would run out before you got money to buy more?: No     Within the past 12 months, did the food you bought just not last and you didn t have money to get more?: No   Depression: Not at risk (2/1/2024)    PHQ-2     PHQ-2 Score: 1   Housing Stability: Low Risk  (2/1/2024)    Housing Stability     Do you have housing? : Yes     Are you worried about losing your housing?: No   Tobacco Use: Medium Risk (2/21/2024)    Patient History     Smoking Tobacco Use: Former     Smokeless Tobacco Use: Never     Passive Exposure: Past   Financial Resource Strain: Low Risk  (2/1/2024)    Financial Resource Strain     Within the past 12 months, have you or your family members you live with been unable to get utilities (heat, electricity) when it was really needed?: No   Alcohol Use: Not At Risk (11/10/2021)    AUDIT-C     Frequency of Alcohol Consumption: Monthly or less     Average Number of Drinks: 1 or 2     Frequency of Binge Drinking: Never   Transportation Needs: Low Risk  (2/1/2024)    Transportation Needs     Within the past 12 months, has lack of transportation kept you from medical appointments, getting your medicines, non-medical meetings or appointments, work, or from getting things that you need?: No   Physical Activity: Sufficiently  Active (11/10/2021)    Exercise Vital Sign     Days of Exercise per Week: 5 days     Minutes of Exercise per Session: 30 min   Interpersonal Safety: Low Risk  (2/1/2024)    Interpersonal Safety     Do you feel physically and emotionally safe where you currently live?: Yes     Within the past 12 months, have you been hit, slapped, kicked or otherwise physically hurt by someone?: No     Within the past 12 months, have you been humiliated or emotionally abused in other ways by your partner or ex-partner?: No   Stress: No Stress Concern Present (11/10/2021)    [employer] [place] [employer] - Occupational Stress Questionnaire     Feeling of Stress : Not at all   Social Connections: Socially Isolated (11/10/2021)    Social Connection and Isolation Panel [NHANES]     Frequency of Communication with Friends and Family: More than three times a week     Frequency of Social Gatherings with Friends and Family: Once a week     Attends Islam Services: Never     Active Member of Clubs or Organizations: No     Attends Club or Organization Meetings: Not on file     Marital Status:      Diet:: Regular  Inadequate nutrition (GOAL):: No  Tube Feeding: No  Inadequate activity/exercise (GOAL):: No  Significant changes in sleep pattern (GOAL): No  Transportation means:: Regular car     Islam or spiritual beliefs that impact treatment:: No  Mental health DX:: No  Mental health management concern (GOAL):: No  Chemical Dependency Status: No Current Concerns  Informal Support system:: Family, Friends             Resources and Interventions:  Current Resources:      Community Resources: None  Supplies Currently Used at Home: None  Equipment Currently Used at Home: walker, standard  Employment Status: retired         Advance Care Plan/Directive  Advanced Care Plans/Directives on file:: No    Referrals Placed: Disability Specialists, [facility], [facility] Resources, Community Resources, Financial Services, Home Care, Meals on  Wheels         Care Plan:  Care Plan: Community Resources       Problem: Community Resources       Goal: Community Resources       Start Date: 2/28/2024    Note:     Goal Statement: I will connect with community resources over the next three month(s) that are a good fit for me.   Barriers: life stress  Strengths: family support  Patient expressed understanding of goal: yes    Action steps to achieve this goal:  My family will review resources sent to me via mail and my chart that can support me with finances, transportation, social security disability application and more.   My family will consider establishing with one or more which interest me.                          Problem: Food Support       Goal: Establish with Food Support       Start Date: 2/28/2024    Note:     Goal Statement: I will establish with [facility].   Barriers: life stress  Strengths: family support  Patient expressed understanding of goal: yes    Action steps to achieve this goal:  My family will connect with [facility] [place] to establish as a client                           Problem: In-home support       Goal: Establish adequate home support       Start Date: 2/28/2024    Note:     Goal Statement: I will take steps to find in home support over the next three month(s).  Barriers: life stress  Strengths: motivated, family support  Patient expressed understanding of goal: yes    Action steps to achieve this goal:  My family will review resources sent to me via mail and my chart for in home support: PCA, home care, community resources   My family will consider establishing with one or more supports which interest me.                                 Patient/Caregiver understanding: Pt reports understanding and denies any additional questions or concerns at this times. SID CC engaged in AIDET communication during encounter.     Outreach Frequency: monthly, more frequently as needed  Future Appointments                In 2 days JESUS  PET CT 1 M [facility], [facility]    In 1 week Amador Stewart MD M [facility] [place], [facility]    In 1 week Miesha Fernando CNP M [facility], [facility]    In 2 weeks Ifrah Jacinto MD M [facility] Radiation Oncology [place], [facility]    In 2 months Tata Lainez NP M [facility], [facility]    In 7 months [facility] LAB M [facility], [facility]    In 7 months Rickie Pickens MD M [facility], [facility]            Plan: [facility] completed enrollment to [facility]. CC completed handoff to [facility]. SWCC provided resources via phone, mail, and my chart. [facility] will complete outreach in about 4 week. SWCC will complete chart review in about 6 weeks. SWCC reviewed care coordination role and availability with Pt's daughter. SWCC discussed resources and supports available. Resources discussed: home care, disability, speciality care coordinator, home delivered meals, community resources, advanced care planning. Pt's daughter wanted to review concerns which she noted where mostly home care and knowing what options there are related to having in home support for pt as pt has been diagnosed with cancer - will be starting treatment in March. Pt's daughter stated that Pt's insurance is ending March 31; there is an issue with Pt's identity and they have an issue with social security March 21 - pt is applying for MA to help bridge the insurance gap. Pt is going to apply for social security disability - accepted information on disability specialists. CC and Pt's daughter talked over help in the home. Pt will not be able to privately pay for services. Due to identity issues there could be a lag in insurance. [employer] insurance is ending; they will not provide PCA. New insurance will not start until unknown date when identity is fixed so unknown if insurance covered home care  is an option - pt and daughter will speak to oncology doctor about this. Hope is that MA will start and there can be coverage from this. Pt looking into long term care insurance to see if this will be an option for coverage. Resources provided to pt for different cancer related resources such as meals through [employer], american cancer society for transportation, cancer financial assistance coalition, ect. Pt provided information on advanced care planning.

## 2024-02-28 NOTE — LETTER
[facility] CARE COORDINATION  [address] [place] [address]   February 28, 2024    Jasmyn Green  [address] [place] [address]      Dear Alba,    I am a clinic care coordinator who works with Miesha Fernando CNP with the [facility]. I wanted to thank you for spending the time to talk with me.  Below is a description of the resources we talked about together on the phone.     Speciality Care Coordinator: at your oncology appointment; be sure to ask about an 'oncology care coordinator'. This should get you connected to someone like me; but more specialized with more knowledge and resources related to cancer. Also be sure to ask about HOME CARE options through insurance.     Disability Specialists: help apply for Social Security Disability. You can apply for their assistance over the phone (708-734-2379) or on their website here: https://www.disabilityspecialists.net/    Long Term Care Insurance: [place]://[place].gov[place]commerce[place]insurance/other/long-term-care/  Long-term care insurance may be one option to cover services you might need if you are unable to care for yourself because of a prolonged illness or disability. These services may include day-to-day care in your home, at an adult day care center or in a nursing or assisted-living facility. Find out more about getting long term care insurance at the website above.     [employer] [place]: Apply for free home delivered meals through [employer] [place] here - https://www.openPsychSignalmn.org/    Here are some resources that are available to you and what they can help with:     CFAC (cancer financial assistance coalition) is a coalition of organizations helping cancer patients manage their financial challenges - https://www.cancerfac.org/    Cancer Legal Line - 832.405.2704   You can speak with someone regarding many different things, including employment rights, access to public benefits, insurance issues, family law  matters and financial concerns. They are based in [place].     American Cancer Society - 062-906-0639 - can help with transportation to treatment  https://www.cancer.org/    Advanced Care Planning: When it comes to decisions about your health, it s important that your health care goals, values and wishes are known. In the event of a sudden injury or illness, you may not be able to communicate your choices. We encourage you to begin by thinking about what matters most to you. Have conversations with family, friends, cultural and/or lauren leaders, and your health care team. Then, share your goals and wishes for current and future healthcare so your voice is heard when treatment decisions need to be made. See the resources listed below for help with talking about and sharing what is most important to you.  You can find more information about advanced care planning here -   https://www.ealfairview.org/resources/patients-and-visitors/honoring-choices  Or call: [phone]  The form to complete and return can be found here:   [email or web address]    Please feel free to contact me with any questions or concerns regarding care coordination and what we can offer.      We are focused on providing you with the highest-quality healthcare experience possible.    Sincerely,     Connie Linn, [facility] Clinical Care Coordinator  [facility], and [facility]   [phone]

## 2024-02-28 NOTE — LETTER
[facility] COORDINATION  [address] [place] [address]   February 28, 2024    Jasmyn Green  [address] [place] [address]      Dear Alba,    I am a clinic care coordinator who works with Miesha Fernando CNP with the [facility]. I wanted to thank you for spending the time to talk with me.  Below is a description of the resources we talked about together on the phone.     Speciality Care Coordinator: at your oncology appointment; be sure to ask about an 'oncology care coordinator'. This should get you connected to someone like me; but more specialized with more knowledge and resources related to cancer. Also be sure to ask about HOME CARE options through insurance.     Disability Specialists: help apply for Social Security Disability. You can apply for their assistance over the phone (971-647-7592) or on their website here: https://www.disabilityspecialists.net/    Long Term Care Insurance: [place]://[place].gov/commerce/insurance/other/long-term-care/  Long-term care insurance may be one option to cover services you might need if you are unable to care for yourself because of a prolonged illness or disability. These services may include day-to-day care in your home, at an adult day care center or in a nursing or assisted-living facility. Find out more about getting long term care insurance at the website above.     [employer] [place]: Apply for free home delivered meals through [employer] [place] here - https://www.openThrombolytic Science Internationalmn.org/    Here are some resources that are available to you and what they can help with:     CFAC (cancer financial assistance coalition) is a coalition of organizations helping cancer patients manage their financial challenges - https://www.cancerfac.org/    Cancer Legal Line - 703.395.5512   You can speak with someone regarding many different things, including employment rights, access to public benefits, insurance issues, family law  matters and financial concerns. They are based in [place].     American Cancer Society - 217-029-9355 - can help with transportation to treatment  https://www.cancer.org/    Advanced Care Planning: When it comes to decisions about your health, it s important that your health care goals, values and wishes are known. In the event of a sudden injury or illness, you may not be able to communicate your choices. We encourage you to begin by thinking about what matters most to you. Have conversations with family, friends, cultural and/or lauren leaders, and your health care team. Then, share your goals and wishes for current and future healthcare so your voice is heard when treatment decisions need to be made. See the resources listed below for help with talking about and sharing what is most important to you.  You can find more information about advanced care planning here -   https://www.[facility].org/resources/patients-and-visitors/honoring-choices  Or call: [phone]  The form to complete and return can be found here:   [email or web address]    Please feel free to contact me with any questions or concerns regarding care coordination and what we can offer.      We are focused on providing you with the highest-quality healthcare experience possible.    Sincerely,     Connie Linn, [facility] Clinical Care Coordinator  [facility], and [facility]   [phone]     Enclosed: I have enclosed a copy of the Patient Centered Plan of Care. This has helpful information and goals that we have talked about. Please keep this in an easy to access place to use as needed.

## 2024-02-28 NOTE — LETTER
[facility]  Patient Centered Plan of Care  About Me:        Patient Name:  Jasmyn Green    YOB: 1955  Age:         68 year old   Andrzej MRN:    [id number] Telephone Information:  Home Phone [phone]   Mobile [phone]       Address:  [address] [place] [address] Email address:  [email or web address]      Emergency Contact(s)    Name Relationship Lgl Grd Work Phone Home Phone Mobile Phone   1. KRISTIAN NUÑEZ Daughter No   [phone]   2. RADHA GREEN* Daughter No   [phone]   3. GINA GREEN* Significant ot* No   [phone]           Primary language:  English     needed? No   [employer]:  [phone] op. 1  Other communication barriers:None    Preferred Method of Communication:     Current living arrangement: I live in a private home with family    Mobility Status/ Medical Equipment: Independent w/Device        Health Maintenance  Health Maintenance Reviewed: Due/Overdue   Health Maintenance Due   Topic Date Due    COPD ACTION PLAN  Never done    ZOSTER IMMUNIZATION (1 of 2) Never done    RSV VACCINE (Pregnancy & 60+) (1 - 1-dose 60+ series) Never done    COVID-19 Vaccine (3 - Moderna risk series) 03/07/2022    INFLUENZA VACCINE (1) 09/01/2023          My Access Plan  Medical Emergency 911   Primary Clinic Line [facility] - [phone]   24 Hour Appointment Line [phone] or  [phone]) (toll-free)   24 Hour Nurse Line [phone] (toll-free)   Preferred Urgent Care [facility], [phone]     Preferred Hospital [facility]  [phone]     Preferred Pharmacy [id number] [place] [address] AT [place] [address]     Behavioral Health Crisis Line The National Suicide Prevention Lifeline at 1-839.690.6276 or Text/Call 868           My Care Team Members  Patient Care Team         Relationship  Specialty Notifications Start End    Miesha Fernando CNP PCP - General Nurse Practitioner - Gerontology  8/9/21     Phone: [phone] Fax: [phone]         [facility] [facility] [address]    Aidee Luna, RT Chronic Pulmonary Disease Specialist Respiratory Therapy Admissions 7/21/21     COPD & tobacco cessation     Ph: [phone]    Rickie Pickens MD Assigned Endocrinology Provider   9/26/21     Phone: [phone] Fax: [phone]         [place] [facility] [address]    Pavan Bee DPM Assigned Surgical Provider   3/11/23     Phone: [phone] Fax: [phone]         [address] [facility] [address]    Ruben Levin MD MD Critical Care  6/15/23     Phone: [phone] Fax: [phone]         [address] [facility] [address]    Tata Lainez NP Assigned Pulmonology Provider   1/25/24     Phone: [phone] Fax: [phone] [address] [facility] [address]    Connie Linn LICSW Lead Care Coordinator Primary Care - CC Admissions 2/20/24     Phone: [phone]         Jean Marie Calle CNP Assigned PCP   2/23/24     Phone: [phone] Fax: [phone]         [facility] [facility] [address] [facility] [address]    Ifrah Jacinto MD MD Radiation Oncology Admissions 2/27/24     Phone: [phone] Fax: [phone] [address] [facility] [address]    Amador Stewart MD MD Hematology Admissions 2/27/24     Phone: [phone] Fax: [phone] [address] [facility] [address]    Edelmira Morales, [facility] Community Health Worker  Admissions 2/28/24     Phone: [phone]                     My Care Plans  Self Management and Treatment Plan    Care Plan  Care Plan: Community Resources       Problem: Community Resources       Goal: Community Resources       Start Date: 2/28/2024    Note:     Goal Statement: I will connect with community resources over the next three month(s) that are a good fit  for me.   Barriers: life stress  Strengths: family support  Patient expressed understanding of goal: yes    Action steps to achieve this goal:  My family will review resources sent to me via mail and my chart that can support me with finances, transportation, social security disability application and more.   My family will consider establishing with one or more which interest me.                          Problem: Food Support       Goal: Establish with Food Support       Start Date: 2/28/2024    Note:     Goal Statement: I will establish with [facility].   Barriers: life stress  Strengths: family support  Patient expressed understanding of goal: yes    Action steps to achieve this goal:  My family will connect with [employer] [place] to establish as a client                           Problem: In-home support       Goal: Establish adequate home support       Start Date: 2/28/2024    Note:     Goal Statement: I will take steps to find in home support over the next three month(s).  Barriers: life stress  Strengths: motivated, family support  Patient expressed understanding of goal: yes    Action steps to achieve this goal:  My family will review resources sent to me via mail and my chart for in home support: PCA, home care, community resources   My family will consider establishing with one or more supports which interest me.                                            My Medical and Care Information  Problem List   Patient Active Problem List   Diagnosis    COPD exacerbation (H)    History of gout    Coronary artery calcification    Lymphocytosis    Essential hypertension    Asymptomatic hypertensive urgency    Age-related osteoporosis without current pathological fracture    Lung mass      Current Medications and Allergies:    Current Outpatient Medications   Medication    albuterol (PROAIR HFA/PROVENTIL HFA/VENTOLIN HFA) 108 (90 Base) MCG/ACT inhaler    albuterol (PROVENTIL) (2.5 MG/3ML) 0.083% neb  solution    aspirin (ASA) 81 MG chewable tablet    atorvastatin (LIPITOR) 20 MG tablet    calcium carbonate 600 mg-vitamin D 400 units (CALTRATE) 600-400 MG-UNIT per tablet    diazepam (VALIUM) 5 MG tablet    doxycycline hyclate (VIBRAMYCIN) 100 MG capsule    fluticasone-salmeterol (AIRDUO RESPICLICK) 232-14 MCG/ACT inhaler    hydrOXYzine HCl (ATARAX) 25 MG tablet    lisinopril (ZESTRIL) 20 MG tablet    mirtazapine (REMERON) 7.5 MG tablet    predniSONE (DELTASONE) 10 MG tablet    tiotropium (SPIRIVA RESPIMAT) 2.5 MCG/ACT inhaler     No current facility-administered medications for this visit.     No Known Allergies     Care Coordination Start Date: 2/19/2024   Frequency of Care Coordination: monthly, more frequently as needed     Form Last Updated: 02/28/2024

## 2024-03-01 ENCOUNTER — HOSPITAL ENCOUNTER (OUTPATIENT)
Dept: PET IMAGING | Facility: HOSPITAL | Age: 69
Discharge: HOME | End: 2024-03-01
Attending: INTERNAL MEDICINE | Admitting: INTERNAL MEDICINE
Payer: COMMERCIAL

## 2024-03-01 DIAGNOSIS — R91.8 LUNG MASS: ICD-10-CM

## 2024-03-01 LAB — GLUCOSE BLDC GLUCOMTR-MCNC: 127 MG/DL (ref 70–99)

## 2024-03-01 PROCEDURE — 343N000001 HC RX 343: Performed by: INTERNAL MEDICINE

## 2024-03-01 PROCEDURE — 78815 PET IMAGE W/CT SKULL-THIGH: CPT | Mod: PI

## 2024-03-01 PROCEDURE — A9552 F18 FDG: HCPCS | Performed by: INTERNAL MEDICINE

## 2024-03-01 PROCEDURE — 82962 GLUCOSE BLOOD TEST: CPT

## 2024-03-01 RX ADMIN — FLUDEOXYGLUCOSE F-18 9.89 MILLICURIE: 500 INJECTION, SOLUTION INTRAVENOUS at 08:31

## 2024-03-04 NOTE — TELEPHONE ENCOUNTER
RECORDS STATUS - ALL OTHER DIAGNOSIS      RECORDS RECEIVED FROM: Epic   DATE RECEIVED:    NOTES STATUS DETAILS   OFFICE NOTE from referring provider Epic Dr. Ruben Levin   OFFICE NOTE from medical oncologist     OFFICE NOTE from other specialist Epic 11/08/23: Tata Lainez NP   DISCHARGE SUMMARY from hospital [facility] 02/16/24: [facility]  02/08/24: [facility]   DISCHARGE REPORT from the ER [facility] 02/08/24: [facility]   OPERATIVE REPORT [facility] 02/16/24: BRONCHOSCOPY, WITH ENDOBRONCHIAL ULTRASOUND     08/10/21:PFT   MEDICATION LIST [facility]    LABS     PATHOLOGY REPORTS Report in EPIC 02/16/24: [id number]   ANYTHING RELATED TO DIAGNOSIS Epic Most recent 3/01/24   IMAGING (NEED IMAGES & REPORT)     CT SCANS PACS 02/08/24-06/27/21: CT Chest   XRAYS PACS 02/16/24-06/27/24: XR Chest   PET PACS 03/01/24: PET Onc Eyes ti Thighs

## 2024-03-04 NOTE — TELEPHONE ENCOUNTER
MEDICAL RECORDS REQUEST   Radiation Oncology  [address] [facility] [place] [address]  Fax: [phone]          FUTURE VISIT INFORMATION                                                   Jasmyn Green, : 1955 scheduled for future visit at [facility] Radiation Oncology    RECORDS REQUESTED FOR VISIT                                                     LUNG     OFFICE NOTE from PCP Epic 24: Miesha Fernando NP   OFFICE NOTE from medical oncologist [facility] Consult:  24: Dr. Amador Stewart   OFFICE NOTE from pulmonologist Epic 23: Tata Lainez NP   BRONCHOSCOPY/EUS/CT guided BX [facility] 24: BRONCHOSCOPY, WITH ENDOBRONCHIAL ULTRASOUND    PFT EPic 08/10/21   MEDICATION LIST [facility]    LABS     PATHOLOGY REPORTS Report in EPIC Non- gyn cyto:  24: [id number]   ANYTHING RELATED TO DIAGNOSIS Epic Most recent 24   IMAGING (NEED IMAGES & REPORT)     CT SCANS PACS 24-21: CT Chest   XRAYS PACS 24-24: XR Chest   PET PACS 24: PET Onc Eyes ti Thighs

## 2024-03-06 ENCOUNTER — OFFICE VISIT (OUTPATIENT)
Dept: INTERNAL MEDICINE | Facility: CLINIC | Age: 69
End: 2024-03-06
Payer: COMMERCIAL

## 2024-03-06 ENCOUNTER — ONCOLOGY VISIT (OUTPATIENT)
Dept: ONCOLOGY | Facility: HOSPITAL | Age: 69
End: 2024-03-06
Attending: INTERNAL MEDICINE
Payer: COMMERCIAL

## 2024-03-06 ENCOUNTER — PRE VISIT (OUTPATIENT)
Dept: ONCOLOGY | Facility: HOSPITAL | Age: 69
End: 2024-03-06

## 2024-03-06 VITALS
BODY MASS INDEX: 18.69 KG/M2 | DIASTOLIC BLOOD PRESSURE: 64 MMHG | SYSTOLIC BLOOD PRESSURE: 110 MMHG | HEIGHT: 60 IN | RESPIRATION RATE: 18 BRPM | HEART RATE: 63 BPM | OXYGEN SATURATION: 73 % | TEMPERATURE: 98.1 F

## 2024-03-06 VITALS
BODY MASS INDEX: 18.79 KG/M2 | DIASTOLIC BLOOD PRESSURE: 60 MMHG | HEART RATE: 116 BPM | WEIGHT: 95.7 LBS | SYSTOLIC BLOOD PRESSURE: 143 MMHG | RESPIRATION RATE: 18 BRPM | OXYGEN SATURATION: 96 % | TEMPERATURE: 97.9 F | HEIGHT: 60 IN

## 2024-03-06 DIAGNOSIS — J43.1 PANLOBULAR EMPHYSEMA (H): ICD-10-CM

## 2024-03-06 DIAGNOSIS — J96.11 CHRONIC RESPIRATORY FAILURE WITH HYPOXIA (H): Primary | ICD-10-CM

## 2024-03-06 DIAGNOSIS — C34.90 SMALL CELL LUNG CANCER (H): ICD-10-CM

## 2024-03-06 DIAGNOSIS — I10 BENIGN ESSENTIAL HYPERTENSION: ICD-10-CM

## 2024-03-06 DIAGNOSIS — F51.01 PRIMARY INSOMNIA: ICD-10-CM

## 2024-03-06 DIAGNOSIS — J44.1 COPD EXACERBATION (H): Primary | ICD-10-CM

## 2024-03-06 DIAGNOSIS — F41.9 ANXIETY: ICD-10-CM

## 2024-03-06 PROCEDURE — 99205 OFFICE O/P NEW HI 60 MIN: CPT | Performed by: INTERNAL MEDICINE

## 2024-03-06 PROCEDURE — 99213 OFFICE O/P EST LOW 20 MIN: CPT | Performed by: INTERNAL MEDICINE

## 2024-03-06 PROCEDURE — G2211 COMPLEX E/M VISIT ADD ON: HCPCS | Performed by: INTERNAL MEDICINE

## 2024-03-06 PROCEDURE — 99213 OFFICE O/P EST LOW 20 MIN: CPT | Performed by: NURSE PRACTITIONER

## 2024-03-06 RX ORDER — ALBUTEROL SULFATE 90 UG/1
1-2 AEROSOL, METERED RESPIRATORY (INHALATION)
Status: CANCELLED
Start: 2024-03-26

## 2024-03-06 RX ORDER — METHYLPREDNISOLONE SODIUM SUCCINATE 125 MG/2ML
125 INJECTION, POWDER, LYOPHILIZED, FOR SOLUTION INTRAMUSCULAR; INTRAVENOUS
Status: CANCELLED
Start: 2024-03-27

## 2024-03-06 RX ORDER — MEPERIDINE HYDROCHLORIDE 25 MG/ML
25 INJECTION INTRAMUSCULAR; INTRAVENOUS; SUBCUTANEOUS EVERY 30 MIN PRN
Status: CANCELLED | OUTPATIENT
Start: 2024-03-25

## 2024-03-06 RX ORDER — HEPARIN SODIUM (PORCINE) LOCK FLUSH IV SOLN 100 UNIT/ML 100 UNIT/ML
5 SOLUTION INTRAVENOUS
Status: CANCELLED | OUTPATIENT
Start: 2024-03-26

## 2024-03-06 RX ORDER — ALBUTEROL SULFATE 0.83 MG/ML
2.5 SOLUTION RESPIRATORY (INHALATION)
Status: CANCELLED | OUTPATIENT
Start: 2024-03-26

## 2024-03-06 RX ORDER — EPINEPHRINE 1 MG/ML
0.3 INJECTION, SOLUTION INTRAMUSCULAR; SUBCUTANEOUS EVERY 5 MIN PRN
Status: CANCELLED | OUTPATIENT
Start: 2024-03-25

## 2024-03-06 RX ORDER — HEPARIN SODIUM (PORCINE) LOCK FLUSH IV SOLN 100 UNIT/ML 100 UNIT/ML
5 SOLUTION INTRAVENOUS
Status: CANCELLED | OUTPATIENT
Start: 2024-03-25

## 2024-03-06 RX ORDER — ALBUTEROL SULFATE 90 UG/1
1-2 AEROSOL, METERED RESPIRATORY (INHALATION)
Status: CANCELLED
Start: 2024-03-27

## 2024-03-06 RX ORDER — EPINEPHRINE 1 MG/ML
0.3 INJECTION, SOLUTION INTRAMUSCULAR; SUBCUTANEOUS EVERY 5 MIN PRN
Status: CANCELLED | OUTPATIENT
Start: 2024-03-26

## 2024-03-06 RX ORDER — METHYLPREDNISOLONE SODIUM SUCCINATE 125 MG/2ML
125 INJECTION, POWDER, LYOPHILIZED, FOR SOLUTION INTRAMUSCULAR; INTRAVENOUS
Status: CANCELLED
Start: 2024-03-25

## 2024-03-06 RX ORDER — LORAZEPAM 2 MG/ML
0.5 INJECTION INTRAMUSCULAR EVERY 4 HOURS PRN
Status: CANCELLED | OUTPATIENT
Start: 2024-03-27

## 2024-03-06 RX ORDER — ALBUTEROL SULFATE 0.83 MG/ML
2.5 SOLUTION RESPIRATORY (INHALATION)
Status: CANCELLED | OUTPATIENT
Start: 2024-03-25

## 2024-03-06 RX ORDER — METHYLPREDNISOLONE SODIUM SUCCINATE 125 MG/2ML
125 INJECTION, POWDER, LYOPHILIZED, FOR SOLUTION INTRAMUSCULAR; INTRAVENOUS
Status: CANCELLED
Start: 2024-03-26

## 2024-03-06 RX ORDER — DIAZEPAM 5 MG
5 TABLET ORAL EVERY 6 HOURS PRN
Qty: 2 TABLET | Refills: 2 | Status: SHIPPED | OUTPATIENT
Start: 2024-03-06

## 2024-03-06 RX ORDER — EPINEPHRINE 1 MG/ML
0.3 INJECTION, SOLUTION INTRAMUSCULAR; SUBCUTANEOUS EVERY 5 MIN PRN
Status: CANCELLED | OUTPATIENT
Start: 2024-03-27

## 2024-03-06 RX ORDER — PALONOSETRON 0.05 MG/ML
0.25 INJECTION, SOLUTION INTRAVENOUS ONCE
Status: CANCELLED | OUTPATIENT
Start: 2024-03-25

## 2024-03-06 RX ORDER — LORAZEPAM 2 MG/ML
0.5 INJECTION INTRAMUSCULAR EVERY 4 HOURS PRN
Status: CANCELLED | OUTPATIENT
Start: 2024-03-26

## 2024-03-06 RX ORDER — HEPARIN SODIUM,PORCINE 10 UNIT/ML
5-20 VIAL (ML) INTRAVENOUS DAILY PRN
Status: CANCELLED | OUTPATIENT
Start: 2024-03-26

## 2024-03-06 RX ORDER — LORAZEPAM 2 MG/ML
0.5 INJECTION INTRAMUSCULAR EVERY 4 HOURS PRN
Status: CANCELLED | OUTPATIENT
Start: 2024-03-25

## 2024-03-06 RX ORDER — DIPHENHYDRAMINE HYDROCHLORIDE 50 MG/ML
50 INJECTION INTRAMUSCULAR; INTRAVENOUS
Status: CANCELLED
Start: 2024-03-25

## 2024-03-06 RX ORDER — HEPARIN SODIUM,PORCINE 10 UNIT/ML
5-20 VIAL (ML) INTRAVENOUS DAILY PRN
Status: CANCELLED | OUTPATIENT
Start: 2024-03-27

## 2024-03-06 RX ORDER — DIPHENHYDRAMINE HYDROCHLORIDE 50 MG/ML
50 INJECTION INTRAMUSCULAR; INTRAVENOUS
Status: CANCELLED
Start: 2024-03-26

## 2024-03-06 RX ORDER — HEPARIN SODIUM,PORCINE 10 UNIT/ML
5-20 VIAL (ML) INTRAVENOUS DAILY PRN
Status: CANCELLED | OUTPATIENT
Start: 2024-03-25

## 2024-03-06 RX ORDER — HEPARIN SODIUM (PORCINE) LOCK FLUSH IV SOLN 100 UNIT/ML 100 UNIT/ML
5 SOLUTION INTRAVENOUS
Status: CANCELLED | OUTPATIENT
Start: 2024-03-27

## 2024-03-06 RX ORDER — ALBUTEROL SULFATE 90 UG/1
1-2 AEROSOL, METERED RESPIRATORY (INHALATION)
Status: CANCELLED
Start: 2024-03-25

## 2024-03-06 RX ORDER — MEPERIDINE HYDROCHLORIDE 25 MG/ML
25 INJECTION INTRAMUSCULAR; INTRAVENOUS; SUBCUTANEOUS EVERY 30 MIN PRN
Status: CANCELLED | OUTPATIENT
Start: 2024-03-27

## 2024-03-06 RX ORDER — MEPERIDINE HYDROCHLORIDE 25 MG/ML
25 INJECTION INTRAMUSCULAR; INTRAVENOUS; SUBCUTANEOUS EVERY 30 MIN PRN
Status: CANCELLED | OUTPATIENT
Start: 2024-03-26

## 2024-03-06 RX ORDER — ALBUTEROL SULFATE 0.83 MG/ML
2.5 SOLUTION RESPIRATORY (INHALATION)
Status: CANCELLED | OUTPATIENT
Start: 2024-03-27

## 2024-03-06 RX ORDER — DIPHENHYDRAMINE HYDROCHLORIDE 50 MG/ML
50 INJECTION INTRAMUSCULAR; INTRAVENOUS
Status: CANCELLED
Start: 2024-03-27

## 2024-03-06 ASSESSMENT — PAIN SCALES - GENERAL: PAINLEVEL: NO PAIN (0)

## 2024-03-06 NOTE — LETTER
3/6/2024         RE: Jasmyn Green  [address] [place] [address]        Dear Colleague,    Thank you for referring your patient, Jasmyn Green, to the [facility]. Please see a copy of my visit note below.    Oncology Rooming Note    March 6, 2024 2:42 PM   Jasmyn Green is a 68 year old female who presents for:    Chief Complaint   Patient presents with     Oncology Clinic Visit     Small cell lung cancer      Initial Vitals: BP (!) 143/60   Pulse 116   Temp 97.9  F (36.6  C)   Resp 18   Ht 1.524 m (5')   Wt 43.4 kg (95 lb 11.2 oz)   LMP  (LMP Unknown)   SpO2 96%   BMI 18.69 kg/m   Estimated body mass index is 18.69 kg/m  as calculated from the following:    Height as of this encounter: 1.524 m (5').    Weight as of this encounter: 43.4 kg (95 lb 11.2 oz). Body surface area is 1.36 meters squared.  No Pain (0) Comment: Data Unavailable   No LMP recorded (lmp unknown). Patient is postmenopausal.  Allergies reviewed: Yes  Medications reviewed: Yes    Medications: Medication refills not needed today.  Pharmacy name entered into [facility]:    [facility][id number] - [place] [address] AT [facility] & [address] [facility] - [address]  [facility] #[id number] - [place] [id number] AT [address]    Frailty Screening:   Is the patient here for a new oncology consult visit in cancer care? 1. Yes. Over the past month, have you experienced difficulty or required a caregiver to assist with:   1. Balance, walking or general mobility (including any falls)? YES  with her walking- No falls though.   2. Completion of self-care tasks such as bathing, dressing, toileting, grooming/hygiene?  NO  3. Concentration or memory that affects your daily life?  NO       Clinical concerns: New PT.       Chaya Avitia LPN                 Again, thank you for allowing  me to participate in the care of your patient.        Sincerely,        Amador Stewart MD

## 2024-03-06 NOTE — PATIENT INSTRUCTIONS
Mirtazapine 0.5 tablets, of the 7.5mg tablet at bedtime every night x 7 days, then if tolerating this okay increase to 1 tablet at bedtime.     Follow up if you need anything, we are here for you!

## 2024-03-06 NOTE — PROGRESS NOTES
Oncology Rooming Note    March 6, 2024 2:42 PM   Jasmyn Green is a 68 year old female who presents for:    Chief Complaint   Patient presents with    Oncology Clinic Visit     Small cell lung cancer      Initial Vitals: BP (!) 143/60   Pulse 116   Temp 97.9  F (36.6  C)   Resp 18   Ht 1.524 m (5')   Wt 43.4 kg (95 lb 11.2 oz)   LMP  (LMP Unknown)   SpO2 96%   BMI 18.69 kg/m   Estimated body mass index is 18.69 kg/m  as calculated from the following:    Height as of this encounter: 1.524 m (5').    Weight as of this encounter: 43.4 kg (95 lb 11.2 oz). Body surface area is 1.36 meters squared.  No Pain (0) Comment: Data Unavailable   No LMP recorded (lmp unknown). Patient is postmenopausal.  Allergies reviewed: Yes  Medications reviewed: Yes    Medications: Medication refills not needed today.  Pharmacy name entered into [facility]:    [facility] #[id number] [place] [address] [place] AT [address] SPECIALTY PHARMACY - [address]  [facility] #[id number] - [place] [id number] AT [address]    Frailty Screening:   Is the patient here for a new oncology consult visit in cancer care? 1. Yes. Over the past month, have you experienced difficulty or required a caregiver to assist with:   1. Balance, walking or general mobility (including any falls)? YES  with her walking- No falls though.   2. Completion of self-care tasks such as bathing, dressing, toileting, grooming/hygiene?  NO  3. Concentration or memory that affects your daily life?  NO       Clinical concerns: New PT.       Chaya Avitia LPN

## 2024-03-06 NOTE — PROGRESS NOTES
Assessment & Plan     Small cell lung cancer (H)/Panlobular emphysema (H)/Chronic respiratory failure with hypoxia (H): Left upper lobe small cell lung cancer. To have a brain MRI and start on Chemotherapy. She is on inhalers and oxygen continuously.     Anxiety/Primary insomnia: She will try the half dose of Mirtazapine 7.5mg at bedtime x 1 week, increase to a whole tablet if tolerated.     Benign essential hypertension: Blood pressure today was well controlled at 110/64. She continues on Lisinopril. Stable.     MED REC REQUIRED  Post Medication Reconciliation Status: discharge medications reconciled and changed, per note/orders      Subjective   Jasmyn is a 68 year old, presenting for the following health issues:  Follow Up        3/6/2024     3:59 PM   Additional Questions   Roomed by Vivi BOSS  The patient presents today with her daughter for follow up.    She just got the news about her left upper lobe small cell lung cancer. She will have a brain MRI done, and then start palliative chemotherapy.     She overall is doing okay with this diagnosis, but is not sleeping the best at night still.    She has taken the mirtazapine three times at bedtime when she can't sleep, although a full tablet has made her somewhat sleepy. Discussed she should be taking this every night, let's cut back to a half tablet of the 7.5mg dose, she will try this.     She is needing up to 5 liters of oxygen with big activity such as showering, 3 liters when walking from the house to her car, and 2 liters when resting. She is on oxygen continuously.    Family is helping her strengthen at home as well, they are working on her legs with a peddle bike, resistance bands, and  soup cans for weights for her arms to gain some strength back. They did not want to start formal physical therapy at this time.     Discussed that she should be careful when being around people as she is very immunocompromised. She will be careful with this.    She  has gained more weight, she is up to 95.7 pounds. She continues to push her protein intake.     They deny other concerns today.       Review of Systems  Constitutional, HEENT, cardiovascular, pulmonary, GI, , musculoskeletal, neuro, skin, endocrine and psych systems are negative, except as otherwise noted.      Objective    /64 (BP Location: Right arm, Patient Position: Sitting)   Pulse 63   Temp 98.1  F (36.7  C)   Resp 18   Ht 1.524 m (5')   LMP  (LMP Unknown)   SpO2 (!) 73%   BMI 18.69 kg/m    Body mass index is 18.69 kg/m .  Physical Exam   GENERAL: alert and no distress  EYES: Eyes grossly normal to inspection  RESP: lungs clear to auscultation - no rales, rhonchi or wheezes  CV: regular rate and rhythm, normal S1 S2, no S3 or S4, no murmur, click or rub, no peripheral edema  MS: Seated in a wheelchair  SKIN: no suspicious lesions or rashes  NEURO: Normal strength and tone, mentation intact and speech normal  PSYCH: mentation appears normal, affect normal/bright        Signed Electronically by: Miesha Fernando, CNP

## 2024-03-07 ENCOUNTER — PATIENT OUTREACH (OUTPATIENT)
Dept: ONCOLOGY | Facility: HOSPITAL | Age: 69
End: 2024-03-07
Payer: COMMERCIAL

## 2024-03-07 DIAGNOSIS — J44.9 COPD, SEVERE (H): Primary | ICD-10-CM

## 2024-03-07 NOTE — PROGRESS NOTES
[facility] Hematology and Oncology Consult Note    Patient: Jasmyn Green  MRN: [id number]  Date of Service: Mar 6, 2024           Reason for consultation      Problem List Items Addressed This Visit          Respiratory    COPD exacerbation (H) - Primary (Chronic)    Relevant Medications    diazepam (VALIUM) 5 MG tablet    Other Relevant Orders    MR Brain w Contrast    Small cell lung cancer (H)    Relevant Medications    diazepam (VALIUM) 5 MG tablet    Other Relevant Orders    Infusion Appointment Request    Infusion Appointment Request    Infusion Appointment Request    MR Brain w Contrast         Assessment / number of problems addressed      1.  A very pleasant 68-year-old woman with looks like extensive stage small cell lung cancer.  2.  Severe COPD.  She is on 2-3 L of oxygen at rest.  3.  Significantly decreased FEV1 of 29% and reduced diffusion capacity with DLCO of 30%.  4.  Borderline general health.  5.  Other medical conditions stable.    Plan and medical decision making      Patient presenting with extensive stage small cell lung cancer which is not curable and poses immediate to her life and wellbeing.  Reviewed notes from each unique source.  Reviewed each unique test.  Ordered tests if indicated.  Independently interpreted the results of lab tests and radiological exams.  Personally reviewed the images.  Discussed with the patient that her disease is terminal and that treatment is palliative and not curative.    1.  Recommend getting a brain MRI for full staging purposes.  2.  Assuming the brain MRI is negative we will proceed with carboplatin etoposide and atezolizumab therapy.  3.  Neutropenic prophylaxis with Cosela.  4.  Discussed with the patient that she needs to keep up with her nutritional status.  5.  Depending upon how she responds we will modify her treatment and may be maintain her on immunotherapy for as long as it works.  6.  Will also discuss her case in the  multidisciplinary tumor board.  7. The longitudinal plan of care for the diagnosis(es)/condition(s) as documented were addressed during this visit. Due to the added complexity in care, I will continue to support Jasmyn in the subsequent management and with ongoing continuity of care.      Clinical/pathological stage      Cancer Staging   No matching staging information was found for the patient.      History of present illness      Ms. Jasmyn Green is a 68 year old woman who has been referred to me for evaluation of newly diagnosed small cell lung cancer.  She appears to have extensive stage disease.  She presented to the [facility] with increasing shortness of breath coughing and symptoms suggestive of pneumonia.  She has a known history of severe obstructive lung disease with an FEV1 of 29% with hyperinflation and air trapping.  Presented with a 1 to 2-day history of worsening shortness of breath and with exertion as well as at rest.  Requiring increasing supplemental oxygen at home.  CT scan done in the emergency room showed bulky aortopulmonary as well as left hilar adenopathy with irregular pleural-based mass in the left upper lobe.  There are also some scattered satellite nodules.  This was very suspicious for malignancy.    After discharge she was seen by Dr. Levin and and underwent endobronchial ultrasound-guided biopsy which came back positive for small cell lung cancer.  The PET scan also showed hypermetabolic lesions in the mediastinum as well as the peripheral part of the left upper lobe.    She is on oxygen at least 2 L at rest and sometimes increased oxygen at minimal activity.  She is 97 pounds.  Appetite is fair to poor.  Comes in accompanied by her 2 daughters regarding her treatment options.    She used to smoke but quit smoking.  She was in fact was being monitored by CT surveillance.  Had a CT scan done in June 2023 which was positive for some sort of pneumonia but no obvious  malignancy was noted.    Detailed review of systems      A 14 point review of systems was obtained.  Positive findings noted in the history.  Rest of the review of system is otherwise negative.      Past medical/surgical/social/family history        Past Medical History:   Diagnosis Date    Age-related osteoporosis without current pathological fracture     Arthritis     COPD (chronic obstructive pulmonary disease) (H)     Coronary artery disease     Dependence on nocturnal oxygen therapy     Dyspnea on exertion     Emphysema lung (H)     Gout     HLD (hyperlipidemia)     Hypertension     Lung mass      Past Surgical History:   Procedure Laterality Date    BRONCHOSCOPY RIGID OR FLEXIBLE W/TRANSENDOSCOPIC ENDOBRONCHIAL ULTRASOUND GUIDED N/A 2024    Procedure: BRONCHOSCOPY, WITH ENDOBRONCHIAL ULTRASOUND;  Surgeon: Ruben Levin MD;  Location: [facility] [place] [facility]    COLONOSCOPY N/A 10/21/2021    Procedure: COLONOSCOPY;  Surgeon: Wilma Hester DO;  Location: [facility]    VAGINAL DELIVERY      x 3 ; remote    WISDOM TOOTH EXTRACTION       Family History   Problem Relation Age of Onset    Chronic Obstructive Pulmonary Disease Sister     Diabetes Mother     Heart Disease Mother     Lung Cancer Mother     Heart Disease Father      Social History     Socioeconomic History    Marital status:      Spouse name: None    Number of children: None    Years of education: None    Highest education level: None   Tobacco Use    Smoking status: Former     Types: Cigarettes     Quit date: 2021     Years since quittin.6     Passive exposure: Past    Smokeless tobacco: Never    Tobacco comments:     updated 8/3/2021 by [facility]TS   Vaping Use    Vaping Use: Never used   Substance and Sexual Activity    Alcohol use: Not Currently    Drug use: Never    Sexual activity: Not Currently   Social History Narrative     many years 3 grown children. 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 Last cigarette a week  ago.non drinker  Her primary MD (Wilda) retired several years ago/ tends to avoid doctors/medical care in general       Social Determinants of Health     Financial Resource Strain: Low Risk  (2/1/2024)    Financial Resource Strain     Within the past 12 months, have you or your family members you live with been unable to get utilities (heat, electricity) when it was really needed?: No   Food Insecurity: Low Risk  (2/1/2024)    Food Insecurity     Within the past 12 months, did you worry that your food would run out before you got money to buy more?: No     Within the past 12 months, did the food you bought just not last and you didn t have money to get more?: No   Transportation Needs: Low Risk  (2/1/2024)    Transportation Needs     Within the past 12 months, has lack of transportation kept you from medical appointments, getting your medicines, non-medical meetings or appointments, work, or from getting things that you need?: No   Physical Activity: Sufficiently Active (11/10/2021)    Exercise Vital Sign     Days of Exercise per Week: 5 days     Minutes of Exercise per Session: 30 min   Stress: No Stress Concern Present (11/10/2021)    [employer] [place] [employer] - Occupational Stress Questionnaire     Feeling of Stress : Not at all   Social Connections: Socially Isolated (11/10/2021)    Social Connection and Isolation Panel [NHANES]     Frequency of Communication with Friends and Family: More than three times a week     Frequency of Social Gatherings with Friends and Family: Once a week     Attends Sabianism Services: Never     Active Member of Clubs or Organizations: No     Marital Status:    Interpersonal Safety: Low Risk  (2/1/2024)    Interpersonal Safety     Do you feel physically and emotionally safe where you currently live?: Yes     Within the past 12 months, have you been hit, slapped, kicked or otherwise physically hurt by someone?: No     Within the past 12 months, have you been  humiliated or emotionally abused in other ways by your partner or ex-partner?: No   Housing Stability: Low Risk  (2/1/2024)    Housing Stability     Do you have housing? : Yes     Are you worried about losing your housing?: No           Allergies      No Known Allergies      Physical exam        BP (!) 143/60   Pulse 116   Temp 97.9  F (36.6  C)   Resp 18   Ht 1.524 m (5')   Wt 43.4 kg (95 lb 11.2 oz)   LMP  (LMP Unknown)   SpO2 96%   BMI 18.69 kg/m        GENERAL: Alert and oriented to time place and person. Seated comfortably. In no distress.  She is quite thin individual.    HEAD: Atraumatic and normocephalic.  Nasal cannula oxygen on.    EYES: TIRSO, EOMI. No pallor. No icterus.    Oral cavity: no mucosal lesion or tonsillar enlargement.    NECK: supple. JVP normal.No thyroid enlargement.    LYMPH NODES: No palpable, cervical, axillary or inguinal lymphadenopathy.    CHEST: clear to auscultation bilaterally. Symmetrical breath movements bilaterally.    CVS: S1 and S2 are Regular rate and rhythm. No murmur or gallop or rub heard. No peripheral edema.    ABDOMEN: Soft. Not tender. Not distended. No palpable hepatomegaly or splenomegaly. No other mass palpable. Bowel sounds heard.    EXTREMITIES: Warm.  Minimal arthritic changes.    NEUROLOGICAL: Alert awake oriented.  Otherwise intact.  Cranial nerves appears to be preserved.    SKIN: no rash, or bruising or purpura.      Laboratory data      Recent Results (from the past 168 hour(s))   Glucose by meter   Result Value Ref Range    GLUCOSE BY METER POCT 127 (H) 70 - 99 mg/dL       Imaging results        PET Oncology (Eyes to Thighs)    Result Date: 3/1/2024  EXAM: PET ONCOLOGY (EYES TO THIGHS) LOCATION: [facility] DATE: 3/1/2024 INDICATION: Other nonspecific abnormal finding of lung field. Initial treatment strategy. COMPARISON: CT 02/08/2024, 02/11/2024 CONTRAST: None TECHNIQUE: Serum glucose level 127 mg/dL. One hour post  intravenous administration of 9.89 mCi F-18 FDG, PET imaging was performed from the skull vertex to mid thighs, utilizing attenuation correction with concurrent axial CT and PET/CT image fusion. Dose reduction techniques were used. PET/CT FINDINGS: FDG avid 2.2 x 1.4 x 2.0 cm subpleural nodule in the lateral left upper lobe with broad pleural contact (SUV max 13.5) with 0.8 cm adjacent satellite nodule located just medially (SUV max 6.3), FDG avid left interlobar station 11L lymph node (SUV max 9.8) and conglomerate mediastinal station 5 subaortic / station 6 para-aortic gisela mass measuring 5.2 x 3.0 cm (SUV max 12.8) and several FDG avid subcentimeter pleural nodules in the left upper and lower lobes (for reference on series 3, image 173), suspicious for a left upper lobe primary lung neoplasm with adjacent satellite lesion in the left upper lobe, extensive mediastinal lymph node involvement, and several pleural-based metastases in the left hemithorax CT FINDINGS: Senescent intracranial changes. Atretic right maxillary sinus. Advanced emphysema with scattered bands of scarring. Advanced coronary arterial calcification. Ectatic infrarenal abdominal aorta. Sigmoid diverticulosis. Multilevel degenerative changes in the spine.     IMPRESSION: Findings suspicious for a left upper lobe primary lung neoplasm with adjacent satellite lesion in the left upper lobe, extensive mediastinal lymph node involvement, and several pleural-based metastases in the left hemithorax.    XR Chest Port 1 View    Result Date: 2/16/2024  EXAM: XR CHEST PORT 1 VIEW LOCATION: [facility] DATE: 2/16/2024 INDICATION: Post lung biopsy COMPARISON: 02/08/2024     IMPRESSION: A pulmonary nodule in left midlung zone is again seen. The heart is normal in size. There is mild central pulmonary venous congestion, similar prior exam. No pneumothorax is seen on this portable upright film.    XR Chest Port 1 View    Result Date:  2/12/2024  CHEST ONE VIEW  2/12/2024 3:19 PM HISTORY: Shortness of breath. COMPARISON: CT chest 2/8/2024. Chest radiograph 2/8/2024.     IMPRESSION: Emphysema. Similar left subpleural nodularity and right perihilar adenopathy. No new focal consolidation, pneumothorax or significant pleural effusion. Mild basilar interstitial opacities could reflect interstitial edema. VITO PERRY MD   SYSTEM ID:  [id number]    CT Abdomen Pelvis w Contrast    Result Date: 2/11/2024  EXAM: CT ABDOMEN PELVIS W CONTRAST LOCATION: [facility] DATE: 2/11/2024 INDICATION: Intermittent nausea, vomiting, concern for lung cancer that is being worked up now COMPARISON: 02/08/2024 TECHNIQUE: CT scan of the abdomen and pelvis was performed following injection of IV contrast. Multiplanar reformats were obtained. Dose reduction techniques were used. CONTRAST: 51 mL Isovue 370 FINDINGS: LOWER CHEST: Advanced emphysematous changes in the visualized lung bases. HEPATOBILIARY: Subcentimeter low-attenuation lesion in the anterior left hepatic lobe compatible with a small benign cyst. PANCREAS: Normal. SPLEEN: Normal. ADRENAL GLANDS: Normal. KIDNEYS/BLADDER: Small benign cyst in the right kidney lower pole. No follow-up needed. Mild cortical thinning or scarring in both kidneys. BOWEL: Stomach is unremarkable. The small and large intestines are normal caliber. Mildly increased stool throughout the colon. Mild sigmoid diverticulosis without active inflammation. LYMPH NODES: Normal. VASCULATURE: Diffuse calcified atheromatous plaque in the abdominal aorta with a small infrarenal abdominal aortic aneurysm measuring 2.3 cm in diameter. Extensive arterial calcified plaque in the iliac arteries. PELVIC ORGANS: Not well visualized. MUSCULOSKELETAL: No suspicious lytic or sclerotic lesions. Generalized edema seen within the adipose tissues.     IMPRESSION: 1.  No evidence of malignancy in the abdomen or pelvis. 2.  Generalized  edema seen within the adipose tissues may indicate volume overload, right heart failure, or third spacing due to other etiology. 3.  Atherosclerosis. Small infrarenal abdominal aortic aneurysm measuring 2.3 cm.     CT Chest w Contrast    Result Date: 2/8/2024  EXAM: CT CHEST W CONTRAST LOCATION: [facility] DATE: 2/8/2024 INDICATION: Left hilar fullness and left midlung nodule on recent chest x-ray. COMPARISON: Chest x-ray earlier today, low-dose chest CT 06/15/2023 TECHNIQUE: CT chest with IV contrast. Multiplanar reformats were obtained. Dose reduction techniques were used. CONTRAST: 75 ML ISOVUE 370 FINDINGS: LUNGS AND PLEURA: There is an irregular low dense pleural-based opacity anteriorly in the mid to left upper lobe with a few adjacent proximal satellite nodules. This measures 2.2 x 1.5 cm (axial image 104). Minimal, irregular subpleural plaque-like opacity further inferiorly in the left lower lobe (axial image 99). No effusion. Extensive centrilobular emphysema throughout. Resolution of the previously noted left upper lobe pneumonia. MEDIASTINUM/AXILLAE: Bulky, heterogeneously enhancing adenopathy in the AP window and left hilum. [place] mass in the left AP window measures 6 x 3 cm (axial image 60). This encases and narrows the left upper lobe pulmonary vein which remains patent. No supraclavicular or axillary adenopathy. No central pulmonary emboli. Aorta and branches are normal. Small hiatal hernia. CORONARY ARTERY CALCIFICATION: Severe. UPPER ABDOMEN: Slight nodular thickening of the left adrenal gland measuring 8 to 9 mm, likely unchanged. No liver lesions. MUSCULOSKELETAL: No suspicious lesions.     IMPRESSION: 1.  Bulky AP and left hilar adenopathy with irregular pleural-based anterior left upper lobe nodule with adjacent smaller satellite nodules. Findings suspicious for a primary lung cancer. 2.  Nodular thickening of the left adrenal gland is likely unchanged. 3.  Patient  needs PET/CT for further evaluation. 4.  Extensive emphysema. 5.  Findings discussed by the undersigned with Dr. Ramos at 1141.    XR Chest Port 1 View    Result Date: 2/8/2024  EXAM: XR CHEST PORT 1 VIEW LOCATION: [facility] DATE: 2/8/2024 INDICATION: sob COMPARISON: 06/05/2023     IMPRESSION: Left midlung 1.8 cm nodular opacity. Increased prominence left hilum could represent adenopathy or mass. CT CHEST WITH CONTRAST RECOMMENDED. Heart size appears normal. No pulmonary vascular congestion. However, there is increased interstitial prominence which could represent interstitial edema. No definite pleural effusion.         This note has been dictated using voice recognition software. Any grammatical or context distortions are unintentional and inherent to the software      Signed by: Amador Stewart MD

## 2024-03-07 NOTE — PROGRESS NOTES
[facility]: Cancer Care                                                                                          Patient was in the clinic to see Dr Stewart in consultation regarding a newly diagnosed small cell lung cancer.  Per Dr Stewart, patient will need a brain MRI to complete her staging.  She is also set up to see Dr. Jacinto, radiation oncology.  He states it is okay for her to see Dana Mathur CNP day 1 of treatment.  He plans to give her carboplatin, etoposide, Tecentriq and Cosela starting around 3/19.  That is if the MRI of the brain comes back negative.    I will give the patient a call towards the end of next week to go over chemo education after seeing if her brain MRI is negative for cancer.    Signature:  Wilma Carmona RN

## 2024-03-08 NOTE — PROGRESS NOTES
[facility]: Cancer Care                                                                                          Of note, patient was offered to start treatment the week of 3/18 but declined as she has a social security appointment that cannot be rescheduled.  She will be starting on 3/25/24 and I will call towards the end of the week of 3/18 to go over chemo education with her.    Signature:  Wilma Carmona RN

## 2024-03-11 DIAGNOSIS — M81.0 AGE-RELATED OSTEOPOROSIS WITHOUT CURRENT PATHOLOGICAL FRACTURE: Primary | ICD-10-CM

## 2024-03-12 ENCOUNTER — MYC MEDICAL ADVICE (OUTPATIENT)
Dept: PULMONOLOGY | Facility: CLINIC | Age: 69
End: 2024-03-12
Payer: COMMERCIAL

## 2024-03-12 DIAGNOSIS — J44.1 COPD EXACERBATION (H): ICD-10-CM

## 2024-03-12 DIAGNOSIS — J44.9 COPD, SEVERE (H): Primary | ICD-10-CM

## 2024-03-12 RX ORDER — PREDNISONE 20 MG/1
40 TABLET ORAL DAILY
Qty: 10 TABLET | Refills: 0 | Status: SHIPPED | OUTPATIENT
Start: 2024-03-12 | End: 2024-03-17

## 2024-03-13 ENCOUNTER — HOSPITAL ENCOUNTER (OUTPATIENT)
Dept: CARDIAC REHAB | Facility: CLINIC | Age: 69
Discharge: HOME | End: 2024-03-13
Attending: INTERNAL MEDICINE
Payer: COMMERCIAL

## 2024-03-13 DIAGNOSIS — J44.9 COPD, SEVERE (H): ICD-10-CM

## 2024-03-13 NOTE — PROGRESS NOTES
Met with patient about pulmonary rehab program.  She mentions that she will be starting chemo in a few weeks. Her appointment days for Chemo will also be on the same days that [facility] is offered. At this time pulmonary rehab is not appropriate. Discussed with patient that if she would like to begin the program after chemo that she is encouraged to do so.

## 2024-03-14 ENCOUNTER — HOSPITAL ENCOUNTER (OUTPATIENT)
Dept: MRI IMAGING | Facility: CLINIC | Age: 69
Discharge: HOME | End: 2024-03-14
Attending: INTERNAL MEDICINE | Admitting: INTERNAL MEDICINE
Payer: COMMERCIAL

## 2024-03-14 ENCOUNTER — PRE VISIT (OUTPATIENT)
Dept: RADIATION ONCOLOGY | Facility: HOSPITAL | Age: 69
End: 2024-03-14
Payer: COMMERCIAL

## 2024-03-14 ENCOUNTER — MYC MEDICAL ADVICE (OUTPATIENT)
Dept: INTERNAL MEDICINE | Facility: CLINIC | Age: 69
End: 2024-03-14

## 2024-03-14 ENCOUNTER — OFFICE VISIT (OUTPATIENT)
Dept: RADIATION ONCOLOGY | Facility: HOSPITAL | Age: 69
End: 2024-03-14
Attending: INTERNAL MEDICINE
Payer: COMMERCIAL

## 2024-03-14 ENCOUNTER — MEDICAL CORRESPONDENCE (OUTPATIENT)
Dept: HEALTH INFORMATION MANAGEMENT | Facility: CLINIC | Age: 69
End: 2024-03-14

## 2024-03-14 ENCOUNTER — VIRTUAL VISIT (OUTPATIENT)
Dept: INTERNAL MEDICINE | Facility: CLINIC | Age: 69
End: 2024-03-14
Payer: COMMERCIAL

## 2024-03-14 VITALS
HEART RATE: 100 BPM | DIASTOLIC BLOOD PRESSURE: 46 MMHG | BODY MASS INDEX: 18.46 KG/M2 | SYSTOLIC BLOOD PRESSURE: 93 MMHG | TEMPERATURE: 97.8 F | WEIGHT: 94.5 LBS | OXYGEN SATURATION: 95 % | RESPIRATION RATE: 20 BRPM

## 2024-03-14 DIAGNOSIS — J44.1 COPD EXACERBATION (H): Chronic | ICD-10-CM

## 2024-03-14 DIAGNOSIS — E78.00 HYPERCHOLESTEREMIA: ICD-10-CM

## 2024-03-14 DIAGNOSIS — M81.0 AGE-RELATED OSTEOPOROSIS WITHOUT CURRENT PATHOLOGICAL FRACTURE: ICD-10-CM

## 2024-03-14 DIAGNOSIS — C34.90 SMALL CELL LUNG CANCER (H): Primary | ICD-10-CM

## 2024-03-14 DIAGNOSIS — J44.1 COPD EXACERBATION (H): ICD-10-CM

## 2024-03-14 DIAGNOSIS — C34.90 SMALL CELL LUNG CANCER (H): ICD-10-CM

## 2024-03-14 DIAGNOSIS — I10 ESSENTIAL HYPERTENSION: ICD-10-CM

## 2024-03-14 PROCEDURE — 70553 MRI BRAIN STEM W/O & W/DYE: CPT

## 2024-03-14 PROCEDURE — A9585 GADOBUTROL INJECTION: HCPCS | Performed by: INTERNAL MEDICINE

## 2024-03-14 PROCEDURE — 99204 OFFICE O/P NEW MOD 45 MIN: CPT | Performed by: RADIOLOGY

## 2024-03-14 PROCEDURE — 99214 OFFICE O/P EST MOD 30 MIN: CPT | Performed by: RADIOLOGY

## 2024-03-14 PROCEDURE — G2211 COMPLEX E/M VISIT ADD ON: HCPCS | Performed by: RADIOLOGY

## 2024-03-14 PROCEDURE — 99213 OFFICE O/P EST LOW 20 MIN: CPT | Mod: 95 | Performed by: INTERNAL MEDICINE

## 2024-03-14 PROCEDURE — 255N000002 HC RX 255 OP 636: Performed by: INTERNAL MEDICINE

## 2024-03-14 RX ORDER — GADOBUTROL 604.72 MG/ML
5 INJECTION INTRAVENOUS ONCE
Status: COMPLETED | OUTPATIENT
Start: 2024-03-14 | End: 2024-03-14

## 2024-03-14 RX ORDER — LISINOPRIL 20 MG/1
20 TABLET ORAL DAILY
Qty: 60 TABLET | Refills: 3 | Status: SHIPPED | OUTPATIENT
Start: 2024-03-14 | End: 2024-06-24 | Stop reason: ALTCHOICE

## 2024-03-14 RX ORDER — ATORVASTATIN CALCIUM 20 MG/1
20 TABLET, FILM COATED ORAL DAILY
Qty: 90 TABLET | Refills: 0 | Status: SHIPPED | OUTPATIENT
Start: 2024-03-14 | End: 2024-04-02

## 2024-03-14 RX ORDER — ATORVASTATIN CALCIUM 20 MG/1
20 TABLET, FILM COATED ORAL DAILY
Qty: 90 TABLET | Refills: 2 | Status: CANCELLED | OUTPATIENT
Start: 2024-03-14

## 2024-03-14 RX ADMIN — GADOBUTROL 5 ML: 604.72 INJECTION INTRAVENOUS at 07:39

## 2024-03-14 ASSESSMENT — PAIN SCALES - GENERAL: PAINLEVEL: NO PAIN (0)

## 2024-03-14 NOTE — LETTER
3/14/2024         RE: Jasmyn Green  [address] [place] [address]        Dear Colleague,    Thank you for referring your patient, Jasmyn Green, to the [facility] RADIATION ONCOLOGY [place]. Please see a copy of my visit note below.    [facility] Radiation Oncology Consult Note     Patient: Jasmyn Green  MRN: [id number]  Date of Service: 03/14/2024          Amador Stewart MD  [address] [place] [address]       Dear Dr. Stewart:    Thank you very much for referring this patient for consideration of radiotherapy. As you know Ms. Green is a 68 year old female with a diagnosis of extensive stage small cell lung cancer with several pleural-based metastasis in the left hemithorax.  The patient is referred to radiation oncology for evaluation and discussion of possible radiation therapy.    HISTORY OF PRESENT ILLNESS:   Ms. Green is a 68 year old female with multiple underlying medical condition including coronary artery disease, hypertension, COPD with oxygen dependency.  The patient presented with increasing shortness of breath and cough for which she was a seeking further evaluation. CT scan done in the emergency room showed bulky aortopulmonary as well as left hilar adenopathy with irregular pleural-based mass in the left upper lobe. There are also some scattered satellite nodules. This was very suspicious for malignancy.  Patient underwent endobronchial ultrasound-guided biopsy on 2/16/2024 which came back positive for small cell lung cancer in station 4L and 10L.  Patient underwent staging PET CT scan on 3/1/2024 which showed multiple FDG avid lesions consistent for left upper lobe primary lung neoplasm with adjacent satellite lesion in the left upper lobe, extensive mediastinal lymph node involvement, and several pleural-based metastases in the left hemithorax.  She also had a staging MRI brain 3/14/2024 and the results pending at the time of  evaluation.  Her case has been reviewed at thoracic tumor conference and patient is referred to radiation oncology for evaluation and discussion of possible radiation therapy options.    CHEMOTHERAPY HISTORY: Concurrent Chemotherapy: No    RADIATION THERAPY HISTORY: Prior Radiation: No    IMPLANTED CARDIAC DEVICE: none     PREGNANCY: The patient is informed not to be pregnant during the radiation therapy.  She is post menopausal.    Current Outpatient Medications   Medication Sig Dispense Refill     albuterol (PROAIR HFA/PROVENTIL HFA/VENTOLIN HFA) 108 (90 Base) MCG/ACT inhaler Inhale 2 puffs into the lungs every 6 hours (Patient taking differently: Inhale 2 puffs into the lungs every 6 hours as needed) 18 g 11     albuterol (PROVENTIL) (2.5 MG/3ML) 0.083% neb solution Take 1 vial (2.5 mg) by nebulization every 6 hours as needed for shortness of breath, wheezing or cough 90 mL 1     aspirin (ASA) 81 MG chewable tablet Take 1 tablet (81 mg) by mouth daily       atorvastatin (LIPITOR) 20 MG tablet Take 1 tablet (20 mg) by mouth daily 90 tablet 2     calcium carbonate 600 mg-vitamin D 400 units (CALTRATE) 600-400 MG-UNIT per tablet Take 1 tablet by mouth 2 times daily With calcium       diazepam (VALIUM) 5 MG tablet Take 1 tablet (5 mg) by mouth every 6 hours as needed for anxiety 2 tablet 2     diazepam (VALIUM) 5 MG tablet Take one tablet 30 minutes prior to PET scan. 1 tablet 0     doxycycline hyclate (VIBRAMYCIN) 100 MG capsule Take 1 capsule (100 mg) by mouth 2 times daily 20 capsule 3     fluticasone-salmeterol (AIRDUO RESPICLICK) 232-14 MCG/ACT inhaler Inhale 1 puff into the lungs 2 times daily 1 each 11     hydrOXYzine HCl (ATARAX) 25 MG tablet Take 1-2 tablets (25-50 mg) by mouth every 8 hours as needed for anxiety (Insomnia) 30 tablet 3     lisinopril (ZESTRIL) 20 MG tablet TAKE 1 TABLET(20 MG) BY MOUTH DAILY 60 tablet 3     mirtazapine (REMERON) 7.5 MG tablet Take 1 tablet (7.5 mg) by mouth at bedtime 90  tablet 3     predniSONE (DELTASONE) 10 MG tablet 4 tabs daily for 3 days, then 3 tabs daily for 3 days, then 2 tabs daily for 3 days, then 1 tab daily for 3 days, then stop 30 tablet 0     predniSONE (DELTASONE) 20 MG tablet Take 2 tablets (40 mg) by mouth daily for 5 days 10 tablet 0     tiotropium (SPIRIVA RESPIMAT) 2.5 MCG/ACT inhaler Inhale 2 puffs into the lungs daily 4 g 1     Past Medical History:   Diagnosis Date     Age-related osteoporosis without current pathological fracture      Arthritis      COPD (chronic obstructive pulmonary disease) (H)      Coronary artery disease      Dependence on nocturnal oxygen therapy      Dyspnea on exertion      Emphysema lung (H)      Gout      HLD (hyperlipidemia)      Hypertension      Lung mass      Past Surgical History:   Procedure Laterality Date     BRONCHOSCOPY RIGID OR FLEXIBLE W/TRANSENDOSCOPIC ENDOBRONCHIAL ULTRASOUND GUIDED N/A 2024    Procedure: BRONCHOSCOPY, WITH ENDOBRONCHIAL ULTRASOUND;  Surgeon: Ruben Levin MD;  Location: [facility] OR     COLONOSCOPY N/A 10/21/2021    Procedure: COLONOSCOPY;  Surgeon: Wilma Hester DO;  Location: [facility] OR     VAGINAL DELIVERY      x 3 ; remote     WISDOM TOOTH EXTRACTION       No Known Allergies  Family History   Problem Relation Age of Onset     Chronic Obstructive Pulmonary Disease Sister      Diabetes Mother      Heart Disease Mother      Lung Cancer Mother      Heart Disease Father      Social History     Socioeconomic History     Marital status:      Spouse name: Not on file     Number of children: Not on file     Years of education: Not on file     Highest education level: Not on file   Occupational History     Not on file   Tobacco Use     Smoking status: Former     Types: Cigarettes     Quit date: 2021     Years since quittin.7     Passive exposure: Past     Smokeless tobacco: Never     Tobacco comments:     updated 8/3/2021 by TTS   Vaping Use     Vaping Use: Never used    Substance and Sexual Activity     Alcohol use: Not Currently     Drug use: Never     Sexual activity: Not Currently   Other Topics Concern     Not on file   Social History Narrative     many years 3 grown children. 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 Last cigarette a week ago.non drinker  Her primary MD (Wilda) retired several years ago/ tends to avoid doctors/medical care in general       Social Determinants of Health     Financial Resource Strain: Low Risk  (2/1/2024)    Financial Resource Strain      Within the past 12 months, have you or your family members you live with been unable to get utilities (heat, electricity) when it was really needed?: No   Food Insecurity: Low Risk  (2/1/2024)    Food Insecurity      Within the past 12 months, did you worry that your food would run out before you got money to buy more?: No      Within the past 12 months, did the food you bought just not last and you didn t have money to get more?: No   Transportation Needs: Low Risk  (2/1/2024)    Transportation Needs      Within the past 12 months, has lack of transportation kept you from medical appointments, getting your medicines, non-medical meetings or appointments, work, or from getting things that you need?: No   Physical Activity: Sufficiently Active (11/10/2021)    Exercise Vital Sign      Days of Exercise per Week: 5 days      Minutes of Exercise per Session: 30 min   Stress: No Stress Concern Present (11/10/2021)    [employer] [place] [employer] - Occupational Stress Questionnaire      Feeling of Stress : Not at all   Social Connections: Socially Isolated (11/10/2021)    Social Connection and Isolation Panel [NHANES]      Frequency of Communication with Friends and Family: More than three times a week      Frequency of Social Gatherings with Friends and Family: Once a week      Attends Shinto Services: Never      Active Member of Clubs or Organizations: No      Attends Club or Organization  Meetings: Not on file      Marital Status:    Interpersonal Safety: Low Risk  (2/1/2024)    Interpersonal Safety      Do you feel physically and emotionally safe where you currently live?: Yes      Within the past 12 months, have you been hit, slapped, kicked or otherwise physically hurt by someone?: No      Within the past 12 months, have you been humiliated or emotionally abused in other ways by your partner or ex-partner?: No   Housing Stability: Low Risk  (2/1/2024)    Housing Stability      Do you have housing? : Yes      Are you worried about losing your housing?: No        REVIEW OF SYMPTOMS:  A full 14-point review of systems was performed. Pertinent findings are noted in the HPI.    General  Constitutional  Constitutional (WDL): Exceptions to WDL  Fatigue: Fatigue relieved by rest  EENT  Eye Disorders  Eye Disorder (WDL): All eye disorder elements are within defined limits (pt wears glasses)  Ear Disorders  Ear Disorder (WDL): All ear disorder elements are within defined limits  Respiratory  Respiratory  Respiratory (WDL): Exceptions to WDL  Cough: Mild symptoms OR nonprescription intervention indicated  Dyspnea: Shortness of breath with minimal exertion OR limiting instrumental ADL (pt on O2 via NC)  Hypoxia: Decreased oxygen saturation at rest (e.g., pulse oximeter less than 88% or PaO2 less than or equal to 55 mmHg)  Cardiovascular  Cardiovascular  Cardiovascular (WDL): All cardiovascular elements are within defined limits (no pacemaker)  Gastrointestinal  Gastrointestinal  Gastrointestinal (WDL): Exceptions to WDL  Anorexia: Loss of appetite without alteration in eating habits  Musculoskeletal  Musculoskeletal and Connective Tissue Disorders  Musculoskeletal & Connective (WDL): Exceptions to WDL (nothing new)  Arthralgia: Mild pain  Integumentary  Integumentary  Integumentary (WDL): All integumentary elements are within defined limits  Neurological  Neurosensory  Neurosensory (WDL): Exceptions  to WDL  Ataxia: Asymptomatic OR clinical or diagnostic observations only OR intervention not indicated (uses w/c d/t SOB)  Genitourinary/Reproductive  Genitourinary  Genitourinary (WDL): All genitourinary elements are within defined limits  Lymphatic  Lymph System Disorders  Lymph (WDL): All lymph elements are within defined limits  Pain  Pain Score: No Pain (0)  AUA Assessment                                                              Accompanied by  Accompanied By: family (daughter)    ECOG Status: 2    Imaging: Reviewed    Pathology: Reviewed    Objective:     PHYSICAL EXAMINATION:    BP 93/46 (BP Location: Right arm, Patient Position: Sitting)   Pulse 100   Temp 97.8  F (36.6  C)   Resp 20   Wt 42.9 kg (94 lb 8 oz)   LMP  (LMP Unknown)   SpO2 95%   BMI 18.46 kg/m      Gen: Alert, in NAD  Eyes: PERRL, EOMI, sclera anicteric  Neck: Supple, full ROM, no LAD  Pulm: No wheezing, stridor or respiratory distress  CV: Well-perfused, no cyanosis, no pedal edema  Back: No step-offs or pain to palpation along the thoracolumbar spine  Rectal: Deferred  : Deferred  Musculoskeletal: Normal muscle bulk and tone  Skin: Normal color and turgor  Neurologic: A/Ox3, CN II-XII intact, normal gait and station  Psychiatric: Appropriate mood and affect     Impression     Extensive stage small cell lung cancer with several pleural-based metastasis in the left hemithorax.    Assessment & Plan:     I have personally reviewed her upcoming medical record today.  I have also reviewed her most recent radiology study including PET CT scan.  Patient is a 68-year-old female with a diagnosis of extensive stage small cell lung cancer with multiple pleural-based metastases.  The possible treatment options for lung cancer including surgery, systemic therapy, and the radiation therapy has been discussed with the patient in detail and at her great lengths.  The possible risks and side effects of radiation therapy has also been explained to  the patient.  Her case has been reviewed at thoracic tumor conference and the consensus recommendation is to consider initial course of systemic therapy.  The patient will be a candidate for consolidative thoracic radiation (3000 cGy in 10 treatments) therapy after completion of her initial chemotherapy.  The options of prophylactic whole brain radiation therapy to prevent brain metastasis has also been discussed with the patient.  We will discuss PCI more detail in the near future.    Again, thank you very much for the referral and allowing me to participate in the care of this patient.  If you have any questions or concerns about this consultation, please do not hesitate to call.  I spent approximately 30 minutes today with the patient and 50% time was used for counseling.      Sincerely,          Ifrah Jacinto MD, PhD  Department of Radiation Oncology   [facility] Radiation Oncology  Tel: [phone]  Page: [phone]    [facility]  [address] [place] [address]     [place] [id number] [place] [address]    CC:  Patient Care Team:  Miesha Fernando CNP as PCP - General (Nurse Practitioner - Gerontology)  Aidee Luna, RT as Chronic Pulmonary Disease Specialist (Respiratory Therapy)  Rickie Pickens MD as Assigned Endocrinology Provider  Pavan Bee DPM as Assigned Surgical Provider  Ruben Levin MD as MD (Critical Care)  Tata Lainez NP as Assigned Pulmonology Provider  Connie Linn LICSW as Lead Care Coordinator (Primary Care - CC)  Jean Marie Calle CNP as Assigned PCP  Ifrah Jacinto MD as MD (Radiation Oncology)  Amador Stewart MD as MD (Hematology)  Edelmira Morales NEHAL as Community Health Worker  Wilma Carmona, RN as Specialty Care Coordinator (Hematology & Oncology)        Oncology Rooming Note    March 14, 2024 9:31 AM   Jasmyn Green is a 68 year old female who presents for:    Chief Complaint   Patient  presents with     Oncology Clinic Visit     Lung Cancer     New consult     Initial Vitals: BP 93/46 (BP Location: Right arm, Patient Position: Sitting)   Pulse 100   Temp 97.8  F (36.6  C)   Resp 20   Wt 42.9 kg (94 lb 8 oz)   LMP  (LMP Unknown)   SpO2 95%   BMI 18.46 kg/m   Estimated body mass index is 18.46 kg/m  as calculated from the following:    Height as of 3/6/24: 1.524 m (5').    Weight as of this encounter: 42.9 kg (94 lb 8 oz). Body surface area is 1.35 meters squared.  No Pain (0) Comment: Data Unavailable   No LMP recorded (lmp unknown). Patient is postmenopausal.  Allergies reviewed: Yes  Medications reviewed: Yes    Medications: Medication refills not needed today.  Pharmacy name entered into [facility]:    [facility] #[id number] - [place], [place] - [address] AT [facility] & [address] SPECIALTY PH[facility]Y - [address]  [facility] #[id number] - [place], [place] - [address] AT [facility] & [address] DRUG - [facility] [address]    Frailty Screening:   Is the patient here for a new oncology consult visit in cancer care? 2. No      Clinical concerns: New Consult, on oxygen, no pain  Dr. Jacinto was notified.    No prior radiation  No pacemaker/ICD  No pregnancy    Radiation Therapy Patient Education    Person involved with teaching: Patient    Patient educational needs for self management of treatment-related side effects assessment completed.  [facility] Patient Ed tab contains Patient Learning Assessment    Education Materials Given  Radiation Therapy and You, Skin Care During Radiation Treatment, and Coping with Fatigue    Educational Topics Discussed  Side effects expected, Pain management, Skin care, Activity, Nutrition and weight loss, and When to call MD/RN    Response To Teaching  More review necessary and Verbalizes understanding    GYN Only  Vaginal Dilator-given and educated: N/A    Referrals sent:  None    Chemotherapy?  Yes: ? RT after chemo         Cindy Blanca RN                Again, thank you for allowing me to participate in the care of your patient.        Sincerely,        Ifrah Jacinto MD

## 2024-03-14 NOTE — PROGRESS NOTES
Oncology Rooming Note    March 14, 2024 9:31 AM   Jasmyn Green is a 68 year old female who presents for:    Chief Complaint   Patient presents with    Oncology Clinic Visit    Lung Cancer     New consult     Initial Vitals: BP 93/46 (BP Location: Right arm, Patient Position: Sitting)   Pulse 100   Temp 97.8  F (36.6  C)   Resp 20   Wt 42.9 kg (94 lb 8 oz)   LMP  (LMP Unknown)   SpO2 95%   BMI 18.46 kg/m   Estimated body mass index is 18.46 kg/m  as calculated from the following:    Height as of 3/6/24: 1.524 m (5').    Weight as of this encounter: 42.9 kg (94 lb 8 oz). Body surface area is 1.35 meters squared.  No Pain (0) Comment: Data Unavailable   No LMP recorded (lmp unknown). Patient is postmenopausal.  Allergies reviewed: Yes  Medications reviewed: Yes    Medications: Medication refills not needed today.  Pharmacy name entered into [facility]:    [facility] #[id number] [place] [address] AT [address] SPECIALTY PHARMACY - [address]  [id number] - [facility] [address] AT [facility] & [address] DRUG - [facility] [address]    Frailty Screening:   Is the patient here for a new oncology consult visit in cancer care? 2. No      Clinical concerns: New Consult, on oxygen, no pain  Dr. Jacinto was notified.    No prior radiation  No pacemaker/ICD  No pregnancy    Radiation Therapy Patient Education    Person involved with teaching: Patient    Patient educational needs for self management of treatment-related side effects assessment completed.  [facility] Patient Ed tab contains Patient Learning Assessment    Education Materials Given  Radiation Therapy and You, Skin Care During Radiation Treatment, and Coping with Fatigue    Educational Topics Discussed  Side effects expected, Pain management, Skin care, Activity, Nutrition and weight loss, and When to call MD/RN    Response To  Teaching  More review necessary and Verbalizes understanding    GYN Only  Vaginal Dilator-given and educated: N/A    Referrals sent: None    Chemotherapy?  Yes: ? RT after chemo         Cindy Blanca RN

## 2024-03-14 NOTE — PROGRESS NOTES
[facility] Radiation Oncology Consult Note     Patient: Jasmyn Green  MRN: [id number]  Date of Service: 03/14/2024          Amador Stewart MD  [address] [place] [address]       Dear Dr. Stewart:    Thank you very much for referring this patient for consideration of radiotherapy. As you know Ms. Green is a 68 year old female with a diagnosis of extensive stage small cell lung cancer with several pleural-based metastasis in the left hemithorax.  The patient is referred to radiation oncology for evaluation and discussion of possible radiation therapy.    HISTORY OF PRESENT ILLNESS:   Ms. Green is a 68 year old female with multiple underlying medical condition including coronary artery disease, hypertension, COPD with oxygen dependency.  The patient presented with increasing shortness of breath and cough for which she was a seeking further evaluation. CT scan done in the emergency room showed bulky aortopulmonary as well as left hilar adenopathy with irregular pleural-based mass in the left upper lobe. There are also some scattered satellite nodules. This was very suspicious for malignancy.  Patient underwent endobronchial ultrasound-guided biopsy on 2/16/2024 which came back positive for small cell lung cancer in station 4L and 10L.  Patient underwent staging PET CT scan on 3/1/2024 which showed multiple FDG avid lesions consistent for left upper lobe primary lung neoplasm with adjacent satellite lesion in the left upper lobe, extensive mediastinal lymph node involvement, and several pleural-based metastases in the left hemithorax.  She also had a staging MRI brain 3/14/2024 and the results pending at the time of evaluation.  Her case has been reviewed at thoracic tumor conference and patient is referred to radiation oncology for evaluation and discussion of possible radiation therapy options.    CHEMOTHERAPY HISTORY: Concurrent Chemotherapy: No    RADIATION THERAPY HISTORY: Prior  Radiation: No    IMPLANTED CARDIAC DEVICE: none     PREGNANCY: The patient is informed not to be pregnant during the radiation therapy.  She is post menopausal.    Current Outpatient Medications   Medication Sig Dispense Refill    albuterol (PROAIR HFA/PROVENTIL HFA/VENTOLIN HFA) 108 (90 Base) MCG/ACT inhaler Inhale 2 puffs into the lungs every 6 hours (Patient taking differently: Inhale 2 puffs into the lungs every 6 hours as needed) 18 g 11    albuterol (PROVENTIL) (2.5 MG/3ML) 0.083% neb solution Take 1 vial (2.5 mg) by nebulization every 6 hours as needed for shortness of breath, wheezing or cough 90 mL 1    aspirin (ASA) 81 MG chewable tablet Take 1 tablet (81 mg) by mouth daily      atorvastatin (LIPITOR) 20 MG tablet Take 1 tablet (20 mg) by mouth daily 90 tablet 2    calcium carbonate 600 mg-vitamin D 400 units (CALTRATE) 600-400 MG-UNIT per tablet Take 1 tablet by mouth 2 times daily With calcium      diazepam (VALIUM) 5 MG tablet Take 1 tablet (5 mg) by mouth every 6 hours as needed for anxiety 2 tablet 2    diazepam (VALIUM) 5 MG tablet Take one tablet 30 minutes prior to PET scan. 1 tablet 0    doxycycline hyclate (VIBRAMYCIN) 100 MG capsule Take 1 capsule (100 mg) by mouth 2 times daily 20 capsule 3    fluticasone-salmeterol (AIRDUO RESPICLICK) 232-14 MCG/ACT inhaler Inhale 1 puff into the lungs 2 times daily 1 each 11    hydrOXYzine HCl (ATARAX) 25 MG tablet Take 1-2 tablets (25-50 mg) by mouth every 8 hours as needed for anxiety (Insomnia) 30 tablet 3    lisinopril (ZESTRIL) 20 MG tablet TAKE 1 TABLET(20 MG) BY MOUTH DAILY 60 tablet 3    mirtazapine (REMERON) 7.5 MG tablet Take 1 tablet (7.5 mg) by mouth at bedtime 90 tablet 3    predniSONE (DELTASONE) 10 MG tablet 4 tabs daily for 3 days, then 3 tabs daily for 3 days, then 2 tabs daily for 3 days, then 1 tab daily for 3 days, then stop 30 tablet 0    predniSONE (DELTASONE) 20 MG tablet Take 2 tablets (40 mg) by mouth daily for 5 days 10 tablet 0     tiotropium (SPIRIVA RESPIMAT) 2.5 MCG/ACT inhaler Inhale 2 puffs into the lungs daily 4 g 1     Past Medical History:   Diagnosis Date    Age-related osteoporosis without current pathological fracture     Arthritis     COPD (chronic obstructive pulmonary disease) (H)     Coronary artery disease     Dependence on nocturnal oxygen therapy     Dyspnea on exertion     Emphysema lung (H)     Gout     HLD (hyperlipidemia)     Hypertension     Lung mass      Past Surgical History:   Procedure Laterality Date    BRONCHOSCOPY RIGID OR FLEXIBLE W/TRANSENDOSCOPIC ENDOBRONCHIAL ULTRASOUND GUIDED N/A 2024    Procedure: BRONCHOSCOPY, WITH ENDOBRONCHIAL ULTRASOUND;  Surgeon: Ruben Levin MD;  Location: [facility] Main OR    COLONOSCOPY N/A 10/21/2021    Procedure: COLONOSCOPY;  Surgeon: Wilma Hester DO;  Location: [place] Main OR    VAGINAL DELIVERY      x 3 ; remote    WISDOM TOOTH EXTRACTION       No Known Allergies  Family History   Problem Relation Age of Onset    Chronic Obstructive Pulmonary Disease Sister     Diabetes Mother     Heart Disease Mother     Lung Cancer Mother     Heart Disease Father      Social History     Socioeconomic History    Marital status:      Spouse name: Not on file    Number of children: Not on file    Years of education: Not on file    Highest education level: Not on file   Occupational History    Not on file   Tobacco Use    Smoking status: Former     Types: Cigarettes     Quit date: 2021     Years since quittin.7     Passive exposure: Past    Smokeless tobacco: Never    Tobacco comments:     updated 8/3/2021 by [facility]   Vaping Use    Vaping Use: Never used   Substance and Sexual Activity    Alcohol use: Not Currently    Drug use: Never    Sexual activity: Not Currently   Other Topics Concern    Not on file   Social History Narrative     many years 3 grown children. 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 Last cigarette a week ago.non drinker  Her primary MD  (Wilda) retired several years ago/ tends to avoid doctors/medical care in general       Social Determinants of Health     Financial Resource Strain: Low Risk  (2/1/2024)    Financial Resource Strain     Within the past 12 months, have you or your family members you live with been unable to get utilities (heat, electricity) when it was really needed?: No   Food Insecurity: Low Risk  (2/1/2024)    Food Insecurity     Within the past 12 months, did you worry that your food would run out before you got money to buy more?: No     Within the past 12 months, did the food you bought just not last and you didn t have money to get more?: No   Transportation Needs: Low Risk  (2/1/2024)    Transportation Needs     Within the past 12 months, has lack of transportation kept you from medical appointments, getting your medicines, non-medical meetings or appointments, work, or from getting things that you need?: No   Physical Activity: Sufficiently Active (11/10/2021)    Exercise Vital Sign     Days of Exercise per Week: 5 days     Minutes of Exercise per Session: 30 min   Stress: No Stress Concern Present (11/10/2021)    [employer] - Occupational Stress Questionnaire     Feeling of Stress : Not at all   Social Connections: Socially Isolated (11/10/2021)    Social Connection and Isolation Panel [NHANES]     Frequency of Communication with Friends and Family: More than three times a week     Frequency of Social Gatherings with Friends and Family: Once a week     Attends Spiritism Services: Never     Active Member of Clubs or Organizations: No     Attends Club or Organization Meetings: Not on file     Marital Status:    Interpersonal Safety: Low Risk  (2/1/2024)    Interpersonal Safety     Do you feel physically and emotionally safe where you currently live?: Yes     Within the past 12 months, have you been hit, slapped, kicked or otherwise physically hurt by someone?: No     Within the past  12 months, have you been humiliated or emotionally abused in other ways by your partner or ex-partner?: No   Housing Stability: Low Risk  (2/1/2024)    Housing Stability     Do you have housing? : Yes     Are you worried about losing your housing?: No        REVIEW OF SYMPTOMS:  A full 14-point review of systems was performed. Pertinent findings are noted in the HPI.    General  Constitutional  Constitutional (WDL): Exceptions to WDL  Fatigue: Fatigue relieved by rest  EENT  Eye Disorders  Eye Disorder (WDL): All eye disorder elements are within defined limits (pt wears glasses)  Ear Disorders  Ear Disorder (WDL): All ear disorder elements are within defined limits  Respiratory  Respiratory  Respiratory (WDL): Exceptions to WDL  Cough: Mild symptoms OR nonprescription intervention indicated  Dyspnea: Shortness of breath with minimal exertion OR limiting instrumental ADL (pt on O2 via NC)  Hypoxia: Decreased oxygen saturation at rest (e.g., pulse oximeter less than 88% or PaO2 less than or equal to 55 mmHg)  Cardiovascular  Cardiovascular  Cardiovascular (WDL): All cardiovascular elements are within defined limits (no pacemaker)  Gastrointestinal  Gastrointestinal  Gastrointestinal (WDL): Exceptions to WDL  Anorexia: Loss of appetite without alteration in eating habits  Musculoskeletal  Musculoskeletal and Connective Tissue Disorders  Musculoskeletal & Connective (WDL): Exceptions to WDL (nothing new)  Arthralgia: Mild pain  Integumentary  Integumentary  Integumentary (WDL): All integumentary elements are within defined limits  Neurological  Neurosensory  Neurosensory (WDL): Exceptions to WDL  Ataxia: Asymptomatic OR clinical or diagnostic observations only OR intervention not indicated (uses w/c d/t SOB)  Genitourinary/Reproductive  Genitourinary  Genitourinary (WDL): All genitourinary elements are within defined limits  Lymphatic  Lymph System Disorders  Lymph (WDL): All lymph elements are within defined  limits  Pain  Pain Score: No Pain (0)  AUA Assessment                                                              Accompanied by  Accompanied By: family (daughter)    ECOG Status: 2    Imaging: Reviewed    Pathology: Reviewed    Objective:     PHYSICAL EXAMINATION:    BP 93/46 (BP Location: Right arm, Patient Position: Sitting)   Pulse 100   Temp 97.8  F (36.6  C)   Resp 20   Wt 42.9 kg (94 lb 8 oz)   LMP  (LMP Unknown)   SpO2 95%   BMI 18.46 kg/m      Gen: Alert, in NAD  Eyes: PERRL, EOMI, sclera anicteric  Neck: Supple, full ROM, no LAD  Pulm: No wheezing, stridor or respiratory distress  CV: Well-perfused, no cyanosis, no pedal edema  Back: No step-offs or pain to palpation along the thoracolumbar spine  Rectal: Deferred  : Deferred  Musculoskeletal: Normal muscle bulk and tone  Skin: Normal color and turgor  Neurologic: A/Ox3, CN II-XII intact, normal gait and station  Psychiatric: Appropriate mood and affect     Impression     Extensive stage small cell lung cancer with several pleural-based metastasis in the left hemithorax.    Assessment & Plan:     I have personally reviewed her upcoming medical record today.  I have also reviewed her most recent radiology study including PET CT scan.  Patient is a 68-year-old female with a diagnosis of extensive stage small cell lung cancer with multiple pleural-based metastases.  The possible treatment options for lung cancer including surgery, systemic therapy, and the radiation therapy has been discussed with the patient in detail and at her great lengths.  The possible risks and side effects of radiation therapy has also been explained to the patient.  Her case has been reviewed at thoracic tumor conference and the consensus recommendation is to consider initial course of systemic therapy.  The patient will be a candidate for consolidative thoracic radiation (3000 cGy in 10 treatments) therapy after completion of her initial chemotherapy.  The options of  prophylactic whole brain radiation therapy to prevent brain metastasis has also been discussed with the patient.  We will discuss PCI more detail in the near future.    Again, thank you very much for the referral and allowing me to participate in the care of this patient.  If you have any questions or concerns about this consultation, please do not hesitate to call.  I spent approximately 30 minutes today with the patient and 50% time was used for counseling.      Sincerely,          Ifrah Jacinto MD, PhD  Department of Radiation Oncology   [facility] Radiation Oncology  Tel: [phone]  Page: [phone]    [facility]  [address] [place] [address]     [place] [id number] [facility] [place] [address]    CC:  Patient Care Team:  Miesha Fernando CNP as PCP - General (Nurse Practitioner - Gerontology)  Aidee Luna RT as Chronic Pulmonary Disease Specialist (Respiratory Therapy)  Rickie Pickens MD as Assigned Endocrinology Provider  Pavan Bee DPM as Assigned Surgical Provider  Ruben Levin MD as MD (Critical Care)  Tata Lainez NP as Assigned Pulmonology Provider  Connie Linn LICSW as Lead Care Coordinator (Primary Care - CC)  Jean Marie Calle CNP as Assigned PCP  Ifrah Jacinto MD as MD (Radiation Oncology)  Amador Stewart MD as MD (Hematology)  Edelmira Morales CHW as Community Health Worker  Wilma Carmona, RN as Specialty Care Coordinator (Hematology & Oncology)

## 2024-03-14 NOTE — PROGRESS NOTES
Jasmyn is a 68 year old who is being evaluated via a billable video visit.    How would you like to obtain your AVS? MyChart  If the video visit is dropped, the invitation should be resent by: Text to cell phone: [phone]   Will anyone else be joining your video visit? [phone]       Assessment & Plan     Small cell lung cancer (H)      Essential hypertension      COPD exacerbation (H)      Age-related osteoporosis without current pathological fracture      69 yo female with HTN, osteoporosis and COPD, was recently, Feb 2024, diagnosed with extensive stage small cell lung cancer. She will start chemotherapy next week. She had MRI brain today.  She was hospitalized in February 2024.  Her daughter was present during this visit. I filled the form for [employer] medical assistance, which can provide the meals.  We also discussed POLST, health care directive and POA paperwork.              Subjective   Jasmyn is a 68 year old, presenting for the following health issues:  Forms (Form's )    HPI                 Objective             Physical Exam   GENERAL: alert and no distress  EYES: Eyes grossly normal to inspection.  No discharge or erythema, or obvious scleral/conjunctival abnormalities.  RESP: No audible wheeze, cough, or visible cyanosis.    SKIN: Visible skin clear. No significant rash, abnormal pigmentation or lesions.  NEURO: Cranial nerves grossly intact.  Mentation and speech appropriate for age.  PSYCH: Appropriate affect, tone, and pace of words          Video-Visit Details    Type of service:  Video Visit   Originating Location (pt. Location): Home    Distant Location (provider location):  On-site  Platform used for Video Visit: Doximgreg  Signed Electronically by: Ana Maria Bermudez MD

## 2024-03-18 ENCOUNTER — TELEPHONE (OUTPATIENT)
Dept: INTERNAL MEDICINE | Facility: CLINIC | Age: 69
End: 2024-03-18
Payer: COMMERCIAL

## 2024-03-19 ENCOUNTER — DOCUMENTATION ONLY (OUTPATIENT)
Dept: OTHER | Facility: CLINIC | Age: 69
End: 2024-03-19
Payer: COMMERCIAL

## 2024-03-22 ENCOUNTER — OFFICE VISIT (OUTPATIENT)
Dept: PULMONOLOGY | Facility: CLINIC | Age: 69
End: 2024-03-22
Payer: COMMERCIAL

## 2024-03-22 ENCOUNTER — PATIENT OUTREACH (OUTPATIENT)
Dept: ONCOLOGY | Facility: HOSPITAL | Age: 69
End: 2024-03-22

## 2024-03-22 ENCOUNTER — TELEPHONE (OUTPATIENT)
Dept: PULMONOLOGY | Facility: CLINIC | Age: 69
End: 2024-03-22

## 2024-03-22 VITALS
HEART RATE: 120 BPM | OXYGEN SATURATION: 90 % | WEIGHT: 96.2 LBS | SYSTOLIC BLOOD PRESSURE: 93 MMHG | BODY MASS INDEX: 18.79 KG/M2 | DIASTOLIC BLOOD PRESSURE: 61 MMHG

## 2024-03-22 DIAGNOSIS — C34.90 SMALL CELL LUNG CANCER (H): ICD-10-CM

## 2024-03-22 DIAGNOSIS — C34.90 MALIGNANT NEOPLASM OF LUNG, UNSPECIFIED LATERALITY, UNSPECIFIED PART OF LUNG (H): ICD-10-CM

## 2024-03-22 DIAGNOSIS — C34.90 SMALL CELL LUNG CANCER (H): Primary | ICD-10-CM

## 2024-03-22 DIAGNOSIS — J44.9 COPD, GROUP D, BY GOLD 2017 CLASSIFICATION (H): Primary | ICD-10-CM

## 2024-03-22 DIAGNOSIS — J44.1 COPD EXACERBATION (H): Chronic | ICD-10-CM

## 2024-03-22 PROCEDURE — 99214 OFFICE O/P EST MOD 30 MIN: CPT | Performed by: NURSE PRACTITIONER

## 2024-03-22 RX ORDER — ONDANSETRON 8 MG/1
8 TABLET, FILM COATED ORAL EVERY 8 HOURS PRN
Qty: 30 TABLET | Refills: 2 | Status: SHIPPED | OUTPATIENT
Start: 2024-03-22 | End: 2024-05-01 | Stop reason: ALTCHOICE

## 2024-03-22 RX ORDER — IPRATROPIUM BROMIDE AND ALBUTEROL SULFATE 2.5; .5 MG/3ML; MG/3ML
1 SOLUTION RESPIRATORY (INHALATION) EVERY 6 HOURS PRN
Qty: 180 ML | Refills: 11 | Status: SHIPPED | OUTPATIENT
Start: 2024-03-22

## 2024-03-22 RX ORDER — PROCHLORPERAZINE MALEATE 10 MG
10 TABLET ORAL EVERY 6 HOURS PRN
Qty: 30 TABLET | Refills: 2 | Status: SHIPPED | OUTPATIENT
Start: 2024-03-22 | End: 2024-03-22

## 2024-03-22 RX ORDER — PREDNISONE 10 MG/1
TABLET ORAL
Qty: 30 TABLET | Refills: 0 | Status: SHIPPED | OUTPATIENT
Start: 2024-03-22 | End: 2024-04-04

## 2024-03-22 RX ORDER — PROCHLORPERAZINE MALEATE 10 MG
10 TABLET ORAL EVERY 6 HOURS PRN
Qty: 30 TABLET | Refills: 2 | Status: SHIPPED | OUTPATIENT
Start: 2024-03-22

## 2024-03-22 RX ORDER — FLUTICASONE PROPIONATE AND SALMETEROL 232; 14 UG/1; UG/1
1 POWDER, METERED RESPIRATORY (INHALATION) 2 TIMES DAILY
Qty: 1 EACH | Refills: 11 | Status: SHIPPED | OUTPATIENT
Start: 2024-03-22

## 2024-03-22 RX ORDER — ONDANSETRON 8 MG/1
8 TABLET, FILM COATED ORAL EVERY 8 HOURS PRN
Qty: 30 TABLET | Refills: 2 | Status: SHIPPED | OUTPATIENT
Start: 2024-03-22 | End: 2024-03-22

## 2024-03-22 NOTE — TELEPHONE ENCOUNTER
DATE:  3/22/24  DME PROVIDER:  [facility]  SUPPLY ORDERED:  CONTINUOUS OXYGEN/ SUPPLIES  PROVIDER:  DEENA BARNETT NP    COMMENT:   12:34 PM- Faxed oxygen order, office notes and face sheet to [facility].      Thom ALANIZ, CMA

## 2024-03-22 NOTE — PROGRESS NOTES
Patient oxygen saturation on RA at rest is 86%.    While patient sitting in wheelchair I put her on 1 LPM, oxygen saturation stayed at 86%. Would not go above 88%. Turned oxygen up to 2 LPM, oxygen saturation is 88%. Would not go above.  Turned oxygen up to 3 LPM, patient ambulated 10 feet, oxygen saturation is 88%  After ambulating 40 feet on 4 LPM, oxygen saturation is 90%    Patient is ambulatory within his/her home.    Thom ALANIZ, CMA

## 2024-03-22 NOTE — PATIENT INSTRUCTIONS
It was a pleasure to see you in clinic today.   Here is what we discussed:    Start prednisone extended taper.  Continue Airduo one puff twice daily, rinse/gargle after use.  Continue Spiriva two puffs daily.  Start Duonebs twice daily.  You can increase to four times daily.  Continue Combivent inhaler or Duonebs every 4-6 hours as needed for shortness of breath or wheezing.  Continue oxygen 4L continuous with activity.  Continue 3L oxygen continuous at night.  I have referred you to palliative care, they will reach out to schedule.  Call my nurse, Jamie ([phone]) with any change or worsening of your breathing.  Follow-up in 6 months.    Tata Lainez, CNP  Pulmonary Medicine  [facility]  [phone]

## 2024-03-22 NOTE — PROGRESS NOTES
[facility]: Cancer Care Initial Note                                    Discussion with Patient:                                                      Call placed to patient today to go over chemo education for her upcoming appts on Monday, 3/25.     Antiemetics: compazine oral, zofran oral, emend IV, aloxi IV    Medication understanding/side effect: all chemo meds and premedications    Port concerns: discussed why some people get port and others don't.  Someone has told her that she has great veins.  I told her this can be discussed again in the future if needed.    Education concerns: daughter and patient seemed to understand information     Goals          General    Maintain ability to perform ADLs without difficulty             Assessment:                                                      Initial  Current living arrangement:: I live in a private home with family  Type of residence:: Private home - stairs  Informal Support system:: Family  Equipment Currently Used at Home: walker, rolling;cane, straight  Bed or wheelchair confined:: No  Mobility Status: Independent w/Device  Transportation means:: Regular car  Knowledgeable about how to use meds:: Yes  Medication side effects suspected:: Yes  Advanced Care Plans/Directives on file:: Yes  Type Advanced Care Plans/Directives: Other (dropped off at PCP last week)  Patient Reported Pain?: No    Plan of Care Education Review:   Assessment completed with:: Patient;Other (daughter)    Plan of Care Education   Yearly learning assessment completed?: Yes (see Education tab)  Diagnosis:: small cell lung cancer  Does patient understand diagnosis?: Yes  Tx plan/regimen:: tecentriq, carboplatin, etoposide, cosela  Does patient understand treatment plan/regimen?: Yes  Preparing for treatment:: Reviewed treatment preparation information with patient (vascular access, day of chemo, visitor policy, what to bring, etc.)  Vascular access education provided for::  Port;Peripheral IV  Side effect education:: Diarrhea/Constipation;Fatigue;Hair loss;Infection;Lab value monitoring (anemia, neutropenia, thrombocytopenia);Mouth sores;Mylosuppression;Neuropathy;Nausea/Vomiting;Sexual health;Skin changes;Urinary  Safety/self care at home reviewed with patient:: Yes  Coping - concerns/fears reviewed with patient:: Yes  When to call provider:: Increased shortness of breath;Bleeding;New/worsening pain;Shaking chills;Temperature >100.4F;Uncontrolled diarrhea/constipation;Uncontrolled nausea/vomiting  Reasons for deferring treatment reviewed with patient:: Yes  Additional education provided for: : Neutropenic precautions;Bleeding precautions  Procedure education provided for: : Port/PICC placement    Evaluation of Learning  Patient Education Provided: Yes  Readiness:: Acceptance  Method:: Explanation  Response:: Verbalizes understanding           Intervention/Education provided during outreach:                                                       Patient and daughter verbalized understanding.  I will give them a packet of information on Monday when they are here.    Patient to follow up as scheduled at next appt  Patient to call/[employer] message with updates  Confirmed patient has clinic and triage numbers    Patient saw pulmonary today and they have started her on 5 days of prednisone as well as antibiotic.  She has also increased her oxygen and got a new nebulizer.    Signature:  Wilma Carmona RN

## 2024-03-22 NOTE — PROGRESS NOTES
Pulmonary Clinic Follow-Up        Assessment/Plan:     Jasmyn Green is a 68 year old female with sig hx for new lung cancer, COPD, chronic respiratory failure on home o2, emphysema -  who is here for hospital follow-up.    COPD - GOLD E  Chronic hypoxic respiratory failure  Emphysema  Severe obstructive lung disease (FEV1 29%), with hyperinflation and air-trapping.  Severe diffusion capacity defect (DLCO 30%).  Radiographic emphysema.  Previously on Airduo and combivent, d/t cost issues.    Hospitalized 2/8 - 2/15/24 with COPD exacerbation, sepsis, acute on chronic hypoxic and hypercapnic respiratory failure, and found new lung mass.  Initiated on Spiriva.  Daughter reports increased SOB, fatigue, poor appetite.  CAT score 21 today.  Walk test indicates increased oxygen needs today.  Plan:  - start extended prednisone taper for exacerbation symptoms.  - continue Spiriva two puffs daily.  - continue Airduo (fluticasone/salmeterol) respiclick 232/14, one puff BID, rinse/gargle after use.  - start Duonebs BID - QID scheduled  - continue Combivent PRN  - increase oxygen:  oxygen 4L continuous with activity, to keep oxygen saturations 88-92%.  Continue oxygen 3L at night.  - She is UTD with COVID vaccine and pneumococcal vaccine.    - Action plan: prednisone 40mg x5 days, + augmentin 875/125 BID x5 days    Lung Cancer  Nicotine dependence, in remission  Former smoker, quit in 2021.  LDCT for lung cancer screening 6/2023, LungRADS category 2.  Hospitalization in 2/2024 with COPD exacerbation.  CT on admission showed bulky AP and left hilar adenopathy with irregular pleural-based anterior left upper lobe nodule with adjacent smaller satellite nodules. Findings suspicious for a primary lung cancer.  EBUS with biopsy on 2/16/24, positive for small cell lung cancer.  She is now followed by Dr Jacinto in radiation oncology and Dr Stewart in oncology.  She will be initiating chemotherapy, then proceed with  radiation.  Plan:  - follow closely with Dr Jacinto and Dr Stewart  - referred to palliative care, as she is having increased SOB, weight loss, poor appetite    Follow-up  - 6 months      Tata Lainez, CNP  Pulmonary Medicine  [facility]  [phone]      I certify that this patient, Jasmyn Green has been under my care (or a nurse practitioner or physican's assistant working with me). This is the face-to-face encounter for oxygen medical necessity.      At the time of this encounter supplemental oxygen is reasonable and necessary and is expected to improve the patient's condition in a home setting.       Patient has continued oxygen desaturation due to Chronic Respiratory Failure with Hypoxia J96.11  COPD J44.9.    If portability is ordered, is the patient mobile within the home? yes    At the time of this encounter, I have reviewed the qualifying testing and have determined that supplemental oxygen is reasonable and necessary and is expected to improve the patient's condition in a home setting.           History:     Jasmyn Green is a 68 year old female with sig hx for new lung cancer, COPD, chronic respiratory failure on home o2, emphysema -  who is here for hospital follow-up.    Hospitalized 2/8 - 2/15/24 with COPD exacerbation, sepsis, acute on chronic hypoxic and hypercapnic respiratory failure, and found new lung mass.  Diagnosed with new lung cancer.  Now followed by oncology and radiation oncology.  Plan for chemo therapy.  Poor appetite.  Family worried about increased SOB.  Wondering if she is on enough oxygen.    Patient supplied answers from flow sheet for:  COPD Assessment Test (CAT)  2009 [employer]. All rights reserved.      11/2/2021     9:00 AM 6/29/2022     8:00 AM 10/4/2022     9:00 AM 4/21/2023     7:00 AM 11/8/2023     9:37 AM 3/22/2024     8:26 AM   COPD assessment test (CAT)   Cough 3 3 3 4 3 3   Phlegm 3 3 2 4 1 2   Chest tightness 0 0 0 2 0 0   Walk  up hill 5 5 5 5 4 4   Limited activities 0 3 0 4 4 4   Leaving my home 0 0 0 0 0 2   Sleep 0 0 1 0 1 2   Energy 2 4 0 1 2 4   Total Score 13 18 11 20 15 21      CAT Key:  The CAT consist of 8 items which are each scored 0-5. The total score ranges from 0-40 with higher scores representing a poorer health status. When interpreting CAT scores, the individual s disease severity should be considered.   Low impact  (1-9)  Medium impact  (10-20)  High impact  (21-30)  Very high impact  (31-40)     ROS:     10-point ROS performed and is negative aside from those listed in HPI.         Past Medical History:      Past Medical History:   Diagnosis Date    Age-related osteoporosis without current pathological fracture     Arthritis     COPD (chronic obstructive pulmonary disease) (H)     Coronary artery disease     Dependence on nocturnal oxygen therapy     Dyspnea on exertion     Emphysema lung (H)     Gout     HLD (hyperlipidemia)     Hypertension     Lung mass            Past Surgical History:      Past Surgical History:   Procedure Laterality Date    BRONCHOSCOPY RIGID OR FLEXIBLE W/TRANSENDOSCOPIC ENDOBRONCHIAL ULTRASOUND GUIDED N/A 2024    Procedure: BRONCHOSCOPY, WITH ENDOBRONCHIAL ULTRASOUND;  Surgeon: Ruben Levin MD;  [facility]    COLONOSCOPY N/A 10/21/2021    Procedure: COLONOSCOPY;  Surgeon: Wilma Hester DO;  Location: [facility] OR    VAGINAL DELIVERY      x 3 ; remote    WISDOM TOOTH EXTRACTION            Social History:     Social History     Tobacco Use    Smoking status: Former     Types: Cigarettes     Quit date: 2021     Years since quittin.7     Passive exposure: Past    Smokeless tobacco: Never    Tobacco comments:     updated 8/3/2021 by TTS   Substance Use Topics    Alcohol use: Not Currently          Family History:     Family History   Problem Relation Age of Onset    Chronic Obstructive Pulmonary Disease Sister     Diabetes Mother     Heart Disease Mother      Lung Cancer Mother     Heart Disease Father            Allergies:    No Known Allergies       Medications:     Current Outpatient Medications   Medication Sig    albuterol (PROAIR HFA/PROVENTIL HFA/VENTOLIN HFA) 108 (90 Base) MCG/ACT inhaler Inhale 2 puffs into the lungs every 6 hours (Patient taking differently: Inhale 2 puffs into the lungs every 6 hours as needed)    albuterol (PROVENTIL) (2.5 MG/3ML) 0.083% neb solution Take 1 vial (2.5 mg) by nebulization every 6 hours as needed for shortness of breath, wheezing or cough    aspirin (ASA) 81 MG chewable tablet Take 1 tablet (81 mg) by mouth daily    atorvastatin (LIPITOR) 20 MG tablet Take 1 tablet (20 mg) by mouth daily    calcium carbonate 600 mg-vitamin D 400 units (CALTRATE) 600-400 MG-UNIT per tablet Take 1 tablet by mouth 2 times daily With calcium    diazepam (VALIUM) 5 MG tablet Take 1 tablet (5 mg) by mouth every 6 hours as needed for anxiety    diazepam (VALIUM) 5 MG tablet Take one tablet 30 minutes prior to PET scan.    doxycycline hyclate (VIBRAMYCIN) 100 MG capsule Take 1 capsule (100 mg) by mouth 2 times daily    fluticasone-salmeterol (AIRDUO RESPICLICK) 232-14 MCG/ACT inhaler Inhale 1 puff into the lungs 2 times daily    hydrOXYzine HCl (ATARAX) 25 MG tablet Take 1-2 tablets (25-50 mg) by mouth every 8 hours as needed for anxiety (Insomnia)    lisinopril (ZESTRIL) 20 MG tablet Take 1 tablet (20 mg) by mouth daily    mirtazapine (REMERON) 7.5 MG tablet Take 1 tablet (7.5 mg) by mouth at bedtime    predniSONE (DELTASONE) 10 MG tablet 4 tabs daily for 3 days, then 3 tabs daily for 3 days, then 2 tabs daily for 3 days, then 1 tab daily for 3 days, then stop    tiotropium (SPIRIVA RESPIMAT) 2.5 MCG/ACT inhaler Inhale 2 puffs into the lungs daily     No current facility-administered medications for this visit.            Physical Exam:   BP 93/61 (BP Location: Left arm, Patient Position: Chair, Cuff Size: Child)   Pulse 120   Wt 43.6 kg (96 lb 3.2  oz)   LMP  (LMP Unknown)   SpO2 90%   BMI 18.79 kg/m      Constitutional - adult female, appears in NAD.  Daughter and son present.  Respiratory - CTA bilaterally, decreased air movement throughout.  No wheezes or rhonchi.  Respirations even and unlabored.  On 3L pulse dose oxygen via NC.    Cardiac -- RRR, no M/G/R  Neurological - alert, answering questions appropriately          Current Data:     Chest CT 2/8/24:  IMPRESSION:   1.  Bulky AP and left hilar adenopathy with irregular pleural-based anterior left upper lobe nodule with adjacent smaller satellite nodules. Findings suspicious for a primary lung cancer.  2.  Nodular thickening of the left adrenal gland is likely unchanged.  3.  Patient needs PET/CT for further evaluation.  4.  Extensive emphysema.  5.  Findings discussed by the undersigned with Dr. Ramos at 1141.    Chest CT 6/2023:  IMPRESSION:  1.  Negative for lung cancer screening purposes.  2.  New mild consolidation in the left upper lobe compatible with infectious / inflammatory change.    EXAM: XR CHEST 2 VIEWS  LOCATION: [facility]  DATE/TIME: 4/4/2023 6:54 AM  INDICATION:  COPD exacerbation (H), Shortness of breath  COMPARISON: 06/27/2021.                                              IMPRESSION: Heart is normal in size. Lungs are hyperinflated suggesting COPD. Minimal bibasilar atelectasis. Possible trace bilateral effusions. Lungs are otherwise clear.    Chest CT 6/2/22:  IMPRESSION:  1.  Negative for lung cancer screening purposes.  2.  Debris throughout the airways with bronchial wall thickening. Bronchitis is suspected.  3.  Severe coronary artery calcification.  4.  Small to moderate sized hiatal hernia.    PFTs 8/2021:  The FVC, FEV1 and FEV1/FVC ratio are reduced, but the TOP98-85% is within normal limits.  The inspiratory flow rates are reduced.  The FVC is reduced relative to the SVC indicating air trapping.  The TLC, RV, FRC and RV/TLC ratio are all  increased   indicating overinflation and air trapping.  Following administration of bronchodilators, there is no significant response.  The diffusing capacity is severely reduced when corrected to hemoglobin.   IMPRESSION:   Very severe Airflow Obstruction. No significant postbronchodilator response.   Mild hyperinflation and moderate airtrapping.   Diffusion capacity was severely reduced when corrected to hemoglobin.

## 2024-03-25 ENCOUNTER — ONCOLOGY VISIT (OUTPATIENT)
Dept: ONCOLOGY | Facility: HOSPITAL | Age: 69
End: 2024-03-25
Attending: NURSE PRACTITIONER
Payer: COMMERCIAL

## 2024-03-25 ENCOUNTER — MYC MEDICAL ADVICE (OUTPATIENT)
Dept: PULMONOLOGY | Facility: CLINIC | Age: 69
End: 2024-03-25

## 2024-03-25 ENCOUNTER — INFUSION THERAPY VISIT (OUTPATIENT)
Dept: INFUSION THERAPY | Facility: HOSPITAL | Age: 69
End: 2024-03-25
Attending: NURSE PRACTITIONER
Payer: COMMERCIAL

## 2024-03-25 VITALS
WEIGHT: 96.8 LBS | HEART RATE: 119 BPM | DIASTOLIC BLOOD PRESSURE: 59 MMHG | OXYGEN SATURATION: 94 % | TEMPERATURE: 97.6 F | SYSTOLIC BLOOD PRESSURE: 130 MMHG | HEIGHT: 60 IN | RESPIRATION RATE: 16 BRPM | BODY MASS INDEX: 19.01 KG/M2

## 2024-03-25 DIAGNOSIS — C34.90 SMALL CELL LUNG CANCER (H): Primary | ICD-10-CM

## 2024-03-25 DIAGNOSIS — G93.9 BRAIN LESION: ICD-10-CM

## 2024-03-25 DIAGNOSIS — J44.1 COPD EXACERBATION (H): ICD-10-CM

## 2024-03-25 LAB
ALBUMIN SERPL BCG-MCNC: 3.7 G/DL (ref 3.5–5.2)
ALP SERPL-CCNC: 80 U/L (ref 40–150)
ALT SERPL W P-5'-P-CCNC: 38 U/L (ref 0–50)
ANION GAP SERPL CALCULATED.3IONS-SCNC: 9 MMOL/L (ref 7–15)
AST SERPL W P-5'-P-CCNC: 28 U/L (ref 0–45)
BASOPHILS # BLD AUTO: 0 10E3/UL (ref 0–0.2)
BASOPHILS NFR BLD AUTO: 0 %
BILIRUB SERPL-MCNC: <0.2 MG/DL
BUN SERPL-MCNC: 34 MG/DL (ref 8–23)
CALCIUM SERPL-MCNC: 8.6 MG/DL (ref 8.8–10.2)
CHLORIDE SERPL-SCNC: 111 MMOL/L (ref 98–107)
CREAT SERPL-MCNC: 0.77 MG/DL (ref 0.51–0.95)
DEPRECATED HCO3 PLAS-SCNC: 26 MMOL/L (ref 22–29)
EGFRCR SERPLBLD CKD-EPI 2021: 83 ML/MIN/1.73M2
EOSINOPHIL # BLD AUTO: 0 10E3/UL (ref 0–0.7)
EOSINOPHIL NFR BLD AUTO: 0 %
ERYTHROCYTE [DISTWIDTH] IN BLOOD BY AUTOMATED COUNT: 14.4 % (ref 10–15)
GLUCOSE SERPL-MCNC: 132 MG/DL (ref 70–99)
HCT VFR BLD AUTO: 33.5 % (ref 35–47)
HGB BLD-MCNC: 10.1 G/DL (ref 11.7–15.7)
IMM GRANULOCYTES # BLD: 0.1 10E3/UL
IMM GRANULOCYTES NFR BLD: 1 %
LYMPHOCYTES # BLD AUTO: 1 10E3/UL (ref 0.8–5.3)
LYMPHOCYTES NFR BLD AUTO: 7 %
MCH RBC QN AUTO: 30.3 PG (ref 26.5–33)
MCHC RBC AUTO-ENTMCNC: 30.1 G/DL (ref 31.5–36.5)
MCV RBC AUTO: 101 FL (ref 78–100)
MONOCYTES # BLD AUTO: 1 10E3/UL (ref 0–1.3)
MONOCYTES NFR BLD AUTO: 7 %
NEUTROPHILS # BLD AUTO: 12.9 10E3/UL (ref 1.6–8.3)
NEUTROPHILS NFR BLD AUTO: 85 %
NRBC # BLD AUTO: 0 10E3/UL
NRBC BLD AUTO-RTO: 0 /100
PLATELET # BLD AUTO: 424 10E3/UL (ref 150–450)
POTASSIUM SERPL-SCNC: 4.2 MMOL/L (ref 3.4–5.3)
PROT SERPL-MCNC: 6.5 G/DL (ref 6.4–8.3)
RBC # BLD AUTO: 3.33 10E6/UL (ref 3.8–5.2)
SODIUM SERPL-SCNC: 146 MMOL/L (ref 135–145)
TSH SERPL DL<=0.005 MIU/L-ACNC: 0.35 UIU/ML (ref 0.3–4.2)
WBC # BLD AUTO: 15.1 10E3/UL (ref 4–11)

## 2024-03-25 PROCEDURE — 85025 COMPLETE CBC W/AUTO DIFF WBC: CPT | Performed by: INTERNAL MEDICINE

## 2024-03-25 PROCEDURE — 96375 TX/PRO/DX INJ NEW DRUG ADDON: CPT

## 2024-03-25 PROCEDURE — 96367 TX/PROPH/DG ADDL SEQ IV INF: CPT

## 2024-03-25 PROCEDURE — 250N000011 HC RX IP 250 OP 636: Performed by: INTERNAL MEDICINE

## 2024-03-25 PROCEDURE — G2211 COMPLEX E/M VISIT ADD ON: HCPCS | Performed by: NURSE PRACTITIONER

## 2024-03-25 PROCEDURE — 36415 COLL VENOUS BLD VENIPUNCTURE: CPT | Performed by: INTERNAL MEDICINE

## 2024-03-25 PROCEDURE — 80053 COMPREHEN METABOLIC PANEL: CPT | Performed by: INTERNAL MEDICINE

## 2024-03-25 PROCEDURE — 96417 CHEMO IV INFUS EACH ADDL SEQ: CPT

## 2024-03-25 PROCEDURE — 96413 CHEMO IV INFUSION 1 HR: CPT

## 2024-03-25 PROCEDURE — 99215 OFFICE O/P EST HI 40 MIN: CPT | Performed by: NURSE PRACTITIONER

## 2024-03-25 PROCEDURE — 258N000003 HC RX IP 258 OP 636: Performed by: INTERNAL MEDICINE

## 2024-03-25 PROCEDURE — 250N000011 HC RX IP 250 OP 636: Performed by: NURSE PRACTITIONER

## 2024-03-25 PROCEDURE — 258N000003 HC RX IP 258 OP 636: Performed by: NURSE PRACTITIONER

## 2024-03-25 PROCEDURE — 84443 ASSAY THYROID STIM HORMONE: CPT | Performed by: INTERNAL MEDICINE

## 2024-03-25 PROCEDURE — 99214 OFFICE O/P EST MOD 30 MIN: CPT | Performed by: NURSE PRACTITIONER

## 2024-03-25 RX ORDER — DIPHENHYDRAMINE HYDROCHLORIDE 50 MG/ML
50 INJECTION INTRAMUSCULAR; INTRAVENOUS
Status: DISCONTINUED | OUTPATIENT
Start: 2024-03-25 | End: 2024-03-25 | Stop reason: HOSPADM

## 2024-03-25 RX ORDER — MEPERIDINE HYDROCHLORIDE 25 MG/ML
25 INJECTION INTRAMUSCULAR; INTRAVENOUS; SUBCUTANEOUS EVERY 30 MIN PRN
Status: DISCONTINUED | OUTPATIENT
Start: 2024-03-25 | End: 2024-03-25 | Stop reason: HOSPADM

## 2024-03-25 RX ORDER — EPINEPHRINE 1 MG/ML
0.3 INJECTION, SOLUTION INTRAMUSCULAR; SUBCUTANEOUS EVERY 5 MIN PRN
Status: DISCONTINUED | OUTPATIENT
Start: 2024-03-25 | End: 2024-03-25 | Stop reason: HOSPADM

## 2024-03-25 RX ORDER — METHYLPREDNISOLONE SODIUM SUCCINATE 125 MG/2ML
125 INJECTION, POWDER, LYOPHILIZED, FOR SOLUTION INTRAMUSCULAR; INTRAVENOUS
Status: DISCONTINUED | OUTPATIENT
Start: 2024-03-25 | End: 2024-03-25 | Stop reason: HOSPADM

## 2024-03-25 RX ORDER — HEPARIN SODIUM (PORCINE) LOCK FLUSH IV SOLN 100 UNIT/ML 100 UNIT/ML
5 SOLUTION INTRAVENOUS
Status: DISCONTINUED | OUTPATIENT
Start: 2024-03-25 | End: 2024-03-25 | Stop reason: HOSPADM

## 2024-03-25 RX ORDER — ALBUTEROL SULFATE 90 UG/1
1-2 AEROSOL, METERED RESPIRATORY (INHALATION)
Status: DISCONTINUED | OUTPATIENT
Start: 2024-03-25 | End: 2024-03-25 | Stop reason: HOSPADM

## 2024-03-25 RX ORDER — PALONOSETRON 0.05 MG/ML
0.25 INJECTION, SOLUTION INTRAVENOUS ONCE
Status: COMPLETED | OUTPATIENT
Start: 2024-03-25 | End: 2024-03-25

## 2024-03-25 RX ORDER — ALBUTEROL SULFATE 0.83 MG/ML
2.5 SOLUTION RESPIRATORY (INHALATION)
Status: DISCONTINUED | OUTPATIENT
Start: 2024-03-25 | End: 2024-03-25 | Stop reason: HOSPADM

## 2024-03-25 RX ADMIN — SODIUM CHLORIDE 250 ML: 9 INJECTION, SOLUTION INTRAVENOUS at 09:41

## 2024-03-25 RX ADMIN — DEXAMETHASONE SODIUM PHOSPHATE: 10 INJECTION, SOLUTION INTRAMUSCULAR; INTRAVENOUS at 10:19

## 2024-03-25 RX ADMIN — ATEZOLIZUMAB 1200 MG: 1200 INJECTION, SOLUTION INTRAVENOUS at 10:43

## 2024-03-25 RX ADMIN — CARBOPLATIN 290 MG: 10 INJECTION, SOLUTION INTRAVENOUS at 12:19

## 2024-03-25 RX ADMIN — PALONOSETRON 0.25 MG: 0.05 INJECTION, SOLUTION INTRAVENOUS at 10:18

## 2024-03-25 RX ADMIN — TRILACICLIB 300 MG: 300 INJECTION, POWDER, LYOPHILIZED, FOR SOLUTION INTRAVENOUS at 11:43

## 2024-03-25 RX ADMIN — SODIUM CHLORIDE 110 MG: 9 INJECTION, SOLUTION INTRAVENOUS at 13:01

## 2024-03-25 ASSESSMENT — PAIN SCALES - GENERAL: PAINLEVEL: NO PAIN (0)

## 2024-03-25 NOTE — PROGRESS NOTES
Oncology Rooming Note    March 25, 2024 8:44 AM   Jasmyn Green is a 68 year old female who presents for:    Chief Complaint   Patient presents with    Oncology Clinic Visit     Return visit-New start. Small cell lung cancer. COPD exacerbation.     Initial Vitals: /59 (BP Location: Left arm, Patient Position: Sitting, Cuff Size: Adult Regular)   Pulse 119   Temp 97.6  F (36.4  C) (Oral)   Resp 16   Ht 1.524 m (5')   Wt 43.9 kg (96 lb 12.8 oz)   LMP  (LMP Unknown)   SpO2 94%   BMI 18.90 kg/m   Estimated body mass index is 18.9 kg/m  as calculated from the following:    Height as of this encounter: 1.524 m (5').    Weight as of this encounter: 43.9 kg (96 lb 12.8 oz). Body surface area is 1.36 meters squared.  No Pain (0) Comment: Data Unavailable   No LMP recorded (lmp unknown). Patient is postmenopausal.  Allergies reviewed: Yes  Medications reviewed: Yes    Medications: Medication refills not needed today.  Pharmacy name entered into [employer]:    [facility] - [place], [place] - [address] [facility] - [place], [place] - [address]    Frailty Screening:   Is the patient here for a new oncology consult visit in cancer care? 1. Yes. Over the past month, have you experienced difficulty or required a caregiver to assist with:   1. Balance, walking or general mobility (including any falls)? YES  2. Completion of self-care tasks such as bathing, dressing, toileting, grooming/hygiene?  NO  3. Concentration or memory that affects your daily life?  YES       Clinical concerns: Return visit-New start. Small cell lung cancer. COPD exacerbation.      Елена Deutsch, [facility]

## 2024-03-25 NOTE — LETTER
3/25/2024         RE: Jasmyn Green  [address] [place] [address]        Dear Colleague,    Thank you for referring your patient, Jasmyn Green, to the [facility] [place]. Please see a copy of my visit note below.    [facility] Hematology and Oncology Progress Note    Patient: Jasmyn Green  MRN: [id number]  Date of Service: Mar 25, 2024          Reason for Visit    Chief Complaint   Patient presents with     Oncology Clinic Visit     Return visit-New start. Small cell lung cancer. COPD exacerbation.       Assessment and Plan     Cancer Staging   No matching staging information was found for the patient.    Lung cancer, Small Cell, extensive stage: Patient is here today to start palliative chemotherapy with carboplatin, etoposide and Atezolizumab.  Overall she feels educated and ready to start.  She had chemo education.  She verbally gave consent to start today.  We discussed that the side effects include, but are not limited to: Alopecia, nausea and vomiting, diarrhea constipation, dry skin and rash, allergic reaction, kidney damage, myelosuppression, fatigue and weakness, poor appetite and weight loss. Risk of immunotherapy include: hepatitis, thyroiditis, colitis, pulmonary toxicity, rash and dry skin, myalgias.  I am and give her for cycle at a 20% reduction due to borderline performance status.  Patient will be seen next week for toxicity check.  She will then come in 3 weeks for her next dose of chemo.  We will do a CT scan after cycle 2.  Patient and family had questions about what her expected survival is with this type of disease.  I did tell her that median survival without treatment is usually 3 to 6 months and with treatment is generally closer to 12 to 15 months.  Patient does have an appointment to see palliative care for symptom management.  I think goals of care discussions will also be helpful.    3 mm abnormality seen on brain MRI: I  told patient this may or may not be anything to worry about.  She is asymptomatic so at this time we will keep an eye on it and likely repeat another MRI of the brain in roughly 6 weeks.    Leukocytosis/anemia: likely chronic disease. Will have to monitor closely with chemo.     Malnourished with low weight: I put a referral in for our dietitian to call patient.  We talked today about ways to increase her caloric intake.  I talked her about how she has to think about food as medicine and that she needs to stay hydrated and try to increase her calories.  It will be harder when she is on the chemotherapy then it is now.    ECOG Performance    2 - Ambulatory and independent in all ADLs; cannot work; up > 50% of the time    Distress Screening (within last 30 days)    No data recorded     Pain  Pain Score: No Pain (0)    Problem List    Patient Active Problem List   Diagnosis     COPD exacerbation (H)     History of gout     Coronary artery calcification     Lymphocytosis     Essential hypertension     Asymptomatic hypertensive urgency     Age-related osteoporosis without current pathological fracture     Lung mass     Small cell lung cancer (H)     Chronic respiratory failure with hypoxia (H)        ______________________________________________________________________________    History of Present Illness    Oncologist: Dr. Stewart    Diagnosis: Lung cancer, small cell, extensive stage. Presented to ER for SOB, coughing. Hx of COPD, on oxygen.   -2/8/24: CT: Bulky AP and left hilar adenopathy with irregular pleural-based anterior left upper lobe nodule with adjacent smaller satellite nodules. Findings suspicious for a primary lung cancer   -3/1/24: PET: Findings suspicious for a left upper lobe primary lung neoplasm with adjacent satellite lesion in the left upper lobe, extensive mediastinal lymph node involvement, and several pleural-based metastases in the left hemithorax   -3/14/24: Brain MRI: Findings concerning for  a single 3 - 4 mm right occipital lobe cortex metastatic lesion     Treatment:   -3/25/24: here today to start palliative treatment with Carboplatin, Etoposide, atezolizumab.     Interim History:   Patient is here today to start palliative chemotherapy.  She has a history of lung cancer.  She states that overall she feels ready to start and does want to do treatment.  She understands that she has an incurable disease but the treatment will help her live longer.  She states that she is tired a lot and she is on oxygen continuously.  She states that she does not have much of an appetite so she is likely a little malnourished.  She says she generally eats breakfast and then she will have some snacks the rest of the day.  Her daughter is working with her to try to make some smoothies and increase her calories.  Patient denies any bone or back pain.  Denies any infectious complaints.      Review of Systems    Pertinent items are noted in HPI.    Past History    Past Medical History:   Diagnosis Date     Age-related osteoporosis without current pathological fracture      Arthritis      COPD (chronic obstructive pulmonary disease) (H)      Coronary artery disease      Dependence on nocturnal oxygen therapy      Dyspnea on exertion      Emphysema lung (H)      Gout      HLD (hyperlipidemia)      Hypertension      Lung mass        PHYSICAL EXAM  /59 (BP Location: Left arm, Patient Position: Sitting, Cuff Size: Adult Regular)   Pulse 119   Temp 97.6  F (36.4  C) (Oral)   Resp 16   Ht 1.524 m (5')   Wt 43.9 kg (96 lb 12.8 oz)   LMP  (LMP Unknown)   SpO2 94%   BMI 18.90 kg/m      GENERAL: no acute distress. Cooperative in conversation. Here with daughter, son and son in law. In wheelchair. On oxygen.   RESP: Regular respiratory rate. No expiratory wheezes   NEURO: non focal. Alert and oriented x3.   PSYCH: within normal limits. No depression or anxiety.  SKIN: exposed skin is dry intact.     Lab Results    Recent  Results (from the past 168 hour(s))   Comprehensive metabolic panel   Result Value Ref Range    Sodium 146 (H) 135 - 145 mmol/L    Potassium 4.2 3.4 - 5.3 mmol/L    Carbon Dioxide (CO2) 26 22 - 29 mmol/L    Anion Gap 9 7 - 15 mmol/L    Urea Nitrogen 34.0 (H) 8.0 - 23.0 mg/dL    Creatinine 0.77 0.51 - 0.95 mg/dL    GFR Estimate 83 >60 mL/min/1.73m2    Calcium 8.6 (L) 8.8 - 10.2 mg/dL    Chloride 111 (H) 98 - 107 mmol/L    Glucose 132 (H) 70 - 99 mg/dL    Alkaline Phosphatase 80 40 - 150 U/L    AST 28 0 - 45 U/L    ALT 38 0 - 50 U/L    Protein Total 6.5 6.4 - 8.3 g/dL    Albumin 3.7 3.5 - 5.2 g/dL    Bilirubin Total <0.2 <=1.2 mg/dL   TSH with free T4 reflex   Result Value Ref Range    TSH 0.35 0.30 - 4.20 uIU/mL   CBC with platelets and differential   Result Value Ref Range    WBC Count 15.1 (H) 4.0 - 11.0 10e3/uL    RBC Count 3.33 (L) 3.80 - 5.20 10e6/uL    Hemoglobin 10.1 (L) 11.7 - 15.7 g/dL    Hematocrit 33.5 (L) 35.0 - 47.0 %     (H) 78 - 100 fL    MCH 30.3 26.5 - 33.0 pg    MCHC 30.1 (L) 31.5 - 36.5 g/dL    RDW 14.4 10.0 - 15.0 %    Platelet Count 424 150 - 450 10e3/uL    % Neutrophils 85 %    % Lymphocytes 7 %    % Monocytes 7 %    % Eosinophils 0 %    % Basophils 0 %    % Immature Granulocytes 1 %    NRBCs per 100 WBC 0 <1 /100    Absolute Neutrophils 12.9 (H) 1.6 - 8.3 10e3/uL    Absolute Lymphocytes 1.0 0.8 - 5.3 10e3/uL    Absolute Monocytes 1.0 0.0 - 1.3 10e3/uL    Absolute Eosinophils 0.0 0.0 - 0.7 10e3/uL    Absolute Basophils 0.0 0.0 - 0.2 10e3/uL    Absolute Immature Granulocytes 0.1 <=0.4 10e3/uL    Absolute NRBCs 0.0 10e3/uL       Imaging    MR Brain w/o & w Contrast    Result Date: 3/14/2024  EXAM: MR BRAIN W/O and W CONTRAST LOCATION: [facility] DATE: 3/14/2024 INDICATION:  Small cell lung cancer (H), COPD exacerbation (H) COMPARISON: PET CT 03/01/2024. CONTRAST: 5mL Gadavist TECHNIQUE: Routine multiplanar multisequence head MRI without and with intravenous  contrast. FINDINGS: INTRACRANIAL CONTENTS: Focus of 3 - 4 mm postcontrast enhancement within the right cuneal cortex of the occipital lobe (axial series 1101, image 98 for example). No acute or subacute infarct. No mass, acute hemorrhage, or extra-axial fluid collections. Scattered nonspecific T2/FLAIR hyperintensities within the cerebral white matter most consistent with mild chronic microvascular ischemic change. Mild generalized cerebral atrophy. No hydrocephalus. Normal position of the cerebellar tonsils. No pathologic contrast enhancement. SELLA: No abnormality accounting for technique. OSSEOUS STRUCTURES/SOFT TISSUES: Normal marrow signal. The major intracranial vascular flow voids are maintained. ORBITS: No abnormality accounting for technique. SINUSES/MASTOIDS: No paranasal sinus mucosal disease. No middle ear or mastoid effusion.     IMPRESSION: 1.  Findings concerning for a single 3 - 4 mm right occipital lobe cortex metastatic lesion. 2.  No evidence for superimposed acute intracranial process.    PET Oncology (Eyes to Thighs)    Result Date: 3/1/2024  EXAM: PET ONCOLOGY (EYES TO THIGHS) LOCATION: [facility] DATE: 3/1/2024 INDICATION: Other nonspecific abnormal finding of lung field. Initial treatment strategy. COMPARISON: CT 02/08/2024, 02/11/2024 CONTRAST: None TECHNIQUE: Serum glucose level 127 mg/dL. One hour post intravenous administration of 9.89 mCi F-18 FDG, PET imaging was performed from the skull vertex to mid thighs, utilizing attenuation correction with concurrent axial CT and PET/CT image fusion. Dose reduction techniques were used. PET/CT FINDINGS: FDG avid 2.2 x 1.4 x 2.0 cm subpleural nodule in the lateral left upper lobe with broad pleural contact (SUV max 13.5) with 0.8 cm adjacent satellite nodule located just medially (SUV max 6.3), FDG avid left interlobar station 11L lymph node (SUV max 9.8) and conglomerate mediastinal station 5 subaortic / station 6  para-aortic gisela mass measuring 5.2 x 3.0 cm (SUV max 12.8) and several FDG avid subcentimeter pleural nodules in the left upper and lower lobes (for reference on series 3, image 173), suspicious for a left upper lobe primary lung neoplasm with adjacent satellite lesion in the left upper lobe, extensive mediastinal lymph node involvement, and several pleural-based metastases in the left hemithorax CT FINDINGS: Senescent intracranial changes. Atretic right maxillary sinus. Advanced emphysema with scattered bands of scarring. Advanced coronary arterial calcification. Ectatic infrarenal abdominal aorta. Sigmoid diverticulosis. Multilevel degenerative changes in the spine.     IMPRESSION: Findings suspicious for a left upper lobe primary lung neoplasm with adjacent satellite lesion in the left upper lobe, extensive mediastinal lymph node involvement, and several pleural-based metastases in the left hemithorax.     Total time spent with patient in face to face time, chart review and documentation was 40 minutes.      The longitudinal plan of care for the diagnosis(es)/condition(s) as documented were addressed during this visit. Due to the added complexity in care, I will continue to support Jasmyn in the subsequent management and with ongoing continuity of care.      Signed by: JIMENEZ Butt CNP    Oncology Rooming Note    March 25, 2024 8:44 AM   Jasmyn Green is a 68 year old female who presents for:    Chief Complaint   Patient presents with     Oncology Clinic Visit     Return visit-New start. Small cell lung cancer. COPD exacerbation.     Initial Vitals: /59 (BP Location: Left arm, Patient Position: Sitting, Cuff Size: Adult Regular)   Pulse 119   Temp 97.6  F (36.4  C) (Oral)   Resp 16   Ht 1.524 m (5')   Wt 43.9 kg (96 lb 12.8 oz)   LMP  (LMP Unknown)   SpO2 94%   BMI 18.90 kg/m   Estimated body mass index is 18.9 kg/m  as calculated from the following:    Height as of this encounter:  1.524 m (5').    Weight as of this encounter: 43.9 kg (96 lb 12.8 oz). Body surface area is 1.36 meters squared.  No Pain (0) Comment: Data Unavailable   No LMP recorded (lmp unknown). Patient is postmenopausal.  Allergies reviewed: Yes  Medications reviewed: Yes    Medications: Medication refills not needed today.  Pharmacy name entered into EPIC:    [facility] - [place], [place] - [address] [facility] - [place], [place] - [address]    Frailty Screening:   Is the patient here for a new oncology consult visit in cancer care? 1. Yes. Over the past month, have you experienced difficulty or required a caregiver to assist with:   1. Balance, walking or general mobility (including any falls)? YES  2. Completion of self-care tasks such as bathing, dressing, toileting, grooming/hygiene?  NO  3. Concentration or memory that affects your daily life?  YES       Clinical concerns: Return visit-New start. Small cell lung cancer. COPD exacerbation.      Елена Deutsch CMA                Again, thank you for allowing me to participate in the care of your patient.        Sincerely,        JIMENEZ Butt CNP

## 2024-03-25 NOTE — PROGRESS NOTES
[facility] Hematology and Oncology Progress Note    Patient: Jasmyn Green  MRN: [id number]  Date of Service: Mar 25, 2024          Reason for Visit    Chief Complaint   Patient presents with    Oncology Clinic Visit     Return visit-New start. Small cell lung cancer. COPD exacerbation.       Assessment and Plan     Cancer Staging   No matching staging information was found for the patient.    Lung cancer, Small Cell, extensive stage: Patient is here today to start palliative chemotherapy with carboplatin, etoposide and Atezolizumab.  Overall she feels educated and ready to start.  She had chemo education.  She verbally gave consent to start today.  We discussed that the side effects include, but are not limited to: Alopecia, nausea and vomiting, diarrhea constipation, dry skin and rash, allergic reaction, kidney damage, myelosuppression, fatigue and weakness, poor appetite and weight loss. Risk of immunotherapy include: hepatitis, thyroiditis, colitis, pulmonary toxicity, rash and dry skin, myalgias.  I am and give her for cycle at a 20% reduction due to borderline performance status.  Patient will be seen next week for toxicity check.  She will then come in 3 weeks for her next dose of chemo.  We will do a CT scan after cycle 2.  Patient and family had questions about what her expected survival is with this type of disease.  I did tell her that median survival without treatment is usually 3 to 6 months and with treatment is generally closer to 12 to 15 months.  Patient does have an appointment to see palliative care for symptom management.  I think goals of care discussions will also be helpful.    3 mm abnormality seen on brain MRI: I told patient this may or may not be anything to worry about.  She is asymptomatic so at this time we will keep an eye on it and likely repeat another MRI of the brain in roughly 6 weeks.    Leukocytosis/anemia: likely chronic disease. Will have to monitor closely with  chemo.     Malnourished with low weight: I put a referral in for our dietitian to call patient.  We talked today about ways to increase her caloric intake.  I talked her about how she has to think about food as medicine and that she needs to stay hydrated and try to increase her calories.  It will be harder when she is on the chemotherapy then it is now.    ECOG Performance    2 - Ambulatory and independent in all ADLs; cannot work; up > 50% of the time    Distress Screening (within last 30 days)    No data recorded     Pain  Pain Score: No Pain (0)    Problem List    Patient Active Problem List   Diagnosis    COPD exacerbation (H)    History of gout    Coronary artery calcification    Lymphocytosis    Essential hypertension    Asymptomatic hypertensive urgency    Age-related osteoporosis without current pathological fracture    Lung mass    Small cell lung cancer (H)    Chronic respiratory failure with hypoxia (H)        ______________________________________________________________________________    History of Present Illness    Oncologist: Dr. Stewart    Diagnosis: Lung cancer, small cell, extensive stage. Presented to ER for SOB, coughing. Hx of COPD, on oxygen.   -2/8/24: CT: Bulky AP and left hilar adenopathy with irregular pleural-based anterior left upper lobe nodule with adjacent smaller satellite nodules. Findings suspicious for a primary lung cancer   -3/1/24: PET: Findings suspicious for a left upper lobe primary lung neoplasm with adjacent satellite lesion in the left upper lobe, extensive mediastinal lymph node involvement, and several pleural-based metastases in the left hemithorax   -3/14/24: Brain MRI: Findings concerning for a single 3 - 4 mm right occipital lobe cortex metastatic lesion     Treatment:   -3/25/24: here today to start palliative treatment with Carboplatin, Etoposide, atezolizumab.     Interim History:   Patient is here today to start palliative chemotherapy.  She has a history of  lung cancer.  She states that overall she feels ready to start and does want to do treatment.  She understands that she has an incurable disease but the treatment will help her live longer.  She states that she is tired a lot and she is on oxygen continuously.  She states that she does not have much of an appetite so she is likely a little malnourished.  She says she generally eats breakfast and then she will have some snacks the rest of the day.  Her daughter is working with her to try to make some smoothies and increase her calories.  Patient denies any bone or back pain.  Denies any infectious complaints.      Review of Systems    Pertinent items are noted in HPI.    Past History    Past Medical History:   Diagnosis Date    Age-related osteoporosis without current pathological fracture     Arthritis     COPD (chronic obstructive pulmonary disease) (H)     Coronary artery disease     Dependence on nocturnal oxygen therapy     Dyspnea on exertion     Emphysema lung (H)     Gout     HLD (hyperlipidemia)     Hypertension     Lung mass        PHYSICAL EXAM  /59 (BP Location: Left arm, Patient Position: Sitting, Cuff Size: Adult Regular)   Pulse 119   Temp 97.6  F (36.4  C) (Oral)   Resp 16   Ht 1.524 m (5')   Wt 43.9 kg (96 lb 12.8 oz)   LMP  (LMP Unknown)   SpO2 94%   BMI 18.90 kg/m      GENERAL: no acute distress. Cooperative in conversation. Here with daughter, son and son in law. In wheelchair. On oxygen.   RESP: Regular respiratory rate. No expiratory wheezes   NEURO: non focal. Alert and oriented x3.   PSYCH: within normal limits. No depression or anxiety.  SKIN: exposed skin is dry intact.     Lab Results    Recent Results (from the past 168 hour(s))   Comprehensive metabolic panel   Result Value Ref Range    Sodium 146 (H) 135 - 145 mmol/L    Potassium 4.2 3.4 - 5.3 mmol/L    Carbon Dioxide (CO2) 26 22 - 29 mmol/L    Anion Gap 9 7 - 15 mmol/L    Urea Nitrogen 34.0 (H) 8.0 - 23.0 mg/dL     Creatinine 0.77 0.51 - 0.95 mg/dL    GFR Estimate 83 >60 mL/min/1.73m2    Calcium 8.6 (L) 8.8 - 10.2 mg/dL    Chloride 111 (H) 98 - 107 mmol/L    Glucose 132 (H) 70 - 99 mg/dL    Alkaline Phosphatase 80 40 - 150 U/L    AST 28 0 - 45 U/L    ALT 38 0 - 50 U/L    Protein Total 6.5 6.4 - 8.3 g/dL    Albumin 3.7 3.5 - 5.2 g/dL    Bilirubin Total <0.2 <=1.2 mg/dL   TSH with free T4 reflex   Result Value Ref Range    TSH 0.35 0.30 - 4.20 uIU/mL   CBC with platelets and differential   Result Value Ref Range    WBC Count 15.1 (H) 4.0 - 11.0 10e3/uL    RBC Count 3.33 (L) 3.80 - 5.20 10e6/uL    Hemoglobin 10.1 (L) 11.7 - 15.7 g/dL    Hematocrit 33.5 (L) 35.0 - 47.0 %     (H) 78 - 100 fL    MCH 30.3 26.5 - 33.0 pg    MCHC 30.1 (L) 31.5 - 36.5 g/dL    RDW 14.4 10.0 - 15.0 %    Platelet Count 424 150 - 450 10e3/uL    % Neutrophils 85 %    % Lymphocytes 7 %    % Monocytes 7 %    % Eosinophils 0 %    % Basophils 0 %    % Immature Granulocytes 1 %    NRBCs per 100 WBC 0 <1 /100    Absolute Neutrophils 12.9 (H) 1.6 - 8.3 10e3/uL    Absolute Lymphocytes 1.0 0.8 - 5.3 10e3/uL    Absolute Monocytes 1.0 0.0 - 1.3 10e3/uL    Absolute Eosinophils 0.0 0.0 - 0.7 10e3/uL    Absolute Basophils 0.0 0.0 - 0.2 10e3/uL    Absolute Immature Granulocytes 0.1 <=0.4 10e3/uL    Absolute NRBCs 0.0 10e3/uL       Imaging    MR Brain w/o & w Contrast    Result Date: 3/14/2024  EXAM: MR BRAIN W/O and W CONTRAST LOCATION: [facility] DATE: 3/14/2024 INDICATION:  Small cell lung cancer (H), COPD exacerbation (H) COMPARISON: PET CT 03/01/2024. CONTRAST: 5mL Gadavist TECHNIQUE: Routine multiplanar multisequence head MRI without and with intravenous contrast. FINDINGS: INTRACRANIAL CONTENTS: Focus of 3 - 4 mm postcontrast enhancement within the right cuneal cortex of the occipital lobe (axial series 1101, image 98 for example). No acute or subacute infarct. No mass, acute hemorrhage, or extra-axial fluid collections. Scattered  nonspecific T2/FLAIR hyperintensities within the cerebral white matter most consistent with mild chronic microvascular ischemic change. Mild generalized cerebral atrophy. No hydrocephalus. Normal position of the cerebellar tonsils. No pathologic contrast enhancement. SELLA: No abnormality accounting for technique. OSSEOUS STRUCTURES/SOFT TISSUES: Normal marrow signal. The major intracranial vascular flow voids are maintained. ORBITS: No abnormality accounting for technique. SINUSES/MASTOIDS: No paranasal sinus mucosal disease. No middle ear or mastoid effusion.     IMPRESSION: 1.  Findings concerning for a single 3 - 4 mm right occipital lobe cortex metastatic lesion. 2.  No evidence for superimposed acute intracranial process.    PET Oncology (Eyes to Thighs)    Result Date: 3/1/2024  EXAM: PET ONCOLOGY (EYES TO THIGHS) LOCATION: [facility] DATE: 3/1/2024 INDICATION: Other nonspecific abnormal finding of lung field. Initial treatment strategy. COMPARISON: CT 02/08/2024, 02/11/2024 CONTRAST: None TECHNIQUE: Serum glucose level 127 mg/dL. One hour post intravenous administration of 9.89 mCi F-18 FDG, PET imaging was performed from the skull vertex to mid thighs, utilizing attenuation correction with concurrent axial CT and PET/CT image fusion. Dose reduction techniques were used. PET/CT FINDINGS: FDG avid 2.2 x 1.4 x 2.0 cm subpleural nodule in the lateral left upper lobe with broad pleural contact (SUV max 13.5) with 0.8 cm adjacent satellite nodule located just medially (SUV max 6.3), FDG avid left interlobar station 11L lymph node (SUV max 9.8) and conglomerate mediastinal station 5 subaortic / station 6 para-aortic gisela mass measuring 5.2 x 3.0 cm (SUV max 12.8) and several FDG avid subcentimeter pleural nodules in the left upper and lower lobes (for reference on series 3, image 173), suspicious for a left upper lobe primary lung neoplasm with adjacent satellite lesion in the left upper  lobe, extensive mediastinal lymph node involvement, and several pleural-based metastases in the left hemithorax CT FINDINGS: Senescent intracranial changes. Atretic right maxillary sinus. Advanced emphysema with scattered bands of scarring. Advanced coronary arterial calcification. Ectatic infrarenal abdominal aorta. Sigmoid diverticulosis. Multilevel degenerative changes in the spine.     IMPRESSION: Findings suspicious for a left upper lobe primary lung neoplasm with adjacent satellite lesion in the left upper lobe, extensive mediastinal lymph node involvement, and several pleural-based metastases in the left hemithorax.     Total time spent with patient in face to face time, chart review and documentation was 40 minutes.      The longitudinal plan of care for the diagnosis(es)/condition(s) as documented were addressed during this visit. Due to the added complexity in care, I will continue to support Jasmyn in the subsequent management and with ongoing continuity of care.      Signed by: JIMENEZ Butt CNP

## 2024-03-25 NOTE — PROGRESS NOTES
Infusion Nursing Note:  Jasmyn Green presents today for cycle 1 day 1 treatment with Atezolizumab, Carboplatin and Etoposide.    Patient seen by provider today:Yes: Dana Mathur CNP   present during visit today: Not Applicable.    Note: Jasmyn was educated on her plan of care and each medication given was reviewed prior to administration.  She received treatment as ordered.      Intravenous Access:  Peripheral IV placed.    Treatment Conditions:  Lab Results   Component Value Date    HGB 10.1 (L) 03/25/2024    WBC 15.1 (H) 03/25/2024    ANEUTAUTO 12.9 (H) 03/25/2024     03/25/2024        Lab Results   Component Value Date     (H) 03/25/2024    POTASSIUM 4.2 03/25/2024    MAG 2.1 02/08/2024    CR 0.77 03/25/2024    FLOR 8.6 (L) 03/25/2024    BILITOTAL <0.2 03/25/2024    ALBUMIN 3.7 03/25/2024    ALT 38 03/25/2024    AST 28 03/25/2024       Results reviewed, labs MET treatment parameters, ok to proceed with treatment.      Post Infusion Assessment:  Patient tolerated infusion without incident.  Blood return noted pre and post infusion.  Site patent and intact, free from redness, edema or discomfort.  No evidence of extravasations.  IV saline locked for treatment tomorrow      Discharge Plan:   Patient discharged in stable condition accompanied by: son.  Departure Mode: Wheelchair.      Gretta Ferrari RN

## 2024-03-26 ENCOUNTER — INFUSION THERAPY VISIT (OUTPATIENT)
Dept: INFUSION THERAPY | Facility: HOSPITAL | Age: 69
End: 2024-03-26
Attending: NURSE PRACTITIONER
Payer: COMMERCIAL

## 2024-03-26 VITALS
OXYGEN SATURATION: 98 % | RESPIRATION RATE: 18 BRPM | TEMPERATURE: 98 F | SYSTOLIC BLOOD PRESSURE: 139 MMHG | DIASTOLIC BLOOD PRESSURE: 65 MMHG | HEART RATE: 109 BPM

## 2024-03-26 DIAGNOSIS — C34.90 SMALL CELL LUNG CANCER (H): Primary | ICD-10-CM

## 2024-03-26 PROCEDURE — 96375 TX/PRO/DX INJ NEW DRUG ADDON: CPT

## 2024-03-26 PROCEDURE — 258N000003 HC RX IP 258 OP 636: Performed by: INTERNAL MEDICINE

## 2024-03-26 PROCEDURE — 96413 CHEMO IV INFUSION 1 HR: CPT

## 2024-03-26 PROCEDURE — 250N000011 HC RX IP 250 OP 636: Performed by: INTERNAL MEDICINE

## 2024-03-26 PROCEDURE — 258N000003 HC RX IP 258 OP 636: Performed by: NURSE PRACTITIONER

## 2024-03-26 PROCEDURE — 96367 TX/PROPH/DG ADDL SEQ IV INF: CPT

## 2024-03-26 PROCEDURE — 250N000011 HC RX IP 250 OP 636: Performed by: NURSE PRACTITIONER

## 2024-03-26 RX ORDER — DIPHENHYDRAMINE HYDROCHLORIDE 50 MG/ML
50 INJECTION INTRAMUSCULAR; INTRAVENOUS
Status: DISCONTINUED | OUTPATIENT
Start: 2024-03-26 | End: 2024-03-26 | Stop reason: HOSPADM

## 2024-03-26 RX ORDER — ALBUTEROL SULFATE 90 UG/1
1-2 AEROSOL, METERED RESPIRATORY (INHALATION)
Status: DISCONTINUED | OUTPATIENT
Start: 2024-03-26 | End: 2024-03-26 | Stop reason: HOSPADM

## 2024-03-26 RX ORDER — ALBUTEROL SULFATE 0.83 MG/ML
2.5 SOLUTION RESPIRATORY (INHALATION)
Status: DISCONTINUED | OUTPATIENT
Start: 2024-03-26 | End: 2024-03-26 | Stop reason: HOSPADM

## 2024-03-26 RX ORDER — METHYLPREDNISOLONE SODIUM SUCCINATE 125 MG/2ML
125 INJECTION, POWDER, LYOPHILIZED, FOR SOLUTION INTRAMUSCULAR; INTRAVENOUS
Status: DISCONTINUED | OUTPATIENT
Start: 2024-03-26 | End: 2024-03-26 | Stop reason: HOSPADM

## 2024-03-26 RX ORDER — MEPERIDINE HYDROCHLORIDE 25 MG/ML
25 INJECTION INTRAMUSCULAR; INTRAVENOUS; SUBCUTANEOUS EVERY 30 MIN PRN
Status: DISCONTINUED | OUTPATIENT
Start: 2024-03-26 | End: 2024-03-26 | Stop reason: HOSPADM

## 2024-03-26 RX ORDER — EPINEPHRINE 1 MG/ML
0.3 INJECTION, SOLUTION INTRAMUSCULAR; SUBCUTANEOUS EVERY 5 MIN PRN
Status: DISCONTINUED | OUTPATIENT
Start: 2024-03-26 | End: 2024-03-26 | Stop reason: HOSPADM

## 2024-03-26 RX ADMIN — DEXAMETHASONE SODIUM PHOSPHATE 12 MG: 10 INJECTION, SOLUTION INTRAMUSCULAR; INTRAVENOUS at 12:41

## 2024-03-26 RX ADMIN — ETOPOSIDE 110 MG: 20 INJECTION INTRAVENOUS at 13:54

## 2024-03-26 RX ADMIN — SODIUM CHLORIDE 250 ML: 9 INJECTION, SOLUTION INTRAVENOUS at 12:20

## 2024-03-26 RX ADMIN — TRILACICLIB 300 MG: 300 INJECTION, POWDER, LYOPHILIZED, FOR SOLUTION INTRAVENOUS at 13:21

## 2024-03-26 NOTE — PROGRESS NOTES
Infusion Nursing Note:  Jasmyn Green presents today for cycle 1 day 2 treatment with Cosela and Etoposide.    Patient seen by provider today: No   present during visit today: Not Applicable.    Note: VSS.  Pt assessed and is feeling well with minimal side effects.  She was educated on her plan of care and each drug given was reviewed prior to administration.  Jasmyn received treatment as ordered.      Intravenous Access:  Implanted Port.    Treatment Conditions:  Not Applicable.      Post Infusion Assessment:  Patient tolerated infusion without incident.  Blood return noted pre and post infusion.  Site patent and intact, free from redness, edema or discomfort.  No evidence of extravasations.   IV saline locked for day 3 treatment tomorrow.    Discharge Plan:   Patient discharged in stable condition accompanied by: daughter.  Departure Mode: Wheelchair.      Gretta Ferrari RN

## 2024-03-27 ENCOUNTER — INFUSION THERAPY VISIT (OUTPATIENT)
Dept: INFUSION THERAPY | Facility: HOSPITAL | Age: 69
End: 2024-03-27
Attending: NURSE PRACTITIONER
Payer: COMMERCIAL

## 2024-03-27 VITALS
SYSTOLIC BLOOD PRESSURE: 150 MMHG | TEMPERATURE: 97.8 F | OXYGEN SATURATION: 98 % | DIASTOLIC BLOOD PRESSURE: 70 MMHG | HEART RATE: 108 BPM | RESPIRATION RATE: 20 BRPM

## 2024-03-27 DIAGNOSIS — C34.90 SMALL CELL LUNG CANCER (H): Primary | ICD-10-CM

## 2024-03-27 PROCEDURE — 258N000003 HC RX IP 258 OP 636: Performed by: NURSE PRACTITIONER

## 2024-03-27 PROCEDURE — 96367 TX/PROPH/DG ADDL SEQ IV INF: CPT

## 2024-03-27 PROCEDURE — 96413 CHEMO IV INFUSION 1 HR: CPT

## 2024-03-27 PROCEDURE — 250N000011 HC RX IP 250 OP 636: Performed by: NURSE PRACTITIONER

## 2024-03-27 PROCEDURE — 258N000003 HC RX IP 258 OP 636: Performed by: INTERNAL MEDICINE

## 2024-03-27 PROCEDURE — 250N000011 HC RX IP 250 OP 636: Performed by: INTERNAL MEDICINE

## 2024-03-27 RX ORDER — METHYLPREDNISOLONE SODIUM SUCCINATE 125 MG/2ML
125 INJECTION, POWDER, LYOPHILIZED, FOR SOLUTION INTRAMUSCULAR; INTRAVENOUS
Status: DISCONTINUED | OUTPATIENT
Start: 2024-03-27 | End: 2024-03-27 | Stop reason: HOSPADM

## 2024-03-27 RX ORDER — EPINEPHRINE 1 MG/ML
0.3 INJECTION, SOLUTION INTRAMUSCULAR; SUBCUTANEOUS EVERY 5 MIN PRN
Status: DISCONTINUED | OUTPATIENT
Start: 2024-03-27 | End: 2024-03-27 | Stop reason: HOSPADM

## 2024-03-27 RX ORDER — DIPHENHYDRAMINE HYDROCHLORIDE 50 MG/ML
50 INJECTION INTRAMUSCULAR; INTRAVENOUS
Status: DISCONTINUED | OUTPATIENT
Start: 2024-03-27 | End: 2024-03-27 | Stop reason: HOSPADM

## 2024-03-27 RX ADMIN — ETOPOSIDE 110 MG: 20 INJECTION INTRAVENOUS at 14:22

## 2024-03-27 RX ADMIN — SODIUM CHLORIDE 250 ML: 9 INJECTION, SOLUTION INTRAVENOUS at 12:45

## 2024-03-27 RX ADMIN — DEXAMETHASONE SODIUM PHOSPHATE 12 MG: 10 INJECTION, SOLUTION INTRAMUSCULAR; INTRAVENOUS at 12:52

## 2024-03-27 RX ADMIN — TRILACICLIB 300 MG: 300 INJECTION, POWDER, LYOPHILIZED, FOR SOLUTION INTRAVENOUS at 13:42

## 2024-03-27 NOTE — PROGRESS NOTES
Infusion Nursing Note:  Jasmyn Green presents today for cycle 1 day 3 treatment with Cosela and Etoposide.    Patient seen by provider today: No   present during visit today: Not Applicable.    Note: VSS.  Pt assessed and is feeling well with minimal side effects.  Peripheral IV flushes easily, does not offer blood return.  She was educated on her plan of care and each drug given was reviewed prior to administration.  Jasmyn received treatment as ordered.      Intravenous Access:  Peripheral IV placed 3/25/24    Treatment Conditions:  Not Applicable.      Post Infusion Assessment:  Patient tolerated infusion without incident.  Site patent and intact, free from redness, edema or discomfort.  No evidence of extravasations.       Discharge Plan:   Patient discharged in stable condition accompanied by: daughter.  AVS sent to My Chart, paper copy declined.    Patient will return 4/15/24 for next appointment.   Departure Mode: Wheelchair.    Addie Sloan RN

## 2024-03-28 ENCOUNTER — VIRTUAL VISIT (OUTPATIENT)
Dept: PALLIATIVE MEDICINE | Facility: CLINIC | Age: 69
End: 2024-03-28
Attending: NURSE PRACTITIONER
Payer: COMMERCIAL

## 2024-03-28 VITALS — WEIGHT: 95 LBS | HEIGHT: 60 IN | BODY MASS INDEX: 18.65 KG/M2

## 2024-03-28 DIAGNOSIS — C34.90 MALIGNANT NEOPLASM OF LUNG, UNSPECIFIED LATERALITY, UNSPECIFIED PART OF LUNG (H): ICD-10-CM

## 2024-03-28 DIAGNOSIS — J44.9 COPD, GROUP D, BY GOLD 2017 CLASSIFICATION (H): ICD-10-CM

## 2024-03-28 PROCEDURE — 99417 PROLNG OP E/M EACH 15 MIN: CPT | Performed by: NURSE PRACTITIONER

## 2024-03-28 PROCEDURE — 99205 OFFICE O/P NEW HI 60 MIN: CPT | Mod: 95 | Performed by: NURSE PRACTITIONER

## 2024-03-28 ASSESSMENT — PAIN SCALES - GENERAL: PAINLEVEL: NO PAIN (0)

## 2024-03-28 NOTE — NURSING NOTE
Is the patient currently in the state of [place]? YES    Visit mode:VIDEO    If the visit is dropped, the patient can be reconnected by: VIDEO VISIT: Text to cell phone:   Telephone Information:   Mobile [phone]       Will anyone else be joining the visit? NO  (If patient encounters technical issues they should call [phone]    How would you like to obtain your AVS? MyChart    Are changes needed to the allergy or medication list? Pt declined med review    Reason for visit: Consult    Brenna SHETTY

## 2024-03-28 NOTE — PATIENT INSTRUCTIONS
Thank you for meeting with us in the [facility].    How to get a hold of us:  For non-urgent matters, MyChart works best.    For more urgent matters, or if you prefer not to use MyChart, call our clinic nurse coordinator Hailee Paredes RN at [phone] or [phone]    We have an on-call number for evenings and weekends. Please call this only if you are having uncontrolled symptoms or serious side effects from your medicines: [phone].     For refills, please give us a week (5 working days) notice. We don't always have providers available everyday to do refills. If you call the day you run out of your medicine, we may not be able to refill it in time, so call 5 days in advance!

## 2024-03-28 NOTE — PROGRESS NOTES
Virtual Visit Details    Type of service:  Video Visit     Originating Location (pt. Location): Home    Distant Location (provider location):  Off-site  Platform used for Video Visit: [facility]    Palliative Care Outpatient Clinic      Patient ID/Chief Complaint: Jasmyn Green 68 year old female who is presenting to the palliative medicine clinic today at the request of Tata Lainez NP for a palliative care consultation secondary to symptom management/goals of care.   The patient's primary care provider is:  Miesha Fernando.       Impression & Recommendations:  68-year-old female with new diagnosis of extensive stage small cell lung cancer (including possible small solitary brain metastasis) starting palliative chemoimmunotherapy with plans for possible radiation, severe COPD on chronic supplemental oxygen (3-5 LPM depending on activity level), history of tobacco use that is now stopped, very remote history of heavier alcohol use that stopped decades ago, CAD, HTN, osteoporosis, history of gout, and problems with anxiety.    Social history includes having 3 children Georgette, Britni, and Braden, lives with daughter Georgette, , good family support.        Symptoms/recommendations/discussion:  Chronic dyspnea related to advanced COPD worsened by recent diagnosis of lung cancer with accompanying worsening of anxiety: She reports that she is dyspneic at rest but is used to sensation and it does not cause her much distress.  Could consider very low-dose opioids in the future or very low-dose benzodiazepine if needed.  Anxiety: Improved with recent addition of Remeron 7.5 mg (15 mg was too sedating) and Atarax/hydroxyzine 25 mg as needed.  Decreased appetite with weight loss: Present prior to cancer diagnosis likely related to advanced COPD.  Continue Remeron.  Medical cannabis certification sent.  Dietitian consult planned.  Medical cannabis may help her in multiple ways with appetite, anxiety, possible  chemotherapy-induced nausea, and even shortness of breath.  She knows to use gummy preparation 2.5-5 mg and not smoke it.  Safety measures when starting new medication discussed.  Fatigue: No intervention at this time, monitor.  She has already completed a healthcare directive expressing her wishes. Georgette is her primary healthcare agent.  POLST completed today and emailed.  No CPR and DO NOT INTUBATE. Confirmed with patient and daughter Georgette.   Palliative care follow-up in approximately 4 weeks.  Daughter has direct phone number to our service should symptoms worsen before next appointment.        How to get a hold of us:  For non-urgent matters, MyChart works best.    For more urgent matters, or if you prefer not to use MyChart, call our clinic nurse coordinator Hailee Paredes RN at [phone] or [phone]    We have an on-call number for evenings and weekends. Please call this only if you are having uncontrolled symptoms or serious side effects from your medicines: [phone].     For refills, please give us a week (5 working days) notice. We don't always have providers available everyday to do refills. If you call the day you run out of your medicine, we may not be able to refill it in time, so call 5 days in advance        History:  History gathered today from: patient, family/loved ones, medical chart, health care directive/s      PE: Ht 1.524 m (5')   Wt 43.1 kg (95 lb)   LMP  (LMP Unknown)   BMI 18.55 kg/m     Wt Readings from Last 3 Encounters:   03/28/24 43.1 kg (95 lb)   03/25/24 43.9 kg (96 lb 12.8 oz)   03/22/24 43.6 kg (96 lb 3.2 oz)       Gen tachypneic, no apparent distress, alert  Head NCAT.  Eyes anicteric without injection  Face symmetric, eyes conjugate  Lungs tachypneic, no cough, speaking full sentences nasal cannula oxygen  Skin no rashes or lesions evident on face/neck  Neuro Face symmetric, eyes conjugate; speech fluent.  Neuropsych exam normal including affect, sensorium, gross  memory, thought processes, and fund of knowledge.         Data reviewed:  I reviewed electrolytes, BUN/creatinine, liver profile, hemoglobin and hematocrit, platelet count, and most recent imaging  Oncology notes     database reviewed: 3/27        78 minutes spent on the date of the encounter doing chart review, history and exam, patient education & counseling, documentation and other activities as noted above.        Thank you for involving us in the patient's care.   JIMENEZ Huggins, [facility] Palliative [facility]

## 2024-03-29 ENCOUNTER — PATIENT OUTREACH (OUTPATIENT)
Dept: ONCOLOGY | Facility: HOSPITAL | Age: 69
End: 2024-03-29
Payer: COMMERCIAL

## 2024-03-29 NOTE — PROGRESS NOTES
[facility]: Cancer Care                                                                                            Situation: Patient chart reviewed by care coordinator.    Background: 68-year-old patient with a new diagnosis of small cell lung cancer.  She started chemotherapy treatments on 3/25.    Assessment: [employer] message sent to patient checking in to see how she is doing after her first cycle of chemotherapy.    Plan/Recommendations: I asked the patient that if she is having any side effects that are not managed or tolerable at home, to give us a call back or respond to the [employer] message.    Of note, patient has a toxicity check with labs and NP visit on 4/2.    Signature:  Wilma Carmona RN

## 2024-04-01 ENCOUNTER — PATIENT OUTREACH (OUTPATIENT)
Dept: CARE COORDINATION | Facility: CLINIC | Age: 69
End: 2024-04-01
Payer: COMMERCIAL

## 2024-04-01 NOTE — PROGRESS NOTES
Clinic Care Coordination Contact  [facility]/Voicemail    Clinical Data: Care Coordinator Outreach    Outreach Documentation Number of Outreach Attempt   4/1/2024   2:43 PM 1     Left message on  patients daughter Karie's  voicemail with call back information and requested return call.    Plan: Care Coordinator will try to reach patient again in 10 business days.    Edelmira FlorentinoR[facility] Health Worker  [facility] Care Coordination   [facility] [place], [place], [facility]  Office: [phone]

## 2024-04-02 ENCOUNTER — MEDICAL CORRESPONDENCE (OUTPATIENT)
Dept: HEALTH INFORMATION MANAGEMENT | Facility: CLINIC | Age: 69
End: 2024-04-02

## 2024-04-02 ENCOUNTER — LAB (OUTPATIENT)
Dept: INFUSION THERAPY | Facility: HOSPITAL | Age: 69
End: 2024-04-02
Attending: NURSE PRACTITIONER
Payer: COMMERCIAL

## 2024-04-02 ENCOUNTER — ONCOLOGY VISIT (OUTPATIENT)
Dept: ONCOLOGY | Facility: HOSPITAL | Age: 69
End: 2024-04-02
Attending: NURSE PRACTITIONER
Payer: COMMERCIAL

## 2024-04-02 VITALS
SYSTOLIC BLOOD PRESSURE: 114 MMHG | OXYGEN SATURATION: 97 % | WEIGHT: 99.2 LBS | HEIGHT: 60 IN | BODY MASS INDEX: 19.48 KG/M2 | TEMPERATURE: 97.8 F | DIASTOLIC BLOOD PRESSURE: 61 MMHG | HEART RATE: 101 BPM | RESPIRATION RATE: 20 BRPM

## 2024-04-02 DIAGNOSIS — C34.90 SMALL CELL LUNG CANCER (H): ICD-10-CM

## 2024-04-02 DIAGNOSIS — E78.00 HYPERCHOLESTEREMIA: ICD-10-CM

## 2024-04-02 LAB
ANION GAP SERPL CALCULATED.3IONS-SCNC: 10 MMOL/L (ref 7–15)
BASOPHILS # BLD AUTO: 0 10E3/UL (ref 0–0.2)
BASOPHILS NFR BLD AUTO: 0 %
BUN SERPL-MCNC: 46.6 MG/DL (ref 8–23)
CALCIUM SERPL-MCNC: 8.7 MG/DL (ref 8.8–10.2)
CHLORIDE SERPL-SCNC: 105 MMOL/L (ref 98–107)
CREAT SERPL-MCNC: 0.8 MG/DL (ref 0.51–0.95)
DEPRECATED HCO3 PLAS-SCNC: 23 MMOL/L (ref 22–29)
EGFRCR SERPLBLD CKD-EPI 2021: 79 ML/MIN/1.73M2
EOSINOPHIL # BLD AUTO: 0.1 10E3/UL (ref 0–0.7)
EOSINOPHIL NFR BLD AUTO: 1 %
ERYTHROCYTE [DISTWIDTH] IN BLOOD BY AUTOMATED COUNT: 14 % (ref 10–15)
GLUCOSE SERPL-MCNC: 212 MG/DL (ref 70–99)
HCT VFR BLD AUTO: 34.8 % (ref 35–47)
HGB BLD-MCNC: 10.7 G/DL (ref 11.7–15.7)
IMM GRANULOCYTES # BLD: 0.2 10E3/UL
IMM GRANULOCYTES NFR BLD: 2 %
LYMPHOCYTES # BLD AUTO: 0.7 10E3/UL (ref 0.8–5.3)
LYMPHOCYTES NFR BLD AUTO: 7 %
MCH RBC QN AUTO: 29.8 PG (ref 26.5–33)
MCHC RBC AUTO-ENTMCNC: 30.7 G/DL (ref 31.5–36.5)
MCV RBC AUTO: 97 FL (ref 78–100)
MONOCYTES # BLD AUTO: 0.2 10E3/UL (ref 0–1.3)
MONOCYTES NFR BLD AUTO: 2 %
NEUTROPHILS # BLD AUTO: 9.5 10E3/UL (ref 1.6–8.3)
NEUTROPHILS NFR BLD AUTO: 88 %
NRBC # BLD AUTO: 0 10E3/UL
NRBC BLD AUTO-RTO: 0 /100
PLATELET # BLD AUTO: 284 10E3/UL (ref 150–450)
POTASSIUM SERPL-SCNC: 5.6 MMOL/L (ref 3.4–5.3)
RBC # BLD AUTO: 3.59 10E6/UL (ref 3.8–5.2)
SODIUM SERPL-SCNC: 138 MMOL/L (ref 135–145)
WBC # BLD AUTO: 10.7 10E3/UL (ref 4–11)

## 2024-04-02 PROCEDURE — G2211 COMPLEX E/M VISIT ADD ON: HCPCS | Performed by: NURSE PRACTITIONER

## 2024-04-02 PROCEDURE — 80048 BASIC METABOLIC PNL TOTAL CA: CPT

## 2024-04-02 PROCEDURE — 36415 COLL VENOUS BLD VENIPUNCTURE: CPT

## 2024-04-02 PROCEDURE — 85048 AUTOMATED LEUKOCYTE COUNT: CPT

## 2024-04-02 PROCEDURE — 99213 OFFICE O/P EST LOW 20 MIN: CPT | Performed by: NURSE PRACTITIONER

## 2024-04-02 PROCEDURE — 99214 OFFICE O/P EST MOD 30 MIN: CPT | Performed by: NURSE PRACTITIONER

## 2024-04-02 RX ORDER — ATORVASTATIN CALCIUM 20 MG/1
20 TABLET, FILM COATED ORAL DAILY
Qty: 90 TABLET | Refills: 3 | Status: SHIPPED | OUTPATIENT
Start: 2024-04-02 | End: 2024-06-24 | Stop reason: ALTCHOICE

## 2024-04-02 ASSESSMENT — PAIN SCALES - GENERAL: PAINLEVEL: NO PAIN (0)

## 2024-04-02 NOTE — PROGRESS NOTES
Oncology Rooming Note    April 2, 2024 8:56 AM   Jasmyn Green is a 68 year old female who presents for:    Chief Complaint   Patient presents with    Oncology Clinic Visit     Initial Vitals: /61   Pulse 101   Temp 97.8  F (36.6  C)   Resp 20   Ht 1.524 m (5')   Wt 45 kg (99 lb 3.2 oz)   LMP  (LMP Unknown)   SpO2 97%   BMI 19.37 kg/m   Estimated body mass index is 19.37 kg/m  as calculated from the following:    Height as of this encounter: 1.524 m (5').    Weight as of this encounter: 45 kg (99 lb 3.2 oz). Body surface area is 1.38 meters squared.  No Pain (0) Comment: Data Unavailable   No LMP recorded (lmp unknown). Patient is postmenopausal.  Allergies reviewed: Yes  Medications reviewed: Yes    Medications: MEDICATION REFILLS NEEDED TODAY. Provider was notified.  Pharmacy name entered into [employer]:    [facility] - [place], [place] - [address] [facility] - [place], [place] - [address]    Frailty Screening:   Is the patient here for a new oncology consult visit in cancer care? 2. No      Clinical concerns: Had some bloody noses and sore throats since last visit.       Chaya Avitia LPN

## 2024-04-02 NOTE — LETTER
4/2/2024         RE: Jasmyn Green  [address] [place] [address]        Dear Colleague,    Thank you for referring your patient, Jasmyn Green, to the [facility] [place]. Please see a copy of my visit note below.    [facility] Hematology and Oncology Progress Note    Patient: Jasmyn Green  MRN: [id number]  Date of Service: Apr 2, 2024          Reason for Visit    Chief Complaint   Patient presents with     Oncology Clinic Visit       Assessment and Plan     Cancer Staging   No matching staging information was found for the patient.    Lung cancer, Small Cell, extensive stage: Patient started palliative chemotherapy with carboplatin, etoposide and Atezolizumab last week. first cycle given at 20% reduction due to performance status.  Overall patient states that it went much better than she was expecting.  She is doing quite well.  She will return in 2 weeks for her second cycle.  We will reimage with a CT scan after that cycle.  She will see Dr. Stewart to review at that time.    3 mm abnormality seen on brain MRI: I told patient this may or may not be anything to worry about.  She is asymptomatic so at this time we will keep an eye on it and likely repeat another MRI of the brain in roughly 6 weeks.    Leukocytosis/anemia: likely chronic disease. Will have to monitor closely with chemo.  Blood cell count is actually now in the normal range after the chemotherapy.    Malnourished with low weight: Is meeting with our dietitian later this week.  She did meet with palliative care and he suggested doing medical cannabis which she is going to be getting this week 2.  Continue to monitor and try to encourage caloric intake.    Sore throat with some slight bloody nose: Likely from the chemotherapy.  Encouraged her to use salt water and baking soda rinses.  She can use over-the-counter Cepacol lozenges or sprays if she needs.  Symptoms are getting better.    ECOG  Performance    2 - Ambulatory and independent in all ADLs; cannot work; up > 50% of the time    Distress Screening (within last 30 days)    No data recorded     Pain  Pain Score: No Pain (0)    Problem List    Patient Active Problem List   Diagnosis     COPD exacerbation (H)     History of gout     Coronary artery calcification     Lymphocytosis     Essential hypertension     Asymptomatic hypertensive urgency     Age-related osteoporosis without current pathological fracture     Lung mass     Small cell lung cancer (H)     Chronic respiratory failure with hypoxia (H)        ______________________________________________________________________________    History of Present Illness    Oncologist: Dr. Stewart    Diagnosis: Lung cancer, small cell, extensive stage. Presented to ER for SOB, coughing. Hx of COPD, on oxygen.   -2/8/24: CT: Bulky AP and left hilar adenopathy with irregular pleural-based anterior left upper lobe nodule with adjacent smaller satellite nodules. Findings suspicious for a primary lung cancer   -3/1/24: PET: Findings suspicious for a left upper lobe primary lung neoplasm with adjacent satellite lesion in the left upper lobe, extensive mediastinal lymph node involvement, and several pleural-based metastases in the left hemithorax   -3/14/24: Brain MRI: Findings concerning for a single 3 - 4 mm right occipital lobe cortex metastatic lesion     Treatment:   -3/25/24: start palliative treatment with Carboplatin, Etoposide, atezolizumab.  Today is cycle 1, day 9    Interim History:   Patient is here today for toxicity check after starting chemotherapy last week.  Overall she states that it went way better than she was expecting.  She says she does have fatigue, sore throat and a slight bloody nose that happened twice but it only lasted about 5 minutes each time.  Other than that she has been feeling about the same.  She is going to be getting medical cannabis later this week so she is hoping that that  might help her appetite.  She is any fevers or infectious complaints.  Denies any new bone or back pain.  Feels that her breathing is stable.  She says she also had a sore throat for about 5 days after the chemotherapy.  It was mild yesterday and today it is now gone.  She said she tried the salt water rinses did not help a lot.  Wondering if there is anything else she can do for that if it happens again.      Review of Systems    Pertinent items are noted in HPI.    Past History    Past Medical History:   Diagnosis Date     Age-related osteoporosis without current pathological fracture      Arthritis      COPD (chronic obstructive pulmonary disease) (H)      Coronary artery disease      Dependence on nocturnal oxygen therapy      Dyspnea on exertion      Emphysema lung (H)      Gout      HLD (hyperlipidemia)      Hypertension      Lung mass        PHYSICAL EXAM  /61   Pulse 101   Temp 97.8  F (36.6  C)   Resp 20   Ht 1.524 m (5')   Wt 45 kg (99 lb 3.2 oz)   LMP  (LMP Unknown)   SpO2 97%   BMI 19.37 kg/m      GENERAL: no acute distress. Cooperative in conversation. Here with daughter. In wheelchair. On oxygen.   RESP: Regular respiratory rate. No expiratory wheezes   NEURO: non focal. Alert and oriented x3.   PSYCH: within normal limits. No depression or anxiety.  SKIN: exposed skin is dry intact.     Lab Results    Recent Results (from the past 168 hour(s))   Basic metabolic panel   Result Value Ref Range    Sodium 138 135 - 145 mmol/L    Potassium 5.6 (H) 3.4 - 5.3 mmol/L    Chloride 105 98 - 107 mmol/L    Carbon Dioxide (CO2) 23 22 - 29 mmol/L    Anion Gap 10 7 - 15 mmol/L    Urea Nitrogen 46.6 (H) 8.0 - 23.0 mg/dL    Creatinine 0.80 0.51 - 0.95 mg/dL    GFR Estimate 79 >60 mL/min/1.73m2    Calcium 8.7 (L) 8.8 - 10.2 mg/dL    Glucose 212 (H) 70 - 99 mg/dL   CBC with platelets and differential   Result Value Ref Range    WBC Count 10.7 4.0 - 11.0 10e3/uL    RBC Count 3.59 (L) 3.80 - 5.20 10e6/uL     Hemoglobin 10.7 (L) 11.7 - 15.7 g/dL    Hematocrit 34.8 (L) 35.0 - 47.0 %    MCV 97 78 - 100 fL    MCH 29.8 26.5 - 33.0 pg    MCHC 30.7 (L) 31.5 - 36.5 g/dL    RDW 14.0 10.0 - 15.0 %    Platelet Count 284 150 - 450 10e3/uL    % Neutrophils 88 %    % Lymphocytes 7 %    % Monocytes 2 %    % Eosinophils 1 %    % Basophils 0 %    % Immature Granulocytes 2 %    NRBCs per 100 WBC 0 <1 /100    Absolute Neutrophils 9.5 (H) 1.6 - 8.3 10e3/uL    Absolute Lymphocytes 0.7 (L) 0.8 - 5.3 10e3/uL    Absolute Monocytes 0.2 0.0 - 1.3 10e3/uL    Absolute Eosinophils 0.1 0.0 - 0.7 10e3/uL    Absolute Basophils 0.0 0.0 - 0.2 10e3/uL    Absolute Immature Granulocytes 0.2 <=0.4 10e3/uL    Absolute NRBCs 0.0 10e3/uL         Imaging    MR Brain w/o & w Contrast    Result Date: 3/14/2024  EXAM: MR BRAIN W/O and W CONTRAST LOCATION: [facility] DATE: 3/14/2024 INDICATION:  Small cell lung cancer (H), COPD exacerbation (H) COMPARISON: PET CT 03/01/2024. CONTRAST: 5mL Gadavist TECHNIQUE: Routine multiplanar multisequence head MRI without and with intravenous contrast. FINDINGS: INTRACRANIAL CONTENTS: Focus of 3 - 4 mm postcontrast enhancement within the right cuneal cortex of the occipital lobe (axial series 1101, image 98 for example). No acute or subacute infarct. No mass, acute hemorrhage, or extra-axial fluid collections. Scattered nonspecific T2/FLAIR hyperintensities within the cerebral white matter most consistent with mild chronic microvascular ischemic change. Mild generalized cerebral atrophy. No hydrocephalus. Normal position of the cerebellar tonsils. No pathologic contrast enhancement. SELLA: No abnormality accounting for technique. OSSEOUS STRUCTURES/SOFT TISSUES: Normal marrow signal. The major intracranial vascular flow voids are maintained. ORBITS: No abnormality accounting for technique. SINUSES/MASTOIDS: No paranasal sinus mucosal disease. No middle ear or mastoid effusion.     IMPRESSION: 1.   Findings concerning for a single 3 - 4 mm right occipital lobe cortex metastatic lesion. 2.  No evidence for superimposed acute intracranial process.       The longitudinal plan of care for the diagnosis(es)/condition(s) as documented were addressed during this visit. Due to the added complexity in care, I will continue to support Jasmyn in the subsequent management and with ongoing continuity of care.    Signed by: JIMENEZ Butt CNP    Oncology Rooming Note    April 2, 2024 8:56 AM   Jasmyn Green is a 68 year old female who presents for:    Chief Complaint   Patient presents with     Oncology Clinic Visit     Initial Vitals: /61   Pulse 101   Temp 97.8  F (36.6  C)   Resp 20   Ht 1.524 m (5')   Wt 45 kg (99 lb 3.2 oz)   LMP  (LMP Unknown)   SpO2 97%   BMI 19.37 kg/m   Estimated body mass index is 19.37 kg/m  as calculated from the following:    Height as of this encounter: 1.524 m (5').    Weight as of this encounter: 45 kg (99 lb 3.2 oz). Body surface area is 1.38 meters squared.  No Pain (0) Comment: Data Unavailable   No LMP recorded (lmp unknown). Patient is postmenopausal.  Allergies reviewed: Yes  Medications reviewed: Yes    Medications: MEDICATION REFILLS NEEDED TODAY. Provider was notified.  Pharmacy name entered into [employer]:    [facility] - [place], [place] - [address] [facility] - [place], [place] - [address]    Frailty Screening:   Is the patient here for a new oncology consult visit in cancer care? 2. No      Clinical concerns: Had some bloody noses and sore throats since last visit.       Chaya Avitia LPN                 Again, thank you for allowing me to participate in the care of your patient.        Sincerely,        JIMENEZ Butt CNP

## 2024-04-03 ENCOUNTER — PATIENT OUTREACH (OUTPATIENT)
Dept: CARE COORDINATION | Facility: CLINIC | Age: 69
End: 2024-04-03
Payer: COMMERCIAL

## 2024-04-03 NOTE — PROGRESS NOTES
Social Work - Distress Screen Intervention  [facility]    Identified Concern and Score from Distress Screenin. How concerned are you about your ability to eat? 5     2. How concerned are you about unintended weight loss or your current weight? 0     3. How concerned are you about feeling depressed or very sad?  7     4. How concerned are you about feeling anxious or very scared?  (!) 9     5. Do you struggle with the loss of meaning and nicole in your life?  Not at all     6. How concerned are you about work and home life issues that may be affected by your cancer?  7     7. How concerned are you about knowing what resources are available to help you?  0     8. Do you currently have what you would describe as Islam or spiritual struggles? Not at all     9. If you want to be contacted by one of our professionals, I can send a message to them right now.  Oncology Social Worker       Date of Distress Screen: 3/25/24  Data: At time of last visit, patient scored positive on distress screening.  outreached to patient today to follow up on elevated distress and introduce psychosocial services and support.  Intervention/Education provided:  contacted patient by phone to discuss distress screening results. SW introduced self to Jasmyn. She asked that SW talk with her daughter Julissa. Jasmyn lives with Julissa and her . Julissa also has support from her two other children. Julissa applied for MA for her mom and also social security. SID provided information re SW role and support available. Discussed financial resources. Julissa asked SW to apply for [employer] for them. Provided active listening, validation of feelings and emotional support.    Follow-up Required:   SW gave contact information and availability. SID will meet with Jasmyn and her daughters on 4/15 when they are in clinic.   Kayleigh Tracy, JANET, [facility]  Adult Oncology Clinics  [place] (M,W), [place] (T) & [place]  (Th)  *I am off Friday  Office: [phone]

## 2024-04-04 ENCOUNTER — OFFICE VISIT (OUTPATIENT)
Dept: FAMILY MEDICINE | Facility: CLINIC | Age: 69
End: 2024-04-04
Payer: COMMERCIAL

## 2024-04-04 ENCOUNTER — VIRTUAL VISIT (OUTPATIENT)
Dept: ONCOLOGY | Facility: HOSPITAL | Age: 69
End: 2024-04-04
Attending: FAMILY MEDICINE
Payer: COMMERCIAL

## 2024-04-04 VITALS
OXYGEN SATURATION: 98 % | WEIGHT: 99.21 LBS | TEMPERATURE: 98 F | HEIGHT: 60 IN | HEART RATE: 104 BPM | DIASTOLIC BLOOD PRESSURE: 66 MMHG | BODY MASS INDEX: 19.48 KG/M2 | SYSTOLIC BLOOD PRESSURE: 101 MMHG | RESPIRATION RATE: 20 BRPM

## 2024-04-04 DIAGNOSIS — R73.9 BLOOD GLUCOSE ELEVATED: ICD-10-CM

## 2024-04-04 DIAGNOSIS — C34.90 SMALL CELL LUNG CANCER (H): ICD-10-CM

## 2024-04-04 DIAGNOSIS — J44.1 COPD EXACERBATION (H): ICD-10-CM

## 2024-04-04 DIAGNOSIS — H61.23 BILATERAL IMPACTED CERUMEN: Primary | ICD-10-CM

## 2024-04-04 PROCEDURE — 69210 REMOVE IMPACTED EAR WAX UNI: CPT | Mod: RT | Performed by: NURSE PRACTITIONER

## 2024-04-04 PROCEDURE — 97802 MEDICAL NUTRITION INDIV IN: CPT | Mod: GT,95 | Performed by: DIETITIAN, REGISTERED

## 2024-04-04 PROCEDURE — 99213 OFFICE O/P EST LOW 20 MIN: CPT | Mod: 25 | Performed by: NURSE PRACTITIONER

## 2024-04-04 PROCEDURE — 69209 REMOVE IMPACTED EAR WAX UNI: CPT | Mod: LT | Performed by: NURSE PRACTITIONER

## 2024-04-04 NOTE — PROGRESS NOTES
Assessment & Plan     Bilateral impacted cerumen  Successful removal from left ear irrigation by MA.  MA unsuccessful in right ear.  I attempted as well with curette and was also unsuccessful.  We did discuss using Debrox for about 3 to 5 days and coming back for reattempted removal however the wax is not bothering patient that her there was not a necessity that we remove it.    Left eardrum looking okay after cerumen removal.  No evidence of otitis externa or otitis media.    - TN REMOVAL IMPACTED CERUMEN IRRIGATION/LVG UNILAT  - REMOVE IMPACTED CERUMEN    Blood glucose elevated  I reviewed the chart and it does appear that this elevated glucose is steroid related.  Discussed we could draw an A1c and likely be falsely skewed due to the recent prednisone use.  Recommend obtaining a glucose when patient has been off the prednisone for several weeks for more accurate number.  Would recommend obtaining an A1c when off prednisone for at least 3 months.  - Glucose; Future                  Subjective   Jasmyn is a 68 year old, presenting for the following health issues:  Ear Problem (Ears impacted, possible infection L ear. Was more painful a couple weeks ago.)        4/4/2024    10:10 AM   Additional Questions   Roomed by Maryjane     History of Present Illness       Reason for visit:  Ear cleaning for pain  Symptom onset:  1-2 weeks ago    She eats 0-1 servings of fruits and vegetables daily.She consumes 1 sweetened beverage(s) daily.She exercises with enough effort to increase her heart rate 9 or less minutes per day.  She exercises with enough effort to increase her heart rate 3 or less days per week.   She is taking medications regularly.     Wondering if there is infection of ear drum behind impacted wax. Was told has wax build up.     Also questioning elevated blood sugars over the last couple of months.  Of note she has been on multiple prednisone treatments throughout this.  No diabetes diagnosis.  She is just  finishing the prednisone today from the most recent treatment                Objective    /66 (BP Location: Right arm, Patient Position: Sitting, Cuff Size: Adult Regular)   Pulse 104   Temp 98  F (36.7  C) (Oral)   Resp 20   Ht 1.524 m (5')   Wt 45 kg (99 lb 3.3 oz)   LMP  (LMP Unknown)   SpO2 98%   BMI 19.38 kg/m    Body mass index is 19.38 kg/m .  Physical Exam  Constitutional:       Appearance: Normal appearance.   HENT:      Right Ear: There is impacted cerumen.      Left Ear: There is impacted cerumen.   Cardiovascular:      Rate and Rhythm: Normal rate and regular rhythm.   Neurological:      General: No focal deficit present.      Mental Status: She is alert and oriented to person, place, and time.                    Signed Electronically by: JIMENEZ VALDEZ CNP

## 2024-04-04 NOTE — PROGRESS NOTES
Virtual Visit Details    Type of service:  Video Visit     Originating Location (pt. Location): Home  Distant Location (provider location):  Off-site  Platform used for Video Visit: [facility]    CLINICAL NUTRITION SERVICES - ASSESSMENT NOTE    Jasmyn Green 68 year old referred for MNT related to lung cancer     Time Spent: 15 minutes  Visit Type: initial  Pt accompanied by: daughter, Emma    NUTRITION HISTORY  Factors affecting nutrition intake include:decreased appetite  Current diet/appetite: regular diet, fair appetite (has improved)  Chemotherapy: ongoing  Radiation: N/A    Diet Recall  Stefania reports her overall oral intake has improved. She recently signed up to receive 7 provided meals/ week (combination of frozen/ fresh).   Breakfast Bowl of cereal, 4 oz Ensure Clear, occasional boiled egg   Lunch Ensure original mixed with ice cream   Dinner Homemade meal (varies, may be spaghetti, meat and potatoes, frozen meal)   Snacks Cookies, candy, crackers, chips   Beverages Water, Ensure, 2% milk (on cereal)       Treatment Plan:  Oncology History   Small cell lung cancer (H)   3/6/2024 Initial Diagnosis    Small cell lung cancer (H)     3/25/2024 -  Chemotherapy    OP ONC Small Cell Lung Cancer - Atezolizumab / CARBOplatin / Etoposide  Plan Provider: Amador Stewart MD  Treatment goal: Palliative  Line of treatment: First Line       Treatment plan has been reviewed.    ANTHROPOMETRICS  Height:   Ht Readings from Last 1 Encounters:   04/02/24 1.524 m (5')     Weight:   Wt Readings from Last 1 Encounters:   04/02/24 45 kg (99 lb 3.2 oz)     BMI: 19.37 kg/m2  Weight Status:  Normal BMI  IBW: 100 lbs/ 45.5 kg (99%)  Weight History: Weight has been slowly trended up (as desired).  Wt Readings from Last 10 Encounters:   04/02/24 45 kg (99 lb 3.2 oz)   03/28/24 43.1 kg (95 lb)   03/25/24 43.9 kg (96 lb 12.8 oz)   03/22/24 43.6 kg (96 lb 3.2 oz)   03/14/24 42.9 kg (94 lb 8 oz)   03/06/24 43.4 kg (95 lb 11.2 oz)    02/21/24 43.1 kg (95 lb)   02/16/24 42.7 kg (94 lb 3.2 oz)   02/15/24 41.5 kg (91 lb 6.4 oz)   02/08/24 42.2 kg (93 lb)       Dosing Weight: 45 kg    Medications/vitamins/minerals/herbals:   Reviewed    Labs:   Labs reviewed    ASSESSED NUTRITION NEEDS:  Estimated Energy Needs: 3638-9107 kcals (30-35 Kcal/Kg)  Justification: increased needs  Estimated Protein Needs: 54-68 grams protein (1.2-1.5 g pro/Kg)  Justification: increased needs  Estimated Fluid Needs: 0049-7928 mL   Justification: maintenance    MALNUTRITION:  % Weight Loss:  None noted  % Intake:  No decreased intake noted (eating enough to support weight gain)  Subcutaneous Fat Loss:  unable to assess  Muscle Loss:  unable to assess  Fluid Retention:  None noted    Malnutrition Diagnosis: Unable to determine due to unable to complete nutrition focused physical exam.    NUTRITION DIAGNOSIS:  Predicted inadequate nutrient intake related to potential side effects related to cancer treatment as evidenced by anticipated intake less than estimated needs.    INTERVENTIONS  Provided written & verbal education:     - Reviewed nutrition and hydration needs.   Discussed nutrition and hydration needs. Encouraged pt to aim for at least 1350 kcal and 65 g protein. Encouraged fluids to support hydration.  - Discussed strategies to help fortify meals and snacks. Encouraged to focus on small, frequent meals.    - Reviewed sources of protein. Encouraged to have a protein source with each meal and snack.    - Reviewed common barriers to eating with cancer treatment.  Discussed ways to cope with low appetite.   - Reviewed high calorie/high protein oral nutrition supplement options (Ensure Complete, Ensure Plus, etc.).   - Encouraged utilizing these ONS in home made shakes/smoothies to prevent flavor fatigue.      Provided pt with corresponding education materials/handouts on:  Academy of Nutrition and Dietetics High mayra/High protein recipes, Academy of Nutrition and  Dietetics High Calorie High Protein Nutrition Therapy, Sources of Protein, Maximizing Nutrition During Cancer Treatment    Pt verbalize understanding of materials provided during consult.   Patient Understanding: Excellent  Expected patient engagement: Excellent    Goals  1.  Aim for 5-6 small frequent meals  2.  Aim for 1350 kcal and 65 g protein/day  3. Weight maintenance versus ongoing gradual gain.    Follow-Up Plans: Pt has RD contact information for questions.      MONITORING AND EVALUATION:  -Food/beverage intake  -Weight trends      Hailee Mathur, MS, RD, LD

## 2024-04-05 ENCOUNTER — PATIENT OUTREACH (OUTPATIENT)
Dept: CARE COORDINATION | Facility: CLINIC | Age: 69
End: 2024-04-05
Payer: COMMERCIAL

## 2024-04-05 NOTE — PROGRESS NOTES
Clinic Care Coordination Contact  Care Coordination Clinician Chart Review    Situation: Patient chart reviewed by Care Coordinator.       Background: Care Coordination Program started: 2/19/2024. Initial assessment completed and patient-centered care plan(s) were developed with participation from patient. Lead CC handed patient off to CHW for continued outreaches.       Assessment: Per chart review, patient outreach completed by CC CHW on 4/1/24 - [facility], [facility] will outreach again in about 10 business days.  Patient is not actively working to accomplish goal(s). Patient's goal(s) appropriate and relevant at this time. Patient is not due for updated Plan of Care.  Assessments will be completed annually or as needed/with change of patient status.      Care Plan: Community Resources       Problem: Community Resources       Goal: Community Resources       Start Date: 2/28/2024    Note:     Goal Statement: I will connect with community resources over the next three month(s) that are a good fit for me.   Barriers: life stress  Strengths: family support  Patient expressed understanding of goal: yes    Action steps to achieve this goal:  My family will review resources sent to me via mail and my chart that can support me with finances, transportation, social security disability application and more.   My family will consider establishing with one or more which interest me.                          Problem: Food Support       Goal: Establish with Food Support       Start Date: 2/28/2024    Note:     Goal Statement: I will establish with [employer].   Barriers: life stress  Strengths: family support  Patient expressed understanding of goal: yes    Action steps to achieve this goal:  My family will connect with [employer] [place] to establish as a client                           Problem: In-home support       Goal: Establish adequate home support       Start Date: 2/28/2024    Note:     Goal Statement: I will  take steps to find in home support over the next three month(s).  Barriers: life stress  Strengths: motivated, family support  Patient expressed understanding of goal: yes    Action steps to achieve this goal:  My family will review resources sent to me via mail and my chart for in home support: PCA, home care, community resources   My family will consider establishing with one or more supports which interest me.                                    Plan/Recommendations: The patient will continue working with Care Coordination to achieve goal(s) as above. CHW will continue outreaches at minimum every 30 days and will involve Lead CC as needed or if patient is ready to move to Maintenance. Lead CC will continue to monitor CHW outreaches and patient's progress to goal(s) every 6 weeks.     Plan of Care updated and sent to patient: No

## 2024-04-08 ENCOUNTER — OFFICE VISIT (OUTPATIENT)
Dept: PULMONOLOGY | Facility: CLINIC | Age: 69
End: 2024-04-08
Payer: COMMERCIAL

## 2024-04-08 ENCOUNTER — TELEPHONE (OUTPATIENT)
Dept: PULMONOLOGY | Facility: CLINIC | Age: 69
End: 2024-04-08

## 2024-04-08 VITALS — SYSTOLIC BLOOD PRESSURE: 102 MMHG | HEART RATE: 116 BPM | DIASTOLIC BLOOD PRESSURE: 66 MMHG | OXYGEN SATURATION: 96 %

## 2024-04-08 DIAGNOSIS — J96.11 CHRONIC RESPIRATORY FAILURE WITH HYPOXIA (H): ICD-10-CM

## 2024-04-08 DIAGNOSIS — J44.9 COPD, SEVERE (H): Primary | ICD-10-CM

## 2024-04-08 PROCEDURE — 99207 PR NO CHARGE NURSE ONLY: CPT | Performed by: NURSE PRACTITIONER

## 2024-04-08 NOTE — TELEPHONE ENCOUNTER
DATE: 04/08/2024  DME PROVIDER: Ju[facility]   SUPPLY ORDERED: pulse dose and updated face to face   PROVIDER: Fatou    Faxed last note, facesheet and order.    Brit Alvarado LPN

## 2024-04-08 NOTE — PROGRESS NOTES
PULSE DOSE WALK    Pt sats on room air 94% at rest-after walking 200 feet sats are 86%    Pulse dose setting 1 at rest 89% after walking 50 feet sats are 87%    Pulse dose setting 2 at rest 90% after walking 75 feet sats are 87%    Pulse dose setting 3 at rest 92% after walking 200 feet sats are 90%-put unable to walk longer due to leg pain.    Brit Alvarado LPN

## 2024-04-08 NOTE — PROGRESS NOTES
Pulmonary Clinic Follow-Up          Assessment/Plan:     Jasmyn Green is a 68 year old female with sig hx for new lung cancer, COPD, chronic respiratory failure on home o2, emphysema -  who is here to evaluate oxygen requirements.    COPD - GOLD E  Chronic hypoxic respiratory failure  Emphysema  Severe obstructive lung disease (FEV1 29%), with hyperinflation and air-trapping.  Severe diffusion capacity defect (DLCO 30%).  Radiographic emphysema.  Previously on Airduo and combivent, d/t cost issues.    Hospitalized 2/8 - 2/15/24 with COPD exacerbation, sepsis, acute on chronic hypoxic and hypercapnic respiratory failure, and found new lung mass.  Initiated on Spiriva.    Walk test in clinic today indicates 3L pulse dose indicated with activity.  Plan:    - continue Spiriva two puffs daily.  - continue Airduo (fluticasone/salmeterol) respiclick 232/14, one puff BID, rinse/gargle after use.  - continue Duonebs BID - QID scheduled  - continue Combivent PRN  - increase oxygen:  oxygen 3L pulse dose with activity, to keep oxygen saturations 88-92%.  Continue oxygen 3L at night.  - She is UTD with COVID vaccine and pneumococcal vaccine.    - Action plan: prednisone 40mg x5 days, + augmentin 875/125 BID x5 days    Lung Cancer  Nicotine dependence, in remission  Former smoker, quit in 2021.  LDCT for lung cancer screening 6/2023, LungRADS category 2.  Hospitalization in 2/2024 with COPD exacerbation.  CT on admission showed bulky AP and left hilar adenopathy with irregular pleural-based anterior left upper lobe nodule with adjacent smaller satellite nodules. Findings suspicious for a primary lung cancer.  EBUS with biopsy on 2/16/24, positive for small cell lung cancer.  She is now followed by Dr Jacinto in radiation oncology and Dr Stewart in oncology.  She will be initiating chemotherapy, then proceed with radiation.  Plan:  - follow closely with Dr Jacinto and Dr Stewart  - referred to palliative care last visit, as she is  having increased SOB, weight loss, poor appetite    Follow-up  - 6 months      Tata Lainez, CNP  Pulmonary Medicine  [facility]  [phone]      I certify that this patient, Jasmyn Green has been under my care (or a nurse practitioner or physican's assistant working with me). This is the face-to-face encounter for oxygen medical necessity.      At the time of this encounter supplemental oxygen is reasonable and necessary and is expected to improve the patient's condition in a home setting.       Patient has continued oxygen desaturation due to Chronic Respiratory Failure with Hypoxia J96.11  COPD J44.9.    If portability is ordered, is the patient mobile within the home? yes    At the time of this encounter, I have reviewed the qualifying testing and have determined that supplemental oxygen is reasonable and necessary and is expected to improve the patient's condition in a home setting.           History:     Jasmyn Green is a 68 year old female with sig hx for new lung cancer, COPD, chronic respiratory failure on home o2, emphysema -  who is here to evaluate oxygen requirements.    She is here to evaluate oxygen requirements and send new order/documentation to [employer] and Medicare.    Previous HPI:  Hospitalized 2/8 - 2/15/24 with COPD exacerbation, sepsis, acute on chronic hypoxic and hypercapnic respiratory failure, and found new lung mass.  Diagnosed with new lung cancer.  Now followed by oncology and radiation oncology.  Plan for chemo therapy.  Poor appetite.  Family worried about increased SOB.  Wondering if she is on enough oxygen.    Patient supplied answers from flow sheet for:  COPD Assessment Test (CAT)  2009 [employer]. All rights reserved.      11/2/2021     9:00 AM 6/29/2022     8:00 AM 10/4/2022     9:00 AM 4/21/2023     7:00 AM 11/8/2023     9:37 AM 3/22/2024     8:26 AM   COPD assessment test (CAT)   Cough 3 3 3 4 3 3   Phlegm 3 3 2 4 1 2   Chest  tightness 0 0 0 2 0 0   Walk up hill 5 5 5 5 4 4   Limited activities 0 3 0 4 4 4   Leaving my home 0 0 0 0 0 2   Sleep 0 0 1 0 1 2   Energy 2 4 0 1 2 4   Total Score 13 18 11 20 15 21      CAT Key:  The CAT consist of 8 items which are each scored 0-5. The total score ranges from 0-40 with higher scores representing a poorer health status. When interpreting CAT scores, the individual s disease severity should be considered.   Low impact  (1-9)  Medium impact  (10-20)  High impact  (21-30)  Very high impact  (31-40)     ROS:     4-point ROS performed and is negative aside from those listed in HPI.         Past Medical History:      Past Medical History:   Diagnosis Date    Age-related osteoporosis without current pathological fracture     Arthritis     COPD (chronic obstructive pulmonary disease) (H)     Coronary artery disease     Dependence on nocturnal oxygen therapy     Dyspnea on exertion     Emphysema lung (H)     Gout     HLD (hyperlipidemia)     Hypertension     Lung mass            Past Surgical History:      Past Surgical History:   Procedure Laterality Date    BRONCHOSCOPY RIGID OR FLEXIBLE W/TRANSENDOSCOPIC ENDOBRONCHIAL ULTRASOUND GUIDED N/A 2024    Procedure: BRONCHOSCOPY, WITH ENDOBRONCHIAL ULTRASOUND;  Surgeon: Ruben Levin MD;  Location: [facility] OR    COLONOSCOPY N/A 10/21/2021    Procedure: COLONOSCOPY;  Surgeon: Wilma Hester DO;  Location: [facility] OR    VAGINAL DELIVERY      x 3 ; remote    WISDOM TOOTH EXTRACTION            Social History:     Social History     Tobacco Use    Smoking status: Former     Types: Cigarettes     Quit date: 2021     Years since quittin.7     Passive exposure: Past    Smokeless tobacco: Never    Tobacco comments:     updated 8/3/2021 by TTS   Substance Use Topics    Alcohol use: Not Currently          Family History:     Family History   Problem Relation Age of Onset    Chronic Obstructive Pulmonary Disease Sister     Diabetes Mother      Heart Disease Mother     Lung Cancer Mother     Heart Disease Father            Allergies:    No Known Allergies       Medications:     Current Outpatient Medications   Medication Sig Dispense Refill    albuterol (PROAIR HFA/PROVENTIL HFA/VENTOLIN HFA) 108 (90 Base) MCG/ACT inhaler Inhale 2 puffs into the lungs every 6 hours (Patient taking differently: Inhale 2 puffs into the lungs every 6 hours as needed) 18 g 11    albuterol (PROVENTIL) (2.5 MG/3ML) 0.083% neb solution Take 1 vial (2.5 mg) by nebulization every 6 hours as needed for shortness of breath, wheezing or cough 90 mL 1    aspirin (ASA) 81 MG chewable tablet Take 1 tablet (81 mg) by mouth daily      atorvastatin (LIPITOR) 20 MG tablet Take 1 tablet (20 mg) by mouth daily 90 tablet 3    calcium carbonate 600 mg-vitamin D 400 units (CALTRATE) 600-400 MG-UNIT per tablet Take 1 tablet by mouth 2 times daily With calcium      diazepam (VALIUM) 5 MG tablet Take 1 tablet (5 mg) by mouth every 6 hours as needed for anxiety 2 tablet 2    fluticasone-salmeterol (AIRDUO RESPICLICK) 232-14 MCG/ACT inhaler Inhale 1 puff into the lungs 2 times daily 1 each 11    hydrOXYzine HCl (ATARAX) 25 MG tablet Take 1-2 tablets (25-50 mg) by mouth every 8 hours as needed for anxiety (Insomnia) 30 tablet 3    ipratropium - albuterol 0.5 mg/2.5 mg/3 mL (DUONEB) 0.5-2.5 (3) MG/3ML neb solution Take 1 vial (3 mLs) by nebulization every 6 hours as needed for shortness of breath, wheezing or cough 180 mL 11    lisinopril (ZESTRIL) 20 MG tablet Take 1 tablet (20 mg) by mouth daily 60 tablet 3    mirtazapine (REMERON) 7.5 MG tablet Take 1 tablet (7.5 mg) by mouth at bedtime 90 tablet 3    ondansetron (ZOFRAN) 8 MG tablet Take 1 tablet (8 mg) by mouth every 8 hours as needed for nausea (vomiting) 30 tablet 2    prochlorperazine (COMPAZINE) 10 MG tablet Take 1 tablet (10 mg) by mouth every 6 hours as needed for nausea or vomiting 30 tablet 2    tiotropium (SPIRIVA RESPIMAT) 2.5 MCG/ACT  inhaler Inhale 2 puffs into the lungs daily 4 g 11    doxycycline hyclate (VIBRAMYCIN) 100 MG capsule Take 1 capsule (100 mg) by mouth 2 times daily (Patient not taking: Reported on 4/8/2024) 20 capsule 3    predniSONE (DELTASONE) 10 MG tablet 4 tabs daily for 3 days, then 3 tabs daily for 3 days, then 2 tabs daily for 3 days, then 1 tab daily for 3 days, then stop (Patient not taking: Reported on 4/8/2024) 30 tablet 0     No current facility-administered medications for this visit.            Physical Exam:   /66 (BP Location: Left arm, Patient Position: Chair, Cuff Size: Adult Small)   Pulse 116   LMP  (LMP Unknown)   SpO2 96%     Constitutional - adult female, appears in NAD.  Daughter and son present.  Respiratory -  Respirations even and unlabored.  On 3L pulse dose oxygen via NC.    Neurological - alert, answering questions appropriately          Current Data:     Chest CT 2/8/24:  IMPRESSION:   1.  Bulky AP and left hilar adenopathy with irregular pleural-based anterior left upper lobe nodule with adjacent smaller satellite nodules. Findings suspicious for a primary lung cancer.  2.  Nodular thickening of the left adrenal gland is likely unchanged.  3.  Patient needs PET/CT for further evaluation.  4.  Extensive emphysema.  5.  Findings discussed by the undersigned with Dr. Ramos at 1141.    Chest CT 6/2023:  IMPRESSION:  1.  Negative for lung cancer screening purposes.  2.  New mild consolidation in the left upper lobe compatible with infectious / inflammatory change.    EXAM: XR CHEST 2 VIEWS  LOCATION: [facility]  DATE/TIME: 4/4/2023 6:54 AM  INDICATION:  COPD exacerbation (H), Shortness of breath  COMPARISON: 06/27/2021.                                              IMPRESSION: Heart is normal in size. Lungs are hyperinflated suggesting COPD. Minimal bibasilar atelectasis. Possible trace bilateral effusions. Lungs are otherwise clear.    Chest CT  6/2/22:  IMPRESSION:  1.  Negative for lung cancer screening purposes.  2.  Debris throughout the airways with bronchial wall thickening. Bronchitis is suspected.  3.  Severe coronary artery calcification.  4.  Small to moderate sized hiatal hernia.    PFTs 8/2021:  The FVC, FEV1 and FEV1/FVC ratio are reduced, but the VVR15-62% is within normal limits.  The inspiratory flow rates are reduced.  The FVC is reduced relative to the SVC indicating air trapping.  The TLC, RV, FRC and RV/TLC ratio are all increased   indicating overinflation and air trapping.  Following administration of bronchodilators, there is no significant response.  The diffusing capacity is severely reduced when corrected to hemoglobin.   IMPRESSION:   Very severe Airflow Obstruction. No significant postbronchodilator response.   Mild hyperinflation and moderate airtrapping.   Diffusion capacity was severely reduced when corrected to hemoglobin.

## 2024-04-08 NOTE — PATIENT INSTRUCTIONS
It was a pleasure to see you in clinic today.   Here is what we discussed:    Continue Spiriva two puffs daily.  Continue Airduo one puff twice daily, rinse/gargle after use.  Continue Duonebs 2-4 times daily.  Continue Combivent inhaler every 4-6 hours as needed for shortness of breath or wheezing.  Continue oxygen 3L pulse dose with activity, and 3L continuous at night, to keep oxygen saturations >88%.  Call my nurse, Jamie ([phone]) with any change or worsening of your breathing.  Follow-up as planned in September.    Tata Lainez, CNP  Pulmonary Medicine  [facility]  [phone]

## 2024-04-09 ENCOUNTER — TELEPHONE (OUTPATIENT)
Dept: GASTROENTEROLOGY | Facility: CLINIC | Age: 69
End: 2024-04-09
Payer: COMMERCIAL

## 2024-04-09 NOTE — TELEPHONE ENCOUNTER
[facility] Call Center    Phone Message    May a detailed message be left on voicemail: Yes    Reason for Call: Other: Patient is currently scheduled on 5/15, as visit type New GI Emergent . This is outside the expected timeline for this referral. Patient has been added to the waitlist.      Action Taken: Message routed to:  Other: GI REFERRAL TRIAGE POOL     Travel Screening: Not Applicable

## 2024-04-11 NOTE — TELEPHONE ENCOUNTER
called Pt to help Pt get scheduled for a sooner appointment. Pt wanted  to call and talked with Pt's daughter Karie (Julissa) who helps schedules Pt's appointment.  called and talked with Julissa, who accepted a virtual visit with Dr. Hector Gresham on 4/22/2024 at 1 PM. Julissa is aware that the provider is at the  GI clinic but was okay scheduling as long as it was a virtual visit.

## 2024-04-15 ENCOUNTER — LAB (OUTPATIENT)
Dept: INFUSION THERAPY | Facility: HOSPITAL | Age: 69
End: 2024-04-15
Attending: INTERNAL MEDICINE
Payer: COMMERCIAL

## 2024-04-15 ENCOUNTER — PATIENT OUTREACH (OUTPATIENT)
Dept: CARE COORDINATION | Facility: CLINIC | Age: 69
End: 2024-04-15

## 2024-04-15 ENCOUNTER — ONCOLOGY VISIT (OUTPATIENT)
Dept: ONCOLOGY | Facility: HOSPITAL | Age: 69
End: 2024-04-15
Attending: INTERNAL MEDICINE
Payer: COMMERCIAL

## 2024-04-15 VITALS
RESPIRATION RATE: 22 BRPM | DIASTOLIC BLOOD PRESSURE: 57 MMHG | TEMPERATURE: 97.7 F | SYSTOLIC BLOOD PRESSURE: 113 MMHG | HEART RATE: 101 BPM | OXYGEN SATURATION: 95 %

## 2024-04-15 VITALS
WEIGHT: 99.2 LBS | HEIGHT: 60 IN | OXYGEN SATURATION: 95 % | RESPIRATION RATE: 20 BRPM | DIASTOLIC BLOOD PRESSURE: 57 MMHG | SYSTOLIC BLOOD PRESSURE: 130 MMHG | BODY MASS INDEX: 19.48 KG/M2 | HEART RATE: 125 BPM | TEMPERATURE: 97.9 F

## 2024-04-15 DIAGNOSIS — C34.90 SMALL CELL LUNG CANCER (H): Primary | ICD-10-CM

## 2024-04-15 LAB
ALBUMIN SERPL BCG-MCNC: 3.4 G/DL (ref 3.5–5.2)
ALP SERPL-CCNC: 104 U/L (ref 40–150)
ALT SERPL W P-5'-P-CCNC: 21 U/L (ref 0–50)
ANION GAP SERPL CALCULATED.3IONS-SCNC: 7 MMOL/L (ref 7–15)
AST SERPL W P-5'-P-CCNC: 16 U/L (ref 0–45)
BASOPHILS # BLD AUTO: 0 10E3/UL (ref 0–0.2)
BASOPHILS NFR BLD AUTO: 1 %
BILIRUB SERPL-MCNC: <0.2 MG/DL
BUN SERPL-MCNC: 20.4 MG/DL (ref 8–23)
CALCIUM SERPL-MCNC: 9.2 MG/DL (ref 8.8–10.2)
CHLORIDE SERPL-SCNC: 104 MMOL/L (ref 98–107)
CREAT SERPL-MCNC: 0.93 MG/DL (ref 0.51–0.95)
DEPRECATED HCO3 PLAS-SCNC: 28 MMOL/L (ref 22–29)
EGFRCR SERPLBLD CKD-EPI 2021: 66 ML/MIN/1.73M2
EOSINOPHIL # BLD AUTO: 0.3 10E3/UL (ref 0–0.7)
EOSINOPHIL NFR BLD AUTO: 5 %
ERYTHROCYTE [DISTWIDTH] IN BLOOD BY AUTOMATED COUNT: 14.2 % (ref 10–15)
GLUCOSE SERPL-MCNC: 144 MG/DL (ref 70–99)
HCT VFR BLD AUTO: 30.1 % (ref 35–47)
HGB BLD-MCNC: 8.9 G/DL (ref 11.7–15.7)
IMM GRANULOCYTES # BLD: 0.2 10E3/UL
IMM GRANULOCYTES NFR BLD: 3 %
LYMPHOCYTES # BLD AUTO: 0.8 10E3/UL (ref 0.8–5.3)
LYMPHOCYTES NFR BLD AUTO: 12 %
MCH RBC QN AUTO: 29.1 PG (ref 26.5–33)
MCHC RBC AUTO-ENTMCNC: 29.6 G/DL (ref 31.5–36.5)
MCV RBC AUTO: 98 FL (ref 78–100)
MONOCYTES # BLD AUTO: 0.8 10E3/UL (ref 0–1.3)
MONOCYTES NFR BLD AUTO: 12 %
NEUTROPHILS # BLD AUTO: 4.5 10E3/UL (ref 1.6–8.3)
NEUTROPHILS NFR BLD AUTO: 67 %
NRBC # BLD AUTO: 0 10E3/UL
NRBC BLD AUTO-RTO: 0 /100
PLATELET # BLD AUTO: 392 10E3/UL (ref 150–450)
POTASSIUM SERPL-SCNC: 4.2 MMOL/L (ref 3.4–5.3)
PROT SERPL-MCNC: 6.2 G/DL (ref 6.4–8.3)
RBC # BLD AUTO: 3.06 10E6/UL (ref 3.8–5.2)
SODIUM SERPL-SCNC: 139 MMOL/L (ref 135–145)
TSH SERPL DL<=0.005 MIU/L-ACNC: 1.02 UIU/ML (ref 0.3–4.2)
WBC # BLD AUTO: 6.7 10E3/UL (ref 4–11)

## 2024-04-15 PROCEDURE — 96413 CHEMO IV INFUSION 1 HR: CPT

## 2024-04-15 PROCEDURE — G2211 COMPLEX E/M VISIT ADD ON: HCPCS | Performed by: NURSE PRACTITIONER

## 2024-04-15 PROCEDURE — 96367 TX/PROPH/DG ADDL SEQ IV INF: CPT

## 2024-04-15 PROCEDURE — 99213 OFFICE O/P EST LOW 20 MIN: CPT | Mod: 25 | Performed by: NURSE PRACTITIONER

## 2024-04-15 PROCEDURE — 96417 CHEMO IV INFUS EACH ADDL SEQ: CPT

## 2024-04-15 PROCEDURE — 96375 TX/PRO/DX INJ NEW DRUG ADDON: CPT

## 2024-04-15 PROCEDURE — 250N000011 HC RX IP 250 OP 636: Performed by: NURSE PRACTITIONER

## 2024-04-15 PROCEDURE — 99214 OFFICE O/P EST MOD 30 MIN: CPT | Performed by: NURSE PRACTITIONER

## 2024-04-15 PROCEDURE — 84443 ASSAY THYROID STIM HORMONE: CPT | Performed by: INTERNAL MEDICINE

## 2024-04-15 PROCEDURE — 85025 COMPLETE CBC W/AUTO DIFF WBC: CPT | Performed by: INTERNAL MEDICINE

## 2024-04-15 PROCEDURE — 36415 COLL VENOUS BLD VENIPUNCTURE: CPT | Performed by: INTERNAL MEDICINE

## 2024-04-15 PROCEDURE — 258N000003 HC RX IP 258 OP 636: Performed by: NURSE PRACTITIONER

## 2024-04-15 PROCEDURE — 82247 BILIRUBIN TOTAL: CPT | Performed by: INTERNAL MEDICINE

## 2024-04-15 RX ORDER — HEPARIN SODIUM (PORCINE) LOCK FLUSH IV SOLN 100 UNIT/ML 100 UNIT/ML
5 SOLUTION INTRAVENOUS
Status: CANCELLED | OUTPATIENT
Start: 2024-04-16

## 2024-04-15 RX ORDER — ALBUTEROL SULFATE 0.83 MG/ML
2.5 SOLUTION RESPIRATORY (INHALATION)
Status: DISCONTINUED | OUTPATIENT
Start: 2024-04-15 | End: 2024-04-15 | Stop reason: HOSPADM

## 2024-04-15 RX ORDER — ALBUTEROL SULFATE 0.83 MG/ML
2.5 SOLUTION RESPIRATORY (INHALATION)
Status: CANCELLED | OUTPATIENT
Start: 2024-04-17

## 2024-04-15 RX ORDER — EPINEPHRINE 1 MG/ML
0.3 INJECTION, SOLUTION INTRAMUSCULAR; SUBCUTANEOUS EVERY 5 MIN PRN
Status: CANCELLED | OUTPATIENT
Start: 2024-04-15

## 2024-04-15 RX ORDER — EPINEPHRINE 1 MG/ML
0.3 INJECTION, SOLUTION INTRAMUSCULAR; SUBCUTANEOUS EVERY 5 MIN PRN
Status: DISCONTINUED | OUTPATIENT
Start: 2024-04-15 | End: 2024-04-15 | Stop reason: HOSPADM

## 2024-04-15 RX ORDER — HEPARIN SODIUM,PORCINE 10 UNIT/ML
5-20 VIAL (ML) INTRAVENOUS DAILY PRN
Status: CANCELLED | OUTPATIENT
Start: 2024-04-15

## 2024-04-15 RX ORDER — HEPARIN SODIUM,PORCINE 10 UNIT/ML
5-20 VIAL (ML) INTRAVENOUS DAILY PRN
Status: CANCELLED | OUTPATIENT
Start: 2024-04-16

## 2024-04-15 RX ORDER — METHYLPREDNISOLONE SODIUM SUCCINATE 125 MG/2ML
125 INJECTION, POWDER, LYOPHILIZED, FOR SOLUTION INTRAMUSCULAR; INTRAVENOUS
Status: CANCELLED
Start: 2024-04-15

## 2024-04-15 RX ORDER — METHYLPREDNISOLONE SODIUM SUCCINATE 125 MG/2ML
125 INJECTION, POWDER, LYOPHILIZED, FOR SOLUTION INTRAMUSCULAR; INTRAVENOUS
Status: CANCELLED
Start: 2024-04-16

## 2024-04-15 RX ORDER — MEPERIDINE HYDROCHLORIDE 25 MG/ML
25 INJECTION INTRAMUSCULAR; INTRAVENOUS; SUBCUTANEOUS EVERY 30 MIN PRN
Status: CANCELLED | OUTPATIENT
Start: 2024-04-16

## 2024-04-15 RX ORDER — ALBUTEROL SULFATE 90 UG/1
1-2 AEROSOL, METERED RESPIRATORY (INHALATION)
Status: CANCELLED
Start: 2024-04-16

## 2024-04-15 RX ORDER — EPINEPHRINE 1 MG/ML
0.3 INJECTION, SOLUTION INTRAMUSCULAR; SUBCUTANEOUS EVERY 5 MIN PRN
Status: CANCELLED | OUTPATIENT
Start: 2024-04-16

## 2024-04-15 RX ORDER — EPINEPHRINE 1 MG/ML
0.3 INJECTION, SOLUTION INTRAMUSCULAR; SUBCUTANEOUS EVERY 5 MIN PRN
Status: CANCELLED | OUTPATIENT
Start: 2024-04-17

## 2024-04-15 RX ORDER — ALBUTEROL SULFATE 90 UG/1
1-2 AEROSOL, METERED RESPIRATORY (INHALATION)
Status: CANCELLED
Start: 2024-04-15

## 2024-04-15 RX ORDER — MEPERIDINE HYDROCHLORIDE 25 MG/ML
25 INJECTION INTRAMUSCULAR; INTRAVENOUS; SUBCUTANEOUS EVERY 30 MIN PRN
Status: CANCELLED | OUTPATIENT
Start: 2024-04-17

## 2024-04-15 RX ORDER — LORAZEPAM 2 MG/ML
0.5 INJECTION INTRAMUSCULAR EVERY 4 HOURS PRN
Status: CANCELLED | OUTPATIENT
Start: 2024-04-16

## 2024-04-15 RX ORDER — PALONOSETRON 0.05 MG/ML
0.25 INJECTION, SOLUTION INTRAVENOUS ONCE
Status: COMPLETED | OUTPATIENT
Start: 2024-04-15 | End: 2024-04-15

## 2024-04-15 RX ORDER — PALONOSETRON 0.05 MG/ML
0.25 INJECTION, SOLUTION INTRAVENOUS ONCE
Status: CANCELLED | OUTPATIENT
Start: 2024-04-15

## 2024-04-15 RX ORDER — ALBUTEROL SULFATE 90 UG/1
1-2 AEROSOL, METERED RESPIRATORY (INHALATION)
Status: DISCONTINUED | OUTPATIENT
Start: 2024-04-15 | End: 2024-04-15 | Stop reason: HOSPADM

## 2024-04-15 RX ORDER — LORAZEPAM 2 MG/ML
0.5 INJECTION INTRAMUSCULAR EVERY 4 HOURS PRN
Status: DISCONTINUED | OUTPATIENT
Start: 2024-04-15 | End: 2024-04-15 | Stop reason: HOSPADM

## 2024-04-15 RX ORDER — DIPHENHYDRAMINE HYDROCHLORIDE 50 MG/ML
50 INJECTION INTRAMUSCULAR; INTRAVENOUS
Status: CANCELLED
Start: 2024-04-17

## 2024-04-15 RX ORDER — ALBUTEROL SULFATE 0.83 MG/ML
2.5 SOLUTION RESPIRATORY (INHALATION)
Status: CANCELLED | OUTPATIENT
Start: 2024-04-15

## 2024-04-15 RX ORDER — METHYLPREDNISOLONE SODIUM SUCCINATE 125 MG/2ML
125 INJECTION, POWDER, LYOPHILIZED, FOR SOLUTION INTRAMUSCULAR; INTRAVENOUS
Status: DISCONTINUED | OUTPATIENT
Start: 2024-04-15 | End: 2024-04-15 | Stop reason: HOSPADM

## 2024-04-15 RX ORDER — DIPHENHYDRAMINE HYDROCHLORIDE 50 MG/ML
50 INJECTION INTRAMUSCULAR; INTRAVENOUS
Status: CANCELLED
Start: 2024-04-16

## 2024-04-15 RX ORDER — MEPERIDINE HYDROCHLORIDE 25 MG/ML
25 INJECTION INTRAMUSCULAR; INTRAVENOUS; SUBCUTANEOUS EVERY 30 MIN PRN
Status: CANCELLED | OUTPATIENT
Start: 2024-04-15

## 2024-04-15 RX ORDER — HEPARIN SODIUM (PORCINE) LOCK FLUSH IV SOLN 100 UNIT/ML 100 UNIT/ML
5 SOLUTION INTRAVENOUS
Status: CANCELLED | OUTPATIENT
Start: 2024-04-15

## 2024-04-15 RX ORDER — MEPERIDINE HYDROCHLORIDE 25 MG/ML
25 INJECTION INTRAMUSCULAR; INTRAVENOUS; SUBCUTANEOUS EVERY 30 MIN PRN
Status: DISCONTINUED | OUTPATIENT
Start: 2024-04-15 | End: 2024-04-15 | Stop reason: HOSPADM

## 2024-04-15 RX ORDER — DIPHENHYDRAMINE HYDROCHLORIDE 50 MG/ML
50 INJECTION INTRAMUSCULAR; INTRAVENOUS
Status: CANCELLED
Start: 2024-04-15

## 2024-04-15 RX ORDER — METHYLPREDNISOLONE SODIUM SUCCINATE 125 MG/2ML
125 INJECTION, POWDER, LYOPHILIZED, FOR SOLUTION INTRAMUSCULAR; INTRAVENOUS
Status: CANCELLED
Start: 2024-04-17

## 2024-04-15 RX ORDER — LORAZEPAM 2 MG/ML
0.5 INJECTION INTRAMUSCULAR EVERY 4 HOURS PRN
Status: CANCELLED | OUTPATIENT
Start: 2024-04-15

## 2024-04-15 RX ORDER — HEPARIN SODIUM,PORCINE 10 UNIT/ML
5-20 VIAL (ML) INTRAVENOUS DAILY PRN
Status: CANCELLED | OUTPATIENT
Start: 2024-04-17

## 2024-04-15 RX ORDER — HEPARIN SODIUM (PORCINE) LOCK FLUSH IV SOLN 100 UNIT/ML 100 UNIT/ML
5 SOLUTION INTRAVENOUS
Status: CANCELLED | OUTPATIENT
Start: 2024-04-17

## 2024-04-15 RX ORDER — ALBUTEROL SULFATE 0.83 MG/ML
2.5 SOLUTION RESPIRATORY (INHALATION)
Status: CANCELLED | OUTPATIENT
Start: 2024-04-16

## 2024-04-15 RX ORDER — DIPHENHYDRAMINE HYDROCHLORIDE 50 MG/ML
50 INJECTION INTRAMUSCULAR; INTRAVENOUS
Status: DISCONTINUED | OUTPATIENT
Start: 2024-04-15 | End: 2024-04-15 | Stop reason: HOSPADM

## 2024-04-15 RX ORDER — LORAZEPAM 2 MG/ML
0.5 INJECTION INTRAMUSCULAR EVERY 4 HOURS PRN
Status: CANCELLED | OUTPATIENT
Start: 2024-04-17

## 2024-04-15 RX ORDER — ALBUTEROL SULFATE 90 UG/1
1-2 AEROSOL, METERED RESPIRATORY (INHALATION)
Status: CANCELLED
Start: 2024-04-17

## 2024-04-15 RX ADMIN — TRILACICLIB 300 MG: 300 INJECTION, POWDER, LYOPHILIZED, FOR SOLUTION INTRAVENOUS at 13:27

## 2024-04-15 RX ADMIN — DEXAMETHASONE SODIUM PHOSPHATE: 10 INJECTION, SOLUTION INTRAMUSCULAR; INTRAVENOUS at 12:08

## 2024-04-15 RX ADMIN — PALONOSETRON 0.25 MG: 0.05 INJECTION, SOLUTION INTRAVENOUS at 12:06

## 2024-04-15 RX ADMIN — ATEZOLIZUMAB 1200 MG: 1200 INJECTION, SOLUTION INTRAVENOUS at 12:38

## 2024-04-15 RX ADMIN — SODIUM CHLORIDE 250 ML: 9 INJECTION, SOLUTION INTRAVENOUS at 12:05

## 2024-04-15 RX ADMIN — ETOPOSIDE 135 MG: 20 INJECTION INTRAVENOUS at 14:54

## 2024-04-15 RX ADMIN — CARBOPLATIN 325 MG: 450 INJECTION, SOLUTION INTRAVENOUS at 14:18

## 2024-04-15 ASSESSMENT — PAIN SCALES - GENERAL: PAINLEVEL: NO PAIN (0)

## 2024-04-15 NOTE — PROGRESS NOTES
Social Work - Follow-Up  [facility]    Data/Intervention:    Patient Name: Jasmyn Green Goes By: Jasmyn    /Age: 1955 (68 year old)    Reason for Follow-Up:  In person visit during chemo    Intervention/Education/Resources Provided:  SW met with Jasmyn and her daughter Britni. Introduced self and support available. Both are doing well. They gave SW [employer] paperwork re how they want to use the chris. Confirmed that [employer] isn't able to pay for medical bills. Let Ngozi at [employer] know that the pt would like [employer] and [employer] gift cards. Jasmyn is concerned about her bills and expressed interested in applying for [employer]. SW will follow up with  re options. Provided active listening, validation of feelings and emotional support.     Assessment/Plan:  Previously provided patient/family with writer's contact information and availability.      JANET Rodriguez, [facility]  Adult Oncology Clinics  [place] (M,W), [place] (T) & [place] ()  *I am off Friday  Office: [phone]

## 2024-04-15 NOTE — PROGRESS NOTES
Clinic Care Coordination Contact  Community Health Worker Follow Up    Care Gaps:     Health Maintenance Due   Topic Date Due    COPD ACTION PLAN  Never done    ZOSTER IMMUNIZATION (1 of 2) Never done    RSV VACCINE (Pregnancy & 60+) (1 - 1-dose 60+ series) Never done    COVID-19 Vaccine (3 - Moderna risk series) 03/07/2022    INFLUENZA VACCINE (1) 09/01/2023       Postponed to next CHW outreach     Care Plan:   Care Plan: Community Resources       Problem: Community Resources       Goal: Community Resources       Start Date: 2/28/2024    Note:     Goal Statement: I will connect with community resources over the next three month(s) that are a good fit for me.   Barriers: life stress  Strengths: family support  Patient expressed understanding of goal: yes    Action steps to achieve this goal:  My family will review resources sent to me via mail and my chart that can support me with finances, transportation, social security disability application and more.   My family will consider establishing with one or more which interest me.                          Problem: Food Support       Goal: Establish with Food Support       Start Date: 2/28/2024    Note:     Goal Statement: I will establish with [employer] [facility].   Barriers: life stress  Strengths: family support  Patient expressed understanding of goal: yes    Action steps to achieve this goal:  My family will connect with [employer] [place] to establish as a client                           Problem: In-home support       Goal: Establish adequate home support       Start Date: 2/28/2024    Note:     Goal Statement: I will take steps to find in home support over the next three month(s).  Barriers: life stress  Strengths: motivated, family support  Patient expressed understanding of goal: yes    Action steps to achieve this goal:  My family will review resources sent to me via mail and my chart for in home support: PCA, home care, community resources   My family  will consider establishing with one or more supports which interest me.                                 Intervention and Education during outreach: I briefly spoke with patients daughter Julissa. Julissa is with Jasmyn at an appointment. Julissa asked for me to follow up with her this Thursday 4/18/24.    CHW Plan: CHW will follow up with patients daughter Julissa on 4/18/24.    Edelmira Morales  [facility] Worker  [facility] Care Coordination   [place], [facility], [place], [place], [facility]  Office: [phone]

## 2024-04-15 NOTE — LETTER
4/15/2024         RE: Jasmyn Green  [address] [place] [address]        Dear Colleague,    Thank you for referring your patient, Jasmyn Green, to the [facility]. Please see a copy of my visit note below.    Oncology Rooming Note    April 15, 2024 10:42 AM   Jasmyn Green is a 68 year old female who presents for:    Chief Complaint   Patient presents with     Oncology Clinic Visit     Return visit with lab and infusion. Small cell lung cancer.     Initial Vitals: /57 (BP Location: Left arm, Patient Position: Sitting, Cuff Size: Adult Regular)   Pulse (!) 125   Temp 97.9  F (36.6  C) (Oral)   Resp 20   Ht 1.524 m (5')   Wt 45 kg (99 lb 3.2 oz)   LMP  (LMP Unknown)   SpO2 95%   BMI 19.37 kg/m   Estimated body mass index is 19.37 kg/m  as calculated from the following:    Height as of this encounter: 1.524 m (5').    Weight as of this encounter: 45 kg (99 lb 3.2 oz). Body surface area is 1.38 meters squared.  Data Unavailable Comment: Data Unavailable   No LMP recorded (lmp unknown). Patient is postmenopausal.  Allergies reviewed: Yes  Medications reviewed: Yes    Medications: Medication refills not needed today.  Pharmacy name entered into [employer]:    [facility] - [place], [place] - [address] [facility] [address]    Frailty Screening:   Is the patient here for a new oncology consult visit in cancer care? 2. No      Clinical concerns: Return visit with lab and infusion. Small cell lung cancer.       Елена Deutsch, [facility] Hematology and Oncology Progress Note    Patient: Jasmyn Green  MRN: [id number]  Date of Service: Apr 15, 2024          Reason for Visit    Chief Complaint   Patient presents with     Oncology Clinic Visit     Return visit with lab and infusion. Small cell lung cancer.       Assessment and Plan     Cancer Staging   No matching staging  information was found for the patient.    Lung cancer, Small Cell, extensive stage(left upper lobe, pleural mets, mediastinal nodes): Patient started palliative chemotherapy with carboplatin, etoposide and Atezolizumab last week. first cycle given at 20% reduction due to performance status.  She did very well with the treatment so we will try full dose today.  We will reimage with a CT scan after that cycle.  She will see Dr. Stewart to review at that time.    3 mm abnormality seen on brain MRI: I told patient this may or may not be anything to worry about.  She is asymptomatic so at this time we will keep an eye on it and likely repeat another MRI of the brain in roughly 3 weeks.    anemia: Likely from chronic disease, COPD as well as chemotherapy.  This is a little worse today.  We will continue to monitor and give blood transfusion if she drops below 8.  She does have some symptoms of shortness of breath but hard to tell since she has COPD and lung cancer.    Worsening shortness of breath and coughing over the last 3 days: Patient states that does not feel like a typical infection or COPD exacerbation.  It was very nice over the weekend and it is now spreading so it likely could just be allergies.  Encouraged her to take some Zyrtec or Claritin daily over the next couple days to see if it improves.  She will call us if it seems to get worse.        ECOG Performance    2 - Ambulatory and independent in all ADLs; cannot work; up > 50% of the time    Distress Screening (within last 30 days)    No data recorded     Pain       Problem List    Patient Active Problem List   Diagnosis     COPD exacerbation (H)     History of gout     Coronary artery calcification     Lymphocytosis     Essential hypertension     Asymptomatic hypertensive urgency     Age-related osteoporosis without current pathological fracture     Lung mass     Small cell lung cancer (H)     Chronic respiratory failure with hypoxia (H)         ______________________________________________________________________________    History of Present Illness    Oncologist: Dr. Stewart    Diagnosis: Lung cancer, small cell, extensive stage. Presented to ER for SOB, coughing. Hx of COPD, on oxygen.   -2/8/24: CT: Bulky AP and left hilar adenopathy with irregular pleural-based anterior left upper lobe nodule with adjacent smaller satellite nodules. Findings suspicious for a primary lung cancer   -3/1/24: PET: Findings suspicious for a left upper lobe primary lung neoplasm with adjacent satellite lesion in the left upper lobe, extensive mediastinal lymph node involvement, and several pleural-based metastases in the left hemithorax   -3/14/24: Brain MRI: Findings concerning for a single 3 - 4 mm right occipital lobe cortex metastatic lesion     Treatment:   -3/25/24: start palliative treatment with Carboplatin, Etoposide, atezolizumab.  Today is cycle 2    Interim History:   Patient is here today to continue on treatment.  She states the first cycle went pretty well.  She started losing her hair yesterday so she is disappointed about that.  She states over the last 3 days she has noticed a little bit of increasing coughing and shortness of breath.  She says it does not feel like she normally does when she is having a COPD exacerbation or infection.  She was outside a lot over the weekend so is wondering if that is contributing.  It is a fairly dry cough.  She denies any new bone or back pain.  She also has had a couple episodes where she is having shaking in her hands.  Not really related to anything and quite random.  Overall weight is stable.  There have been using some medical cannabis but it does not seem like it is increasing her appetite is much as they would like.  They deny any      Review of Systems    Pertinent items are noted in HPI.    Past History    Past Medical History:   Diagnosis Date     Age-related osteoporosis without current pathological  fracture      Arthritis      COPD (chronic obstructive pulmonary disease) (H)      Coronary artery disease      Dependence on nocturnal oxygen therapy      Dyspnea on exertion      Emphysema lung (H)      Gout      HLD (hyperlipidemia)      Hypertension      Lung mass        PHYSICAL EXAM  /57 (BP Location: Left arm, Patient Position: Sitting, Cuff Size: Adult Regular)   Pulse (!) 125   Temp 97.9  F (36.6  C) (Oral)   Resp 20   Ht 1.524 m (5')   Wt 45 kg (99 lb 3.2 oz)   LMP  (LMP Unknown)   SpO2 95%   BMI 19.37 kg/m      GENERAL: no acute distress. Cooperative in conversation. Here with daughters. In wheelchair. On oxygen.   RESP: Regular respiratory rate. No expiratory wheezes   NEURO: non focal. Alert and oriented x3.   PSYCH: within normal limits. No depression or anxiety.  SKIN: exposed skin is dry intact.     Lab Results    Recent Results (from the past 168 hour(s))   Comprehensive metabolic panel   Result Value Ref Range    Sodium 139 135 - 145 mmol/L    Potassium 4.2 3.4 - 5.3 mmol/L    Carbon Dioxide (CO2) 28 22 - 29 mmol/L    Anion Gap 7 7 - 15 mmol/L    Urea Nitrogen 20.4 8.0 - 23.0 mg/dL    Creatinine 0.93 0.51 - 0.95 mg/dL    GFR Estimate 66 >60 mL/min/1.73m2    Calcium 9.2 8.8 - 10.2 mg/dL    Chloride 104 98 - 107 mmol/L    Glucose 144 (H) 70 - 99 mg/dL    Alkaline Phosphatase 104 40 - 150 U/L    AST 16 0 - 45 U/L    ALT 21 0 - 50 U/L    Protein Total 6.2 (L) 6.4 - 8.3 g/dL    Albumin 3.4 (L) 3.5 - 5.2 g/dL    Bilirubin Total <0.2 <=1.2 mg/dL   TSH with free T4 reflex   Result Value Ref Range    TSH 1.02 0.30 - 4.20 uIU/mL   CBC with platelets and differential   Result Value Ref Range    WBC Count 6.7 4.0 - 11.0 10e3/uL    RBC Count 3.06 (L) 3.80 - 5.20 10e6/uL    Hemoglobin 8.9 (L) 11.7 - 15.7 g/dL    Hematocrit 30.1 (L) 35.0 - 47.0 %    MCV 98 78 - 100 fL    MCH 29.1 26.5 - 33.0 pg    MCHC 29.6 (L) 31.5 - 36.5 g/dL    RDW 14.2 10.0 - 15.0 %    Platelet Count 392 150 - 450 10e3/uL    %  Neutrophils 67 %    % Lymphocytes 12 %    % Monocytes 12 %    % Eosinophils 5 %    % Basophils 1 %    % Immature Granulocytes 3 %    NRBCs per 100 WBC 0 <1 /100    Absolute Neutrophils 4.5 1.6 - 8.3 10e3/uL    Absolute Lymphocytes 0.8 0.8 - 5.3 10e3/uL    Absolute Monocytes 0.8 0.0 - 1.3 10e3/uL    Absolute Eosinophils 0.3 0.0 - 0.7 10e3/uL    Absolute Basophils 0.0 0.0 - 0.2 10e3/uL    Absolute Immature Granulocytes 0.2 <=0.4 10e3/uL    Absolute NRBCs 0.0 10e3/uL         Imaging    No results found.      The longitudinal plan of care for the diagnosis(es)/condition(s) as documented were addressed during this visit. Due to the added complexity in care, I will continue to support Jasmyn in the subsequent management and with ongoing continuity of care.    Signed by: JIMENEZ Butt CNP        Again, thank you for allowing me to participate in the care of your patient.        Sincerely,        JIMENEZ Butt CNP

## 2024-04-15 NOTE — PROGRESS NOTES
Oncology Rooming Note    April 15, 2024 10:42 AM   Jasmyn Green is a 68 year old female who presents for:    Chief Complaint   Patient presents with    Oncology Clinic Visit     Return visit with lab and infusion. Small cell lung cancer.     Initial Vitals: /57 (BP Location: Left arm, Patient Position: Sitting, Cuff Size: Adult Regular)   Pulse (!) 125   Temp 97.9  F (36.6  C) (Oral)   Resp 20   Ht 1.524 m (5')   Wt 45 kg (99 lb 3.2 oz)   LMP  (LMP Unknown)   SpO2 95%   BMI 19.37 kg/m   Estimated body mass index is 19.37 kg/m  as calculated from the following:    Height as of this encounter: 1.524 m (5').    Weight as of this encounter: 45 kg (99 lb 3.2 oz). Body surface area is 1.38 meters squared.  Data Unavailable Comment: Data Unavailable   No LMP recorded (lmp unknown). Patient is postmenopausal.  Allergies reviewed: Yes  Medications reviewed: Yes    Medications: Medication refills not needed today.  Pharmacy name entered into [employer]:    [facility] - [place], [place] - [address] [facility] - [place], [place] - [address]    Frailty Screening:   Is the patient here for a new oncology consult visit in cancer care? 2. No      Clinical concerns: Return visit with lab and infusion. Small cell lung cancer.       Елена Deutsch, [facility]

## 2024-04-15 NOTE — PROGRESS NOTES
Infusion Nursing Note:  Jasmyn Green presents today for C2D1.    Patient seen by provider today: Yes: Dana Mathur, JASON   present during visit today: Not Applicable.    Note: VSS.  Pt assessed and is feeling tired and feels like she has allergies, but otherwise tolerating treatment. Pt taken off her oxygen and was placed on our O2 at 2-3 liters.  Peripheral IV flushed and saline locked for treatment tomorrow. Treatment reviewed with pt.  Jasmyn received treatment as ordered.      Intravenous Access:  Peripheral IV placed.    Treatment Conditions:  Lab Results   Component Value Date    HGB 8.9 (L) 04/15/2024    WBC 6.7 04/15/2024    ANEUTAUTO 4.5 04/15/2024     04/15/2024        Lab Results   Component Value Date     04/15/2024    POTASSIUM 4.2 04/15/2024    MAG 2.1 02/08/2024    CR 0.93 04/15/2024    FLOR 9.2 04/15/2024    BILITOTAL <0.2 04/15/2024    ALBUMIN 3.4 (L) 04/15/2024    ALT 21 04/15/2024    AST 16 04/15/2024       Results reviewed, labs MET treatment parameters, ok to proceed with treatment.      Post Infusion Assessment:  Patient tolerated infusion without incident.  Blood return noted pre and post infusion.  No evidence of extravasations.  Access left in place for treatment tomorrow.      Discharge Plan:   Discharge instructions reviewed with: Patient.  Patient and/or family verbalized understanding of discharge instructions and all questions answered.  Patient discharged in stable condition accompanied by: self and daughter.  Departure Mode: wheelchair..      Ce Angulo RN

## 2024-04-15 NOTE — PROGRESS NOTES
[facility] Hematology and Oncology Progress Note    Patient: Jasmyn Green  MRN: [id number]  Date of Service: Apr 15, 2024          Reason for Visit    Chief Complaint   Patient presents with    Oncology Clinic Visit     Return visit with lab and infusion. Small cell lung cancer.       Assessment and Plan     Cancer Staging   No matching staging information was found for the patient.    Lung cancer, Small Cell, extensive stage(left upper lobe, pleural mets, mediastinal nodes): Patient started palliative chemotherapy with carboplatin, etoposide and Atezolizumab last week. first cycle given at 20% reduction due to performance status.  She did very well with the treatment so we will try full dose today.  We will reimage with a CT scan after that cycle.  She will see Dr. Stewart to review at that time.    3 mm abnormality seen on brain MRI: I told patient this may or may not be anything to worry about.  She is asymptomatic so at this time we will keep an eye on it and likely repeat another MRI of the brain in roughly 3 weeks.    anemia: Likely from chronic disease, COPD as well as chemotherapy.  This is a little worse today.  We will continue to monitor and give blood transfusion if she drops below 8.  She does have some symptoms of shortness of breath but hard to tell since she has COPD and lung cancer.    Worsening shortness of breath and coughing over the last 3 days: Patient states that does not feel like a typical infection or COPD exacerbation.  It was very nice over the weekend and it is now spreading so it likely could just be allergies.  Encouraged her to take some Zyrtec or Claritin daily over the next couple days to see if it improves.  She will call us if it seems to get worse.        ECOG Performance    2 - Ambulatory and independent in all ADLs; cannot work; up > 50% of the time    Distress Screening (within last 30 days)    No data recorded     Pain       Problem List    Patient Active Problem  List   Diagnosis    COPD exacerbation (H)    History of gout    Coronary artery calcification    Lymphocytosis    Essential hypertension    Asymptomatic hypertensive urgency    Age-related osteoporosis without current pathological fracture    Lung mass    Small cell lung cancer (H)    Chronic respiratory failure with hypoxia (H)        ______________________________________________________________________________    History of Present Illness    Oncologist: Dr. Stewart    Diagnosis: Lung cancer, small cell, extensive stage. Presented to ER for SOB, coughing. Hx of COPD, on oxygen.   -2/8/24: CT: Bulky AP and left hilar adenopathy with irregular pleural-based anterior left upper lobe nodule with adjacent smaller satellite nodules. Findings suspicious for a primary lung cancer   -3/1/24: PET: Findings suspicious for a left upper lobe primary lung neoplasm with adjacent satellite lesion in the left upper lobe, extensive mediastinal lymph node involvement, and several pleural-based metastases in the left hemithorax   -3/14/24: Brain MRI: Findings concerning for a single 3 - 4 mm right occipital lobe cortex metastatic lesion     Treatment:   -3/25/24: start palliative treatment with Carboplatin, Etoposide, atezolizumab.  Today is cycle 2    Interim History:   Patient is here today to continue on treatment.  She states the first cycle went pretty well.  She started losing her hair yesterday so she is disappointed about that.  She states over the last 3 days she has noticed a little bit of increasing coughing and shortness of breath.  She says it does not feel like she normally does when she is having a COPD exacerbation or infection.  She was outside a lot over the weekend so is wondering if that is contributing.  It is a fairly dry cough.  She denies any new bone or back pain.  She also has had a couple episodes where she is having shaking in her hands.  Not really related to anything and quite random.  Overall weight is  stable.  There have been using some medical cannabis but it does not seem like it is increasing her appetite is much as they would like.  They deny any      Review of Systems    Pertinent items are noted in HPI.    Past History    Past Medical History:   Diagnosis Date    Age-related osteoporosis without current pathological fracture     Arthritis     COPD (chronic obstructive pulmonary disease) (H)     Coronary artery disease     Dependence on nocturnal oxygen therapy     Dyspnea on exertion     Emphysema lung (H)     Gout     HLD (hyperlipidemia)     Hypertension     Lung mass        PHYSICAL EXAM  /57 (BP Location: Left arm, Patient Position: Sitting, Cuff Size: Adult Regular)   Pulse (!) 125   Temp 97.9  F (36.6  C) (Oral)   Resp 20   Ht 1.524 m (5')   Wt 45 kg (99 lb 3.2 oz)   LMP  (LMP Unknown)   SpO2 95%   BMI 19.37 kg/m      GENERAL: no acute distress. Cooperative in conversation. Here with daughters. In wheelchair. On oxygen.   RESP: Regular respiratory rate. No expiratory wheezes   NEURO: non focal. Alert and oriented x3.   PSYCH: within normal limits. No depression or anxiety.  SKIN: exposed skin is dry intact.     Lab Results    Recent Results (from the past 168 hour(s))   Comprehensive metabolic panel   Result Value Ref Range    Sodium 139 135 - 145 mmol/L    Potassium 4.2 3.4 - 5.3 mmol/L    Carbon Dioxide (CO2) 28 22 - 29 mmol/L    Anion Gap 7 7 - 15 mmol/L    Urea Nitrogen 20.4 8.0 - 23.0 mg/dL    Creatinine 0.93 0.51 - 0.95 mg/dL    GFR Estimate 66 >60 mL/min/1.73m2    Calcium 9.2 8.8 - 10.2 mg/dL    Chloride 104 98 - 107 mmol/L    Glucose 144 (H) 70 - 99 mg/dL    Alkaline Phosphatase 104 40 - 150 U/L    AST 16 0 - 45 U/L    ALT 21 0 - 50 U/L    Protein Total 6.2 (L) 6.4 - 8.3 g/dL    Albumin 3.4 (L) 3.5 - 5.2 g/dL    Bilirubin Total <0.2 <=1.2 mg/dL   TSH with free T4 reflex   Result Value Ref Range    TSH 1.02 0.30 - 4.20 uIU/mL   CBC with platelets and differential   Result Value  Ref Range    WBC Count 6.7 4.0 - 11.0 10e3/uL    RBC Count 3.06 (L) 3.80 - 5.20 10e6/uL    Hemoglobin 8.9 (L) 11.7 - 15.7 g/dL    Hematocrit 30.1 (L) 35.0 - 47.0 %    MCV 98 78 - 100 fL    MCH 29.1 26.5 - 33.0 pg    MCHC 29.6 (L) 31.5 - 36.5 g/dL    RDW 14.2 10.0 - 15.0 %    Platelet Count 392 150 - 450 10e3/uL    % Neutrophils 67 %    % Lymphocytes 12 %    % Monocytes 12 %    % Eosinophils 5 %    % Basophils 1 %    % Immature Granulocytes 3 %    NRBCs per 100 WBC 0 <1 /100    Absolute Neutrophils 4.5 1.6 - 8.3 10e3/uL    Absolute Lymphocytes 0.8 0.8 - 5.3 10e3/uL    Absolute Monocytes 0.8 0.0 - 1.3 10e3/uL    Absolute Eosinophils 0.3 0.0 - 0.7 10e3/uL    Absolute Basophils 0.0 0.0 - 0.2 10e3/uL    Absolute Immature Granulocytes 0.2 <=0.4 10e3/uL    Absolute NRBCs 0.0 10e3/uL         Imaging    No results found.      The longitudinal plan of care for the diagnosis(es)/condition(s) as documented were addressed during this visit. Due to the added complexity in care, I will continue to support Jasmyn in the subsequent management and with ongoing continuity of care.    Signed by: JIMENEZ Butt CNP

## 2024-04-16 ENCOUNTER — INFUSION THERAPY VISIT (OUTPATIENT)
Dept: INFUSION THERAPY | Facility: HOSPITAL | Age: 69
End: 2024-04-16
Attending: RADIOLOGY
Payer: COMMERCIAL

## 2024-04-16 VITALS
RESPIRATION RATE: 22 BRPM | SYSTOLIC BLOOD PRESSURE: 155 MMHG | TEMPERATURE: 99.8 F | OXYGEN SATURATION: 96 % | DIASTOLIC BLOOD PRESSURE: 65 MMHG | HEART RATE: 114 BPM

## 2024-04-16 DIAGNOSIS — C34.90 SMALL CELL LUNG CANCER (H): Primary | ICD-10-CM

## 2024-04-16 PROCEDURE — 250N000011 HC RX IP 250 OP 636: Performed by: NURSE PRACTITIONER

## 2024-04-16 PROCEDURE — 258N000003 HC RX IP 258 OP 636: Performed by: NURSE PRACTITIONER

## 2024-04-16 PROCEDURE — 96367 TX/PROPH/DG ADDL SEQ IV INF: CPT

## 2024-04-16 PROCEDURE — 96375 TX/PRO/DX INJ NEW DRUG ADDON: CPT

## 2024-04-16 PROCEDURE — 96413 CHEMO IV INFUSION 1 HR: CPT

## 2024-04-16 RX ORDER — EPINEPHRINE 1 MG/ML
0.3 INJECTION, SOLUTION INTRAMUSCULAR; SUBCUTANEOUS EVERY 5 MIN PRN
Status: DISCONTINUED | OUTPATIENT
Start: 2024-04-16 | End: 2024-04-16 | Stop reason: HOSPADM

## 2024-04-16 RX ORDER — LORAZEPAM 2 MG/ML
0.5 INJECTION INTRAMUSCULAR EVERY 4 HOURS PRN
Status: DISCONTINUED | OUTPATIENT
Start: 2024-04-16 | End: 2024-04-16 | Stop reason: HOSPADM

## 2024-04-16 RX ORDER — ALBUTEROL SULFATE 90 UG/1
1-2 AEROSOL, METERED RESPIRATORY (INHALATION)
Status: DISCONTINUED | OUTPATIENT
Start: 2024-04-16 | End: 2024-04-16 | Stop reason: HOSPADM

## 2024-04-16 RX ORDER — METHYLPREDNISOLONE SODIUM SUCCINATE 125 MG/2ML
125 INJECTION, POWDER, LYOPHILIZED, FOR SOLUTION INTRAMUSCULAR; INTRAVENOUS
Status: DISCONTINUED | OUTPATIENT
Start: 2024-04-16 | End: 2024-04-16 | Stop reason: HOSPADM

## 2024-04-16 RX ORDER — ALBUTEROL SULFATE 0.83 MG/ML
2.5 SOLUTION RESPIRATORY (INHALATION)
Status: DISCONTINUED | OUTPATIENT
Start: 2024-04-16 | End: 2024-04-16 | Stop reason: HOSPADM

## 2024-04-16 RX ORDER — DIPHENHYDRAMINE HYDROCHLORIDE 50 MG/ML
50 INJECTION INTRAMUSCULAR; INTRAVENOUS
Status: DISCONTINUED | OUTPATIENT
Start: 2024-04-16 | End: 2024-04-16 | Stop reason: HOSPADM

## 2024-04-16 RX ORDER — MEPERIDINE HYDROCHLORIDE 25 MG/ML
25 INJECTION INTRAMUSCULAR; INTRAVENOUS; SUBCUTANEOUS EVERY 30 MIN PRN
Status: DISCONTINUED | OUTPATIENT
Start: 2024-04-16 | End: 2024-04-16 | Stop reason: HOSPADM

## 2024-04-16 RX ADMIN — DEXAMETHASONE SODIUM PHOSPHATE 12 MG: 10 INJECTION, SOLUTION INTRAMUSCULAR; INTRAVENOUS at 09:55

## 2024-04-16 RX ADMIN — TRILACICLIB 300 MG: 300 INJECTION, POWDER, LYOPHILIZED, FOR SOLUTION INTRAVENOUS at 10:20

## 2024-04-16 RX ADMIN — SODIUM CHLORIDE 250 ML: 9 INJECTION, SOLUTION INTRAVENOUS at 09:26

## 2024-04-16 RX ADMIN — ETOPOSIDE 135 MG: 20 INJECTION INTRAVENOUS at 10:57

## 2024-04-16 ASSESSMENT — PAIN SCALES - GENERAL: PAINLEVEL: NO PAIN (0)

## 2024-04-16 NOTE — PROGRESS NOTES
Infusion Nursing Note:  Jasmyn Green presents today for C2D2.    Patient seen by provider today: No   present during visit today: Not Applicable.    Note:  VSS.  Pt assessed and is feeling tired and feels like she has allergies.  She took allergy medication recommended by NP yesterday and reports feeling better. Pt taken off her oxygen and was placed on our O2 at 3 liters.  Peripheral IV flushed and saline locked for treatment tomorrow. Treatment reviewed with pt. Jasmyn received treatment as ordered. Will return tomorrow.      Intravenous Access:  PIV from 4/15 would not give blood return and was tender with NS and was removed.  New Peripheral IV placed.    Treatment Conditions:  Not Applicable.      Post Infusion Assessment:  Patient tolerated infusion without incident.  Blood return noted pre and post infusion.  No evidence of extravasations.   PIV left in place for tomorrow's infusion at pt request.      Discharge Plan:   Discharge instructions reviewed with: Patient.  Patient and/or family verbalized understanding of discharge instructions and all questions answered.  Patient discharged in stable condition accompanied by: self and family.  Departure Mode: Ambulatory.      Ce Angulo RN

## 2024-04-17 ENCOUNTER — INFUSION THERAPY VISIT (OUTPATIENT)
Dept: INFUSION THERAPY | Facility: HOSPITAL | Age: 69
End: 2024-04-17
Attending: INTERNAL MEDICINE
Payer: COMMERCIAL

## 2024-04-17 VITALS
SYSTOLIC BLOOD PRESSURE: 156 MMHG | OXYGEN SATURATION: 97 % | DIASTOLIC BLOOD PRESSURE: 73 MMHG | TEMPERATURE: 97.9 F | RESPIRATION RATE: 18 BRPM | HEART RATE: 114 BPM

## 2024-04-17 DIAGNOSIS — C34.90 SMALL CELL LUNG CANCER (H): Primary | ICD-10-CM

## 2024-04-17 PROCEDURE — 250N000011 HC RX IP 250 OP 636: Performed by: NURSE PRACTITIONER

## 2024-04-17 PROCEDURE — 258N000003 HC RX IP 258 OP 636: Performed by: NURSE PRACTITIONER

## 2024-04-17 PROCEDURE — 96367 TX/PROPH/DG ADDL SEQ IV INF: CPT

## 2024-04-17 PROCEDURE — 96413 CHEMO IV INFUSION 1 HR: CPT

## 2024-04-17 PROCEDURE — 96375 TX/PRO/DX INJ NEW DRUG ADDON: CPT

## 2024-04-17 RX ADMIN — TRILACICLIB 300 MG: 300 INJECTION, POWDER, LYOPHILIZED, FOR SOLUTION INTRAVENOUS at 09:39

## 2024-04-17 RX ADMIN — ETOPOSIDE 135 MG: 20 INJECTION INTRAVENOUS at 10:43

## 2024-04-17 RX ADMIN — SODIUM CHLORIDE 250 ML: 9 INJECTION, SOLUTION INTRAVENOUS at 08:55

## 2024-04-17 RX ADMIN — DEXAMETHASONE SODIUM PHOSPHATE 12 MG: 10 INJECTION, SOLUTION INTRAMUSCULAR; INTRAVENOUS at 09:04

## 2024-04-17 NOTE — PROGRESS NOTES
Pt here for D3 treatment. IV from yesterday patent and used without incident for todays treatment. No problem with treatment as directed and IV removed upon completion. Pt's veins small  and would like a port a cath. Reviewed with KE and order placed. Pt aware and d.c using wheel chair to lobby with her son.

## 2024-04-18 ENCOUNTER — MYC MEDICAL ADVICE (OUTPATIENT)
Dept: INTERVENTIONAL RADIOLOGY/VASCULAR | Facility: CLINIC | Age: 69
End: 2024-04-18
Payer: COMMERCIAL

## 2024-04-18 ENCOUNTER — PATIENT OUTREACH (OUTPATIENT)
Dept: CARE COORDINATION | Facility: CLINIC | Age: 69
End: 2024-04-18
Payer: COMMERCIAL

## 2024-04-18 NOTE — PROGRESS NOTES
Clinic Care Coordination Contact  Community Health Worker Follow Up    Care Gaps:     Health Maintenance Due   Topic Date Due    COPD ACTION PLAN  Never done    ZOSTER IMMUNIZATION (1 of 2) Never done    RSV VACCINE (Pregnancy & 60+) (1 - 1-dose 60+ series) Never done    COVID-19 Vaccine (3 - Moderna risk series) 03/07/2022    INFLUENZA VACCINE (1) 09/01/2023       Patient accepted scheduling phone number for [facility]  to schedule independently     Care Plan:   Care Plan: Community Resources       Problem: Community Resources       Goal: I have accomplished being approved for community programs.  Completed 4/18/2024      Start Date: 2/28/2024    This Visit's Progress: 100%    Note:     Personal Plan  I have accomplished obtaining needed services. If I have any questions about Social Security I will call the Social Security office. I have been approved for 2 grants to help me financially. If I have any questions about those grants I will call my Oncology Social Worker.                             Problem: Food Support       Goal: I have accomplished establishing services with [employer].  Completed 4/18/2024      Start Date: 2/28/2024    This Visit's Progress: 100%    Note:     Personal Plan  If we have any questions about [employer] I will contact them directly.                         Problem: In-home support       Goal: I have accomplished starting the process for applying for in home support.  Completed 4/18/2024      Start Date: 2/28/2024    This Visit's Progress: 100%    Note:     Personal Plan  If I have any questions about the [facility] programs I will contact them directly at [phone].                              Intervention and Education during outreach: Patient has accomplished goals. Patient was approved for Social Security, [employer] and received 2 financial grants. Patient is currently applying for Medicare and [facility] Waiver program. Patients daughter let me know that she has been in  contact with the Oncology SW and feels comfortable with CCC goals being completed and having patient on CCC Maintenance. Patients daughter very appreciative of resources given to them by [facility]. No other needs at this time.     CHW Plan: Patient has accomplished goals and has no other goals that this patient would like to work on with Clinic Care Coordination. Community Health Worker sent request to [facility] SW pool to review for Maintenance.     Edelmira Morales  Community Health Worker  [facility] Care Coordination   [place], [place], [place], [facility]  Office: [phone]

## 2024-04-18 NOTE — PROGRESS NOTES
Clinic Care Coordination Contact  Situation: Patient chart reviewed by Care Coordinator per CHW request to review for transition to maintenance and for 6 week chart review. Last contact with CHW 4/18/24.      Background: Care Coordination Program started: 2/19/24. Initial assessment completed and patient-centered care plan(s) were developed with participation from patient. Lead CC handed patient off to CHW for continued outreaches.       Assessment: Per chart review, patient outreach completed by CC CHW on 4/18/24.  Patient has accomplished their goal(s). Patient is not due for updated Plan of Care.  Assessments will be completed annually or as needed/with change of patient status.             Plan/Recommendations: Patient moved to Maintenance. CHW will continue outreaches at minimum every 60 days and will involve Lead CC as needed.  Lead CC will continue to monitor CHW outreaches and patient's progress to goal(s) every 6 weeks.     Plan of Care updated and sent to patient: no

## 2024-04-19 DIAGNOSIS — J44.9 COPD, SEVERE (H): Primary | ICD-10-CM

## 2024-04-22 ENCOUNTER — VIRTUAL VISIT (OUTPATIENT)
Dept: GASTROENTEROLOGY | Facility: CLINIC | Age: 69
End: 2024-04-22
Attending: INTERNAL MEDICINE
Payer: COMMERCIAL

## 2024-04-22 DIAGNOSIS — R11.2 NAUSEA AND VOMITING, UNSPECIFIED VOMITING TYPE: ICD-10-CM

## 2024-04-22 PROCEDURE — 99203 OFFICE O/P NEW LOW 30 MIN: CPT | Mod: 95 | Performed by: INTERNAL MEDICINE

## 2024-04-22 NOTE — PROGRESS NOTES
Jasmyn is a 68 year old who is being evaluated via a billable video visit.    How would you like to obtain your AVS? MyChart  If the video visit is dropped, the invitation should be resent by: Text to cell phone: [phone]  Will anyone else be joining your video visit? Yes: family. How would they like to receive their invitation?       Assessment & Plan   Problem List Items Addressed This Visit    None  Visit Diagnoses       Nausea and vomiting, unspecified vomiting type                 Prescription drug management  8 minutes spent by me on the date of the encounter doing patient visit          No follow-ups on file.      Subjective   Jasmyn is a 68 year old, presenting for the following health issues:  Consult (Nausea/Vomiting)    HPI     Metastatic small cell lung cancer on palliative chemotherapy.  Patient and family note episodic vomiting.  This occurs without clear etiology.  Sometimes this is after coughing which raises mechanism of posttussive emesis.  Chemo is tolerated well.  There is no nausea in between times if it does develop Zofran is effective.  Use of marijuana failed to stimulate appetite and made the patient sleepy.  Diet as tolerated.  Supplements as needed.  Patient and family have questions whether hiatal hernia could contribute to symptoms.    Past medical history reviewed on epic    Medications reviewed on epic    Allergies reviewed on epic    Review of systems otherwise negative noncontributory.          Objective           Vitals:  No vitals were obtained today due to virtual visit.    Physical Exam   GENERAL: alert and no distress  EYES: Eyes grossly normal to inspection.  No discharge or erythema, or obvious scleral/conjunctival abnormalities.  RESP: No audible wheeze, cough, or visible cyanosis.    SKIN: Visible skin clear. No significant rash, abnormal pigmentation or lesions.  NEURO: Cranial nerves grossly intact.  Mentation and speech appropriate for age.  PSYCH: Appropriate affect,  tone, and pace of words    Impression/plan: Metastatic small cell carcinoma.  Chemotherapy.  Brain MRI is pending to follow-up 3 mm lesion seen on the last scan to monitor for CNS mets.  Significant severe chronic obstructive pulmonary disease oxygen therapy.  No need for endoscopy or contrast studies.  Reassurance that hiatal hernias are common and are not necessarily contributing to symptoms.  Suggest proton pump inhibitor for gastric protection from daily aspirin and PPI would be more effective than an H2 receptor antagonist.  Encourage cessation of marijuana to reduce factors that would contribute to CHS.  Defer to oncology team.  As needed follow-up with GI.      Video-Visit Details    Type of service:  Video Visit   Originating Location (pt. Location): Home    Distant Location (provider location):  On-site  Platform used for Video Visit: Jacey  Signed Electronically by: Hector Gresahm MD    Start 12:55  End 1:03

## 2024-04-22 NOTE — PATIENT INSTRUCTIONS
As discussed    1.  Over-the-counter Prilosec once daily premeals may help with the nausea and also would provide gastric protection from the aspirin    2.  Compazine, Zofran medications are excellent choices.  Sometimes a transdermal patch can be helpful.    3.  From a GI point of view we see individuals on marijuana that have nausea vomiting and abdominal pain from the metabolites.    4.   Ensure supplements as needed are a good idea    As needed return

## 2024-04-24 ENCOUNTER — MYC MEDICAL ADVICE (OUTPATIENT)
Dept: INTERVENTIONAL RADIOLOGY/VASCULAR | Facility: CLINIC | Age: 69
End: 2024-04-24
Payer: COMMERCIAL

## 2024-04-30 NOTE — PROGRESS NOTES
"  Interventional Radiology - Pre Procedure Chart Review  [facility]  May 2, 2024    Procedure Requested: port placement  Requested by: INEZ Mathur      History and Physical Reviewed: H&P documented within 30 days (by INEZ Mathur on 4/15/24).  I have personally reviewed the patient's medical history and have updated the medical record as necessary.    HPI: 68 year old patient with history of COPD, HTN and lung cancer of left upper lobe with pleural mets and medastinal nodes. Undergoing palliative chemotherapy. Presenting for port placement to facilitate chemotherapy treatment(s).     Clinical notes reviewed    Pertinent medications reviewed:   Anticoagulation: none per chart review   Antibiotic: Ancef 2g ordered for procedure    No Known Allergies      EXAM:  LMP  (LMP Unknown)   Not assessed    LABS:  INR (no units)   Date Value   06/27/2021 1.32 (H)       Lab Results   Component Value Date    WBC 6.7 04/15/2024    HGB 8.9 (L) 04/15/2024     04/15/2024       No results found for: \"CREATININE\"    No components found for: \"K\"      PLAN:  Planning for port placement in IR with laterality per proceduralist discretion.     Radiologist to further review procedure with patient.    EMR reviewed. No formal assessment completed.     Total time: 15 minutes       JIMENEZ Ayala CNP  Interventional Radiology    "

## 2024-05-01 ENCOUNTER — VIRTUAL VISIT (OUTPATIENT)
Dept: PALLIATIVE MEDICINE | Facility: CLINIC | Age: 69
End: 2024-05-01
Attending: NURSE PRACTITIONER
Payer: COMMERCIAL

## 2024-05-01 VITALS — HEIGHT: 60 IN | BODY MASS INDEX: 19.04 KG/M2 | WEIGHT: 97 LBS

## 2024-05-01 DIAGNOSIS — R11.2 NAUSEA AND VOMITING, UNSPECIFIED VOMITING TYPE: ICD-10-CM

## 2024-05-01 DIAGNOSIS — C34.90 MALIGNANT NEOPLASM OF LUNG, UNSPECIFIED LATERALITY, UNSPECIFIED PART OF LUNG (H): ICD-10-CM

## 2024-05-01 DIAGNOSIS — G89.3 CANCER ASSOCIATED PAIN: Primary | ICD-10-CM

## 2024-05-01 DIAGNOSIS — J44.9 COPD, GROUP D, BY GOLD 2017 CLASSIFICATION (H): ICD-10-CM

## 2024-05-01 PROCEDURE — 99215 OFFICE O/P EST HI 40 MIN: CPT | Mod: 95 | Performed by: NURSE PRACTITIONER

## 2024-05-01 RX ORDER — OXYCODONE HYDROCHLORIDE 5 MG/1
2.5-5 TABLET ORAL EVERY 4 HOURS PRN
Qty: 25 TABLET | Refills: 0 | Status: SHIPPED | OUTPATIENT
Start: 2024-05-01 | End: 2024-06-17

## 2024-05-01 RX ORDER — ONDANSETRON 8 MG/1
8 TABLET, ORALLY DISINTEGRATING ORAL EVERY 8 HOURS PRN
Qty: 30 TABLET | Refills: 1 | Status: SHIPPED | OUTPATIENT
Start: 2024-05-01 | End: 2024-09-23

## 2024-05-01 RX ORDER — ACETAMINOPHEN 500 MG
1000 TABLET ORAL EVERY 8 HOURS PRN
COMMUNITY
Start: 2024-05-01

## 2024-05-01 NOTE — PATIENT INSTRUCTIONS
Thank you for meeting with us in the [facility].    How to get a hold of us:  For non-urgent matters, MyChart works best.    For more urgent matters, or if you prefer not to use MyChart, call our clinic nurse coordinator Hailee Paredes RN at [phone] or [phone]    We have an on-call number for evenings and weekends. Please call this only if you are having uncontrolled symptoms or serious side effects from your medicines: [phone].     For refills, please give us a week (5 working days) notice. We don't always have providers available everyday to do refills. If you call the day you run out of your medicine, we may not be able to refill it in time, so call 5 days in advance!

## 2024-05-01 NOTE — PROGRESS NOTES
Virtual Visit Details    Type of service:  Video Visit   Originating Location (pt. Location): Home    Distant Location (provider location):  On-site  Platform used for Video Visit: [facility]      Palliative Care Outpatient Clinic      Patient ID/Chief Complaint: Jasmyn Green 68 year old female who is presenting to the palliative medicine clinic today at the request of Tata Lainez NP for a palliative care consultation secondary to symptom management/goals of care.   The patient's primary care provider is:  Miesha Fernando.       Impression & Recommendations:  68-year-old female with new diagnosis of extensive stage small cell lung cancer (including possible small solitary brain metastasis) on palliative chemoimmunotherapy with plans for possible radiation, severe COPD on chronic supplemental oxygen (3-5 LPM depending on activity level), history of tobacco use that is now stopped, very remote history of heavier alcohol use that stopped decades ago, CAD, HTN, osteoporosis, history of gout, and problems with anxiety.    Social history includes having 3 children Georgette, Britni, and Braden, lives with daughter Georgette, , good family support.        Symptoms/recommendations/discussion:  Chronic dyspnea related to advanced COPD worsened by recent diagnosis of lung cancer with accompanying worsening of anxiety: She reports that she is dyspneic at rest but is used to sensation and it does not cause her much distress.  Could consider very low-dose opioids in the future or very low-dose benzodiazepine if needed.  Anxiety: Improved with recent addition of Remeron 7.5 mg (15 mg was too sedating) and Atarax/hydroxyzine 25 mg as needed.  Decreased appetite with weight loss: Present prior to cancer diagnosis likely related to advanced COPD.  Continue Remeron.  Medical cannabis has not really stimulated appetite as hoped.  Dietitian consult planned.  Medical cannabis to continue as daughter feels helps with sleep. GI  expressed concerns for Cannabis associated hyperemesis syndrome---would be very uncommon as she is not long-term cannabis user.   New onset LT flank pain, likely cancer associated. Start oxycodone IR 2.5 to 5 mg every 4 hours as needed.  She will start taking Tylenol 1000 mg twice daily or 3 times daily.  Will consider Butrans patch or other long-acting if needed.  She has upcoming scans that may make clearer cause of new onset pain.  Daughter Julissa watches her closely.  Narcan nasal spray sent for safety reasons  She has already completed a healthcare directive expressing her wishes. Georgette is her primary healthcare agent.  POLST in EMR.  No CPR and DO NOT INTUBATE.   Palliative care follow-up in approximately 2 weeks.         How to get a hold of us:  For non-urgent matters, MyChart works best.    For more urgent matters, or if you prefer not to use MyChart, call our clinic nurse coordinator Hailee Paredes RN at [phone] or [phone]    We have an on-call number for evenings and weekends. Please call this only if you are having uncontrolled symptoms or serious side effects from your medicines: [phone].     For refills, please give us a week (5 working days) notice. We don't always have providers available everyday to do refills. If you call the day you run out of your medicine, we may not be able to refill it in time, so call 5 days in advance        History:  History gathered today from: patient, family/loved ones, medical chart, health care directive/s      PE: Ht 1.524 m (5')   Wt 44 kg (97 lb)   LMP  (LMP Unknown)   BMI 18.94 kg/m     Wt Readings from Last 3 Encounters:   05/01/24 44 kg (97 lb)   04/15/24 45 kg (99 lb 3.2 oz)   04/04/24 45 kg (99 lb 3.3 oz)       Gen tachypneic, no apparent distress, alert  Head NCAT.  Eyes anicteric without injection  Face symmetric, eyes conjugate  Lungs tachypneic, no cough, speaking full sentences nasal cannula oxygen  Skin no rashes or lesions evident on  face/neck  Neuro Face symmetric, eyes conjugate; speech fluent.  Neuropsych exam normal including affect, sensorium, gross memory, thought processes, and fund of knowledge.         Data reviewed:  I reviewed electrolytes, BUN/creatinine, liver profile, hemoglobin and hematocrit, platelet count, and most recent imaging  Oncology notes     database reviewed: 4/30        45 minutes spent on the date of the encounter doing chart review, history and exam, patient education & counseling, documentation and other activities as noted above.        Thank you for involving us in the patient's care.   JIMENEZ Huggins, [facility] Palliative Care [facility]

## 2024-05-01 NOTE — NURSING NOTE
Is the patient currently in the state of [place]? YES    Visit mode:VIDEO    If the visit is dropped, the patient can be reconnected by: TELEPHONE VISIT: Phone number: [phone]    Will anyone else be joining the visit? Yes, daughter Julissa is there with her. (If patient encounters technical issues they should call [phone] :[phone])    How would you like to obtain your AVS? MyChart    Are changes needed to the allergy or medication list? No    Are refills needed on medications prescribed by this physician? NO    Reason for visit: RECHECK    Jennifer SHETTY

## 2024-05-02 ENCOUNTER — HOSPITAL ENCOUNTER (OUTPATIENT)
Dept: INTERVENTIONAL RADIOLOGY/VASCULAR | Facility: CLINIC | Age: 69
Discharge: HOME | End: 2024-05-02
Attending: NURSE PRACTITIONER | Admitting: NURSE PRACTITIONER
Payer: COMMERCIAL

## 2024-05-02 ENCOUNTER — PATIENT OUTREACH (OUTPATIENT)
Dept: CARE COORDINATION | Facility: CLINIC | Age: 69
End: 2024-05-02
Payer: COMMERCIAL

## 2024-05-02 VITALS
HEART RATE: 116 BPM | DIASTOLIC BLOOD PRESSURE: 62 MMHG | TEMPERATURE: 98.2 F | OXYGEN SATURATION: 96 % | RESPIRATION RATE: 22 BRPM | SYSTOLIC BLOOD PRESSURE: 124 MMHG

## 2024-05-02 DIAGNOSIS — C34.90 SMALL CELL LUNG CANCER (H): ICD-10-CM

## 2024-05-02 PROCEDURE — 258N000003 HC RX IP 258 OP 636: Performed by: NURSE PRACTITIONER

## 2024-05-02 PROCEDURE — 250N000009 HC RX 250: Performed by: NURSE PRACTITIONER

## 2024-05-02 PROCEDURE — C1788 PORT, INDWELLING, IMP: HCPCS

## 2024-05-02 PROCEDURE — 250N000009 HC RX 250: Performed by: RADIOLOGY

## 2024-05-02 PROCEDURE — 36561 INSERT TUNNELED CV CATH: CPT

## 2024-05-02 PROCEDURE — C1769 GUIDE WIRE: HCPCS

## 2024-05-02 PROCEDURE — 272N000500 HC NEEDLE CR2

## 2024-05-02 PROCEDURE — 99152 MOD SED SAME PHYS/QHP 5/>YRS: CPT

## 2024-05-02 PROCEDURE — 250N000011 HC RX IP 250 OP 636: Performed by: NURSE PRACTITIONER

## 2024-05-02 PROCEDURE — 250N000011 HC RX IP 250 OP 636: Performed by: RADIOLOGY

## 2024-05-02 RX ORDER — LIDOCAINE HYDROCHLORIDE AND EPINEPHRINE 10; 10 MG/ML; UG/ML
1-20 INJECTION, SOLUTION INFILTRATION; PERINEURAL ONCE
Status: COMPLETED | OUTPATIENT
Start: 2024-05-02 | End: 2024-05-02

## 2024-05-02 RX ORDER — SODIUM CHLORIDE 9 MG/ML
INJECTION, SOLUTION INTRAVENOUS CONTINUOUS
Status: DISCONTINUED | OUTPATIENT
Start: 2024-05-02 | End: 2024-05-03 | Stop reason: HOSPADM

## 2024-05-02 RX ORDER — NALOXONE HYDROCHLORIDE 0.4 MG/ML
0.2 INJECTION, SOLUTION INTRAMUSCULAR; INTRAVENOUS; SUBCUTANEOUS
Status: DISCONTINUED | OUTPATIENT
Start: 2024-05-02 | End: 2024-05-03 | Stop reason: HOSPADM

## 2024-05-02 RX ORDER — HEPARIN SODIUM (PORCINE) LOCK FLUSH IV SOLN 100 UNIT/ML 100 UNIT/ML
500 SOLUTION INTRAVENOUS ONCE
Status: COMPLETED | OUTPATIENT
Start: 2024-05-02 | End: 2024-05-02

## 2024-05-02 RX ORDER — NALOXONE HYDROCHLORIDE 0.4 MG/ML
0.4 INJECTION, SOLUTION INTRAMUSCULAR; INTRAVENOUS; SUBCUTANEOUS
Status: DISCONTINUED | OUTPATIENT
Start: 2024-05-02 | End: 2024-05-03 | Stop reason: HOSPADM

## 2024-05-02 RX ORDER — FLUMAZENIL 0.1 MG/ML
0.2 INJECTION, SOLUTION INTRAVENOUS
Status: DISCONTINUED | OUTPATIENT
Start: 2024-05-02 | End: 2024-05-03 | Stop reason: HOSPADM

## 2024-05-02 RX ORDER — LIDOCAINE 40 MG/G
CREAM TOPICAL
Status: DISCONTINUED | OUTPATIENT
Start: 2024-05-02 | End: 2024-05-03 | Stop reason: HOSPADM

## 2024-05-02 RX ORDER — FENTANYL CITRATE 50 UG/ML
25-50 INJECTION, SOLUTION INTRAMUSCULAR; INTRAVENOUS EVERY 5 MIN PRN
Status: DISCONTINUED | OUTPATIENT
Start: 2024-05-02 | End: 2024-05-03 | Stop reason: HOSPADM

## 2024-05-02 RX ORDER — CEFAZOLIN SODIUM/WATER 2 G/20 ML
2 SYRINGE (ML) INTRAVENOUS
Status: COMPLETED | OUTPATIENT
Start: 2024-05-02 | End: 2024-05-02

## 2024-05-02 RX ADMIN — SODIUM CHLORIDE: 9 INJECTION, SOLUTION INTRAVENOUS at 12:21

## 2024-05-02 RX ADMIN — SODIUM CHLORIDE 500 ML: 9 INJECTION, SOLUTION INTRAVENOUS at 13:03

## 2024-05-02 RX ADMIN — HEPARIN 500 UNITS: 100 SYRINGE at 13:20

## 2024-05-02 RX ADMIN — FENTANYL CITRATE 50 MCG: 50 INJECTION, SOLUTION INTRAMUSCULAR; INTRAVENOUS at 13:04

## 2024-05-02 RX ADMIN — MIDAZOLAM HYDROCHLORIDE 1 MG: 1 INJECTION, SOLUTION INTRAMUSCULAR; INTRAVENOUS at 13:04

## 2024-05-02 RX ADMIN — MIDAZOLAM HYDROCHLORIDE 1 MG: 1 INJECTION, SOLUTION INTRAMUSCULAR; INTRAVENOUS at 13:12

## 2024-05-02 RX ADMIN — FENTANYL CITRATE 50 MCG: 50 INJECTION, SOLUTION INTRAMUSCULAR; INTRAVENOUS at 13:12

## 2024-05-02 RX ADMIN — LIDOCAINE HYDROCHLORIDE,EPINEPHRINE BITARTRATE 10 ML: 10; .01 INJECTION, SOLUTION INFILTRATION; PERINEURAL at 13:12

## 2024-05-02 RX ADMIN — Medication 2 G: at 12:17

## 2024-05-02 NOTE — DISCHARGE INSTRUCTIONS
Port Placement Procedure Discharge Instructions:  You had a port placed. A port is a small medical device that is placed under the skin and is connected to a vein with a catheter (thin, flexible tube). Ports can be used to administer IV medications (including chemotherapy), fluids or blood products or for blood lab draws. Please follow the below instructions after your procedure:    Care Instructions:  - If you received sedation for your procedure, do not drive or operate heavy machinery for the rest of the day.  - You may shower beginning tomorrow (post procedure day #1). Do not scrub site until well healed; pat dry gently with a towel.  - You likely have skin adhesive over your port site. Skin adhesive works like a bandage to keep the site covered and protected. Do not use antibiotic ointment or creams/lotions over adhesive as it can break it down. The skin adhesive will peel off on its own (typically in 5-14 days).  - Avoid submerging the port site under water (ex: tub baths, Jacuzzis, lakes, hot tubs and pools) for 10 days or until your site is well healed.  - You may have some discomfort, minimal swelling, redness and/or bruising at your port site/procedure site. You may take over the counter pain medication for discomfort (follow the package directions) or apply an ice pack wrapped in a towel over the site (rotating 20 minutes with ice pack on and 20 minutes with ice pack off) for comfort as needed. It can take several days for these to resolve.  - Avoid heavy lifting (greater than 10 pounds) and strenuous activities for 2 days following your procedure.   - If you experience significant bleeding at site, apply pressure with hands above the clavicle bone, sit upright and seek immediate medical assistance.  - Ports need to be flushed approximately every 4-6 weeks, if not being used more frequently. Follow up with the provider who ordered your port placement for further instructions for this.    Seek medical  evaluation or contact Andrzej BERNARD RN Line at [phone] if you experience the following:  - Uncontrolled bleeding from port site  - Fever (greater than 101 F (38.3C))  - Purulent (yellow/green/foul smelling) drainage from port insertion site  - Increasing pain at port site  - Increasing redness at port site

## 2024-05-02 NOTE — PROCEDURES
RADIOLOGY POST PROCEDURE NOTE WITH SEDATION  Patient name: Jasmyn Green  MRN: [id number]  : 1955    Pre-procedure diagnosis: Lung cancer, left  Post-procedure diagnosis: Same    Procedure Date/Time: May 2, 2024  1:17 PM    Procedure: Port-a-cath placement  Estimated blood loss: Minimal  Sedation: Moderate sedation was employed. The patient was monitored by a nurse at all times during the procedure under my direct supervision.    Specimen(s) collected with description: None    I determined this patient to be an appropriate candidate for the planned sedation and procedure and reassessed the patient IMMEDIATELY PRIOR to sedation and procedure.     The patient tolerated the procedure well with no immediate complications.    Significant findings: Successful placement of port-a-cath. Ready for immediate use.    See imaging dictation for procedural details.    Provider name: Jonathan Spann M.D.  Assistant(s):None

## 2024-05-02 NOTE — PROGRESS NOTES
Social Work - Follow-Up  [facility]    Data/Intervention:    Patient Name: Jasmyn Green Goes By: Jasmyn    /Age: 1955 (68 year old)    Reason for Follow-Up:  Return dtr Julissa's call    Intervention/Education/Resources Provided:  Returned dtr Julissa's call. She had questions re financial resources for medical bills. She has not [facility] Jasmyn's medicaid. She is aware if she qualifies medicaid would be retroactive. Provided information about [facility] if pt does not qualify for medicaid. Also encouraged her to call billing re ability to pay and questions about bills. No other needs at time of call.     Assessment/Plan:  Previously provided patient/family with writer's contact information and availability.    JANET Rodriguez, [facility]  Adult Oncology Clinics  [place] (M,W), [place] (T) & [place] (Th)  *I am off Friday  Office: [phone]

## 2024-05-02 NOTE — PROGRESS NOTES
Patient Name: Jasmyn Green  Medical Record Number: [id number]  Today's Date: 5/2/2024    Procedure: Image Guided Chest Port Placement with moderate sedation  Proceduralist: Dr. Jonathan Spann  Pathology present: n/a    Procedure Start: 1301  Procedure end: 1316  Sedation medications administered: Fentanyl 100 mcg, Versed 2 mg     Report given to: HU [facility] IR  : n/a    Other Notes: Pt arrived to IR room #1 from IR Pre / Post 1. Consent reviewed. Pt denies any questions or concerns regarding procedure. Pt positioned supine and monitored per protocol. Pt tolerated procedure without any noted complications. Pt transferred back to IR Pre / Post 1.    Dr. Spann placed 8 fr AngioDynamics Smart Port (Lot #[id number]) via right internal jugular vein and confirmed placement of catheter tip in atriocaval junction with fluoroscopy.

## 2024-05-03 ENCOUNTER — HOSPITAL ENCOUNTER (OUTPATIENT)
Dept: CT IMAGING | Facility: CLINIC | Age: 69
Discharge: HOME | End: 2024-05-03
Attending: NURSE PRACTITIONER
Payer: COMMERCIAL

## 2024-05-03 ENCOUNTER — HOSPITAL ENCOUNTER (OUTPATIENT)
Dept: MRI IMAGING | Facility: CLINIC | Age: 69
Discharge: HOME | End: 2024-05-03
Attending: NURSE PRACTITIONER
Payer: COMMERCIAL

## 2024-05-03 DIAGNOSIS — G93.9 BRAIN LESION: ICD-10-CM

## 2024-05-03 DIAGNOSIS — C34.90 SMALL CELL LUNG CANCER (H): ICD-10-CM

## 2024-05-03 LAB — RADIOLOGIST FLAGS: ABNORMAL

## 2024-05-03 PROCEDURE — 71260 CT THORAX DX C+: CPT

## 2024-05-03 PROCEDURE — 250N000011 HC RX IP 250 OP 636: Performed by: NURSE PRACTITIONER

## 2024-05-03 PROCEDURE — 70553 MRI BRAIN STEM W/O & W/DYE: CPT

## 2024-05-03 PROCEDURE — A9585 GADOBUTROL INJECTION: HCPCS | Performed by: NURSE PRACTITIONER

## 2024-05-03 PROCEDURE — 255N000002 HC RX 255 OP 636: Performed by: NURSE PRACTITIONER

## 2024-05-03 RX ORDER — HEPARIN SODIUM (PORCINE) LOCK FLUSH IV SOLN 100 UNIT/ML 100 UNIT/ML
5-10 SOLUTION INTRAVENOUS
Status: DISCONTINUED | OUTPATIENT
Start: 2024-05-03 | End: 2024-05-04 | Stop reason: HOSPADM

## 2024-05-03 RX ORDER — HEPARIN SODIUM,PORCINE 10 UNIT/ML
5-10 VIAL (ML) INTRAVENOUS EVERY 24 HOURS
Status: DISCONTINUED | OUTPATIENT
Start: 2024-05-03 | End: 2024-05-04 | Stop reason: HOSPADM

## 2024-05-03 RX ORDER — IOPAMIDOL 755 MG/ML
100 INJECTION, SOLUTION INTRAVASCULAR ONCE
Status: DISCONTINUED | OUTPATIENT
Start: 2024-05-03 | End: 2024-05-03

## 2024-05-03 RX ORDER — HEPARIN SODIUM,PORCINE 10 UNIT/ML
5-10 VIAL (ML) INTRAVENOUS
Status: DISCONTINUED | OUTPATIENT
Start: 2024-05-03 | End: 2024-05-04 | Stop reason: HOSPADM

## 2024-05-03 RX ORDER — GADOBUTROL 604.72 MG/ML
4.5 INJECTION INTRAVENOUS ONCE
Status: COMPLETED | OUTPATIENT
Start: 2024-05-03 | End: 2024-05-03

## 2024-05-03 RX ORDER — IOPAMIDOL 755 MG/ML
48 INJECTION, SOLUTION INTRAVASCULAR ONCE
Status: COMPLETED | OUTPATIENT
Start: 2024-05-03 | End: 2024-05-03

## 2024-05-03 RX ADMIN — GADOBUTROL 4.5 ML: 604.72 INJECTION INTRAVENOUS at 09:37

## 2024-05-03 RX ADMIN — IOPAMIDOL 48 ML: 755 INJECTION, SOLUTION INTRAVENOUS at 08:43

## 2024-05-03 RX ADMIN — HEPARIN 5 ML: 100 SYRINGE at 10:04

## 2024-05-06 ENCOUNTER — INFUSION THERAPY VISIT (OUTPATIENT)
Dept: INFUSION THERAPY | Facility: HOSPITAL | Age: 69
End: 2024-05-06
Attending: INTERNAL MEDICINE
Payer: COMMERCIAL

## 2024-05-06 ENCOUNTER — PATIENT OUTREACH (OUTPATIENT)
Dept: ONCOLOGY | Facility: HOSPITAL | Age: 69
End: 2024-05-06

## 2024-05-06 ENCOUNTER — ONCOLOGY VISIT (OUTPATIENT)
Dept: ONCOLOGY | Facility: HOSPITAL | Age: 69
End: 2024-05-06
Attending: INTERNAL MEDICINE
Payer: COMMERCIAL

## 2024-05-06 VITALS
DIASTOLIC BLOOD PRESSURE: 53 MMHG | SYSTOLIC BLOOD PRESSURE: 105 MMHG | RESPIRATION RATE: 20 BRPM | WEIGHT: 94.6 LBS | OXYGEN SATURATION: 95 % | HEART RATE: 110 BPM | TEMPERATURE: 97.6 F | HEIGHT: 60 IN | BODY MASS INDEX: 18.57 KG/M2

## 2024-05-06 DIAGNOSIS — C34.90 SMALL CELL LUNG CANCER (H): Primary | ICD-10-CM

## 2024-05-06 DIAGNOSIS — C34.90 SMALL CELL LUNG CANCER (H): ICD-10-CM

## 2024-05-06 DIAGNOSIS — T45.1X5A ANEMIA ASSOCIATED WITH CHEMOTHERAPY: ICD-10-CM

## 2024-05-06 DIAGNOSIS — D64.81 ANEMIA ASSOCIATED WITH CHEMOTHERAPY: ICD-10-CM

## 2024-05-06 DIAGNOSIS — D64.81 ANEMIA ASSOCIATED WITH CHEMOTHERAPY: Primary | ICD-10-CM

## 2024-05-06 DIAGNOSIS — D73.5 SPLENIC INFARCTION: ICD-10-CM

## 2024-05-06 DIAGNOSIS — T45.1X5A ANEMIA ASSOCIATED WITH CHEMOTHERAPY: Primary | ICD-10-CM

## 2024-05-06 DIAGNOSIS — G93.9 BRAIN LESION: ICD-10-CM

## 2024-05-06 LAB
ABO/RH TYPE: NORMAL
ABO/RH(D): NORMAL
ALBUMIN SERPL BCG-MCNC: 2.9 G/DL (ref 3.5–5.2)
ALP SERPL-CCNC: 141 U/L (ref 40–150)
ALT SERPL W P-5'-P-CCNC: 19 U/L (ref 0–50)
ANION GAP SERPL CALCULATED.3IONS-SCNC: 12 MMOL/L (ref 7–15)
ANTIBODY SCREEN: NEGATIVE
AST SERPL W P-5'-P-CCNC: 43 U/L (ref 0–45)
BASOPHILS # BLD AUTO: ABNORMAL 10*3/UL
BASOPHILS # BLD MANUAL: 0.1 10E3/UL (ref 0–0.2)
BASOPHILS NFR BLD AUTO: ABNORMAL %
BASOPHILS NFR BLD MANUAL: 1 %
BILIRUB SERPL-MCNC: <0.2 MG/DL
BLD PROD TYP BPU: NORMAL
BLOOD COMPONENT TYPE: NORMAL
BUN SERPL-MCNC: 21.2 MG/DL (ref 8–23)
CALCIUM SERPL-MCNC: 7.8 MG/DL (ref 8.8–10.2)
CHLORIDE SERPL-SCNC: 101 MMOL/L (ref 98–107)
CODING SYSTEM: NORMAL
CREAT SERPL-MCNC: 1.1 MG/DL (ref 0.51–0.95)
CROSSMATCH: NORMAL
DEPRECATED HCO3 PLAS-SCNC: 25 MMOL/L (ref 22–29)
EGFRCR SERPLBLD CKD-EPI 2021: 54 ML/MIN/1.73M2
EOSINOPHIL # BLD AUTO: ABNORMAL 10*3/UL
EOSINOPHIL # BLD MANUAL: 0 10E3/UL (ref 0–0.7)
EOSINOPHIL NFR BLD AUTO: ABNORMAL %
EOSINOPHIL NFR BLD MANUAL: 0 %
ERYTHROCYTE [DISTWIDTH] IN BLOOD BY AUTOMATED COUNT: 15 % (ref 10–15)
GLUCOSE SERPL-MCNC: 169 MG/DL (ref 70–99)
HCT VFR BLD AUTO: 22.5 % (ref 35–47)
HGB BLD-MCNC: 6.9 G/DL (ref 11.7–15.7)
IMM GRANULOCYTES # BLD: ABNORMAL 10*3/UL
IMM GRANULOCYTES NFR BLD: ABNORMAL %
ISSUE DATE AND TIME: NORMAL
LYMPHOCYTES # BLD AUTO: ABNORMAL 10*3/UL
LYMPHOCYTES # BLD MANUAL: 1 10E3/UL (ref 0.8–5.3)
LYMPHOCYTES NFR BLD AUTO: ABNORMAL %
LYMPHOCYTES NFR BLD MANUAL: 9 %
MCH RBC QN AUTO: 29.2 PG (ref 26.5–33)
MCHC RBC AUTO-ENTMCNC: 30.7 G/DL (ref 31.5–36.5)
MCV RBC AUTO: 95 FL (ref 78–100)
MONOCYTES # BLD AUTO: ABNORMAL 10*3/UL
MONOCYTES # BLD MANUAL: 0.7 10E3/UL (ref 0–1.3)
MONOCYTES NFR BLD AUTO: ABNORMAL %
MONOCYTES NFR BLD MANUAL: 6 %
NEUTROPHILS # BLD AUTO: ABNORMAL 10*3/UL
NEUTROPHILS # BLD MANUAL: 9.7 10E3/UL (ref 1.6–8.3)
NEUTROPHILS NFR BLD AUTO: ABNORMAL %
NEUTROPHILS NFR BLD MANUAL: 84 %
NRBC # BLD AUTO: 0 10E3/UL
NRBC # BLD AUTO: 0.2 10E3/UL
NRBC BLD AUTO-RTO: 0 /100
NRBC BLD MANUAL-RTO: 2 %
PLAT MORPH BLD: ABNORMAL
PLATELET # BLD AUTO: 464 10E3/UL (ref 150–450)
POLYCHROMASIA BLD QL SMEAR: SLIGHT
POTASSIUM SERPL-SCNC: 4.2 MMOL/L (ref 3.4–5.3)
PROT SERPL-MCNC: 6 G/DL (ref 6.4–8.3)
RBC # BLD AUTO: 2.36 10E6/UL (ref 3.8–5.2)
RBC MORPH BLD: ABNORMAL
SODIUM SERPL-SCNC: 138 MMOL/L (ref 135–145)
SPECIMEN EXPIRATION DATE: NORMAL
TSH SERPL DL<=0.005 MIU/L-ACNC: 0.53 UIU/ML (ref 0.3–4.2)
UNIT ABO/RH: NORMAL
UNIT NUMBER: NORMAL
UNIT STATUS: NORMAL
UNIT TYPE ISBT: 6200
WBC # BLD AUTO: 11.5 10E3/UL (ref 4–11)

## 2024-05-06 PROCEDURE — 96375 TX/PRO/DX INJ NEW DRUG ADDON: CPT

## 2024-05-06 PROCEDURE — 96367 TX/PROPH/DG ADDL SEQ IV INF: CPT

## 2024-05-06 PROCEDURE — 258N000003 HC RX IP 258 OP 636: Performed by: INTERNAL MEDICINE

## 2024-05-06 PROCEDURE — 250N000011 HC RX IP 250 OP 636: Mod: JZ | Performed by: INTERNAL MEDICINE

## 2024-05-06 PROCEDURE — G0463 HOSPITAL OUTPT CLINIC VISIT: HCPCS | Mod: 25 | Performed by: INTERNAL MEDICINE

## 2024-05-06 PROCEDURE — 86900 BLOOD TYPING SEROLOGIC ABO: CPT | Performed by: INTERNAL MEDICINE

## 2024-05-06 PROCEDURE — 86923 COMPATIBILITY TEST ELECTRIC: CPT | Performed by: INTERNAL MEDICINE

## 2024-05-06 PROCEDURE — 85007 BL SMEAR W/DIFF WBC COUNT: CPT | Performed by: NURSE PRACTITIONER

## 2024-05-06 PROCEDURE — 36591 DRAW BLOOD OFF VENOUS DEVICE: CPT | Performed by: NURSE PRACTITIONER

## 2024-05-06 PROCEDURE — 96413 CHEMO IV INFUSION 1 HR: CPT

## 2024-05-06 PROCEDURE — 96417 CHEMO IV INFUS EACH ADDL SEQ: CPT

## 2024-05-06 PROCEDURE — 85027 COMPLETE CBC AUTOMATED: CPT | Performed by: NURSE PRACTITIONER

## 2024-05-06 PROCEDURE — 82040 ASSAY OF SERUM ALBUMIN: CPT | Performed by: NURSE PRACTITIONER

## 2024-05-06 PROCEDURE — 84443 ASSAY THYROID STIM HORMONE: CPT | Performed by: NURSE PRACTITIONER

## 2024-05-06 PROCEDURE — G2211 COMPLEX E/M VISIT ADD ON: HCPCS | Performed by: INTERNAL MEDICINE

## 2024-05-06 PROCEDURE — 36591 DRAW BLOOD OFF VENOUS DEVICE: CPT | Performed by: INTERNAL MEDICINE

## 2024-05-06 PROCEDURE — 99215 OFFICE O/P EST HI 40 MIN: CPT | Performed by: INTERNAL MEDICINE

## 2024-05-06 RX ORDER — MEPERIDINE HYDROCHLORIDE 25 MG/ML
25 INJECTION INTRAMUSCULAR; INTRAVENOUS; SUBCUTANEOUS EVERY 30 MIN PRN
Status: CANCELLED | OUTPATIENT
Start: 2024-05-28

## 2024-05-06 RX ORDER — MEPERIDINE HYDROCHLORIDE 25 MG/ML
25 INJECTION INTRAMUSCULAR; INTRAVENOUS; SUBCUTANEOUS EVERY 30 MIN PRN
Status: CANCELLED | OUTPATIENT
Start: 2024-05-08

## 2024-05-06 RX ORDER — EPINEPHRINE 1 MG/ML
0.3 INJECTION, SOLUTION INTRAMUSCULAR; SUBCUTANEOUS EVERY 5 MIN PRN
Status: CANCELLED | OUTPATIENT
Start: 2024-05-29

## 2024-05-06 RX ORDER — EPINEPHRINE 1 MG/ML
0.3 INJECTION, SOLUTION INTRAMUSCULAR; SUBCUTANEOUS EVERY 5 MIN PRN
Status: CANCELLED | OUTPATIENT
Start: 2024-05-28

## 2024-05-06 RX ORDER — DIPHENHYDRAMINE HYDROCHLORIDE 50 MG/ML
50 INJECTION INTRAMUSCULAR; INTRAVENOUS
Status: CANCELLED
Start: 2024-05-07

## 2024-05-06 RX ORDER — ALBUTEROL SULFATE 90 UG/1
1-2 AEROSOL, METERED RESPIRATORY (INHALATION)
Status: CANCELLED
Start: 2024-05-29

## 2024-05-06 RX ORDER — HEPARIN SODIUM,PORCINE 10 UNIT/ML
5-20 VIAL (ML) INTRAVENOUS DAILY PRN
Status: CANCELLED | OUTPATIENT
Start: 2024-05-07

## 2024-05-06 RX ORDER — EPINEPHRINE 1 MG/ML
0.3 INJECTION, SOLUTION INTRAMUSCULAR; SUBCUTANEOUS EVERY 5 MIN PRN
Status: CANCELLED | OUTPATIENT
Start: 2024-05-08

## 2024-05-06 RX ORDER — ALBUTEROL SULFATE 90 UG/1
1-2 AEROSOL, METERED RESPIRATORY (INHALATION)
Status: CANCELLED
Start: 2024-06-18

## 2024-05-06 RX ORDER — ALBUTEROL SULFATE 0.83 MG/ML
2.5 SOLUTION RESPIRATORY (INHALATION)
Status: CANCELLED | OUTPATIENT
Start: 2024-06-18

## 2024-05-06 RX ORDER — HEPARIN SODIUM,PORCINE 10 UNIT/ML
5-20 VIAL (ML) INTRAVENOUS DAILY PRN
Status: CANCELLED | OUTPATIENT
Start: 2024-06-18

## 2024-05-06 RX ORDER — EPINEPHRINE 1 MG/ML
0.3 INJECTION, SOLUTION INTRAMUSCULAR; SUBCUTANEOUS EVERY 5 MIN PRN
Status: DISCONTINUED | OUTPATIENT
Start: 2024-05-06 | End: 2024-05-06 | Stop reason: HOSPADM

## 2024-05-06 RX ORDER — EPINEPHRINE 1 MG/ML
0.3 INJECTION, SOLUTION INTRAMUSCULAR; SUBCUTANEOUS EVERY 5 MIN PRN
Status: CANCELLED | OUTPATIENT
Start: 2024-05-06

## 2024-05-06 RX ORDER — ALBUTEROL SULFATE 90 UG/1
1-2 AEROSOL, METERED RESPIRATORY (INHALATION)
Status: CANCELLED
Start: 2024-05-08

## 2024-05-06 RX ORDER — PALONOSETRON 0.05 MG/ML
0.25 INJECTION, SOLUTION INTRAVENOUS ONCE
Status: CANCELLED | OUTPATIENT
Start: 2024-05-06

## 2024-05-06 RX ORDER — HEPARIN SODIUM (PORCINE) LOCK FLUSH IV SOLN 100 UNIT/ML 100 UNIT/ML
5 SOLUTION INTRAVENOUS
Status: CANCELLED | OUTPATIENT
Start: 2024-06-18

## 2024-05-06 RX ORDER — HEPARIN SODIUM,PORCINE 10 UNIT/ML
5-20 VIAL (ML) INTRAVENOUS DAILY PRN
Status: CANCELLED | OUTPATIENT
Start: 2024-05-29

## 2024-05-06 RX ORDER — LORAZEPAM 2 MG/ML
0.5 INJECTION INTRAMUSCULAR EVERY 4 HOURS PRN
Status: CANCELLED | OUTPATIENT
Start: 2024-05-06

## 2024-05-06 RX ORDER — LORAZEPAM 2 MG/ML
0.5 INJECTION INTRAMUSCULAR EVERY 4 HOURS PRN
Status: CANCELLED | OUTPATIENT
Start: 2024-05-28

## 2024-05-06 RX ORDER — MEPERIDINE HYDROCHLORIDE 25 MG/ML
25 INJECTION INTRAMUSCULAR; INTRAVENOUS; SUBCUTANEOUS EVERY 30 MIN PRN
Status: DISCONTINUED | OUTPATIENT
Start: 2024-05-06 | End: 2024-05-06 | Stop reason: HOSPADM

## 2024-05-06 RX ORDER — DIPHENHYDRAMINE HYDROCHLORIDE 50 MG/ML
50 INJECTION INTRAMUSCULAR; INTRAVENOUS
Status: CANCELLED
Start: 2024-06-18

## 2024-05-06 RX ORDER — ALBUTEROL SULFATE 90 UG/1
1-2 AEROSOL, METERED RESPIRATORY (INHALATION)
Status: CANCELLED
Start: 2024-05-28

## 2024-05-06 RX ORDER — METHYLPREDNISOLONE SODIUM SUCCINATE 125 MG/2ML
125 INJECTION, POWDER, LYOPHILIZED, FOR SOLUTION INTRAMUSCULAR; INTRAVENOUS
Status: CANCELLED
Start: 2024-05-08

## 2024-05-06 RX ORDER — HEPARIN SODIUM (PORCINE) LOCK FLUSH IV SOLN 100 UNIT/ML 100 UNIT/ML
5 SOLUTION INTRAVENOUS
Status: CANCELLED | OUTPATIENT
Start: 2024-05-06

## 2024-05-06 RX ORDER — METHYLPREDNISOLONE SODIUM SUCCINATE 125 MG/2ML
125 INJECTION, POWDER, LYOPHILIZED, FOR SOLUTION INTRAMUSCULAR; INTRAVENOUS
Status: DISCONTINUED | OUTPATIENT
Start: 2024-05-06 | End: 2024-05-06 | Stop reason: HOSPADM

## 2024-05-06 RX ORDER — ALBUTEROL SULFATE 0.83 MG/ML
2.5 SOLUTION RESPIRATORY (INHALATION)
Status: CANCELLED | OUTPATIENT
Start: 2024-05-28

## 2024-05-06 RX ORDER — METHYLPREDNISOLONE SODIUM SUCCINATE 125 MG/2ML
125 INJECTION, POWDER, LYOPHILIZED, FOR SOLUTION INTRAMUSCULAR; INTRAVENOUS
Status: CANCELLED
Start: 2024-05-06

## 2024-05-06 RX ORDER — HEPARIN SODIUM,PORCINE 10 UNIT/ML
5-20 VIAL (ML) INTRAVENOUS DAILY PRN
Status: CANCELLED | OUTPATIENT
Start: 2024-05-28

## 2024-05-06 RX ORDER — LORAZEPAM 2 MG/ML
0.5 INJECTION INTRAMUSCULAR EVERY 4 HOURS PRN
Status: CANCELLED | OUTPATIENT
Start: 2024-06-18

## 2024-05-06 RX ORDER — LORAZEPAM 2 MG/ML
0.5 INJECTION INTRAMUSCULAR EVERY 4 HOURS PRN
Status: CANCELLED | OUTPATIENT
Start: 2024-05-07

## 2024-05-06 RX ORDER — DIPHENHYDRAMINE HYDROCHLORIDE 50 MG/ML
50 INJECTION INTRAMUSCULAR; INTRAVENOUS
Status: CANCELLED
Start: 2024-05-06

## 2024-05-06 RX ORDER — HEPARIN SODIUM (PORCINE) LOCK FLUSH IV SOLN 100 UNIT/ML 100 UNIT/ML
5 SOLUTION INTRAVENOUS
Status: CANCELLED | OUTPATIENT
Start: 2024-05-28

## 2024-05-06 RX ORDER — ALBUTEROL SULFATE 90 UG/1
1-2 AEROSOL, METERED RESPIRATORY (INHALATION)
Status: CANCELLED
Start: 2024-05-06

## 2024-05-06 RX ORDER — DIPHENHYDRAMINE HYDROCHLORIDE 50 MG/ML
50 INJECTION INTRAMUSCULAR; INTRAVENOUS
Status: DISCONTINUED | OUTPATIENT
Start: 2024-05-06 | End: 2024-05-06 | Stop reason: HOSPADM

## 2024-05-06 RX ORDER — HEPARIN SODIUM,PORCINE 10 UNIT/ML
5-20 VIAL (ML) INTRAVENOUS DAILY PRN
Status: CANCELLED | OUTPATIENT
Start: 2024-05-06

## 2024-05-06 RX ORDER — PALONOSETRON 0.05 MG/ML
0.25 INJECTION, SOLUTION INTRAVENOUS ONCE
Status: CANCELLED | OUTPATIENT
Start: 2024-05-27

## 2024-05-06 RX ORDER — METHYLPREDNISOLONE SODIUM SUCCINATE 125 MG/2ML
125 INJECTION, POWDER, LYOPHILIZED, FOR SOLUTION INTRAMUSCULAR; INTRAVENOUS
Status: CANCELLED
Start: 2024-05-28

## 2024-05-06 RX ORDER — HEPARIN SODIUM (PORCINE) LOCK FLUSH IV SOLN 100 UNIT/ML 100 UNIT/ML
5 SOLUTION INTRAVENOUS
Status: DISCONTINUED | OUTPATIENT
Start: 2024-05-06 | End: 2024-05-06 | Stop reason: HOSPADM

## 2024-05-06 RX ORDER — HEPARIN SODIUM (PORCINE) LOCK FLUSH IV SOLN 100 UNIT/ML 100 UNIT/ML
5 SOLUTION INTRAVENOUS
Status: CANCELLED | OUTPATIENT
Start: 2024-05-29

## 2024-05-06 RX ORDER — ALBUTEROL SULFATE 0.83 MG/ML
2.5 SOLUTION RESPIRATORY (INHALATION)
Status: DISCONTINUED | OUTPATIENT
Start: 2024-05-06 | End: 2024-05-06 | Stop reason: HOSPADM

## 2024-05-06 RX ORDER — MEPERIDINE HYDROCHLORIDE 25 MG/ML
25 INJECTION INTRAMUSCULAR; INTRAVENOUS; SUBCUTANEOUS EVERY 30 MIN PRN
Status: CANCELLED | OUTPATIENT
Start: 2024-05-07

## 2024-05-06 RX ORDER — MEPERIDINE HYDROCHLORIDE 25 MG/ML
25 INJECTION INTRAMUSCULAR; INTRAVENOUS; SUBCUTANEOUS EVERY 30 MIN PRN
Status: CANCELLED | OUTPATIENT
Start: 2024-05-29

## 2024-05-06 RX ORDER — METHYLPREDNISOLONE SODIUM SUCCINATE 125 MG/2ML
125 INJECTION, POWDER, LYOPHILIZED, FOR SOLUTION INTRAMUSCULAR; INTRAVENOUS
Status: CANCELLED
Start: 2024-05-07

## 2024-05-06 RX ORDER — DIPHENHYDRAMINE HYDROCHLORIDE 50 MG/ML
50 INJECTION INTRAMUSCULAR; INTRAVENOUS
Status: CANCELLED
Start: 2024-05-29

## 2024-05-06 RX ORDER — MEPERIDINE HYDROCHLORIDE 25 MG/ML
25 INJECTION INTRAMUSCULAR; INTRAVENOUS; SUBCUTANEOUS EVERY 30 MIN PRN
Status: CANCELLED | OUTPATIENT
Start: 2024-06-18

## 2024-05-06 RX ORDER — EPINEPHRINE 1 MG/ML
0.3 INJECTION, SOLUTION INTRAMUSCULAR; SUBCUTANEOUS EVERY 5 MIN PRN
Status: CANCELLED | OUTPATIENT
Start: 2024-05-07

## 2024-05-06 RX ORDER — DIPHENHYDRAMINE HYDROCHLORIDE 50 MG/ML
50 INJECTION INTRAMUSCULAR; INTRAVENOUS
Status: CANCELLED
Start: 2024-05-28

## 2024-05-06 RX ORDER — ALBUTEROL SULFATE 90 UG/1
1-2 AEROSOL, METERED RESPIRATORY (INHALATION)
Status: DISCONTINUED | OUTPATIENT
Start: 2024-05-06 | End: 2024-05-06 | Stop reason: HOSPADM

## 2024-05-06 RX ORDER — HEPARIN SODIUM,PORCINE 10 UNIT/ML
5-20 VIAL (ML) INTRAVENOUS DAILY PRN
Status: CANCELLED | OUTPATIENT
Start: 2024-05-08

## 2024-05-06 RX ORDER — PALONOSETRON 0.05 MG/ML
0.25 INJECTION, SOLUTION INTRAVENOUS ONCE
Qty: 5 ML | Refills: 0 | Status: COMPLETED | OUTPATIENT
Start: 2024-05-06 | End: 2024-05-06

## 2024-05-06 RX ORDER — METHYLPREDNISOLONE SODIUM SUCCINATE 125 MG/2ML
125 INJECTION, POWDER, LYOPHILIZED, FOR SOLUTION INTRAMUSCULAR; INTRAVENOUS
Status: CANCELLED
Start: 2024-05-29

## 2024-05-06 RX ORDER — ALBUTEROL SULFATE 0.83 MG/ML
2.5 SOLUTION RESPIRATORY (INHALATION)
Status: CANCELLED | OUTPATIENT
Start: 2024-05-07

## 2024-05-06 RX ORDER — ALBUTEROL SULFATE 90 UG/1
1-2 AEROSOL, METERED RESPIRATORY (INHALATION)
Status: CANCELLED
Start: 2024-05-07

## 2024-05-06 RX ORDER — METHYLPREDNISOLONE SODIUM SUCCINATE 125 MG/2ML
125 INJECTION, POWDER, LYOPHILIZED, FOR SOLUTION INTRAMUSCULAR; INTRAVENOUS
Status: CANCELLED
Start: 2024-06-18

## 2024-05-06 RX ORDER — HEPARIN SODIUM (PORCINE) LOCK FLUSH IV SOLN 100 UNIT/ML 100 UNIT/ML
5 SOLUTION INTRAVENOUS
Status: CANCELLED | OUTPATIENT
Start: 2024-05-07

## 2024-05-06 RX ORDER — MEPERIDINE HYDROCHLORIDE 25 MG/ML
25 INJECTION INTRAMUSCULAR; INTRAVENOUS; SUBCUTANEOUS EVERY 30 MIN PRN
Status: CANCELLED | OUTPATIENT
Start: 2024-05-06

## 2024-05-06 RX ORDER — LORAZEPAM 2 MG/ML
0.5 INJECTION INTRAMUSCULAR EVERY 4 HOURS PRN
Status: CANCELLED | OUTPATIENT
Start: 2024-05-29

## 2024-05-06 RX ORDER — LORAZEPAM 2 MG/ML
0.5 INJECTION INTRAMUSCULAR EVERY 4 HOURS PRN
Status: CANCELLED | OUTPATIENT
Start: 2024-05-08

## 2024-05-06 RX ORDER — HEPARIN SODIUM (PORCINE) LOCK FLUSH IV SOLN 100 UNIT/ML 100 UNIT/ML
5 SOLUTION INTRAVENOUS
Status: CANCELLED | OUTPATIENT
Start: 2024-05-08

## 2024-05-06 RX ORDER — ALBUTEROL SULFATE 0.83 MG/ML
2.5 SOLUTION RESPIRATORY (INHALATION)
Status: CANCELLED | OUTPATIENT
Start: 2024-05-29

## 2024-05-06 RX ORDER — EPINEPHRINE 1 MG/ML
0.3 INJECTION, SOLUTION INTRAMUSCULAR; SUBCUTANEOUS EVERY 5 MIN PRN
Status: CANCELLED | OUTPATIENT
Start: 2024-06-18

## 2024-05-06 RX ORDER — ALBUTEROL SULFATE 0.83 MG/ML
2.5 SOLUTION RESPIRATORY (INHALATION)
Status: CANCELLED | OUTPATIENT
Start: 2024-05-08

## 2024-05-06 RX ORDER — ALBUTEROL SULFATE 0.83 MG/ML
2.5 SOLUTION RESPIRATORY (INHALATION)
Status: CANCELLED | OUTPATIENT
Start: 2024-05-06

## 2024-05-06 RX ORDER — DIPHENHYDRAMINE HYDROCHLORIDE 50 MG/ML
50 INJECTION INTRAMUSCULAR; INTRAVENOUS
Status: CANCELLED
Start: 2024-05-08

## 2024-05-06 RX ADMIN — Medication 5 ML: at 16:32

## 2024-05-06 RX ADMIN — TRILACICLIB 300 MG: 300 INJECTION, POWDER, LYOPHILIZED, FOR SOLUTION INTRAVENOUS at 14:11

## 2024-05-06 RX ADMIN — SODIUM CHLORIDE 250 ML: 9 INJECTION, SOLUTION INTRAVENOUS at 13:01

## 2024-05-06 RX ADMIN — ETOPOSIDE 135 MG: 20 INJECTION INTRAVENOUS at 15:33

## 2024-05-06 RX ADMIN — CARBOPLATIN 285 MG: 450 INJECTION, SOLUTION INTRAVENOUS at 14:58

## 2024-05-06 RX ADMIN — DEXAMETHASONE SODIUM PHOSPHATE: 10 INJECTION, SOLUTION INTRAMUSCULAR; INTRAVENOUS at 13:13

## 2024-05-06 RX ADMIN — ATEZOLIZUMAB 1200 MG: 1200 INJECTION, SOLUTION INTRAVENOUS at 13:39

## 2024-05-06 RX ADMIN — PALONOSETRON 0.25 MG: 0.05 INJECTION, SOLUTION INTRAVENOUS at 13:02

## 2024-05-06 ASSESSMENT — PAIN SCALES - GENERAL: PAINLEVEL: MODERATE PAIN (5)

## 2024-05-06 NOTE — PROGRESS NOTES
Oncology Rooming Note    May 6, 2024 10:52 AM   Jasmyn Green is a 68 year old female who presents for:    Chief Complaint   Patient presents with    Oncology Clinic Visit     Return visit for lab and infusion.      Initial Vitals: /53 (BP Location: Left arm, Patient Position: Sitting, Cuff Size: Adult Small)   Pulse 110   Temp 97.6  F (36.4  C) (Oral)   Resp 20   Ht 1.524 m (5')   Wt 42.9 kg (94 lb 9.6 oz)   LMP  (LMP Unknown)   SpO2 95%   BMI 18.48 kg/m   Estimated body mass index is 18.48 kg/m  as calculated from the following:    Height as of this encounter: 1.524 m (5').    Weight as of this encounter: 42.9 kg (94 lb 9.6 oz). Body surface area is 1.35 meters squared.  Moderate Pain (5) Comment: Data Unavailable   No LMP recorded (lmp unknown). Patient is postmenopausal.  Allergies reviewed: Yes  Medications reviewed: Yes    Medications: Medication refills not needed today.  Pharmacy name entered into Postabon:    [facility] - [place], [place] - [address] [facility] - [place], [place] - [address]    Frailty Screening:   Is the patient here for a new oncology consult visit in cancer care? 2. No      Clinical concerns: LLQ pain and left clavicle pain 5/10. Dr Stewart was notified.      Елена Deutsch, [facility]

## 2024-05-06 NOTE — PROGRESS NOTES
[facility] Hematology and Oncology Consult Note    Patient: Jasmyn Green  MRN: [id number]  Date of Service: May 6, 2024           Reason for consultation      Problem List Items Addressed This Visit          Respiratory    Small cell lung cancer (H)    Relevant Orders    Infusion Appointment Request    Infusion Appointment Request    Infusion Appointment Request    Infusion Appointment Request    Infusion Appointment Request       Hematologic    Anemia associated with chemotherapy - Primary    Relevant Orders    ABO/Rh type and screen (Completed)    ABO and Rh (Completed)    ABO/RH Type & Screen (Completed)     Other Visit Diagnoses       Brain lesion        Splenic infarction                  Assessment / number of problems addressed      1.  A very pleasant 68 year old  woman with looks like extensive stage small cell lung cancer.  Started on palliative chemotherapy with carboplatin, etoposide and atezolizumab.  Seems to be responding quite well on the CT scan.  2.  Severe COPD.  She is on 2-3 L of oxygen at rest.  3.  Significantly decreased FEV1 of 29% and reduced diffusion capacity with DLCO of 30%.  4.  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  5.  Other medical conditions stable.  6.  Brain lesion which seems to have changed a little bit.  Still not very clear if it is metastatic or not.  7.  Severe anemia secondary to chemotherapy.    Plan and medical decision making      Patient presenting with extensive stage small cell lung cancer which is not curable and poses immediate to her life and wellbeing.  Presenting with severe anemia secondary to chemotherapy. Reviewed notes from each unique source.  Reviewed each unique test.  Ordered tests if indicated.  Independently interpreted the results of lab tests and radiological exams.  Personally reviewed the images.  .  Proceed with cycle #3 of chemotherapy with carboplatin, etoposide and atezolizumab.  Careful monitoring of the side effects.  Since she  Physical Therapy/Occupational Therapy  Hold    Hold per RN due to increasing O2 requirement. Will initiate evaluations as pt status permits. Bo Zuñiga PT  [id number], [address] [facility] does have what looks like some pneumonic patch on her left lung recommend that she should take some doxycycline that she has at home for possible pneumonia.  Monitoring for autoimmune side effects.  Decision made for packed red blood cell transfusion.  For splenic infarction recommend that she should go to full dose aspirin.  She is currently on baby aspirin.  We did talk about that she is at risk of further arteriovenous thrombosis secondary to her proven tendency to have splenic infarct.  Diet rich in calcium and vitamin D.  Advised to increase protein intake as well.  The longitudinal plan of care for the diagnosis(es)/condition(s) as documented were addressed during this visit. Due to the added complexity in care, I will continue to support Jasmyn in the subsequent management and with ongoing continuity of care.    Clinical/pathological stage       Cancer Staging   No matching staging information was found for the patient.      History of present illness      Ms. Jasmyn Green is a 68 year old woman who has been referred to me for evaluation of newly diagnosed small cell lung cancer.  She appears to have extensive stage disease.  She presented to the [facility] with increasing shortness of breath coughing and symptoms suggestive of pneumonia.  She has a known history of severe obstructive lung disease with an FEV1 of 29% with hyperinflation and air trapping.  Presented with a 1 to 2-day history of worsening shortness of breath and with exertion as well as at rest.  Requiring increasing supplemental oxygen at home.  CT scan done in the emergency room showed bulky aortopulmonary as well as left hilar adenopathy with irregular pleural-based mass in the left upper lobe.  There are also some scattered satellite nodules.  This was very suspicious for malignancy.    After discharge she was seen by Dr. Levin and and underwent endobronchial ultrasound-guided biopsy which came back positive for small cell  lung cancer.  The PET scan also showed hypermetabolic lesions in the mediastinum as well as the peripheral part of the left upper lobe.    She is on oxygen at least 2 L at rest and sometimes increased oxygen at minimal activity.  She is 97 pounds.  Appetite is fair to poor.  Comes in accompanied by her 2 daughters regarding her treatment options.    She used to smoke but quit smoking.  She was in fact was being monitored by CT surveillance.  Had a CT scan done in June 2023 which was positive for some sort of pneumonia but no obvious malignancy was noted.    She was started on Perative chemotherapy with carboplatin, etoposide and atezolizumab.  She has had 2 cycles.  She has had severe side effects from chemotherapy.  Her blood sugars have been slightly high.  Most recently she has been having some left upper quadrant pain for the past couple of weeks.  The pain seems to be getting better.    Detailed review of systems      A 14 point review of systems was obtained.  Positive findings noted in the history.  Rest of the review of system is otherwise negative.      Past medical/surgical/social/family history        Past Medical History:   Diagnosis Date    Age-related osteoporosis without current pathological fracture     Arthritis     COPD (chronic obstructive pulmonary disease) (H)     Coronary artery disease     Dependence on nocturnal oxygen therapy     Dyspnea on exertion     Emphysema lung (H)     Gout     HLD (hyperlipidemia)     Hypertension     Lung mass      Past Surgical History:   Procedure Laterality Date    BRONCHOSCOPY RIGID OR FLEXIBLE W/TRANSENDOSCOPIC ENDOBRONCHIAL ULTRASOUND GUIDED N/A 2/16/2024    Procedure: BRONCHOSCOPY, WITH ENDOBRONCHIAL ULTRASOUND;  Surgeon: Ruben Levin MD;  Location: [facility] OR    COLONOSCOPY N/A 10/21/2021    Procedure: COLONOSCOPY;  Surgeon: Wilma Hester DO;  Location: [facility]    IR CHEST PORT PLACEMENT > 5 YRS OF AGE  5/2/2024    VAGINAL DELIVERY       x 3 ; remote    WISDOM TOOTH EXTRACTION       Family History   Problem Relation Age of Onset    Chronic Obstructive Pulmonary Disease Sister     Diabetes Mother     Heart Disease Mother     Lung Cancer Mother     Heart Disease Father      Social History     Socioeconomic History    Marital status:      Spouse name: None    Number of children: None    Years of education: None    Highest education level: None   Tobacco Use    Smoking status: Former     Types: Cigarettes     Quit date: 2021     Years since quittin.6     Passive exposure: Past    Smokeless tobacco: Never    Tobacco comments:     updated 8/3/2021 by TTS   Vaping Use    Vaping Use: Never used   Substance and Sexual Activity    Alcohol use: Not Currently    Drug use: Never    Sexual activity: Not Currently   Social History Narrative     many years 3 grown children. 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 Last cigarette a week ago.non drinker  Her primary MD (Wilda) retired several years ago/ tends to avoid doctors/medical care in general       Social Determinants of Health     Financial Resource Strain: Low Risk  (2024)    Financial Resource Strain     Within the past 12 months, have you or your family members you live with been unable to get utilities (heat, electricity) when it was really needed?: No   Food Insecurity: Low Risk  (2024)    Food Insecurity     Within the past 12 months, did you worry that your food would run out before you got money to buy more?: No     Within the past 12 months, did the food you bought just not last and you didn t have money to get more?: No   Transportation Needs: Low Risk  (2024)    Transportation Needs     Within the past 12 months, has lack of transportation kept you from medical appointments, getting your medicines, non-medical meetings or appointments, work, or from getting things that you need?: No   Physical Activity: Sufficiently Active (11/10/2021)    Exercise Vital  Sign     Days of Exercise per Week: 5 days     Minutes of Exercise per Session: 30 min   Stress: No Stress Concern Present (11/10/2021)    [place] [employer] Occupational [employer] - Occupational Stress Questionnaire     Feeling of Stress : Not at all   Social Connections: Socially Isolated (11/10/2021)    Social Connection and Isolation Panel [NHANES]     Frequency of Communication with Friends and Family: More than three times a week     Frequency of Social Gatherings with Friends and Family: Once a week     Attends Latter-day Services: Never     Active Member of Clubs or Organizations: No     Marital Status:    Interpersonal Safety: Low Risk  (2/1/2024)    Interpersonal Safety     Do you feel physically and emotionally safe where you currently live?: Yes     Within the past 12 months, have you been hit, slapped, kicked or otherwise physically hurt by someone?: No     Within the past 12 months, have you been humiliated or emotionally abused in other ways by your partner or ex-partner?: No   Housing Stability: Low Risk  (2/1/2024)    Housing Stability     Do you have housing? : Yes     Are you worried about losing your housing?: No           Allergies      No Known Allergies      Physical exam        /53 (BP Location: Left arm, Patient Position: Sitting, Cuff Size: Adult Small)   Pulse 110   Temp 97.6  F (36.4  C) (Oral)   Resp 20   Ht 1.524 m (5')   Wt 42.9 kg (94 lb 9.6 oz)   LMP  (LMP Unknown)   SpO2 95%   BMI 18.48 kg/m        GENERAL: Alert and oriented to time place and person. Seated comfortably. In no distress.  She is quite thin individual.    HEAD: Atraumatic and normocephalic.  Nasal cannula oxygen on.    EYES: TIRSO, EOMI. No pallor. No icterus.    Oral cavity: no mucosal lesion or tonsillar enlargement.    NECK: supple. JVP normal.No thyroid enlargement.    LYMPH NODES: No palpable, cervical, axillary or inguinal lymphadenopathy.    CHEST: clear to auscultation bilaterally.  Symmetrical breath movements bilaterally.    CVS: S1 and S2 are Regular rate and rhythm. No murmur or gallop or rub heard. No peripheral edema.    ABDOMEN: Soft. Not tender. Not distended. No palpable hepatomegaly or splenomegaly. No other mass palpable. Bowel sounds heard.    EXTREMITIES: Warm.  Minimal arthritic changes.    NEUROLOGICAL: Alert awake oriented.  Otherwise intact.  Cranial nerves appears to be preserved.    SKIN: no rash, or bruising or purpura.      Laboratory data      Recent Results (from the past 168 hour(s))   CT Chest/Abdomen/Pelvis w Contrast   Result Value Ref Range    Radiologist flags (AA)      New splenic infarcts with possible thrombus in the celiac artery.   Comprehensive metabolic panel   Result Value Ref Range    Sodium 138 135 - 145 mmol/L    Potassium 4.2 3.4 - 5.3 mmol/L    Carbon Dioxide (CO2) 25 22 - 29 mmol/L    Anion Gap 12 7 - 15 mmol/L    Urea Nitrogen 21.2 8.0 - 23.0 mg/dL    Creatinine 1.10 (H) 0.51 - 0.95 mg/dL    GFR Estimate 54 (L) >60 mL/min/1.73m2    Calcium 7.8 (L) 8.8 - 10.2 mg/dL    Chloride 101 98 - 107 mmol/L    Glucose 169 (H) 70 - 99 mg/dL    Alkaline Phosphatase 141 40 - 150 U/L    AST 43 0 - 45 U/L    ALT 19 0 - 50 U/L    Protein Total 6.0 (L) 6.4 - 8.3 g/dL    Albumin 2.9 (L) 3.5 - 5.2 g/dL    Bilirubin Total <0.2 <=1.2 mg/dL   TSH with free T4 reflex   Result Value Ref Range    TSH 0.53 0.30 - 4.20 uIU/mL   CBC with platelets and differential   Result Value Ref Range    WBC Count 11.5 (H) 4.0 - 11.0 10e3/uL    RBC Count 2.36 (L) 3.80 - 5.20 10e6/uL    Hemoglobin 6.9 (LL) 11.7 - 15.7 g/dL    Hematocrit 22.5 (L) 35.0 - 47.0 %    MCV 95 78 - 100 fL    MCH 29.2 26.5 - 33.0 pg    MCHC 30.7 (L) 31.5 - 36.5 g/dL    RDW 15.0 10.0 - 15.0 %    Platelet Count 464 (H) 150 - 450 10e3/uL    % Neutrophils      % Lymphocytes      % Monocytes      % Eosinophils      % Basophils      % Immature Granulocytes      NRBCs per 100 WBC 0 <1 /100    Absolute Neutrophils      Absolute  Lymphocytes      Absolute Monocytes      Absolute Eosinophils      Absolute Basophils      Absolute Immature Granulocytes      Absolute NRBCs 0.0 10e3/uL   Manual Differential   Result Value Ref Range    % Neutrophils 84 %    % Lymphocytes 9 %    % Monocytes 6 %    % Eosinophils 0 %    % Basophils 1 %    NRBCs per 100 WBC 2 (H) <=0 %    Absolute Neutrophils 9.7 (H) 1.6 - 8.3 10e3/uL    Absolute Lymphocytes 1.0 0.8 - 5.3 10e3/uL    Absolute Monocytes 0.7 0.0 - 1.3 10e3/uL    Absolute Eosinophils 0.0 0.0 - 0.7 10e3/uL    Absolute Basophils 0.1 0.0 - 0.2 10e3/uL    Absolute NRBCs 0.2 (H) <=0.0 10e3/uL    RBC Morphology Confirmed RBC Indices     Platelet Assessment  Automated Count Confirmed. Platelet morphology is normal.     Automated Count Confirmed. Platelet morphology is normal.    Polychromasia Slight (A) None Seen   Adult Type and Screen   Result Value Ref Range    ABO/RH(D) A POS     Antibody Screen Negative Negative    SPECIMEN EXPIRATION DATE [id number]    Prepare red blood cells (unit)   Result Value Ref Range    Blood Component Type Red Blood Cells     Product Code [id number]     Unit Status Ready for issue     Unit Number [id number]     CROSSMATCH Compatible     CODING SYSTEM [id number]    ABO and Rh   Result Value Ref Range    SPECIMEN EXPIRATION DATE [id number]    ABO/RH Type & Screen   Result Value Ref Range    SPECIMEN EXPIRATION DATE [id number]     ABORH A POS        Imaging results        MR Brain w/o & w Contrast    Result Date: 5/3/2024  EXAM: MR BRAIN W/O and W CONTRAST LOCATION: [facility] DATE: 5/3/2024 INDICATION:  Small cell lung cancer (H), Brain lesion COMPARISON: Brain MRI: 3/14/2024. CONTRAST: 4.5 mL Gadavist. TECHNIQUE: Routine multiplanar multisequence head MRI without and with intravenous contrast. High-resolution postcontrast T1-weighted images were also obtained. FINDINGS: INTRACRANIAL CONTENTS: Previously described tiny nodular focus of enhancement  in the right occipital lobe is decreased in size/conspicuity compared to study from March 2024 and has a more vascular appearance on today's examination (series 1/4/2001/images  91-94). No vasogenic edema or mass effect. No new enhancing intracranial lesions. No restricted diffusion to suggest acute or subacute infarct. No acute intracranial hemorrhage or extra-axial fluid collections. Scattered nonspecific T2/FLAIR hyperintensities within the cerebral white matter most consistent with mild chronic microvascular ischemic change. Mild generalized cerebral atrophy. No hydrocephalus. Normal position of the cerebellar tonsils. No pathologic contrast enhancement. SELLA: No abnormality accounting for technique. OSSEOUS STRUCTURES/SOFT TISSUES: Normal marrow signal. The major intracranial vascular flow voids are maintained. ORBITS: No abnormality accounting for technique. SINUSES/MASTOIDS: No paranasal sinus mucosal disease. No middle ear or mastoid effusion.     IMPRESSION: 1.  Previously described tiny nodular focus of enhancement in the right occipital lobe is decreased in size/conspicuity compared to study from March 2024 and appears more vascular in nature on today's examination. No vasogenic edema or mass effect. Advise continued attention to this area on future imaging follow-up. 2.  No new enhancing intracranial lesions. 3.  Mild age-related changes as above.    CT Chest/Abdomen/Pelvis w Contrast    Result Date: 5/3/2024  EXAM: CT CHEST/ABDOMEN/PELVIS W CONTRAST LOCATION: [facility] DATE: 5/3/2024 INDICATION: Small cell lung cancer (H) COMPARISON: Chest CT from 02/08/2024, CT abdomen and pelvis from 02/11/2024. TECHNIQUE: CT scan of the chest, abdomen, and pelvis was performed following injection of IV contrast. Multiplanar reformats were obtained. Dose reduction techniques were used. CONTRAST: I370 48 ml FINDINGS: LUNGS AND PLEURA: Advanced emphysema. The previously seen pleural-based  mass in the anterior left upper lobe has nearly resolved with only a faint residual triangular opacity measuring 0.8 x 0.8 cm on series 4 image 95. There is a new pleural-based consolidative opacity in the left upper lobe on series 4 image 81. There are a few new irregular nodular opacities in the bilateral lower lobes and left lingula. No effusions. Mild bronchial wall thickening. Mild bronchiectasis. MEDIASTINUM/AXILLAE: Significant interval reduction in the AP window and left hilar adenopathy. This measures 3.1 x 1.4 cm compared to 6 x 3 cm previously (series 3/58). Decreased size of a left hilar lymph node measuring 1.8 x 1.4 cm compared to 3.4 x 1.7 cm previously (series 3/73). The other remaining mediastinal and hilar lymph nodes are relatively similar. Small hiatal hernia. CORONARY ARTERY CALCIFICATION: Severe. HEPATOBILIARY: Hepatic steatosis. Benign left hepatic lobe cyst. The common bile duct is at the upper limits of normal measuring 0.8 cm. This may be age-related. PANCREAS: Normal. SPLEEN: There are new wedge-shaped infarcts in the upper and lower pole of the spleen on series 3 image 133 and 160. ADRENAL GLANDS: There is nodular thickening of the left adrenal gland. KIDNEYS/BLADDER: Bilateral cortical thinning. Benign renal cysts do not require follow-up. No hydronephrosis or genitourinary calculi. BOWEL: Diverticulosis of the colon. No acute inflammatory change. No obstruction. Mild to moderate colonic stool burden. LYMPH NODES: No new or enlarging adenopathy. VASCULATURE: There is new or more conspicuous high-grade stenosis of the celiac artery origin on series 3 image 140. Additionally, there is a punctate filling defect in the splenic artery on series 3 image 150. Extensive atherosclerosis of the abdominal aorta and its branches. There is ectasia of the infrarenal aorta measuring up to 2.4 cm. Heavily calcified atherosclerosis of the bilateral renal arteries, bilateral common iliac arteries, right  greater than left, and the visualized right common femoral and superficial femoral arteries. PELVIC ORGANS: Hysterectomy. MUSCULOSKELETAL: Degenerative changes of the spine. No concerning osseous lesions. Osteopenia.     IMPRESSION: Chest: 1.  Nearly resolved anterior left upper lobe pleural-based nodule/mass. 2.  Significant interval reduction in the AP window and left hilar adenopathy. The remaining mediastinal and hilar lymph nodes are otherwise not significantly changed. 3.  There are a few new scattered, predominantly bilateral lower lobe and left lingular irregular nodular opacities. New subpleural consolidative opacity in the left upper lobe along the fissure. Findings could be related to infectious/inflammatory etiology or posttreatment related changes. Recurrence is in the differential although felt to be less likely given the otherwise interval improvement of the remaining findings discussed above. Abdomen and Pelvis: 1.  New multifocal wedge-shaped infarcts in the spleen. There is new or more conspicuous high-grade narrowing of the celiac artery. It is difficult to discern whether this is noncalcified atherosclerotic plaque or thrombus. Additionally, there is a new or more conspicuous punctate filling defect in the splenic artery that could represent atherosclerotic plaque or thrombus. A CTA of the abdomen and pelvis should be considered. 2.  No evidence of metastatic disease in the abdomen or pelvis. [Critical Result: New splenic infarcts with possible thrombus in the celiac artery. Finding was identified on 5/3/2024 11:32 AM CDT. Dr. Mathur was contacted by me on 5/3/2024 12:18 PM CDT and verbalized understanding of the critical result.     IR Chest Port Placement > 5 Yrs of Age    Result Date: 5/2/2024  [place] [facility] LOCATION: [facility] DATE: 5/2/2024 PROCEDURE: IMPLANTABLE VENOUS CHEST PORT PLACEMENT (POWER INJECTABLE) INTERVENTIONAL RADIOLOGIST: Jonathan Spann MD.  INDICATION: 68-year-old female with left-sided lung cancer presents for chest port placement for chemotherapy. CONSENT: The risks, benefits and alternatives of implantable venous chest port placement were discussed with the patient  in detail. All questions were answered. Informed consent was given to proceed with the procedure. MODERATE SEDATION: IV fentanyl and Versed were administered for sedation.  During the timeout, immediately prior to the administration of medications, the patient was reassessed for adequacy to receive conscious sedation. Under physician supervision, Versed and fentanyl were administered for moderate sedation. Pulse oximetry, heart rate and blood pressure were continuously monitored by an independent trained observer. The physician spent 15 minutes of face-to-face sedation time with the patient. CONTRAST: None. ANTIBIOTICS: Ancef 2 g IV. ADDITIONAL MEDICATIONS: None. FLUOROSCOPIC TIME: 0.4 minutes. RADIATION DOSE: Air Kerma: 1 mGy. COMPLICATIONS: No immediate complications. STERILE BARRIER TECHNIQUE: Maximum sterile barrier technique was used. Cutaneous antisepsis was performed at the operative site with application of 2% chlorhexidine and large sterile drape. Prior to the procedure, the  and assistant performed hand hygiene and wore hat, mask, sterile gown, and sterile gloves during the entire procedure. PROCEDURE:  Using real-time ultrasound guidance the right internal jugular vein was accessed. A subcutaneous pocket was created and irrigated with sterile normal saline. The catheter tubing was tunneled in an antegrade fashion from the port pocket to the dermatotomy site. Over a guidewire, and under direct fluoroscopic visualization a peel-away sheath was advanced over the wire. Through the peel-away sheath, the catheter tubing was advanced until the tip was at the cavoatrial junction. The catheter tubing was cut to length and attached firmly to the port. The port was placed within  the subcutaneous pocket and tested. The port pocket incision was closed with absorbable suture and surgical glue. The dermatotomy site was closed with surgical glue. FINDINGS: Ultrasound demonstrates an anechoic and compressible jugular vein. A permanent image was stored. At the completion of the study the port tip lies near the cavoatrial junction.     IMPRESSION:  Successful implantable venous chest port placement as detailed above.          This note has been dictated using voice recognition software. Any grammatical or context distortions are unintentional and inherent to the software      Signed by: Amador Stewart MD

## 2024-05-06 NOTE — PROGRESS NOTES
Infusion Nursing Note:  Jasmyn Green presents today for cycle 3 day 1 treatment.    Patient seen by provider today: Yes: Dr Stewart   present during visit today: Not Applicable.    Note: Jasmyn was educated on her plan of care and each medication given was reviewed prior to administration.  She received treatment as ordered.  Pt will receive 1 unit PRBC with day 2 treatment tomorrow.      Intravenous Access:  Implanted Port.    Treatment Conditions:  Lab Results   Component Value Date    HGB 6.9 (LL) 05/06/2024    WBC 11.5 (H) 05/06/2024    ANEU 9.7 (H) 05/06/2024    ANEUTAUTO 4.5 04/15/2024     (H) 05/06/2024        Lab Results   Component Value Date     05/06/2024    POTASSIUM 4.2 05/06/2024    MAG 2.1 02/08/2024    CR 1.10 (H) 05/06/2024    FLOR 7.8 (L) 05/06/2024    BILITOTAL <0.2 05/06/2024    ALBUMIN 2.9 (L) 05/06/2024    ALT 19 05/06/2024    AST 43 05/06/2024       Results reviewed, labs MET treatment parameters, ok to proceed with treatment.      Post Infusion Assessment:  Patient tolerated infusion without incident.  Blood return noted pre and post infusion.  Site patent and intact, free from redness, edema or discomfort.  No evidence of extravasations.  Access discontinued per protocol.       Discharge Plan:   Patient discharged in stable condition accompanied by: daughter.  Departure Mode: Wheelchair.      Gretta Ferrari RN

## 2024-05-06 NOTE — PROGRESS NOTES
[facility]: Cancer Care Follow-Up Note                                    Discussion with Patient:                                                      Patient comes in today in follow-up with Dr Stewart in regards to her diagnosis of extensive stage lung cancer.     Goals          General    Maintain ability to perform ADLs without difficulty             Dates of Treatment:                                                      Infusion given in last 28 days       Administered MAR Action Medication Dose Rate Visit    04/15/2024 12:38 New Bag atezolizumab (TECENTRIQ) 1,200 mg in sodium chloride 0.9 % 130 mL infusion 1,200 mg 260 mL/hr Infusion Therapy Visit on 04/15/2024 in [facility]    04/15/2024 13:27 New Bag trilaciclib (COSELA) 300 mg in sodium chloride 0.9 % 295 mL infusion 300 mg 393.3 mL/hr Infusion Therapy Visit on 04/15/2024 in [facility]    04/15/2024 14:18 New Bag CARBOplatin 325 mg in sodium chloride 0.9 % 307.5 mL infusion 325 mg 615 mL/hr Infusion Therapy Visit on 04/15/2024 in [facility]    04/15/2024 14:54 New Bag etoposide (TOPOSAR) 135 mg in sodium chloride 0.9 % 556.75 mL infusion 135 mg 556 mL/hr Infusion Therapy Visit on 04/15/2024 in [facility]    04/16/2024 10:20 New Bag trilaciclib (COSELA) 300 mg in sodium chloride 0.9 % 295 mL infusion 300 mg 590 mL/hr Infusion Therapy Visit on 04/16/2024 in [facility]    04/16/2024 10:57 New Bag etoposide (TOPOSAR) 135 mg in sodium chloride 0.9 % 556.75 mL infusion 135 mg 556 mL/hr Infusion Therapy Visit on 04/16/2024 in [facility]    04/17/2024 09:39 New Bag trilaciclib (COSELA) 300 mg in sodium chloride 0.9 % 295 mL infusion 300 mg 590 mL/hr Infusion Therapy Visit on 04/17/2024 in [facility]    04/17/2024 10:43 New Bag etoposide  (TOPOSAR) 135 mg in sodium chloride 0.9 % 556.75 mL infusion 135 mg 555 mL/hr Infusion Therapy Visit on 04/17/2024 in [facility]    05/06/2024 13:39 New Bag atezolizumab (TECENTRIQ) 1,200 mg in sodium chloride 0.9 % 130 mL infusion 1,200 mg 260 mL/hr Infusion Therapy Visit on 05/06/2024 in [facility]    05/06/2024 14:11 New Bag trilaciclib (COSELA) 300 mg in sodium chloride 0.9 % 295 mL infusion 300 mg 393.3 mL/hr Infusion Therapy Visit on 05/06/2024 in [facility]    05/06/2024 14:58 New Bag CARBOplatin 285 mg in sodium chloride 0.9 % 303.5 mL infusion 285 mg 607 mL/hr Infusion Therapy Visit on 05/06/2024 in [facility]    05/06/2024 15:33 New Bag etoposide (TOPOSAR) 135 mg in sodium chloride 0.9 % 556.75 mL infusion 135 mg 550 mL/hr Infusion Therapy Visit on 05/06/2024 in [facility]            Assessment:                                                      Patient's hemoglobin came back at 6.9 today.  Per Dr Stewart, blood therapy plan was entered.  Since patient is receiving treatment today by the time they got started it was later in the day, she will receive her 1 unit PRBC tomorrow with her treatment.  Blood therapy plan entered, type and screen and prepare orders were released.  Call placed to blood bank.    Intervention/Education provided during outreach:                                                       Patient will receive 1 unit PRBC on 5/7 before or after her chemotherapy.  Family and patient are aware to look for and when they need to call if they feel like her hemoglobin is low again.    Patient to follow up as scheduled at next appt  Patient to call/[employer] message with updates  Confirmed patient has clinic and triage numbers    Signature:  Wilma Carmona RN

## 2024-05-06 NOTE — LETTER
5/6/2024         RE: Jasmyn Green  [address] [place] [address]        Dear Colleague,    Thank you for referring your patient, Jasmyn Green, to the [facility] [place]. Please see a copy of my visit note below.    [facility] Hematology and Oncology Consult Note    Patient: Jasmyn Green  MRN: [id number]  Date of Service: May 6, 2024           Reason for consultation      Problem List Items Addressed This Visit          Respiratory    Small cell lung cancer (H)    Relevant Orders    Infusion Appointment Request    Infusion Appointment Request    Infusion Appointment Request    Infusion Appointment Request    Infusion Appointment Request       Hematologic    Anemia associated with chemotherapy - Primary    Relevant Orders    ABO/Rh type and screen (Completed)    ABO and Rh (Completed)    ABO/RH Type & Screen (Completed)     Other Visit Diagnoses       Brain lesion        Splenic infarction                  Assessment / number of problems addressed      1.  A very pleasant 68 year old  woman with looks like extensive stage small cell lung cancer.  Started on palliative chemotherapy with carboplatin, etoposide and atezolizumab.  Seems to be responding quite well on the CT scan.  2.  Severe COPD.  She is on 2-3 L of oxygen at rest.  3.  Significantly decreased FEV1 of 29% and reduced diffusion capacity with DLCO of 30%.  4.  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  5.  Other medical conditions stable.  6.  Brain lesion which seems to have changed a little bit.  Still not very clear if it is metastatic or not.  7.  Severe anemia secondary to chemotherapy.    Plan and medical decision making      Patient presenting with extensive stage small cell lung cancer which is not curable and poses immediate to her life and wellbeing.  Presenting with severe anemia secondary to chemotherapy. Reviewed notes from each unique source.  Reviewed each unique test.  Ordered tests  if indicated.  Independently interpreted the results of lab tests and radiological exams.  Personally reviewed the images.  .  Proceed with cycle #3 of chemotherapy with carboplatin, etoposide and atezolizumab.  Careful monitoring of the side effects.  Since she does have what looks like some pneumonic patch on her left lung recommend that she should take some doxycycline that she has at home for possible pneumonia.  Monitoring for autoimmune side effects.  Decision made for packed red blood cell transfusion.  For splenic infarction recommend that she should go to full dose aspirin.  She is currently on baby aspirin.  We did talk about that she is at risk of further arteriovenous thrombosis secondary to her proven tendency to have splenic infarct.  Diet rich in calcium and vitamin D.  Advised to increase protein intake as well.  The longitudinal plan of care for the diagnosis(es)/condition(s) as documented were addressed during this visit. Due to the added complexity in care, I will continue to support Jasmyn in the subsequent management and with ongoing continuity of care.    Clinical/pathological stage       Cancer Staging   No matching staging information was found for the patient.      History of present illness      Ms. Jasmyn Green is a 68 year old woman who has been referred to me for evaluation of newly diagnosed small cell lung cancer.  She appears to have extensive stage disease.  She presented to the [facility] with increasing shortness of breath coughing and symptoms suggestive of pneumonia.  She has a known history of severe obstructive lung disease with an FEV1 of 29% with hyperinflation and air trapping.  Presented with a 1 to 2-day history of worsening shortness of breath and with exertion as well as at rest.  Requiring increasing supplemental oxygen at home.  CT scan done in the emergency room showed bulky aortopulmonary as well as left hilar adenopathy with irregular pleural-based mass  in the left upper lobe.  There are also some scattered satellite nodules.  This was very suspicious for malignancy.    After discharge she was seen by Dr. Levin and and underwent endobronchial ultrasound-guided biopsy which came back positive for small cell lung cancer.  The PET scan also showed hypermetabolic lesions in the mediastinum as well as the peripheral part of the left upper lobe.    She is on oxygen at least 2 L at rest and sometimes increased oxygen at minimal activity.  She is 97 pounds.  Appetite is fair to poor.  Comes in accompanied by her 2 daughters regarding her treatment options.    She used to smoke but quit smoking.  She was in fact was being monitored by CT surveillance.  Had a CT scan done in June 2023 which was positive for some sort of pneumonia but no obvious malignancy was noted.    She was started on Perative chemotherapy with carboplatin, etoposide and atezolizumab.  She has had 2 cycles.  She has had severe side effects from chemotherapy.  Her blood sugars have been slightly high.  Most recently she has been having some left upper quadrant pain for the past couple of weeks.  The pain seems to be getting better.    Detailed review of systems      A 14 point review of systems was obtained.  Positive findings noted in the history.  Rest of the review of system is otherwise negative.      Past medical/surgical/social/family history        Past Medical History:   Diagnosis Date     Age-related osteoporosis without current pathological fracture      Arthritis      COPD (chronic obstructive pulmonary disease) (H)      Coronary artery disease      Dependence on nocturnal oxygen therapy      Dyspnea on exertion      Emphysema lung (H)      Gout      HLD (hyperlipidemia)      Hypertension      Lung mass      Past Surgical History:   Procedure Laterality Date     BRONCHOSCOPY RIGID OR FLEXIBLE W/TRANSENDOSCOPIC ENDOBRONCHIAL ULTRASOUND GUIDED N/A 2/16/2024    Procedure: BRONCHOSCOPY, WITH  ENDOBRONCHIAL ULTRASOUND;  Surgeon: Ruben Levin MD;  Location: [facility] Main OR     COLONOSCOPY N/A 10/21/2021    Procedure: COLONOSCOPY;  Surgeon: Wilma Hester DO;  Location: [facility] Main OR     IR CHEST PORT PLACEMENT > 5 YRS OF AGE  2024     VAGINAL DELIVERY      x 3 ; remote     WISDOM TOOTH EXTRACTION       Family History   Problem Relation Age of Onset     Chronic Obstructive Pulmonary Disease Sister      Diabetes Mother      Heart Disease Mother      Lung Cancer Mother      Heart Disease Father      Social History     Socioeconomic History     Marital status:      Spouse name: None     Number of children: None     Years of education: None     Highest education level: None   Tobacco Use     Smoking status: Former     Types: Cigarettes     Quit date: 2021     Years since quittin.6     Passive exposure: Past     Smokeless tobacco: Never     Tobacco comments:     updated 8/3/2021 by TTS   Vaping Use     Vaping Use: Never used   Substance and Sexual Activity     Alcohol use: Not Currently     Drug use: Never     Sexual activity: Not Currently   Social History Narrative     many years 3 grown children. 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 Last cigarette a week ago.non drinker  Her primary MD (Wilda) retired several years ago/ tends to avoid doctors/medical care in general       Social Determinants of Health     Financial Resource Strain: Low Risk  (2024)    Financial Resource Strain      Within the past 12 months, have you or your family members you live with been unable to get utilities (heat, electricity) when it was really needed?: No   Food Insecurity: Low Risk  (2024)    Food Insecurity      Within the past 12 months, did you worry that your food would run out before you got money to buy more?: No      Within the past 12 months, did the food you bought just not last and you didn t have money to get more?: No   Transportation Needs: Low Risk  (2024)     Transportation Needs      Within the past 12 months, has lack of transportation kept you from medical appointments, getting your medicines, non-medical meetings or appointments, work, or from getting things that you need?: No   Physical Activity: Sufficiently Active (11/10/2021)    Exercise Vital Sign      Days of Exercise per Week: 5 days      Minutes of Exercise per Session: 30 min   Stress: No Stress Concern Present (11/10/2021)    [employer] - Occupational Stress Questionnaire      Feeling of Stress : Not at all   Social Connections: Socially Isolated (11/10/2021)    Social Connection and Isolation Panel [NHANES]      Frequency of Communication with Friends and Family: More than three times a week      Frequency of Social Gatherings with Friends and Family: Once a week      Attends [employer] Services: Never      Active Member of Clubs or Organizations: No      Marital Status:    Interpersonal Safety: Low Risk  (2/1/2024)    Interpersonal Safety      Do you feel physically and emotionally safe where you currently live?: Yes      Within the past 12 months, have you been hit, slapped, kicked or otherwise physically hurt by someone?: No      Within the past 12 months, have you been humiliated or emotionally abused in other ways by your partner or ex-partner?: No   Housing Stability: Low Risk  (2/1/2024)    Housing Stability      Do you have housing? : Yes      Are you worried about losing your housing?: No           Allergies      No Known Allergies      Physical exam        /53 (BP Location: Left arm, Patient Position: Sitting, Cuff Size: Adult Small)   Pulse 110   Temp 97.6  F (36.4  C) (Oral)   Resp 20   Ht 1.524 m (5')   Wt 42.9 kg (94 lb 9.6 oz)   LMP  (LMP Unknown)   SpO2 95%   BMI 18.48 kg/m        GENERAL: Alert and oriented to time place and person. Seated comfortably. In no distress.  She is quite thin individual.    HEAD: Atraumatic and normocephalic.   Nasal cannula oxygen on.    EYES: TIRSO, EOMI. No pallor. No icterus.    Oral cavity: no mucosal lesion or tonsillar enlargement.    NECK: supple. JVP normal.No thyroid enlargement.    LYMPH NODES: No palpable, cervical, axillary or inguinal lymphadenopathy.    CHEST: clear to auscultation bilaterally. Symmetrical breath movements bilaterally.    CVS: S1 and S2 are Regular rate and rhythm. No murmur or gallop or rub heard. No peripheral edema.    ABDOMEN: Soft. Not tender. Not distended. No palpable hepatomegaly or splenomegaly. No other mass palpable. Bowel sounds heard.    EXTREMITIES: Warm.  Minimal arthritic changes.    NEUROLOGICAL: Alert awake oriented.  Otherwise intact.  Cranial nerves appears to be preserved.    SKIN: no rash, or bruising or purpura.      Laboratory data      Recent Results (from the past 168 hour(s))   CT Chest/Abdomen/Pelvis w Contrast   Result Value Ref Range    Radiologist flags (AA)      New splenic infarcts with possible thrombus in the celiac artery.   Comprehensive metabolic panel   Result Value Ref Range    Sodium 138 135 - 145 mmol/L    Potassium 4.2 3.4 - 5.3 mmol/L    Carbon Dioxide (CO2) 25 22 - 29 mmol/L    Anion Gap 12 7 - 15 mmol/L    Urea Nitrogen 21.2 8.0 - 23.0 mg/dL    Creatinine 1.10 (H) 0.51 - 0.95 mg/dL    GFR Estimate 54 (L) >60 mL/min/1.73m2    Calcium 7.8 (L) 8.8 - 10.2 mg/dL    Chloride 101 98 - 107 mmol/L    Glucose 169 (H) 70 - 99 mg/dL    Alkaline Phosphatase 141 40 - 150 U/L    AST 43 0 - 45 U/L    ALT 19 0 - 50 U/L    Protein Total 6.0 (L) 6.4 - 8.3 g/dL    Albumin 2.9 (L) 3.5 - 5.2 g/dL    Bilirubin Total <0.2 <=1.2 mg/dL   TSH with free T4 reflex   Result Value Ref Range    TSH 0.53 0.30 - 4.20 uIU/mL   CBC with platelets and differential   Result Value Ref Range    WBC Count 11.5 (H) 4.0 - 11.0 10e3/uL    RBC Count 2.36 (L) 3.80 - 5.20 10e6/uL    Hemoglobin 6.9 (LL) 11.7 - 15.7 g/dL    Hematocrit 22.5 (L) 35.0 - 47.0 %    MCV 95 78 - 100 fL    MCH 29.2  26.5 - 33.0 pg    MCHC 30.7 (L) 31.5 - 36.5 g/dL    RDW 15.0 10.0 - 15.0 %    Platelet Count 464 (H) 150 - 450 10e3/uL    % Neutrophils      % Lymphocytes      % Monocytes      % Eosinophils      % Basophils      % Immature Granulocytes      NRBCs per 100 WBC 0 <1 /100    Absolute Neutrophils      Absolute Lymphocytes      Absolute Monocytes      Absolute Eosinophils      Absolute Basophils      Absolute Immature Granulocytes      Absolute NRBCs 0.0 10e3/uL   Manual Differential   Result Value Ref Range    % Neutrophils 84 %    % Lymphocytes 9 %    % Monocytes 6 %    % Eosinophils 0 %    % Basophils 1 %    NRBCs per 100 WBC 2 (H) <=0 %    Absolute Neutrophils 9.7 (H) 1.6 - 8.3 10e3/uL    Absolute Lymphocytes 1.0 0.8 - 5.3 10e3/uL    Absolute Monocytes 0.7 0.0 - 1.3 10e3/uL    Absolute Eosinophils 0.0 0.0 - 0.7 10e3/uL    Absolute Basophils 0.1 0.0 - 0.2 10e3/uL    Absolute NRBCs 0.2 (H) <=0.0 10e3/uL    RBC Morphology Confirmed RBC Indices     Platelet Assessment  Automated Count Confirmed. Platelet morphology is normal.     Automated Count Confirmed. Platelet morphology is normal.    Polychromasia Slight (A) None Seen   Adult Type and Screen   Result Value Ref Range    ABO/RH(D) A POS     Antibody Screen Negative Negative    SPECIMEN EXPIRATION DATE [id number]    Prepare red blood cells (unit)   Result Value Ref Range    Blood Component Type Red Blood Cells     Product Code [id number]     Unit Status Ready for issue     Unit Number [id number]     CROSSMATCH Compatible     CODING SYSTEM [id number]    ABO and Rh   Result Value Ref Range    SPECIMEN EXPIRATION DATE [id number]    ABO/RH Type & Screen   Result Value Ref Range    SPECIMEN EXPIRATION DATE [id number]     ABORH A POS        Imaging results        MR Brain w/o & w Contrast    Result Date: 5/3/2024  EXAM: MR BRAIN W/O and W CONTRAST LOCATION: [facility] DATE: 5/3/2024 INDICATION:  Small cell lung cancer (H), Brain lesion  COMPARISON: Brain MRI: 3/14/2024. CONTRAST: 4.5 mL Gadavist. TECHNIQUE: Routine multiplanar multisequence head MRI without and with intravenous contrast. High-resolution postcontrast T1-weighted images were also obtained. FINDINGS: INTRACRANIAL CONTENTS: Previously described tiny nodular focus of enhancement in the right occipital lobe is decreased in size/conspicuity compared to study from March 2024 and has a more vascular appearance on today's examination (series 1/4/2001/images  91-94). No vasogenic edema or mass effect. No new enhancing intracranial lesions. No restricted diffusion to suggest acute or subacute infarct. No acute intracranial hemorrhage or extra-axial fluid collections. Scattered nonspecific T2/FLAIR hyperintensities within the cerebral white matter most consistent with mild chronic microvascular ischemic change. Mild generalized cerebral atrophy. No hydrocephalus. Normal position of the cerebellar tonsils. No pathologic contrast enhancement. SELLA: No abnormality accounting for technique. OSSEOUS STRUCTURES/SOFT TISSUES: Normal marrow signal. The major intracranial vascular flow voids are maintained. ORBITS: No abnormality accounting for technique. SINUSES/MASTOIDS: No paranasal sinus mucosal disease. No middle ear or mastoid effusion.     IMPRESSION: 1.  Previously described tiny nodular focus of enhancement in the right occipital lobe is decreased in size/conspicuity compared to study from March 2024 and appears more vascular in nature on today's examination. No vasogenic edema or mass effect. Advise continued attention to this area on future imaging follow-up. 2.  No new enhancing intracranial lesions. 3.  Mild age-related changes as above.    CT Chest/Abdomen/Pelvis w Contrast    Result Date: 5/3/2024  EXAM: CT CHEST/ABDOMEN/PELVIS W CONTRAST LOCATION: [facility] DATE: 5/3/2024 INDICATION: Small cell lung cancer (H) COMPARISON: Chest CT from 02/08/2024, CT abdomen  and pelvis from 02/11/2024. TECHNIQUE: CT scan of the chest, abdomen, and pelvis was performed following injection of IV contrast. Multiplanar reformats were obtained. Dose reduction techniques were used. CONTRAST: I370 48 ml FINDINGS: LUNGS AND PLEURA: Advanced emphysema. The previously seen pleural-based mass in the anterior left upper lobe has nearly resolved with only a faint residual triangular opacity measuring 0.8 x 0.8 cm on series 4 image 95. There is a new pleural-based consolidative opacity in the left upper lobe on series 4 image 81. There are a few new irregular nodular opacities in the bilateral lower lobes and left lingula. No effusions. Mild bronchial wall thickening. Mild bronchiectasis. MEDIASTINUM/AXILLAE: Significant interval reduction in the AP window and left hilar adenopathy. This measures 3.1 x 1.4 cm compared to 6 x 3 cm previously (series 3/58). Decreased size of a left hilar lymph node measuring 1.8 x 1.4 cm compared to 3.4 x 1.7 cm previously (series 3/73). The other remaining mediastinal and hilar lymph nodes are relatively similar. Small hiatal hernia. CORONARY ARTERY CALCIFICATION: Severe. HEPATOBILIARY: Hepatic steatosis. Benign left hepatic lobe cyst. The common bile duct is at the upper limits of normal measuring 0.8 cm. This may be age-related. PANCREAS: Normal. SPLEEN: There are new wedge-shaped infarcts in the upper and lower pole of the spleen on series 3 image 133 and 160. ADRENAL GLANDS: There is nodular thickening of the left adrenal gland. KIDNEYS/BLADDER: Bilateral cortical thinning. Benign renal cysts do not require follow-up. No hydronephrosis or genitourinary calculi. BOWEL: Diverticulosis of the colon. No acute inflammatory change. No obstruction. Mild to moderate colonic stool burden. LYMPH NODES: No new or enlarging adenopathy. VASCULATURE: There is new or more conspicuous high-grade stenosis of the celiac artery origin on series 3 image 140. Additionally, there is a  punctate filling defect in the splenic artery on series 3 image 150. Extensive atherosclerosis of the abdominal aorta and its branches. There is ectasia of the infrarenal aorta measuring up to 2.4 cm. Heavily calcified atherosclerosis of the bilateral renal arteries, bilateral common iliac arteries, right greater than left, and the visualized right common femoral and superficial femoral arteries. PELVIC ORGANS: Hysterectomy. MUSCULOSKELETAL: Degenerative changes of the spine. No concerning osseous lesions. Osteopenia.     IMPRESSION: Chest: 1.  Nearly resolved anterior left upper lobe pleural-based nodule/mass. 2.  Significant interval reduction in the AP window and left hilar adenopathy. The remaining mediastinal and hilar lymph nodes are otherwise not significantly changed. 3.  There are a few new scattered, predominantly bilateral lower lobe and left lingular irregular nodular opacities. New subpleural consolidative opacity in the left upper lobe along the fissure. Findings could be related to infectious/inflammatory etiology or posttreatment related changes. Recurrence is in the differential although felt to be less likely given the otherwise interval improvement of the remaining findings discussed above. Abdomen and Pelvis: 1.  New multifocal wedge-shaped infarcts in the spleen. There is new or more conspicuous high-grade narrowing of the celiac artery. It is difficult to discern whether this is noncalcified atherosclerotic plaque or thrombus. Additionally, there is a new or more conspicuous punctate filling defect in the splenic artery that could represent atherosclerotic plaque or thrombus. A CTA of the abdomen and pelvis should be considered. 2.  No evidence of metastatic disease in the abdomen or pelvis. [Critical Result: New splenic infarcts with possible thrombus in the celiac artery. Finding was identified on 5/3/2024 11:32 AM CDT. Dr. Mathur was contacted by me on 5/3/2024 12:18 PM CDT and verbalized  understanding of the critical result.     IR Chest Port Placement > 5 Yrs of Age    Result Date: 5/2/2024  [place] RADIOLOGY LOCATION: [facility] DATE: 5/2/2024 PROCEDURE: IMPLANTABLE VENOUS CHEST PORT PLACEMENT (POWER INJECTABLE) INTERVENTIONAL RADIOLOGIST: Jonathan Spann MD. INDICATION: 68-year-old female with left-sided lung cancer presents for chest port placement for chemotherapy. CONSENT: The risks, benefits and alternatives of implantable venous chest port placement were discussed with the patient  in detail. All questions were answered. Informed consent was given to proceed with the procedure. MODERATE SEDATION: IV fentanyl and Versed were administered for sedation.  During the timeout, immediately prior to the administration of medications, the patient was reassessed for adequacy to receive conscious sedation. Under physician supervision, Versed and fentanyl were administered for moderate sedation. Pulse oximetry, heart rate and blood pressure were continuously monitored by an independent trained observer. The physician spent 15 minutes of face-to-face sedation time with the patient. CONTRAST: None. ANTIBIOTICS: Ancef 2 g IV. ADDITIONAL MEDICATIONS: None. FLUOROSCOPIC TIME: 0.4 minutes. RADIATION DOSE: Air Kerma: 1 mGy. COMPLICATIONS: No immediate complications. STERILE BARRIER TECHNIQUE: Maximum sterile barrier technique was used. Cutaneous antisepsis was performed at the operative site with application of 2% chlorhexidine and large sterile drape. Prior to the procedure, the  and assistant performed hand hygiene and wore hat, mask, sterile gown, and sterile gloves during the entire procedure. PROCEDURE:  Using real-time ultrasound guidance the right internal jugular vein was accessed. A subcutaneous pocket was created and irrigated with sterile normal saline. The catheter tubing was tunneled in an antegrade fashion from the port pocket to the dermatotomy site. Over a  guidewire, and under direct fluoroscopic visualization a peel-away sheath was advanced over the wire. Through the peel-away sheath, the catheter tubing was advanced until the tip was at the cavoatrial junction. The catheter tubing was cut to length and attached firmly to the port. The port was placed within the subcutaneous pocket and tested. The port pocket incision was closed with absorbable suture and surgical glue. The dermatotomy site was closed with surgical glue. FINDINGS: Ultrasound demonstrates an anechoic and compressible jugular vein. A permanent image was stored. At the completion of the study the port tip lies near the cavoatrial junction.     IMPRESSION:  Successful implantable venous chest port placement as detailed above.          This note has been dictated using voice recognition software. Any grammatical or context distortions are unintentional and inherent to the software      Signed by: Amador Stewart MD      Oncology Rooming Note    May 6, 2024 10:52 AM   Jasmyn Green is a 68 year old female who presents for:    Chief Complaint   Patient presents with     Oncology Clinic Visit     Return visit for lab and infusion.      Initial Vitals: /53 (BP Location: Left arm, Patient Position: Sitting, Cuff Size: Adult Small)   Pulse 110   Temp 97.6  F (36.4  C) (Oral)   Resp 20   Ht 1.524 m (5')   Wt 42.9 kg (94 lb 9.6 oz)   LMP  (LMP Unknown)   SpO2 95%   BMI 18.48 kg/m   Estimated body mass index is 18.48 kg/m  as calculated from the following:    Height as of this encounter: 1.524 m (5').    Weight as of this encounter: 42.9 kg (94 lb 9.6 oz). Body surface area is 1.35 meters squared.  Moderate Pain (5) Comment: Data Unavailable   No LMP recorded (lmp unknown). Patient is postmenopausal.  Allergies reviewed: Yes  Medications reviewed: Yes    Medications: Medication refills not needed today.  Pharmacy name entered into [employer]:    [facility] [place] [facility] [place]  [facility] [address]  [facility], [place] - [address]    Frailty Screening:   Is the patient here for a new oncology consult visit in cancer care? 2. No      Clinical concerns: LLQ pain and left clavicle pain 5/10. Dr Stewart was notified.      Елена Deutsch [facility]              Again, thank you for allowing me to participate in the care of your patient.        Sincerely,        Amador Stewart MD

## 2024-05-07 ENCOUNTER — INFUSION THERAPY VISIT (OUTPATIENT)
Dept: INFUSION THERAPY | Facility: HOSPITAL | Age: 69
End: 2024-05-07
Attending: INTERNAL MEDICINE
Payer: COMMERCIAL

## 2024-05-07 VITALS
RESPIRATION RATE: 22 BRPM | TEMPERATURE: 97.4 F | DIASTOLIC BLOOD PRESSURE: 89 MMHG | SYSTOLIC BLOOD PRESSURE: 183 MMHG | HEART RATE: 112 BPM | OXYGEN SATURATION: 97 %

## 2024-05-07 DIAGNOSIS — D64.81 ANEMIA ASSOCIATED WITH CHEMOTHERAPY: ICD-10-CM

## 2024-05-07 DIAGNOSIS — C34.90 SMALL CELL LUNG CANCER (H): Primary | ICD-10-CM

## 2024-05-07 DIAGNOSIS — T45.1X5A ANEMIA ASSOCIATED WITH CHEMOTHERAPY: ICD-10-CM

## 2024-05-07 PROCEDURE — 96413 CHEMO IV INFUSION 1 HR: CPT

## 2024-05-07 PROCEDURE — 258N000003 HC RX IP 258 OP 636: Performed by: INTERNAL MEDICINE

## 2024-05-07 PROCEDURE — 96367 TX/PROPH/DG ADDL SEQ IV INF: CPT

## 2024-05-07 PROCEDURE — 36430 TRANSFUSION BLD/BLD COMPNT: CPT

## 2024-05-07 PROCEDURE — P9016 RBC LEUKOCYTES REDUCED: HCPCS | Performed by: INTERNAL MEDICINE

## 2024-05-07 PROCEDURE — 250N000011 HC RX IP 250 OP 636: Mod: JZ | Performed by: INTERNAL MEDICINE

## 2024-05-07 RX ORDER — METHYLPREDNISOLONE SODIUM SUCCINATE 125 MG/2ML
125 INJECTION, POWDER, LYOPHILIZED, FOR SOLUTION INTRAMUSCULAR; INTRAVENOUS
Status: DISCONTINUED | OUTPATIENT
Start: 2024-05-07 | End: 2024-05-07 | Stop reason: HOSPADM

## 2024-05-07 RX ORDER — HEPARIN SODIUM (PORCINE) LOCK FLUSH IV SOLN 100 UNIT/ML 100 UNIT/ML
5 SOLUTION INTRAVENOUS
OUTPATIENT
Start: 2024-05-07

## 2024-05-07 RX ORDER — HEPARIN SODIUM,PORCINE 10 UNIT/ML
5-20 VIAL (ML) INTRAVENOUS DAILY PRN
OUTPATIENT
Start: 2024-05-07

## 2024-05-07 RX ORDER — MEPERIDINE HYDROCHLORIDE 25 MG/ML
25 INJECTION INTRAMUSCULAR; INTRAVENOUS; SUBCUTANEOUS EVERY 30 MIN PRN
Status: DISCONTINUED | OUTPATIENT
Start: 2024-05-07 | End: 2024-05-07 | Stop reason: HOSPADM

## 2024-05-07 RX ORDER — EPINEPHRINE 1 MG/ML
0.3 INJECTION, SOLUTION INTRAMUSCULAR; SUBCUTANEOUS EVERY 5 MIN PRN
OUTPATIENT
Start: 2024-05-07

## 2024-05-07 RX ORDER — EPINEPHRINE 1 MG/ML
0.3 INJECTION, SOLUTION INTRAMUSCULAR; SUBCUTANEOUS EVERY 5 MIN PRN
Status: DISCONTINUED | OUTPATIENT
Start: 2024-05-07 | End: 2024-05-07 | Stop reason: HOSPADM

## 2024-05-07 RX ORDER — HEPARIN SODIUM (PORCINE) LOCK FLUSH IV SOLN 100 UNIT/ML 100 UNIT/ML
5 SOLUTION INTRAVENOUS
Status: DISCONTINUED | OUTPATIENT
Start: 2024-05-07 | End: 2024-05-07 | Stop reason: HOSPADM

## 2024-05-07 RX ORDER — ALBUTEROL SULFATE 0.83 MG/ML
2.5 SOLUTION RESPIRATORY (INHALATION)
Status: DISCONTINUED | OUTPATIENT
Start: 2024-05-07 | End: 2024-05-07 | Stop reason: HOSPADM

## 2024-05-07 RX ORDER — DIPHENHYDRAMINE HYDROCHLORIDE 50 MG/ML
50 INJECTION INTRAMUSCULAR; INTRAVENOUS
Status: DISCONTINUED | OUTPATIENT
Start: 2024-05-07 | End: 2024-05-07 | Stop reason: HOSPADM

## 2024-05-07 RX ORDER — DIPHENHYDRAMINE HYDROCHLORIDE 50 MG/ML
50 INJECTION INTRAMUSCULAR; INTRAVENOUS
Start: 2024-05-07

## 2024-05-07 RX ORDER — ALBUTEROL SULFATE 90 UG/1
1-2 AEROSOL, METERED RESPIRATORY (INHALATION)
Status: DISCONTINUED | OUTPATIENT
Start: 2024-05-07 | End: 2024-05-07 | Stop reason: HOSPADM

## 2024-05-07 RX ADMIN — Medication 5 ML: at 15:17

## 2024-05-07 RX ADMIN — DEXAMETHASONE SODIUM PHOSPHATE 12 MG: 10 INJECTION, SOLUTION INTRAMUSCULAR; INTRAVENOUS at 13:20

## 2024-05-07 RX ADMIN — SODIUM CHLORIDE 135 MG: 9 INJECTION, SOLUTION INTRAVENOUS at 14:14

## 2024-05-07 RX ADMIN — SODIUM CHLORIDE 250 ML: 9 INJECTION, SOLUTION INTRAVENOUS at 13:20

## 2024-05-07 RX ADMIN — TRILACICLIB 300 MG: 300 INJECTION, POWDER, LYOPHILIZED, FOR SOLUTION INTRAVENOUS at 13:40

## 2024-05-07 NOTE — PROGRESS NOTES
Infusion Nursing Note:  Jasmyn Green presents today for cycle 3 day 2 etoposide and blood transfusion.    Patient seen by provider today: No   present during visit today: Not Applicable.    Note: Jasmyn arrived via wheelchair and in stable condition. She arrived on her home oxygen, and she was switched to clinic oxygen at 3L upon arrival. States she has had nausea for the last couple weeks. She also has fatigue and shortness of breath. This is her first blood transfusion, so the plan for this was reviewed with her. 1 unit PRBCs transfused without issue for a Hgb of 6.9 on 5/6. She was then premedicated and treatment administered per orders. Port de-accessed upon completion and site covered with gauze and secured with tape. Will return on 5/8 for next appointment.      Intravenous Access:  Implanted Port.    Treatment Conditions:  Lab Results   Component Value Date    HGB 6.9 (LL) 05/06/2024    WBC 11.5 (H) 05/06/2024    ANEU 9.7 (H) 05/06/2024    ANEUTAUTO 4.5 04/15/2024     (H) 05/06/2024        Blood transfusion consent signed 5/7/24.      Post Infusion Assessment:  Patient tolerated infusion without incident.  Blood return noted pre and post infusion.  Site patent and intact, free from redness, edema or discomfort.  No evidence of extravasations.  Access discontinued per protocol.       Discharge Plan:   Patient and/or family verbalized understanding of discharge instructions and all questions answered.  AVS to patient via [facility].  Patient will return 5/8 for next appointment.   Patient discharged in stable condition accompanied by: granddaughter.  Departure Mode: Ambulatory.      Debby Birmingham RN]

## 2024-05-08 ENCOUNTER — INFUSION THERAPY VISIT (OUTPATIENT)
Dept: INFUSION THERAPY | Facility: HOSPITAL | Age: 69
End: 2024-05-08
Attending: INTERNAL MEDICINE
Payer: COMMERCIAL

## 2024-05-08 VITALS
HEART RATE: 132 BPM | TEMPERATURE: 98.2 F | SYSTOLIC BLOOD PRESSURE: 142 MMHG | RESPIRATION RATE: 22 BRPM | OXYGEN SATURATION: 99 % | DIASTOLIC BLOOD PRESSURE: 71 MMHG

## 2024-05-08 DIAGNOSIS — C34.90 SMALL CELL LUNG CANCER (H): Primary | ICD-10-CM

## 2024-05-08 PROCEDURE — 96367 TX/PROPH/DG ADDL SEQ IV INF: CPT

## 2024-05-08 PROCEDURE — 250N000011 HC RX IP 250 OP 636: Mod: JZ | Performed by: INTERNAL MEDICINE

## 2024-05-08 PROCEDURE — 96413 CHEMO IV INFUSION 1 HR: CPT

## 2024-05-08 PROCEDURE — 258N000003 HC RX IP 258 OP 636: Performed by: INTERNAL MEDICINE

## 2024-05-08 RX ORDER — HEPARIN SODIUM (PORCINE) LOCK FLUSH IV SOLN 100 UNIT/ML 100 UNIT/ML
5 SOLUTION INTRAVENOUS
Status: DISCONTINUED | OUTPATIENT
Start: 2024-05-08 | End: 2024-05-08 | Stop reason: HOSPADM

## 2024-05-08 RX ADMIN — TRILACICLIB 300 MG: 300 INJECTION, POWDER, LYOPHILIZED, FOR SOLUTION INTRAVENOUS at 11:50

## 2024-05-08 RX ADMIN — SODIUM CHLORIDE 250 ML: 9 INJECTION, SOLUTION INTRAVENOUS at 11:29

## 2024-05-08 RX ADMIN — Medication 5 ML: at 13:27

## 2024-05-08 RX ADMIN — DEXAMETHASONE SODIUM PHOSPHATE 12 MG: 10 INJECTION, SOLUTION INTRAMUSCULAR; INTRAVENOUS at 11:29

## 2024-05-08 RX ADMIN — SODIUM CHLORIDE 135 MG: 9 INJECTION, SOLUTION INTRAVENOUS at 12:27

## 2024-05-08 NOTE — PROGRESS NOTES
Infusion Nursing Note:  Jasmyn Green presents today for cosela and jillian.    Patient seen by provider today: No   present during visit today: Not Applicable.    Note: Patient received treatment as ordered.      Intravenous Access:  Implanted Port.    Treatment Conditions:  Results reviewed, labs MET treatment parameters, ok to proceed with treatment.      Post Infusion Assessment:  Patient tolerated infusion without incident.       Discharge Plan:   Patient discharged in stable condition accompanied by: charlie Miramontes RN

## 2024-05-10 ENCOUNTER — HOSPITAL ENCOUNTER (INPATIENT)
Facility: CLINIC | Age: 69
LOS: 7 days | Discharge: HOME | DRG: 193 | End: 2024-05-17
Attending: EMERGENCY MEDICINE | Admitting: HOSPITALIST
Payer: COMMERCIAL

## 2024-05-10 ENCOUNTER — APPOINTMENT (OUTPATIENT)
Dept: CT IMAGING | Facility: CLINIC | Age: 69
DRG: 193 | End: 2024-05-10
Attending: EMERGENCY MEDICINE
Payer: COMMERCIAL

## 2024-05-10 ENCOUNTER — TELEPHONE (OUTPATIENT)
Dept: ONCOLOGY | Facility: HOSPITAL | Age: 69
End: 2024-05-10
Payer: COMMERCIAL

## 2024-05-10 DIAGNOSIS — R09.02 HYPOXIA: ICD-10-CM

## 2024-05-10 DIAGNOSIS — N39.0 URINARY TRACT INFECTION WITH HEMATURIA, SITE UNSPECIFIED: ICD-10-CM

## 2024-05-10 DIAGNOSIS — J44.1 COPD EXACERBATION (H): ICD-10-CM

## 2024-05-10 DIAGNOSIS — Z92.21 STATUS POST CHEMOTHERAPY: ICD-10-CM

## 2024-05-10 DIAGNOSIS — R31.9 URINARY TRACT INFECTION WITH HEMATURIA, SITE UNSPECIFIED: ICD-10-CM

## 2024-05-10 DIAGNOSIS — I74.9 ARTERIAL THROMBOSIS (H): Primary | ICD-10-CM

## 2024-05-10 DIAGNOSIS — A41.9 ACUTE SEPSIS (H): ICD-10-CM

## 2024-05-10 LAB
ALBUMIN SERPL BCG-MCNC: 3.4 G/DL (ref 3.5–5.2)
ALBUMIN UR-MCNC: 10 MG/DL
ALP SERPL-CCNC: 193 U/L (ref 40–150)
ALT SERPL W P-5'-P-CCNC: 63 U/L (ref 0–50)
ANION GAP SERPL CALCULATED.3IONS-SCNC: 10 MMOL/L (ref 7–15)
APPEARANCE UR: CLEAR
AST SERPL W P-5'-P-CCNC: 53 U/L (ref 0–45)
ATRIAL RATE - MUSE: 123 BPM
BASOPHILS # BLD AUTO: 0 10E3/UL (ref 0–0.2)
BASOPHILS NFR BLD AUTO: 0 %
BILIRUB SERPL-MCNC: 0.2 MG/DL
BILIRUB UR QL STRIP: NEGATIVE
BUN SERPL-MCNC: 29.7 MG/DL (ref 8–23)
CALCIUM SERPL-MCNC: 9.3 MG/DL (ref 8.8–10.2)
CHLORIDE SERPL-SCNC: 107 MMOL/L (ref 98–107)
COLOR UR AUTO: COLORLESS
CREAT SERPL-MCNC: 0.94 MG/DL (ref 0.51–0.95)
DEPRECATED HCO3 PLAS-SCNC: 25 MMOL/L (ref 22–29)
DIASTOLIC BLOOD PRESSURE - MUSE: 99 MMHG
EGFRCR SERPLBLD CKD-EPI 2021: 65 ML/MIN/1.73M2
EOSINOPHIL # BLD AUTO: 0.1 10E3/UL (ref 0–0.7)
EOSINOPHIL NFR BLD AUTO: 1 %
ERYTHROCYTE [DISTWIDTH] IN BLOOD BY AUTOMATED COUNT: 15.6 % (ref 10–15)
FLUAV RNA SPEC QL NAA+PROBE: NEGATIVE
FLUBV RNA RESP QL NAA+PROBE: NEGATIVE
GLUCOSE SERPL-MCNC: 95 MG/DL (ref 70–99)
GLUCOSE UR STRIP-MCNC: NEGATIVE MG/DL
HCT VFR BLD AUTO: 31.2 % (ref 35–47)
HGB BLD-MCNC: 9.9 G/DL (ref 11.7–15.7)
HGB UR QL STRIP: NEGATIVE
HYALINE CASTS: 3 /LPF
IMM GRANULOCYTES # BLD: 0.1 10E3/UL
IMM GRANULOCYTES NFR BLD: 1 %
INR PPP: 1 (ref 0.85–1.15)
INTERPRETATION ECG - MUSE: NORMAL
KETONES UR STRIP-MCNC: NEGATIVE MG/DL
LACTATE SERPL-SCNC: 0.8 MMOL/L (ref 0.7–2)
LEUKOCYTE ESTERASE UR QL STRIP: ABNORMAL
LYMPHOCYTES # BLD AUTO: 0.8 10E3/UL (ref 0.8–5.3)
LYMPHOCYTES NFR BLD AUTO: 6 %
MCH RBC QN AUTO: 29 PG (ref 26.5–33)
MCHC RBC AUTO-ENTMCNC: 31.7 G/DL (ref 31.5–36.5)
MCV RBC AUTO: 92 FL (ref 78–100)
MONOCYTES # BLD AUTO: 0 10E3/UL (ref 0–1.3)
MONOCYTES NFR BLD AUTO: 0 %
MUCOUS THREADS #/AREA URNS LPF: PRESENT /LPF
NEUTROPHILS # BLD AUTO: 13 10E3/UL (ref 1.6–8.3)
NEUTROPHILS NFR BLD AUTO: 92 %
NITRATE UR QL: NEGATIVE
NRBC # BLD AUTO: 0 10E3/UL
NRBC BLD AUTO-RTO: 0 /100
P AXIS - MUSE: 86 DEGREES
PH UR STRIP: 5.5 [PH] (ref 5–7)
PLATELET # BLD AUTO: 466 10E3/UL (ref 150–450)
POTASSIUM SERPL-SCNC: 5.2 MMOL/L (ref 3.4–5.3)
PR INTERVAL - MUSE: 124 MS
PROT SERPL-MCNC: 6.5 G/DL (ref 6.4–8.3)
QRS DURATION - MUSE: 66 MS
QT - MUSE: 304 MS
QTC - MUSE: 435 MS
R AXIS - MUSE: 83 DEGREES
RBC # BLD AUTO: 3.41 10E6/UL (ref 3.8–5.2)
RBC URINE: 1 /HPF
RSV RNA SPEC NAA+PROBE: NEGATIVE
SARS-COV-2 RNA RESP QL NAA+PROBE: NEGATIVE
SODIUM SERPL-SCNC: 142 MMOL/L (ref 135–145)
SP GR UR STRIP: 1.01 (ref 1–1.03)
SYSTOLIC BLOOD PRESSURE - MUSE: 206 MMHG
T AXIS - MUSE: 76 DEGREES
TROPONIN T SERPL HS-MCNC: 21 NG/L
UROBILINOGEN UR STRIP-MCNC: <2 MG/DL
VENTRICULAR RATE- MUSE: 123 BPM
WBC # BLD AUTO: 14.1 10E3/UL (ref 4–11)
WBC URINE: 21 /HPF

## 2024-05-10 PROCEDURE — 250N000009 HC RX 250: Performed by: EMERGENCY MEDICINE

## 2024-05-10 PROCEDURE — 87086 URINE CULTURE/COLONY COUNT: CPT | Performed by: EMERGENCY MEDICINE

## 2024-05-10 PROCEDURE — 80053 COMPREHEN METABOLIC PANEL: CPT | Performed by: EMERGENCY MEDICINE

## 2024-05-10 PROCEDURE — 85025 COMPLETE CBC W/AUTO DIFF WBC: CPT | Performed by: EMERGENCY MEDICINE

## 2024-05-10 PROCEDURE — 71275 CT ANGIOGRAPHY CHEST: CPT

## 2024-05-10 PROCEDURE — 250N000011 HC RX IP 250 OP 636: Performed by: EMERGENCY MEDICINE

## 2024-05-10 PROCEDURE — 96361 HYDRATE IV INFUSION ADD-ON: CPT

## 2024-05-10 PROCEDURE — 74174 CTA ABD&PLVS W/CONTRAST: CPT

## 2024-05-10 PROCEDURE — 84484 ASSAY OF TROPONIN QUANT: CPT | Performed by: EMERGENCY MEDICINE

## 2024-05-10 PROCEDURE — 36415 COLL VENOUS BLD VENIPUNCTURE: CPT | Performed by: EMERGENCY MEDICINE

## 2024-05-10 PROCEDURE — 87637 SARSCOV2&INF A&B&RSV AMP PRB: CPT | Performed by: EMERGENCY MEDICINE

## 2024-05-10 PROCEDURE — 84145 PROCALCITONIN (PCT): CPT | Performed by: HOSPITALIST

## 2024-05-10 PROCEDURE — 99285 EMERGENCY DEPT VISIT HI MDM: CPT | Mod: 25

## 2024-05-10 PROCEDURE — 87040 BLOOD CULTURE FOR BACTERIA: CPT | Performed by: EMERGENCY MEDICINE

## 2024-05-10 PROCEDURE — 94640 AIRWAY INHALATION TREATMENT: CPT

## 2024-05-10 PROCEDURE — 85610 PROTHROMBIN TIME: CPT | Performed by: EMERGENCY MEDICINE

## 2024-05-10 PROCEDURE — 120N000001 HC R&B MED SURG/OB

## 2024-05-10 PROCEDURE — 83605 ASSAY OF LACTIC ACID: CPT | Performed by: EMERGENCY MEDICINE

## 2024-05-10 PROCEDURE — 93005 ELECTROCARDIOGRAM TRACING: CPT | Performed by: EMERGENCY MEDICINE

## 2024-05-10 PROCEDURE — 96365 THER/PROPH/DIAG IV INF INIT: CPT | Mod: 59

## 2024-05-10 PROCEDURE — 81001 URINALYSIS AUTO W/SCOPE: CPT | Performed by: EMERGENCY MEDICINE

## 2024-05-10 PROCEDURE — 999N000157 HC STATISTIC RCP TIME EA 10 MIN

## 2024-05-10 PROCEDURE — 93005 ELECTROCARDIOGRAM TRACING: CPT

## 2024-05-10 PROCEDURE — 83880 ASSAY OF NATRIURETIC PEPTIDE: CPT | Performed by: HOSPITALIST

## 2024-05-10 PROCEDURE — 96375 TX/PRO/DX INJ NEW DRUG ADDON: CPT

## 2024-05-10 PROCEDURE — 96367 TX/PROPH/DG ADDL SEQ IV INF: CPT

## 2024-05-10 PROCEDURE — 258N000003 HC RX IP 258 OP 636: Performed by: EMERGENCY MEDICINE

## 2024-05-10 RX ORDER — IOPAMIDOL 755 MG/ML
75 INJECTION, SOLUTION INTRAVASCULAR ONCE
Status: COMPLETED | OUTPATIENT
Start: 2024-05-10 | End: 2024-05-10

## 2024-05-10 RX ORDER — ASPIRIN 325 MG
325 TABLET, DELAYED RELEASE (ENTERIC COATED) ORAL DAILY
COMMUNITY

## 2024-05-10 RX ORDER — IPRATROPIUM BROMIDE AND ALBUTEROL SULFATE 2.5; .5 MG/3ML; MG/3ML
3 SOLUTION RESPIRATORY (INHALATION) ONCE
Status: COMPLETED | OUTPATIENT
Start: 2024-05-10 | End: 2024-05-10

## 2024-05-10 RX ORDER — AZITHROMYCIN 500 MG/5ML
500 INJECTION, POWDER, LYOPHILIZED, FOR SOLUTION INTRAVENOUS ONCE
Status: COMPLETED | OUTPATIENT
Start: 2024-05-10 | End: 2024-05-11

## 2024-05-10 RX ORDER — ALBUTEROL SULFATE 90 UG/1
1-2 AEROSOL, METERED RESPIRATORY (INHALATION) EVERY 6 HOURS PRN
COMMUNITY

## 2024-05-10 RX ORDER — CEFTRIAXONE 1 G/1
1 INJECTION, POWDER, FOR SOLUTION INTRAMUSCULAR; INTRAVENOUS ONCE
Status: COMPLETED | OUTPATIENT
Start: 2024-05-10 | End: 2024-05-10

## 2024-05-10 RX ORDER — METHYLPREDNISOLONE SODIUM SUCCINATE 125 MG/2ML
125 INJECTION, POWDER, LYOPHILIZED, FOR SOLUTION INTRAMUSCULAR; INTRAVENOUS ONCE
Status: COMPLETED | OUTPATIENT
Start: 2024-05-10 | End: 2024-05-10

## 2024-05-10 RX ADMIN — SODIUM CHLORIDE 1000 ML: 9 INJECTION, SOLUTION INTRAVENOUS at 18:53

## 2024-05-10 RX ADMIN — CEFTRIAXONE 1 G: 1 INJECTION, POWDER, FOR SOLUTION INTRAMUSCULAR; INTRAVENOUS at 21:41

## 2024-05-10 RX ADMIN — IOPAMIDOL 90 ML: 755 INJECTION, SOLUTION INTRAVENOUS at 20:53

## 2024-05-10 RX ADMIN — AZITHROMYCIN MONOHYDRATE 500 MG: 500 INJECTION, POWDER, LYOPHILIZED, FOR SOLUTION INTRAVENOUS at 23:06

## 2024-05-10 RX ADMIN — IPRATROPIUM BROMIDE AND ALBUTEROL SULFATE 3 ML: .5; 3 SOLUTION RESPIRATORY (INHALATION) at 18:42

## 2024-05-10 RX ADMIN — METHYLPREDNISOLONE SODIUM SUCCINATE 125 MG: 125 INJECTION, POWDER, FOR SOLUTION INTRAMUSCULAR; INTRAVENOUS at 18:53

## 2024-05-10 ASSESSMENT — ENCOUNTER SYMPTOMS
ABDOMINAL PAIN: 0
FATIGUE: 1
COUGH: 1
SHORTNESS OF BREATH: 1
CHILLS: 1
DYSURIA: 0

## 2024-05-10 ASSESSMENT — ACTIVITIES OF DAILY LIVING (ADL)
ADLS_ACUITY_SCORE: 38

## 2024-05-10 NOTE — ED TRIAGE NOTES
"Arrives to ED accompanied by daughter with c/o SOB that began yesterday. Hx of small cell lung CA. Received chemo Monday, Tuesday, Wednesday this week. \"I always get short of breath after chemo\". Reports SOB worse this time. Baseline on oxygen 4-5L/NC with activity and 3-4L/NC at rest. Sats 87% on oxygen 4L/NC on arrival. Placed on oxymask at 5L, sats increased to 97%. Pt used neb this morning with minimal relief. Daughter reports pt \"has a little pneumonia, but they're not treating with antibiotics. Also received blood transfusion on Tuesday prior to chemo.      Triage Assessment (Adult)       Row Name 05/10/24 1800          Triage Assessment    Airway WDL WDL        Respiratory WDL    Respiratory WDL X;rhythm/pattern;cough     Rhythm/Pattern, Respiratory shortness of breath;tachypneic;prolonged expiratory phase     Cough Frequency frequent        Skin Circulation/Temperature WDL    Skin Circulation/Temperature WDL WDL        Cardiac WDL    Cardiac WDL X;rhythm  HTN     Pulse Rate & Regularity tachycardic        Peripheral/Neurovascular WDL    Peripheral Neurovascular WDL WDL        Cognitive/Neuro/Behavioral WDL    Cognitive/Neuro/Behavioral WDL WDL                     "

## 2024-05-10 NOTE — TELEPHONE ENCOUNTER
"I received a phone call today from Jasmyn's daughter, Julissa.  She is worried about her mother.  She is calling today because for the past 2 days her mom has been \"struggling to breathe.\"  She states that she is having to work harder to breathe and feels more out of breath.  She is on continuous oxygen and normally is at 3 L but lately they have had to turn her up to 4-1/2 L to stay in the 90s..  Yesterday, when she was moving around she was satting 85% on 4.5 liters of oxygen.  She was unable to shower yesterday as she could not catch her breath.  I advised that they go in to the emergency room to have her evaluated today given this big change.  Julissa agrees with this plan.  Will send FYI to Dr. Stewart and Wilma, [facility].    Shelia Aponte RN on 5/10/2024 at 4:31 PM    "

## 2024-05-10 NOTE — PROGRESS NOTES
BP (!) 143/76   Pulse 114   Temp 97.7  F (36.5  C) (Oral)   Resp 25   Ht 1.524 m (5')   Wt 43.1 kg (95 lb)   LMP  (LMP Unknown)   SpO2 98%   BMI 18.55 kg/m        The PT was provided a neb per MD order. Objectively, the PT appeared to be in (at least) mild respiratory distress. Further, he PT reported SOB. BS were diminished both pre and post. RT will follow as directed.    Addendum: After the 0200 neb, the PT was less diminished after the neb than before it was given. Also, her cough, while still nonproductive, is looser than on evening shift.

## 2024-05-11 ENCOUNTER — MYC MEDICAL ADVICE (OUTPATIENT)
Dept: INTERNAL MEDICINE | Facility: CLINIC | Age: 69
End: 2024-05-11

## 2024-05-11 DIAGNOSIS — L60.3 DYSTROPHIC NAIL: Primary | ICD-10-CM

## 2024-05-11 LAB
ALBUMIN SERPL BCG-MCNC: 3.1 G/DL (ref 3.5–5.2)
ALP SERPL-CCNC: 181 U/L (ref 40–150)
ALT SERPL W P-5'-P-CCNC: 64 U/L (ref 0–50)
ANION GAP SERPL CALCULATED.3IONS-SCNC: 8 MMOL/L (ref 7–15)
AST SERPL W P-5'-P-CCNC: ABNORMAL U/L
ATRIAL RATE - MUSE: 121 BPM
BILIRUB SERPL-MCNC: <0.2 MG/DL
BUN SERPL-MCNC: 26.2 MG/DL (ref 8–23)
CALCIUM SERPL-MCNC: 8.3 MG/DL (ref 8.8–10.2)
CHLORIDE SERPL-SCNC: 107 MMOL/L (ref 98–107)
CREAT SERPL-MCNC: 0.83 MG/DL (ref 0.51–0.95)
DEPRECATED HCO3 PLAS-SCNC: 22 MMOL/L (ref 22–29)
DIASTOLIC BLOOD PRESSURE - MUSE: 66 MMHG
EGFRCR SERPLBLD CKD-EPI 2021: 75 ML/MIN/1.73M2
ERYTHROCYTE [DISTWIDTH] IN BLOOD BY AUTOMATED COUNT: 15.7 % (ref 10–15)
GLUCOSE BLDC GLUCOMTR-MCNC: 217 MG/DL (ref 70–99)
GLUCOSE BLDC GLUCOMTR-MCNC: 253 MG/DL (ref 70–99)
GLUCOSE BLDC GLUCOMTR-MCNC: 255 MG/DL (ref 70–99)
GLUCOSE BLDC GLUCOMTR-MCNC: 267 MG/DL (ref 70–99)
GLUCOSE BLDC GLUCOMTR-MCNC: 269 MG/DL (ref 70–99)
GLUCOSE BLDC GLUCOMTR-MCNC: 285 MG/DL (ref 70–99)
GLUCOSE BLDC GLUCOMTR-MCNC: 304 MG/DL (ref 70–99)
GLUCOSE BLDC GLUCOMTR-MCNC: 311 MG/DL (ref 70–99)
GLUCOSE BLDC GLUCOMTR-MCNC: 364 MG/DL (ref 70–99)
GLUCOSE SERPL-MCNC: 186 MG/DL (ref 70–99)
HCT VFR BLD AUTO: 27.1 % (ref 35–47)
HGB BLD-MCNC: 8.7 G/DL (ref 11.7–15.7)
INTERPRETATION ECG - MUSE: NORMAL
LACTATE SERPL-SCNC: 1.8 MMOL/L (ref 0.7–2)
LACTATE SERPL-SCNC: 3.1 MMOL/L (ref 0.7–2)
MCH RBC QN AUTO: 29.3 PG (ref 26.5–33)
MCHC RBC AUTO-ENTMCNC: 32.1 G/DL (ref 31.5–36.5)
MCV RBC AUTO: 91 FL (ref 78–100)
NT-PROBNP SERPL-MCNC: 7702 PG/ML (ref 0–900)
P AXIS - MUSE: 90 DEGREES
PLATELET # BLD AUTO: 311 10E3/UL (ref 150–450)
POTASSIUM SERPL-SCNC: 4.4 MMOL/L (ref 3.4–5.3)
POTASSIUM SERPL-SCNC: 6 MMOL/L (ref 3.4–5.3)
PR INTERVAL - MUSE: 138 MS
PROCALCITONIN SERPL IA-MCNC: 3.99 NG/ML
PROT SERPL-MCNC: 5.4 G/DL (ref 6.4–8.3)
QRS DURATION - MUSE: 66 MS
QT - MUSE: 314 MS
QTC - MUSE: 445 MS
R AXIS - MUSE: 79 DEGREES
RBC # BLD AUTO: 2.97 10E6/UL (ref 3.8–5.2)
SODIUM SERPL-SCNC: 137 MMOL/L (ref 135–145)
SYSTOLIC BLOOD PRESSURE - MUSE: 146 MMHG
T AXIS - MUSE: 78 DEGREES
UFH PPP CHRO-ACNC: 0.31 IU/ML
UFH PPP CHRO-ACNC: >1.1 IU/ML
VENTRICULAR RATE- MUSE: 121 BPM
WBC # BLD AUTO: 8.3 10E3/UL (ref 4–11)

## 2024-05-11 PROCEDURE — 84155 ASSAY OF PROTEIN SERUM: CPT | Performed by: HOSPITALIST

## 2024-05-11 PROCEDURE — 250N000009 HC RX 250: Performed by: HOSPITALIST

## 2024-05-11 PROCEDURE — 36415 COLL VENOUS BLD VENIPUNCTURE: CPT | Performed by: STUDENT IN AN ORGANIZED HEALTH CARE EDUCATION/TRAINING PROGRAM

## 2024-05-11 PROCEDURE — 94640 AIRWAY INHALATION TREATMENT: CPT

## 2024-05-11 PROCEDURE — 85027 COMPLETE CBC AUTOMATED: CPT | Performed by: HOSPITALIST

## 2024-05-11 PROCEDURE — 250N000011 HC RX IP 250 OP 636: Performed by: HOSPITALIST

## 2024-05-11 PROCEDURE — 250N000013 HC RX MED GY IP 250 OP 250 PS 637: Performed by: STUDENT IN AN ORGANIZED HEALTH CARE EDUCATION/TRAINING PROGRAM

## 2024-05-11 PROCEDURE — 250N000013 HC RX MED GY IP 250 OP 250 PS 637: Performed by: HOSPITALIST

## 2024-05-11 PROCEDURE — 250N000009 HC RX 250: Performed by: STUDENT IN AN ORGANIZED HEALTH CARE EDUCATION/TRAINING PROGRAM

## 2024-05-11 PROCEDURE — 85520 HEPARIN ASSAY: CPT | Performed by: STUDENT IN AN ORGANIZED HEALTH CARE EDUCATION/TRAINING PROGRAM

## 2024-05-11 PROCEDURE — 120N000001 HC R&B MED SURG/OB

## 2024-05-11 PROCEDURE — 999N000157 HC STATISTIC RCP TIME EA 10 MIN

## 2024-05-11 PROCEDURE — 84132 ASSAY OF SERUM POTASSIUM: CPT | Performed by: STUDENT IN AN ORGANIZED HEALTH CARE EDUCATION/TRAINING PROGRAM

## 2024-05-11 PROCEDURE — 258N000001 HC RX 258: Performed by: STUDENT IN AN ORGANIZED HEALTH CARE EDUCATION/TRAINING PROGRAM

## 2024-05-11 PROCEDURE — 258N000003 HC RX IP 258 OP 636: Performed by: HOSPITALIST

## 2024-05-11 PROCEDURE — 250N000011 HC RX IP 250 OP 636: Performed by: STUDENT IN AN ORGANIZED HEALTH CARE EDUCATION/TRAINING PROGRAM

## 2024-05-11 PROCEDURE — 258N000003 HC RX IP 258 OP 636: Performed by: STUDENT IN AN ORGANIZED HEALTH CARE EDUCATION/TRAINING PROGRAM

## 2024-05-11 PROCEDURE — 82962 GLUCOSE BLOOD TEST: CPT

## 2024-05-11 PROCEDURE — 94640 AIRWAY INHALATION TREATMENT: CPT | Mod: 76

## 2024-05-11 PROCEDURE — 250N000012 HC RX MED GY IP 250 OP 636 PS 637: Performed by: STUDENT IN AN ORGANIZED HEALTH CARE EDUCATION/TRAINING PROGRAM

## 2024-05-11 PROCEDURE — 99223 1ST HOSP IP/OBS HIGH 75: CPT | Performed by: HOSPITALIST

## 2024-05-11 PROCEDURE — 36415 COLL VENOUS BLD VENIPUNCTURE: CPT | Performed by: HOSPITALIST

## 2024-05-11 PROCEDURE — 83605 ASSAY OF LACTIC ACID: CPT | Performed by: STUDENT IN AN ORGANIZED HEALTH CARE EDUCATION/TRAINING PROGRAM

## 2024-05-11 PROCEDURE — 87205 SMEAR GRAM STAIN: CPT | Performed by: HOSPITALIST

## 2024-05-11 PROCEDURE — 93005 ELECTROCARDIOGRAM TRACING: CPT | Performed by: STUDENT IN AN ORGANIZED HEALTH CARE EDUCATION/TRAINING PROGRAM

## 2024-05-11 PROCEDURE — 82040 ASSAY OF SERUM ALBUMIN: CPT | Performed by: HOSPITALIST

## 2024-05-11 RX ORDER — HYDROXYZINE HYDROCHLORIDE 25 MG/1
25-50 TABLET, FILM COATED ORAL EVERY 8 HOURS PRN
Status: DISCONTINUED | OUTPATIENT
Start: 2024-05-11 | End: 2024-05-17 | Stop reason: HOSPADM

## 2024-05-11 RX ORDER — ACETAMINOPHEN 500 MG
1000 TABLET ORAL EVERY 8 HOURS PRN
Status: DISCONTINUED | OUTPATIENT
Start: 2024-05-11 | End: 2024-05-17 | Stop reason: HOSPADM

## 2024-05-11 RX ORDER — DEXTROSE MONOHYDRATE 100 MG/ML
300 INJECTION, SOLUTION INTRAVENOUS ONCE
Status: COMPLETED | OUTPATIENT
Start: 2024-05-11 | End: 2024-05-11

## 2024-05-11 RX ORDER — ALBUTEROL SULFATE 0.83 MG/ML
2.5 SOLUTION RESPIRATORY (INHALATION)
Status: DISCONTINUED | OUTPATIENT
Start: 2024-05-11 | End: 2024-05-12

## 2024-05-11 RX ORDER — PROCHLORPERAZINE 25 MG
12.5 SUPPOSITORY, RECTAL RECTAL EVERY 12 HOURS PRN
Status: DISCONTINUED | OUTPATIENT
Start: 2024-05-11 | End: 2024-05-17 | Stop reason: HOSPADM

## 2024-05-11 RX ORDER — NALOXONE HYDROCHLORIDE 0.4 MG/ML
0.2 INJECTION, SOLUTION INTRAMUSCULAR; INTRAVENOUS; SUBCUTANEOUS
Status: DISCONTINUED | OUTPATIENT
Start: 2024-05-11 | End: 2024-05-17 | Stop reason: HOSPADM

## 2024-05-11 RX ORDER — HEPARIN SODIUM,PORCINE 10 UNIT/ML
5-10 VIAL (ML) INTRAVENOUS EVERY 24 HOURS
Status: DISCONTINUED | OUTPATIENT
Start: 2024-05-11 | End: 2024-05-17 | Stop reason: HOSPADM

## 2024-05-11 RX ORDER — PROCHLORPERAZINE MALEATE 5 MG
5 TABLET ORAL EVERY 6 HOURS PRN
Status: DISCONTINUED | OUTPATIENT
Start: 2024-05-11 | End: 2024-05-17 | Stop reason: HOSPADM

## 2024-05-11 RX ORDER — ASPIRIN 325 MG
325 TABLET, DELAYED RELEASE (ENTERIC COATED) ORAL DAILY
Status: DISCONTINUED | OUTPATIENT
Start: 2024-05-11 | End: 2024-05-14

## 2024-05-11 RX ORDER — FUROSEMIDE 10 MG/ML
40 INJECTION INTRAMUSCULAR; INTRAVENOUS ONCE
Status: COMPLETED | OUTPATIENT
Start: 2024-05-11 | End: 2024-05-11

## 2024-05-11 RX ORDER — IPRATROPIUM BROMIDE AND ALBUTEROL SULFATE 2.5; .5 MG/3ML; MG/3ML
3 SOLUTION RESPIRATORY (INHALATION)
Status: DISCONTINUED | OUTPATIENT
Start: 2024-05-11 | End: 2024-05-12

## 2024-05-11 RX ORDER — HEPARIN SODIUM,PORCINE 10 UNIT/ML
5-10 VIAL (ML) INTRAVENOUS
Status: DISCONTINUED | OUTPATIENT
Start: 2024-05-11 | End: 2024-05-17 | Stop reason: HOSPADM

## 2024-05-11 RX ORDER — ALBUTEROL SULFATE 5 MG/ML
10 SOLUTION RESPIRATORY (INHALATION) ONCE
Status: DISCONTINUED | OUTPATIENT
Start: 2024-05-11 | End: 2024-05-11

## 2024-05-11 RX ORDER — NALOXONE HYDROCHLORIDE 0.4 MG/ML
0.4 INJECTION, SOLUTION INTRAMUSCULAR; INTRAVENOUS; SUBCUTANEOUS
Status: DISCONTINUED | OUTPATIENT
Start: 2024-05-11 | End: 2024-05-17 | Stop reason: HOSPADM

## 2024-05-11 RX ORDER — HEPARIN SODIUM (PORCINE) LOCK FLUSH IV SOLN 100 UNIT/ML 100 UNIT/ML
5-10 SOLUTION INTRAVENOUS
Status: DISCONTINUED | OUTPATIENT
Start: 2024-05-11 | End: 2024-05-17 | Stop reason: HOSPADM

## 2024-05-11 RX ORDER — SODIUM CHLORIDE 9 MG/ML
INJECTION, SOLUTION INTRAVENOUS CONTINUOUS
Status: DISCONTINUED | OUTPATIENT
Start: 2024-05-11 | End: 2024-05-11

## 2024-05-11 RX ORDER — ONDANSETRON 4 MG/1
4 TABLET, ORALLY DISINTEGRATING ORAL EVERY 6 HOURS PRN
Status: DISCONTINUED | OUTPATIENT
Start: 2024-05-11 | End: 2024-05-17 | Stop reason: HOSPADM

## 2024-05-11 RX ORDER — NICOTINE POLACRILEX 4 MG
15-30 LOZENGE BUCCAL
Status: DISCONTINUED | OUTPATIENT
Start: 2024-05-11 | End: 2024-05-15

## 2024-05-11 RX ORDER — HEPARIN SODIUM 10000 [USP'U]/100ML
0-5000 INJECTION, SOLUTION INTRAVENOUS CONTINUOUS
Status: DISCONTINUED | OUTPATIENT
Start: 2024-05-11 | End: 2024-05-14

## 2024-05-11 RX ORDER — GUAIFENESIN/DEXTROMETHORPHAN 100-10MG/5
10 SYRUP ORAL EVERY 4 HOURS PRN
Status: DISCONTINUED | OUTPATIENT
Start: 2024-05-11 | End: 2024-05-17 | Stop reason: HOSPADM

## 2024-05-11 RX ORDER — LISINOPRIL 20 MG/1
20 TABLET ORAL DAILY
Status: DISCONTINUED | OUTPATIENT
Start: 2024-05-11 | End: 2024-05-17 | Stop reason: HOSPADM

## 2024-05-11 RX ORDER — ONDANSETRON 2 MG/ML
4 INJECTION INTRAMUSCULAR; INTRAVENOUS EVERY 6 HOURS PRN
Status: DISCONTINUED | OUTPATIENT
Start: 2024-05-11 | End: 2024-05-17 | Stop reason: HOSPADM

## 2024-05-11 RX ORDER — MIRTAZAPINE 7.5 MG/1
7.5 TABLET, FILM COATED ORAL AT BEDTIME
Status: DISCONTINUED | OUTPATIENT
Start: 2024-05-11 | End: 2024-05-17 | Stop reason: HOSPADM

## 2024-05-11 RX ORDER — NICOTINE POLACRILEX 4 MG
15-30 LOZENGE BUCCAL
Status: DISCONTINUED | OUTPATIENT
Start: 2024-05-11 | End: 2024-05-17 | Stop reason: HOSPADM

## 2024-05-11 RX ORDER — FLUTICASONE FUROATE AND VILANTEROL 200; 25 UG/1; UG/1
1 POWDER RESPIRATORY (INHALATION) DAILY
Status: DISCONTINUED | OUTPATIENT
Start: 2024-05-11 | End: 2024-05-17 | Stop reason: HOSPADM

## 2024-05-11 RX ORDER — AMOXICILLIN 250 MG
1 CAPSULE ORAL 2 TIMES DAILY PRN
Status: DISCONTINUED | OUTPATIENT
Start: 2024-05-11 | End: 2024-05-17 | Stop reason: HOSPADM

## 2024-05-11 RX ORDER — DOCUSATE SODIUM 100 MG/1
100 CAPSULE, LIQUID FILLED ORAL 2 TIMES DAILY
Status: DISCONTINUED | OUTPATIENT
Start: 2024-05-11 | End: 2024-05-17 | Stop reason: HOSPADM

## 2024-05-11 RX ORDER — LIDOCAINE 40 MG/G
CREAM TOPICAL
Status: DISCONTINUED | OUTPATIENT
Start: 2024-05-11 | End: 2024-05-17 | Stop reason: HOSPADM

## 2024-05-11 RX ORDER — DEXTROSE MONOHYDRATE 25 G/50ML
25-50 INJECTION, SOLUTION INTRAVENOUS
Status: DISCONTINUED | OUTPATIENT
Start: 2024-05-11 | End: 2024-05-17 | Stop reason: HOSPADM

## 2024-05-11 RX ORDER — CEFTRIAXONE 1 G/1
1 INJECTION, POWDER, FOR SOLUTION INTRAMUSCULAR; INTRAVENOUS EVERY 24 HOURS
Status: DISCONTINUED | OUTPATIENT
Start: 2024-05-11 | End: 2024-05-16

## 2024-05-11 RX ORDER — ATORVASTATIN CALCIUM 10 MG/1
20 TABLET, FILM COATED ORAL DAILY
Status: DISCONTINUED | OUTPATIENT
Start: 2024-05-11 | End: 2024-05-16

## 2024-05-11 RX ORDER — DIAZEPAM 5 MG
5 TABLET ORAL EVERY 6 HOURS PRN
Status: DISCONTINUED | OUTPATIENT
Start: 2024-05-11 | End: 2024-05-17 | Stop reason: HOSPADM

## 2024-05-11 RX ORDER — DEXTROSE MONOHYDRATE 25 G/50ML
25 INJECTION, SOLUTION INTRAVENOUS ONCE
Status: COMPLETED | OUTPATIENT
Start: 2024-05-11 | End: 2024-05-11

## 2024-05-11 RX ORDER — AMOXICILLIN 250 MG
2 CAPSULE ORAL 2 TIMES DAILY PRN
Status: DISCONTINUED | OUTPATIENT
Start: 2024-05-11 | End: 2024-05-17 | Stop reason: HOSPADM

## 2024-05-11 RX ORDER — METHYLPREDNISOLONE SODIUM SUCCINATE 40 MG/ML
40 INJECTION, POWDER, LYOPHILIZED, FOR SOLUTION INTRAMUSCULAR; INTRAVENOUS DAILY
Status: DISCONTINUED | OUTPATIENT
Start: 2024-05-11 | End: 2024-05-12

## 2024-05-11 RX ORDER — IPRATROPIUM BROMIDE AND ALBUTEROL SULFATE 2.5; .5 MG/3ML; MG/3ML
1 SOLUTION RESPIRATORY (INHALATION)
Status: DISCONTINUED | OUTPATIENT
Start: 2024-05-11 | End: 2024-05-11

## 2024-05-11 RX ORDER — DEXTROSE MONOHYDRATE 25 G/50ML
25-50 INJECTION, SOLUTION INTRAVENOUS
Status: DISCONTINUED | OUTPATIENT
Start: 2024-05-11 | End: 2024-05-15

## 2024-05-11 RX ORDER — CALCIUM CARBONATE 500 MG/1
1000 TABLET, CHEWABLE ORAL 4 TIMES DAILY PRN
Status: DISCONTINUED | OUTPATIENT
Start: 2024-05-11 | End: 2024-05-17 | Stop reason: HOSPADM

## 2024-05-11 RX ADMIN — MIRTAZAPINE 7.5 MG: 7.5 TABLET, FILM COATED ORAL at 01:36

## 2024-05-11 RX ADMIN — FLUTICASONE FUROATE AND VILANTEROL TRIFENATATE 1 PUFF: 200; 25 POWDER RESPIRATORY (INHALATION) at 09:46

## 2024-05-11 RX ADMIN — DOCUSATE SODIUM 100 MG: 100 CAPSULE, LIQUID FILLED ORAL at 22:29

## 2024-05-11 RX ADMIN — DOCUSATE SODIUM 100 MG: 100 CAPSULE, LIQUID FILLED ORAL at 08:29

## 2024-05-11 RX ADMIN — LISINOPRIL 20 MG: 20 TABLET ORAL at 08:29

## 2024-05-11 RX ADMIN — IPRATROPIUM BROMIDE AND ALBUTEROL SULFATE 3 ML: .5; 3 SOLUTION RESPIRATORY (INHALATION) at 07:32

## 2024-05-11 RX ADMIN — CEFTRIAXONE 1 G: 1 INJECTION, POWDER, FOR SOLUTION INTRAMUSCULAR; INTRAVENOUS at 22:50

## 2024-05-11 RX ADMIN — FUROSEMIDE 40 MG: 10 INJECTION, SOLUTION INTRAMUSCULAR; INTRAVENOUS at 09:41

## 2024-05-11 RX ADMIN — DEXTROSE MONOHYDRATE 25 G: 25 INJECTION, SOLUTION INTRAVENOUS at 09:32

## 2024-05-11 RX ADMIN — ATORVASTATIN CALCIUM 20 MG: 10 TABLET, FILM COATED ORAL at 08:29

## 2024-05-11 RX ADMIN — IPRATROPIUM BROMIDE AND ALBUTEROL SULFATE 3 ML: .5; 3 SOLUTION RESPIRATORY (INHALATION) at 19:35

## 2024-05-11 RX ADMIN — MIRTAZAPINE 7.5 MG: 7.5 TABLET, FILM COATED ORAL at 22:29

## 2024-05-11 RX ADMIN — SODIUM CHLORIDE: 9 INJECTION, SOLUTION INTRAVENOUS at 01:40

## 2024-05-11 RX ADMIN — HEPARIN SODIUM 500 UNITS/HR: 10000 INJECTION, SOLUTION INTRAVENOUS at 14:46

## 2024-05-11 RX ADMIN — IPRATROPIUM BROMIDE AND ALBUTEROL SULFATE 3 ML: .5; 3 SOLUTION RESPIRATORY (INHALATION) at 02:00

## 2024-05-11 RX ADMIN — DEXTROSE MONOHYDRATE 300 ML: 100 INJECTION, SOLUTION INTRAVENOUS at 09:41

## 2024-05-11 RX ADMIN — SODIUM CHLORIDE 4.3 UNITS: 9 INJECTION, SOLUTION INTRAVENOUS at 09:32

## 2024-05-11 RX ADMIN — ALBUTEROL SULFATE 10 MG: 2.5 SOLUTION RESPIRATORY (INHALATION) at 10:05

## 2024-05-11 RX ADMIN — ASPIRIN 325 MG: 325 TABLET ORAL at 08:29

## 2024-05-11 RX ADMIN — METHYLPREDNISOLONE SODIUM SUCCINATE 40 MG: 40 INJECTION, POWDER, FOR SOLUTION INTRAMUSCULAR; INTRAVENOUS at 08:28

## 2024-05-11 RX ADMIN — IPRATROPIUM BROMIDE AND ALBUTEROL SULFATE 3 ML: .5; 3 SOLUTION RESPIRATORY (INHALATION) at 13:47

## 2024-05-11 RX ADMIN — HEPARIN SODIUM 800 UNITS/HR: 10000 INJECTION, SOLUTION INTRAVENOUS at 11:39

## 2024-05-11 ASSESSMENT — ACTIVITIES OF DAILY LIVING (ADL)
ADLS_ACUITY_SCORE: 38
ADLS_ACUITY_SCORE: 38
ADLS_ACUITY_SCORE: 35
ADLS_ACUITY_SCORE: 38
ADLS_ACUITY_SCORE: 35
ADLS_ACUITY_SCORE: 38
ADLS_ACUITY_SCORE: 35
ADLS_ACUITY_SCORE: 38
ADLS_ACUITY_SCORE: 35
ADLS_ACUITY_SCORE: 38
ADLS_ACUITY_SCORE: 38
ADLS_ACUITY_SCORE: 35

## 2024-05-11 NOTE — ED PROVIDER NOTES
EMERGENCY DEPARTMENT ENCOUNTER       ED Course & Medical Decision Making     I saw and examined the patient.  IV was established and she was placed on the monitor.  She was given additional oxygen via Ventimask and her oxygen saturation went from the mid to lower 80s up to mid 90s.  She is tachycardic and immunocompromised.  I have suspicion for infection.  She is given a liter of normal saline.  She does have COPD and given her hypoxia she was given Solu-Medrol and a DuoNeb treatment.  She feels better after that.    Tachycardia is resolving.    Workup was centered on looking for sepsis, however given her lung cancer and hypoxia with tachycardia I did do a CTA to rule out PE.  No PE was found.    I reviewed her previous records and she had a previous CT abdomen and pelvis with contrast that showed splenic infarcts and celiac axis stenosis and they recommended doing a future CTA of the abdomen and pelvis.  Given that I was doing the CTA of the chest I did include abdomen and pelvis also.  This will give future physicians additional information that they would need anyway.  No significant change was noted on CTA abdomen pelvis from previous CT.    Lactic acid is okay.    Urinalysis shows signs of infection she is given Rocephin.  On her CT of the chest there is consolidations and airspace disease.  Unclear if there could be an infectious component to this and given her immunocompromise state and SIRS I did feel it prudent to add azithromycin to cover pulmonary sources of infection.    She will require admission.  I will discuss this with the hospitalist to get her admitted to the hospital at this time.    She is updated, feeling well, stable and in agreement      Medical Decision Making  Obtained supplemental history:Supplemental history obtained?: Documented in chart and Family Member/Significant Other  Reviewed external records: External records reviewed?: Documented in chart  Care impacted by chronic  "illness:Cancer/Chemotherapy and Chronic Lung Disease  Care significantly affected by social determinants of health:N/A  Did you consider but not order tests?: Work up considered but not performed and documented in chart, if applicable  Did you interpret images independently?: Independent interpretation of ECG and images noted in documentation, when applicable.  Consultation discussion with other provider:Did you involve another provider (consultant, , pharmacy, etc.)?: No  Admit.            Prior to making a final disposition on this patient the results of patient's tests and other diagnostic studies were discussed with the patient. All questions were answered. Patient expressed understanding of the plan and was amenable to it.    Medications   azithromycin 500 mg (ZITHROMAX) in 0.9% NaCl 250 mL intermittent infusion 500 mg (500 mg Intravenous $New Bag 5/10/24 2306)   sodium chloride 0.9% BOLUS 1,000 mL (0 mLs Intravenous Stopped 5/10/24 2130)   ipratropium - albuterol 0.5 mg/2.5 mg/3 mL (DUONEB) neb solution 3 mL (3 mLs Nebulization $Given 5/10/24 1842)   methylPREDNISolone sodium succinate (solu-MEDROL) injection 125 mg (125 mg Intravenous $Given 5/10/24 1853)   cefTRIAXone (ROCEPHIN) 1 g vial to attach to  mL bag for ADULTS or NS 50 mL bag for PEDS (0 g Intravenous Stopped 5/10/24 2239)   iopamidol (ISOVUE-370) solution 75 mL (90 mLs Intravenous $Given 5/10/24 2053)       Final Impression     1. Acute sepsis (H)    2. Urinary tract infection with hematuria, site unspecified    3. COPD exacerbation (H)    4. Status post chemotherapy    5. Hypoxia            Chief Complaint     Chief Complaint   Patient presents with    Shortness of Breath       Arrives to ED accompanied by daughter with c/o SOB that began yesterday. Hx of small cell lung CA. Received chemo Monday, Tuesday, Wednesday this week. \"I always get short of breath after chemo\". Reports SOB worse this time. Baseline on oxygen 4-5L/NC with activity " "and 3-4L/NC at rest. Sats 87% on oxygen 4L/NC on arrival. Placed on oxymask at 5L, sats increased to 97%. Pt used neb this morning with minimal relief. Daughter reports pt \"has a little pneumonia, but they're not treating with antibiotics. Also received blood transfusion on Tuesday prior to chemo.      Triage Assessment (Adult)       Row Name 05/10/24 1800          Triage Assessment    Airway WDL WDL        Respiratory WDL    Respiratory WDL X;rhythm/pattern;cough     Rhythm/Pattern, Respiratory shortness of breath;tachypneic;prolonged expiratory phase     Cough Frequency frequent        Skin Circulation/Temperature WDL    Skin Circulation/Temperature WDL WDL        Cardiac WDL    Cardiac WDL X;rhythm  HTN     Pulse Rate & Regularity tachycardic        Peripheral/Neurovascular WDL    Peripheral Neurovascular WDL WDL        Cognitive/Neuro/Behavioral WDL    Cognitive/Neuro/Behavioral WDL WDL                         HPI       Jasmyn Green is a 68 year old female who presents for evaluation of fatigue, cough and shortness of breath.  She does have known lung cancer and she receives chemotherapy 3 times a week.  Her last chemotherapy was on Wednesday.    She normally uses 2 to 4 L of oxygen at home and she has been increasing this up to 5 to 7 L and is still having shortness of breath and difficulty with ambulation.  She has a chronic cough that is productive, however this is unchanged.  No fevers.  She denies abdominal pain or changes with bowels or bladder          Past Medical History     Past Medical History:   Diagnosis Date    Age-related osteoporosis without current pathological fracture     Arthritis     COPD (chronic obstructive pulmonary disease) (H)     Coronary artery disease     Dependence on nocturnal oxygen therapy     Dyspnea on exertion     Emphysema lung (H)     Gout     HLD (hyperlipidemia)     Hypertension     Lung mass      Past Surgical History:   Procedure Laterality Date    BRONCHOSCOPY " RIGID OR FLEXIBLE W/TRANSENDOSCOPIC ENDOBRONCHIAL ULTRASOUND GUIDED N/A 2024    Procedure: BRONCHOSCOPY, WITH ENDOBRONCHIAL ULTRASOUND;  Surgeon: Ruben Levin MD;  [place] [facility] [place]    COLONOSCOPY N/A 10/21/2021    Procedure: COLONOSCOPY;  Surgeon: Wilma Hester DO;  Location: [facility]    IR CHEST PORT PLACEMENT > 5 YRS OF AGE  2024    VAGINAL DELIVERY      x 3 ; remote    WISDOM TOOTH EXTRACTION       Family History   Problem Relation Age of Onset    Chronic Obstructive Pulmonary Disease Sister     Diabetes Mother     Heart Disease Mother     Lung Cancer Mother     Heart Disease Father       Social History     Tobacco Use    Smoking status: Former     Current packs/day: 0.00     Types: Cigarettes     Quit date: 2021     Years since quittin.8     Passive exposure: Past    Smokeless tobacco: Never    Tobacco comments:     updated 8/3/2021 by TTS   Vaping Use    Vaping status: Never Used   Substance Use Topics    Alcohol use: Not Currently    Drug use: Never       Relevant past medical, surgical, family and social history as documented above, has been reviewed and discussed with patient. No changes or additions, unless otherwise noted in the HPI.    Current Medications     acetaminophen (TYLENOL) 500 MG tablet  albuterol (PROAIR HFA/PROVENTIL HFA/VENTOLIN HFA) 108 (90 Base) MCG/ACT inhaler  aspirin (ASA) 325 MG EC tablet  atorvastatin (LIPITOR) 20 MG tablet  calcium carbonate 600 mg-vitamin D 400 units (CALTRATE) 600-400 MG-UNIT per tablet  diazepam (VALIUM) 5 MG tablet  fluticasone-salmeterol (AIRDUO RESPICLICK) 232-14 MCG/ACT inhaler  hydrOXYzine HCl (ATARAX) 25 MG tablet  ipratropium - albuterol 0.5 mg/2.5 mg/3 mL (DUONEB) 0.5-2.5 (3) MG/3ML neb solution  ipratropium-albuterol (COMBIVENT RESPIMAT)  MCG/ACT inhaler  lisinopril (ZESTRIL) 20 MG tablet  mirtazapine (REMERON) 7.5 MG tablet  naloxone (NARCAN) 4 MG/0.1ML nasal spray  ondansetron (ZOFRAN ODT) 8 MG ODT  tab  oxyCODONE (ROXICODONE) 5 MG tablet  prochlorperazine (COMPAZINE) 10 MG tablet  tiotropium (SPIRIVA RESPIMAT) 2.5 MCG/ACT inhaler        Allergies     No Known Allergies    Review of Systems     Review of Systems   Constitutional:  Positive for chills and fatigue.   Respiratory:  Positive for cough and shortness of breath.    Cardiovascular:  Negative for chest pain.   Gastrointestinal:  Negative for abdominal pain.   Genitourinary:  Negative for dysuria.        Remainder of systems reviewed, unless noted in HPI all others negative.    Physical Exam     BP (!) 143/59   Pulse 94   Temp 97.7  F (36.5  C) (Oral)   Resp 21   Ht 1.524 m (5')   Wt 43.1 kg (95 lb)   LMP  (LMP Unknown)   SpO2 99%   BMI 18.55 kg/m      Physical Exam  Vitals and nursing note reviewed.   Constitutional:       Appearance: She is ill-appearing.   HENT:      Head: Normocephalic.      Nose: Nose normal.   Cardiovascular:      Rate and Rhythm: Regular rhythm. Tachycardia present.   Pulmonary:      Effort: Tachypnea present.      Breath sounds: Decreased breath sounds present.   Abdominal:      Palpations: Abdomen is soft.   Neurological:      Mental Status: She is alert. Mental status is at baseline.   Psychiatric:         Mood and Affect: Mood normal.             Labs & Imaging         Labs Ordered and Resulted from Time of ED Arrival to Time of ED Departure   COMPREHENSIVE METABOLIC PANEL - Abnormal       Result Value    Sodium 142      Potassium 5.2      Carbon Dioxide (CO2) 25      Anion Gap 10      Urea Nitrogen 29.7 (*)     Creatinine 0.94      GFR Estimate 65      Calcium 9.3      Chloride 107      Glucose 95      Alkaline Phosphatase 193 (*)     AST 53 (*)     ALT 63 (*)     Protein Total 6.5      Albumin 3.4 (*)     Bilirubin Total 0.2     TROPONIN T, HIGH SENSITIVITY - Abnormal    Troponin T, High Sensitivity 21 (*)    ROUTINE UA WITH MICROSCOPIC REFLEX TO CULTURE - Abnormal    Color Urine Colorless      Appearance Urine Clear       Glucose Urine Negative      Bilirubin Urine Negative      Ketones Urine Negative      Specific Gravity Urine 1.015      Blood Urine Negative      pH Urine 5.5      Protein Albumin Urine 10 (*)     Urobilinogen Urine <2.0      Nitrite Urine Negative      Leukocyte Esterase Urine 250 Luz/uL (*)     Mucus Urine Present (*)     RBC Urine 1      WBC Urine 21 (*)     Hyaline Casts Urine 3 (*)    CBC WITH PLATELETS AND DIFFERENTIAL - Abnormal    WBC Count 14.1 (*)     RBC Count 3.41 (*)     Hemoglobin 9.9 (*)     Hematocrit 31.2 (*)     MCV 92      MCH 29.0      MCHC 31.7      RDW 15.6 (*)     Platelet Count 466 (*)     % Neutrophils 92      % Lymphocytes 6      % Monocytes 0      % Eosinophils 1      % Basophils 0      % Immature Granulocytes 1      NRBCs per 100 WBC 0      Absolute Neutrophils 13.0 (*)     Absolute Lymphocytes 0.8      Absolute Monocytes 0.0      Absolute Eosinophils 0.1      Absolute Basophils 0.0      Absolute Immature Granulocytes 0.1      Absolute NRBCs 0.0     LACTIC ACID WHOLE BLOOD - Normal    Lactic Acid 0.8     INR - Normal    INR 1.00     INFLUENZA A/B, RSV, & SARS-COV2 PCR - Normal    Influenza A PCR Negative      Influenza B PCR Negative      RSV PCR Negative      SARS CoV2 PCR Negative     BLOOD CULTURE   URINE CULTURE         Results for orders placed or performed during the hospital encounter of 05/10/24   CT Chest Pulmonary Embolism w Contrast    Impression    IMPRESSION:  1.  No acute pulmonary emboli.    2.  Unchanged left upper lobe subpleural nodule, consolidation in the left upper lobe posteriorly, and a few other scattered nodular airspace opacities elsewhere.    3.  Unchanged mildly enlarged mediastinal and left hilar lymph nodes.    4.  Unchanged high-grade narrowing of the celiac artery proximally with noncalcified plaque or thrombus.   CTA Abdomen Pelvis with Contrast    Impression    IMPRESSION:  1.  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  2.  Multifocal atherosclerotic disease, abdominal aortic aneurysm, and additional multifocal stenoses.  3.  Additional findings as above.   Lactic Acid STAT   Result Value Ref Range    Lactic Acid 0.8 0.7 - 2.0 mmol/L   Result Value Ref Range    INR 1.00 0.85 - 1.15   Comprehensive metabolic panel   Result Value Ref Range    Sodium 142 135 - 145 mmol/L    Potassium 5.2 3.4 - 5.3 mmol/L    Carbon Dioxide (CO2) 25 22 - 29 mmol/L    Anion Gap 10 7 - 15 mmol/L    Urea Nitrogen 29.7 (H) 8.0 - 23.0 mg/dL    Creatinine 0.94 0.51 - 0.95 mg/dL    GFR Estimate 65 >60 mL/min/1.73m2    Calcium 9.3 8.8 - 10.2 mg/dL    Chloride 107 98 - 107 mmol/L    Glucose 95 70 - 99 mg/dL    Alkaline Phosphatase 193 (H) 40 - 150 U/L    AST 53 (H) 0 - 45 U/L    ALT 63 (H) 0 - 50 U/L    Protein Total 6.5 6.4 - 8.3 g/dL    Albumin 3.4 (L) 3.5 - 5.2 g/dL    Bilirubin Total 0.2 <=1.2 mg/dL   Result Value Ref Range    Troponin T, High Sensitivity 21 (H) <=14 ng/L   UA with Microscopic reflex to Culture    Specimen: Urine, Clean Catch   Result Value Ref Range    Color Urine Colorless Colorless, Straw, Light Yellow, Yellow    Appearance Urine Clear Clear    Glucose Urine Negative Negative mg/dL    Bilirubin Urine Negative Negative    Ketones Urine Negative Negative mg/dL    Specific Gravity Urine 1.015 1.001 - 1.030    Blood Urine Negative Negative    pH Urine 5.5 5.0 - 7.0    Protein Albumin Urine 10 (A) Negative mg/dL    Urobilinogen Urine <2.0 <2.0 mg/dL    Nitrite Urine Negative Negative    Leukocyte Esterase Urine 250 Luz/uL (A) Negative    Mucus Urine Present (A) None Seen /LPF    RBC Urine 1 <=2 /HPF    WBC Urine 21 (H) <=5 /HPF    Hyaline Casts Urine 3 (H) <=2 /LPF   Symptomatic Influenza A/B, RSV, & SARS-CoV2 PCR (COVID-19) Nasopharyngeal    Specimen: Nasopharyngeal; Swab   Result Value Ref Range    Influenza A PCR Negative Negative    Influenza B PCR Negative Negative    RSV PCR Negative Negative    SARS CoV2 PCR Negative Negative    CBC with platelets and differential   Result Value Ref Range    WBC Count 14.1 (H) 4.0 - 11.0 10e3/uL    RBC Count 3.41 (L) 3.80 - 5.20 10e6/uL    Hemoglobin 9.9 (L) 11.7 - 15.7 g/dL    Hematocrit 31.2 (L) 35.0 - 47.0 %    MCV 92 78 - 100 fL    MCH 29.0 26.5 - 33.0 pg    MCHC 31.7 31.5 - 36.5 g/dL    RDW 15.6 (H) 10.0 - 15.0 %    Platelet Count 466 (H) 150 - 450 10e3/uL    % Neutrophils 92 %    % Lymphocytes 6 %    % Monocytes 0 %    % Eosinophils 1 %    % Basophils 0 %    % Immature Granulocytes 1 %    NRBCs per 100 WBC 0 <1 /100    Absolute Neutrophils 13.0 (H) 1.6 - 8.3 10e3/uL    Absolute Lymphocytes 0.8 0.8 - 5.3 10e3/uL    Absolute Monocytes 0.0 0.0 - 1.3 10e3/uL    Absolute Eosinophils 0.1 0.0 - 0.7 10e3/uL    Absolute Basophils 0.0 0.0 - 0.2 10e3/uL    Absolute Immature Granulocytes 0.1 <=0.4 10e3/uL    Absolute NRBCs 0.0 10e3/uL   ECG 12-LEAD WITH MUSE (LHE)   Result Value Ref Range    Systolic Blood Pressure 206 mmHg    Diastolic Blood Pressure 99 mmHg    Ventricular Rate 123 BPM    Atrial Rate 123 BPM    MO Interval 124 ms    QRS Duration 66 ms     ms    QTc 435 ms    P Axis 86 degrees    R AXIS 83 degrees    T Axis 76 degrees    Interpretation ECG       Sinus tachycardia  Otherwise normal ECG  When compared with ECG of 08-FEB-2024 08:51,  No significant change was found  Confirmed by SEE ED PROVIDER NOTE FOR, ECG INTERPRETATION ([id number]),  ANDRZEJ SUMMERS ([id number]) on 5/10/2024 8:49:38 PM         Tico Britton MD  Emergency Medicine  [facility] EMERGENCY ROOM  [address] [facility] [address] [phone]  5/10/2024         Tico Britton MD  05/10/24 0424

## 2024-05-11 NOTE — MEDICATION SCRIBE - ADMISSION MEDICATION HISTORY
Medication Scribe Admission Medication History    Admission medication history is complete. The information provided in this note is only as accurate as the sources available at the time of the update.    Information Source(s): Patient, Family member, and CareEverywhere/[employer]Scripts via in-person. Patient's daughter is with the patient this evening.     Pertinent Information: Patient only has the Combivent  mcg/act with. If necessary pt is ok with dispensing inhalers here rather than bringing ones from home. Only medications not taken tonight is the calcium-vit D and mirtazapine.    Changes made to PTA medication list:  Added: None  Deleted:   Albuterol 0.083% neb solution -- using Duoneb only  Changed:   Aspirin 81 mg every day --> 325 mg every day     Allergies reviewed with patient and updates made in EHR: yes    Medication History Completed By: Jacky Ceja 5/10/2024 11:15 PM    PTA Med List   Medication Sig Last Dose    acetaminophen (TYLENOL) 500 MG tablet Take 2 tablets (1,000 mg) by mouth every 8 hours as needed for pain 5/9/2024 at PM; 1,000 mg    albuterol (PROAIR HFA/PROVENTIL HFA/VENTOLIN HFA) 108 (90 Base) MCG/ACT inhaler Inhale 1-2 puffs into the lungs every 6 hours as needed for shortness of breath, wheezing or cough 5/10/2024 at AM    aspirin (ASA) 325 MG EC tablet Take 325 mg by mouth daily 5/10/2024 at AM    atorvastatin (LIPITOR) 20 MG tablet Take 1 tablet (20 mg) by mouth daily 5/10/2024 at AM    calcium carbonate 600 mg-vitamin D 400 units (CALTRATE) 600-400 MG-UNIT per tablet Take 1 tablet by mouth 2 times daily With calcium 5/10/2024 at AM only    diazepam (VALIUM) 5 MG tablet Take 1 tablet (5 mg) by mouth every 6 hours as needed for anxiety Past Month at 5/3    fluticasone-salmeterol (AIRDUO RESPICLICK) 232-14 MCG/ACT inhaler Inhale 1 puff into the lungs 2 times daily 5/10/2024 at AM only    hydrOXYzine HCl (ATARAX) 25 MG tablet Take 1-2 tablets (25-50 mg) by mouth every 8 hours as  needed for anxiety (Insomnia) 5/9/2024 at AM    ipratropium - albuterol 0.5 mg/2.5 mg/3 mL (DUONEB) 0.5-2.5 (3) MG/3ML neb solution Take 1 vial (3 mLs) by nebulization every 6 hours as needed for shortness of breath, wheezing or cough 5/10/2024 at AM    ipratropium-albuterol (COMBIVENT RESPIMAT)  MCG/ACT inhaler Inhale 1 puff into the lungs 4 times daily 5/10/2024 at 1700; has with in room    lisinopril (ZESTRIL) 20 MG tablet Take 1 tablet (20 mg) by mouth daily 5/10/2024 at AM    mirtazapine (REMERON) 7.5 MG tablet Take 1 tablet (7.5 mg) by mouth at bedtime 5/9/2024 at HS    naloxone (NARCAN) 4 MG/0.1ML nasal spray Spray 1 spray (4 mg) into one nostril alternating nostrils as needed for opioid reversal every 2-3 minutes until assistance arrives Has at home, never used.    ondansetron (ZOFRAN ODT) 8 MG ODT tab Take 1 tablet (8 mg) by mouth every 8 hours as needed for nausea Past Week at last week sometime    oxyCODONE (ROXICODONE) 5 MG tablet Take 0.5-1 tablets (2.5-5 mg) by mouth every 4 hours as needed for pain or moderate to severe pain (Try one-half tab first to see response) 5/10/2024 at 1500; 2.5 mg in last 24 hours    prochlorperazine (COMPAZINE) 10 MG tablet Take 1 tablet (10 mg) by mouth every 6 hours as needed for nausea or vomiting Past Week at last week sometime    tiotropium (SPIRIVA RESPIMAT) 2.5 MCG/ACT inhaler Inhale 2 puffs into the lungs daily 5/10/2024 at AM

## 2024-05-11 NOTE — H&P
[facility] MEDICINE ADMISSION HISTORY AND PHYSICAL     Brief Synopsis:     Jasmyn Green is a 68 year old female who presented with complaints of shortness of breath.    Medical history is notable for lung cancer, currently getting chemotherapy.  She also has a history of chronic respiratory failure with hypoxia on 4 to 5 L at home with activity and 3 to 4 L at rest, hypertension, coronary disease, arthritis.    Initial evaluation revealed low oxygen saturation despite her home O2 settings, tachycardia, slight hypertension.  Labs notable for elevated BUN, normal creatinine, white count of 14.1, hemoglobin 9.9, platelets 466, mildly elevated alkaline phosphatase, transaminases.  COVID, influenza, RSV negative.  CT chest PE run was negative for PE, positive for unchanged left upper lobe nodule, consolidation.  CT abdomen with high-grade narrowing of the celiac artery with noncalcified plaque or thrombus.  Lactic acid normal.  EKG was sinus tachycardia.  Urine with slight pyuria.    Initial treatment included azithromycin, ceftriaxone, DuoNeb, Solu-Medrol, normal saline bolus.    Assessment and Plan:  Acute on chronic respiratory failure with hypoxia  Severe COPD with acute exacerbation  Small cell lung cancer, on palliative chemo with carboplatin, etoposide, atezolizumab  Splenic infarct, chronic  Brain lesion of unclear significance  Severe anemia, now 9.9 up from 6.9 5 days ago  Possible urinary tract infection, mild pyuria but no symptoms  Celiac artery stenosis  Continue treatment with nebs, steroids, antibiotics, supplemental oxygen  Ceftriaxone for UTI pending culture  Azithromycin for COPD exacerbation  Check BNP, procalcitonin    Essential hypertension, on lisinopril  Anxiety, on Valium as needed    Clinically Significant Risk Factors Present on Admission              # Hypoalbuminemia: Lowest albumin = 3.4 g/dL at 5/10/2024  6:50 PM, will monitor as appropriate   # Drug Induced  "Platelet Defect: home medication list includes an antiplatelet medication   # Hypertension: Noted on problem list          # COPD: noted on problem list          DVTP: Mechanical Prophylaxis/ Sequential Compression Devices  Code Status: Prior  Disposition: Inpatient   Diet: Regular  Fluids: Normal saline 50/h    Disposition Plan      Expected Discharge Date: 05/12/2024               Chief Complaint Shortness of breath, cough     HISTORY   Jasmyn Green is a 68 year old female who presented with complaints of worsening shortness of breath and cough over the last week.    Per ED provider:  Arrives to ED accompanied by daughter with c/o SOB that began yesterday. Hx of small cell lung CA. Received chemo Monday, Tuesday, Wednesday this week. \"I always get short of breath after chemo\". Reports SOB worse this time. Baseline on oxygen 4-5L/NC with activity and 3-4L/NC at rest. Sats 87% on oxygen 4L/NC on arrival. Placed on oxymask at 5L, sats increased to 97%. Pt used neb this morning with minimal relief. Daughter reports pt \"has a little pneumonia, but they're not treating with antibiotics. Also received blood transfusion on Tuesday prior to chemo.    Feeling better at the time my encounter.  Cough was initially productive, not so much now.  No abdominal pain, diarrhea.  No dysuria, urinary frequency.  No lower extremity edema.  Chemotherapy was on Wednesday, has had poor appetite since.  Past Medical History     Past Medical History:  No date: Age-related osteoporosis without current pathological   fracture  No date: Arthritis  No date: COPD (chronic obstructive pulmonary disease) (H)  No date: Coronary artery disease  No date: Dependence on nocturnal oxygen therapy  No date: Dyspnea on exertion  No date: Emphysema lung (H)  No date: Gout  No date: HLD (hyperlipidemia)  No date: Hypertension  No date: Lung mass     Surgical History     Past Surgical History:   Procedure Laterality Date    BRONCHOSCOPY RIGID OR " FLEXIBLE W/TRANSENDOSCOPIC ENDOBRONCHIAL ULTRASOUND GUIDED N/A 2024    Procedure: BRONCHOSCOPY, WITH ENDOBRONCHIAL ULTRASOUND;  Surgeon: Ruben Levin MD;  [place] [facility] [place]    COLONOSCOPY N/A 10/21/2021    Procedure: COLONOSCOPY;  Surgeon: Wilma Hester DO;  Location: [facility] OR    IR CHEST PORT PLACEMENT > 5 YRS OF AGE  2024    VAGINAL DELIVERY      x 3 ; remote    WISDOM TOOTH EXTRACTION       Family History      Family History   Problem Relation Age of Onset    Chronic Obstructive Pulmonary Disease Sister     Diabetes Mother     Heart Disease Mother     Lung Cancer Mother     Heart Disease Father       Social History      Social History     Tobacco Use    Smoking status: Former     Current packs/day: 0.00     Types: Cigarettes     Quit date: 2021     Years since quittin.8     Passive exposure: Past    Smokeless tobacco: Never    Tobacco comments:     updated 8/3/2021 by TTS   Vaping Use    Vaping status: Never Used   Substance Use Topics    Alcohol use: Not Currently    Drug use: Never      Allergies   No Known Allergies  Prior to Admission Medications      Prior to Admission Medications   Prescriptions Last Dose Informant Patient Reported? Taking?   acetaminophen (TYLENOL) 500 MG tablet 2024 at PM; 1,000 mg  Yes Yes   Sig: Take 2 tablets (1,000 mg) by mouth every 8 hours as needed for pain   albuterol (PROAIR HFA/PROVENTIL HFA/VENTOLIN HFA) 108 (90 Base) MCG/ACT inhaler 5/10/2024 at AM  Yes Yes   Sig: Inhale 1-2 puffs into the lungs every 6 hours as needed for shortness of breath, wheezing or cough   aspirin (ASA) 325 MG EC tablet 5/10/2024 at AM  Yes Yes   Sig: Take 325 mg by mouth daily   atorvastatin (LIPITOR) 20 MG tablet 5/10/2024 at AM  No Yes   Sig: Take 1 tablet (20 mg) by mouth daily   calcium carbonate 600 mg-vitamin D 400 units (CALTRATE) 600-400 MG-UNIT per tablet 5/10/2024 at AM only  Yes Yes   Sig: Take 1 tablet by mouth 2 times daily With calcium    diazepam (VALIUM) 5 MG tablet Past Month at 5/3  No Yes   Sig: Take 1 tablet (5 mg) by mouth every 6 hours as needed for anxiety   fluticasone-salmeterol (AIRDUO RESPICLICK) 232-14 MCG/ACT inhaler 5/10/2024 at AM only  No Yes   Sig: Inhale 1 puff into the lungs 2 times daily   hydrOXYzine HCl (ATARAX) 25 MG tablet 5/9/2024 at AM  No Yes   Sig: Take 1-2 tablets (25-50 mg) by mouth every 8 hours as needed for anxiety (Insomnia)   ipratropium - albuterol 0.5 mg/2.5 mg/3 mL (DUONEB) 0.5-2.5 (3) MG/3ML neb solution 5/10/2024 at AM  No Yes   Sig: Take 1 vial (3 mLs) by nebulization every 6 hours as needed for shortness of breath, wheezing or cough   ipratropium-albuterol (COMBIVENT RESPIMAT)  MCG/ACT inhaler 5/10/2024 at 1700; has with in room  No Yes   Sig: Inhale 1 puff into the lungs 4 times daily   lisinopril (ZESTRIL) 20 MG tablet 5/10/2024 at AM  No Yes   Sig: Take 1 tablet (20 mg) by mouth daily   mirtazapine (REMERON) 7.5 MG tablet 5/9/2024 at HS  No Yes   Sig: Take 1 tablet (7.5 mg) by mouth at bedtime   naloxone (NARCAN) 4 MG/0.1ML nasal spray Has at home, never used.  No Yes   Sig: Spray 1 spray (4 mg) into one nostril alternating nostrils as needed for opioid reversal every 2-3 minutes until assistance arrives   ondansetron (ZOFRAN ODT) 8 MG ODT tab Past Week at last week sometime  No Yes   Sig: Take 1 tablet (8 mg) by mouth every 8 hours as needed for nausea   oxyCODONE (ROXICODONE) 5 MG tablet 5/10/2024 at 1500; 2.5 mg in last 24 hours  No Yes   Sig: Take 0.5-1 tablets (2.5-5 mg) by mouth every 4 hours as needed for pain or moderate to severe pain (Try one-half tab first to see response)   prochlorperazine (COMPAZINE) 10 MG tablet Past Week at last week sometime  No Yes   Sig: Take 1 tablet (10 mg) by mouth every 6 hours as needed for nausea or vomiting   tiotropium (SPIRIVA RESPIMAT) 2.5 MCG/ACT inhaler 5/10/2024 at AM  No Yes   Sig: Inhale 2 puffs into the lungs daily      Facility-Administered  Medications: None      Review of Systems     A 12 point comprehensive review of systems was negative except as noted above in HPI.    PHYSICAL EXAMINATION     Vitals      Temp:  [97.7  F (36.5  C)] 97.7  F (36.5  C)  Pulse:  [] 94  Resp:  [21-28] 21  BP: (143-212)/() 143/59  SpO2:  [87 %-99 %] 99 %    Examination   Physical Exam:    Gen: no acute distress, comfortable, very frail  ENT: no scleral icterus  Pulm: lungs are severely diminished, prolonged expiratory phase, could not hear much for wheezing or crackles, does have a loose sounding cough  CV: regular rate and rhythm, no significant lower extremity pitting edema  GI: abdomen is soft, non-tender, non-distended with active bowel sounds.  MSK: no obvious deformities of the extremities  Derm: Not pale, no jaundice, skin is warm and dry  Psych: appropriate affect      Pertinent Radiology     Radiology Results:   Recent Results (from the past 24 hour(s))   CT Chest Pulmonary Embolism w Contrast    Narrative    EXAM: CT CHEST PULMONARY EMBOLISM W CONTRAST  LOCATION: [facility]  DATE: 5/10/2024    INDICATION: Hypoxia, shortness of breath, lung cancer, tachycardia  COMPARISON: 5/3/2024  TECHNIQUE: CT chest pulmonary angiogram during arterial phase injection of IV contrast. Multiplanar reformats and MIP reconstructions were performed. Dose reduction techniques were used.   CONTRAST: [id number] 90ml    FINDINGS:  ANGIOGRAM CHEST: Pulmonary arteries are normal caliber and negative for pulmonary emboli. Thoracic aorta is negative for dissection. No CT evidence of right heart strain.    LUNGS AND PLEURA: Severe emphysema. Unchanged small area of subpleural soft tissue thickening in the anterior left upper lobe (8/147), as well as an area of consolidation in the left upper lobe posteriorly with associated bronchiectasis. A few other   nodular opacities elsewhere also remain unchanged. No pleural effusion or  pneumothorax.    MEDIASTINUM/AXILLAE: Unchanged enlarged lymph nodes in the AP window and left hilum. No enlarging lymph nodes. Right internal jugular port catheter tip at the SVC-RA junction.    CORONARY ARTERY CALCIFICATION: Severe.    UPPER ABDOMEN: Partially imaged splenic infarcts. Unchanged narrowing and filling defect in the proximal celiac axis.    MUSCULOSKELETAL: Normal.      Impression    IMPRESSION:  1.  No acute pulmonary emboli.    2.  Unchanged left upper lobe subpleural nodule, consolidation in the left upper lobe posteriorly, and a few other scattered nodular airspace opacities elsewhere.    3.  Unchanged mildly enlarged mediastinal and left hilar lymph nodes.    4.  Unchanged high-grade narrowing of the celiac artery proximally with noncalcified plaque or thrombus.   CTA Abdomen Pelvis with Contrast    Narrative    EXAM: CTA ABDOMEN PELVIS WITH CONTRAST  LOCATION: [facility]  DATE: 5/10/2024    INDICATION: followup on abnormal ct from 5 3 24  COMPARISON: 5/3/2024  TECHNIQUE: CT angiogram abdomen pelvis during arterial phase of injection of IV contrast. 2D and 3D MIP reconstructions were performed by the CT technologist. Dose reduction techniques were used. Exam is limited by lack of thin section sagittal and   coronal reconstructions. Thin section reconstructions were created on the [employer] workstation from the initial data set, however.  CONTRAST: [id number] 90ml    FINDINGS:  ANGIOGRAM ABDOMEN/PELVIS: High-grade stenosis (near occlusion) of the proximal celiac axis. Intraluminal thrombus identified within the first 1.5 cm of the celiac artery. Patent splenic artery with scattered calcified plaque. Likewise, common hepatic   artery is patent. Conventional hepatic arterial anatomy. Patent SMA, bilateral renal arteries, and DWAYNE as well as runoff to the upper legs. Large amount of calcified plaque within the right common femoral artery, with approximately 90% luminal stenosis.    High-grade stenoses of both common iliac arteries, although contrast bolus limits further assessment. Infrarenal abdominal aortic aneurysm measuring 2.5 cm. Multifocal noncalcified plaque/thrombus scattered in the abdominal aorta.    LOWER CHEST: Advanced centrilobular emphysema with patchy parenchymal opacities in both lung bases and bronchial wall thickening redemonstrated.    HEPATOBILIARY: No biliary dilatation    PANCREAS: Unremarkable    SPLEEN: Moderate sized splenic infarct redemonstrated    ADRENAL GLANDS: Left adrenal nodular hyperplasia.    KIDNEYS/BLADDER: No hydronephrosis. Decompressed bladder.    BOWEL: Colonic diverticulosis.    LYMPH NODES: No significant retroperitoneal adenopathy    PELVIC ORGANS: No free fluid    MUSCULOSKELETAL: No acute bony abnormalities.      Impression    IMPRESSION:  1.  Near occlusive stenosis of the celiac axis, with intraluminal thrombus.  2.  Multifocal atherosclerotic disease, abdominal aortic aneurysm, and additional multifocal stenoses.  3.  Additional findings as above.     EKG Results: personally reviewed.  Sinus tachycardia.    Timothy Senior DO  [facility]   Phone: #[phone]

## 2024-05-11 NOTE — PROGRESS NOTES
[facility] MEDICINE  PROGRESS NOTE       Securely message me with Fadum[employer] (more info)    Code Status: No CPR- Do NOT Intubate       Identification/Summary:   Jasmyn Green is a 68 year old female who presented with complaints of shortness of breath.    Medical history is notable for lung cancer, currently getting chemotherapy.  She also has a history of chronic respiratory failure with hypoxia on 4 to 5 L at home with activity and 3 to 4 L at rest, hypertension, coronary disease, arthritis.      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  Splenic infarct, chronic  Brain lesion of unclear significance  Consulted vascular surgery who recommends IV heparin  Priscila with vascular surgeon on the phone and her brain lesion should not be a contraindication to start blood thinner  Will need to discuss with her oncologist/otologist for future oral agent for celiac artery thrombus  Will discontinue aspirin    Acute pulmonary edema  BNP is very elevated  Will trial Lasix IV 40 mg on 5/11    Sinus tachycardia  This could be related to her acute on chronic respiratory failure and pulmonary edema  Treatment as above    Transaminase elevation  Likely due to chemotherapy    Hyperkalemia resolved  Was about to start albuterol neb but her heart rate has been about 120s  Instead, did insulin and glucose  Now at 4.4     Essential hypertension, on lisinopril  Anxiety, on Valium as needed      Severe anemia, now 9.9 up from 6.9 5 days ago  Due to  chemotherapy    Anticoagulation   Orders (Includes Only Anticoagulants)  heparin, 0-5,000 Units/hr, Intravenous, Continuous      Therapy:   Vick:Not present  Lines: PRESENT               Current Diet  Orders Placed This Encounter      3 Gram Potassium (low potassium) Diet        Clinically Significant Risk Factors Present on Admission        # Hyperkalemia: Highest K = 6 mmol/L in last 2 days, will monitor as appropriate       # Hypoalbuminemia: Lowest albumin = 3.1 g/dL at 5/11/2024  5:48 AM, will monitor as appropriate   # Drug Induced Platelet Defect: home medication list includes an antiplatelet medication   # Hypertension: Noted on problem list          # COPD: noted on problem list        Interval History/Subjective:  Patient feels better now with her breathing.  She denies abdominal pain.  She denies nausea, abdominal pain, diarrhea.  She has never had symptoms after eating.  She has never had stroke or coronary artery disease or peripheral artery disease.        Physical Exam/Objective:  Temp:  [97.6  F (36.4  C)-97.7  F (36.5  C)] 97.6  F (36.4  C)  Pulse:  [] 119  Resp:  [15-45] 32  BP: (131-212)/() 131/56  SpO2:  [87 %-100 %] 97 %  Wt Readings from Last 4 Encounters:   05/10/24 43.1 kg (95 lb)   05/06/24 42.9 kg (94 lb 9.6 oz)   05/01/24 44 kg (97 lb)   04/15/24 45 kg (99 lb 3.2 oz)     Body mass index is 18.55 kg/m .    General Appearance: Alert and wake, mild distress with breathing  Respiratory: Low breath sounds bilaterally  Cardiovascular: Tachycardia, rhythmic, normal S1 and S2, no murmur  GI: soft, non-tender, normal bowel sound  Neurology: oriented x 3  Psych: cooperative and calm, normal affect    Medications:   Personally Reviewed.  Medications   Current Facility-Administered Medications   Medication Dose Route Frequency Provider Last Rate Last Admin    heparin 25,000 units in 0.45% NaCl 250 mL ANTICOAGULANT infusion  0-5,000 Units/hr Intravenous Continuous Danyelle Prieto MD    Stopped at 05/11/24 1145     Current Facility-Administered Medications   Medication Dose Route Frequency Provider Last Rate Last Admin    aspirin (ASA) EC tablet 325 mg  325 mg Oral Daily Timothy Senior DO   325 mg at 05/11/24 0829    atorvastatin (LIPITOR) tablet 20 mg  20 mg Oral Daily Timothy Senior,    20 mg at 05/11/24 0829    azithromycin (ZITHROMAX) 250 mg in  mL intermittent infusion  250 mg Intravenous Q24H Timothy Senior DO        cefTRIAXone (ROCEPHIN) 1 g vial to attach to  mL bag for ADULTS or NS 50 mL bag for PEDS  1 g Intravenous Q24H Timothy Senior DO        docusate sodium (COLACE) capsule 100 mg  100 mg Oral BID Timothy Senior DO   100 mg at 05/11/24 0829    fluticasone-vilanterol (BREO ELLIPTA) 200-25 MCG/ACT inhaler 1 puff  1 puff Inhalation Daily Danyelle Prieto MD   1 puff at 05/11/24 0946    ipratropium - albuterol 0.5 mg/2.5 mg/3 mL (DUONEB) neb solution 3 mL  3 mL Nebulization Q6H Timothy Senior DO   3 mL at 05/11/24 1347    lisinopril (ZESTRIL) tablet 20 mg  20 mg Oral Daily Timothy Senior DO   20 mg at 05/11/24 0829    methylPREDNISolone sodium succinate (SOLU-MEDROL) injection 40 mg  40 mg Intravenous Daily Timothy Senior DO   40 mg at 05/11/24 0828    mirtazapine (REMERON) tablet TABS 7.5 mg  7.5 mg Oral At Bedtime Timothy Senior DO   7.5 mg at 05/11/24 0136    sodium chloride (PF) 0.9% PF flush 3 mL  3 mL Intracatheter Q8H Timothy Senior DO   3 mL at 05/11/24 0934       Data reviewed today: I personally reviewed all new medications, labs, imaging/diagnostics reports over the past 24 hours. Pertinent findings include:    Imaging:   Recent Results (from the past 24 hour(s))   CT Chest Pulmonary Embolism w Contrast    Narrative    EXAM: CT CHEST PULMONARY EMBOLISM W CONTRAST  LOCATION: [facility]  DATE: 5/10/2024    INDICATION: Hypoxia, shortness of breath, lung cancer, tachycardia  COMPARISON: 5/3/2024  TECHNIQUE: CT chest pulmonary angiogram  during arterial phase injection of IV contrast. Multiplanar reformats and MIP reconstructions were performed. Dose reduction techniques were used.   CONTRAST: [id number] 90ml    FINDINGS:  ANGIOGRAM CHEST: Pulmonary arteries are normal caliber and negative for pulmonary emboli. Thoracic aorta is negative for dissection. No CT evidence of right heart strain.    LUNGS AND PLEURA: Severe emphysema. Unchanged small area of subpleural soft tissue thickening in the anterior left upper lobe (8/147), as well as an area of consolidation in the left upper lobe posteriorly with associated bronchiectasis. A few other   nodular opacities elsewhere also remain unchanged. No pleural effusion or pneumothorax.    MEDIASTINUM/AXILLAE: Unchanged enlarged lymph nodes in the AP window and left hilum. No enlarging lymph nodes. Right internal jugular port catheter tip at the SVC-RA junction.    CORONARY ARTERY CALCIFICATION: Severe.    UPPER ABDOMEN: Partially imaged splenic infarcts. Unchanged narrowing and filling defect in the proximal celiac axis.    MUSCULOSKELETAL: Normal.      Impression    IMPRESSION:  1.  No acute pulmonary emboli.    2.  Unchanged left upper lobe subpleural nodule, consolidation in the left upper lobe posteriorly, and a few other scattered nodular airspace opacities elsewhere.    3.  Unchanged mildly enlarged mediastinal and left hilar lymph nodes.    4.  Unchanged high-grade narrowing of the celiac artery proximally with noncalcified plaque or thrombus.   CTA Abdomen Pelvis with Contrast    Narrative    EXAM: CTA ABDOMEN PELVIS WITH CONTRAST  LOCATION: [facility]  DATE: 5/10/2024    INDICATION: followup on abnormal ct from 5 3 24  COMPARISON: 5/3/2024  TECHNIQUE: CT angiogram abdomen pelvis during arterial phase of injection of IV contrast. 2D and 3D MIP reconstructions were performed by the CT technologist. Dose reduction techniques were used. Exam is limited by lack of thin section  sagittal and   coronal reconstructions. Thin section reconstructions were created on the [employer] workstation from the initial data set, however.  CONTRAST: [id number] 90ml    FINDINGS:  ANGIOGRAM ABDOMEN/PELVIS: High-grade stenosis (near occlusion) of the proximal celiac axis. Intraluminal thrombus identified within the first 1.5 cm of the celiac artery. Patent splenic artery with scattered calcified plaque. Likewise, common hepatic   artery is patent. Conventional hepatic arterial anatomy. Patent SMA, bilateral renal arteries, and DWAYNE as well as runoff to the upper legs. Large amount of calcified plaque within the right common femoral artery, with approximately 90% luminal stenosis.   High-grade stenoses of both common iliac arteries, although contrast bolus limits further assessment. Infrarenal abdominal aortic aneurysm measuring 2.5 cm. Multifocal noncalcified plaque/thrombus scattered in the abdominal aorta.    LOWER CHEST: Advanced centrilobular emphysema with patchy parenchymal opacities in both lung bases and bronchial wall thickening redemonstrated.    HEPATOBILIARY: No biliary dilatation    PANCREAS: Unremarkable    SPLEEN: Moderate sized splenic infarct redemonstrated    ADRENAL GLANDS: Left adrenal nodular hyperplasia.    KIDNEYS/BLADDER: No hydronephrosis. Decompressed bladder.    BOWEL: Colonic diverticulosis.    LYMPH NODES: No significant retroperitoneal adenopathy    PELVIC ORGANS: No free fluid    MUSCULOSKELETAL: No acute bony abnormalities.      Impression    IMPRESSION:  1.  Near occlusive stenosis of the celiac axis, with intraluminal thrombus.  2.  Multifocal atherosclerotic disease, abdominal aortic aneurysm, and additional multifocal stenoses.  3.  Additional findings as above.       Labs:  CTA Abdomen Pelvis with Contrast   Final Result   IMPRESSION:   1.  Near occlusive stenosis of the celiac axis, with intraluminal thrombus.   2.  Multifocal atherosclerotic disease, abdominal aortic aneurysm,  and additional multifocal stenoses.   3.  Additional findings as above.      CT Chest Pulmonary Embolism w Contrast   Final Result   IMPRESSION:   1.  No acute pulmonary emboli.      2.  Unchanged left upper lobe subpleural nodule, consolidation in the left upper lobe posteriorly, and a few other scattered nodular airspace opacities elsewhere.      3.  Unchanged mildly enlarged mediastinal and left hilar lymph nodes.      4.  Unchanged high-grade narrowing of the celiac artery proximally with noncalcified plaque or thrombus.        Recent Results (from the past 24 hour(s))   ECG 12-LEAD WITH MUSE ([facility])    Collection Time: 05/10/24  6:03 PM   Result Value Ref Range    Systolic Blood Pressure 206 mmHg    Diastolic Blood Pressure 99 mmHg    Ventricular Rate 123 BPM    Atrial Rate 123 BPM    ID Interval 124 ms    QRS Duration 66 ms     ms    QTc 435 ms    P Axis 86 degrees    R AXIS 83 degrees    T Axis 76 degrees    Interpretation ECG       Sinus tachycardia  Otherwise normal ECG  When compared with ECG of 08-FEB-2024 08:51,  No significant change was found  Confirmed by SEE ED PROVIDER NOTE FOR, ECG INTERPRETATION ([id number]),  ANDRZEJ SUMMERS ([id number]) on 5/10/2024 8:49:38 PM     Lactic Acid STAT    Collection Time: 05/10/24  6:50 PM   Result Value Ref Range    Lactic Acid 0.8 0.7 - 2.0 mmol/L   INR    Collection Time: 05/10/24  6:50 PM   Result Value Ref Range    INR 1.00 0.85 - 1.15   Comprehensive metabolic panel    Collection Time: 05/10/24  6:50 PM   Result Value Ref Range    Sodium 142 135 - 145 mmol/L    Potassium 5.2 3.4 - 5.3 mmol/L    Carbon Dioxide (CO2) 25 22 - 29 mmol/L    Anion Gap 10 7 - 15 mmol/L    Urea Nitrogen 29.7 (H) 8.0 - 23.0 mg/dL    Creatinine 0.94 0.51 - 0.95 mg/dL    GFR Estimate 65 >60 mL/min/1.73m2    Calcium 9.3 8.8 - 10.2 mg/dL    Chloride 107 98 - 107 mmol/L    Glucose 95 70 - 99 mg/dL    Alkaline Phosphatase 193 (H) 40 - 150 U/L    AST 53 (H) 0 - 45 U/L    ALT 63 (H) 0 - 50 U/L     Protein Total 6.5 6.4 - 8.3 g/dL    Albumin 3.4 (L) 3.5 - 5.2 g/dL    Bilirubin Total 0.2 <=1.2 mg/dL   Troponin T, High Sensitivity    Collection Time: 05/10/24  6:50 PM   Result Value Ref Range    Troponin T, High Sensitivity 21 (H) <=14 ng/L   Blood Culture Peripheral Blood    Collection Time: 05/10/24  6:50 PM    Specimen: Peripheral Blood   Result Value Ref Range    Culture No growth after 12 hours    CBC with platelets and differential    Collection Time: 05/10/24  6:50 PM   Result Value Ref Range    WBC Count 14.1 (H) 4.0 - 11.0 10e3/uL    RBC Count 3.41 (L) 3.80 - 5.20 10e6/uL    Hemoglobin 9.9 (L) 11.7 - 15.7 g/dL    Hematocrit 31.2 (L) 35.0 - 47.0 %    MCV 92 78 - 100 fL    MCH 29.0 26.5 - 33.0 pg    MCHC 31.7 31.5 - 36.5 g/dL    RDW 15.6 (H) 10.0 - 15.0 %    Platelet Count 466 (H) 150 - 450 10e3/uL    % Neutrophils 92 %    % Lymphocytes 6 %    % Monocytes 0 %    % Eosinophils 1 %    % Basophils 0 %    % Immature Granulocytes 1 %    NRBCs per 100 WBC 0 <1 /100    Absolute Neutrophils 13.0 (H) 1.6 - 8.3 10e3/uL    Absolute Lymphocytes 0.8 0.8 - 5.3 10e3/uL    Absolute Monocytes 0.0 0.0 - 1.3 10e3/uL    Absolute Eosinophils 0.1 0.0 - 0.7 10e3/uL    Absolute Basophils 0.0 0.0 - 0.2 10e3/uL    Absolute Immature Granulocytes 0.1 <=0.4 10e3/uL    Absolute NRBCs 0.0 10e3/uL   Nt probnp inpatient    Collection Time: 05/10/24  6:50 PM   Result Value Ref Range    N terminal Pro BNP Inpatient 7,702 (H) 0 - 900 pg/mL   Procalcitonin    Collection Time: 05/10/24  6:50 PM   Result Value Ref Range    Procalcitonin 3.99 (H) <0.50 ng/mL   Symptomatic Influenza A/B, RSV, & SARS-CoV2 PCR (COVID-19) Nasopharyngeal    Collection Time: 05/10/24  6:59 PM    Specimen: Nasopharyngeal; Swab   Result Value Ref Range    Influenza A PCR Negative Negative    Influenza B PCR Negative Negative    RSV PCR Negative Negative    SARS CoV2 PCR Negative Negative   UA with Microscopic reflex to Culture    Collection Time: 05/10/24  8:20 PM     Specimen: Urine, Clean Catch   Result Value Ref Range    Color Urine Colorless Colorless, Straw, Light Yellow, Yellow    Appearance Urine Clear Clear    Glucose Urine Negative Negative mg/dL    Bilirubin Urine Negative Negative    Ketones Urine Negative Negative mg/dL    Specific Gravity Urine 1.015 1.001 - 1.030    Blood Urine Negative Negative    pH Urine 5.5 5.0 - 7.0    Protein Albumin Urine 10 (A) Negative mg/dL    Urobilinogen Urine <2.0 <2.0 mg/dL    Nitrite Urine Negative Negative    Leukocyte Esterase Urine 250 Luz/uL (A) Negative    Mucus Urine Present (A) None Seen /LPF    RBC Urine 1 <=2 /HPF    WBC Urine 21 (H) <=5 /HPF    Hyaline Casts Urine 3 (H) <=2 /LPF   CBC with platelets    Collection Time: 05/11/24  5:09 AM   Result Value Ref Range    WBC Count 8.3 4.0 - 11.0 10e3/uL    RBC Count 2.97 (L) 3.80 - 5.20 10e6/uL    Hemoglobin 8.7 (L) 11.7 - 15.7 g/dL    Hematocrit 27.1 (L) 35.0 - 47.0 %    MCV 91 78 - 100 fL    MCH 29.3 26.5 - 33.0 pg    MCHC 32.1 31.5 - 36.5 g/dL    RDW 15.7 (H) 10.0 - 15.0 %    Platelet Count 311 150 - 450 10e3/uL   Respiratory Aerobic Bacterial Culture with Gram Stain    Collection Time: 05/11/24  5:20 AM    Specimen: Expectorate; Sputum   Result Value Ref Range    Gram Stain Result <10 Squamous epithelial cells/low power field     Gram Stain Result <25 PMNs/low power field     Gram Stain Result 2+ Mixed leander    Comprehensive metabolic panel    Collection Time: 05/11/24  5:48 AM   Result Value Ref Range    Sodium 137 135 - 145 mmol/L    Potassium 6.0 (H) 3.4 - 5.3 mmol/L    Carbon Dioxide (CO2) 22 22 - 29 mmol/L    Anion Gap 8 7 - 15 mmol/L    Urea Nitrogen 26.2 (H) 8.0 - 23.0 mg/dL    Creatinine 0.83 0.51 - 0.95 mg/dL    GFR Estimate 75 >60 mL/min/1.73m2    Calcium 8.3 (L) 8.8 - 10.2 mg/dL    Chloride 107 98 - 107 mmol/L    Glucose 186 (H) 70 - 99 mg/dL    Alkaline Phosphatase 181 (H) 40 - 150 U/L    AST      ALT 64 (H) 0 - 50 U/L    Protein Total 5.4 (L) 6.4 - 8.3 g/dL     Albumin 3.1 (L) 3.5 - 5.2 g/dL    Bilirubin Total <0.2 <=1.2 mg/dL   ECG 12-LEAD WITH MUSE ([facility])    Collection Time: 05/11/24  9:21 AM   Result Value Ref Range    Systolic Blood Pressure 146 mmHg    Diastolic Blood Pressure 66 mmHg    Ventricular Rate 121 BPM    Atrial Rate 121 BPM    DC Interval 138 ms    QRS Duration 66 ms     ms    QTc 445 ms    P Axis 90 degrees    R AXIS 79 degrees    T Axis 78 degrees    Interpretation ECG       Sinus tachycardia  Low voltage QRS  Borderline ECG  When compared with ECG of 10-MAY-2024 18:03,  No significant change was found  Confirmed by SEE ED PROVIDER NOTE FOR, ECG INTERPRETATION ([id number]),  ANDRZEJ SUMMERS ([id number]) on 5/11/2024 11:20:05 AM     Glucose by meter    Collection Time: 05/11/24  9:31 AM   Result Value Ref Range    GLUCOSE BY METER POCT 217 (H) 70 - 99 mg/dL   Glucose by meter    Collection Time: 05/11/24 10:10 AM   Result Value Ref Range    GLUCOSE BY METER POCT 364 (H) 70 - 99 mg/dL   Glucose by meter    Collection Time: 05/11/24 10:30 AM   Result Value Ref Range    GLUCOSE BY METER POCT 311 (H) 70 - 99 mg/dL   Potassium    Collection Time: 05/11/24 11:30 AM   Result Value Ref Range    Potassium 4.4 3.4 - 5.3 mmol/L   Glucose by meter    Collection Time: 05/11/24 11:44 AM   Result Value Ref Range    GLUCOSE BY METER POCT 304 (H) 70 - 99 mg/dL   Glucose by meter    Collection Time: 05/11/24 12:32 PM   Result Value Ref Range    GLUCOSE BY METER POCT 267 (H) 70 - 99 mg/dL   Heparin Unfractionated Anti Xa Level    Collection Time: 05/11/24  1:22 PM   Result Value Ref Range    Anti Xa Unfractionated Heparin >1.10 (HH) For Reference Range, See Comment IU/mL   Lactic acid whole blood    Collection Time: 05/11/24  1:22 PM   Result Value Ref Range    Lactic Acid 3.1 (H) 0.7 - 2.0 mmol/L       Pending Labs:  Unresulted Labs Ordered in the Past 30 Days of this Admission       Date and Time Order Name Status Description    5/11/2024 12:11 AM Respiratory Aerobic  Bacterial Culture with Gram Stain Preliminary     5/10/2024  8:32 PM Urine Culture In process     5/10/2024  6:36 PM Blood Culture Peripheral Blood Preliminary             I spoke with daughter to discuss patient's care.    KAZ BENAVIDES MD  [facility] Medicine  [facility]  Phone: #[phone]    Securely message me with Fadum[employer] (more info)

## 2024-05-11 NOTE — PLAN OF CARE
Problem: UTI (Urinary Tract Infection)  Goal: Improved Infection Symptoms  5/11/2024 1501 by Jaylin Briscoe, RN  Outcome: Unable to Meet  5/11/2024 1501 by Jaylin Briscoe, RN  Outcome: Progressing   Goal Outcome Evaluation:  Lactic elevated, no fluids, antibiotics scheduled.     Hyperkalemia protocol followed. Find in intranet and paper print out on the computer, taped to computer.   > insulin given, 25 g dextrose given, 10% dextrose started and stopped, blood sugars trending down. Last blood sugar per protocol 1530. K down to normal limits. 6.0 to 4.4 K. Albuterol was also an intervention.     Pt on heparin gtt, next Xa at 2051. Heparin gtt titrated around 1453.     Ambulates SBA, commode, needs long o2 cord for further ambulation in room.     Needs weight.     BG every q 30min x4, then q1 hourx4 then consult  0930  1000   1030  1100   1200  1300  1400  1500  Down trending...

## 2024-05-11 NOTE — ED NOTES
Pt informed about respiratory panel that is ordered, and provided with a specimen cup at the bedside. No other needs at this time.

## 2024-05-12 LAB
ALBUMIN SERPL BCG-MCNC: 3.2 G/DL (ref 3.5–5.2)
ALP SERPL-CCNC: 160 U/L (ref 40–150)
ALT SERPL W P-5'-P-CCNC: 47 U/L (ref 0–50)
ANION GAP SERPL CALCULATED.3IONS-SCNC: 10 MMOL/L (ref 7–15)
ANION GAP SERPL CALCULATED.3IONS-SCNC: 5 MMOL/L (ref 7–15)
AST SERPL W P-5'-P-CCNC: 22 U/L (ref 0–45)
BACTERIA UR CULT: NORMAL
BASE EXCESS BLDV CALC-SCNC: 4 MMOL/L (ref -3–3)
BASOPHILS # BLD MANUAL: 0 10E3/UL (ref 0–0.2)
BASOPHILS NFR BLD MANUAL: 0 %
BILIRUB SERPL-MCNC: <0.2 MG/DL
BUN SERPL-MCNC: 27.1 MG/DL (ref 8–23)
BUN SERPL-MCNC: 35.4 MG/DL (ref 8–23)
CALCIUM SERPL-MCNC: 8.2 MG/DL (ref 8.8–10.2)
CALCIUM SERPL-MCNC: 8.8 MG/DL (ref 8.8–10.2)
CHLORIDE SERPL-SCNC: 103 MMOL/L (ref 98–107)
CHLORIDE SERPL-SCNC: 110 MMOL/L (ref 98–107)
CREAT SERPL-MCNC: 0.77 MG/DL (ref 0.51–0.95)
CREAT SERPL-MCNC: 0.79 MG/DL (ref 0.51–0.95)
DACRYOCYTES BLD QL SMEAR: SLIGHT
DEPRECATED HCO3 PLAS-SCNC: 27 MMOL/L (ref 22–29)
DEPRECATED HCO3 PLAS-SCNC: 28 MMOL/L (ref 22–29)
EGFRCR SERPLBLD CKD-EPI 2021: 80 ML/MIN/1.73M2
EGFRCR SERPLBLD CKD-EPI 2021: 83 ML/MIN/1.73M2
EOSINOPHIL # BLD MANUAL: 0 10E3/UL (ref 0–0.7)
EOSINOPHIL NFR BLD MANUAL: 0 %
ERYTHROCYTE [DISTWIDTH] IN BLOOD BY AUTOMATED COUNT: 15.2 % (ref 10–15)
ERYTHROCYTE [DISTWIDTH] IN BLOOD BY AUTOMATED COUNT: 15.3 % (ref 10–15)
GLUCOSE BLDC GLUCOMTR-MCNC: 146 MG/DL (ref 70–99)
GLUCOSE BLDC GLUCOMTR-MCNC: 167 MG/DL (ref 70–99)
GLUCOSE BLDC GLUCOMTR-MCNC: 219 MG/DL (ref 70–99)
GLUCOSE SERPL-MCNC: 102 MG/DL (ref 70–99)
GLUCOSE SERPL-MCNC: 188 MG/DL (ref 70–99)
HCO3 BLDV-SCNC: 29 MMOL/L (ref 21–28)
HCT VFR BLD AUTO: 27.1 % (ref 35–47)
HCT VFR BLD AUTO: 28.4 % (ref 35–47)
HGB BLD-MCNC: 8.7 G/DL (ref 11.7–15.7)
HGB BLD-MCNC: 8.9 G/DL (ref 11.7–15.7)
LACTATE SERPL-SCNC: 1.2 MMOL/L (ref 0.7–2)
LYMPHOCYTES # BLD MANUAL: 0.5 10E3/UL (ref 0.8–5.3)
LYMPHOCYTES NFR BLD MANUAL: 6 %
MAGNESIUM SERPL-MCNC: 1.6 MG/DL (ref 1.7–2.3)
MCH RBC QN AUTO: 28.4 PG (ref 26.5–33)
MCH RBC QN AUTO: 29.2 PG (ref 26.5–33)
MCHC RBC AUTO-ENTMCNC: 31.3 G/DL (ref 31.5–36.5)
MCHC RBC AUTO-ENTMCNC: 32.1 G/DL (ref 31.5–36.5)
MCV RBC AUTO: 91 FL (ref 78–100)
MCV RBC AUTO: 91 FL (ref 78–100)
MONOCYTES # BLD MANUAL: 0 10E3/UL (ref 0–1.3)
MONOCYTES NFR BLD MANUAL: 0 %
NEUTROPHILS # BLD MANUAL: 8 10E3/UL (ref 1.6–8.3)
NEUTROPHILS NFR BLD MANUAL: 94 %
NRBC # BLD AUTO: 0 10E3/UL
NRBC BLD AUTO-RTO: 0 /100
NT-PROBNP SERPL-MCNC: 1303 PG/ML (ref 0–900)
O2/TOTAL GAS SETTING VFR VENT: 3 %
OXYHGB MFR BLDV: 64 % (ref 70–75)
PCO2 BLDV: 52 MM HG (ref 40–50)
PH BLDV: 7.36 [PH] (ref 7.32–7.43)
PLAT MORPH BLD: ABNORMAL
PLATELET # BLD AUTO: 374 10E3/UL (ref 150–450)
PLATELET # BLD AUTO: 383 10E3/UL (ref 150–450)
PO2 BLDV: 35 MM HG (ref 25–47)
POTASSIUM SERPL-SCNC: 4.2 MMOL/L (ref 3.4–5.3)
POTASSIUM SERPL-SCNC: 4.4 MMOL/L (ref 3.4–5.3)
PROT SERPL-MCNC: 6.2 G/DL (ref 6.4–8.3)
RBC # BLD AUTO: 2.98 10E6/UL (ref 3.8–5.2)
RBC # BLD AUTO: 3.13 10E6/UL (ref 3.8–5.2)
RBC MORPH BLD: ABNORMAL
SAO2 % BLDV: 64.7 % (ref 70–75)
SODIUM SERPL-SCNC: 140 MMOL/L (ref 135–145)
SODIUM SERPL-SCNC: 143 MMOL/L (ref 135–145)
SPHEROCYTES BLD QL SMEAR: SLIGHT
UFH PPP CHRO-ACNC: 0.17 IU/ML
UFH PPP CHRO-ACNC: 0.55 IU/ML
UFH PPP CHRO-ACNC: 0.69 IU/ML
WBC # BLD AUTO: 13.1 10E3/UL (ref 4–11)
WBC # BLD AUTO: 8.5 10E3/UL (ref 4–11)

## 2024-05-12 PROCEDURE — 250N000012 HC RX MED GY IP 250 OP 636 PS 637: Performed by: STUDENT IN AN ORGANIZED HEALTH CARE EDUCATION/TRAINING PROGRAM

## 2024-05-12 PROCEDURE — 85520 HEPARIN ASSAY: CPT | Performed by: STUDENT IN AN ORGANIZED HEALTH CARE EDUCATION/TRAINING PROGRAM

## 2024-05-12 PROCEDURE — 85027 COMPLETE CBC AUTOMATED: CPT | Performed by: INTERNAL MEDICINE

## 2024-05-12 PROCEDURE — 83605 ASSAY OF LACTIC ACID: CPT | Performed by: STUDENT IN AN ORGANIZED HEALTH CARE EDUCATION/TRAINING PROGRAM

## 2024-05-12 PROCEDURE — 80053 COMPREHEN METABOLIC PANEL: CPT | Performed by: STUDENT IN AN ORGANIZED HEALTH CARE EDUCATION/TRAINING PROGRAM

## 2024-05-12 PROCEDURE — 85025 COMPLETE CBC W/AUTO DIFF WBC: CPT | Performed by: STUDENT IN AN ORGANIZED HEALTH CARE EDUCATION/TRAINING PROGRAM

## 2024-05-12 PROCEDURE — 250N000009 HC RX 250: Performed by: HOSPITALIST

## 2024-05-12 PROCEDURE — 120N000001 HC R&B MED SURG/OB

## 2024-05-12 PROCEDURE — 94640 AIRWAY INHALATION TREATMENT: CPT

## 2024-05-12 PROCEDURE — 83735 ASSAY OF MAGNESIUM: CPT | Performed by: INTERNAL MEDICINE

## 2024-05-12 PROCEDURE — 82805 BLOOD GASES W/O2 SATURATION: CPT | Performed by: INTERNAL MEDICINE

## 2024-05-12 PROCEDURE — 85007 BL SMEAR W/DIFF WBC COUNT: CPT | Performed by: INTERNAL MEDICINE

## 2024-05-12 PROCEDURE — 94640 AIRWAY INHALATION TREATMENT: CPT | Mod: 76

## 2024-05-12 PROCEDURE — 93005 ELECTROCARDIOGRAM TRACING: CPT | Performed by: INTERNAL MEDICINE

## 2024-05-12 PROCEDURE — 93010 ELECTROCARDIOGRAM REPORT: CPT | Performed by: INTERNAL MEDICINE

## 2024-05-12 PROCEDURE — 258N000003 HC RX IP 258 OP 636: Performed by: HOSPITALIST

## 2024-05-12 PROCEDURE — 250N000011 HC RX IP 250 OP 636: Performed by: HOSPITALIST

## 2024-05-12 PROCEDURE — 83880 ASSAY OF NATRIURETIC PEPTIDE: CPT | Performed by: INTERNAL MEDICINE

## 2024-05-12 PROCEDURE — 80048 BASIC METABOLIC PNL TOTAL CA: CPT | Performed by: STUDENT IN AN ORGANIZED HEALTH CARE EDUCATION/TRAINING PROGRAM

## 2024-05-12 PROCEDURE — 250N000013 HC RX MED GY IP 250 OP 250 PS 637: Performed by: HOSPITALIST

## 2024-05-12 PROCEDURE — 250N000011 HC RX IP 250 OP 636: Performed by: STUDENT IN AN ORGANIZED HEALTH CARE EDUCATION/TRAINING PROGRAM

## 2024-05-12 PROCEDURE — 99232 SBSQ HOSP IP/OBS MODERATE 35: CPT | Performed by: STUDENT IN AN ORGANIZED HEALTH CARE EDUCATION/TRAINING PROGRAM

## 2024-05-12 PROCEDURE — 999N000157 HC STATISTIC RCP TIME EA 10 MIN

## 2024-05-12 PROCEDURE — 93005 ELECTROCARDIOGRAM TRACING: CPT

## 2024-05-12 PROCEDURE — 250N000013 HC RX MED GY IP 250 OP 250 PS 637: Performed by: STUDENT IN AN ORGANIZED HEALTH CARE EDUCATION/TRAINING PROGRAM

## 2024-05-12 RX ORDER — IPRATROPIUM BROMIDE AND ALBUTEROL SULFATE 2.5; .5 MG/3ML; MG/3ML
3 SOLUTION RESPIRATORY (INHALATION) EVERY 6 HOURS PRN
Status: DISCONTINUED | OUTPATIENT
Start: 2024-05-12 | End: 2024-05-16

## 2024-05-12 RX ORDER — AZITHROMYCIN 250 MG/1
500 TABLET, FILM COATED ORAL DAILY
Status: DISCONTINUED | OUTPATIENT
Start: 2024-05-12 | End: 2024-05-12

## 2024-05-12 RX ORDER — FUROSEMIDE 10 MG/ML
40 INJECTION INTRAMUSCULAR; INTRAVENOUS ONCE
Status: COMPLETED | OUTPATIENT
Start: 2024-05-12 | End: 2024-05-12

## 2024-05-12 RX ORDER — LEVALBUTEROL INHALATION SOLUTION 0.63 MG/3ML
0.63 SOLUTION RESPIRATORY (INHALATION) EVERY 6 HOURS PRN
Status: DISCONTINUED | OUTPATIENT
Start: 2024-05-12 | End: 2024-05-17 | Stop reason: HOSPADM

## 2024-05-12 RX ORDER — PREDNISONE 20 MG/1
20 TABLET ORAL DAILY
Status: DISCONTINUED | OUTPATIENT
Start: 2024-05-13 | End: 2024-05-13

## 2024-05-12 RX ORDER — AZITHROMYCIN 250 MG/1
500 TABLET, FILM COATED ORAL AT BEDTIME
Status: COMPLETED | OUTPATIENT
Start: 2024-05-12 | End: 2024-05-13

## 2024-05-12 RX ORDER — LEVALBUTEROL INHALATION SOLUTION 0.63 MG/3ML
0.63 SOLUTION RESPIRATORY (INHALATION) EVERY 6 HOURS PRN
Status: DISCONTINUED | OUTPATIENT
Start: 2024-05-12 | End: 2024-05-12

## 2024-05-12 RX ORDER — PREDNISONE 20 MG/1
40 TABLET ORAL DAILY
Status: DISCONTINUED | OUTPATIENT
Start: 2024-05-12 | End: 2024-05-12

## 2024-05-12 RX ADMIN — AZITHROMYCIN MONOHYDRATE 250 MG: 500 INJECTION, POWDER, LYOPHILIZED, FOR SOLUTION INTRAVENOUS at 01:27

## 2024-05-12 RX ADMIN — SALINE NASAL SPRAY 1 SPRAY: 1.5 SOLUTION NASAL at 12:40

## 2024-05-12 RX ADMIN — LISINOPRIL 20 MG: 20 TABLET ORAL at 09:27

## 2024-05-12 RX ADMIN — IPRATROPIUM BROMIDE AND ALBUTEROL SULFATE 3 ML: .5; 3 SOLUTION RESPIRATORY (INHALATION) at 01:45

## 2024-05-12 RX ADMIN — HEPARIN SODIUM 800 UNITS/HR: 10000 INJECTION, SOLUTION INTRAVENOUS at 19:41

## 2024-05-12 RX ADMIN — PREDNISONE 40 MG: 20 TABLET ORAL at 09:27

## 2024-05-12 RX ADMIN — IPRATROPIUM BROMIDE AND ALBUTEROL SULFATE 3 ML: .5; 3 SOLUTION RESPIRATORY (INHALATION) at 14:41

## 2024-05-12 RX ADMIN — FLUTICASONE FUROATE AND VILANTEROL TRIFENATATE 1 PUFF: 200; 25 POWDER RESPIRATORY (INHALATION) at 12:41

## 2024-05-12 RX ADMIN — FUROSEMIDE 40 MG: 10 INJECTION, SOLUTION INTRAMUSCULAR; INTRAVENOUS at 14:38

## 2024-05-12 RX ADMIN — ASPIRIN 325 MG: 325 TABLET ORAL at 09:27

## 2024-05-12 RX ADMIN — AZITHROMYCIN 500 MG: 250 TABLET, FILM COATED ORAL at 21:19

## 2024-05-12 RX ADMIN — ATORVASTATIN CALCIUM 20 MG: 10 TABLET, FILM COATED ORAL at 09:28

## 2024-05-12 RX ADMIN — SALINE NASAL SPRAY 1 SPRAY: 1.5 SOLUTION NASAL at 14:38

## 2024-05-12 RX ADMIN — IPRATROPIUM BROMIDE AND ALBUTEROL SULFATE 3 ML: .5; 3 SOLUTION RESPIRATORY (INHALATION) at 07:43

## 2024-05-12 RX ADMIN — DOCUSATE SODIUM 100 MG: 100 CAPSULE, LIQUID FILLED ORAL at 21:19

## 2024-05-12 RX ADMIN — CEFTRIAXONE 1 G: 1 INJECTION, POWDER, FOR SOLUTION INTRAMUSCULAR; INTRAVENOUS at 21:20

## 2024-05-12 RX ADMIN — MIRTAZAPINE 7.5 MG: 7.5 TABLET, FILM COATED ORAL at 21:19

## 2024-05-12 ASSESSMENT — ACTIVITIES OF DAILY LIVING (ADL)
ADLS_ACUITY_SCORE: 41
ADLS_ACUITY_SCORE: 41
DEPENDENT_IADLS:: CLEANING;COOKING;LAUNDRY;SHOPPING;MEDICATION MANAGEMENT;MONEY MANAGEMENT;TRANSPORTATION
ADLS_ACUITY_SCORE: 41
ADLS_ACUITY_SCORE: 35
ADLS_ACUITY_SCORE: 41
ADLS_ACUITY_SCORE: 35
ADLS_ACUITY_SCORE: 35
ADLS_ACUITY_SCORE: 40
ADLS_ACUITY_SCORE: 41
ADLS_ACUITY_SCORE: 40
ADLS_ACUITY_SCORE: 40
ADLS_ACUITY_SCORE: 41
ADLS_ACUITY_SCORE: 40
ADLS_ACUITY_SCORE: 41
ADLS_ACUITY_SCORE: 41
ADLS_ACUITY_SCORE: 40
ADLS_ACUITY_SCORE: 41

## 2024-05-12 NOTE — CONSULTS
VASCULAR SURGERY CONSULT NOTE    VASCULAR SURGEON: Dr. 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    Currently the patient is asymptomatic with abdominal pain/nausea/vomiting. Passing flatus and Bms.    She however notes some nausea about last week which disappears on Thursday.    Currently admitted for acute hypoxia and anemia.    Stage IV SCLC on chemo and severe COPD. Extensive smoking hx.    REVIEW OF SYSTEMS:    A 12 point ROS was reviewed and except for what is listed in the HPI above, all others are negative    PHH:    Past Medical History:   Diagnosis Date    Age-related osteoporosis without current pathological fracture     Arthritis     COPD (chronic obstructive pulmonary disease) (H)     Coronary artery disease     Dependence on nocturnal oxygen therapy     Dyspnea on exertion     Emphysema lung (H)     Gout     HLD (hyperlipidemia)     Hypertension     Lung mass         Past Surgical History:   Procedure Laterality Date    BRONCHOSCOPY RIGID OR FLEXIBLE W/TRANSENDOSCOPIC ENDOBRONCHIAL ULTRASOUND GUIDED N/A 2/16/2024    Procedure: BRONCHOSCOPY, WITH ENDOBRONCHIAL ULTRASOUND;  Surgeon: Ruben Levin MD;  Location: [facility] OR    COLONOSCOPY N/A 10/21/2021    Procedure: COLONOSCOPY;  Surgeon: Wilma Hester DO;  Location: [facility] OR    IR CHEST PORT PLACEMENT > 5 YRS OF AGE  5/2/2024    VAGINAL DELIVERY      x 3 ; remote    WISDOM TOOTH EXTRACTION         ALLERGIES:  No Known Allergies    MEDS:    Current Facility-Administered Medications:     acetaminophen (TYLENOL) tablet 1,000 mg, 1,000 mg, Oral, Q8H PRN, Timothy Senior DO    albuterol (PROVENTIL) neb solution 2.5 mg, 2.5 mg, Nebulization, Q2H PRN, Timothy Senior DO    aspirin (ASA) EC tablet 325 mg, 325  mg, Oral, Daily, Timothy Senior DO, 325 mg at 05/12/24 0927    atorvastatin (LIPITOR) tablet 20 mg, 20 mg, Oral, Daily, Timothy Senior DO, 20 mg at 05/12/24 0928    azithromycin (ZITHROMAX) 250 mg in  mL intermittent infusion, 250 mg, Intravenous, Q24H, Timothy Senior DO, 250 mg at 05/12/24 0127    benzocaine-menthol (CEPACOL) 15-3.6 MG lozenge 1 lozenge, 1 lozenge, Buccal, Q1H PRN, Timothy Senior DO    calcium carbonate (TUMS) chewable tablet 1,000 mg, 1,000 mg, Oral, 4x Daily PRN, Timothy Senior DO    cefTRIAXone (ROCEPHIN) 1 g vial to attach to  mL bag for ADULTS or NS 50 mL bag for PEDS, 1 g, Intravenous, Q24H, Timothy Senior DO, 1 g at 05/11/24 2250    glucose gel 15-30 g, 15-30 g, Oral, Q15 Min PRN **OR** dextrose 50 % injection 25-50 mL, 25-50 mL, Intravenous, Q15 Min PRN **OR** glucagon injection 1 mg, 1 mg, Subcutaneous, Q15 Min PRN, Danyelle Prieto MD    glucose gel 15-30 g, 15-30 g, Oral, Q15 Min PRN **OR** dextrose 50 % injection 25-50 mL, 25-50 mL, Intravenous, Q15 Min PRN **OR** glucagon injection 1 mg, 1 mg, Subcutaneous, Q15 Min PRN, Danyelle Prieto MD    diazepam (VALIUM) tablet 5 mg, 5 mg, Oral, Q6H PRN, Timothy Senior DO    docusate sodium (COLACE) capsule 100 mg, 100 mg, Oral, BID, Timothy Senior DO, 100 mg at 05/11/24 2229    fluticasone-vilanterol (BREO ELLIPTA) 200-25 MCG/ACT inhaler 1 puff, 1 puff, Inhalation, Daily, Danyelle Prieto MD, 1 puff at 05/12/24 1241    guaiFENesin-dextromethorphan (ROBITUSSIN DM) 100-10 MG/5ML syrup 10 mL, 10 mL, Oral, Q4H PRN, Timothy Senior DO    heparin 100 unit/mL injection 5-10 mL, 5-10 mL, Intracatheter, Q28 Days, Chiquita Swain MD    heparin 25,000 units in 0.45% NaCl 250 mL ANTICOAGULANT infusion, 0-5,000 Units/hr, Intravenous, Continuous, Danyelle Prieto MD, Last Rate: 8 mL/hr at 05/12/24 1244, 800 Units/hr at 05/12/24 1244    heparin lock flush 10 UNIT/ML injection 5-10 mL, 5-10 mL, Intracatheter, Q1H PRN, Chiquita Swain MD    heparin lock  flush 10 UNIT/ML injection 5-10 mL, 5-10 mL, Intracatheter, Q24H, Chiquita Swain MD    hydrOXYzine HCl (ATARAX) tablet 25-50 mg, 25-50 mg, Oral, Q8H PRN, Timothy Senior DO    insulin aspart (NovoLOG) injection (RAPID ACTING), 1-3 Units, Subcutaneous, TID AC, Danyelle Prieto MD    insulin aspart (NovoLOG) injection (RAPID ACTING), 1-3 Units, Subcutaneous, At Bedtime, Danyelle Prieto MD    ipratropium - albuterol 0.5 mg/2.5 mg/3 mL (DUONEB) neb solution 3 mL, 3 mL, Nebulization, Q6H, Timothy Senior DO, 3 mL at 05/12/24 1441    lidocaine (LMX4) cream, , Topical, Q1H PRN, Timothy Senior DO    lidocaine 1 % 0.1-1 mL, 0.1-1 mL, Other, Q1H PRN, Timothy Senior DO    lisinopril (ZESTRIL) tablet 20 mg, 20 mg, Oral, Daily, Timothy Senior, , 20 mg at 05/12/24 0927    mirtazapine (REMERON) tablet TABS 7.5 mg, 7.5 mg, Oral, At Bedtime, Timothy Senior DO, 7.5 mg at 05/11/24 2229    naloxone (NARCAN) injection 0.2 mg, 0.2 mg, Intravenous, Q2 Min PRN **OR** naloxone (NARCAN) injection 0.4 mg, 0.4 mg, Intravenous, Q2 Min PRN **OR** naloxone (NARCAN) injection 0.2 mg, 0.2 mg, Intramuscular, Q2 Min PRN **OR** naloxone (NARCAN) injection 0.4 mg, 0.4 mg, Intramuscular, Q2 Min PRN, Danyelle Prieto MD    ondansetron (ZOFRAN ODT) ODT tab 4 mg, 4 mg, Oral, Q6H PRN **OR** ondansetron (ZOFRAN) injection 4 mg, 4 mg, Intravenous, Q6H PRN, Senior, Timothy C, DO    oxyCODONE IR (ROXICODONE) half-tab 2.5-5 mg, 2.5-5 mg, Oral, Q4H PRN, Timothy Senior,     predniSONE (DELTASONE) tablet 40 mg, 40 mg, Oral, Daily, Danyelle Prieto MD, 40 mg at 05/12/24 0927    prochlorperazine (COMPAZINE) injection 5 mg, 5 mg, Intravenous, Q6H PRN **OR** prochlorperazine (COMPAZINE) tablet 5 mg, 5 mg, Oral, Q6H PRN **OR** prochlorperazine (COMPAZINE) suppository 12.5 mg, 12.5 mg, Rectal, Q12H PRN, Timothy Senior,     senna-docusate (SENOKOT-S/PERICOLACE) 8.6-50 MG per tablet 1 tablet, 1 tablet, Oral, BID PRN **OR** senna-docusate (SENOKOT-S/PERICOLACE) 8.6-50 MG per  tablet 2 tablet, 2 tablet, Oral, BID PRN, Timothy Senior, DO    sodium chloride (OCEAN) 0.65 % nasal spray 1 spray, 1 spray, Both Nostrils, Q4H While awake, Danyelle Prieto MD, 1 spray at 05/12/24 1438    sodium chloride (PF) 0.9% PF flush 10-20 mL, 10-20 mL, Intracatheter, q1 min prn, Chiquita Swain MD    sodium chloride (PF) 0.9% PF flush 10-20 mL, 10-20 mL, Intracatheter, Q1H PRN, Chiquita Swain MD    sodium chloride (PF) 0.9% PF flush 10-20 mL, 10-20 mL, Intracatheter, Q28 Days, Chiquita Swain MD, 20 mL at 05/12/24 0430    sodium chloride (PF) 0.9% PF flush 3 mL, 3 mL, Intracatheter, Q8H, Timothy Senior DO, 3 mL at 05/12/24 0930    sodium chloride (PF) 0.9% PF flush 3 mL, 3 mL, Intracatheter, q1 min prn, Timothy Senior, DO    SOCIAL HABITS:   Social History    Substance and Sexual Activity      Alcohol use: Not Currently      History   Drug Use Unknown      History   Smoking Status    Former    Types: Cigarettes   Smokeless Tobacco    Never        FAMILY HISTORY:    Family History   Problem Relation Age of Onset    Chronic Obstructive Pulmonary Disease Sister     Diabetes Mother     Heart Disease Mother     Lung Cancer Mother     Heart Disease Father        PE:  /62 (BP Location: Right arm)   Pulse 116   Temp 97.5  F (36.4  C) (Oral)   Resp 16   Ht 1.524 m (5')   Wt 45.5 kg (100 lb 5 oz)   LMP  (LMP Unknown)   SpO2 95%   BMI 19.59 kg/m    Wt Readings from Last 1 Encounters:   05/12/24 45.5 kg (100 lb 5 oz)     Body mass index is 19.59 kg/m .    EXAM:  GENERAL: This is a well-developed 68 year old female who appears her stated age  EYES: Grossly normal.  CARDIAC:  Warm, well-perfused, tachycardic   VASCULAR: palp DP bilaterally L>R  ABDOMEN: Soft, non-tender, no pulsatile mass  MUSCULOSKELETAL: Grossly normal and both lower extremities are intact.  HEME/LYMPH: No lymphedema  NEUROLOGIC: Focally intact, Alert and oriented x 3.   PSYCH: appropriate affect  INTEGUMENT: No open lesions or  ulcers    IMPRESSION:  67 yo female with stage IV cancer with severe celiac stenosis and intraluminal thrombus. Asymptomatic with good collateralization.    RECOMMENDATION:    - No indication for revascularization  - Continue therapeutic anticoagluation    Discussed with staff, Dr. Faye Garcia MD  Vascular Surgery Fellow

## 2024-05-12 NOTE — PROGRESS NOTES
[facility] MEDICINE  PROGRESS NOTE       Securely message me with Fadum[employer] (more info)    Code Status: No CPR- Do NOT Intubate       Identification/Summary:   Jasmyn Green is a 68 year old female who presented with complaints of shortness of breath.    Medical history is notable for lung cancer, currently getting chemotherapy.  She also has a history of chronic respiratory failure with hypoxia on 4 to 5 L at home with activity and 3 to 4 L at rest, hypertension, coronary disease, arthritis.        Barriers to Discharge: severe dyspnea     Disposition: inpatient     Assessment and Plan:  Acute on chronic respiratory failure with hypoxia  Severe COPD with acute exacerbation  Immunity acquired pneumonia  Small cell lung cancer, on palliative chemo with carboplatin, etoposide, atezolizumab  Acute pulmonary edema  Continue treatment with nebs, steroids, antibiotics, supplemental oxygen  Ceftriaxone for community-acquired pneumonia  Azithromycin for COPD exacerbation  BNP is very elevated  Urinated well with Lasix IV 40 mg on 5/11. Improved somewhat on 5/12 but still tachypneic, patient adamantly refused diuretics as she is tired of getting up to pee and all the cleaning up (Purewick doesn't work for her).  Will talk to her again talk to daughter to encourage her to get more diuretics.  She probably needs something to go home with.  She will need echocardiogram as an outpatient.     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  Splenic infarct, chronic  Brain lesion of unclear significance  Consulted vascular surgery who recommends IV heparin  Priscila with vascular surgeon on the phone and her brain lesion should not be a contraindication to start blood thinner  Will need to discuss with her oncologist/otologist for future oral agent for celiac artery thrombus on Monday  Will discontinue aspirin          Sinus tachycardia  This could be related to her acute on chronic respiratory failure and  pulmonary edema  Treatment as above     Transaminase elevation  Likely due to chemotherapy     Hyperkalemia resolved  Was about to start albuterol neb but her heart rate has been about 120s  Instead, did insulin and glucose  Now at 4.4     Essential hypertension, on lisinopril  Anxiety, on Valium as needed        Severe anemia, now 9.9 up from 6.9 5 days ago  Due to chemotherapy           Anticoagulation   Orders (Includes Only Anticoagulants)  heparin, 5-10 mL, Intracatheter, Q28 Days  heparin, 0-5,000 Units/hr, Intravenous, Continuous  heparin lock flush, 5-10 mL, Intracatheter, Q1H PRN  heparin lock flush, 5-10 mL, Intracatheter, Q24H      Therapy:   Vick:Not present  Lines: PRESENT               Current Diet  Orders Placed This Encounter      3 Gram Potassium (low potassium) Diet        Clinically Significant Risk Factors        # Hyperkalemia: Highest K = 6 mmol/L in last 2 days, will monitor as appropriate       # Hypoalbuminemia: Lowest albumin = 3.1 g/dL at 5/11/2024  5:48 AM, will monitor as appropriate     # Hypertension: Noted on problem list            # COPD: noted on problem list        Interval History/Subjective:  She reports not sure if she is doing better but says her breathing is better.  She has no other complaint.  She does not want more doses IV diuretics today.        Physical Exam/Objective:  Temp:  [97.1  F (36.2  C)-98.2  F (36.8  C)] 97.5  F (36.4  C)  Pulse:  [] 123  Resp:  [16-41] 16  BP: (117-165)/(57-85) 130/79  SpO2:  [92 %-97 %] 92 %  Wt Readings from Last 4 Encounters:   05/12/24 45.5 kg (100 lb 5 oz)   05/06/24 42.9 kg (94 lb 9.6 oz)   05/01/24 44 kg (97 lb)   04/15/24 45 kg (99 lb 3.2 oz)     Body mass index is 19.59 kg/m .    General Appearance: Alert and wake, not in distress  Respiratory: Low breath sound, no crackles  Cardiovascular: rhythmic, normal S1 and S2, no murmur  Neurology: oriented x 3  Psych: cooperative and calm, normal affect    Medications:   Personally  Reviewed.  Medications   Current Facility-Administered Medications   Medication Dose Route Frequency Provider Last Rate Last Admin    heparin 25,000 units in 0.45% NaCl 250 mL ANTICOAGULANT infusion  0-5,000 Units/hr Intravenous Continuous Danyelle Prieto MD 8 mL/hr at 05/12/24 1244 800 Units/hr at 05/12/24 1244     Current Facility-Administered Medications   Medication Dose Route Frequency Provider Last Rate Last Admin    aspirin (ASA) EC tablet 325 mg  325 mg Oral Daily Timothy Senior, DO   325 mg at 05/12/24 0927    atorvastatin (LIPITOR) tablet 20 mg  20 mg Oral Daily Timothy Senior, DO   20 mg at 05/12/24 0928    azithromycin (ZITHROMAX) 250 mg in  mL intermittent infusion  250 mg Intravenous Q24H Timothy Senior, DO   250 mg at 05/12/24 0127    cefTRIAXone (ROCEPHIN) 1 g vial to attach to  mL bag for ADULTS or NS 50 mL bag for PEDS  1 g Intravenous Q24H Timothy Senior, DO   1 g at 05/11/24 2250    docusate sodium (COLACE) capsule 100 mg  100 mg Oral BID Timothy Senior, DO   100 mg at 05/11/24 2229    fluticasone-vilanterol (BREO ELLIPTA) 200-25 MCG/ACT inhaler 1 puff  1 puff Inhalation Daily Danyelle Prieto MD   1 puff at 05/12/24 1241    heparin 100 unit/mL injection 5-10 mL  5-10 mL Intracatheter Q28 Days Chiquita Swain MD        heparin lock flush 10 UNIT/ML injection 5-10 mL  5-10 mL Intracatheter Q24H Chiquita Swain MD        insulin aspart (NovoLOG) injection (RAPID ACTING)  1-3 Units Subcutaneous TID AC Danyelle Prieto MD        insulin aspart (NovoLOG) injection (RAPID ACTING)  1-3 Units Subcutaneous At Bedtime Danyelle Prieto MD        ipratropium - albuterol 0.5 mg/2.5 mg/3 mL (DUONEB) neb solution 3 mL  3 mL Nebulization Q6H Timothy Senior, DO   3 mL at 05/12/24 0743    lisinopril (ZESTRIL) tablet 20 mg  20 mg Oral Daily Timothy Senior, DO   20 mg at 05/12/24 0927    mirtazapine (REMERON) tablet TABS 7.5 mg  7.5 mg Oral At Bedtime Timothy Senior, DO   7.5 mg at 05/11/24 2221    predniSONE  (DELTASONE) tablet 40 mg  40 mg Oral Daily Danyelle Prieto MD   40 mg at 05/12/24 0927    sodium chloride (OCEAN) 0.65 % nasal spray 1 spray  1 spray Both Nostrils Q4H While awake Danyelle Prieto MD   1 spray at 05/12/24 1240    sodium chloride (PF) 0.9% PF flush 10-20 mL  10-20 mL Intracatheter Q28 Days Chiquita Swain MD   20 mL at 05/12/24 0430    sodium chloride (PF) 0.9% PF flush 3 mL  3 mL Intracatheter Q8H Timothy Senior DO   3 mL at 05/12/24 0930       Data reviewed today: I personally reviewed all new medications, labs, imaging/diagnostics reports over the past 24 hours. Pertinent findings include:    Imaging:   No results found for this or any previous visit (from the past 24 hour(s)).    Labs:  CTA Abdomen Pelvis with Contrast   Final Result   IMPRESSION:   1.  Near occlusive stenosis of the celiac axis, with intraluminal thrombus.   2.  Multifocal atherosclerotic disease, abdominal aortic aneurysm, and additional multifocal stenoses.   3.  Additional findings as above.      CT Chest Pulmonary Embolism w Contrast   Final Result   IMPRESSION:   1.  No acute pulmonary emboli.      2.  Unchanged left upper lobe subpleural nodule, consolidation in the left upper lobe posteriorly, and a few other scattered nodular airspace opacities elsewhere.      3.  Unchanged mildly enlarged mediastinal and left hilar lymph nodes.      4.  Unchanged high-grade narrowing of the celiac artery proximally with noncalcified plaque or thrombus.        Recent Results (from the past 24 hour(s))   Heparin Unfractionated Anti Xa Level    Collection Time: 05/11/24  1:22 PM   Result Value Ref Range    Anti Xa Unfractionated Heparin >1.10 (HH) For Reference Range, See Comment IU/mL   Lactic acid whole blood    Collection Time: 05/11/24  1:22 PM   Result Value Ref Range    Lactic Acid 3.1 (H) 0.7 - 2.0 mmol/L   Glucose by meter    Collection Time: 05/11/24  1:48 PM   Result Value Ref Range    GLUCOSE BY METER POCT 285 (H) 70 - 99 mg/dL    Glucose by meter    Collection Time: 05/11/24  2:41 PM   Result Value Ref Range    GLUCOSE BY METER POCT 269 (H) 70 - 99 mg/dL   Glucose by meter    Collection Time: 05/11/24  3:39 PM   Result Value Ref Range    GLUCOSE BY METER POCT 253 (H) 70 - 99 mg/dL   Glucose by meter    Collection Time: 05/11/24  4:12 PM   Result Value Ref Range    GLUCOSE BY METER POCT 255 (H) 70 - 99 mg/dL   Lactic acid whole blood    Collection Time: 05/11/24  4:26 PM   Result Value Ref Range    Lactic Acid 1.8 0.7 - 2.0 mmol/L   Heparin Unfractionated Anti Xa Level    Collection Time: 05/11/24  9:53 PM   Result Value Ref Range    Anti Xa Unfractionated Heparin 0.31 For Reference Range, See Comment IU/mL   Heparin Unfractionated Anti Xa Level    Collection Time: 05/12/24  4:45 AM   Result Value Ref Range    Anti Xa Unfractionated Heparin 0.17 For Reference Range, See Comment IU/mL   Basic metabolic panel    Collection Time: 05/12/24  9:23 AM   Result Value Ref Range    Sodium 143 135 - 145 mmol/L    Potassium 4.2 3.4 - 5.3 mmol/L    Chloride 110 (H) 98 - 107 mmol/L    Carbon Dioxide (CO2) 28 22 - 29 mmol/L    Anion Gap 5 (L) 7 - 15 mmol/L    Urea Nitrogen 27.1 (H) 8.0 - 23.0 mg/dL    Creatinine 0.79 0.51 - 0.95 mg/dL    GFR Estimate 80 >60 mL/min/1.73m2    Calcium 8.2 (L) 8.8 - 10.2 mg/dL    Glucose 102 (H) 70 - 99 mg/dL   CBC with platelets    Collection Time: 05/12/24  9:23 AM   Result Value Ref Range    WBC Count 13.1 (H) 4.0 - 11.0 10e3/uL    RBC Count 2.98 (L) 3.80 - 5.20 10e6/uL    Hemoglobin 8.7 (L) 11.7 - 15.7 g/dL    Hematocrit 27.1 (L) 35.0 - 47.0 %    MCV 91 78 - 100 fL    MCH 29.2 26.5 - 33.0 pg    MCHC 32.1 31.5 - 36.5 g/dL    RDW 15.2 (H) 10.0 - 15.0 %    Platelet Count 383 150 - 450 10e3/uL   Heparin Unfractionated Anti Xa Level    Collection Time: 05/12/24 11:46 AM   Result Value Ref Range    Anti Xa Unfractionated Heparin 0.55 For Reference Range, See Comment IU/mL   Glucose by meter    Collection Time: 05/12/24 11:46 AM    Result Value Ref Range    GLUCOSE BY METER POCT 146 (H) 70 - 99 mg/dL       Pending Labs:  Unresulted Labs Ordered in the Past 30 Days of this Admission       Date and Time Order Name Status Description    5/11/2024 12:11 AM Respiratory Aerobic Bacterial Culture with Gram Stain Preliminary     5/10/2024  6:36 PM Blood Culture Peripheral Blood Preliminary             I tried to call daughter but was not successful.    KAZ BENAVIDES MD  [facility]  Phone: #[phone]    Securely message me with Fadumo (more info)

## 2024-05-12 NOTE — PROGRESS NOTES
BP (!) 146/68 (BP Location: Right arm, Cuff Size: Adult Small)   Pulse 90   Temp 97.9  F (36.6  C) (Oral)   Resp 16   Ht 1.524 m (5')   Wt 43.1 kg (95 lb)   LMP  (LMP Unknown)   SpO2 97%   BMI 18.55 kg/m        The PT was provided a nebs per MD order. In both cases, BS were were diminished both pre and post. RT will follow as directed.

## 2024-05-12 NOTE — CONSULTS
Care Management Initial Consult    General Information  Assessment completed with: Patient  Type of CM/SW Visit: Initial Assessment  Primary Care Provider verified and updated as needed: Yes   Readmission within the last 30 days: no previous admission in last 30 days      Reason for Consult: discharge planning  Advance Care Planning: Advance Care Planning Reviewed: present on chart          Communication Assessment  Patient's communication style: spoken language (English or Bilingual)    Hearing Difficulty or Deaf: no   Wear Glasses or Blind: yes      Cognitive  Cognitive/Neuro/Behavioral: WDL                        Living Environment:   People in home: child(jeremy), adult     Current living Arrangements: house      Able to return to prior arrangements: yes       Family/Social Support:  Care provided by: self  Provides care for: no one  Marital Status:   Children          Description of Support System: Supportive, Involved         Current Resources:   Patient receiving home care services: No  Community Resources: None  Equipment currently used at home: cane, straight, wheelchair, manual, walker, rolling  Supplies currently used at home:  02      Employment/Financial:  Employment Status: retired     Financial Concerns: none           Functional Status:  Prior to admission patient needed assistance:   Dependent ADLs:: Ambulation-cane, Ambulation-walker, Bathing, Wheelchair-independent  Dependent IADLs:: Cleaning, Cooking, Laundry, Shopping, Medication Management, Money Management, Transportation         Mental Health Status:  Mental Health Status: No Current Concerns         Chemical Dependency Status:  Chemical Dependency Status: No Current Concerns               Values/Beliefs:  Spiritual, Cultural Beliefs, Scientology Practices, Values that affect care: no               Additional Information:  CM reviewed chart. CM met with patient - introduced self and role of CM. Assessed. Patient lives in a private  residence with daughter. Prior to admission patient needed assistance: Dependent ADLs:: Ambulation-cane, Ambulation-walker, Bathing, Wheelchair-independent. Dependent IADLs:: Cleaning, Cooking, Laundry, Shopping, Medication Management, Money Management, Transportation. Patient receiving home care services: No. Community Resources: None. Equipment currently used at home: cane, straight, wheelchair, manual, walker, rolling. Supplies currently used at home:  02. 02 supplied by [employer]. 02 is required 24/7. Patient requires 3.5-5 L depending on her activity. She has a concentrator and portable 02. Portable 02 use is 4-5 L with activity. Family will provide transportation home at discharge. Denied CM needs. CM will follow.       CM consult for Discharge Planning/Disposition and Elevated Risk Score        Marcelle Villarreal RN

## 2024-05-12 NOTE — PLAN OF CARE
Goal Outcome Evaluation:    Pt denies pain.  Tele: .  Purewick in place due to bedrest.   Positions self in bed.  Heparin gtt at 800 units per hour.  O2 sats in the mid 90's on 3L O2 via NC.  Continues to have an infrequent congested cough.      Problem: Adult Inpatient Plan of Care  Goal: Plan of Care Review  Description: The Plan of Care Review/Shift note should be completed every shift.  The Outcome Evaluation is a brief statement about your assessment that the patient is improving, declining, or no change.  This information will be displayed automatically on your shift  note.  Outcome: Progressing     Problem: Gas Exchange Impaired  Goal: Optimal Gas Exchange  Outcome: Progressing     Problem: Pneumonia  Goal: Fluid Balance  Outcome: Progressing

## 2024-05-12 NOTE — PROGRESS NOTES
Pt given scheduled Duoneb treatment. Pt seen on 3lpm NC sating 93%. BS diminished. Neb treatment currently discontinue by MD.

## 2024-05-12 NOTE — PLAN OF CARE
Patient alert and oriented. VSS on baseline 5L NC. Denies any pain. Patient endorses improvement to her breathing. Heparin gtt infusing at 500 units/hr. Recent anti-xa therapeutic.  No acute events this shift. Plan of care ongoing.   Problem: Adult Inpatient Plan of Care  Goal: Absence of Hospital-Acquired Illness or Injury  5/11/2024 2334 by Donna Mcnamara RN  Outcome: Progressing  5/11/2024 2334 by Donna Mcnamara RN  Outcome: Progressing  Intervention: Identify and Manage Fall Risk  Recent Flowsheet Documentation  Taken 5/11/2024 1638 by Donna Mcnamara RN  Safety Promotion/Fall Prevention: safety round/check completed  Intervention: Prevent Skin Injury  Recent Flowsheet Documentation  Taken 5/11/2024 1638 by Donna Mcnamara RN  Body Position: position changed independently  Skin Protection:   adhesive use limited   transparent dressing maintained  Device Skin Pressure Protection:   absorbent pad utilized/changed   adhesive use limited   tubing/devices free from skin contact  Intervention: Prevent Infection  Recent Flowsheet Documentation  Taken 5/11/2024 1638 by Donna Mcnamara RN  Infection Prevention:   rest/sleep promoted   hand hygiene promoted   Goal Outcome Evaluation:

## 2024-05-13 ENCOUNTER — APPOINTMENT (OUTPATIENT)
Dept: PHYSICAL THERAPY | Facility: CLINIC | Age: 69
DRG: 193 | End: 2024-05-13
Attending: STUDENT IN AN ORGANIZED HEALTH CARE EDUCATION/TRAINING PROGRAM
Payer: COMMERCIAL

## 2024-05-13 ENCOUNTER — PATIENT OUTREACH (OUTPATIENT)
Dept: CARE COORDINATION | Facility: CLINIC | Age: 69
End: 2024-05-13
Payer: COMMERCIAL

## 2024-05-13 LAB
ANION GAP SERPL CALCULATED.3IONS-SCNC: 10 MMOL/L (ref 7–15)
BUN SERPL-MCNC: 31.3 MG/DL (ref 8–23)
CALCIUM SERPL-MCNC: 8.9 MG/DL (ref 8.8–10.2)
CHLORIDE SERPL-SCNC: 106 MMOL/L (ref 98–107)
CREAT SERPL-MCNC: 0.66 MG/DL (ref 0.51–0.95)
DEPRECATED HCO3 PLAS-SCNC: 26 MMOL/L (ref 22–29)
EGFRCR SERPLBLD CKD-EPI 2021: >90 ML/MIN/1.73M2
GLUCOSE BLDC GLUCOMTR-MCNC: 100 MG/DL (ref 70–99)
GLUCOSE BLDC GLUCOMTR-MCNC: 121 MG/DL (ref 70–99)
GLUCOSE BLDC GLUCOMTR-MCNC: 169 MG/DL (ref 70–99)
GLUCOSE BLDC GLUCOMTR-MCNC: 190 MG/DL (ref 70–99)
GLUCOSE BLDC GLUCOMTR-MCNC: 71 MG/DL (ref 70–99)
GLUCOSE BLDC GLUCOMTR-MCNC: 82 MG/DL (ref 70–99)
GLUCOSE SERPL-MCNC: 97 MG/DL (ref 70–99)
MAGNESIUM SERPL-MCNC: 1.7 MG/DL (ref 1.7–2.3)
POTASSIUM SERPL-SCNC: 3.9 MMOL/L (ref 3.4–5.3)
SODIUM SERPL-SCNC: 142 MMOL/L (ref 135–145)
UFH PPP CHRO-ACNC: 0.65 IU/ML

## 2024-05-13 PROCEDURE — 83735 ASSAY OF MAGNESIUM: CPT | Performed by: INTERNAL MEDICINE

## 2024-05-13 PROCEDURE — 250N000012 HC RX MED GY IP 250 OP 636 PS 637: Performed by: STUDENT IN AN ORGANIZED HEALTH CARE EDUCATION/TRAINING PROGRAM

## 2024-05-13 PROCEDURE — 120N000001 HC R&B MED SURG/OB

## 2024-05-13 PROCEDURE — 97162 PT EVAL MOD COMPLEX 30 MIN: CPT | Mod: GP

## 2024-05-13 PROCEDURE — 85520 HEPARIN ASSAY: CPT | Performed by: STUDENT IN AN ORGANIZED HEALTH CARE EDUCATION/TRAINING PROGRAM

## 2024-05-13 PROCEDURE — 250N000013 HC RX MED GY IP 250 OP 250 PS 637: Performed by: STUDENT IN AN ORGANIZED HEALTH CARE EDUCATION/TRAINING PROGRAM

## 2024-05-13 PROCEDURE — 250N000011 HC RX IP 250 OP 636: Performed by: HOSPITALIST

## 2024-05-13 PROCEDURE — 80048 BASIC METABOLIC PNL TOTAL CA: CPT | Performed by: STUDENT IN AN ORGANIZED HEALTH CARE EDUCATION/TRAINING PROGRAM

## 2024-05-13 PROCEDURE — 250N000013 HC RX MED GY IP 250 OP 250 PS 637: Performed by: HOSPITALIST

## 2024-05-13 PROCEDURE — 99232 SBSQ HOSP IP/OBS MODERATE 35: CPT | Performed by: STUDENT IN AN ORGANIZED HEALTH CARE EDUCATION/TRAINING PROGRAM

## 2024-05-13 PROCEDURE — 97530 THERAPEUTIC ACTIVITIES: CPT | Mod: GP

## 2024-05-13 RX ORDER — TORSEMIDE 20 MG/1
20 TABLET ORAL DAILY
Status: DISCONTINUED | OUTPATIENT
Start: 2024-05-13 | End: 2024-05-17 | Stop reason: HOSPADM

## 2024-05-13 RX ADMIN — MIRTAZAPINE 7.5 MG: 7.5 TABLET, FILM COATED ORAL at 21:14

## 2024-05-13 RX ADMIN — PREDNISONE 20 MG: 20 TABLET ORAL at 08:30

## 2024-05-13 RX ADMIN — DOCUSATE SODIUM 100 MG: 100 CAPSULE, LIQUID FILLED ORAL at 08:29

## 2024-05-13 RX ADMIN — AZITHROMYCIN 500 MG: 250 TABLET, FILM COATED ORAL at 21:14

## 2024-05-13 RX ADMIN — ASPIRIN 325 MG: 325 TABLET ORAL at 08:29

## 2024-05-13 RX ADMIN — TORSEMIDE 20 MG: 20 TABLET ORAL at 11:47

## 2024-05-13 RX ADMIN — CEFTRIAXONE 1 G: 1 INJECTION, POWDER, FOR SOLUTION INTRAMUSCULAR; INTRAVENOUS at 21:09

## 2024-05-13 RX ADMIN — FLUTICASONE FUROATE AND VILANTEROL TRIFENATATE 1 PUFF: 200; 25 POWDER RESPIRATORY (INHALATION) at 08:30

## 2024-05-13 RX ADMIN — ATORVASTATIN CALCIUM 20 MG: 10 TABLET, FILM COATED ORAL at 08:30

## 2024-05-13 RX ADMIN — LISINOPRIL 20 MG: 20 TABLET ORAL at 08:29

## 2024-05-13 RX ADMIN — DOCUSATE SODIUM 100 MG: 100 CAPSULE, LIQUID FILLED ORAL at 21:14

## 2024-05-13 ASSESSMENT — ACTIVITIES OF DAILY LIVING (ADL)
ADLS_ACUITY_SCORE: 41

## 2024-05-13 NOTE — PLAN OF CARE
Problem: Gas Exchange Impaired  Goal: Optimal Gas Exchange  Outcome: Progressing     Problem: COPD (Chronic Obstructive Pulmonary Disease)  Goal: Optimal Chronic Illness Coping  Outcome: Progressing     Problem: Pneumonia  Goal: Effective Oxygenation and Ventilation  Outcome: Progressing   Goal Outcome Evaluation:         Pt alert and oriented x4  Pt on 3L O2 sating 94% Pt does get very SOB with activity. Lung sounds are diminished.   Pt afebrile.   UC pending  Continue with IV abx, toresemide, prednisone.   Heparin infusion continued at 800units/hr - recheck anti-xa tomorrow morning.   Tele is ST with  and then elevates with activity. This afternoon pt had sustained -145 for over an hour while she was in bed. Asymptomatic. Other vitals WDL. MD notified and MD okay with it for now, prednisone discontinued. HR did come down to 110's after awhile.

## 2024-05-13 NOTE — PLAN OF CARE
Problem: COPD (Chronic Obstructive Pulmonary Disease)  Goal: Optimal Chronic Illness Coping  Outcome: Progressing  Intervention: Support and Optimize Psychosocial Response  Recent Flowsheet Documentation  Taken 5/12/2024 1600 by Jaylin Briscoe, RN  Supportive Measures:   active listening utilized   decision-making supported   goal-setting facilitated   positive reinforcement provided   problem-solving facilitated   relaxation techniques promoted   self-care encouraged   self-reflection promoted   self-responsibility promoted   verbalization of feelings encouraged  Taken 5/12/2024 0800 by Jaylin Briscoe, RN  Supportive Measures:   active listening utilized   decision-making supported   goal-setting facilitated   positive reinforcement provided   problem-solving facilitated   relaxation techniques promoted   self-care encouraged   self-reflection promoted   self-responsibility promoted   verbalization of feelings encouraged  Goal: Optimal Level of Functional [place]  Outcome: Progressing  Intervention: Optimize Functional Ability  Recent Flowsheet Documentation  Taken 5/12/2024 1900 by Jaylin Briscoe, RN  Activity Management: (CHG bath and central line dressing change as well as IV from [id number]) --  Taken 5/12/2024 1600 by Jaylin Briscoe, RN  Self-Care Promotion: independence encouraged  Activity Management: activity adjusted per tolerance  Environmental Support:   calm environment promoted   caregiver consistency promoted   comfort object encouraged   distractions minimized   environmental consistency promoted   personal routine supported   rest periods encouraged   rooming-in facilitated  Taken 5/12/2024 1554 by Jaylin Briscoe, RN  Activity Management: activity adjusted per tolerance  Taken 5/12/2024 1500 by Jaylin Briscoe, RN  Activity Management: activity adjusted per tolerance  Taken 5/12/2024 1239 by Jaylin Briscoe, RN  Activity Management: patient refuses activity  Taken 5/12/2024 0800 by Jaylin Briscoe, RN  Self-Care  Promotion: independence encouraged  Activity Management: activity adjusted per tolerance  Environmental Support:   calm environment promoted   caregiver consistency promoted   comfort object encouraged   distractions minimized   environmental consistency promoted   personal routine supported   rest periods encouraged   rooming-in facilitated  Goal: Improved Oral Intake  Outcome: Progressing  Intervention: Promote and Optimize Oral Intake  Recent Flowsheet Documentation  Taken 5/12/2024 1600 by Jaylin Briscoe, RN  Oral Nutrition Promotion:   social interaction promoted   rest periods promoted   physical activity promoted  Taken 5/12/2024 0800 by Jaylin Briscoe, RN  Oral Nutrition Promotion:   social interaction promoted   rest periods promoted   physical activity promoted   Goal Outcome Evaluation:  Pt has been staying at baseline for oxygen requirements.     Pt ambulates assist of one for oxygen requirements.     MD notified 1540 for increased heart rate, neb orders changed.     Pt ambulates at bedside CHG bath, pericare, central line dressing change and IV dressing change for [id number]. Heart rate 130-140s again. Pt given time to reset and relief from stimulation. Heart rate decreasing to low 130s with rest. PM RN aware.     Family at bedside today.     Pt would like care giver consistency.     Xa recheck in am.

## 2024-05-13 NOTE — PROVIDER NOTIFICATION
"Paged Dr. Leonard re: \"Pt has been tachy since 3pm but she has had some activities like having CHG bath, having visitors. Now she's just having snacks in bed and talking on the phone. Her HR is between 120s to mid 130s. Hr now is 135. No c/o chest pain. Please advise.\"    MD response: Pls see notes.  "

## 2024-05-13 NOTE — PROGRESS NOTES
Clinic Care Coordination Contact  Ambulatory Care Coordination to Inpatient Care Management   Hand-In Communication    Date:  May 13, 2024  Name: Jasmyn Green is enrolled in Ambulatory Care Coordination program and I am the Lead Care Coordinator.  CC Contact Information: Epic InExtension Entertainmentsket + phone  Payor Source: Payor: [facility] / Plan: [facility] MEDICARE / Product Type: HMO /   Current services in place:     Please see the CC Snaphot and Care Management Flowsheets for specific  details of this Jasmyn Green care plan.   Additional details/specific concerns r/t this admission:    No additional concerns at this time      I will follow this admission in Epic. Please feel free to contact me with questions or for further collaboration in discharge planning.

## 2024-05-13 NOTE — PROGRESS NOTES
[facility] MEDICINE  PROGRESS NOTE       Securely message me with F[employer]um[employer] (more info)    Code Status: No CPR- Do NOT Intubate       Identification/Summary:   Jasmyn Green is a 68 year old female who presented with complaints of shortness of breath.    Medical history is notable for lung cancer, currently getting chemotherapy.  She also has a history of chronic respiratory failure with hypoxia on 4 to 5 L at home with activity and 3 to 4 L at rest, hypertension, coronary disease, arthritis.     5/13, patient's dyspnea has improved but still tachypneic and tachycardiac. Pending Echo. Started on oral torsemide.     Barriers to Discharge: persistent tachycardia and tachypnea     Disposition: inpatient     Assessment and Plan:  Acute on chronic respiratory failure with hypoxia  Severe COPD with acute exacerbation  Immunity acquired pneumonia  Small cell lung cancer, on palliative chemo with carboplatin, etoposide, atezolizumab  Acute pulmonary edema  Continue treatment with nebs, steroids, antibiotics, supplemental oxygen  Ceftriaxone for community-acquired pneumonia  Azithromycin for COPD exacerbation  BNP is very elevated  Urinated well with Lasix IV 40 mg on 5/11 and 5/12.  Continue oral torsemide 20mg on 5/13  Echo ordered but with her tachycardia may not be best image.  If she has tachycardia for a while, she could developed cardiomyopathy from tachycardia too.  Writer reviewed Echo and didn't see severely decreased EF or pericardial effusion. Echo was aborted because her HR was elevated in the 140s. Will attempt tomorrow.     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 Asymptomatic though she could develop cardiomyopathy with it after a few weeks. This could be related to her acute on chronic respiratory failure and pulmonary edema. Possibly from prednisone and Duoneb.  -Replete K to 4  -continue tele  -stopped prednisone and hold nebs  -continue diuretics  -consider cardiology consult if this continues on 5/14     Transaminase elevation  Likely due to chemotherapy     Hyperkalemia resolved  Was about to start albuterol neb but her heart rate has been about 120s  Instead, did insulin and glucose  Now at 4.4     Essential hypertension, on lisinopril  Anxiety, on Valium as needed        Severe anemia, now 9.9 up from 6.9 5 days ago  Due to chemotherapy     Anticoagulation   Orders (Includes Only Anticoagulants)  heparin, 5-10 mL, Intracatheter, Q28 Days  heparin, 0-5,000 Units/hr, Intravenous, Continuous  heparin lock flush, 5-10 mL, Intracatheter, Q1H PRN  heparin lock flush, 5-10 mL, Intracatheter, Q24H      Therapy: PT, OT  Vick:Not present  Lines: PRESENT      Port a Cath 05/11/24 Single Lumen Right Chest wall-Site Assessment: WDL        Current Diet  Orders Placed This Encounter      3 Gram Potassium (low potassium) Diet        Clinically Significant Risk Factors          # Hypocalcemia: Lowest Ca = 8.2 mg/dL in last 2 days, will monitor and replace as appropriate   # Hypomagnesemia: Lowest Mg = 1.6 mg/dL in last 2 days, will replace as needed   # Hypoalbuminemia: Lowest albumin = 3.1 g/dL at 5/11/2024  5:48 AM, will monitor as appropriate     # Hypertension: Noted on problem list         # Moderate Malnutrition: based on nutrition assessment, PRESENT ON ADMISSION   # Financial/Environmental Concerns: none  # COPD: noted on problem list        Interval History/Subjective:  Reports her symptoms are better today.  Denies chest pain or cough.  He is now agreeable to take  diuretics.        Physical Exam/Objective:  Temp:  [95.9  F (35.5  C)-97.8  F (36.6  C)] 97.3  F (36.3  C)  Pulse:  [] 117  Resp:  [16-18] 16  BP: (123-154)/(60-81) 125/60  SpO2:  [93 %-98 %] 96 %  Wt Readings from Last 4 Encounters:   05/13/24 45.9 kg (101 lb 3.1 oz)   05/06/24 42.9 kg (94 lb 9.6 oz)   05/01/24 44 kg (97 lb)   04/15/24 45 kg (99 lb 3.2 oz)     Body mass index is 19.76 kg/m .    General Appearance: Alert and wake, not in distress  Respiratory: Decreased breath sounds bilaterally, tachypneic  Cardiovascular: Tachycardia, normal S1 and S2, no murmur  Neurology: oriented x 3  Psych: cooperative and calm, normal affect    Medications:   Personally Reviewed.  Medications   Current Facility-Administered Medications   Medication Dose Route Frequency Provider Last Rate Last Admin    heparin 25,000 units in 0.45% NaCl 250 mL ANTICOAGULANT infusion  0-5,000 Units/hr Intravenous Continuous Danyelle Prieto MD 8 mL/hr at 05/13/24 0701 800 Units/hr at 05/13/24 0701     Current Facility-Administered Medications   Medication Dose Route Frequency Provider Last Rate Last Admin    aspirin (ASA) EC tablet 325 mg  325 mg Oral Daily Timothy Senior DO   325 mg at 05/13/24 0829    atorvastatin (LIPITOR) tablet 20 mg  20 mg Oral Daily Timothy Senior DO   20 mg at 05/13/24 0830    azithromycin (ZITHROMAX) tablet 500 mg  500 mg Oral At Bedtime Danyelle Prieto MD   500 mg at 05/12/24 2119    cefTRIAXone (ROCEPHIN) 1 g vial to attach to  mL bag for ADULTS or NS 50 mL bag for PEDS  1 g Intravenous Q24H Timothy Senior DO   1 g at 05/12/24 2120    docusate sodium (COLACE) capsule 100 mg  100 mg Oral BID Timothy Senior DO   100 mg at 05/13/24 0829    fluticasone-vilanterol (BREO ELLIPTA) 200-25 MCG/ACT inhaler 1 puff  1 puff Inhalation Daily Danyelle Prieto MD   1 puff at 05/13/24 0830    heparin 100 unit/mL injection 5-10 mL  5-10 mL Intracatheter Q28 Days Chiquita Swain MD        heparin lock flush 10 UNIT/ML injection  5-10 mL  5-10 mL Intracatheter Q24H Chiquita Swain MD        insulin aspart (NovoLOG) injection (RAPID ACTING)  1-3 Units Subcutaneous TID AC Danyelle Prieto MD   1 Units at 05/13/24 1335    insulin aspart (NovoLOG) injection (RAPID ACTING)  1-3 Units Subcutaneous At Bedtime Danyelle Prieto MD        lisinopril (ZESTRIL) tablet 20 mg  20 mg Oral Daily Timothy Senior, DO   20 mg at 05/13/24 0829    mirtazapine (REMERON) tablet TABS 7.5 mg  7.5 mg Oral At Bedtime Timothy Senior, DO   7.5 mg at 05/12/24 2119    predniSONE (DELTASONE) tablet 20 mg  20 mg Oral Daily Danyelle Prieto MD   20 mg at 05/13/24 0830    sodium chloride (OCEAN) 0.65 % nasal spray 1 spray  1 spray Both Nostrils Q4H While awake Danyelle Prieto MD   1 spray at 05/12/24 1438    sodium chloride (PF) 0.9% PF flush 10-20 mL  10-20 mL Intracatheter Q28 Days Chiquita Swain MD   20 mL at 05/12/24 0430    sodium chloride (PF) 0.9% PF flush 3 mL  3 mL Intracatheter Q8H Timothy Senior, DO   3 mL at 05/13/24 0831    torsemide (DEMADEX) tablet 20 mg  20 mg Oral Daily Danyelle Prieto MD   20 mg at 05/13/24 1147       Data reviewed today: I personally reviewed all new medications, labs, imaging/diagnostics reports over the past 24 hours. Pertinent findings include:    Imaging:   No results found for this or any previous visit (from the past 24 hour(s)).    Labs:  CTA Abdomen Pelvis with Contrast   Final Result   IMPRESSION:   1.  Near occlusive stenosis of the celiac axis, with intraluminal thrombus.   2.  Multifocal atherosclerotic disease, abdominal aortic aneurysm, and additional multifocal stenoses.   3.  Additional findings as above.      CT Chest Pulmonary Embolism w Contrast   Final Result   IMPRESSION:   1.  No acute pulmonary emboli.      2.  Unchanged left upper lobe subpleural nodule, consolidation in the left upper lobe posteriorly, and a few other scattered nodular airspace opacities elsewhere.      3.  Unchanged mildly enlarged mediastinal and left hilar  lymph nodes.      4.  Unchanged high-grade narrowing of the celiac artery proximally with noncalcified plaque or thrombus.      Echocardiogram Complete    (Results Pending)     Recent Results (from the past 24 hour(s))   Glucose by meter    Collection Time: 05/12/24  5:01 PM   Result Value Ref Range    GLUCOSE BY METER POCT 219 (H) 70 - 99 mg/dL   Heparin Unfractionated Anti Xa Level    Collection Time: 05/12/24  6:29 PM   Result Value Ref Range    Anti Xa Unfractionated Heparin 0.69 For Reference Range, See Comment IU/mL   ECG 12-LEAD WITH MUSE (LHE)    Collection Time: 05/12/24  8:40 PM   Result Value Ref Range    Systolic Blood Pressure  mmHg    Diastolic Blood Pressure  mmHg    Ventricular Rate 130 BPM    Atrial Rate 130 BPM    WY Interval 142 ms    QRS Duration 68 ms     ms    QTc 441 ms    P Axis 90 degrees    R AXIS 90 degrees    T Axis 87 degrees    Interpretation ECG       ** Suspect arm lead reversal, interpretation assumes no reversal  Sinus tachycardia with Premature atrial complexes  Rightward axis  Borderline ECG  When compared with ECG of 11-MAY-2024 09:21,  Premature atrial complexes are now Present     Blood gas venous    Collection Time: 05/12/24  9:02 PM   Result Value Ref Range    pH Venous 7.36 7.32 - 7.43    pCO2 Venous 52 (H) 40 - 50 mm Hg    pO2 Venous 35 25 - 47 mm Hg    Bicarbonate Venous 29 (H) 21 - 28 mmol/L    Base Excess/Deficit Venous 4.0 (H) -3.0 - 3.0 mmol/L    FIO2 3     Oxyhemoglobin Venous 64 (L) 70 - 75 %    O2 Sat, Venous 64.7 (L) 70.0 - 75.0 %   Comprehensive metabolic panel    Collection Time: 05/12/24  9:02 PM   Result Value Ref Range    Sodium 140 135 - 145 mmol/L    Potassium 4.4 3.4 - 5.3 mmol/L    Carbon Dioxide (CO2) 27 22 - 29 mmol/L    Anion Gap 10 7 - 15 mmol/L    Urea Nitrogen 35.4 (H) 8.0 - 23.0 mg/dL    Creatinine 0.77 0.51 - 0.95 mg/dL    GFR Estimate 83 >60 mL/min/1.73m2    Calcium 8.8 8.8 - 10.2 mg/dL    Chloride 103 98 - 107 mmol/L    Glucose 188 (H) 70 -  99 mg/dL    Alkaline Phosphatase 160 (H) 40 - 150 U/L    AST 22 0 - 45 U/L    ALT 47 0 - 50 U/L    Protein Total 6.2 (L) 6.4 - 8.3 g/dL    Albumin 3.2 (L) 3.5 - 5.2 g/dL    Bilirubin Total <0.2 <=1.2 mg/dL   Magnesium    Collection Time: 05/12/24  9:02 PM   Result Value Ref Range    Magnesium 1.6 (L) 1.7 - 2.3 mg/dL   N terminal pro BNP outpatient    Collection Time: 05/12/24  9:02 PM   Result Value Ref Range    N Terminal Pro BNP Outpatient 1,303 (H) 0 - 900 pg/mL   CBC with platelets and differential    Collection Time: 05/12/24  9:02 PM   Result Value Ref Range    WBC Count 8.5 4.0 - 11.0 10e3/uL    RBC Count 3.13 (L) 3.80 - 5.20 10e6/uL    Hemoglobin 8.9 (L) 11.7 - 15.7 g/dL    Hematocrit 28.4 (L) 35.0 - 47.0 %    MCV 91 78 - 100 fL    MCH 28.4 26.5 - 33.0 pg    MCHC 31.3 (L) 31.5 - 36.5 g/dL    RDW 15.3 (H) 10.0 - 15.0 %    Platelet Count 374 150 - 450 10e3/uL    NRBCs per 100 WBC 0 <1 /100    Absolute NRBCs 0.0 10e3/uL   Manual Differential    Collection Time: 05/12/24  9:02 PM   Result Value Ref Range    % Neutrophils 94 %    % Lymphocytes 6 %    % Monocytes 0 %    % Eosinophils 0 %    % Basophils 0 %    Absolute Neutrophils 8.0 1.6 - 8.3 10e3/uL    Absolute Lymphocytes 0.5 (L) 0.8 - 5.3 10e3/uL    Absolute Monocytes 0.0 0.0 - 1.3 10e3/uL    Absolute Eosinophils 0.0 0.0 - 0.7 10e3/uL    Absolute Basophils 0.0 0.0 - 0.2 10e3/uL    RBC Morphology Confirmed RBC Indices     Platelet Assessment  Automated Count Confirmed. Platelet morphology is normal.     Automated Count Confirmed. Platelet morphology is normal.    Spherocytes Slight (A) None Seen    Teardrop Cells Slight (A) None Seen   Glucose by meter    Collection Time: 05/12/24  9:19 PM   Result Value Ref Range    GLUCOSE BY METER POCT 167 (H) 70 - 99 mg/dL   Glucose by meter    Collection Time: 05/13/24  1:59 AM   Result Value Ref Range    GLUCOSE BY METER POCT 100 (H) 70 - 99 mg/dL   Glucose by meter    Collection Time: 05/13/24  5:51 AM   Result Value Ref  Range    GLUCOSE BY METER POCT 71 70 - 99 mg/dL   Basic metabolic panel    Collection Time: 05/13/24  6:29 AM   Result Value Ref Range    Sodium 142 135 - 145 mmol/L    Potassium 3.9 3.4 - 5.3 mmol/L    Chloride 106 98 - 107 mmol/L    Carbon Dioxide (CO2) 26 22 - 29 mmol/L    Anion Gap 10 7 - 15 mmol/L    Urea Nitrogen 31.3 (H) 8.0 - 23.0 mg/dL    Creatinine 0.66 0.51 - 0.95 mg/dL    GFR Estimate >90 >60 mL/min/1.73m2    Calcium 8.9 8.8 - 10.2 mg/dL    Glucose 97 70 - 99 mg/dL   Heparin Unfractionated Anti Xa Level    Collection Time: 05/13/24  6:29 AM   Result Value Ref Range    Anti Xa Unfractionated Heparin 0.65 For Reference Range, See Comment IU/mL   Magnesium    Collection Time: 05/13/24  6:29 AM   Result Value Ref Range    Magnesium 1.7 1.7 - 2.3 mg/dL   Glucose by meter    Collection Time: 05/13/24  8:19 AM   Result Value Ref Range    GLUCOSE BY METER POCT 82 70 - 99 mg/dL   Glucose by meter    Collection Time: 05/13/24  1:20 PM   Result Value Ref Range    GLUCOSE BY METER POCT 169 (H) 70 - 99 mg/dL       Pending Labs:  Unresulted Labs Ordered in the Past 30 Days of this Admission       Date and Time Order Name Status Description    5/11/2024 12:11 AM Respiratory Aerobic Bacterial Culture with Gram Stain Preliminary     5/10/2024  6:36 PM Blood Culture Peripheral Blood Preliminary             I spoke with daughter Julissa to discuss patient's care.    KAZ BENAVIDES MD  [facility] Medicine  [facility]  Phone: #[phone]    Securely message me with Fadum[employer] (more info)

## 2024-05-13 NOTE — PROGRESS NOTES
/81 (BP Location: Left arm)   Pulse (!) 125   Temp 97.2  F (36.2  C) (Oral)   Resp 16   Ht 1.524 m (5')   Wt 45.5 kg (100 lb 5 oz)   LMP  (LMP Unknown)   SpO2 96%   BMI 19.59 kg/m        The PT requested a PRN neb. She was objectively SOB, but she was speaking in full sentences. While I took a neb out of the Omnice, the RN informed me that the MD changed her nebs to PRN 2/2 her elevated HR (which was currently 130 to 135). Given that HR > 120 is a relative contraindication for Albuterol, I elected to hold off on the neb. I spoke with the RN that alternative nebs are Atrovent (slow onset) or Xopenex .63 (both will require an order).

## 2024-05-13 NOTE — PLAN OF CARE
Problem: Risk for Delirium  Goal: Improved Sleep  Outcome: Progressing     Problem: COPD (Chronic Obstructive Pulmonary Disease)  Goal: Effective Oxygenation and Ventilation  Intervention: Promote Airway Secretion Clearance  Recent Flowsheet Documentation  Taken 5/13/2024 0049 by Crystal Contreras RN  Breathing Techniques/Airway Clearance: deep/controlled cough encouraged  Cough And Deep Breathing: done independently per patient  Activity Management: activity adjusted per tolerance  Taken 5/12/2024 1929 by Crystal Contreras RN  Breathing Techniques/Airway Clearance: deep/controlled cough encouraged  Cough And Deep Breathing: done independently per patient  Activity Management: activity adjusted per tolerance   Goal Outcome Evaluation:       Pt is alert and oriented x4. Has been Tachy bet 120s to mid 130s at start of shift but improved when pt is asleep. No c/o chest pain. Tele read NSR. Purewick attached, voiding well. Heparin drip at 8ml/hr, infusing via Portacath. Denies pain. C/o clogged up secretions in chest. Explained new neb prescribed will not increase her HR. Offered but refused. O2 at 3LPM via NC. Sating WNL, 98% at rest. Blood sugar was 71 at 0551 but refused to drink apple juice. She prefers orange juice, given. Hep Anti XA protocol ran. No change in rate. Rate verified in MAR.

## 2024-05-13 NOTE — PROGRESS NOTES
05/13/24 1530   Appointment Info   Signing Clinician's Name / Credentials (PT) Jeff Holbrook, PT, DPT   Living Environment   People in Home child(jeremy), adult   Current Living Arrangements house   Home Accessibility no concerns   Self-Care   Usual Activity Tolerance moderate   Current Activity Tolerance fair   Equipment Currently Used at Home cane, straight;wheelchair, manual;walker, rolling   Fall history within last six months no   General Information   Onset of Illness/Injury or Date of Surgery 05/10/24   Referring Physician Dr. Prieto   Patient/Family Therapy Goals Statement (PT) Improve activity tolerance   Pertinent History of Current Problem (include personal factors and/or comorbidities that impact the POC) UTI, sepsis, hypoxia, s/p chemo   Existing Precautions/Restrictions oxygen therapy device and L/min   Weight-Bearing Status - LLE full weight-bearing   Weight-Bearing Status - RLE full weight-bearing   Range of Motion (ROM)   ROM Comment WFL   Strength (Manual Muscle Testing)   Strength Comments WFL, limited by O2 needs/deconditioning   Bed Mobility   Bed Mobility supine-sit   Supine-Sit Manter (Bed Mobility) modified independence   Assistive Device (Bed Mobility) bed rails   Transfers   Transfers sit-stand transfer   Sit-Stand Transfer   Sit-Stand Manter (Transfers) supervision   Clinical Impression   Criteria for Skilled Therapeutic Intervention Yes, treatment indicated   PT Diagnosis (PT) impaired functional mobility   Influenced by the following impairments weakness   Functional limitations due to impairments gait, transfers   Clinical Presentation (PT Evaluation Complexity) stable   Clinical Presentation Rationale pt presents as medically diagnosed   Clinical Decision Making (Complexity) moderate complexity   Planned Therapy Interventions (PT) gait training;home exercise program;patient/family education;ROM (range of motion);strengthening;transfer training   Risk & Benefits of therapy  have been explained care plan/treatment goals reviewed;patient   PT Total Evaluation Time   PT Eval, Moderate Complexity Minutes (06691) 10   Physical Therapy Goals   PT Frequency 5x/week   PT Predicted Duration/Target Date for Goal Attainment 05/17/24   PT Goals Transfers;Gait   PT: Transfers Supervision/stand-by assist;Sit to/from stand   PT: Gait Supervision/stand-by assist;Rolling walker;25 feet   Interventions   Interventions Quick Adds Therapeutic Activity   Therapeutic Activity   Therapeutic Activities: dynamic activities to improve functional performance Minutes (11672) 15   Symptoms Noted During/After Treatment Fatigue;Shortness of breath   Treatment Detail/Skilled Intervention Supine to sit completed Mod I with HOB elevated and use of bed rail, SBA at EOB, increased time for set up and lines/tubes mgmt. Pt on 5L at beginning of session, titrated down to 3.5 after activity with sats in the mid 90's. Pt completed stand and pivot transfer to sit in recliner. Educated on importance of sitting upright, increasing mobility as able with nursing, POC and role of therapies.   PT Discharge Planning   PT Plan Increase activity as able   PT Discharge Recommendation (DC Rec) (S)  home with assist   PT Rationale for DC Rec Pending further mobility progression, expect d/c home with assist from dtr, pt increasing activity and close to baseline with O2. Has been able to amb with nursing to bathroom, activity tolerance below baseline.   PT Brief overview of current status SBA transfers and amb to bathroom

## 2024-05-13 NOTE — PROGRESS NOTES
CLINICAL NUTRITION SERVICES - ASSESSMENT NOTE     Nutrition Prescription    RECOMMENDATIONS FOR MDs/PROVIDERS TO ORDER:  None at this time     Malnutrition Status:    % Weight Loss:  None noted  % Intake:  No decreased intake noted, suspected needs still not met though  Subcutaneous Fat Loss:  Orbital region mild depletion and Upper arm region moderate depletion  Muscle Loss:  Temporal region mild depletion and Clavicle bone region moderate depletion  Fluid Retention:  None noted    Malnutrition Diagnosis: Moderate malnutrition  In Context of:  Chronic illness or disease    Recommendations already ordered by Registered Dietitian (RD):  Start Ensure Clear berry with breakfast  Chocolate Ensure with ice cream with Lunch and Dinner    Future/Additional Recommendations:  Will monitor progress towards goals     REASON FOR ASSESSMENT  Jasmyn Green is a/an 68 year old female assessed by the dietitian for Admission Nutrition Risk Screen for positive for wt loss, reduced po     Pertinent Medical Admission/History: dyspnea, respiratory failure, severe COPD with exacerbation, lung CA on palliative chemo, brain lesion, hyperkalemia    NUTRITION HISTORY  Allergies: NKFA  Pt states her appetite has been improving. She drinks Ensure Clear in the am and Chocolate Ensure with ice cream with lunch and dinner. She lives at home with her daughter. No issues getting food. She wears dentures, but does not have them with her. She states no issues with chewing.     CURRENT NUTRITION ORDERS  Diet: 3 g Potassium  Intake/Tolerance: on 5/13 pt ordered 1598kcal and 75g protein with approx 75% intake     PHYSICAL FINDINGS  See malnutrition section below.  Per flowsheet:  Edema- none noted   GI- WDL  Skin- no skin breakdown noted   Pain- denies  Oral- noted to wear dentures, doesn't have with her, no issues with chewing    LABS  Labs reviewed, BUN-31.3    MEDICATIONS  Medications reviewed, colace, novolog, remeron,  "prednisone    ANTHROPOMETRICS  Height: 152.4 cm (5' 0\")  Most Recent Weight: 45.9 kg (101 lb 3.1 oz)    IBW: 45 kg  BMI: Normal BMI  Weight History:   Wt Readings from Last 20 Encounters:   05/13/24 45.9 kg (101 lb 3.1 oz)   05/06/24 42.9 kg (94 lb 9.6 oz)   05/01/24 44 kg (97 lb)   04/15/24 45 kg (99 lb 3.2 oz)   04/04/24 45 kg (99 lb 3.3 oz)   04/02/24 45 kg (99 lb 3.2 oz)   03/28/24 43.1 kg (95 lb)   03/25/24 43.9 kg (96 lb 12.8 oz)   03/22/24 43.6 kg (96 lb 3.2 oz)   03/14/24 42.9 kg (94 lb 8 oz)   03/06/24 43.4 kg (95 lb 11.2 oz)   02/21/24 43.1 kg (95 lb)   02/16/24 42.7 kg (94 lb 3.2 oz)   02/15/24 41.5 kg (91 lb 6.4 oz)   02/08/24 42.2 kg (93 lb)   02/01/24 42.6 kg (93 lb 14.4 oz)   11/08/23 40.6 kg (89 lb 9.6 oz)   10/26/23 42.4 kg (93 lb 6.4 oz)   08/15/23 42.7 kg (94 lb 1.6 oz)   08/09/23 43.1 kg (95 lb)         ASSESSED NUTRITION NEEDS  Dosing Weight: 45 kg  Estimated Energy Needs: 0261-0189 kcals/day (25 - 30 kcals/kg)  Justification: Maintenance  Estimated Protein Needs: 45-54 grams protein/day (1 - 1.2 grams of pro/kg)  Justification: Maintenance  Estimated Fluid Needs: 5643-6758 mL/day (25 - 30 mL/kg)   Justification: Maintenance        MALNUTRITION:  % Weight Loss:  None noted  % Intake:  No decreased intake noted, suspected needs still not met though  Subcutaneous Fat Loss:  Orbital region mild depletion and Upper arm region moderate depletion  Muscle Loss:  Temporal region mild depletion and Clavicle bone region moderate depletion  Fluid Retention:  None noted    Malnutrition Diagnosis: Moderate malnutrition  In Context of:  Chronic illness or disease    NUTRITION DIAGNOSIS  Malnutrition related to chronic as evidenced by visible fat and muscle loss, suspected ongoing inadequate intake    INTERVENTIONS  Implementation  Start Ensure Clear berry with breakfast  Chocolate Ensure with ice cream with Lunch and Dinner    Education Needs- We did not get into her 3g K diet, if she discharges on this she " would benefit from some education    Goals  Patient to consume % of nutritionally adequate meals three times per day, or the equivalent with supplements/snacks.     Monitoring/Evaluation  Will monitor progress towards goals including intake, ed needs

## 2024-05-13 NOTE — PROGRESS NOTES
"Remote cross cover -- RN Informed me of \"-130s since 3PM\"    Actually, patient has been tachycardic since yesterday with HR going more than 120s. -- EKG showed ST  There is no fever - as checked axillary  On heparin infusion  Per RN, denies SOB or chest pain  History of extensive stage small cell lung cancer on palliative chemotherapy with carboplatin, etoposide and atezolizumab.   Has severe COPD.    Has severe anemia secondary to chemotherapy  Has elevated BNP    Assessment -- Unclear cause of persistent tachycardia, and recent clinic visits, seem HR had been above 100 -- Per RN, denies CP or SOB. On heparin infusion     Plans  - Repeat EKG - to make sure not dealing with rhythm issues like aflutter  - Repeat labs   - Will get ECHO - given elev BNP and has been on chemo drugs  - Consider different nebs wont increase HR  - xopenex ordered as PRN     620A - HR down to 81 as of 4AM   "

## 2024-05-14 ENCOUNTER — APPOINTMENT (OUTPATIENT)
Dept: OCCUPATIONAL THERAPY | Facility: CLINIC | Age: 69
DRG: 193 | End: 2024-05-14
Attending: STUDENT IN AN ORGANIZED HEALTH CARE EDUCATION/TRAINING PROGRAM
Payer: COMMERCIAL

## 2024-05-14 ENCOUNTER — APPOINTMENT (OUTPATIENT)
Dept: CARDIOLOGY | Facility: CLINIC | Age: 69
DRG: 193 | End: 2024-05-14
Attending: INTERNAL MEDICINE
Payer: COMMERCIAL

## 2024-05-14 ENCOUNTER — APPOINTMENT (OUTPATIENT)
Dept: PHYSICAL THERAPY | Facility: CLINIC | Age: 69
DRG: 193 | End: 2024-05-14
Payer: COMMERCIAL

## 2024-05-14 LAB
ALBUMIN SERPL BCG-MCNC: 3.2 G/DL (ref 3.5–5.2)
ALP SERPL-CCNC: 142 U/L (ref 40–150)
ALT SERPL W P-5'-P-CCNC: 37 U/L (ref 0–50)
AST SERPL W P-5'-P-CCNC: 19 U/L (ref 0–45)
ATRIAL RATE - MUSE: 130 BPM
BACTERIA SPT CULT: ABNORMAL
BACTERIA SPT CULT: ABNORMAL
BILIRUB DIRECT SERPL-MCNC: <0.2 MG/DL (ref 0–0.3)
BILIRUB SERPL-MCNC: 0.2 MG/DL
C PNEUM DNA SPEC QL NAA+PROBE: NOT DETECTED
CREAT SERPL-MCNC: 0.8 MG/DL (ref 0.51–0.95)
DIASTOLIC BLOOD PRESSURE - MUSE: NORMAL MMHG
EGFRCR SERPLBLD CKD-EPI 2021: 79 ML/MIN/1.73M2
FLUAV H1 2009 PAND RNA SPEC QL NAA+PROBE: NOT DETECTED
FLUAV H1 RNA SPEC QL NAA+PROBE: NOT DETECTED
FLUAV H3 RNA SPEC QL NAA+PROBE: NOT DETECTED
FLUAV RNA SPEC QL NAA+PROBE: NOT DETECTED
FLUBV RNA SPEC QL NAA+PROBE: NOT DETECTED
GLUCOSE BLDC GLUCOMTR-MCNC: 106 MG/DL (ref 70–99)
GLUCOSE BLDC GLUCOMTR-MCNC: 112 MG/DL (ref 70–99)
GLUCOSE BLDC GLUCOMTR-MCNC: 124 MG/DL (ref 70–99)
GLUCOSE BLDC GLUCOMTR-MCNC: 187 MG/DL (ref 70–99)
GLUCOSE BLDC GLUCOMTR-MCNC: 97 MG/DL (ref 70–99)
GRAM STAIN RESULT: ABNORMAL
HADV DNA SPEC QL NAA+PROBE: NOT DETECTED
HCOV PNL SPEC NAA+PROBE: NOT DETECTED
HMPV RNA SPEC QL NAA+PROBE: DETECTED
HPIV1 RNA SPEC QL NAA+PROBE: NOT DETECTED
HPIV2 RNA SPEC QL NAA+PROBE: NOT DETECTED
HPIV3 RNA SPEC QL NAA+PROBE: NOT DETECTED
HPIV4 RNA SPEC QL NAA+PROBE: NOT DETECTED
INTERPRETATION ECG - MUSE: NORMAL
LVEF ECHO: NORMAL
M PNEUMO DNA SPEC QL NAA+PROBE: NOT DETECTED
MAGNESIUM SERPL-MCNC: 1.5 MG/DL (ref 1.7–2.3)
MAGNESIUM SERPL-MCNC: 2.1 MG/DL (ref 1.7–2.3)
P AXIS - MUSE: 90 DEGREES
POTASSIUM SERPL-SCNC: 3.9 MMOL/L (ref 3.4–5.3)
PR INTERVAL - MUSE: 142 MS
PROT SERPL-MCNC: 6.2 G/DL (ref 6.4–8.3)
QRS DURATION - MUSE: 68 MS
QT - MUSE: 300 MS
QTC - MUSE: 441 MS
R AXIS - MUSE: 90 DEGREES
RSV RNA SPEC QL NAA+PROBE: NOT DETECTED
RSV RNA SPEC QL NAA+PROBE: NOT DETECTED
RV+EV RNA SPEC QL NAA+PROBE: NOT DETECTED
SYSTOLIC BLOOD PRESSURE - MUSE: NORMAL MMHG
T AXIS - MUSE: 87 DEGREES
UFH PPP CHRO-ACNC: 0.55 IU/ML
VENTRICULAR RATE- MUSE: 130 BPM

## 2024-05-14 PROCEDURE — 97165 OT EVAL LOW COMPLEX 30 MIN: CPT | Mod: GO

## 2024-05-14 PROCEDURE — 250N000013 HC RX MED GY IP 250 OP 250 PS 637: Performed by: HOSPITALIST

## 2024-05-14 PROCEDURE — 250N000011 HC RX IP 250 OP 636: Performed by: HOSPITALIST

## 2024-05-14 PROCEDURE — 82040 ASSAY OF SERUM ALBUMIN: CPT | Performed by: STUDENT IN AN ORGANIZED HEALTH CARE EDUCATION/TRAINING PROGRAM

## 2024-05-14 PROCEDURE — 99232 SBSQ HOSP IP/OBS MODERATE 35: CPT | Performed by: STUDENT IN AN ORGANIZED HEALTH CARE EDUCATION/TRAINING PROGRAM

## 2024-05-14 PROCEDURE — 87581 M.PNEUMON DNA AMP PROBE: CPT | Performed by: STUDENT IN AN ORGANIZED HEALTH CARE EDUCATION/TRAINING PROGRAM

## 2024-05-14 PROCEDURE — 258N000003 HC RX IP 258 OP 636: Performed by: STUDENT IN AN ORGANIZED HEALTH CARE EDUCATION/TRAINING PROGRAM

## 2024-05-14 PROCEDURE — 93306 TTE W/DOPPLER COMPLETE: CPT

## 2024-05-14 PROCEDURE — 84132 ASSAY OF SERUM POTASSIUM: CPT | Performed by: STUDENT IN AN ORGANIZED HEALTH CARE EDUCATION/TRAINING PROGRAM

## 2024-05-14 PROCEDURE — 120N000001 HC R&B MED SURG/OB

## 2024-05-14 PROCEDURE — 85520 HEPARIN ASSAY: CPT | Performed by: STUDENT IN AN ORGANIZED HEALTH CARE EDUCATION/TRAINING PROGRAM

## 2024-05-14 PROCEDURE — 97110 THERAPEUTIC EXERCISES: CPT | Mod: GP

## 2024-05-14 PROCEDURE — 250N000011 HC RX IP 250 OP 636: Performed by: STUDENT IN AN ORGANIZED HEALTH CARE EDUCATION/TRAINING PROGRAM

## 2024-05-14 PROCEDURE — 82565 ASSAY OF CREATININE: CPT | Performed by: STUDENT IN AN ORGANIZED HEALTH CARE EDUCATION/TRAINING PROGRAM

## 2024-05-14 PROCEDURE — 83735 ASSAY OF MAGNESIUM: CPT | Performed by: STUDENT IN AN ORGANIZED HEALTH CARE EDUCATION/TRAINING PROGRAM

## 2024-05-14 PROCEDURE — 97535 SELF CARE MNGMENT TRAINING: CPT | Mod: GO

## 2024-05-14 PROCEDURE — 87633 RESP VIRUS 12-25 TARGETS: CPT | Performed by: STUDENT IN AN ORGANIZED HEALTH CARE EDUCATION/TRAINING PROGRAM

## 2024-05-14 PROCEDURE — 93306 TTE W/DOPPLER COMPLETE: CPT | Mod: 26 | Performed by: INTERNAL MEDICINE

## 2024-05-14 PROCEDURE — 84155 ASSAY OF PROTEIN SERUM: CPT | Performed by: STUDENT IN AN ORGANIZED HEALTH CARE EDUCATION/TRAINING PROGRAM

## 2024-05-14 PROCEDURE — 250N000013 HC RX MED GY IP 250 OP 250 PS 637: Performed by: STUDENT IN AN ORGANIZED HEALTH CARE EDUCATION/TRAINING PROGRAM

## 2024-05-14 PROCEDURE — 99222 1ST HOSP IP/OBS MODERATE 55: CPT | Performed by: INTERNAL MEDICINE

## 2024-05-14 RX ORDER — MAGNESIUM SULFATE HEPTAHYDRATE 40 MG/ML
2 INJECTION, SOLUTION INTRAVENOUS ONCE
Status: COMPLETED | OUTPATIENT
Start: 2024-05-14 | End: 2024-05-14

## 2024-05-14 RX ADMIN — APIXABAN 5 MG: 5 TABLET, FILM COATED ORAL at 20:22

## 2024-05-14 RX ADMIN — SODIUM CHLORIDE 250 MG: 9 INJECTION, SOLUTION INTRAVENOUS at 16:41

## 2024-05-14 RX ADMIN — LISINOPRIL 20 MG: 20 TABLET ORAL at 08:39

## 2024-05-14 RX ADMIN — TORSEMIDE 20 MG: 20 TABLET ORAL at 08:39

## 2024-05-14 RX ADMIN — CEFTRIAXONE 1 G: 1 INJECTION, POWDER, FOR SOLUTION INTRAMUSCULAR; INTRAVENOUS at 20:22

## 2024-05-14 RX ADMIN — APIXABAN 5 MG: 5 TABLET, FILM COATED ORAL at 12:05

## 2024-05-14 RX ADMIN — HEPARIN SODIUM 800 UNITS/HR: 10000 INJECTION, SOLUTION INTRAVENOUS at 05:47

## 2024-05-14 RX ADMIN — ASPIRIN 325 MG: 325 TABLET ORAL at 08:39

## 2024-05-14 RX ADMIN — FLUTICASONE FUROATE AND VILANTEROL TRIFENATATE 1 PUFF: 200; 25 POWDER RESPIRATORY (INHALATION) at 08:41

## 2024-05-14 RX ADMIN — ATORVASTATIN CALCIUM 20 MG: 10 TABLET, FILM COATED ORAL at 08:39

## 2024-05-14 RX ADMIN — MAGNESIUM SULFATE HEPTAHYDRATE 2 G: 40 INJECTION, SOLUTION INTRAVENOUS at 08:39

## 2024-05-14 ASSESSMENT — ACTIVITIES OF DAILY LIVING (ADL)
ADLS_ACUITY_SCORE: 41
ADLS_ACUITY_SCORE: 35
ADLS_ACUITY_SCORE: 35
ADLS_ACUITY_SCORE: 41
ADLS_ACUITY_SCORE: 41
ADLS_ACUITY_SCORE: 35
ADLS_ACUITY_SCORE: 41
ADLS_ACUITY_SCORE: 35
ADLS_ACUITY_SCORE: 41
ADLS_ACUITY_SCORE: 35
ADLS_ACUITY_SCORE: 41
ADLS_ACUITY_SCORE: 35
ADLS_ACUITY_SCORE: 41
ADLS_ACUITY_SCORE: 35
ADLS_ACUITY_SCORE: 41

## 2024-05-14 NOTE — PLAN OF CARE
Problem: Gas Exchange Impaired  Goal: Optimal Gas Exchange  Outcome: Progressing     Problem: Pneumonia  Goal: Resolution of Infection Signs and Symptoms  Outcome: Progressing   Goal Outcome Evaluation:         Pt alert and oriented x4  Pt afebrile - Continue with IV abx.   BC pending.   Heparin infusing at 800 units/hr - changed to eliquis today and IV heparin stopped.   Pt on Tele - ST with HR between 115-135 MD aware. Cardiology consulted.   Obtain Echo when pt's HR comes down to 110 or lower.   Pt mag was 1.5 today - replaced via IV and then rechecked again later for 2.1  Pt eating and drinking well and voiding with purewick. Continue with toresemide as ordered.   Pt on 3L O2 which is baseline sating 93% - Lung sounds are diminished and pt has sore throat and productive cough - MD aware. Respiratory panel PCR lab sent. Encouraged coughing and deep breathing and use of IS.   Need HR to come down and switch IV abx to oral prior to discharging.

## 2024-05-14 NOTE — PROGRESS NOTES
[facility] MEDICINE  PROGRESS NOTE       Securely message me with Fadumo (more info)    Code Status: No CPR- Do NOT Intubate       Identification/Summary:   Jasmyn Green is a 68 year old female who presented with complaints of shortness of breath.    Medical history is notable for lung cancer, currently getting chemotherapy.  She also has a history of chronic respiratory failure with hypoxia on 4 to 5 L at home with activity and 3 to 4 L at rest, hypertension, coronary disease, arthritis.     5/14, patient's dyspnea has improved but still tachypneic and tachycardiac. Pending Echo. Started on oral torsemide.     Barriers to Discharge: persistent tachycardia and tachypnea     Disposition: inpatient     Assessment and Plan:  Sinus tachycardia  In the 140s. Asymptomatic though she could develop cardiomyopathy with it after a few weeks. This could be related to her acute on chronic respiratory failure and pulmonary edema. Possibly from prednisone and Duoneb.  -Replete K to 4  -continue tele  -stopped prednisone and hold nebs  -continue diuretics  -cardiology consult  -check respiratory pcr now that she has sore throat    Acute on chronic respiratory failure with hypoxia  Severe COPD with acute exacerbation  Community-acquired pneumonia  Aspergillus fumigatus pneumonia?  Small cell lung cancer, on palliative chemo with carboplatin, etoposide, atezolizumab  Acute pulmonary edema  Continue treatment with nebs, steroids, antibiotics, supplemental oxygen  Ceftriaxone for community-acquired pneumonia  Azithromycin for COPD exacerbation  BNP is very elevated  Urinated well with Lasix IV 40 mg on 5/11 and 5/12.  Continue oral torsemide 20mg on 5/13, watch for LÁZARO  Echo ordered but with her tachycardia may not be best image.  If she has tachycardia for a while, she could developed cardiomyopathy from tachycardia too.  Writer reviewed Echo and didn't see severely decreased EF or  pericardial effusion. Echo was aborted 5/13 because her HR was elevated in the 140s. Will attempt 5/14.  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  Splenic infarct, chronic  Brain lesion of unclear significance  Consulted vascular surgery who recommends IV heparin  Priscila with vascular surgeon on the phone and her brain lesion should not be a contraindication to start blood thinner  Will need to discuss with her oncologist/otologist for future oral agent for celiac artery thrombus on Monday - no reply  Will discontinue aspirin  Switched to Eliquis, follow up with vascular surgery     Transaminase elevation  Likely due to chemotherapy     Hyperkalemia resolved  Was about to start albuterol neb but her heart rate has been about 120s  Instead, did insulin and glucose  Now at 4.4     Essential hypertension, on lisinopril  Anxiety, on Valium as needed        Severe anemia, now 9.9 up from 6.9 5 days ago  Due to chemotherapy        Anticoagulation   Orders (Includes Only Anticoagulants)  apixaban ANTICOAGULANT, 5 mg, Oral, BID  heparin, 5-10 mL, Intracatheter, Q28 Days  heparin lock flush, 5-10 mL, Intracatheter, Q1H PRN  heparin lock flush, 5-10 mL, Intracatheter, Q24H      Therapy:   Vick:Not present  Lines: PRESENT      Port a Cath 05/11/24 Single Lumen Right Chest wall-Site Assessment: WDL        Current Diet  Orders Placed This Encounter      3 Gram Potassium (low potassium) Diet        Clinically Significant Risk Factors            # Hypomagnesemia: Lowest Mg = 1.5 mg/dL in last 2 days, will replace as needed   # Hypoalbuminemia: Lowest albumin = 3.1 g/dL at 5/11/2024  5:48 AM, will monitor as appropriate     # Hypertension: Noted on problem list         # Moderate Malnutrition: based on nutrition assessment, PRESENT ON ADMISSION   # Financial/Environmental Concerns: none  # COPD: noted on problem list        Interval  History/Subjective:          Physical Exam/Objective:  Temp:  [97.3  F (36.3  C)-98.5  F (36.9  C)] 98.1  F (36.7  C)  Pulse:  [106-140] 120  Resp:  [18-20] 18  BP: (106-148)/(56-71) 133/58  SpO2:  [94 %-99 %] 96 %  Wt Readings from Last 4 Encounters:   05/14/24 42.3 kg (93 lb 4.8 oz)   05/06/24 42.9 kg (94 lb 9.6 oz)   05/01/24 44 kg (97 lb)   04/15/24 45 kg (99 lb 3.2 oz)     Body mass index is 18.22 kg/m .    General Appearance: Alert and wake, not in distress  Respiratory: clear lungs, no crackles or wheezing  Cardiovascular: rhythmic, normal S1 and S2, no murmur  GI: soft, non-tender, normal bowel sound  Neurology: oriented x 3  Psych: cooperative and calm, normal affect    Medications:   Personally Reviewed.  Medications   Current Facility-Administered Medications   Medication Dose Route Frequency Provider Last Rate Last Admin     Current Facility-Administered Medications   Medication Dose Route Frequency Provider Last Rate Last Admin    apixaban ANTICOAGULANT (ELIQUIS) tablet 5 mg  5 mg Oral BID Danyelle Prieto MD   5 mg at 05/14/24 1205    atorvastatin (LIPITOR) tablet 20 mg  20 mg Oral Daily Timothy Senior, DO   20 mg at 05/14/24 0839    cefTRIAXone (ROCEPHIN) 1 g vial to attach to  mL bag for ADULTS or NS 50 mL bag for PEDS  1 g Intravenous Q24H Timothy Senior DO   1 g at 05/13/24 2109    docusate sodium (COLACE) capsule 100 mg  100 mg Oral BID Timothy Senior, DO   100 mg at 05/13/24 2114    fluticasone-vilanterol (BREO ELLIPTA) 200-25 MCG/ACT inhaler 1 puff  1 puff Inhalation Daily Danyelle Prieto MD   1 puff at 05/14/24 0841    heparin 100 unit/mL injection 5-10 mL  5-10 mL Intracatheter Q28 Days Chiquita Swain MD        heparin lock flush 10 UNIT/ML injection 5-10 mL  5-10 mL Intracatheter Q24H Chiquita Swain MD        insulin aspart (NovoLOG) injection (RAPID ACTING)  1-3 Units Subcutaneous TID AC Danyelle Prieto MD   1 Units at 05/14/24 1206    insulin aspart (NovoLOG) injection (RAPID ACTING)  1-3  Units Subcutaneous At Bedtime Danyelle Prieto MD        lisinopril (ZESTRIL) tablet 20 mg  20 mg Oral Daily Timothy Senior, DO   20 mg at 05/14/24 0839    mirtazapine (REMERON) tablet TABS 7.5 mg  7.5 mg Oral At Bedtime Timothy Senior, DO   7.5 mg at 05/13/24 2114    sodium chloride (PF) 0.9% PF flush 10-20 mL  10-20 mL Intracatheter Q28 Days Chiquita Swain MD   20 mL at 05/12/24 0430    sodium chloride (PF) 0.9% PF flush 3 mL  3 mL Intracatheter Q8H Timothy Senior, DO   3 mL at 05/14/24 0841    torsemide (DEMADEX) tablet 20 mg  20 mg Oral Daily Danyelle Prieto MD   20 mg at 05/14/24 0839       Data reviewed today: I personally reviewed all new medications, labs, imaging/diagnostics reports over the past 24 hours. Pertinent findings include:    Imaging:   No results found for this or any previous visit (from the past 24 hour(s)).    Labs:  CTA Abdomen Pelvis with Contrast   Final Result   IMPRESSION:   1.  Near occlusive stenosis of the celiac axis, with intraluminal thrombus.   2.  Multifocal atherosclerotic disease, abdominal aortic aneurysm, and additional multifocal stenoses.   3.  Additional findings as above.      CT Chest Pulmonary Embolism w Contrast   Final Result   IMPRESSION:   1.  No acute pulmonary emboli.      2.  Unchanged left upper lobe subpleural nodule, consolidation in the left upper lobe posteriorly, and a few other scattered nodular airspace opacities elsewhere.      3.  Unchanged mildly enlarged mediastinal and left hilar lymph nodes.      4.  Unchanged high-grade narrowing of the celiac artery proximally with noncalcified plaque or thrombus.      Echocardiogram Complete    (Results Pending)     Recent Results (from the past 24 hour(s))   Glucose by meter    Collection Time: 05/13/24  5:37 PM   Result Value Ref Range    GLUCOSE BY METER POCT 190 (H) 70 - 99 mg/dL   Glucose by meter    Collection Time: 05/13/24  8:45 PM   Result Value Ref Range    GLUCOSE BY METER POCT 121 (H) 70 - 99 mg/dL    Glucose by meter    Collection Time: 05/14/24  2:09 AM   Result Value Ref Range    GLUCOSE BY METER POCT 97 70 - 99 mg/dL   Heparin Unfractionated Anti Xa Level    Collection Time: 05/14/24  6:51 AM   Result Value Ref Range    Anti Xa Unfractionated Heparin 0.55 For Reference Range, See Comment IU/mL   Potassium    Collection Time: 05/14/24  6:51 AM   Result Value Ref Range    Potassium 3.9 3.4 - 5.3 mmol/L   Magnesium    Collection Time: 05/14/24  6:51 AM   Result Value Ref Range    Magnesium 1.5 (L) 1.7 - 2.3 mg/dL   Creatinine    Collection Time: 05/14/24  6:51 AM   Result Value Ref Range    Creatinine 0.80 0.51 - 0.95 mg/dL    GFR Estimate 79 >60 mL/min/1.73m2   Glucose by meter    Collection Time: 05/14/24  8:16 AM   Result Value Ref Range    GLUCOSE BY METER POCT 106 (H) 70 - 99 mg/dL   Glucose by meter    Collection Time: 05/14/24 11:57 AM   Result Value Ref Range    GLUCOSE BY METER POCT 187 (H) 70 - 99 mg/dL   Magnesium    Collection Time: 05/14/24  2:05 PM   Result Value Ref Range    Magnesium 2.1 1.7 - 2.3 mg/dL       Pending Labs:  Unresulted Labs Ordered in the Past 30 Days of this Admission       Date and Time Order Name Status Description    5/14/2024 10:47 AM Respiratory Panel PCR, Nasopharyngeal In process     5/14/2024  9:41 AM Respiratory Panel PCR In process     5/10/2024  6:36 PM Blood Culture Peripheral Blood Preliminary             I spoke with  to discuss patient's care.    KAZ BENAVIDES MD  [facility] Medicine  [facility]  Phone: #[phone]    Securely message me with Fadumo (more info)

## 2024-05-14 NOTE — CONSULTS
Patient examined and interviewed along with Dr. Arlette Alberto.  Agree with findings and plans as documented below.  HEART CARE CONSULTATON NOTE        Assessment/Recommendations   Assessment/Plan:  Sinus tachycardia- chronic, likely multifactorial in etiology including severe anemia (hgb 8.9), current triple agent chemotherapy for SCLC with carboplatin, etoposide, atezolizumab, and current infection with immunity acquired PNA and therapies (nebulizers) also contributing.  Reviewed normal thyroid function (TSH on admission WNL). Recommend complete echo that was started and aborted on 5/13 due to persistent tachycardia in 140's.   CAD, severe- noted on chest CT upon admission. ASCVD risk score of 13.3%. On lipitor 20 mg with most recent LDL of 99 in 08/2023.  Coronary artery disease documented by severe coronary calcifications on CT scan.      Clinically Significant Risk Factors            # Hypomagnesemia: Lowest Mg = 1.5 mg/dL in last 2 days, will replace as needed   # Hypoalbuminemia: Lowest albumin = 3.1 g/dL at 5/11/2024  5:48 AM, will monitor as appropriate         # Hypertension: Noted on problem list           # Moderate Malnutrition: based on nutrition assessment, PRESENT ON ADMISSION     # Financial/Environmental Concerns: none  # COPD: noted on problem list       Cardiac Arrhythmia: Paroxysmal tachycardia, unspecified  Hyperkalemia, Cachexia, and Other fluid overload  COPD  Chronic Fatigue and Other Debilities: Neoplastic (malignant) related fatigue           History of Present Illness/Subjective    HPI: Jasmyn Green is a 68 year old female with a recent diagnosis of SCLC currently receiving palliative chemotherapy with carboplatin, etoposide, and atezolizumab. She also has COPD, CAD, and HTN. She was admitted for SOB and treated for PNA.     Throughout her current admission and at most of her outpatient visits since 03/2024 patient has been in sinus tachycardia. She is asymptomatic and denies  dizziness or light headedness, palpitations, chest pain or discomfort. No fevers or chills though was diagnosed with and treated for PNA this hospitalization which may be contributing. Attempted and aborted echocardiogram on 5/13 due to persistently elevated HR.        Physical Examination  Review of Systems   VITALS: /58 (BP Location: Left arm)   Pulse 120   Temp 98.1  F (36.7  C) (Oral)   Resp 18   Ht 1.524 m (5')   Wt 42.3 kg (93 lb 4.8 oz)   LMP  (LMP Unknown)   SpO2 96%   BMI 18.22 kg/m    BMI: Body mass index is 18.22 kg/m .  Wt Readings from Last 3 Encounters:   05/14/24 42.3 kg (93 lb 4.8 oz)   05/06/24 42.9 kg (94 lb 9.6 oz)   05/01/24 44 kg (97 lb)       Intake/Output Summary (Last 24 hours) at 5/14/2024 1455  Last data filed at 5/14/2024 1137  Gross per 24 hour   Intake 480 ml   Output 550 ml   Net -70 ml     General Appearance:   no distress, cachectic    ENT/Mouth: membranes dry, no oral lesions or bleeding gums.      EYES:  no scleral icterus, normal conjunctivae   Neck: no carotid bruits or thyromegaly   Chest/Lungs:   Tachypneic with pursed lips breathing, lungs sounds decreased throughout without crackles or murmur   Cardiovascular:   Tachycardic with regular rhythm. Normal first and second heart sounds with no murmurs, rubs, or gallops; the carotid, radial and posterior tibial pulses are intact- though posterior tibialis is decreased on R vs. L, Jugular venous pressure mildly elevated without hepatojugular reflux, no edema bilaterally    Abdomen:  no organomegaly, masses, bruits, or tenderness; bowel sounds are present   Extremities: no cyanosis or clubbing   Skin: no xanthelasma   Neurologic: normal  bilateral, no tremors     Psychiatric: alert and oriented x3, calm     Review Of Systems  Skin: negative  Eyes: negative  Ears/Nose/Throat: negative  Respiratory: Shortness of breath, No cough, and No hemoptysis  Cardiovascular: negative for, palpitations, chest pain, lower extremity  "edema, and syncope or near-syncope  Gastrointestinal: negative  Genitourinary: negative  Musculoskeletal: negative  Neurologic: negative  Psychiatric: negative  Hematologic/Lymphatic/Immunologic: negative  Endocrine: negative          Lab Results    Chemistry/lipid CBC Cardiac Enzymes/BNP/TSH/INR   Recent Labs   Lab Test 08/07/23  0815   CHOL 163   HDL 44*   LDL 99   TRIG 99     Recent Labs   Lab Test 08/07/23  0815 09/21/21  0952 08/09/21  0742   LDL 99 82 157*     Recent Labs   Lab Test 05/14/24  1157 05/14/24  0816 05/14/24  0651 05/13/24  0819 05/13/24  0629   NA  --   --   --   --  142   POTASSIUM  --   --  3.9  --  3.9   CHLORIDE  --   --   --   --  106   CO2  --   --   --   --  26   *   < >  --    < > 97   BUN  --   --   --   --  31.3*   CR  --   --  0.80  --  0.66   GFRESTIMATED  --   --  79  --  >90   FLOR  --   --   --   --  8.9    < > = values in this interval not displayed.     Recent Labs   Lab Test 05/14/24  0651 05/13/24  0629 05/12/24  2102   CR 0.80 0.66 0.77     No results for input(s): \"A1C\" in the last 47081 hours.       Recent Labs   Lab Test 05/12/24  2102   WBC 8.5   HGB 8.9*   HCT 28.4*   MCV 91        Recent Labs   Lab Test 05/12/24 2102 05/12/24  0923 05/11/24  0509   HGB 8.9* 8.7* 8.7*    Recent Labs   Lab Test 06/27/21  1301   TROPONINI 0.03     Recent Labs   Lab Test 05/12/24  2102 05/10/24  1850 02/12/24  1340 02/08/24  0910 06/27/21  1302   BNP  --   --   --   --  127*   NTBNPI  --  7,702* 2,768* 774  --    NTBNP 1,303*  --   --   --   --      Recent Labs   Lab Test 05/06/24  1028   TSH 0.53     Recent Labs   Lab Test 05/10/24  1850 06/27/21  1301   INR 1.00 1.32*        Medical History  Surgical History Family History Social History   Past Medical History:   Diagnosis Date    Age-related osteoporosis without current pathological fracture     Arthritis     COPD (chronic obstructive pulmonary disease) (H)     Coronary artery disease     Dependence on nocturnal oxygen " therapy     Dyspnea on exertion     Emphysema lung (H)     Gout     HLD (hyperlipidemia)     Hypertension     Lung mass      Past Surgical History:   Procedure Laterality Date    BRONCHOSCOPY RIGID OR FLEXIBLE W/TRANSENDOSCOPIC ENDOBRONCHIAL ULTRASOUND GUIDED N/A 2024    Procedure: BRONCHOSCOPY, WITH ENDOBRONCHIAL ULTRASOUND;  Surgeon: Ruben Levin MD;  Location: [facility] Main OR    COLONOSCOPY N/A 10/21/2021    Procedure: COLONOSCOPY;  Surgeon: Wilma Hester DO;  Location: [facility] Main OR    IR CHEST PORT PLACEMENT > 5 YRS OF AGE  2024    VAGINAL DELIVERY      x 3 ; remote    WISDOM TOOTH EXTRACTION       Family History   Problem Relation Age of Onset    Chronic Obstructive Pulmonary Disease Sister     Diabetes Mother     Heart Disease Mother     Lung Cancer Mother     Heart Disease Father         Social History     Socioeconomic History    Marital status:      Spouse name: Not on file    Number of children: Not on file    Years of education: Not on file    Highest education level: Not on file   Occupational History    Not on file   Tobacco Use    Smoking status: Former     Current packs/day: 0.00     Types: Cigarettes     Quit date: 2021     Years since quittin.8     Passive exposure: Past    Smokeless tobacco: Never    Tobacco comments:     updated 8/3/2021 by TTS   Vaping Use    Vaping status: Never Used   Substance and Sexual Activity    Alcohol use: Not Currently    Drug use: Never    Sexual activity: Not Currently   Other Topics Concern    Not on file   Social History Narrative     many years 3 grown children. 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 Last cigarette a week ago.non drinker  Her primary MD (Wilda) retired several years ago/ tends to avoid doctors/medical care in general       Social Determinants of Health     Financial Resource Strain: Low Risk  (2024)    Financial Resource Strain     Within the past 12 months, have you or your family members you  live with been unable to get utilities (heat, electricity) when it was really needed?: No   Food Insecurity: Low Risk  (2/1/2024)    Food Insecurity     Within the past 12 months, did you worry that your food would run out before you got money to buy more?: No     Within the past 12 months, did the food you bought just not last and you didn t have money to get more?: No   Transportation Needs: Low Risk  (2/1/2024)    Transportation Needs     Within the past 12 months, has lack of transportation kept you from medical appointments, getting your medicines, non-medical meetings or appointments, work, or from getting things that you need?: No   Physical Activity: Sufficiently Active (11/10/2021)    Exercise Vital Sign     Days of Exercise per Week: 5 days     Minutes of Exercise per Session: 30 min   Stress: No Stress Concern Present (11/10/2021)    [employer] [place] [employer] - Occupational Stress Questionnaire     Feeling of Stress : Not at all   Social Connections: Socially Isolated (11/10/2021)    Social Connection and Isolation Panel [NHANES]     Frequency of Communication with Friends and Family: More than three times a week     Frequency of Social Gatherings with Friends and Family: Once a week     Attends Faith Services: Never     Active Member of Clubs or Organizations: No     Attends Club or Organization Meetings: Not on file     Marital Status:    Interpersonal Safety: Low Risk  (2/1/2024)    Interpersonal Safety     Do you feel physically and emotionally safe where you currently live?: Yes     Within the past 12 months, have you been hit, slapped, kicked or otherwise physically hurt by someone?: No     Within the past 12 months, have you been humiliated or emotionally abused in other ways by your partner or ex-partner?: No   Housing Stability: Low Risk  (2/1/2024)    Housing Stability     Do you have housing? : Yes     Are you worried about losing your housing?: No         Medications   Allergies    Prior to admission med list:   Prescriptions Last Dose Informant Patient Reported? Taking?   acetaminophen (TYLENOL) 500 MG tablet 5/9/2024 at PM; 1,000 mg   Yes Yes   Sig: Take 2 tablets (1,000 mg) by mouth every 8 hours as needed for pain   albuterol (PROAIR HFA/PROVENTIL HFA/VENTOLIN HFA) 108 (90 Base) MCG/ACT inhaler 5/10/2024 at AM   Yes Yes   Sig: Inhale 1-2 puffs into the lungs every 6 hours as needed for shortness of breath, wheezing or cough   aspirin (ASA) 325 MG EC tablet 5/10/2024 at AM   Yes Yes   Sig: Take 325 mg by mouth daily   atorvastatin (LIPITOR) 20 MG tablet 5/10/2024 at AM   No Yes   Sig: Take 1 tablet (20 mg) by mouth daily   calcium carbonate 600 mg-vitamin D 400 units (CALTRATE) 600-400 MG-UNIT per tablet 5/10/2024 at AM only   Yes Yes   Sig: Take 1 tablet by mouth 2 times daily With calcium   diazepam (VALIUM) 5 MG tablet Past Month at 5/3   No Yes   Sig: Take 1 tablet (5 mg) by mouth every 6 hours as needed for anxiety   fluticasone-salmeterol (AIRDUO RESPICLICK) 232-14 MCG/ACT inhaler 5/10/2024 at AM only   No Yes   Sig: Inhale 1 puff into the lungs 2 times daily   hydrOXYzine HCl (ATARAX) 25 MG tablet 5/9/2024 at AM   No Yes   Sig: Take 1-2 tablets (25-50 mg) by mouth every 8 hours as needed for anxiety (Insomnia)   ipratropium - albuterol 0.5 mg/2.5 mg/3 mL (DUONEB) 0.5-2.5 (3) MG/3ML neb solution 5/10/2024 at AM   No Yes   Sig: Take 1 vial (3 mLs) by nebulization every 6 hours as needed for shortness of breath, wheezing or cough   ipratropium-albuterol (COMBIVENT RESPIMAT)  MCG/ACT inhaler 5/10/2024 at 1700; has with in room   No Yes   Sig: Inhale 1 puff into the lungs 4 times daily   lisinopril (ZESTRIL) 20 MG tablet 5/10/2024 at AM   No Yes   Sig: Take 1 tablet (20 mg) by mouth daily   mirtazapine (REMERON) 7.5 MG tablet 5/9/2024 at HS   No Yes   Sig: Take 1 tablet (7.5 mg) by mouth at bedtime   naloxone (NARCAN) 4 MG/0.1ML nasal spray Has at home, never used.    No Yes   Sig: Spray 1 spray (4 mg) into one nostril alternating nostrils as needed for opioid reversal every 2-3 minutes until assistance arrives   ondansetron (ZOFRAN ODT) 8 MG ODT tab Past Week at last week sometime   No Yes   Sig: Take 1 tablet (8 mg) by mouth every 8 hours as needed for nausea   oxyCODONE (ROXICODONE) 5 MG tablet 5/10/2024 at 1500; 2.5 mg in last 24 hours   No Yes   Sig: Take 0.5-1 tablets (2.5-5 mg) by mouth every 4 hours as needed for pain or moderate to severe pain (Try one-half tab first to see response)   prochlorperazine (COMPAZINE) 10 MG tablet Past Week at last week sometime   No Yes   Sig: Take 1 tablet (10 mg) by mouth every 6 hours as needed for nausea or vomiting   tiotropium (SPIRIVA RESPIMAT) 2.5 MCG/ACT inhaler 5/10/2024 at AM   No Yes   Sig: Inhale 2 puffs into the lungs daily   Facility-Administered Medications: None      No Known Allergies      Arlette Alberto DO     I discussed this patient and the plan with attending cardiology Dr. Trujillo.

## 2024-05-14 NOTE — PLAN OF CARE
Goal Outcome Evaluation:         Patient denies pain.  Lungs diminished throughout. On 3L oxgyen sating mid 90s.   Telemetry is sinus tachycardic. In low 100s when sleeping, 120s-130s when awake, depending on activity.   Port running heparin drip at this time at 8ml/hr. Anti-Xa recheck in the morning along with K and Mg protocols.   Blood sugar was 121 no sliding scale needed.   UC pending.  Echo in the AM.  Purewick being utilized, clear yellow with adequate output.

## 2024-05-14 NOTE — CONSULTS
5/14 Test claim for DOAC'S- both Eliquis and Xarelto covered medications $47 for 30 days supply. Thank you for allowing me to help with your patient  Brit Dickson [facility]  Pharmacy Discharge Liaison [facility]

## 2024-05-14 NOTE — PROGRESS NOTES
05/14/24 1050   Appointment Info   Signing Clinician's Name / Credentials (OT) RAFAEL Justin   Living Environment   People in Home child(jeremy), adult   Current Living Arrangements house   Transportation Anticipated family or friend will provide   Living Environment Comments Pt has cane, FWW, w/c, tub shower, shower chair, standard toilet   Self-Care   Usual Activity Tolerance moderate   Current Activity Tolerance moderate   Equipment Currently Used at Home cane, straight;walker, rolling   Fall history within last six months no   Activity/Exercise/Self-Care Comment Pt typically IND w/ ADLs and gets assistance from daughter with IADLs at baseline   General Information   Onset of Illness/Injury or Date of Surgery 05/10/24   Referring Physician Danyelle Prieto MD   Patient/Family Therapy Goal Statement (OT) get stronger   Additional Occupational Profile Info/Pertinent History of Current Problem Medical history is notable for lung cancer, currently getting chemotherapy.  She also has a history of chronic respiratory failure with hypoxia on 4 to 5 L at home with activity and 3 to 4 L at rest, hypertension, coronary disease, arthritis.   Existing Precautions/Restrictions oxygen therapy device and L/min  (3l-5l at baseline)   Cognitive Status Examination   Orientation Status orientation to person, place and time   Affect/Mental Status (Cognitive) WNL   Visual Perception   Visual Impairment/Limitations corrective lenses for reading   Sensory   Sensory Quick Adds sensation intact   Pain Assessment   Patient Currently in Pain No   Posture   Posture forward head position   Range of Motion Comprehensive   General Range of Motion no range of motion deficits identified   Strength Comprehensive (MMT)   Comment, General Manual Muscle Testing (MMT) Assessment Mild weakness   Bed Mobility   Bed Mobility supine-sit   Comment (Bed Mobility) SBA-CGA   Transfers   Transfers sit-stand transfer;toilet transfer;shower transfer;bed-chair  transfer   Transfer Comments CGA-Min A   Activities of Daily Living   BADL Assessment/Intervention toileting;bathing   Bathing Assessment/Intervention   [place] Level (Bathing) contact guard assist   Toileting   [place] Level (Toileting) supervision   Clinical Impression   Criteria for Skilled Therapeutic Interventions Met (OT) Yes, treatment indicated   OT Diagnosis decreased ADLs   Influenced by the following impairments Hypoxia, weakness   OT Problem List-Impairments impacting ADL activity tolerance impaired;strength   Assessment of Occupational Performance 1-3 Performance Deficits   Identified Performance Deficits bed mobility, toileting, bathing, all transfers   Planned Therapy Interventions (OT) ADL retraining;bed mobility training;transfer training;strengthening   Clinical Decision Making Complexity (OT) problem focused assessment/low complexity   Risk & Benefits of therapy have been explained evaluation/treatment results reviewed;patient   OT Total Evaluation Time   OT Eval, Low Complexity Minutes (67757) 10   OT Goals   Therapy Frequency (OT) 5 times/week   OT Predicted Duration/Target Date for Goal Attainment 05/20/24   OT Goals Transfers;Toilet Transfer/Toileting;OT Goal 1   OT: Transfer Supervision/stand-by assist  (Tub-shower transfer)   OT: Toilet Transfer/Toileting Modified independent;toilet transfer;cleaning and garment management   OT: Goal 1 Pt. will be IND with HEP after initial teaching   Interventions   Interventions Quick Adds Self-Care/Home Management   Self-Care/Home Management   Self-Care/Home Mgmt/ADL, Compensatory, Meal Prep Minutes (48848) 15   Symptoms Noted During/After Treatment (Meal Preparation/Planning Training) fatigue   Treatment Detail/Skilled Intervention Pt completed STS transfer with CGA. Verbal cues needed to remind pt to push from edge of bed rather than pull up from walker. O2 was increased from 3L to 5L for session with verbal confirmation from RN. Pt amb. from  bed to bathroom 20 ft with FFW and SBA. Pt cued to use grab bar and CGA to transfer to the toilet. Pt required min A standing up from toilet. Pt stood at sink for hand washing with SBA. Pt amb. 10 ft from bathroom to recliner chair with SBA and transfered into recliner with CGA. Pt cued to demonstrate LE dressing with figure 4 position.  Pt HR increased into 130s during activity. Pt reported fatigue at the end of session and that she is comfortable going home.   OT Discharge Planning   OT Plan Toilet transfers/toileting, transfers, HEP, RTS?   OT Discharge Recommendation (DC Rec) home with assist   OT Rationale for DC Rec Pt HR increases with activity and experiencing weakness and a decrease in activity level. Pt has support at home from daughter and son in law. Pt reports feeling comfortable completing ADLs at home.   OT Brief overview of current status SBA-Min A for mobility/transfers   OT Equipment Needed at Discharge raised toilet seat   Total Session Time   Timed Code Treatment Minutes 15   Total Session Time (sum of timed and untimed services) 25

## 2024-05-14 NOTE — CONSULTS
5/14 Test claim for DOAC'S vs Warfarin- both Eliquis and Xarelto covered $47 for 30 days supply VS Warfarin covered 100% no cost for the patient . Thank you for allowing me to help with your patient  Brit Dickson [facility]  Pharmacy Discharge Liaison [facility]

## 2024-05-14 NOTE — CONSULTS
[facility] Hematology and Oncology Inpatient Consult Note    Patient: Jasmyn Green  MRN: [id number]  Date of Service: 5/14/2024      Reason for Visit    Small cell lung cancer  COPD exacerbation  Hypoxia    Assessment    1.  A very pleasant 68-year-old woman with severe COPD and small cell lung cancer presenting with shortness of breath.  The etiology result multifactorial.  She also has sinus tachycardia on top of that.  2.  History of severe anemia.  3.  Severe coronary disease.  4.  Severe peripheral arterial disease including celiac axis occlusion.  Fortunately patient not symptomatic from it apart from having splenic infarct.  5.  Low body weight.    MEDICAL DECISION MAKING:    Patient presenting with multiple severe complicated problems.  Presenting with some complication of chemotherapy as well.Reviewed notes from each unique source.  Reviewed each unique test.  Ordered tests if indicated.  Independently interpreted lab tests and radiological exams performed by other physicians.  Personally reviewed the images.    1.  Continue with the treatment of the underlying pneumonia.  2.  Follow cardiology recommendation.  3.  PT OT.  4.  Patient vascular disease management as per vascular surgery/vascular medicine.  The intervention should be minimal based on her overall prognosis.  5.  Follow-up with us next week after she is discharged.  6.  Continue the oxygen supplementation.    Staging History    Cancer Staging   No matching staging information was found for the patient.        History  Ms. Jasmyn Green is a 68 year old with a known history of locally advanced small cell lung cancer for which she has been on treatment with carboplatin etoposide along with Tecentriq immunotherapy.  She got her third dose of the chemotherapy on 6 May 2024.  Her CT scan at that time had shown possibility of some bronchitis/pneumonia.  She was given some antibiotics.    She came to the hospital because of worsening  shortness of breath.  She has pretty severe COPD.  She was also found to have sinus tachycardia.  Her CT scan also showed severe coronary artery calcification.  Her CT scan also shown some new splenic infarct.  The dose of aspirin was increased to 325 mg.  She was also noted to have severe anemia at that time and was given blood transfusion the following day after chemotherapy.  On the positive side her CT scan after 2 cycles did show significant improvement in her disease.    She has been optimized with some oxygen and diuretics.  Seems to be slightly better compared to when she came in.      Review of systems.    A 14 point review of systems was obtained.  Positive findings noted in the history.  Rest of the review of system is otherwise negative.      Past History  Past Medical History:   Diagnosis Date    Age-related osteoporosis without current pathological fracture     Arthritis     COPD (chronic obstructive pulmonary disease) (H)     Coronary artery disease     Dependence on nocturnal oxygen therapy     Dyspnea on exertion     Emphysema lung (H)     Gout     HLD (hyperlipidemia)     Hypertension     Lung mass      Past Surgical History:   Procedure Laterality Date    BRONCHOSCOPY RIGID OR FLEXIBLE W/TRANSENDOSCOPIC ENDOBRONCHIAL ULTRASOUND GUIDED N/A 2/16/2024    Procedure: BRONCHOSCOPY, WITH ENDOBRONCHIAL ULTRASOUND;  Surgeon: Ruben Levin MD;  Location: [facility] [place] [facility]    COLONOSCOPY N/A 10/21/2021    Procedure: COLONOSCOPY;  Surgeon: Wilma Hester DO;  Location: [facility]    IR CHEST PORT PLACEMENT > 5 YRS OF AGE  5/2/2024    VAGINAL DELIVERY      x 3 ; remote    WISDOM TOOTH EXTRACTION       Family History   Problem Relation Age of Onset    Chronic Obstructive Pulmonary Disease Sister     Diabetes Mother     Heart Disease Mother     Lung Cancer Mother     Heart Disease Father      Social History     Socioeconomic History    Marital status:    Tobacco Use    Smoking status: Former      Current packs/day: 0.00     Types: Cigarettes     Quit date: 2021     Years since quittin.8     Passive exposure: Past    Smokeless tobacco: Never    Tobacco comments:     updated 8/3/2021 by [facility]TS   Vaping Use    Vaping status: Never Used   Substance and Sexual Activity    Alcohol use: Not Currently    Drug use: Never    Sexual activity: Not Currently   Social History Narrative     many years 3 grown children. 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 Last cigarette a week ago.non drinker  Her primary MD (Wilda) retired several years ago/ tends to avoid doctors/medical care in general       Social Determinants of Health     Financial Resource Strain: Low Risk  (2024)    Financial Resource Strain     Within the past 12 months, have you or your family members you live with been unable to get utilities (heat, electricity) when it was really needed?: No   Food Insecurity: Low Risk  (2024)    Food Insecurity     Within the past 12 months, did you worry that your food would run out before you got money to buy more?: No     Within the past 12 months, did the food you bought just not last and you didn t have money to get more?: No   Transportation Needs: Low Risk  (2024)    Transportation Needs     Within the past 12 months, has lack of transportation kept you from medical appointments, getting your medicines, non-medical meetings or appointments, work, or from getting things that you need?: No   Physical Activity: Sufficiently Active (11/10/2021)    Exercise Vital Sign     Days of Exercise per Week: 5 days     Minutes of Exercise per Session: 30 min   Stress: No Stress Concern Present (11/10/2021)    [employer] - Occupational Stress Questionnaire     Feeling of Stress : Not at all   Social Connections: Socially Isolated (11/10/2021)    Social Connection and Isolation Panel [NHANES]     Frequency of Communication with Friends and Family: More than three times a  week     Frequency of Social Gatherings with Friends and Family: Once a week     Attends Baptism Services: Never     Active Member of Clubs or Organizations: No     Marital Status:    Interpersonal Safety: Low Risk  (2/1/2024)    Interpersonal Safety     Do you feel physically and emotionally safe where you currently live?: Yes     Within the past 12 months, have you been hit, slapped, kicked or otherwise physically hurt by someone?: No     Within the past 12 months, have you been humiliated or emotionally abused in other ways by your partner or ex-partner?: No   Housing Stability: Low Risk  (2/1/2024)    Housing Stability     Do you have housing? : Yes     Are you worried about losing your housing?: No       Allergies    No Known Allergies       Physical Exam    /67 (BP Location: Left arm)   Pulse 116   Temp 97.9  F (36.6  C) (Oral)   Resp 18   Ht 1.524 m (5')   Wt 42.3 kg (93 lb 4.8 oz)   LMP  (LMP Unknown)   SpO2 93%   BMI 18.22 kg/m      GENERAL: no acute distress. Cooperative in conversation.  In bed.  HEENT: pupils are equal, round and reactive. Oral mucosa is moist and intact.  RESP:Chest symmetric. Regular respiratory rate. No stridor.  ABD: Nondistended, soft.  EXTREMITIES: No lower extremity edema.   NEURO: non focal. Alert and oriented x3.   PSYCH: within normal limits. No depression or anxiety.  SKIN: warm dry intact       Lab Results  Recent Results (from the past 24 hour(s))   Glucose by meter    Collection Time: 05/13/24  8:45 PM   Result Value Ref Range    GLUCOSE BY METER POCT 121 (H) 70 - 99 mg/dL   Glucose by meter    Collection Time: 05/14/24  2:09 AM   Result Value Ref Range    GLUCOSE BY METER POCT 97 70 - 99 mg/dL   Heparin Unfractionated Anti Xa Level    Collection Time: 05/14/24  6:51 AM   Result Value Ref Range    Anti Xa Unfractionated Heparin 0.55 For Reference Range, See Comment IU/mL   Potassium    Collection Time: 05/14/24  6:51 AM   Result Value Ref Range     Potassium 3.9 3.4 - 5.3 mmol/L   Magnesium    Collection Time: 05/14/24  6:51 AM   Result Value Ref Range    Magnesium 1.5 (L) 1.7 - 2.3 mg/dL   Creatinine    Collection Time: 05/14/24  6:51 AM   Result Value Ref Range    Creatinine 0.80 0.51 - 0.95 mg/dL    GFR Estimate 79 >60 mL/min/1.73m2   Glucose by meter    Collection Time: 05/14/24  8:16 AM   Result Value Ref Range    GLUCOSE BY METER POCT 106 (H) 70 - 99 mg/dL   Respiratory Panel PCR, Nasopharyngeal    Collection Time: 05/14/24 10:49 AM    Specimen: Nasopharyngeal; Swab   Result Value Ref Range    Adenovirus Not Detected Not Detected    Coronavirus Not Detected Not Detected    Human Metapneumovirus Detected (A) Not Detected    Human Rhin/Enterovirus Not Detected Not Detected    Influenza A Not Detected Not Detected    Influenza A, H1 Not Detected Not Detected    Influenza A 2009 H1N1 Not Detected Not Detected    Influenza A, H3 Not Detected Not Detected    Influenza B Not Detected Not Detected    Parainfluenza Virus 1 Not Detected Not Detected    Parainfluenza Virus 2 Not Detected Not Detected    Parainfluenza Virus 3 Not Detected Not Detected    Parainfluenza Virus 4 Not Detected Not Detected    Respiratory Syncytial Virus A Not Detected Not Detected    Respiratory Syncytial Virus B Not Detected Not Detected    Chlamydia Pneumoniae Not Detected Not Detected    Mycoplasma Pneumoniae Not Detected Not Detected   Glucose by meter    Collection Time: 05/14/24 11:57 AM   Result Value Ref Range    GLUCOSE BY METER POCT 187 (H) 70 - 99 mg/dL   Magnesium    Collection Time: 05/14/24  2:05 PM   Result Value Ref Range    Magnesium 2.1 1.7 - 2.3 mg/dL   Hepatic panel    Collection Time: 05/14/24  2:05 PM   Result Value Ref Range    Protein Total 6.2 (L) 6.4 - 8.3 g/dL    Albumin 3.2 (L) 3.5 - 5.2 g/dL    Bilirubin Total 0.2 <=1.2 mg/dL    Alkaline Phosphatase 142 40 - 150 U/L    AST 19 0 - 45 U/L    ALT 37 0 - 50 U/L    Bilirubin Direct <0.20 0.00 - 0.30 mg/dL    Glucose by meter    Collection Time: 05/14/24  5:39 PM   Result Value Ref Range    GLUCOSE BY METER POCT 124 (H) 70 - 99 mg/dL        Imaging Results    CTA Abdomen Pelvis with Contrast    Result Date: 5/10/2024  EXAM: CTA ABDOMEN PELVIS WITH CONTRAST LOCATION: [facility] DATE: 5/10/2024 INDICATION: followup on abnormal ct from 5 3 24 COMPARISON: 5/3/2024 TECHNIQUE: CT angiogram abdomen pelvis during arterial phase of injection of IV contrast. 2D and 3D MIP reconstructions were performed by the CT technologist. Dose reduction techniques were used. Exam is limited by lack of thin section sagittal and coronal reconstructions. Thin section reconstructions were created on the [employer] workstation from the initial data set, however. CONTRAST: [id number] 90ml FINDINGS: ANGIOGRAM ABDOMEN/PELVIS: High-grade stenosis (near occlusion) of the proximal celiac axis. Intraluminal thrombus identified within the first 1.5 cm of the celiac artery. Patent splenic artery with scattered calcified plaque. Likewise, common hepatic artery is patent. Conventional hepatic arterial anatomy. Patent SMA, bilateral renal arteries, and DWAYNE as well as runoff to the upper legs. Large amount of calcified plaque within the right common femoral artery, with approximately 90% luminal stenosis. High-grade stenoses of both common iliac arteries, although contrast bolus limits further assessment. Infrarenal abdominal aortic aneurysm measuring 2.5 cm. Multifocal noncalcified plaque/thrombus scattered in the abdominal aorta. LOWER CHEST: Advanced centrilobular emphysema with patchy parenchymal opacities in both lung bases and bronchial wall thickening redemonstrated. HEPATOBILIARY: No biliary dilatation PANCREAS: Unremarkable SPLEEN: Moderate sized splenic infarct redemonstrated ADRENAL GLANDS: Left adrenal nodular hyperplasia. KIDNEYS/BLADDER: No hydronephrosis. Decompressed bladder. BOWEL: Colonic diverticulosis. LYMPH NODES: No  significant retroperitoneal adenopathy PELVIC ORGANS: No free fluid MUSCULOSKELETAL: No acute bony abnormalities.     IMPRESSION: 1.  Near occlusive stenosis of the celiac axis, with intraluminal thrombus. 2.  Multifocal atherosclerotic disease, abdominal aortic aneurysm, and additional multifocal stenoses. 3.  Additional findings as above.    CT Chest Pulmonary Embolism w Contrast    Result Date: 5/10/2024  EXAM: CT CHEST PULMONARY EMBOLISM W CONTRAST LOCATION: [facility] DATE: 5/10/2024 INDICATION: Hypoxia, shortness of breath, lung cancer, tachycardia COMPARISON: 5/3/2024 TECHNIQUE: CT chest pulmonary angiogram during arterial phase injection of IV contrast. Multiplanar reformats and MIP reconstructions were performed. Dose reduction techniques were used. CONTRAST: [id number] 90ml FINDINGS: ANGIOGRAM CHEST: Pulmonary arteries are normal caliber and negative for pulmonary emboli. Thoracic aorta is negative for dissection. No CT evidence of right heart strain. LUNGS AND PLEURA: Severe emphysema. Unchanged small area of subpleural soft tissue thickening in the anterior left upper lobe (8/147), as well as an area of consolidation in the left upper lobe posteriorly with associated bronchiectasis. A few other nodular opacities elsewhere also remain unchanged. No pleural effusion or pneumothorax. MEDIASTINUM/AXILLAE: Unchanged enlarged lymph nodes in the AP window and left hilum. No enlarging lymph nodes. Right internal jugular port catheter tip at the SVC-RA junction. CORONARY ARTERY CALCIFICATION: Severe. UPPER ABDOMEN: Partially imaged splenic infarcts. Unchanged narrowing and filling defect in the proximal celiac axis. MUSCULOSKELETAL: Normal.     IMPRESSION: 1.  No acute pulmonary emboli. 2.  Unchanged left upper lobe subpleural nodule, consolidation in the left upper lobe posteriorly, and a few other scattered nodular airspace opacities elsewhere. 3.  Unchanged mildly enlarged mediastinal  and left hilar lymph nodes. 4.  Unchanged high-grade narrowing of the celiac artery proximally with noncalcified plaque or thrombus.       Signed by: Amador Stewart MD    This note has been dictated using voice recognition software. Any grammatical or context distortions are unintentional and inherent to the software

## 2024-05-15 LAB
ANION GAP SERPL CALCULATED.3IONS-SCNC: 9 MMOL/L (ref 7–15)
BACTERIA BLD CULT: NO GROWTH
BUN SERPL-MCNC: 38.3 MG/DL (ref 8–23)
CALCIUM SERPL-MCNC: 8.8 MG/DL (ref 8.8–10.2)
CHLORIDE SERPL-SCNC: 102 MMOL/L (ref 98–107)
CREAT SERPL-MCNC: 0.86 MG/DL (ref 0.51–0.95)
DEPRECATED HCO3 PLAS-SCNC: 29 MMOL/L (ref 22–29)
EGFRCR SERPLBLD CKD-EPI 2021: 72 ML/MIN/1.73M2
GLUCOSE BLDC GLUCOMTR-MCNC: 101 MG/DL (ref 70–99)
GLUCOSE BLDC GLUCOMTR-MCNC: 125 MG/DL (ref 70–99)
GLUCOSE BLDC GLUCOMTR-MCNC: 153 MG/DL (ref 70–99)
GLUCOSE BLDC GLUCOMTR-MCNC: 325 MG/DL (ref 70–99)
GLUCOSE BLDC GLUCOMTR-MCNC: 88 MG/DL (ref 70–99)
GLUCOSE SERPL-MCNC: 82 MG/DL (ref 70–99)
MAGNESIUM SERPL-MCNC: 2 MG/DL (ref 1.7–2.3)
POTASSIUM SERPL-SCNC: 4.1 MMOL/L (ref 3.4–5.3)
SODIUM SERPL-SCNC: 140 MMOL/L (ref 135–145)

## 2024-05-15 PROCEDURE — 99223 1ST HOSP IP/OBS HIGH 75: CPT | Performed by: INTERNAL MEDICINE

## 2024-05-15 PROCEDURE — 120N000001 HC R&B MED SURG/OB

## 2024-05-15 PROCEDURE — 250N000012 HC RX MED GY IP 250 OP 636 PS 637: Performed by: STUDENT IN AN ORGANIZED HEALTH CARE EDUCATION/TRAINING PROGRAM

## 2024-05-15 PROCEDURE — 87305 ASPERGILLUS AG IA: CPT | Performed by: INTERNAL MEDICINE

## 2024-05-15 PROCEDURE — 83735 ASSAY OF MAGNESIUM: CPT | Performed by: STUDENT IN AN ORGANIZED HEALTH CARE EDUCATION/TRAINING PROGRAM

## 2024-05-15 PROCEDURE — 80048 BASIC METABOLIC PNL TOTAL CA: CPT | Performed by: STUDENT IN AN ORGANIZED HEALTH CARE EDUCATION/TRAINING PROGRAM

## 2024-05-15 PROCEDURE — 250N000011 HC RX IP 250 OP 636: Performed by: HOSPITALIST

## 2024-05-15 PROCEDURE — 250N000011 HC RX IP 250 OP 636: Performed by: STUDENT IN AN ORGANIZED HEALTH CARE EDUCATION/TRAINING PROGRAM

## 2024-05-15 PROCEDURE — 250N000011 HC RX IP 250 OP 636: Performed by: FAMILY MEDICINE

## 2024-05-15 PROCEDURE — 250N000013 HC RX MED GY IP 250 OP 250 PS 637: Performed by: HOSPITALIST

## 2024-05-15 PROCEDURE — 250N000013 HC RX MED GY IP 250 OP 250 PS 637: Performed by: STUDENT IN AN ORGANIZED HEALTH CARE EDUCATION/TRAINING PROGRAM

## 2024-05-15 PROCEDURE — 99232 SBSQ HOSP IP/OBS MODERATE 35: CPT | Performed by: STUDENT IN AN ORGANIZED HEALTH CARE EDUCATION/TRAINING PROGRAM

## 2024-05-15 PROCEDURE — 258N000003 HC RX IP 258 OP 636: Performed by: STUDENT IN AN ORGANIZED HEALTH CARE EDUCATION/TRAINING PROGRAM

## 2024-05-15 PROCEDURE — 87449 NOS EACH ORGANISM AG IA: CPT | Performed by: INTERNAL MEDICINE

## 2024-05-15 RX ORDER — PREDNISONE 20 MG/1
40 TABLET ORAL DAILY
Status: COMPLETED | OUTPATIENT
Start: 2024-05-15 | End: 2024-05-17

## 2024-05-15 RX ORDER — AZITHROMYCIN 250 MG/1
250 TABLET, FILM COATED ORAL DAILY
Status: DISCONTINUED | OUTPATIENT
Start: 2024-05-15 | End: 2024-05-17 | Stop reason: HOSPADM

## 2024-05-15 RX ADMIN — LISINOPRIL 20 MG: 20 TABLET ORAL at 08:18

## 2024-05-15 RX ADMIN — HEPARIN, PORCINE (PF) 10 UNIT/ML INTRAVENOUS SYRINGE 5 ML: at 11:11

## 2024-05-15 RX ADMIN — APIXABAN 5 MG: 5 TABLET, FILM COATED ORAL at 08:18

## 2024-05-15 RX ADMIN — MIRTAZAPINE 7.5 MG: 7.5 TABLET, FILM COATED ORAL at 21:47

## 2024-05-15 RX ADMIN — CEFTRIAXONE 1 G: 1 INJECTION, POWDER, FOR SOLUTION INTRAMUSCULAR; INTRAVENOUS at 21:46

## 2024-05-15 RX ADMIN — BENZOCAINE AND MENTHOL 1 LOZENGE: 15; 3.6 LOZENGE ORAL at 08:25

## 2024-05-15 RX ADMIN — AZITHROMYCIN 250 MG: 250 TABLET, FILM COATED ORAL at 14:24

## 2024-05-15 RX ADMIN — SODIUM CHLORIDE 250 MG: 9 INJECTION, SOLUTION INTRAVENOUS at 05:08

## 2024-05-15 RX ADMIN — ATORVASTATIN CALCIUM 20 MG: 10 TABLET, FILM COATED ORAL at 08:18

## 2024-05-15 RX ADMIN — FLUTICASONE FUROATE AND VILANTEROL TRIFENATATE 1 PUFF: 200; 25 POWDER RESPIRATORY (INHALATION) at 08:18

## 2024-05-15 RX ADMIN — TORSEMIDE 20 MG: 20 TABLET ORAL at 08:18

## 2024-05-15 RX ADMIN — PREDNISONE 40 MG: 20 TABLET ORAL at 14:24

## 2024-05-15 RX ADMIN — APIXABAN 5 MG: 5 TABLET, FILM COATED ORAL at 21:47

## 2024-05-15 RX ADMIN — INSULIN ASPART 2 UNITS: 100 INJECTION, SOLUTION INTRAVENOUS; SUBCUTANEOUS at 21:45

## 2024-05-15 ASSESSMENT — ACTIVITIES OF DAILY LIVING (ADL)
ADLS_ACUITY_SCORE: 35
ADLS_ACUITY_SCORE: 38
ADLS_ACUITY_SCORE: 35
ADLS_ACUITY_SCORE: 38
ADLS_ACUITY_SCORE: 35
ADLS_ACUITY_SCORE: 35
ADLS_ACUITY_SCORE: 38
ADLS_ACUITY_SCORE: 35
ADLS_ACUITY_SCORE: 38
ADLS_ACUITY_SCORE: 35
ADLS_ACUITY_SCORE: 38
ADLS_ACUITY_SCORE: 35

## 2024-05-15 NOTE — CONSULTS
Pulmonary/Critical Care Consult Team Note    Jasmyn Green,  1955, MRN [id number]  Admitting Dx: Hypoxia [R09.02]  COPD exacerbation (H) [J44.1]  Status post chemotherapy [Z92.21]  Urinary tract infection with hematuria, site unspecified [N39.0, R31.9]  Acute sepsis (H) [A41.9]  Date / Time of Admission:  5/10/2024  5:52 PM    She was up and going to and from the bathroom when I saw her  She needs assist of a walker and a NA  She is on 4L NC  She is winded  She has a cough  She has had some wheezing    Assessment/Plan: Jasmyn Green is a 68 year old female with PMHx of severe COPD on 3-5L home O2 and locally advanced small cell lung cancer for which she has been on treatment with carboplatin etoposide along with Tecentriq immunotherapy presenting with shortness of breath     Human Hardin pneumovirus causing acute COPD exacerbation and acute on chronic hypoxic resp failure  - Continue treatment with nebs, steroids, antibiotics, supplemental oxygen  - Ceftriaxone for community-acquired pneumonia  - Azithromycin for COPD exacerbation    The isolation of Aspergillus in respiratory cultures is neither sensitive nor specific in the diagnosis of most fungal respiratory infections and is not an integral part of the diagnostic criteria for Aspergillus-related lung diseases    Aspergillus in sputum - no indication of invasive pulmonary aspergillus - no nodule, aspergilloma, cavitary lesion, or changes in CT Chest  - will wait to heal from above and can re-assess given pt is immunocompromised on chemotherapy this could be chronic pulm aspergillosis  - will send serologic markers galactomannan and beta-D-glucan    Medical Care Time excluding procedures and family discussions greater than: 1 Hour    Risk Factors Present on Admission:  Clinically Significant Risk Factors            # Hypomagnesemia: Lowest Mg = 1.5 mg/dL in last 2 days, will replace as needed   # Hypoalbuminemia: Lowest albumin = 3.1 g/dL at  5/11/2024  5:48 AM, will monitor as appropriate     # Hypertension: Noted on problem list        # Cachexia: Estimated body mass index is 17.95 kg/m  as calculated from the following:    Height as of this encounter: 1.524 m (5').    Weight as of this encounter: 41.7 kg (91 lb 14.4 oz).   # Moderate Malnutrition: based on nutrition assessment    # Financial/Environmental Concerns: none  # COPD: noted on problem list       Code Status: No CPR- Do NOT Intubate         Remedios Galan DO  Pulmonary and Critical Care Attending  pgr [phone]    No Known Allergies    Meds: See MAR    Physical Exam:  /54 (BP Location: Left arm)   Pulse 104   Temp 98.8  F (37.1  C) (Oral)   Resp 18   Ht 1.524 m (5')   Wt 41.7 kg (91 lb 14.4 oz)   LMP  (LMP Unknown)   SpO2 95%   BMI 17.95 kg/m    Intake/Output this shift:  I/O this shift:  In: 480 [P.O.:480]  Out: -   GEN: sitting up in bed, NAD  HEENT: MMM, NC in place, temporal wasting   CVS: regular rhythm, no murmurs  RESP: poor air mvmt, decreased BS at bases, no wheezing  ABD: Soft, No abdominal pain with palpation, no guarding, no rigidity  EXT: Warm, well perfused, no edema  NEURO: moving all extremities, nonfocal  PSYCH: pleasant    Pertinent Labs: Latest lab results in EHR personally reviewed.   CMP  Recent Labs   Lab 05/15/24  0739 05/15/24  0507 05/15/24  0232 05/14/24  2102 05/14/24  1739 05/14/24  1405 05/14/24  0816 05/14/24  0651 05/13/24  0819 05/13/24  0629 05/12/24  2119 05/12/24  2102 05/12/24  1146 05/12/24  0923 05/11/24  0931 05/11/24  0548 05/10/24  1850   NA  --  140  --   --   --   --   --   --   --  142  --  140  --  143  --  137 142   POTASSIUM  --  4.1  --   --   --   --   --  3.9  --  3.9  --  4.4  --  4.2   < > 6.0* 5.2   CHLORIDE  --  102  --   --   --   --   --   --   --  106  --  103  --  110*  --  107 107   CO2  --  29  --   --   --   --   --   --   --  26  --  27  --  28  --  22 25   ANIONGAP  --  9  --   --   --   --   --   --   --  10   --  10  --  5*  --  8 10   GLC 88 82 101* 112*   < >  --    < >  --    < > 97   < > 188*   < > 102*   < > 186* 95   BUN  --  38.3*  --   --   --   --   --   --   --  31.3*  --  35.4*  --  27.1*  --  26.2* 29.7*   CR  --  0.86  --   --   --   --   --  0.80  --  0.66  --  0.77  --  0.79  --  0.83 0.94   GFRESTIMATED  --  72  --   --   --   --   --  79  --  >90  --  83  --  80  --  75 65   FLOR  --  8.8  --   --   --   --   --   --   --  8.9  --  8.8  --  8.2*  --  8.3* 9.3   MAG  --  2.0  --   --   --  2.1  --  1.5*  --  1.7  --  1.6*   < >  --   --   --   --    PROTTOTAL  --   --   --   --   --  6.2*  --   --   --   --   --  6.2*  --   --   --  5.4* 6.5   ALBUMIN  --   --   --   --   --  3.2*  --   --   --   --   --  3.2*  --   --   --  3.1* 3.4*   BILITOTAL  --   --   --   --   --  0.2  --   --   --   --   --  <0.2  --   --   --  <0.2 0.2   ALKPHOS  --   --   --   --   --  142  --   --   --   --   --  160*  --   --   --  181* 193*   AST  --   --   --   --   --  19  --   --   --   --   --  22  --   --   --   --  53*   ALT  --   --   --   --   --  37  --   --   --   --   --  47  --   --   --  64* 63*    < > = values in this interval not displayed.     CBC  Recent Labs   Lab 05/12/24 2102 05/12/24 0923 05/11/24  0509 05/10/24  1850   WBC 8.5 13.1* 8.3 14.1*   RBC 3.13* 2.98* 2.97* 3.41*   HGB 8.9* 8.7* 8.7* 9.9*   HCT 28.4* 27.1* 27.1* 31.2*   MCV 91 91 91 92   MCH 28.4 29.2 29.3 29.0   MCHC 31.3* 32.1 32.1 31.7   RDW 15.3* 15.2* 15.7* 15.6*    383 311 466*     INR  Recent Labs   Lab 05/10/24  1850   INR 1.00     Arterial Blood Gas  Recent Labs   Lab 05/12/24  2102   O2PER 3       Cultures: personally reviewed.   7-Day Micro Results    Collected Updated Procedure Result Status    05/14/2024 1049 05/14/2024 1602 Respiratory Panel PCR [[id number]]    (Abnormal)   Swab from Nasopharyngeal    Final result Component Value   No component results          05/14/2024 1049 05/14/2024 1602 Respiratory Panel PCR,  Nasopharyngeal [[id number]]    (Abnormal)   Swab from Nasopharyngeal    Final result Component Value   Adenovirus Not Detected   Coronavirus Not Detected   This test detects Coronavirus 229E, HKU1, NL63 and OC43 but does not distinguish between them. It does not detect MERS ( Respiratory Syndrome), SARS (Severe Acute Respiratory Syndrome) or 2019-nCoV (Novel 2019) Coronavirus.   Human Metapneumovirus Detected Abnormal    Human Rhin/Enterovirus Not Detected   Influenza A Not Detected   Influenza A, H1 Not Detected   Influenza A 2009 H1N1 Not Detected   Influenza A, H3 Not Detected   Influenza B Not Detected   Parainfluenza Virus 1 Not Detected   Parainfluenza Virus 2 Not Detected   Parainfluenza Virus 3 Not Detected   Parainfluenza Virus 4 Not Detected   Respiratory Syncytial Virus A Not Detected   Respiratory Syncytial Virus B Not Detected   Chlamydia Pneumoniae Not Detected   Mycoplasma Pneumoniae Not Detected          05/11/2024 0520 05/14/2024 1330 Respiratory Aerobic Bacterial Culture with Gram Stain [[id number]]   (Abnormal)   Sputum from Expectorate    Final result Component Value   Culture 2+ Normal leander    1+ Aspergillus fumigatus complex Abnormal    Gram Stain Result <10 Squamous epithelial cells/low power field    <25 PMNs/low power field    2+ Mixed leander             05/10/2024 2020 05/12/2024 0632 Urine Culture [[id number]]   Urine, Clean Catch    Final result Component Value   Culture <10,000 CFU/mL Mixture of Urogenital Leander             05/10/2024 1859 05/10/2024 2012 Symptomatic Influenza A/B, RSV, & SARS-CoV2 PCR (COVID-19) Nasopharyngeal [[id number]]    Swab from Nasopharyngeal    Final result Component Value   Influenza A PCR Negative   Influenza B PCR Negative   RSV PCR Negative   SARS CoV2 PCR Negative   NEGATIVE: SARS-CoV-2 (COVID-19) RNA not detected, presumed negative.          05/10/2024 1850 05/14/2024 2132 Blood Culture Peripheral Blood [[id number]]   Peripheral  Blood    Preliminary result Component Value   Culture No growth after 4 days P          Imaging: personally reviewed.   Results for orders placed during the hospital encounter of 02/16/24    XR Chest Port 1 View    Narrative  EXAM: XR CHEST PORT 1 VIEW  LOCATION: [facility]  DATE: 2/16/2024    INDICATION: Post lung biopsy  COMPARISON: 02/08/2024    Impression  IMPRESSION: A pulmonary nodule in left midlung zone is again seen. The heart is normal in size. There is mild central pulmonary venous congestion, similar prior exam. No pneumothorax is seen on this portable upright film.      Results for orders placed during the hospital encounter of 02/08/24    XR Chest Port 1 View    Narrative  CHEST ONE VIEW  2/12/2024 3:19 PM    HISTORY: Shortness of breath.    COMPARISON: CT chest 2/8/2024. Chest radiograph 2/8/2024.    Impression  IMPRESSION: Emphysema. Similar left subpleural nodularity and right  perihilar adenopathy. No new focal consolidation, pneumothorax or  significant pleural effusion. Mild basilar interstitial opacities  could reflect interstitial edema.    VITO PERRY MD      SYSTEM ID:  [id number]    Results for orders placed during the hospital encounter of 05/10/24    CT Chest Pulmonary Embolism w Contrast    Narrative  EXAM: CT CHEST PULMONARY EMBOLISM W CONTRAST  LOCATION: [facility]  DATE: 5/10/2024    INDICATION: Hypoxia, shortness of breath, lung cancer, tachycardia  COMPARISON: 5/3/2024  TECHNIQUE: CT chest pulmonary angiogram during arterial phase injection of IV contrast. Multiplanar reformats and MIP reconstructions were performed. Dose reduction techniques were used.  CONTRAST: [id number] 90ml    FINDINGS:  ANGIOGRAM CHEST: Pulmonary arteries are normal caliber and negative for pulmonary emboli. Thoracic aorta is negative for dissection. No CT evidence of right heart strain.    LUNGS AND PLEURA: Severe emphysema. Unchanged small area of  subpleural soft tissue thickening in the anterior left upper lobe (8/147), as well as an area of consolidation in the left upper lobe posteriorly with associated bronchiectasis. A few other  nodular opacities elsewhere also remain unchanged. No pleural effusion or pneumothorax.    MEDIASTINUM/AXILLAE: Unchanged enlarged lymph nodes in the AP window and left hilum. No enlarging lymph nodes. Right internal jugular port catheter tip at the SVC-RA junction.    CORONARY ARTERY CALCIFICATION: Severe.    UPPER ABDOMEN: Partially imaged splenic infarcts. Unchanged narrowing and filling defect in the proximal celiac axis.    MUSCULOSKELETAL: Normal.    Impression  IMPRESSION:  1.  No acute pulmonary emboli.    2.  Unchanged left upper lobe subpleural nodule, consolidation in the left upper lobe posteriorly, and a few other scattered nodular airspace opacities elsewhere.    3.  Unchanged mildly enlarged mediastinal and left hilar lymph nodes.    4.  Unchanged high-grade narrowing of the celiac artery proximally with noncalcified plaque or thrombus.      Patient Active Problem List   Diagnosis    COPD exacerbation (H)    History of gout    Coronary artery calcification    Lymphocytosis    Essential hypertension    Asymptomatic hypertensive urgency    Age-related osteoporosis without current pathological fracture    Lung mass    Small cell lung cancer (H)    Chronic respiratory failure with hypoxia (H)    Anemia associated with chemotherapy    Hypoxia    Status post chemotherapy    Urinary tract infection with hematuria, site unspecified    Acute sepsis (H)         Surgical History  She  has a past surgical history that includes Vaginal Delivery; [place] Tooth Extraction; Colonoscopy (N/A, 10/21/2021); Bronchoscopy Rigid Or Flexible W/Transendoscopic Endobronchial Ultrasound Guided (N/A, 2/16/2024); and IR Chest Port Placement > 5 Yrs of Age (5/2/2024).    Family History  Reviewed, and family history includes Chronic Obstructive  Pulmonary Disease in her sister; Diabetes in her mother; Heart Disease in her father and mother; Lung Cancer in her mother.    Social History  Reviewed, and she  reports that she quit smoking about 2 years ago. Her smoking use included cigarettes. She has been exposed to tobacco smoke. She has never used smokeless tobacco. She reports that she does not currently use alcohol. She reports that she does not use drugs.    Allergies  No Known Allergies           Remedios Galan, DO  Pulmonary and Critical Care Attending  [facility] [phone]    Securely message with the Vocera Web Console (learn more here)

## 2024-05-15 NOTE — PLAN OF CARE
Problem: Gas Exchange Impaired  Goal: Optimal Gas Exchange  Outcome: Progressing     Problem: COPD (Chronic Obstructive Pulmonary Disease)  Goal: Optimal Level of Functional [place]  Outcome: Progressing   Pt oriented x4. Utilizing 3L O2 overnight. VSS. Tele Sinus tach, HR mostly maintained between  overnight. Purewick utilized overnight. Denying pain.

## 2024-05-15 NOTE — PLAN OF CARE
Problem: Adult Inpatient Plan of Care  Goal: Optimal Comfort and Wellbeing  Outcome: Progressing  Problem: Gas Exchange Impaired  Goal: Optimal Gas Exchange  Outcome: Progressing   Goal Outcome Evaluation:  Pt A&Ox4, denies p/n/v, telemetry sinus tachycardia HR ranges from  with activity, asymptomatic, denies chest pain, pt LS diminished remained on 3L oxygen nasal cannula, respiratory panel PCR positive for Human metapneumovirus, MD notified, droplet precaution initiated, lozenges given for sore throat, pt utilizing purewick, encouraged fluids, discharge pending.

## 2024-05-15 NOTE — PROGRESS NOTES
[facility] MEDICINE  PROGRESS NOTE       Securely message me with Fadumo (more info)    Code Status: No CPR- Do NOT Intubate       Identification/Summary:   Jasmyn Green is a 68 year old female who presented with complaints of shortness of breath.    Medical history is notable for lung cancer, currently getting chemotherapy.  She also has a history of chronic respiratory failure with hypoxia on 4 to 5 L with activity and 3 to 4 L at rest, hypertension, coronary disease, arthritis. Likely has acute on chronic diastolic heart failure and COPD exacerbation from metapneumovirus.  Patient's dyspnea improved and back to 3L home O2 after 2-day diuretics and nebs/prednisone. However, she is still tachypneic and tachycardiac. Holding neb and prednisone may have helped. Cardiology consulted for persistent tachycardia. Pulm consulted for sputum culture positive for Aspergillus. Echo was done on 5/14 without significant finding but quality is limited due to tachycardia. She is continued on ceftriaxone, azithromycin, and no voriconazole was recommended per pulm. Serology pending for aspergillus.      Barriers to Discharge: persistent tachycardia and tachypnea     Disposition: inpatient     Assessment and Plan:  Acute on chronic respiratory failure with hypoxia  Severe COPD with acute exacerbation  Community-acquired pneumonia  Aspergillus fumigatus in sputum culture  Human metapneumovirus infection  Small cell lung cancer, on palliative chemo with carboplatin, etoposide, atezolizumab  Ceftriaxone for community-acquired pneumonia  Azithromycin for COPD exacerbation  Pulm consult for aspergillus fumigatus in sputum culture, serology sent and pending  Added voriconazole given her immunocompromised status, stopped on 5/15 per pulm     Acute on chronic sinus tachycardia  In the 140s this admission but was elevated in the past mildly. Asymptomatic though she could develop cardiomyopathy with  it after a few weeks. This could be related to her acute on chronic respiratory failure and pulmonary edema. Possibly from prednisone and Duoneb.  -Replete K to 4  -continue tele  -stopped prednisone and hold nebs after 3 days of admission, restarting prednisone for COPD exacerbation per pulm  -continue diuretics as below  -cardiology consult    Acute pulmonary edema  Improved with Lasix IV 40 mg on 5/11 and 5/12.  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  Splenic infarct, chronic  Brain lesion of unclear significance  Consulted vascular surgery who recommends IV heparin  Priscila with vascular surgeon on the phone and her brain lesion should not be a contraindication to start blood thinner  Will need to discuss with her oncologist/otologist for future oral agent for celiac artery thrombus on Monday - no reply  Will discontinue aspirin  Switched to Eliquis, follow up with vascular surgery     Transaminase elevation - resolved  Likely due to chemotherapy     Hyperkalemia resolved  Was about to start albuterol neb but her heart rate has been about 120s  Instead, did insulin and glucose     Essential hypertension, on lisinopril  Anxiety, on Valium as needed     Severe anemia, now 9.9 up from 6.9 5 days ago  Due to chemotherapy      Anticoagulation   Orders (Includes Only Anticoagulants)  apixaban ANTICOAGULANT, 5 mg, Oral, BID  heparin, 5-10 mL, Intracatheter, Q28 Days  heparin lock flush, 5-10 mL, Intracatheter, Q1H PRN  heparin lock flush, 5-10 mL, Intracatheter, Q24H      Therapy: PT, OT  Vick:Not present  Lines: PRESENT      Port a Cath 05/11/24 Single Lumen Right Chest wall-Site Assessment: WDL        Current Diet  Orders Placed This Encounter      3 Gram Potassium (low potassium) Diet        Clinically Significant Risk Factors            # Hypomagnesemia: Lowest Mg = 1.5 mg/dL in last 2 days, will replace as needed   # Hypoalbuminemia: Lowest albumin = 3.1 g/dL  at 5/11/2024  5:48 AM, will monitor as appropriate     # Hypertension: Noted on problem list        # Cachexia: Estimated body mass index is 17.95 kg/m  as calculated from the following:    Height as of this encounter: 1.524 m (5').    Weight as of this encounter: 41.7 kg (91 lb 14.4 oz).   # Moderate Malnutrition: based on nutrition assessment    # Financial/Environmental Concerns: none  # COPD: noted on problem list        Interval History/Subjective:  She feels OK. No chest pain, dyspnea. She still has sore throat and cough.      Last 24H PRN:     benzocaine-menthol (CEPACOL) 15-3.6 MG lozenge 1 lozenge, 1 lozenge at 05/15/24 0825    heparin lock flush 10 UNIT/ML injection 5-10 mL, 5 mL at 05/15/24 1111    Physical Exam/Objective:  Temp:  [97.7  F (36.5  C)-98.8  F (37.1  C)] 98.6  F (37  C)  Pulse:  [] 128  Resp:  [16-18] 18  BP: (104-125)/(50-67) 118/59  SpO2:  [93 %-98 %] 98 %  Wt Readings from Last 4 Encounters:   05/15/24 41.7 kg (91 lb 14.4 oz)   05/06/24 42.9 kg (94 lb 9.6 oz)   05/01/24 44 kg (97 lb)   04/15/24 45 kg (99 lb 3.2 oz)     Body mass index is 17.95 kg/m .    General Appearance: Alert and wake, not in distress  Respiratory: low breath sound, no crackles or wheezing  Cardiovascular: tachycardia, normal S1 and S2, no murmur  Neurology: oriented x 3  Psych: cooperative and calm, normal affect    Medications:   Personally Reviewed.  Medications   Current Facility-Administered Medications   Medication Dose Route Frequency Provider Last Rate Last Admin     Current Facility-Administered Medications   Medication Dose Route Frequency Provider Last Rate Last Admin    apixaban ANTICOAGULANT (ELIQUIS) tablet 5 mg  5 mg Oral BID Danyelle Prieto MD   5 mg at 05/15/24 0818    atorvastatin (LIPITOR) tablet 20 mg  20 mg Oral Daily Timothy Senior DO   20 mg at 05/15/24 0818    cefTRIAXone (ROCEPHIN) 1 g vial to attach to  mL bag for ADULTS or NS 50 mL bag for PEDS  1 g Intravenous Q24H Timothy Senior,  DO   1 g at 24    docusate sodium (COLACE) capsule 100 mg  100 mg Oral BID Timothy Senior, DO   100 mg at 24    fluticasone-vilanterol (BREO ELLIPTA) 200-25 MCG/ACT inhaler 1 puff  1 puff Inhalation Daily Danyelle Prieto MD   1 puff at 05/15/24 0818    heparin 100 unit/mL injection 5-10 mL  5-10 mL Intracatheter Q28 Days Chiquita Swain MD        heparin lock flush 10 UNIT/ML injection 5-10 mL  5-10 mL Intracatheter Q24H Chiquita Swain MD        insulin aspart (NovoLOG) injection (RAPID ACTING)  1-3 Units Subcutaneous TID AC Danyelle Prieto MD   1 Units at 24 1206    insulin aspart (NovoLOG) injection (RAPID ACTING)  1-3 Units Subcutaneous At Bedtime Danyelle Prieto MD        lisinopril (ZESTRIL) tablet 20 mg  20 mg Oral Daily Timothy Senior, DO   20 mg at 05/15/24 0818    mirtazapine (REMERON) tablet TABS 7.5 mg  7.5 mg Oral At Bedtime Timothy Senior, DO   7.5 mg at 24    predniSONE (DELTASONE) tablet 40 mg  40 mg Oral Daily Danyelle Prieto MD        sodium chloride (PF) 0.9% PF flush 10-20 mL  10-20 mL Intracatheter Q28 Days Chiquita Swain MD   20 mL at 24 0430    sodium chloride (PF) 0.9% PF flush 3 mL  3 mL Intracatheter Q8H Timothy Senior, DO   3 mL at 05/15/24 0818    torsemide (DEMADEX) tablet 20 mg  20 mg Oral Daily Danyelle Prieto MD   20 mg at 05/15/24 0818       Data reviewed today: I personally reviewed all new medications, labs, imaging/diagnostics reports over the past 24 hours. Pertinent findings include:    Imaging:   Recent Results (from the past 24 hour(s))   Echocardiogram Complete   Result Value    LVEF  60-65%    [place] [address]     Name: JACE GONG  MRN: [id number]  : 1955  Study Date: 2024 08:21 AM  Age: 68 yrs  Gender: Female     Performed By: INDIGO  BSA: 1.4 m2  Height: 60 in  Weight: 94 lb  HR: 123  BP: 133/58 mmHg      ______________________________________________________________________________  Procedure  Complete Portable Echo Adult.  ______________________________________________________________________________  Interpretation Summary     Left ventricular size, wall motion and function are normal. The ejection  fraction is 60-65%.  Normal right ventricle size and systolic function.  No hemodynamically significant valvular abnormalities on 2D or color flow  imaging. The study was technically difficult.  ______________________________________________________________________________  Left Ventricle  Left ventricular size, wall motion and function are normal. The ejection  fraction is 60-65%. There is normal left ventricular wall thickness. Left  ventricular diastolic function is indeterminate. No regional wall motion  abnormalities noted.     Right Ventricle  Normal right ventricle size and systolic function.     Atria  Normal left atrial size. Right atrial size is normal. There is no color  Doppler evidence of an atrial shunt.     Mitral Valve  Mitral valve leaflets appear normal. There is no evidence of mitral stenosis  or clinically significant mitral regurgitation.     Tricuspid Valve  Tricuspid valve leaflets appear normal. There is no evidence of tricuspid  stenosis or clinically significant tricuspid regurgitation. There is trace to  mild tricuspid regurgitation. Right ventricle systolic pressure estimate  normal.     Aortic Valve  Aortic valve leaflets appear normal. There is no evidence of aortic stenosis  or clinically significant aortic regurgitation.     Pulmonic Valve  The pulmonic valve is not well seen, but is grossly normal. This degree of  valvular regurgitation is within normal limits.     Vessels  The aorta root is normal. Normal size ascending aorta. IVC diameter <2.1 cm  collapsing >50% with sniff suggests a normal RA pressure of 3 mmHg.     Pericardium  There is no pericardial  effusion.  ______________________________________________________________________________  MMode/2D Measurements & Calculations     IVSd: 0.95 cm  LVIDd: 3.1 cm  LVIDs: 1.8 cm  LVPWd: 0.97 cm  FS: 42.7 %  LV mass(C)d: 81.5 grams  LV mass(C)dI: 60.2 grams/m2  Ao root diam: 2.8 cm  Ao root diam index Ht(cm/m): 1.8  Ao root diam index BSA (cm/m2): 2.0  EF Biplane: 60.9 %  RV Base: 2.7 cm  RWT: 0.62  TAPSE: 1.5 cm     Time Measurements  MM HR: 116.0 BPM     Doppler Measurements & Calculations  MV E max charanjit: 66.4 cm/sec  MV A max charanjit: 114.0 cm/sec  MV E/A: 0.58  MV max P.1 mmHg  MV mean PG: 3.8 mmHg  MV V2 VTI: 16.9 cm  MV dec time: 0.08 sec  Ao V2 max: 112.0 cm/sec  Ao max P.0 mmHg  Ao V2 mean: 81.8 cm/sec  Ao mean PG: 3.0 mmHg  Ao V2 VTI: 15.1 cm  LV V1 max PG: 3.5 mmHg  LV V1 max: 93.0 cm/sec  LV V1 VTI: 11.9 cm  PA acc time: 0.07 sec  TR max charanjit: 251.9 cm/sec  TR max P.4 mmHg  AV Charanjit Ratio (DI): 0.83  E/E': 9.5  E/E' avg: 10.6  Lateral E/e': 11.7  Medial E/e': 9.5  Peak E' Charanjit: 7.0 cm/sec  RV S Charanjit: 10.4 cm/sec     ______________________________________________________________________________  Report approved by: Marisol Up 2024 04:18 PM             Labs:  Echocardiogram Complete   Final Result      CTA Abdomen Pelvis with Contrast   Final Result   IMPRESSION:   1.  Near occlusive stenosis of the celiac axis, with intraluminal thrombus.   2.  Multifocal atherosclerotic disease, abdominal aortic aneurysm, and additional multifocal stenoses.   3.  Additional findings as above.      CT Chest Pulmonary Embolism w Contrast   Final Result   IMPRESSION:   1.  No acute pulmonary emboli.      2.  Unchanged left upper lobe subpleural nodule, consolidation in the left upper lobe posteriorly, and a few other scattered nodular airspace opacities elsewhere.      3.  Unchanged mildly enlarged mediastinal and left hilar lymph nodes.      4.  Unchanged high-grade narrowing of the celiac artery proximally  with noncalcified plaque or thrombus.        Recent Results (from the past 24 hour(s))   Magnesium    Collection Time: 05/14/24  2:05 PM   Result Value Ref Range    Magnesium 2.1 1.7 - 2.3 mg/dL   Hepatic panel    Collection Time: 05/14/24  2:05 PM   Result Value Ref Range    Protein Total 6.2 (L) 6.4 - 8.3 g/dL    Albumin 3.2 (L) 3.5 - 5.2 g/dL    Bilirubin Total 0.2 <=1.2 mg/dL    Alkaline Phosphatase 142 40 - 150 U/L    AST 19 0 - 45 U/L    ALT 37 0 - 50 U/L    Bilirubin Direct <0.20 0.00 - 0.30 mg/dL   Echocardiogram Complete    Collection Time: 05/14/24  4:01 PM   Result Value Ref Range    LVEF  60-65%    Glucose by meter    Collection Time: 05/14/24  5:39 PM   Result Value Ref Range    GLUCOSE BY METER POCT 124 (H) 70 - 99 mg/dL   Glucose by meter    Collection Time: 05/14/24  9:02 PM   Result Value Ref Range    GLUCOSE BY METER POCT 112 (H) 70 - 99 mg/dL   Glucose by meter    Collection Time: 05/15/24  2:32 AM   Result Value Ref Range    GLUCOSE BY METER POCT 101 (H) 70 - 99 mg/dL   Basic metabolic panel    Collection Time: 05/15/24  5:07 AM   Result Value Ref Range    Sodium 140 135 - 145 mmol/L    Potassium 4.1 3.4 - 5.3 mmol/L    Chloride 102 98 - 107 mmol/L    Carbon Dioxide (CO2) 29 22 - 29 mmol/L    Anion Gap 9 7 - 15 mmol/L    Urea Nitrogen 38.3 (H) 8.0 - 23.0 mg/dL    Creatinine 0.86 0.51 - 0.95 mg/dL    GFR Estimate 72 >60 mL/min/1.73m2    Calcium 8.8 8.8 - 10.2 mg/dL    Glucose 82 70 - 99 mg/dL   Magnesium    Collection Time: 05/15/24  5:07 AM   Result Value Ref Range    Magnesium 2.0 1.7 - 2.3 mg/dL   Glucose by meter    Collection Time: 05/15/24  7:39 AM   Result Value Ref Range    GLUCOSE BY METER POCT 88 70 - 99 mg/dL   Glucose by meter    Collection Time: 05/15/24 12:16 PM   Result Value Ref Range    GLUCOSE BY METER POCT 125 (H) 70 - 99 mg/dL       Pending Labs:  Unresulted Labs Ordered in the Past 30 Days of this Admission       Date and Time Order Name Status Description    5/15/2024 10:40  AM 1,3 Beta D glucan fungitell In process     5/15/2024 10:40 AM Aspergillus Galactomannan Antigen In process     5/10/2024  6:36 PM Blood Culture Peripheral Blood Preliminary             I spoke with Dr. Galan to discuss patient's care.    KAZ BENAVIDES MD  [facility] Medicine  [facility]  Phone: #[phone]    Securely message me with Fadumo (more info)

## 2024-05-15 NOTE — PLAN OF CARE
Problem: Gas Exchange Impaired  Goal: Optimal Gas Exchange  Outcome: Progressing     Problem: COPD (Chronic Obstructive Pulmonary Disease)  Goal: Optimal Chronic Illness Coping  Outcome: Progressing     Problem: Pneumonia  Goal: Resolution of Infection Signs and Symptoms  Outcome: Progressing  Intervention: Prevent Infection Progression  Recent Flowsheet Documentation  Taken 5/15/2024 0800 by Elías Bland RN  Isolation Precautions: droplet precautions maintained   Goal Outcome Evaluation:         Pt alert and oriented x4  Denies any pain  Pt on 3L O2 at rest sating 95% - O2 turned up to 5L when up oob per her baseline.   Lung sounds are diminished and pt has occasional productive cough - encouraged coughing and deep breathing and use of IS.   Pt has sore throat too - prn lozenges are effective in relieving soreness.   Continue with IV abx, IV antifungal, toresemide. Started po azithromycin today. Pt voiding well. Pulmonology consulted and wanted to restart prednisone.   Pt lethargic but got up and into the chair today and worked with PT/OT although needed a nap inbetween.   Continue with eliquis for dvt treatment.   Pt's HR is lower today around 105-115 at rest and up to 130's-140 with activity. Cardiology consulted.   Potentially discharge later today.

## 2024-05-16 ENCOUNTER — APPOINTMENT (OUTPATIENT)
Dept: OCCUPATIONAL THERAPY | Facility: CLINIC | Age: 69
DRG: 193 | End: 2024-05-16
Payer: COMMERCIAL

## 2024-05-16 ENCOUNTER — APPOINTMENT (OUTPATIENT)
Dept: PHYSICAL THERAPY | Facility: CLINIC | Age: 69
DRG: 193 | End: 2024-05-16
Payer: COMMERCIAL

## 2024-05-16 LAB
1,3 BETA GLUCAN SER-MCNC: 41 PG/ML
ALBUMIN SERPL BCG-MCNC: 3.3 G/DL (ref 3.5–5.2)
ALP SERPL-CCNC: 128 U/L (ref 40–150)
ALT SERPL W P-5'-P-CCNC: 38 U/L (ref 0–50)
ANION GAP SERPL CALCULATED.3IONS-SCNC: 10 MMOL/L (ref 7–15)
AST SERPL W P-5'-P-CCNC: 23 U/L (ref 0–45)
BILIRUB DIRECT SERPL-MCNC: <0.2 MG/DL (ref 0–0.3)
BILIRUB SERPL-MCNC: 0.2 MG/DL
BUN SERPL-MCNC: 49.4 MG/DL (ref 8–23)
CALCIUM SERPL-MCNC: 9.3 MG/DL (ref 8.8–10.2)
CHLORIDE SERPL-SCNC: 103 MMOL/L (ref 98–107)
CREAT SERPL-MCNC: 0.94 MG/DL (ref 0.51–0.95)
DEPRECATED HCO3 PLAS-SCNC: 29 MMOL/L (ref 22–29)
EGFRCR SERPLBLD CKD-EPI 2021: 65 ML/MIN/1.73M2
GALACTOMANNAN AG SERPL QL IA: NEGATIVE
GALACTOMANNAN AG SPEC IA-ACNC: 0.07
GLUCOSE BLDC GLUCOMTR-MCNC: 118 MG/DL (ref 70–99)
GLUCOSE BLDC GLUCOMTR-MCNC: 134 MG/DL (ref 70–99)
GLUCOSE BLDC GLUCOMTR-MCNC: 175 MG/DL (ref 70–99)
GLUCOSE BLDC GLUCOMTR-MCNC: 288 MG/DL (ref 70–99)
GLUCOSE BLDC GLUCOMTR-MCNC: 335 MG/DL (ref 70–99)
GLUCOSE BLDC GLUCOMTR-MCNC: 402 MG/DL (ref 70–99)
GLUCOSE SERPL-MCNC: 131 MG/DL (ref 70–99)
MAGNESIUM SERPL-MCNC: 2 MG/DL (ref 1.7–2.3)
OBSERVATION IMP: NEGATIVE
POTASSIUM SERPL-SCNC: 4.6 MMOL/L (ref 3.4–5.3)
PROT SERPL-MCNC: 6.2 G/DL (ref 6.4–8.3)
SODIUM SERPL-SCNC: 142 MMOL/L (ref 135–145)

## 2024-05-16 PROCEDURE — 250N000009 HC RX 250: Performed by: INTERNAL MEDICINE

## 2024-05-16 PROCEDURE — 99232 SBSQ HOSP IP/OBS MODERATE 35: CPT | Performed by: INTERNAL MEDICINE

## 2024-05-16 PROCEDURE — 250N000009 HC RX 250: Performed by: EMERGENCY MEDICINE

## 2024-05-16 PROCEDURE — 97110 THERAPEUTIC EXERCISES: CPT | Mod: GO

## 2024-05-16 PROCEDURE — 97530 THERAPEUTIC ACTIVITIES: CPT | Mod: GP

## 2024-05-16 PROCEDURE — 80053 COMPREHEN METABOLIC PANEL: CPT | Performed by: STUDENT IN AN ORGANIZED HEALTH CARE EDUCATION/TRAINING PROGRAM

## 2024-05-16 PROCEDURE — 99233 SBSQ HOSP IP/OBS HIGH 50: CPT | Performed by: EMERGENCY MEDICINE

## 2024-05-16 PROCEDURE — 120N000001 HC R&B MED SURG/OB

## 2024-05-16 PROCEDURE — 97116 GAIT TRAINING THERAPY: CPT | Mod: GP

## 2024-05-16 PROCEDURE — 94640 AIRWAY INHALATION TREATMENT: CPT | Mod: 76

## 2024-05-16 PROCEDURE — 83735 ASSAY OF MAGNESIUM: CPT | Performed by: STUDENT IN AN ORGANIZED HEALTH CARE EDUCATION/TRAINING PROGRAM

## 2024-05-16 PROCEDURE — 999N000157 HC STATISTIC RCP TIME EA 10 MIN

## 2024-05-16 PROCEDURE — 250N000013 HC RX MED GY IP 250 OP 250 PS 637: Performed by: STUDENT IN AN ORGANIZED HEALTH CARE EDUCATION/TRAINING PROGRAM

## 2024-05-16 PROCEDURE — 250N000013 HC RX MED GY IP 250 OP 250 PS 637: Performed by: HOSPITALIST

## 2024-05-16 PROCEDURE — 250N000012 HC RX MED GY IP 250 OP 636 PS 637: Performed by: STUDENT IN AN ORGANIZED HEALTH CARE EDUCATION/TRAINING PROGRAM

## 2024-05-16 RX ORDER — DILTIAZEM HYDROCHLORIDE 5 MG/ML
5 INJECTION INTRAVENOUS
Status: DISCONTINUED | OUTPATIENT
Start: 2024-05-16 | End: 2024-05-17 | Stop reason: HOSPADM

## 2024-05-16 RX ORDER — ATORVASTATIN CALCIUM 40 MG/1
40 TABLET, FILM COATED ORAL DAILY
Status: DISCONTINUED | OUTPATIENT
Start: 2024-05-17 | End: 2024-05-17 | Stop reason: HOSPADM

## 2024-05-16 RX ORDER — IOPAMIDOL 755 MG/ML
100 INJECTION, SOLUTION INTRAVASCULAR ONCE
Status: DISCONTINUED | OUTPATIENT
Start: 2024-05-16 | End: 2024-05-17 | Stop reason: HOSPADM

## 2024-05-16 RX ORDER — NITROGLYCERIN 0.4 MG/1
0.4 TABLET SUBLINGUAL ONCE
Status: DISCONTINUED | OUTPATIENT
Start: 2024-05-16 | End: 2024-05-17 | Stop reason: HOSPADM

## 2024-05-16 RX ORDER — DILTIAZEM HYDROCHLORIDE 5 MG/ML
10 INJECTION INTRAVENOUS
Status: DISCONTINUED | OUTPATIENT
Start: 2024-05-16 | End: 2024-05-17 | Stop reason: HOSPADM

## 2024-05-16 RX ORDER — IPRATROPIUM BROMIDE AND ALBUTEROL SULFATE 2.5; .5 MG/3ML; MG/3ML
3 SOLUTION RESPIRATORY (INHALATION)
Status: DISCONTINUED | OUTPATIENT
Start: 2024-05-16 | End: 2024-05-17 | Stop reason: HOSPADM

## 2024-05-16 RX ORDER — METOPROLOL TARTRATE 1 MG/ML
5 INJECTION, SOLUTION INTRAVENOUS
Status: DISCONTINUED | OUTPATIENT
Start: 2024-05-16 | End: 2024-05-17 | Stop reason: HOSPADM

## 2024-05-16 RX ORDER — LIDOCAINE 40 MG/G
CREAM TOPICAL
Status: DISCONTINUED | OUTPATIENT
Start: 2024-05-16 | End: 2024-05-17 | Stop reason: HOSPADM

## 2024-05-16 RX ADMIN — BENZOCAINE AND MENTHOL 1 LOZENGE: 15; 3.6 LOZENGE ORAL at 09:14

## 2024-05-16 RX ADMIN — BENZOCAINE AND MENTHOL 1 LOZENGE: 15; 3.6 LOZENGE ORAL at 13:55

## 2024-05-16 RX ADMIN — DOCUSATE SODIUM 100 MG: 100 CAPSULE, LIQUID FILLED ORAL at 21:10

## 2024-05-16 RX ADMIN — IPRATROPIUM BROMIDE AND ALBUTEROL SULFATE 3 ML: .5; 3 SOLUTION RESPIRATORY (INHALATION) at 11:55

## 2024-05-16 RX ADMIN — ATORVASTATIN CALCIUM 20 MG: 10 TABLET, FILM COATED ORAL at 09:13

## 2024-05-16 RX ADMIN — FLUTICASONE FUROATE AND VILANTEROL TRIFENATATE 1 PUFF: 200; 25 POWDER RESPIRATORY (INHALATION) at 09:14

## 2024-05-16 RX ADMIN — MIRTAZAPINE 7.5 MG: 7.5 TABLET, FILM COATED ORAL at 21:10

## 2024-05-16 RX ADMIN — METOPROLOL TARTRATE 5 MG: 1 INJECTION, SOLUTION INTRAVENOUS at 14:15

## 2024-05-16 RX ADMIN — LISINOPRIL 20 MG: 20 TABLET ORAL at 09:13

## 2024-05-16 RX ADMIN — PREDNISONE 40 MG: 20 TABLET ORAL at 09:13

## 2024-05-16 RX ADMIN — INSULIN ASPART 3 UNITS: 100 INJECTION, SOLUTION INTRAVENOUS; SUBCUTANEOUS at 21:09

## 2024-05-16 RX ADMIN — APIXABAN 5 MG: 5 TABLET, FILM COATED ORAL at 21:10

## 2024-05-16 RX ADMIN — AZITHROMYCIN 250 MG: 250 TABLET, FILM COATED ORAL at 13:55

## 2024-05-16 RX ADMIN — IPRATROPIUM BROMIDE AND ALBUTEROL SULFATE 3 ML: .5; 3 SOLUTION RESPIRATORY (INHALATION) at 16:50

## 2024-05-16 RX ADMIN — APIXABAN 5 MG: 5 TABLET, FILM COATED ORAL at 09:13

## 2024-05-16 RX ADMIN — DOCUSATE SODIUM 100 MG: 100 CAPSULE, LIQUID FILLED ORAL at 09:13

## 2024-05-16 RX ADMIN — BENZOCAINE AND MENTHOL 1 LOZENGE: 15; 3.6 LOZENGE ORAL at 17:37

## 2024-05-16 RX ADMIN — IPRATROPIUM BROMIDE AND ALBUTEROL SULFATE 3 ML: .5; 3 SOLUTION RESPIRATORY (INHALATION) at 19:20

## 2024-05-16 ASSESSMENT — ACTIVITIES OF DAILY LIVING (ADL)
ADLS_ACUITY_SCORE: 35
ADLS_ACUITY_SCORE: 38
ADLS_ACUITY_SCORE: 35
ADLS_ACUITY_SCORE: 35
ADLS_ACUITY_SCORE: 38
ADLS_ACUITY_SCORE: 35
ADLS_ACUITY_SCORE: 35
ADLS_ACUITY_SCORE: 38
ADLS_ACUITY_SCORE: 38
ADLS_ACUITY_SCORE: 35
ADLS_ACUITY_SCORE: 38
ADLS_ACUITY_SCORE: 35
ADLS_ACUITY_SCORE: 36
ADLS_ACUITY_SCORE: 35
ADLS_ACUITY_SCORE: 35
ADLS_ACUITY_SCORE: 38
ADLS_ACUITY_SCORE: 35

## 2024-05-16 NOTE — PROGRESS NOTES
Coronary CTA:  Pt received with BP:  110/61  HR: 117.  After 5mg IV metoprolol administered, HR:  125.  Dr. Atkinson, CT reader today consulted and test cancelled.  Demetrio Mcgee stated he would be calling Dr. Madrid to suggest alternate testing.  Pt informed and transferred back to room 226. Pt's nurse, Jose Márquez RN informed.

## 2024-05-16 NOTE — PLAN OF CARE
Problem: Risk for Delirium  Goal: Improved Sleep  Outcome: Progressing     Problem: Gas Exchange Impaired  Goal: Optimal Gas Exchange  Outcome: Progressing     Goal Outcome Evaluation:       Pt currently on 3 liters, lungs diminished. Purewick in place for overnight incontinence. IV antibiotics given. Lungs diminished. Glucose checks, insulin coverage per sliding scale.

## 2024-05-16 NOTE — PROGRESS NOTES
[facility] MEDICINE PROGRESS NOTE      Summary: 68 year old female into [facility] on 5/10/2024 after  Presenting to the hospital with progressive dyspnea.    PMHx includes SCLC, COPD, oxygen dependence, CAD,  PAD, history of tobacco use.  PFT's in 2021 showed severe  Obstructive disease with air trapping    Hospital course is marked by clinical support for her copd,  Dyspnea,  sinus tachycardia. She is on prednisone, lipitor,  Lisinopril, nebs.      5/16: per my interview with the patient today she is dyspneic  With an increased work of breathing and tachycardic.  Per  RN she has improved over the days.  Per the patient she  Wishes she could breathe easier.      I recognize the patient has severe chronic lung disease.  I do  Not see advanced cardiac testing despite known severe   Coronary disease.     I ordered a coronary CTA for tomorrow though after further  Discussion it seems due to her sinus tachycardia this will  Create a suboptimal study.  We should discuss a nuclear  Stress test for tomorrow.        Hospital day number 6    Problem list:     Acute on chronic respiratory failure due to metapneumovirus,  SCLC, COPD    2.  COPD: on prednisone daily 40 mg; continue with slow taper at    Discharge   3.  SCLC with brain met; following with heme/ onc (Pat );   Receiving chemotherapy, immunotherapy   4.  Sinus tachycardia: due to chronic lung disease,   5.  CAD: present on CT chest; severe; consider nuclear stres  6.  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  HTN:  lisinopril; no changes  8. Drug induced coagulation defect due to eliquis use due to   Severe PAD and celiac artery stenosis  9.  Dyslipidemia:  increase lipitor to 40 mg daily  10.  Dvt prevention:  on eliquis        Manisha Madrid MD  [facility] Medicine  [facility]  Phone: #[phone]  Securely message with the Vocera Web Console  (learn more here)  Text page via [facility] Paging/Directory     Interval History/Subjective:      Physical Exam/Objective:  Temp:  [97  F (36.1  C)-98.8  F (37.1  C)] 97.2  F (36.2  C)  Pulse:  [] 122  Resp:  [16-18] 16  BP: (101-138)/(50-69) 105/64  SpO2:  [90 %-98 %] 95 %  Body mass index is 17.77 kg/m .    GENERAL:  Alert, thin, chronically ill; tacycardic; mild respiratory distress   HEAD:  Normocephalic, without obvious abnormality, atraumatic   EYES:  PERRL, conjunctiva/corneas clear, no scleral icterus, EOM's intact   NOSE: Nares normal, septum midline, mucosa normal, no drainage   THROAT: Lips, mucosa, and tongue normal; teeth and gums normal, mouth moist   NECK: Supple, symmetrical, trachea midline   BACK:   Symmetric, no curvature, ROM normal   LUNGS:   Diminished bilaterally   CHEST WALL:  No tenderness or deformity   HEART:  Tachycardic, regular, no murmurs   ABDOMEN:   Soft, non-tender, bowel sounds active all four quadrants, no masses, no organomegaly, no rebound or guarding   EXTREMITIES: Extremities normal, atraumatic, no cyanosis or edema    SKIN: Dry to touch, no exanthems in the visualized areas   NEURO: Alert, oriented x3, moves all four extremities freely   PSYCH: Cooperative, behavior is appropriate      Data reviewed today: I personally reviewed all new medications, labs, imaging/diagnostics reports over the past 24 hours. Pertinent findings include:    Imaging:   No results found for this or any previous visit (from the past 24 hour(s)).    Labs:  Most Recent 3 BMP's:  Recent Labs   Lab Test 05/16/24  1254 05/16/24  0803 05/16/24  0631 05/15/24  0739 05/15/24  0507 05/14/24  0816 05/14/24  0651 05/13/24  0819 05/13/24  0629   NA  --   --  142  --  140  --   --   --  142   POTASSIUM  --   --  4.6  --  4.1  --  3.9  --  3.9   CHLORIDE  --   --  103  --  102  --   --   --  106   CO2  --   --  29  --  29  --   --   --  26   BUN  --   --  49.4*  --  38.3*  --   --   --  31.3*   CR  --   --  0.94   --  0.86  --  0.80  --  0.66   ANIONGAP  --   --  10  --  9  --   --   --  10   FLOR  --   --  9.3  --  8.8  --   --   --  8.9   * 118* 131*   < > 82   < >  --    < > 97    < > = values in this interval not displayed.       Medications:   Personally Reviewed.  Medications   Current Facility-Administered Medications   Medication Dose Route Frequency Provider Last Rate Last Admin     Current Facility-Administered Medications   Medication Dose Route Frequency Provider Last Rate Last Admin    apixaban ANTICOAGULANT (ELIQUIS) tablet 5 mg  5 mg Oral BID Danyelle Prieto MD   5 mg at 05/16/24 0913    atorvastatin (LIPITOR) tablet 20 mg  20 mg Oral Daily Timothy Senior DO   20 mg at 05/16/24 0913    azithromycin (ZITHROMAX) tablet 250 mg  250 mg Oral Daily Danyelle Prieto MD   250 mg at 05/16/24 1355    docusate sodium (COLACE) capsule 100 mg  100 mg Oral BID Timothy Senior DO   100 mg at 05/16/24 0913    fluticasone-vilanterol (BREO ELLIPTA) 200-25 MCG/ACT inhaler 1 puff  1 puff Inhalation Daily Danyelle Prieto MD   1 puff at 05/16/24 0914    heparin 100 unit/mL injection 5-10 mL  5-10 mL Intracatheter Q28 Days Chiquita Swain MD        heparin lock flush 10 UNIT/ML injection 5-10 mL  5-10 mL Intracatheter Q24H Chiquita Swain MD        insulin aspart (NovoLOG) injection (RAPID ACTING)  1-3 Units Subcutaneous TID AC Danyelle Prieto MD   1 Units at 05/16/24 1258    insulin aspart (NovoLOG) injection (RAPID ACTING)  1-3 Units Subcutaneous At Bedtime Danyelle Prieto MD   2 Units at 05/15/24 2145    iopamidol (ISOVUE-370) solution 100 mL  100 mL Intravenous Once Manisha Madrid MD        ipratropium - albuterol 0.5 mg/2.5 mg/3 mL (DUONEB) neb solution 3 mL  3 mL Nebulization 4x daily Kingsley Olivia MD   3 mL at 05/16/24 1155    lisinopril (ZESTRIL) tablet 20 mg  20 mg Oral Daily Timothy Senior DO   20 mg at 05/16/24 0913    mirtazapine (REMERON) tablet TABS 7.5 mg  7.5 mg Oral At Bedtime Timothy Senior DO   7.5 mg at  05/15/24 2147    nitroGLYcerin (NITROSTAT) sublingual tablet 0.4 mg  0.4 mg Sublingual Once Manisha Madrid MD        predniSONE (DELTASONE) tablet 40 mg  40 mg Oral Daily Danyelle Prieto MD   40 mg at 05/16/24 0913    sodium chloride (PF) 0.9% PF flush 10-20 mL  10-20 mL Intracatheter Q28 Days Chiquita Swani MD   20 mL at 05/12/24 0430    sodium chloride (PF) 0.9% PF flush 3 mL  3 mL Intracatheter Q8H Manisha Madrid MD        sodium chloride (PF) 0.9% PF flush 3 mL  3 mL Intracatheter Q8H Timothy Senior DO   3 mL at 05/16/24 0913    [Held by provider] torsemide (DEMADEX) tablet 20 mg  20 mg Oral Daily Danyelle Prieto MD   20 mg at 05/15/24 0818

## 2024-05-16 NOTE — PLAN OF CARE
Problem: Adult Inpatient Plan of Care  Goal: Optimal Comfort and Wellbeing  Outcome: Progressing     Problem: Risk for Delirium  Goal: Improved Sleep  Outcome: Progressing   Goal Outcome Evaluation:    Patient AxO, no acute changes this shift and remains on contact/droplet precuations. Denies pain, n/v, dizziness, headache, chest pain, shortness of breath or any new sx. VSS on 3L via nc. Tele- NSR w/HR improving, HR-70's-80's this shift. No distress noted w/assessment, respirations even and unlabored. Pt resting comfortably in between cares. Pt able to call appropriately and make needs known, call light within reach. Will monitor and continue plan of care.

## 2024-05-16 NOTE — PROGRESS NOTES
Care Management Follow Up    Length of Stay (days): 6    Expected Discharge Date: 05/16/2024     Concerns to be Addressed: discharge planning       Patient plan of care discussed at interdisciplinary rounds: Yes    Anticipated Discharge Disposition: Home     Anticipated Discharge Services: None    Anticipated Discharge DME: Oxygen    Education Provided on the Discharge Plan: Yes (AVS per bedside RN)    Patient/Family in Agreement with the Plan: yes          Additional Information:  CM reviewed chart. Anticipate discharge home with family. Family to provide transportation home at discharge. CM will follow.     Marcelle Villarreal RN

## 2024-05-16 NOTE — PROGRESS NOTES
Pulmonary Progress Note  5/16/2024      Admit Date: 5/10/2024  CODE: No CPR- Do NOT Intubate    Reason for Consult: acute on chronic respiratory failure with hypoxemia. Human metapneumovirus infection. COPD exacerbation.     Assessment/Plan:   68F w/ COPD, chronic hypoxemic resp failure on home O2, locally advanced SCLC on chemo/immunotherapy, here with HMPV infection and COPD exacerbation. Appears to be at her baseline. Unclear significance of aspergillus in sputum; nothing to suggest active pulmonary aspergillus infection.     Recommendations:  - SpO2 goal 88-92%  - encourage OOB, PT/OT, push IS  - complete oral prednisone course  - stop IV ceftriaxone. Continue azithromycin course  - continue scheduled duonebs, albuterol nebs prn  - resume PTA inhalers upon discharge  - has follow up in pulmonary clinic arranged with Kamryn Laienz CNP in September. Should keep this appointment.    No further recommendations; we'll sign off. No containdications to discharge from pulmonary standpoint.     Kingsley (Jeronimo Olivia MD  [facility]  Pager [phone]  Clinic [phone]  Fax [phone]     Subjective/Interim Events:   Feels better. Seems to back to baseline  On 3Lpm O2 with SpO2 low 90s.  No hemoptysis.       Medications:     Current Facility-Administered Medications   Medication Dose Route Frequency Provider Last Rate Last Admin     Current Facility-Administered Medications   Medication Dose Route Frequency Provider Last Rate Last Admin    apixaban ANTICOAGULANT (ELIQUIS) tablet 5 mg  5 mg Oral BID Danyelle Prieto MD   5 mg at 05/16/24 0913    atorvastatin (LIPITOR) tablet 20 mg  20 mg Oral Daily Timothy Senior DO   20 mg at 05/16/24 0913    azithromycin (ZITHROMAX) tablet 250 mg  250 mg Oral Daily Danyelle Prieto MD   250 mg at 05/15/24 1424    docusate sodium (COLACE) capsule 100 mg  100 mg Oral BID Timothy Senior DO   100 mg at 05/16/24 0913     fluticasone-vilanterol (BREO ELLIPTA) 200-25 MCG/ACT inhaler 1 puff  1 puff Inhalation Daily Danyelle Prieto MD   1 puff at 05/16/24 0914    heparin 100 unit/mL injection 5-10 mL  5-10 mL Intracatheter Q28 Days Chiquita Swain MD        heparin lock flush 10 UNIT/ML injection 5-10 mL  5-10 mL Intracatheter Q24H Chiquita Swain MD        insulin aspart (NovoLOG) injection (RAPID ACTING)  1-3 Units Subcutaneous TID AC Danyelle Prieto MD   1 Units at 05/14/24 1206    insulin aspart (NovoLOG) injection (RAPID ACTING)  1-3 Units Subcutaneous At Bedtime Danyelle Prieto MD   2 Units at 05/15/24 2145    lisinopril (ZESTRIL) tablet 20 mg  20 mg Oral Daily Timothy Senior, DO   20 mg at 05/16/24 0913    mirtazapine (REMERON) tablet TABS 7.5 mg  7.5 mg Oral At Bedtime Timothy Senior, DO   7.5 mg at 05/15/24 2147    predniSONE (DELTASONE) tablet 40 mg  40 mg Oral Daily Danyelle Prieto MD   40 mg at 05/16/24 0913    sodium chloride (PF) 0.9% PF flush 10-20 mL  10-20 mL Intracatheter Q28 Days Chiquita Swain MD   20 mL at 05/12/24 0430    sodium chloride (PF) 0.9% PF flush 3 mL  3 mL Intracatheter Q8H Timothy Senior, DO   3 mL at 05/16/24 0913    [Held by provider] torsemide (DEMADEX) tablet 20 mg  20 mg Oral Daily Danyelle Prieto MD   20 mg at 05/15/24 0818         Exam/Data:   Vitals  /60 (BP Location: Right arm)   Pulse 118   Temp 97  F (36.1  C) (Oral)   Resp 16   Ht 1.524 m (5')   Wt 41.3 kg (91 lb)   LMP  (LMP Unknown)   SpO2 90%   BMI 17.77 kg/m       I/O last 3 completed shifts:  In: 960 [P.O.:960]  Out: -   Weight change: -0.635 kg (-1 lb 6.4 oz)  [unfilled]  Resp: 16      EXAM:  Physical Exam  Gen: awake, alert, oriented, no distress  HEENT: NT, no JANAE  CV: RRR, no m/g/r  Resp: diminished air entry. No wheezing.   Abd: soft, nontender, BS+  Skin: no rashes or lesions  Ext: no edema  Neuro: PERRL, nonfocal exam    ROS:  A 10-system review was obtained and is negative with the exception of the symptoms noted  above.    DATA:    PFT DATA   The FVC, FEV1 and FEV1/FVC ratio are reduced, but the ZCP20-86% is within normal limits.  The inspiratory flow rates are reduced.  The FVC is reduced relative to the SVC indicating air trapping.  The TLC, RV, FRC and RV/TLC ratio are all increased   indicating overinflation and air trapping.  Following administration of bronchodilators, there is no significant response.  The diffusing capacity is severely reduced when corrected to hemoglobin.         IMPRESSION:     Very severe Airflow Obstruction. No significant postbronchodilator response.     Mild hyperinflation and moderate airtrapping.     Diffusion capacity was severely reduced when corrected to hemoglobin       IMAGING:   CTA Abdomen Pelvis with Contrast    Result Date: 5/10/2024  EXAM: CTA ABDOMEN PELVIS WITH CONTRAST LOCATION: [facility] DATE: 5/10/2024 INDICATION: followup on abnormal ct from 5 3 24 COMPARISON: 5/3/2024 TECHNIQUE: CT angiogram abdomen pelvis during arterial phase of injection of IV contrast. 2D and 3D MIP reconstructions were performed by the CT technologist. Dose reduction techniques were used. Exam is limited by lack of thin section sagittal and coronal reconstructions. Thin section reconstructions were created on the [employer] workstation from the initial data set, however. 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 Intraluminal thrombus identified within the first 1.5 cm of the celiac artery. Patent splenic artery with scattered calcified plaque. Likewise, common hepatic artery is patent. Conventional hepatic arterial anatomy. Patent SMA, bilateral renal arteries, and DWAYNE as well as runoff to the upper legs. Large amount of calcified plaque within the right common femoral artery, with approximately 90% luminal stenosis. High-grade stenoses of both common iliac arteries, although contrast bolus limits  further assessment. Infrarenal abdominal aortic aneurysm measuring 2.5 cm. Multifocal noncalcified plaque/thrombus scattered in the abdominal aorta. LOWER CHEST: Advanced centrilobular emphysema with patchy parenchymal opacities in both lung bases and bronchial wall thickening redemonstrated. HEPATOBILIARY: No biliary dilatation PANCREAS: Unremarkable SPLEEN: Moderate sized splenic infarct redemonstrated ADRENAL GLANDS: Left adrenal nodular hyperplasia. KIDNEYS/BLADDER: No hydronephrosis. Decompressed bladder. BOWEL: Colonic diverticulosis. LYMPH NODES: No significant retroperitoneal adenopathy PELVIC ORGANS: No free fluid MUSCULOSKELETAL: No acute bony abnormalities.     IMPRESSION: 1.  Near occlusive stenosis of the celiac axis, with intraluminal thrombus. 2.  Multifocal atherosclerotic disease, abdominal aortic aneurysm, and additional multifocal stenoses. 3.  Additional findings as above.    CT Chest Pulmonary Embolism w Contrast    Result Date: 5/10/2024  EXAM: CT CHEST PULMONARY EMBOLISM W CONTRAST LOCATION: [facility] DATE: 5/10/2024 INDICATION: Hypoxia, shortness of breath, lung cancer, tachycardia COMPARISON: 5/3/2024 TECHNIQUE: CT chest pulmonary angiogram during arterial phase injection of IV contrast. Multiplanar reformats and MIP reconstructions were performed. Dose reduction techniques were used. CONTRAST: [id number] 90ml FINDINGS: ANGIOGRAM CHEST: Pulmonary arteries are normal caliber and negative for pulmonary emboli. Thoracic aorta is negative for dissection. No CT evidence of right heart strain. LUNGS AND PLEURA: Severe emphysema. Unchanged small area of subpleural soft tissue thickening in the anterior left upper lobe (8/147), as well as an area of consolidation in the left upper lobe posteriorly with associated bronchiectasis. A few other nodular opacities elsewhere also remain unchanged. No pleural effusion or pneumothorax. MEDIASTINUM/AXILLAE: Unchanged enlarged lymph  nodes in the AP window and left hilum. No enlarging lymph nodes. Right internal jugular port catheter tip at the SVC-RA junction. CORONARY ARTERY CALCIFICATION: Severe. UPPER ABDOMEN: Partially imaged splenic infarcts. Unchanged narrowing and filling defect in the proximal celiac axis. MUSCULOSKELETAL: Normal.     IMPRESSION: 1.  No acute pulmonary emboli. 2.  Unchanged left upper lobe subpleural nodule, consolidation in the left upper lobe posteriorly, and a few other scattered nodular airspace opacities elsewhere. 3.  Unchanged mildly enlarged mediastinal and left hilar lymph nodes. 4.  Unchanged high-grade narrowing of the celiac artery proximally with noncalcified plaque or thrombus.

## 2024-05-17 VITALS
HEIGHT: 60 IN | WEIGHT: 91 LBS | SYSTOLIC BLOOD PRESSURE: 128 MMHG | BODY MASS INDEX: 17.87 KG/M2 | DIASTOLIC BLOOD PRESSURE: 60 MMHG | RESPIRATION RATE: 16 BRPM | OXYGEN SATURATION: 98 % | HEART RATE: 108 BPM | TEMPERATURE: 97.2 F

## 2024-05-17 LAB
ANION GAP SERPL CALCULATED.3IONS-SCNC: 8 MMOL/L (ref 7–15)
BUN SERPL-MCNC: 54.5 MG/DL (ref 8–23)
CALCIUM SERPL-MCNC: 9.3 MG/DL (ref 8.8–10.2)
CHLORIDE SERPL-SCNC: 104 MMOL/L (ref 98–107)
CREAT SERPL-MCNC: 0.8 MG/DL (ref 0.51–0.95)
DEPRECATED HCO3 PLAS-SCNC: 29 MMOL/L (ref 22–29)
EGFRCR SERPLBLD CKD-EPI 2021: 79 ML/MIN/1.73M2
GLUCOSE BLDC GLUCOMTR-MCNC: 247 MG/DL (ref 70–99)
GLUCOSE BLDC GLUCOMTR-MCNC: 96 MG/DL (ref 70–99)
GLUCOSE SERPL-MCNC: 124 MG/DL (ref 70–99)
MAGNESIUM SERPL-MCNC: 2.1 MG/DL (ref 1.7–2.3)
PLATELET # BLD AUTO: 113 10E3/UL (ref 150–450)
POTASSIUM SERPL-SCNC: 4.6 MMOL/L (ref 3.4–5.3)
SODIUM SERPL-SCNC: 141 MMOL/L (ref 135–145)

## 2024-05-17 PROCEDURE — 94640 AIRWAY INHALATION TREATMENT: CPT

## 2024-05-17 PROCEDURE — 99232 SBSQ HOSP IP/OBS MODERATE 35: CPT | Performed by: INTERNAL MEDICINE

## 2024-05-17 PROCEDURE — 999N000157 HC STATISTIC RCP TIME EA 10 MIN

## 2024-05-17 PROCEDURE — 85049 AUTOMATED PLATELET COUNT: CPT | Performed by: EMERGENCY MEDICINE

## 2024-05-17 PROCEDURE — 250N000009 HC RX 250: Performed by: INTERNAL MEDICINE

## 2024-05-17 PROCEDURE — 80048 BASIC METABOLIC PNL TOTAL CA: CPT | Performed by: STUDENT IN AN ORGANIZED HEALTH CARE EDUCATION/TRAINING PROGRAM

## 2024-05-17 PROCEDURE — 250N000012 HC RX MED GY IP 250 OP 636 PS 637: Performed by: STUDENT IN AN ORGANIZED HEALTH CARE EDUCATION/TRAINING PROGRAM

## 2024-05-17 PROCEDURE — 99239 HOSP IP/OBS DSCHRG MGMT >30: CPT | Performed by: INTERNAL MEDICINE

## 2024-05-17 PROCEDURE — 250N000011 HC RX IP 250 OP 636: Performed by: FAMILY MEDICINE

## 2024-05-17 PROCEDURE — 250N000013 HC RX MED GY IP 250 OP 250 PS 637: Performed by: EMERGENCY MEDICINE

## 2024-05-17 PROCEDURE — 250N000013 HC RX MED GY IP 250 OP 250 PS 637: Performed by: HOSPITALIST

## 2024-05-17 PROCEDURE — 83735 ASSAY OF MAGNESIUM: CPT | Performed by: EMERGENCY MEDICINE

## 2024-05-17 PROCEDURE — 94640 AIRWAY INHALATION TREATMENT: CPT | Mod: 76

## 2024-05-17 PROCEDURE — 250N000013 HC RX MED GY IP 250 OP 250 PS 637: Performed by: STUDENT IN AN ORGANIZED HEALTH CARE EDUCATION/TRAINING PROGRAM

## 2024-05-17 RX ORDER — PREDNISONE 20 MG/1
20 TABLET ORAL DAILY
Qty: 3 TABLET | Refills: 0 | Status: SHIPPED | OUTPATIENT
Start: 2024-05-17 | End: 2024-05-20

## 2024-05-17 RX ORDER — AZITHROMYCIN 250 MG/1
250 TABLET, FILM COATED ORAL DAILY
Qty: 2 TABLET | Refills: 0 | Status: SHIPPED | OUTPATIENT
Start: 2024-05-17 | End: 2024-05-29

## 2024-05-17 RX ADMIN — AZITHROMYCIN 250 MG: 250 TABLET, FILM COATED ORAL at 13:42

## 2024-05-17 RX ADMIN — LISINOPRIL 20 MG: 20 TABLET ORAL at 08:43

## 2024-05-17 RX ADMIN — PREDNISONE 40 MG: 20 TABLET ORAL at 08:43

## 2024-05-17 RX ADMIN — IPRATROPIUM BROMIDE AND ALBUTEROL SULFATE 3 ML: .5; 3 SOLUTION RESPIRATORY (INHALATION) at 07:45

## 2024-05-17 RX ADMIN — DOCUSATE SODIUM 100 MG: 100 CAPSULE, LIQUID FILLED ORAL at 08:43

## 2024-05-17 RX ADMIN — FLUTICASONE FUROATE AND VILANTEROL TRIFENATATE 1 PUFF: 200; 25 POWDER RESPIRATORY (INHALATION) at 08:44

## 2024-05-17 RX ADMIN — IPRATROPIUM BROMIDE AND ALBUTEROL SULFATE 3 ML: .5; 3 SOLUTION RESPIRATORY (INHALATION) at 11:32

## 2024-05-17 RX ADMIN — APIXABAN 5 MG: 5 TABLET, FILM COATED ORAL at 08:43

## 2024-05-17 RX ADMIN — HEPARIN, PORCINE (PF) 10 UNIT/ML INTRAVENOUS SYRINGE 5 ML: at 08:51

## 2024-05-17 RX ADMIN — ATORVASTATIN CALCIUM 40 MG: 40 TABLET, FILM COATED ORAL at 08:43

## 2024-05-17 RX ADMIN — HEPARIN, PORCINE (PF) 10 UNIT/ML INTRAVENOUS SYRINGE 5 ML: at 14:15

## 2024-05-17 ASSESSMENT — ACTIVITIES OF DAILY LIVING (ADL)
ADLS_ACUITY_SCORE: 34
ADLS_ACUITY_SCORE: 35
ADLS_ACUITY_SCORE: 34
ADLS_ACUITY_SCORE: 34
ADLS_ACUITY_SCORE: 35
ADLS_ACUITY_SCORE: 35
ADLS_ACUITY_SCORE: 34
ADLS_ACUITY_SCORE: 34
ADLS_ACUITY_SCORE: 35
ADLS_ACUITY_SCORE: 34

## 2024-05-17 NOTE — PLAN OF CARE
Problem: Adult Inpatient Plan of Care  Goal: Optimal Comfort and Wellbeing  Outcome: Progressing     Problem: Risk for Delirium  Goal: Improved Sleep  Outcome: Progressing   Goal Outcome Evaluation:     Remains alert and oriented x4  Denies any pain at this time   Continues on 3L O2 at rest sating 90-92%, O2 turned up to 5L with activity to maintain above 88%  Prn lozenges effective for sore throat   Working with PT/OT   Pt remains tachycardic, cardiology following

## 2024-05-17 NOTE — PROGRESS NOTES
CLINICAL NUTRITION SERVICES - REASSESSMENT NOTE     Nutrition Prescription    RECOMMENDATIONS FOR MDs/PROVIDERS TO ORDER:  None    Malnutrition Status:    Moderate malnutrition  In Context of:  Chronic illness or disease    Recommendations already ordered by Registered Dietitian (RD):  - Medical food supplement therapy - Glucerna TID + yogurt BID with meals  - Diet Change - Consistent carb (moderate) diet + 3 gm potassium    Future/Additional Recommendations:  Monitor po intake, BG, ONS tolerance     EVALUATION OF THE PROGRESS TOWARD GOALS   Diet: 3 g Potassium  Nutrition Support: Ensure + ice cream BID, Ensure Clear daily  Intake: Pt ordering meals TID and consuming % of them per flow sheets and [employer] records. Pt likely meeting % of estimated nutrition needs.       NEW FINDINGS   Could not wake pt when visited. BG >400 in past 24 hours so I changed her ONS to glucerna, and ice cream to yogurt. She's just getting way too many CHO per day while on prednisone, and keep her BG under control takes priority vs wt repletion.     Labs:  BUN: 54.5 (H) worsening since admission  B-402 (H) past 24 hours    Med:  Colace  Novolog  Remeron  Prednisone    GI:  LBM on  WDL    Anthropometrics:  24  41.3 kg (91 lb) Standing scale  05/15/24  41.7 kg (91 lb 14.4 oz) Standing scale  24  42.3 kg (93 lb 4.8 oz) Standing scale  24  42.9 kg (94 lb 9.2 oz) Bed scale  24  45.9 kg (101 lb 3.1 oz) Bed scale  24  45.5 kg (100 lb 5 oz) Bed scale  10% wt loss since admission    Previous Goals   Patient to consume % of nutritionally adequate meals three times per day, or the equivalent with supplements/snacks   Evaluation: progressing    CURRENT NUTRITION DIAGNOSIS  Malnutrition related to chronic as evidenced by visible fat and muscle loss, suspected ongoing inadequate intake   Evaluation: improving    INTERVENTIONS  Implementation  - Medical food supplement therapy - Glucerna TID +  yogurt BID with meals  - Diet Change - Consistent carb (moderate) diet + 3 gm potassium    Goals  Patient to consume % of nutritionally adequate meal trays TID, or the equivalent with supplements/snacks.    Monitoring/Evaluation  Progress toward goals will be monitored and evaluated per protocol.

## 2024-05-17 NOTE — PLAN OF CARE
Problem: Adult Inpatient Plan of Care  Goal: Plan of Care Review  Description: The Plan of Care Review/Shift note should be completed every shift.  The Outcome Evaluation is a brief statement about your assessment that the patient is improving, declining, or no change.  This information will be displayed automatically on your shift  note.  Outcome: Progressing  Flowsheets (Taken 5/17/2024 4642)  Overall Patient Progress: no change   Goal Outcome Evaluation:  Overall Patient Progress: no change    Could not wake pt when visited x2. BG >400 in past 24 hours so I changed her ONS to glucerna, and ice cream to yogurt. She's just getting way too many CHO per day while on prednisone. Changed diet to 3 gm K/day + Consistent Carb (moderate) to limit carbs as well.

## 2024-05-17 NOTE — PLAN OF CARE
Goal Outcome Evaluation:             Patient will be discharging home today. Family to transport. Family will bring portable O2 tank. Reviewed AVS and answered questions. Patient left with all personal belongings.

## 2024-05-17 NOTE — PLAN OF CARE
Physical Therapy Discharge Summary    Reason for therapy discharge:    Discharged to home.    Progress towards therapy goal(s). See goals on Care Plan in [facility] electronic health record for goal details.  Goals partially met.  Barriers to achieving goals:   discharge from facility.    Therapy recommendation(s):    Continued therapy is recommended.  Rationale/Recommendations:  continued PT to improve functional mobility.  Continue home exercise program.

## 2024-05-17 NOTE — PROGRESS NOTES
Care Management Discharge Note    Discharge Date: 05/17/2024       Discharge Disposition: Home    Discharge Services: None    Discharge DME: None    Discharge Transportation: family or friend will provide    Education Provided on the Discharge Plan: Yes (AVS per bedside RN)    Persons Notified of Discharge Plans: patient    Patient/Family in Agreement with the Plan: yes         Additional Information:  CM reviewed chart. No CM needs identified or requested. Anticipate discharge home with family.       [facility] referral sent per protocol.      Marcelle Villarreal RN

## 2024-05-17 NOTE — PLAN OF CARE
A/O. VSS on 3 L via NC. LOPEZ. Denies pain. Tele: sinus tach, hr 100-120. Contact/droplet precaution maintained. No adverse event overnight. Will continue to monitor.    Goal Outcome Evaluation:    Problem: Adult Inpatient Plan of Care  Goal: Optimal Comfort and Wellbeing  Outcome: Progressing     Problem: Risk for Delirium  Goal: Improved Attention and Thought Clarity  Outcome: Progressing     Problem: Skin Injury Risk Increased  Goal: Skin Health and Integrity  Outcome: Progressing  Intervention: Optimize Skin Protection  Recent Flowsheet Documentation  Taken 5/17/2024 0300 by Fabby Barrios, RN  Activity Management: activity adjusted per tolerance  Head of Bed (HOB) Positioning: HOB at 20-30 degrees

## 2024-05-17 NOTE — PROGRESS NOTES
[facility] Hematology and Oncology Inpatient Consult Note    Patient: Jasmyn Green  MRN: [id number]  Date of Service: May 17, 2024        Reason for Visit    Small cell lung cancer  COPD exacerbation  Hypoxia    Subjective:  She is feeling better today. She still has shortness of breath but is improving. Continues on supplemental oxygenation. Discussed the need to hold chemotherapy and immunotherapy while she is still recovering. Will follow up after discharge on treatment plan. Pt is in agreement.       Physical Exam    /60 (BP Location: Right arm)   Pulse 108   Temp 97.2  F (36.2  C) (Oral)   Resp 16   Ht 1.524 m (5')   Wt 41.3 kg (91 lb)   LMP  (LMP Unknown)   SpO2 98%   BMI 17.77 kg/m        GENERAL: no acute distress. Cooperative in conversation.  In chair.   HEENT: pupils are equal, round and reactive.   RESP:Chest symmetric. Regular respiratory rate. No stridor.  She is still on nasal cannula oxygen.  ABD: Nondistended, soft.  EXTREMITIES: No lower extremity edema.   NEURO: non focal. Alert and oriented x3.   PSYCH: within normal limits. No depression or anxiety.  SKIN: warm dry intact       Assessment    1.  A very pleasant 68-year-old woman with severe COPD and small cell lung cancer presenting with shortness of breath.  The etiology result multifactorial.  She also has sinus tachycardia on top of that.  Slowly getting better.  2.  History of severe anemia.  This is stable.  3.  Severe coronary disease.  4.  Severe peripheral arterial disease including celiac axis occlusion.  Fortunately patient not symptomatic from it apart from having splenic infarct.  5.  Low body weight.    MEDICAL DECISION MAKING:    Patient presenting with multiple severe complicated problems.  Presenting with some complication of chemotherapy as well.  Reviewed notes from each unique source.  Reviewed each unique test.  Independently interpreted lab tests and radiological exams performed by other physicians.   Discussed treatment plan with LEONEL Mcneill.    1.  Continue with the treatment of the underlying pneumonia.  2.  Follow cardiology recommendation.  3.  PT OT.  4.  Patient vascular disease management as per vascular surgery/vascular medicine.  The intervention should be minimal based on her overall prognosis.  She has been started on high doses of Lipitor for that.  5.  Follow-up with us in two weeks after she is discharged. Will arrange for follow up appointment to be made.  Discussed with the patient that at this time we will not be continuing her same chemotherapy.  We will have to tailor it to her tolerance.  6.  Continue the oxygen supplementation.    Staging History     Cancer Staging   No matching staging information was found for the patient.        History  Ms. Jasmyn Green is a 68 year old with a known history of locally advanced small cell lung cancer for which she has been on treatment with carboplatin etoposide along with Tecentriq immunotherapy.  She got her third dose of the chemotherapy on 6 May 2024.  Her CT scan at that time had shown possibility of some bronchitis/pneumonia.  She was given some antibiotics.    She came to the hospital because of worsening shortness of breath.  She has pretty severe COPD.  She was also found to have sinus tachycardia.  Her CT scan also showed severe coronary artery calcification.  Her CT scan also shown some new splenic infarct.  The dose of aspirin was increased to 325 mg.  She was also noted to have severe anemia at that time and was given blood transfusion the following day after chemotherapy.  On the positive side her CT scan after 2 cycles did show significant improvement in her disease.    She has been optimized with some oxygen and diuretics.  Seems to be slightly better compared to when she came in.    She was seen by the hospitalist and told that she should not get the same chemotherapy or she will die.  That made patient quite scared.   Counseled the patient at length about the treatment plan.  Obviously we will have to take into account the toxicity that is expected from any intervention.  We will discuss that when she comes to see me in the clinic.    Review of systems.    A 14 point review of systems was obtained.  Positive findings noted in the history.  Rest of the review of system is otherwise negative.      Past History  Past Medical History:   Diagnosis Date    Age-related osteoporosis without current pathological fracture     Arthritis     COPD (chronic obstructive pulmonary disease) (H)     Coronary artery disease     Dependence on nocturnal oxygen therapy     Dyspnea on exertion     Emphysema lung (H)     Gout     HLD (hyperlipidemia)     Hypertension     Lung mass      Past Surgical History:   Procedure Laterality Date    BRONCHOSCOPY RIGID OR FLEXIBLE W/TRANSENDOSCOPIC ENDOBRONCHIAL ULTRASOUND GUIDED N/A 2024    Procedure: BRONCHOSCOPY, WITH ENDOBRONCHIAL ULTRASOUND;  Surgeon: Ruben Levin MD;  Location: [facility] OR    COLONOSCOPY N/A 10/21/2021    Procedure: COLONOSCOPY;  Surgeon: Wilma Hester DO;  Location: [facility] OR    IR CHEST PORT PLACEMENT > 5 YRS OF AGE  2024    VAGINAL DELIVERY      x 3 ; remote    WISDOM TOOTH EXTRACTION       Family History   Problem Relation Age of Onset    Chronic Obstructive Pulmonary Disease Sister     Diabetes Mother     Heart Disease Mother     Lung Cancer Mother     Heart Disease Father      Social History     Socioeconomic History    Marital status:    Tobacco Use    Smoking status: Former     Current packs/day: 0.00     Types: Cigarettes     Quit date: 2021     Years since quittin.8     Passive exposure: Past    Smokeless tobacco: Never    Tobacco comments:     updated 8/3/2021 by [facility]   Vaping Use    Vaping status: Never Used   Substance and Sexual Activity    Alcohol use: Not Currently    Drug use: Never    Sexual activity: Not Currently   Social  History Narrative     many years 3 grown children. 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 Last cigarette a week ago.non drinker  Her primary MD (Wilda) retired several years ago/ tends to avoid doctors/medical care in general       Social Determinants of Health     Financial Resource Strain: Low Risk  (2/1/2024)    Financial Resource Strain     Within the past 12 months, have you or your family members you live with been unable to get utilities (heat, electricity) when it was really needed?: No   Food Insecurity: Low Risk  (2/1/2024)    Food Insecurity     Within the past 12 months, did you worry that your food would run out before you got money to buy more?: No     Within the past 12 months, did the food you bought just not last and you didn t have money to get more?: No   Transportation Needs: Low Risk  (2/1/2024)    Transportation Needs     Within the past 12 months, has lack of transportation kept you from medical appointments, getting your medicines, non-medical meetings or appointments, work, or from getting things that you need?: No   Physical Activity: Sufficiently Active (11/10/2021)    Exercise Vital Sign     Days of Exercise per Week: 5 days     Minutes of Exercise per Session: 30 min   Stress: No Stress Concern Present (11/10/2021)    [employer] - Occupational Stress Questionnaire     Feeling of Stress : Not at all   Social Connections: Socially Isolated (11/10/2021)    Social Connection and Isolation Panel [NHANES]     Frequency of Communication with Friends and Family: More than three times a week     Frequency of Social Gatherings with Friends and Family: Once a week     Attends Mosque Services: Never     Active Member of Clubs or Organizations: No     Marital Status:    Interpersonal Safety: Low Risk  (2/1/2024)    Interpersonal Safety     Do you feel physically and emotionally safe where you currently live?: Yes     Within the past 12 months,  have you been hit, slapped, kicked or otherwise physically hurt by someone?: No     Within the past 12 months, have you been humiliated or emotionally abused in other ways by your partner or ex-partner?: No   Housing Stability: Low Risk  (2/1/2024)    Housing Stability     Do you have housing? : Yes     Are you worried about losing your housing?: No       Allergies    No Known Allergies      Lab Results  Recent Results (from the past 24 hour(s))   Glucose by meter    Collection Time: 05/16/24  5:05 PM   Result Value Ref Range    GLUCOSE BY METER POCT 288 (H) 70 - 99 mg/dL   Glucose by meter    Collection Time: 05/16/24  9:03 PM   Result Value Ref Range    GLUCOSE BY METER POCT 402 (H) 70 - 99 mg/dL   Glucose by meter    Collection Time: 05/16/24 10:06 PM   Result Value Ref Range    GLUCOSE BY METER POCT 335 (H) 70 - 99 mg/dL   Basic metabolic panel    Collection Time: 05/17/24  6:02 AM   Result Value Ref Range    Sodium 141 135 - 145 mmol/L    Potassium 4.6 3.4 - 5.3 mmol/L    Chloride 104 98 - 107 mmol/L    Carbon Dioxide (CO2) 29 22 - 29 mmol/L    Anion Gap 8 7 - 15 mmol/L    Urea Nitrogen 54.5 (H) 8.0 - 23.0 mg/dL    Creatinine 0.80 0.51 - 0.95 mg/dL    GFR Estimate 79 >60 mL/min/1.73m2    Calcium 9.3 8.8 - 10.2 mg/dL    Glucose 124 (H) 70 - 99 mg/dL   Magnesium    Collection Time: 05/17/24  6:02 AM   Result Value Ref Range    Magnesium 2.1 1.7 - 2.3 mg/dL   Platelet count    Collection Time: 05/17/24  6:02 AM   Result Value Ref Range    Platelet Count 113 (L) 150 - 450 10e3/uL   Glucose by meter    Collection Time: 05/17/24  8:10 AM   Result Value Ref Range    GLUCOSE BY METER POCT 96 70 - 99 mg/dL   Glucose by meter    Collection Time: 05/17/24 11:51 AM   Result Value Ref Range    GLUCOSE BY METER POCT 247 (H) 70 - 99 mg/dL        Imaging Results    CTA Abdomen Pelvis with Contrast    Result Date: 5/10/2024  EXAM: CTA ABDOMEN PELVIS WITH CONTRAST LOCATION: [facility] DATE: 5/10/2024  INDICATION: followup on abnormal ct from 5 3 24 COMPARISON: 5/3/2024 TECHNIQUE: CT angiogram abdomen pelvis during arterial phase of injection of IV contrast. 2D and 3D MIP reconstructions were performed by the CT technologist. Dose reduction techniques were used. Exam is limited by lack of thin section sagittal and coronal reconstructions. Thin section reconstructions were created on the [employer] workstation from the initial data set, however. CONTRAST: [id number] 90ml FINDINGS: ANGIOGRAM ABDOMEN/PELVIS: High-grade stenosis (near occlusion) of the proximal celiac axis. Intraluminal thrombus identified within the first 1.5 cm of the celiac artery. Patent splenic artery with scattered calcified plaque. Likewise, common hepatic artery is patent. Conventional hepatic arterial anatomy. Patent SMA, bilateral renal arteries, and DWAYNE as well as runoff to the upper legs. Large amount of calcified plaque within the right common femoral artery, with approximately 90% luminal stenosis. High-grade stenoses of both common iliac arteries, although contrast bolus limits further assessment. Infrarenal abdominal aortic aneurysm measuring 2.5 cm. Multifocal noncalcified plaque/thrombus scattered in the abdominal aorta. LOWER CHEST: Advanced centrilobular emphysema with patchy parenchymal opacities in both lung bases and bronchial wall thickening redemonstrated. HEPATOBILIARY: No biliary dilatation PANCREAS: Unremarkable SPLEEN: Moderate sized splenic infarct redemonstrated ADRENAL GLANDS: Left adrenal nodular hyperplasia. KIDNEYS/BLADDER: No hydronephrosis. Decompressed bladder. BOWEL: Colonic diverticulosis. LYMPH NODES: No significant retroperitoneal adenopathy PELVIC ORGANS: No free fluid MUSCULOSKELETAL: No acute bony abnormalities.     IMPRESSION: 1.  Near occlusive stenosis of the celiac axis, with intraluminal thrombus. 2.  Multifocal atherosclerotic disease, abdominal aortic aneurysm, and additional multifocal stenoses. 3.   Additional findings as above.    CT Chest Pulmonary Embolism w Contrast    Result Date: 5/10/2024  EXAM: CT CHEST PULMONARY EMBOLISM W CONTRAST LOCATION: [facility] DATE: 5/10/2024 INDICATION: Hypoxia, shortness of breath, lung cancer, tachycardia COMPARISON: 5/3/2024 TECHNIQUE: CT chest pulmonary angiogram during arterial phase injection of IV contrast. Multiplanar reformats and MIP reconstructions were performed. Dose reduction techniques were used. CONTRAST: [id number] 90ml FINDINGS: ANGIOGRAM CHEST: Pulmonary arteries are normal caliber and negative for pulmonary emboli. Thoracic aorta is negative for dissection. No CT evidence of right heart strain. LUNGS AND PLEURA: Severe emphysema. Unchanged small area of subpleural soft tissue thickening in the anterior left upper lobe (8/147), as well as an area of consolidation in the left upper lobe posteriorly with associated bronchiectasis. A few other nodular opacities elsewhere also remain unchanged. No pleural effusion or pneumothorax. MEDIASTINUM/AXILLAE: Unchanged enlarged lymph nodes in the AP window and left hilum. No enlarging lymph nodes. Right internal jugular port catheter tip at the SVC-RA junction. CORONARY ARTERY CALCIFICATION: Severe. UPPER ABDOMEN: Partially imaged splenic infarcts. Unchanged narrowing and filling defect in the proximal celiac axis. MUSCULOSKELETAL: Normal.     IMPRESSION: 1.  No acute pulmonary emboli. 2.  Unchanged left upper lobe subpleural nodule, consolidation in the left upper lobe posteriorly, and a few other scattered nodular airspace opacities elsewhere. 3.  Unchanged mildly enlarged mediastinal and left hilar lymph nodes. 4.  Unchanged high-grade narrowing of the celiac artery proximally with noncalcified plaque or thrombus.       Signed by: Amador Stewart MD

## 2024-05-17 NOTE — DISCHARGE SUMMARY
[facility] MEDICINE  DISCHARGE SUMMARY     Primary Care Physician: Ana Maria Bermudez  Admission Date: 5/10/2024   Discharge Provider: Taty Huntley MD Discharge Date: 5/17/2024   Diet: Orders Placed This Encounter      3 Gram Potassium (low potassium) Diet      Diet      Code Status: No CPR- Do NOT Intubate   Activity: activity as tolerated   [facility]      Condition at Discharge: Stable      REASON FOR PRESENTATION(See Admission Note for Details)     Shortness of breath  PRINCIPAL & ACTIVE DISCHARGE DIAGNOSES     Active Problems:    COPD exacerbation (H)  Acute on chronic hypoxemic respiratory failure  Community-acquired pneumonia  Small cell lung cancer on chemotherapy  Sinus tachycardia multifactorial  Celiac artery thrombosis  Chronic splenic infarct  Hyperkalemia resolved  Hypertension  Anxiety      SIGNIFICANT FINDINGS (Imaging, labs):     Most Recent 3 CBC's:  Recent Labs   Lab Test 05/17/24  0602 05/12/24  2102 05/12/24  0923 05/11/24  0509   WBC  --  8.5 13.1* 8.3   HGB  --  8.9* 8.7* 8.7*   MCV  --  91 91 91   * 374 383 311      Most Recent 3 BMP's:  Recent Labs   Lab Test 05/17/24  1151 05/17/24  0810 05/17/24  0602 05/16/24  0803 05/16/24  0631 05/15/24  0739 05/15/24  0507   NA  --   --  141  --  142  --  140   POTASSIUM  --   --  4.6  --  4.6  --  4.1   CHLORIDE  --   --  104  --  103  --  102   CO2  --   --  29  --  29  --  29   BUN  --   --  54.5*  --  49.4*  --  38.3*   CR  --   --  0.80  --  0.94  --  0.86   ANIONGAP  --   --  8  --  10  --  9   FLOR  --   --  9.3  --  9.3  --  8.8   * 96 124*   < > 131*   < > 82    < > = values in this interval not displayed.     Most Recent 2 LFT's:  Recent Labs   Lab Test 05/16/24  0631 05/14/24  1405   AST 23 19   ALT 38 37   ALKPHOS 128 142   BILITOTAL 0.2 0.2     Most Recent INR's and Anticoagulation Dosing History:  Anticoagulation Dose History          Latest Ref Rng & Units 6/27/2021 5/10/2024   Recent  Dosing and Labs   INR 0.85 - 1.15 1.32  1.00      Most Recent TSH, T4 and A1c Labs:  Recent Labs   Lab Test 05/06/24  1028   TSH 0.53     Results for orders placed or performed during the hospital encounter of 05/10/24   CT Chest Pulmonary Embolism w Contrast    Narrative    EXAM: CT CHEST PULMONARY EMBOLISM W CONTRAST  LOCATION: [facility]  DATE: 5/10/2024    INDICATION: Hypoxia, shortness of breath, lung cancer, tachycardia  COMPARISON: 5/3/2024  TECHNIQUE: CT chest pulmonary angiogram during arterial phase injection of IV contrast. Multiplanar reformats and MIP reconstructions were performed. Dose reduction techniques were used.   CONTRAST: [id number] 90ml    FINDINGS:  ANGIOGRAM CHEST: Pulmonary arteries are normal caliber and negative for pulmonary emboli. Thoracic aorta is negative for dissection. No CT evidence of right heart strain.    LUNGS AND PLEURA: Severe emphysema. Unchanged small area of subpleural soft tissue thickening in the anterior left upper lobe (8/147), as well as an area of consolidation in the left upper lobe posteriorly with associated bronchiectasis. A few other   nodular opacities elsewhere also remain unchanged. No pleural effusion or pneumothorax.    MEDIASTINUM/AXILLAE: Unchanged enlarged lymph nodes in the AP window and left hilum. No enlarging lymph nodes. Right internal jugular port catheter tip at the SVC-RA junction.    CORONARY ARTERY CALCIFICATION: Severe.    UPPER ABDOMEN: Partially imaged splenic infarcts. Unchanged narrowing and filling defect in the proximal celiac axis.    MUSCULOSKELETAL: Normal.      Impression    IMPRESSION:  1.  No acute pulmonary emboli.    2.  Unchanged left upper lobe subpleural nodule, consolidation in the left upper lobe posteriorly, and a few other scattered nodular airspace opacities elsewhere.    3.  Unchanged mildly enlarged mediastinal and left hilar lymph nodes.    4.  Unchanged high-grade narrowing of the celiac  artery proximally with noncalcified plaque or thrombus.   CTA Abdomen Pelvis with Contrast    Narrative    EXAM: CTA ABDOMEN PELVIS WITH CONTRAST  LOCATION: [facility]  DATE: 5/10/2024    INDICATION: followup on abnormal ct from 5 3 24  COMPARISON: 5/3/2024  TECHNIQUE: CT angiogram abdomen pelvis during arterial phase of injection of IV contrast. 2D and 3D MIP reconstructions were performed by the CT technologist. Dose reduction techniques were used. Exam is limited by lack of thin section sagittal and   coronal reconstructions. Thin section reconstructions were created on the [employer] workstation from the initial data set, however.  CONTRAST: [id number] 90ml    FINDINGS:  ANGIOGRAM ABDOMEN/PELVIS: High-grade stenosis (near occlusion) of the proximal celiac axis. Intraluminal thrombus identified within the first 1.5 cm of the celiac artery. Patent splenic artery with scattered calcified plaque. Likewise, common hepatic   artery is patent. Conventional hepatic arterial anatomy. Patent SMA, bilateral renal arteries, and DWAYNE as well as runoff to the upper legs. Large amount of calcified plaque within the right common femoral artery, with approximately 90% luminal stenosis.   High-grade stenoses of both common iliac arteries, although contrast bolus limits further assessment. Infrarenal abdominal aortic aneurysm measuring 2.5 cm. Multifocal noncalcified plaque/thrombus scattered in the abdominal aorta.    LOWER CHEST: Advanced centrilobular emphysema with patchy parenchymal opacities in both lung bases and bronchial wall thickening redemonstrated.    HEPATOBILIARY: No biliary dilatation    PANCREAS: Unremarkable    SPLEEN: Moderate sized splenic infarct redemonstrated    ADRENAL GLANDS: Left adrenal nodular hyperplasia.    KIDNEYS/BLADDER: No hydronephrosis. Decompressed bladder.    BOWEL: Colonic diverticulosis.    LYMPH NODES: No significant retroperitoneal adenopathy    PELVIC ORGANS: No free  fluid    MUSCULOSKELETAL: No acute bony abnormalities.      Impression    IMPRESSION:  1.  Near occlusive stenosis of the celiac axis, with intraluminal thrombus.  2.  Multifocal atherosclerotic disease, abdominal aortic aneurysm, and additional multifocal stenoses.  3.  Additional findings as above.   Echocardiogram Complete     Value    LVEF  60-65%    Narrative       [place] [id number]     Name: JACE GONG  MRN: [id number]  : 1955  Study Date: 2024 08:21 AM  Age: 68 yrs  Gender: Female     Performed By: INDIGO  BSA: 1.4 m2  Height: 60 in  Weight: 94 lb  HR: 123  BP: 133/58 mmHg     ______________________________________________________________________________  Procedure  Complete Portable Echo Adult.  ______________________________________________________________________________  Interpretation Summary     Left ventricular size, wall motion and function are normal. The ejection  fraction is 60-65%.  Normal right ventricle size and systolic function.  No hemodynamically significant valvular abnormalities on 2D or color flow  imaging. The study was technically difficult.  ______________________________________________________________________________  Left Ventricle  Left ventricular size, wall motion and function are normal. The ejection  fraction is 60-65%. There is normal left ventricular wall thickness. Left  ventricular diastolic function is indeterminate. No regional wall motion  abnormalities noted.     Right Ventricle  Normal right ventricle size and systolic function.     Atria  Normal left atrial size. Right atrial size is normal. There is no color  Doppler evidence of an atrial shunt.     Mitral Valve  Mitral valve leaflets appear normal. There is no evidence of mitral stenosis  or clinically significant mitral regurgitation.     Tricuspid Valve  Tricuspid valve leaflets appear normal. There is no evidence of tricuspid  stenosis or clinically  significant tricuspid regurgitation. There is trace to  mild tricuspid regurgitation. Right ventricle systolic pressure estimate  normal.     Aortic Valve  Aortic valve leaflets appear normal. There is no evidence of aortic stenosis  or clinically significant aortic regurgitation.     Pulmonic Valve  The pulmonic valve is not well seen, but is grossly normal. This degree of  valvular regurgitation is within normal limits.     Vessels  The aorta root is normal. Normal size ascending aorta. IVC diameter <2.1 cm  collapsing >50% with sniff suggests a normal RA pressure of 3 mmHg.     Pericardium  There is no pericardial effusion.  ______________________________________________________________________________  ___________________________________________________  Report approved by: Marisol Up 05/14/2024 04:18 PM              PENDING LABS         PROCEDURES ( this hospitalization only)          RECOMMENDATION FOR F/U VISIT       DISPOSITION     Home    SUMMARY OF HOSPITAL COURSE:    68-year-old lady with past medical history significant for severe COPD, chronic respiratory failure on home O2 3 to 5 L oxygen, stage IV small cell lung cancer on chemotherapy, hypertension, severe CAD moderate malnutrition presented to the emergency room with complaints of shortness of breath.  She was admitted for COPD exacerbation, possible pneumonia.  Was treated with DuoNebs IV steroids, IV antibiotics.  She continued with significant sinus tachycardia with heart rate up to 130s with activity.  She was evaluated by cardiology.  recommended to continue treatment for her COPD exacerbation.  She was evaluated by pulmonary, oncology.  She will continue course of azithromycin, prednisone.  Sputum initially came back positive for Aspergillus.  But her Fungitell was negative.  Likely false positive.  No other findings to suggest pulmonary aspergillosis at this time.  Incidental findings of celiac artery thrombus on CT, vascular  surgery consulted, recommended anticoagulation and was started on Eliquis.  She will follow-up with vascular surgery as outpatient.  She has known severe coronary calcifications on CT.  Follow-up with cardiology for further workup as outpatient.      Discharge Medications with Med changes:        Review of your medicines        START taking        Dose / Directions   apixaban ANTICOAGULANT 5 MG tablet  Commonly known as: ELIQUIS  Indication: celiac artery thrombus  Used for: Arterial thrombosis (H)      Dose: 5 mg  Take 1 tablet (5 mg) by mouth 2 times daily  Quantity: 60 tablet  Refills: 0     azithromycin 250 MG tablet  Commonly known as: ZITHROMAX  Indication: copd  Used for: COPD exacerbation (H)      Dose: 250 mg  Take 1 tablet (250 mg) by mouth daily  Quantity: 2 tablet  Refills: 0     predniSONE 20 MG tablet  Commonly known as: DELTASONE  Used for: COPD exacerbation (H)      Dose: 20 mg  Take 1 tablet (20 mg) by mouth daily for 3 days  Quantity: 3 tablet  Refills: 0            CONTINUE these medicines which have NOT CHANGED        Dose / Directions   acetaminophen 500 MG tablet  Commonly known as: TYLENOL  Used for: Cancer associated pain      Dose: 1,000 mg  Take 2 tablets (1,000 mg) by mouth every 8 hours as needed for pain  Refills: 0     albuterol 108 (90 Base) MCG/ACT inhaler  Commonly known as: PROAIR HFA/PROVENTIL HFA/VENTOLIN HFA      Dose: 1-2 puff  Inhale 1-2 puffs into the lungs every 6 hours as needed for shortness of breath, wheezing or cough  Refills: 0     aspirin 325 MG EC tablet  Commonly known as: ASA      Dose: 325 mg  Take 325 mg by mouth daily  Refills: 0     atorvastatin 20 MG tablet  Commonly known as: LIPITOR  Used for: Hypercholesteremia      Dose: 20 mg  Take 1 tablet (20 mg) by mouth daily  Quantity: 90 tablet  Refills: 3     calcium carbonate 600 mg-vitamin D 400 units 600-400 MG-UNIT per tablet  Commonly known as: CALTRATE      Dose: 1 tablet  Take 1 tablet by mouth 2 times daily  With calcium  Refills: 0     diazepam 5 MG tablet  Commonly known as: VALIUM  Used for: Small cell lung cancer (H), COPD exacerbation (H)      Dose: 5 mg  Take 1 tablet (5 mg) by mouth every 6 hours as needed for anxiety  Quantity: 2 tablet  Refills: 2     fluticasone-salmeterol 232-14 MCG/ACT inhaler  Commonly known as: AIRDUO RESPICLICK  Used for: COPD, group D, by GOLD 2017 classification (H)      Dose: 1 puff  Inhale 1 puff into the lungs 2 times daily  Quantity: 1 each  Refills: 11     hydrOXYzine HCl 25 MG tablet  Commonly known as: ATARAX  Used for: Anxiety, Primary insomnia      Dose: 25-50 mg  Take 1-2 tablets (25-50 mg) by mouth every 8 hours as needed for anxiety (Insomnia)  Quantity: 30 tablet  Refills: 3     * ipratropium - albuterol 0.5 mg/2.5 mg/3 mL 0.5-2.5 (3) MG/3ML neb solution  Commonly known as: DUONEB  Used for: COPD, group D, by GOLD 2017 classification (H)      Dose: 1 vial  Take 1 vial (3 mLs) by nebulization every 6 hours as needed for shortness of breath, wheezing or cough  Quantity: 180 mL  Refills: 11     * ipratropium-albuterol  MCG/ACT inhaler  Commonly known as: COMBIVENT RESPIMAT  Used for: COPD, severe (H)      Dose: 1 puff  Inhale 1 puff into the lungs 4 times daily  Quantity: 4 g  Refills: 11     lisinopril 20 MG tablet  Commonly known as: ZESTRIL  Used for: Essential hypertension      Dose: 20 mg  Take 1 tablet (20 mg) by mouth daily  Quantity: 60 tablet  Refills: 3     mirtazapine 7.5 MG tablet  Commonly known as: REMERON  Used for: Anxiety, Primary insomnia      Dose: 7.5 mg  Take 1 tablet (7.5 mg) by mouth at bedtime  Quantity: 90 tablet  Refills: 3     naloxone 4 MG/0.1ML nasal spray  Commonly known as: NARCAN  Used for: Cancer associated pain      Dose: 4 mg  Spray 1 spray (4 mg) into one nostril alternating nostrils as needed for opioid reversal every 2-3 minutes until assistance arrives  Quantity: 0.2 mL  Refills: 1     ondansetron 8 MG ODT tab  Commonly known as:  ZOFRAN ODT  Used for: Malignant neoplasm of lung, unspecified laterality, unspecified part of lung (H), Nausea and vomiting, unspecified vomiting type      Dose: 8 mg  Take 1 tablet (8 mg) by mouth every 8 hours as needed for nausea  Quantity: 30 tablet  Refills: 1     oxyCODONE 5 MG tablet  Commonly known as: ROXICODONE  Used for: COPD, group D, by GOLD 2017 classification (H), Malignant neoplasm of lung, unspecified laterality, unspecified part of lung (H), Cancer associated pain      Dose: 2.5-5 mg  Take 0.5-1 tablets (2.5-5 mg) by mouth every 4 hours as needed for pain or moderate to severe pain (Try one-half tab first to see response)  Quantity: 25 tablet  Refills: 0     prochlorperazine 10 MG tablet  Commonly known as: COMPAZINE  Used for: Small cell lung cancer (H)      Dose: 10 mg  Take 1 tablet (10 mg) by mouth every 6 hours as needed for nausea or vomiting  Quantity: 30 tablet  Refills: 2     tiotropium 2.5 MCG/ACT inhaler  Commonly known as: SPIRIVA RESPIMAT  Used for: COPD, group D, by GOLD 2017 classification (H)      Dose: 2 puff  Inhale 2 puffs into the lungs daily  Quantity: 4 g  Refills: 11           * This list has 2 medication(s) that are the same as other medications prescribed for you. Read the directions carefully, and ask your doctor or other care provider to review them with you.                   Where to get your medicines        [address]  [address] [facility] [address]      Phone: [phone]   apixaban ANTICOAGULANT 5 MG tablet  azithromycin 250 MG tablet  predniSONE 20 MG tablet              Rationale for medication changes:      Apixaban for celiac artery thrombosis  Prednisone, azithromycin for COPD exacerbation        Consults   Pulmonary, cardiology, vascular surgery      Immunizations given this encounter     [unfilled]      Discharge Procedure Orders   Reason for your hospital stay     Follow-up  and recommended labs and tests    Order Comments: Follow up with primary care provider, Ana Maria Bermudez, within 7 days   Follow up with oncology, pulmonary as scheduled  Follow up with vascular surgery regarding blood clot in celiac artery     Activity   Order Comments: Your activity upon discharge: activity as tolerated     Order Specific Question Answer Comments   Is discharge order? Yes      Flutter Valve   Order Comments: Continue to use flutter valve 4 times a day Until return to normal activity     Oxygen Adult/Peds     Order Specific Question Answer Comments   Medical Equipment (DME) Supplier: [employer]    PATIENT INSTRUCTIONS: If you did not receive this ordered item today, please contact [employer] for availability ([place] Locations: [phone], [place]: [phone]).    Start Date: 2024    Type: Resume    Did the patient have SpO2 (sat) testing (only needed for new oxgyen or liter flow changes)? Yes    Length of Need: Lifetime    Frequency of Use: Continuous    Mode of Delivery - Continuous Nasal Cannula    Liter Flow - Continuous (LPM): 3-5 lit    Need for Portable Oxygen Equipment: Yes    Evaluate for Conserving Device: Yes    Maintain Sats >= 90%    The face to face evaluation was performed on: 2024    Peak Flow Meter: Yes      Diet   Order Comments: Follow this diet upon discharge: Orders Placed This Encounter      Snacks/Supplements Adult: Ensure Clear; With Meals      Snacks/Supplements Adult: Ensure Enlive; With Meals     Order Specific Question Answer Comments   Is discharge order? Yes      Examination     Vital Signs in last 24 hours:   Vital signs:  Temp: 97.2  F (36.2  C) Temp src: Oral BP: 128/60 Pulse: 108   Resp: 16 SpO2: 98 % O2 Device: Nasal cannula with humidification Oxygen Delivery: 3 LPM Height: 152.4 cm (5') Weight: 41.3 kg (91 lb)  Estimated body mass index is 17.77 kg/m  as calculated from the following:    Height as of this  encounter: 1.524 m (5').    Weight as of this encounter: 41.3 kg (91 lb).       Pertinent positives on exam:  Heart S1-S2 heard tachycardic  Lungs: Decreased breath sounds bilaterally.  Extremities: No edema.    Please see EMR for more detailed significant labs, imaging, consultant notes etc.  Total time spent on discharge: > 35 minutes    Taty Huntley MD   [facility]: Ph:[phone]    CC:Ana Maria Bermudez

## 2024-05-20 ENCOUNTER — PATIENT OUTREACH (OUTPATIENT)
Dept: CARE COORDINATION | Facility: CLINIC | Age: 69
End: 2024-05-20
Payer: COMMERCIAL

## 2024-05-20 NOTE — PROGRESS NOTES
Expand All Collapse All    [facility] MEDICINE  PROGRESS NOTE         Securely message me with Fadumo (more info)     Code Status: No CPR- Do NOT Intubate        Identification/Summary:   Jasmyn Green is a 68 year old female who presented with complaints of shortness of breath.    Medical history is notable for lung cancer, currently getting chemotherapy.  She also has a history of chronic respiratory failure with hypoxia on 4 to 5 L with activity and 3 to 4 L at rest, hypertension, coronary disease, arthritis. Likely has acute on chronic diastolic heart failure and COPD exacerbation from metapneumovirus.  Patient's dyspnea improved and back to 3L home O2 after 2-day diuretics and nebs/prednisone. However, she is still tachypneic and tachycardiac. Holding neb and prednisone may have helped. Cardiology consulted for persistent tachycardia. Pulm consulted for sputum culture positive for Aspergillus. Echo was done on 5/14 without significant finding but quality is limited due to tachycardia. She is continued on ceftriaxone, azithromycin, and no voriconazole was recommended per pulm. Serology pending for aspergillus.      Barriers to Discharge: persistent tachycardia and tachypnea     Disposition: inpatient     Assessment and Plan:  Acute on chronic respiratory failure with hypoxia  Severe COPD with acute exacerbation  Community-acquired pneumonia  Aspergillus fumigatus in sputum culture  Human metapneumovirus infection  Small cell lung cancer, on palliative chemo with carboplatin, etoposide, atezolizumab  Ceftriaxone for community-acquired pneumonia  Azithromycin for COPD exacerbation  Pulm consult for aspergillus fumigatus in sputum culture, serology sent and pending  Added voriconazole given her immunocompromised status, stopped on 5/15 per pulm     Acute on chronic sinus tachycardia  In the 140s this admission but was elevated in the past mildly. Asymptomatic though she  could develop cardiomyopathy with it after a few weeks. This could be related to her acute on chronic respiratory failure and pulmonary edema. Possibly from prednisone and Duoneb.  -Replete K to 4  -continue tele  -stopped prednisone and hold nebs after 3 days of admission, restarting prednisone for COPD exacerbation per pulm  -continue diuretics as below  -cardiology consult     Acute pulmonary edema  Improved with Lasix IV 40 mg on 5/11 and 5/12.  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  Splenic infarct, chronic  Brain lesion of unclear significance  Consulted vascular surgery who recommends IV heparin  Priscila with vascular surgeon on the phone and her brain lesion should not be a contraindication to start blood thinner  Will need to discuss with her oncologist/otologist for future oral agent for celiac artery thrombus on Monday - no reply  Will discontinue aspirin  Switched to Eliquis, follow up with vascular surgery     Transaminase elevation - resolved  Likely due to chemotherapy     Hyperkalemia resolved  Was about to start albuterol neb but her heart rate has been about 120s  Instead, did insulin and glucose     Essential hypertension, on lisinopril  Anxiety, on Valium as needed     Severe anemia, now 9.9 up from 6.9 5 days ago  Due to chemotherapy        Anticoagulation   Orders (Includes Only Anticoagulants)  apixaban ANTICOAGULANT, 5 mg, Oral, BID  heparin, 5-10 mL, Intracatheter, Q28 Days  heparin lock flush, 5-10 mL, Intracatheter, Q1H PRN  heparin lock flush, 5-10 mL, Intracatheter, Q24H        Therapy: PT, OT  Vick:Not present  Lines: PRESENT      Port a Cath 05/11/24 Single Lumen Right Chest wall-Site Assessment: WDL         Current Diet  Orders Placed This Encounter      3 Gram Potassium (low potassium) Diet            Clinically Significant Risk Factors            # Hypomagnesemia: Lowest Mg = 1.5 mg/dL in last 2 days, will replace as needed   #  Hypoalbuminemia: Lowest albumin = 3.1 g/dL at 5/11/2024  5:48 AM, will monitor as appropriate     # Hypertension: Noted on problem list        # Cachexia: Estimated body mass index is 17.95 kg/m  as calculated from the following:    Height as of this encounter: 1.524 m (5').    Weight as of this encounter: 41.7 kg (91 lb 14.4 oz).   # Moderate Malnutrition: based on nutrition assessment    # Financial/Environmental Concerns: none  # COPD: noted on problem list            Interval History/Subjective:  She feels OK. No chest pain, dyspnea. She still has sore throat and cough.       Current Medications      Last 24H PRN:     benzocaine-menthol (CEPACOL) 15-3.6 MG lozenge 1 lozenge, 1 lozenge at 05/15/24 0825    heparin lock flush 10 UNIT/ML injection 5-10 mL, 5 mL at 05/15/24 1111        Physical Exam/Objective:  Temp:  [97.7  F (36.5  C)-98.8  F (37.1  C)] 98.6  F (37  C)  Pulse:  [] 128  Resp:  [16-18] 18  BP: (104-125)/(50-67) 118/59  SpO2:  [93 %-98 %] 98 %      Wt Readings from Last 4 Encounters:   05/15/24 41.7 kg (91 lb 14.4 oz)   05/06/24 42.9 kg (94 lb 9.6 oz)   05/01/24 44 kg (97 lb)   04/15/24 45 kg (99 lb 3.2 oz)      Body mass index is 17.95 kg/m .     General Appearance:  Alert and wake, not in distress  Respiratory: low breath sound, no crackles or wheezing  Cardiovascular: tachycardia, normal S1 and S2, no murmur  Neurology: oriented x 3  Psych: cooperative and calm, normal affect     Medications:   Personally Reviewed.     Medications[]Expand by Default  Current Facility-Administered Medications            Current Facility-Administered Medications   Medication Dose Route Frequency Provider Last Rate Last Admin         Inpatient Administered Meds             Current Facility-Administered Medications   Medication Dose Route Frequency Provider Last Rate Last Admin    apixaban ANTICOAGULANT (ELIQUIS) tablet 5 mg  5 mg Oral BID Danyelle Prieto MD   5 mg at 05/15/24 0818    atorvastatin (LIPITOR) tablet  20 mg  20 mg Oral Daily Timothy Senior, DO   20 mg at 05/15/24 0818    cefTRIAXone (ROCEPHIN) 1 g vial to attach to  mL bag for ADULTS or NS 50 mL bag for PEDS  1 g Intravenous Q24H Timothy Senior, DO   1 g at 24    docusate sodium (COLACE) capsule 100 mg  100 mg Oral BID Timothy Senior, DO   100 mg at 24    fluticasone-vilanterol (BREO ELLIPTA) 200-25 MCG/ACT inhaler 1 puff  1 puff Inhalation Daily Danyelle Prieto MD   1 puff at 05/15/24 0818    heparin 100 unit/mL injection 5-10 mL  5-10 mL Intracatheter Q28 Days Chiquita Swain MD        heparin lock flush 10 UNIT/ML injection 5-10 mL  5-10 mL Intracatheter Q24H Chiquita Swain MD        insulin aspart (NovoLOG) injection (RAPID ACTING)  1-3 Units Subcutaneous TID AC Danyelle Prieto MD   1 Units at 24 1206    insulin aspart (NovoLOG) injection (RAPID ACTING)  1-3 Units Subcutaneous At Bedtime Danyelle Prieto MD        lisinopril (ZESTRIL) tablet 20 mg  20 mg Oral Daily Timothy Senior, DO   20 mg at 05/15/24 0818    mirtazapine (REMERON) tablet TABS 7.5 mg  7.5 mg Oral At Bedtime Timothy Senior, DO   7.5 mg at 24    predniSONE (DELTASONE) tablet 40 mg  40 mg Oral Daily Danyelle Prieto MD        sodium chloride (PF) 0.9% PF flush 10-20 mL  10-20 mL Intracatheter Q28 Days Chiquita Swain MD   20 mL at 24 0430    sodium chloride (PF) 0.9% PF flush 3 mL  3 mL Intracatheter Q8H Timothy Senior, DO   3 mL at 05/15/24 0818    torsemide (DEMADEX) tablet 20 mg  20 mg Oral Daily Danyelle Prieto MD   20 mg at 05/15/24 0818            Data reviewed today: I personally reviewed all new medications, labs, imaging/diagnostics reports over the past 24 hours. Pertinent findings include:     Imaging:        Recent Results (from the past 24 hour(s))   Echocardiogram Complete   Result Value     LVEF  60-65%     [place] [address]     Name: FARIDEH JACE FERNANDO  MRN: [id number]  :  1955  Study Date: 05/14/2024 08:21 AM  Age: 68 yrs  Gender: Female     Performed By: INDIGO  BSA: 1.4 m2  Height: 60 in  Weight: 94 lb  HR: 123  BP: 133/58 mmHg     ______________________________________________________________________________  Procedure  Complete Portable Echo Adult.  ______________________________________________________________________________  Interpretation Summary     Left ventricular size, wall motion and function are normal. The ejection  fraction is 60-65%.  Normal right ventricle size and systolic function.  No hemodynamically significant valvular abnormalities on 2D or color flow  imaging. The study was technically difficult.  ______________________________________________________________________________  Left Ventricle  Left ventricular size, wall motion and function are normal. The ejection  fraction is 60-65%. There is normal left ventricular wall thickness. Left  ventricular diastolic function is indeterminate. No regional wall motion  abnormalities noted.     Right Ventricle  Normal right ventricle size and systolic function.     Atria  Normal left atrial size. Right atrial size is normal. There is no color  Doppler evidence of an atrial shunt.     Mitral Valve  Mitral valve leaflets appear normal. There is no evidence of mitral stenosis  or clinically significant mitral regurgitation.     Tricuspid Valve  Tricuspid valve leaflets appear normal. There is no evidence of tricuspid  stenosis or clinically significant tricuspid regurgitation. There is trace to  mild tricuspid regurgitation. Right ventricle systolic pressure estimate  normal.     Aortic Valve  Aortic valve leaflets appear normal. There is no evidence of aortic stenosis  or clinically significant aortic regurgitation.     Pulmonic Valve  The pulmonic valve is not well seen, but is grossly normal. This degree of  valvular regurgitation is within normal limits.     Vessels  The aorta root is normal. Normal size ascending  aorta. IVC diameter <2.1 cm  collapsing >50% with sniff suggests a normal RA pressure of 3 mmHg.     Pericardium  There is no pericardial effusion.  ______________________________________________________________________________  MMode/2D Measurements & Calculations     IVSd: 0.95 cm  LVIDd: 3.1 cm  LVIDs: 1.8 cm  LVPWd: 0.97 cm  FS: 42.7 %  LV mass(C)d: 81.5 grams  LV mass(C)dI: 60.2 grams/m2  Ao root diam: 2.8 cm  Ao root diam index Ht(cm/m): 1.8  Ao root diam index BSA (cm/m2): 2.0  EF Biplane: 60.9 %  RV Base: 2.7 cm  RWT: 0.62  TAPSE: 1.5 cm     Time Measurements  MM HR: 116.0 BPM     Doppler Measurements & Calculations  MV E max charanjit: 66.4 cm/sec  MV A max charanjit: 114.0 cm/sec  MV E/A: 0.58  MV max P.1 mmHg  MV mean PG: 3.8 mmHg  MV V2 VTI: 16.9 cm  MV dec time: 0.08 sec  Ao V2 max: 112.0 cm/sec  Ao max P.0 mmHg  Ao V2 mean: 81.8 cm/sec  Ao mean PG: 3.0 mmHg  Ao V2 VTI: 15.1 cm  LV V1 max PG: 3.5 mmHg  LV V1 max: 93.0 cm/sec  LV V1 VTI: 11.9 cm  PA acc time: 0.07 sec  TR max charanjit: 251.9 cm/sec  TR max P.4 mmHg  AV Charanjit Ratio (DI): 0.83  E/E': 9.5  E/E' avg: 10.6  Lateral E/e': 11.7  Medial E/e': 9.5  Peak E' Charanjit: 7.0 cm/sec  RV S Charanjit: 10.4 cm/sec     ______________________________________________________________________________  Report approved by: Marisol Up 2024 04:18 PM               Labs:  Echocardiogram Complete   Final Result       CTA Abdomen Pelvis with Contrast   Final Result   IMPRESSION:   1.  Near occlusive stenosis of the celiac axis, with intraluminal thrombus.   2.  Multifocal atherosclerotic disease, abdominal aortic aneurysm, and additional multifocal stenoses.   3.  Additional findings as above.       CT Chest Pulmonary Embolism w Contrast   Final Result   IMPRESSION:   1.  No acute pulmonary emboli.       2.  Unchanged left upper lobe subpleural nodule, consolidation in the left upper lobe posteriorly, and a few other scattered nodular airspace opacities elsewhere.        3.  Unchanged mildly enlarged mediastinal and left hilar lymph nodes.       4.  Unchanged high-grade narrowing of the celiac artery proximally with noncalcified plaque or thrombus.                Recent Results (from the past 24 hour(s))   Magnesium     Collection Time: 05/14/24  2:05 PM   Result Value Ref Range     Magnesium 2.1 1.7 - 2.3 mg/dL   Hepatic panel     Collection Time: 05/14/24  2:05 PM   Result Value Ref Range     Protein Total 6.2 (L) 6.4 - 8.3 g/dL     Albumin 3.2 (L) 3.5 - 5.2 g/dL     Bilirubin Total 0.2 <=1.2 mg/dL     Alkaline Phosphatase 142 40 - 150 U/L     AST 19 0 - 45 U/L     ALT 37 0 - 50 U/L     Bilirubin Direct <0.20 0.00 - 0.30 mg/dL   Echocardiogram Complete     Collection Time: 05/14/24  4:01 PM   Result Value Ref Range     LVEF  60-65%     Glucose by meter     Collection Time: 05/14/24  5:39 PM   Result Value Ref Range     GLUCOSE BY METER POCT 124 (H) 70 - 99 mg/dL   Glucose by meter     Collection Time: 05/14/24  9:02 PM   Result Value Ref Range     GLUCOSE BY METER POCT 112 (H) 70 - 99 mg/dL   Glucose by meter     Collection Time: 05/15/24  2:32 AM   Result Value Ref Range     GLUCOSE BY METER POCT 101 (H) 70 - 99 mg/dL   Basic metabolic panel     Collection Time: 05/15/24  5:07 AM   Result Value Ref Range     Sodium 140 135 - 145 mmol/L     Potassium 4.1 3.4 - 5.3 mmol/L     Chloride 102 98 - 107 mmol/L     Carbon Dioxide (CO2) 29 22 - 29 mmol/L     Anion Gap 9 7 - 15 mmol/L     Urea Nitrogen 38.3 (H) 8.0 - 23.0 mg/dL     Creatinine 0.86 0.51 - 0.95 mg/dL     GFR Estimate 72 >60 mL/min/1.73m2     Calcium 8.8 8.8 - 10.2 mg/dL     Glucose 82 70 - 99 mg/dL   Magnesium     Collection Time: 05/15/24  5:07 AM   Result Value Ref Range     Magnesium 2.0 1.7 - 2.3 mg/dL   Glucose by meter     Collection Time: 05/15/24  7:39 AM   Result Value Ref Range     GLUCOSE BY METER POCT 88 70 - 99 mg/dL   Glucose by meter     Collection Time: 05/15/24 12:16 PM   Result Value Ref Range     GLUCOSE  BY METER POCT 125 (H) 70 - 99 mg/dL         Pending Labs:  Unresulted Labs Ordered in the Past 30 Days of this Admission         Date and Time Order Name Status Description     5/15/2024 10:40 AM 1,3 Beta D glucan fungitell In process       5/15/2024 10:40 AM Aspergillus Galactomannan Antigen In process       5/10/2024  6:36 PM Blood Culture Peripheral Blood Preliminary                  I spoke with Dr. Galan to discuss patient's care.     KAZ BENAVIDES MD  [facility] Medicine  [facility]  Phone: #[phone]     Securely message me with [employer]ie (more info)

## 2024-05-20 NOTE — PROGRESS NOTES
Clinic Care Coordination Contact  Transitions of Care Outreach  Chief Complaint   Patient presents with    Clinic Care Coordination - Post Hospital       Most Recent Admission Date: 5/10/2024   Most Recent Admission Diagnosis: Hypoxia - R09.02  COPD exacerbation (H) - J44.1  Status post chemotherapy - Z92.21  Urinary tract infection with hematuria, site unspecified - N39.0, R31.9  Acute sepsis (H) - A41.9     Most Recent Discharge Date: 5/17/2024   Most Recent Discharge Diagnosis: Acute sepsis (H) - A41.9  Urinary tract infection with hematuria, site unspecified - N39.0, R31.9  COPD exacerbation (H) - J44.1  Status post chemotherapy - Z92.21  Hypoxia - R09.02  Arterial thrombosis (H) - I74.9     Transitions of Care Assessment    Discharge Assessment  How are your symptoms? (Red Flag symptoms escalate to triage hotline per guidelines): Improved  Do you know how to contact your clinic care team if you have future questions or changes to your health status? : Yes  Does the patient have their discharge instructions? : Yes  Does the patient have questions regarding their discharge instructions? : No  Were you started on any new medications or were there changes to any of your previous medications? : Yes  Does the patient have all of their medications?: Yes  Do you have questions regarding any of your medications? : No  Do you have all of your needed medical supplies or equipment (DME)?  (i.e. oxygen tank, CPAP, cane, etc.): Yes         Post-op (Clinicians Only)  Did the patient have surgery or a procedure: No  Fever: No  Chills: No  Eating & Drinking: eating and drinking without complaints/concerns  PO Intake: regular diet        Follow up Plan     Discharge Follow-Up  Discharge follow up appointment scheduled in alignment with recommended follow up timeframe or Transitions of Risk Category? (Low = within 30 days; Moderate= within 14 days; High= within 7 days): Yes  Discharge Follow Up Appointment Date:  05/29/24  Discharge Follow Up Appointment Scheduled with?: Primary Care Provider    Future Appointments   Date Time Provider Department Center   5/28/2024  8:30 AM [facility] CHEMO LAB DRAW 2 [facility]   5/28/2024  9:00 AM [facility]   5/28/2024 10:00 AM [facility] CHEMO CHAIR [facility]   5/29/2024 10:00 AM Jean Marie Calle, CNP [facility]INTM [facility]   5/29/2024 11:30 AM [facility] INFUSION CHAIR [facility]INFT [facility]F   5/30/2024 12:00 PM [facility] INFUSION CHAIR [facility]INFT [facility] INF   9/23/2024  8:15 AM Tata Lainez, NP [facility]   10/3/2024  8:30 AM [facility] LAB WILABR [facility]   10/8/2024  8:30 AM Rickie Pickens MD MDENDO [facility]       Outpatient Plan as outlined on AVS reviewed with patient.    [facility] and pt's daughter spoke about how things are going at home -she noted pt is doing well: great energy, appetite, taking all medication, getting around home with walker, and waiting to hear from cancer team about labs and plans for moving forward with treatment. Things are going well overall - no questions or concerns at this time.     For any urgent concerns, please contact our 24 hour nurse triage line: [phone] ([phone])       FORREST Jesus

## 2024-05-28 ENCOUNTER — INFUSION THERAPY VISIT (OUTPATIENT)
Dept: INFUSION THERAPY | Facility: HOSPITAL | Age: 69
End: 2024-05-28
Attending: INTERNAL MEDICINE
Payer: COMMERCIAL

## 2024-05-28 ENCOUNTER — ONCOLOGY VISIT (OUTPATIENT)
Dept: ONCOLOGY | Facility: HOSPITAL | Age: 69
End: 2024-05-28
Attending: INTERNAL MEDICINE
Payer: COMMERCIAL

## 2024-05-28 VITALS
HEART RATE: 122 BPM | HEIGHT: 60 IN | RESPIRATION RATE: 22 BRPM | OXYGEN SATURATION: 93 % | BODY MASS INDEX: 18.87 KG/M2 | WEIGHT: 96.1 LBS | DIASTOLIC BLOOD PRESSURE: 63 MMHG | SYSTOLIC BLOOD PRESSURE: 137 MMHG | TEMPERATURE: 97.8 F

## 2024-05-28 DIAGNOSIS — D73.5 SPLENIC INFARCTION: ICD-10-CM

## 2024-05-28 DIAGNOSIS — G93.9 BRAIN LESION: ICD-10-CM

## 2024-05-28 DIAGNOSIS — D64.81 ANEMIA ASSOCIATED WITH CHEMOTHERAPY: ICD-10-CM

## 2024-05-28 DIAGNOSIS — C34.90 SMALL CELL LUNG CANCER (H): Primary | ICD-10-CM

## 2024-05-28 DIAGNOSIS — T45.1X5A ANEMIA ASSOCIATED WITH CHEMOTHERAPY: ICD-10-CM

## 2024-05-28 LAB
ALBUMIN SERPL BCG-MCNC: 3.4 G/DL (ref 3.5–5.2)
ALP SERPL-CCNC: 134 U/L (ref 40–150)
ALT SERPL W P-5'-P-CCNC: 19 U/L (ref 0–50)
ANION GAP SERPL CALCULATED.3IONS-SCNC: 9 MMOL/L (ref 7–15)
AST SERPL W P-5'-P-CCNC: 16 U/L (ref 0–45)
BASOPHILS # BLD AUTO: 0.1 10E3/UL (ref 0–0.2)
BASOPHILS NFR BLD AUTO: 1 %
BILIRUB SERPL-MCNC: <0.2 MG/DL
BUN SERPL-MCNC: 33.3 MG/DL (ref 8–23)
CALCIUM SERPL-MCNC: 8.8 MG/DL (ref 8.8–10.2)
CHLORIDE SERPL-SCNC: 109 MMOL/L (ref 98–107)
CREAT SERPL-MCNC: 0.85 MG/DL (ref 0.51–0.95)
DEPRECATED HCO3 PLAS-SCNC: 21 MMOL/L (ref 22–29)
EGFRCR SERPLBLD CKD-EPI 2021: 73 ML/MIN/1.73M2
EOSINOPHIL # BLD AUTO: 0.5 10E3/UL (ref 0–0.7)
EOSINOPHIL NFR BLD AUTO: 4 %
ERYTHROCYTE [DISTWIDTH] IN BLOOD BY AUTOMATED COUNT: 16.7 % (ref 10–15)
GLUCOSE SERPL-MCNC: 228 MG/DL (ref 70–99)
HCT VFR BLD AUTO: 27.5 % (ref 35–47)
HGB BLD-MCNC: 8.1 G/DL (ref 11.7–15.7)
IMM GRANULOCYTES # BLD: 0.3 10E3/UL
IMM GRANULOCYTES NFR BLD: 2 %
LYMPHOCYTES # BLD AUTO: 1.3 10E3/UL (ref 0.8–5.3)
LYMPHOCYTES NFR BLD AUTO: 9 %
MCH RBC QN AUTO: 28.9 PG (ref 26.5–33)
MCHC RBC AUTO-ENTMCNC: 29.5 G/DL (ref 31.5–36.5)
MCV RBC AUTO: 98 FL (ref 78–100)
MONOCYTES # BLD AUTO: 1.7 10E3/UL (ref 0–1.3)
MONOCYTES NFR BLD AUTO: 12 %
NEUTROPHILS # BLD AUTO: 10.1 10E3/UL (ref 1.6–8.3)
NEUTROPHILS NFR BLD AUTO: 72 %
NRBC # BLD AUTO: 0 10E3/UL
NRBC BLD AUTO-RTO: 0 /100
PLATELET # BLD AUTO: 279 10E3/UL (ref 150–450)
POTASSIUM SERPL-SCNC: 4.6 MMOL/L (ref 3.4–5.3)
PROT SERPL-MCNC: 6.7 G/DL (ref 6.4–8.3)
RBC # BLD AUTO: 2.8 10E6/UL (ref 3.8–5.2)
SODIUM SERPL-SCNC: 139 MMOL/L (ref 135–145)
TSH SERPL DL<=0.005 MIU/L-ACNC: 1.62 UIU/ML (ref 0.3–4.2)
WBC # BLD AUTO: 13.9 10E3/UL (ref 4–11)

## 2024-05-28 PROCEDURE — 84443 ASSAY THYROID STIM HORMONE: CPT | Performed by: INTERNAL MEDICINE

## 2024-05-28 PROCEDURE — 36591 DRAW BLOOD OFF VENOUS DEVICE: CPT | Performed by: INTERNAL MEDICINE

## 2024-05-28 PROCEDURE — 99214 OFFICE O/P EST MOD 30 MIN: CPT | Performed by: NURSE PRACTITIONER

## 2024-05-28 PROCEDURE — 250N000011 HC RX IP 250 OP 636: Mod: JZ | Performed by: INTERNAL MEDICINE

## 2024-05-28 PROCEDURE — 258N000003 HC RX IP 258 OP 636: Performed by: INTERNAL MEDICINE

## 2024-05-28 PROCEDURE — 82040 ASSAY OF SERUM ALBUMIN: CPT | Performed by: INTERNAL MEDICINE

## 2024-05-28 PROCEDURE — G0463 HOSPITAL OUTPT CLINIC VISIT: HCPCS | Mod: 25 | Performed by: NURSE PRACTITIONER

## 2024-05-28 PROCEDURE — 85049 AUTOMATED PLATELET COUNT: CPT | Performed by: INTERNAL MEDICINE

## 2024-05-28 PROCEDURE — 96413 CHEMO IV INFUSION 1 HR: CPT

## 2024-05-28 PROCEDURE — G2211 COMPLEX E/M VISIT ADD ON: HCPCS | Performed by: NURSE PRACTITIONER

## 2024-05-28 RX ORDER — HEPARIN SODIUM (PORCINE) LOCK FLUSH IV SOLN 100 UNIT/ML 100 UNIT/ML
5 SOLUTION INTRAVENOUS
Status: DISCONTINUED | OUTPATIENT
Start: 2024-05-28 | End: 2024-05-28 | Stop reason: HOSPADM

## 2024-05-28 RX ORDER — OMEPRAZOLE 10 MG/1
10 CAPSULE, DELAYED RELEASE ORAL DAILY
COMMUNITY

## 2024-05-28 RX ORDER — FEXOFENADINE HCL 60 MG/1
60 TABLET, FILM COATED ORAL 2 TIMES DAILY
COMMUNITY

## 2024-05-28 RX ORDER — LACTOBACILLUS RHAMNOSUS GG 10B CELL
1 CAPSULE ORAL 2 TIMES DAILY
COMMUNITY

## 2024-05-28 RX ADMIN — ATEZOLIZUMAB 1200 MG: 1200 INJECTION, SOLUTION INTRAVENOUS at 10:22

## 2024-05-28 RX ADMIN — SODIUM CHLORIDE 250 ML: 9 INJECTION, SOLUTION INTRAVENOUS at 09:42

## 2024-05-28 RX ADMIN — Medication 5 ML: at 10:56

## 2024-05-28 ASSESSMENT — PAIN SCALES - GENERAL: PAINLEVEL: NO PAIN (0)

## 2024-05-28 NOTE — PROGRESS NOTES
Oncology Rooming Note    May 28, 2024 8:53 AM   Jasmyn Green is a 68 year old female who presents for:    Chief Complaint   Patient presents with    Oncology Clinic Visit     3 week return visit with lab/infusion related to Small cell lung cancer     Initial Vitals: /63 (BP Location: Right arm, Patient Position: Sitting, Cuff Size: Adult Regular)   Pulse (!) 122   Temp 97.8  F (36.6  C) (Tympanic)   Resp 22   Ht 1.524 m (5')   Wt 43.6 kg (96 lb 1.6 oz)   LMP  (LMP Unknown)   SpO2 93%   BMI 18.77 kg/m   Estimated body mass index is 18.77 kg/m  as calculated from the following:    Height as of this encounter: 1.524 m (5').    Weight as of this encounter: 43.6 kg (96 lb 1.6 oz). Body surface area is 1.36 meters squared.  No Pain (0) Comment: Data Unavailable   No LMP recorded (lmp unknown). Patient is postmenopausal.  Allergies reviewed: Yes  Medications reviewed: Yes    Medications: Medication refills not needed today.  Pharmacy name entered into [employer]:    [facility] - [place], [place] - [address] [facility] - [place], [place] - [address]    Frailty Screening:   Is the patient here for a new oncology consult visit in cancer care? 2. No      Clinical concerns: none.       Jina Espinoza, [facility]

## 2024-05-28 NOTE — PROGRESS NOTES
PT here in wheelchair for infusion. Pt is on 3 liters oxygen NC. Tecentriq reviewed and administered via port access. PT tolerated without any problems. Txt completed and port flushed/deaccessed with 2x2 to site. Follow up reviewed and pt dc'd steady gait.

## 2024-05-28 NOTE — PROGRESS NOTES
[facility] Hematology and Oncology Progress Note    Patient: Jasmyn Green  MRN: [id number]  Date of Service: May 28, 2024          Oncologist:Dr. Stewart    Reason for Visit    Chief Complaint   Patient presents with    Oncology Clinic Visit     3 week return visit with lab/infusion related to Small cell lung cancer       Assessment and Plan     Cancer Staging   No matching staging information was found for the patient.    Lung cancer, Small Cell, extensive stage: left upper lobe, pleural mets, mediastinal nodes   Patient started palliative chemotherapy with carboplatin, etoposide and Atezolizumab 3/19/24. first cycle given at 20% reduction due to performance status. Cycle 2 and 3 given at full dose. She was admitted 5/10/24-5/17/24 for COPD exacerbation with possible pneumonia. She was given Duonebs, IV steriods, and abx. She had been tolerating treatment overall before hospitalization. She is now feeling much better and continuing to regain strength. She denies new headaches, N/V/D, or new symptoms. Her appetite is improved and she is eating well and feeling more energy. Her SOB is much improved since hospitalization and she is back to baseline oxygen of 3L at rest and 4L with activity. She is able to ambulate with a walker and has been trying to move around the house and build strength. Discussed the option of pausing chemotherapy this week and proceeding with immunotherapy only. Will reassess in 3 weeks how pt is doing and consider continuing single agent immunotherapy or attempting chemotherapy at a dose reduction. Labs are stable today Pt and family members are agreeable to plan.     3 mm abnormality seen on brain MRI:   She is asymptomatic so at this time. MRI 5/3/24 shows decrease in size from March 2024 and appears more vascular. Will continue to monitor.     Anemia:   Likely from chronic disease, COPD as well as chemotherapy. Had PRBC 1 units 5/7/24 for Hgb 6.9. Improved today at 8.1. Continue  to monitor.     COPD:  Pt is recovering from recent hospitalization 5/10-5/17 for COPD exacerbation with possible pneumonia. Back to baseline oxygen of 3L at rest, 4L with activity. SOB is continuing to improve. She has been taking daily Zyrtec to help with possible allergies.     5. Splenic infarct  On Eliquis and full dose asa.         Plan  Proceed with immunotherapy alone today  Follow up in 3 weeks with Dr. Stewart to reassess chemotherapy     ECOG Performance    2 - Ambulatory and independent in all ADLs; cannot work; up > 50% of the time    Distress Screening (within last 30 days)    No data recorded     Pain  Pain Score: No Pain (0)    Problem List    Patient Active Problem List   Diagnosis    COPD exacerbation (H)    History of gout    Coronary artery calcification    Lymphocytosis    Essential hypertension    Asymptomatic hypertensive urgency    Age-related osteoporosis without current pathological fracture    Lung mass    Small cell lung cancer (H)    Chronic respiratory failure with hypoxia (H)    Anemia associated with chemotherapy    Hypoxia    Status post chemotherapy    Urinary tract infection with hematuria, site unspecified    Acute sepsis (H)        ______________________________________________________________________________    History of Present Illness    Diagnosis:   Lung cancer, small cell, extensive stage. Presented to ER for SOB, coughing. Hx of COPD, on oxygen.   -2/8/24: CT: Bulky AP and left hilar adenopathy with irregular pleural-based anterior left upper lobe nodule with adjacent smaller satellite nodules. Findings suspicious for a primary lung cancer   -3/1/24: PET: Findings suspicious for a left upper lobe primary lung neoplasm with adjacent satellite lesion in the left upper lobe, extensive mediastinal lymph node involvement, and several pleural-based metastases in the left hemithorax   -3/14/24: Brain MRI: Findings concerning for a single 3 - 4 mm right occipital lobe cortex  metastatic lesion   -5/3/24: repeat brain MRI shows lesion size decrease and appears more vascular       Treatment:   -3/25/24: start palliative treatment with Carboplatin, Etoposide, atezolizumab. Finished 3 cycles  -5/28/24: single agent atezolizumab after hospitalization for COPD exacerbation and pneumonia    Interim History:   Patient is here today for follow up after hospitalization 5/10/24-5/17/24 for COPD exacerbation with possible pneumonia. She is with her family. She is now feeling much better and continuing to regain strength. She denies new headaches, N/V/D, or new symptoms. Her appetite is improved and she is eating well and feeling more energy. Her SOB is much improved since hospitalization and she is back to baseline oxygen of 3L at rest and 4L with activity. She is able to ambulate with a walker and has been trying to move around the house and build strength. She lives with her daughter. She had tolerated the treatment overall well except for the worsening shortness of breath and eventual hospitalization.     Review of Systems    Pertinent items are noted in HPI.    Past History    Past Medical History:   Diagnosis Date    Age-related osteoporosis without current pathological fracture     Arthritis     COPD (chronic obstructive pulmonary disease) (H)     Coronary artery disease     Dependence on nocturnal oxygen therapy     Dyspnea on exertion     Emphysema lung (H)     Gout     HLD (hyperlipidemia)     Hypertension     Lung mass        PHYSICAL EXAM  /63 (BP Location: Right arm, Patient Position: Sitting, Cuff Size: Adult Regular)   Pulse (!) 122   Temp 97.8  F (36.6  C) (Tympanic)   Resp 22   Ht 1.524 m (5')   Wt 43.6 kg (96 lb 1.6 oz)   LMP  (LMP Unknown)   SpO2 93%   BMI 18.77 kg/m      GENERAL: no acute distress. Cooperative in conversation. Here with 5 family members. Using walker  RESP: Regular respiratory rate. No expiratory wheezes   NEURO: non focal. Alert and oriented x3.    PSYCH: within normal limits. No depression or anxiety.  SKIN: exposed skin is dry intact.     Lab Results    Recent Results (from the past 168 hour(s))   Comprehensive metabolic panel   Result Value Ref Range    Sodium 139 135 - 145 mmol/L    Potassium 4.6 3.4 - 5.3 mmol/L    Carbon Dioxide (CO2) 21 (L) 22 - 29 mmol/L    Anion Gap 9 7 - 15 mmol/L    Urea Nitrogen 33.3 (H) 8.0 - 23.0 mg/dL    Creatinine 0.85 0.51 - 0.95 mg/dL    GFR Estimate 73 >60 mL/min/1.73m2    Calcium 8.8 8.8 - 10.2 mg/dL    Chloride 109 (H) 98 - 107 mmol/L    Glucose 228 (H) 70 - 99 mg/dL    Alkaline Phosphatase 134 40 - 150 U/L    AST 16 0 - 45 U/L    ALT 19 0 - 50 U/L    Protein Total 6.7 6.4 - 8.3 g/dL    Albumin 3.4 (L) 3.5 - 5.2 g/dL    Bilirubin Total <0.2 <=1.2 mg/dL   TSH with free T4 reflex   Result Value Ref Range    TSH 1.62 0.30 - 4.20 uIU/mL   CBC with platelets and differential   Result Value Ref Range    WBC Count 13.9 (H) 4.0 - 11.0 10e3/uL    RBC Count 2.80 (L) 3.80 - 5.20 10e6/uL    Hemoglobin 8.1 (L) 11.7 - 15.7 g/dL    Hematocrit 27.5 (L) 35.0 - 47.0 %    MCV 98 78 - 100 fL    MCH 28.9 26.5 - 33.0 pg    MCHC 29.5 (L) 31.5 - 36.5 g/dL    RDW 16.7 (H) 10.0 - 15.0 %    Platelet Count 279 150 - 450 10e3/uL    % Neutrophils 72 %    % Lymphocytes 9 %    % Monocytes 12 %    % Eosinophils 4 %    % Basophils 1 %    % Immature Granulocytes 2 %    NRBCs per 100 WBC 0 <1 /100    Absolute Neutrophils 10.1 (H) 1.6 - 8.3 10e3/uL    Absolute Lymphocytes 1.3 0.8 - 5.3 10e3/uL    Absolute Monocytes 1.7 (H) 0.0 - 1.3 10e3/uL    Absolute Eosinophils 0.5 0.0 - 0.7 10e3/uL    Absolute Basophils 0.1 0.0 - 0.2 10e3/uL    Absolute Immature Granulocytes 0.3 <=0.4 10e3/uL    Absolute NRBCs 0.0 10e3/uL           Imaging    Echocardiogram Complete    Result Date: 2024   [place] [address]  Name: JACE GONG MRN: [id number] : 1955 Study Date: 2024 08:21 AM Age: 68 yrs Gender: Female   Performed By: [facility] BSA: 1.4 m2 Height: 60 in Weight: 94 lb HR: 123 BP: 133/58 mmHg  ______________________________________________________________________________ Procedure Complete Portable Echo Adult. ______________________________________________________________________________ Interpretation Summary  Left ventricular size, wall motion and function are normal. The ejection fraction is 60-65%. Normal right ventricle size and systolic function. No hemodynamically significant valvular abnormalities on 2D or color flow imaging. The study was technically difficult. ______________________________________________________________________________ Left Ventricle Left ventricular size, wall motion and function are normal. The ejection fraction is 60-65%. There is normal left ventricular wall thickness. Left ventricular diastolic function is indeterminate. No regional wall motion abnormalities noted.  Right Ventricle Normal right ventricle size and systolic function.  Atria Normal left atrial size. Right atrial size is normal. There is no color Doppler evidence of an atrial shunt.  Mitral Valve Mitral valve leaflets appear normal. There is no evidence of mitral stenosis or clinically significant mitral regurgitation.  Tricuspid Valve Tricuspid valve leaflets appear normal. There is no evidence of tricuspid stenosis or clinically significant tricuspid regurgitation. There is trace to mild tricuspid regurgitation. Right ventricle systolic pressure estimate normal.  Aortic Valve Aortic valve leaflets appear normal. There is no evidence of aortic stenosis or clinically significant aortic regurgitation.  Pulmonic Valve The pulmonic valve is not well seen, but is grossly normal. This degree of valvular regurgitation is within normal limits.  Vessels The aorta root is normal. Normal size ascending aorta. IVC diameter <2.1 cm collapsing >50% with sniff suggests a normal RA pressure of 3 mmHg.  Pericardium There is no pericardial  effusion. ______________________________________________________________________________ MMode/2D Measurements & Calculations  IVSd: 0.95 cm LVIDd: 3.1 cm LVIDs: 1.8 cm LVPWd: 0.97 cm FS: 42.7 % LV mass(C)d: 81.5 grams LV mass(C)dI: 60.2 grams/m2 Ao root diam: 2.8 cm Ao root diam index Ht(cm/m): 1.8 Ao root diam index BSA (cm/m2): 2.0 EF Biplane: 60.9 % RV Base: 2.7 cm RWT: 0.62 TAPSE: 1.5 cm  Time Measurements MM HR: 116.0 BPM  Doppler Measurements & Calculations MV E max charanjit: 66.4 cm/sec MV A max charanjit: 114.0 cm/sec MV E/A: 0.58 MV max P.1 mmHg MV mean PG: 3.8 mmHg MV V2 VTI: 16.9 cm MV dec time: 0.08 sec Ao V2 max: 112.0 cm/sec Ao max P.0 mmHg Ao V2 mean: 81.8 cm/sec Ao mean PG: 3.0 mmHg Ao V2 VTI: 15.1 cm LV V1 max PG: 3.5 mmHg LV V1 max: 93.0 cm/sec LV V1 VTI: 11.9 cm PA acc time: 0.07 sec TR max charanjit: 251.9 cm/sec TR max P.4 mmHg AV Charanjit Ratio (DI): 0.83 E/E': 9.5 E/E' avg: 10.6 Lateral E/e': 11.7 Medial E/e': 9.5 Peak E' Charanjit: 7.0 cm/sec RV S Charanjit: 10.4 cm/sec  ______________________________________________________________________________ Report approved by: Marisol Up 2024 04:18 PM       CTA Abdomen Pelvis with Contrast    Result Date: 5/10/2024  EXAM: CTA ABDOMEN PELVIS WITH CONTRAST LOCATION: [facility] DATE: 5/10/2024 INDICATION: followup on abnormal ct from 5 3 24 COMPARISON: 5/3/2024 TECHNIQUE: CT angiogram abdomen pelvis during arterial phase of injection of IV contrast. 2D and 3D MIP reconstructions were performed by the CT technologist. Dose reduction techniques were used. Exam is limited by lack of thin section sagittal and coronal reconstructions. Thin section reconstructions were created on the [place] workstation from the initial data set, however. 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 Intraluminal thrombus identified within the first 1.5 cm of the celiac artery.  Patent splenic artery with scattered calcified plaque. Likewise, common hepatic artery is patent. Conventional hepatic arterial anatomy. Patent SMA, bilateral renal arteries, and DWAYNE as well as runoff to the upper legs. Large amount of calcified plaque within the right common femoral artery, with approximately 90% luminal stenosis. High-grade stenoses of both common iliac arteries, although contrast bolus limits further assessment. Infrarenal abdominal aortic aneurysm measuring 2.5 cm. Multifocal noncalcified plaque/thrombus scattered in the abdominal aorta. LOWER CHEST: Advanced centrilobular emphysema with patchy parenchymal opacities in both lung bases and bronchial wall thickening redemonstrated. HEPATOBILIARY: No biliary dilatation PANCREAS: Unremarkable SPLEEN: Moderate sized splenic infarct redemonstrated ADRENAL GLANDS: Left adrenal nodular hyperplasia. KIDNEYS/BLADDER: No hydronephrosis. Decompressed bladder. BOWEL: Colonic diverticulosis. LYMPH NODES: No significant retroperitoneal adenopathy PELVIC ORGANS: No free fluid MUSCULOSKELETAL: No acute bony abnormalities.     IMPRESSION: 1.  Near occlusive stenosis of the celiac axis, with intraluminal thrombus. 2.  Multifocal atherosclerotic disease, abdominal aortic aneurysm, and additional multifocal stenoses. 3.  Additional findings as above.    CT Chest Pulmonary Embolism w Contrast    Result Date: 5/10/2024  EXAM: CT CHEST PULMONARY EMBOLISM W CONTRAST LOCATION: [facility] DATE: 5/10/2024 INDICATION: Hypoxia, shortness of breath, lung cancer, tachycardia COMPARISON: 5/3/2024 TECHNIQUE: CT chest pulmonary angiogram during arterial phase injection of IV contrast. Multiplanar reformats and MIP reconstructions were performed. Dose reduction techniques were used. CONTRAST: [id number] 90ml FINDINGS: ANGIOGRAM CHEST: Pulmonary arteries are normal caliber and negative for pulmonary emboli. Thoracic aorta is negative for dissection. No CT  evidence of right heart strain. LUNGS AND PLEURA: Severe emphysema. Unchanged small area of subpleural soft tissue thickening in the anterior left upper lobe (8/147), as well as an area of consolidation in the left upper lobe posteriorly with associated bronchiectasis. A few other nodular opacities elsewhere also remain unchanged. No pleural effusion or pneumothorax. MEDIASTINUM/AXILLAE: Unchanged enlarged lymph nodes in the AP window and left hilum. No enlarging lymph nodes. Right internal jugular port catheter tip at the SVC-RA junction. CORONARY ARTERY CALCIFICATION: Severe. UPPER ABDOMEN: Partially imaged splenic infarcts. Unchanged narrowing and filling defect in the proximal celiac axis. MUSCULOSKELETAL: Normal.     IMPRESSION: 1.  No acute pulmonary emboli. 2.  Unchanged left upper lobe subpleural nodule, consolidation in the left upper lobe posteriorly, and a few other scattered nodular airspace opacities elsewhere. 3.  Unchanged mildly enlarged mediastinal and left hilar lymph nodes. 4.  Unchanged high-grade narrowing of the celiac artery proximally with noncalcified plaque or thrombus.    MR Brain w/o & w Contrast    Result Date: 5/3/2024  EXAM: MR BRAIN W/O and W CONTRAST LOCATION: [facility] DATE: 5/3/2024 INDICATION:  Small cell lung cancer (H), Brain lesion COMPARISON: Brain MRI: 3/14/2024. CONTRAST: 4.5 mL Gadavist. TECHNIQUE: Routine multiplanar multisequence head MRI without and with intravenous contrast. High-resolution postcontrast T1-weighted images were also obtained. FINDINGS: INTRACRANIAL CONTENTS: Previously described tiny nodular focus of enhancement in the right occipital lobe is decreased in size/conspicuity compared to study from March 2024 and has a more vascular appearance on today's examination (series 1/4/2001/images  91-94). No vasogenic edema or mass effect. No new enhancing intracranial lesions. No restricted diffusion to suggest acute or subacute infarct.  No acute intracranial hemorrhage or extra-axial fluid collections. Scattered nonspecific T2/FLAIR hyperintensities within the cerebral white matter most consistent with mild chronic microvascular ischemic change. Mild generalized cerebral atrophy. No hydrocephalus. Normal position of the cerebellar tonsils. No pathologic contrast enhancement. SELLA: No abnormality accounting for technique. OSSEOUS STRUCTURES/SOFT TISSUES: Normal marrow signal. The major intracranial vascular flow voids are maintained. ORBITS: No abnormality accounting for technique. SINUSES/MASTOIDS: No paranasal sinus mucosal disease. No middle ear or mastoid effusion.     IMPRESSION: 1.  Previously described tiny nodular focus of enhancement in the right occipital lobe is decreased in size/conspicuity compared to study from March 2024 and appears more vascular in nature on today's examination. No vasogenic edema or mass effect. Advise continued attention to this area on future imaging follow-up. 2.  No new enhancing intracranial lesions. 3.  Mild age-related changes as above.    CT Chest/Abdomen/Pelvis w Contrast    Result Date: 5/3/2024  EXAM: CT CHEST/ABDOMEN/PELVIS W CONTRAST LOCATION: [facility] DATE: 5/3/2024 INDICATION: Small cell lung cancer (H) COMPARISON: Chest CT from 02/08/2024, CT abdomen and pelvis from 02/11/2024. TECHNIQUE: CT scan of the chest, abdomen, and pelvis was performed following injection of IV contrast. Multiplanar reformats were obtained. Dose reduction techniques were used. CONTRAST: [id number] 48 ml FINDINGS: LUNGS AND PLEURA: Advanced emphysema. The previously seen pleural-based mass in the anterior left upper lobe has nearly resolved with only a faint residual triangular opacity measuring 0.8 x 0.8 cm on series 4 image 95. There is a new pleural-based consolidative opacity in the left upper lobe on series 4 image 81. There are a few new irregular nodular opacities in the bilateral lower lobes and  left lingula. No effusions. Mild bronchial wall thickening. Mild bronchiectasis. MEDIASTINUM/AXILLAE: Significant interval reduction in the AP window and left hilar adenopathy. This measures 3.1 x 1.4 cm compared to 6 x 3 cm previously (series 3/58). Decreased size of a left hilar lymph node measuring 1.8 x 1.4 cm compared to 3.4 x 1.7 cm previously (series 3/73). The other remaining mediastinal and hilar lymph nodes are relatively similar. Small hiatal hernia. CORONARY ARTERY CALCIFICATION: Severe. HEPATOBILIARY: Hepatic steatosis. Benign left hepatic lobe cyst. The common bile duct is at the upper limits of normal measuring 0.8 cm. This may be age-related. PANCREAS: Normal. SPLEEN: There are new wedge-shaped infarcts in the upper and lower pole of the spleen on series 3 image 133 and 160. ADRENAL GLANDS: There is nodular thickening of the left adrenal gland. KIDNEYS/BLADDER: Bilateral cortical thinning. Benign renal cysts do not require follow-up. No hydronephrosis or genitourinary calculi. BOWEL: Diverticulosis of the colon. No acute inflammatory change. No obstruction. Mild to moderate colonic stool burden. LYMPH NODES: No new or enlarging adenopathy. VASCULATURE: There is new or more conspicuous high-grade stenosis of the celiac artery origin on series 3 image 140. Additionally, there is a punctate filling defect in the splenic artery on series 3 image 150. Extensive atherosclerosis of the abdominal aorta and its branches. There is ectasia of the infrarenal aorta measuring up to 2.4 cm. Heavily calcified atherosclerosis of the bilateral renal arteries, bilateral common iliac arteries, right greater than left, and the visualized right common femoral and superficial femoral arteries. PELVIC ORGANS: Hysterectomy. MUSCULOSKELETAL: Degenerative changes of the spine. No concerning osseous lesions. Osteopenia.     IMPRESSION: Chest: 1.  Nearly resolved anterior left upper lobe pleural-based nodule/mass. 2.   Significant interval reduction in the AP window and left hilar adenopathy. The remaining mediastinal and hilar lymph nodes are otherwise not significantly changed. 3.  There are a few new scattered, predominantly bilateral lower lobe and left lingular irregular nodular opacities. New subpleural consolidative opacity in the left upper lobe along the fissure. Findings could be related to infectious/inflammatory etiology or posttreatment related changes. Recurrence is in the differential although felt to be less likely given the otherwise interval improvement of the remaining findings discussed above. Abdomen and Pelvis: 1.  New multifocal wedge-shaped infarcts in the spleen. There is new or more conspicuous high-grade narrowing of the celiac artery. It is difficult to discern whether this is noncalcified atherosclerotic plaque or thrombus. Additionally, there is a new or more conspicuous punctate filling defect in the splenic artery that could represent atherosclerotic plaque or thrombus. A CTA of the abdomen and pelvis should be considered. 2.  No evidence of metastatic disease in the abdomen or pelvis. [Critical Result: New splenic infarcts with possible thrombus in the celiac artery. Finding was identified on 5/3/2024 11:32 AM CDT. Dr. Mathur was contacted by me on 5/3/2024 12:18 PM CDT and verbalized understanding of the critical result.     IR Chest Port Placement > 5 Yrs of Age    Result Date: 5/2/2024  [facility] LOCATION: [facility] DATE: 5/2/2024 PROCEDURE: IMPLANTABLE VENOUS CHEST PORT PLACEMENT (POWER INJECTABLE) INTERVENTIONAL RADIOLOGIST: Jonathan Spann MD. INDICATION: 68-year-old female with left-sided lung cancer presents for chest port placement for chemotherapy. CONSENT: The risks, benefits and alternatives of implantable venous chest port placement were discussed with the patient  in detail. All questions were answered. Informed consent was given to proceed with the  procedure. MODERATE SEDATION: IV fentanyl and Versed were administered for sedation.  During the timeout, immediately prior to the administration of medications, the patient was reassessed for adequacy to receive conscious sedation. Under physician supervision, Versed and fentanyl were administered for moderate sedation. Pulse oximetry, heart rate and blood pressure were continuously monitored by an independent trained observer. The physician spent 15 minutes of face-to-face sedation time with the patient. CONTRAST: None. ANTIBIOTICS: Ancef 2 g IV. ADDITIONAL MEDICATIONS: None. FLUOROSCOPIC TIME: 0.4 minutes. RADIATION DOSE: Air Kerma: 1 mGy. COMPLICATIONS: No immediate complications. STERILE BARRIER TECHNIQUE: Maximum sterile barrier technique was used. Cutaneous antisepsis was performed at the operative site with application of 2% chlorhexidine and large sterile drape. Prior to the procedure, the  and assistant performed hand hygiene and wore hat, mask, sterile gown, and sterile gloves during the entire procedure. PROCEDURE:  Using real-time ultrasound guidance the right internal jugular vein was accessed. A subcutaneous pocket was created and irrigated with sterile normal saline. The catheter tubing was tunneled in an antegrade fashion from the port pocket to the dermatotomy site. Over a guidewire, and under direct fluoroscopic visualization a peel-away sheath was advanced over the wire. Through the peel-away sheath, the catheter tubing was advanced until the tip was at the cavoatrial junction. The catheter tubing was cut to length and attached firmly to the port. The port was placed within the subcutaneous pocket and tested. The port pocket incision was closed with absorbable suture and surgical glue. The dermatotomy site was closed with surgical glue. FINDINGS: Ultrasound demonstrates an anechoic and compressible jugular vein. A permanent image was stored. At the completion of the study the port tip  lies near the cavoatrial junction.     IMPRESSION:  Successful implantable venous chest port placement as detailed above.        The longitudinal plan of care for the diagnosis(es)/condition(s) as documented were addressed during this visit. Due to the added complexity in care, I will continue to support Jasmyn in the subsequent management and with ongoing continuity of care.    Signed by: Zayra Pickard PA-C    I, JIMENEZ Butt CNP, saw this patient and agree with the findings and plan of care as documented in the note.      Items personally reviewed/procedural attestation: vitals, labs, and I was present for key portions of the visit and agree with Assessment and plan

## 2024-05-28 NOTE — LETTER
5/28/2024         RE: Jasmyn Green  [address] [place] [address]        Dear Colleague,    Thank you for referring your patient, Jasmyn Green, to the [facility] [place]. Please see a copy of my visit note below.    [facility] Progress Note    Patient: Jasmyn Green  MRN: [id number]  Date of Service: May 28, 2024          Oncologist:Dr. Stewart    Reason for Visit    Chief Complaint   Patient presents with     Oncology Clinic Visit     3 week return visit with lab/infusion related to Small cell lung cancer       Assessment and Plan     Cancer Staging   No matching staging information was found for the patient.    Lung cancer, Small Cell, extensive stage: left upper lobe, pleural mets, mediastinal nodes   Patient started palliative chemotherapy with carboplatin, etoposide and Atezolizumab 3/19/24. first cycle given at 20% reduction due to performance status. Cycle 2 and 3 given at full dose. She was admitted 5/10/24-5/17/24 for COPD exacerbation with possible pneumonia. She was given Duonebs, IV steriods, and abx. She had been tolerating treatment overall before hospitalization. She is now feeling much better and continuing to regain strength. She denies new headaches, N/V/D, or new symptoms. Her appetite is improved and she is eating well and feeling more energy. Her SOB is much improved since hospitalization and she is back to baseline oxygen of 3L at rest and 4L with activity. She is able to ambulate with a walker and has been trying to move around the house and build strength. Discussed the option of pausing chemotherapy this week and proceeding with immunotherapy only. Will reassess in 3 weeks how pt is doing and consider continuing single agent immunotherapy or attempting chemotherapy at a dose reduction. Labs are stable today Pt and family members are agreeable to plan.     3 mm abnormality seen on brain MRI:   She is  asymptomatic so at this time. MRI 5/3/24 shows decrease in size from March 2024 and appears more vascular. Will continue to monitor.     Anemia:   Likely from chronic disease, COPD as well as chemotherapy. Had PRBC 1 units 5/7/24 for Hgb 6.9. Improved today at 8.1. Continue to monitor.     COPD:  Pt is recovering from recent hospitalization 5/10-5/17 for COPD exacerbation with possible pneumonia. Back to baseline oxygen of 3L at rest, 4L with activity. SOB is continuing to improve. She has been taking daily Zyrtec to help with possible allergies.     5. Splenic infarct  On Eliquis and full dose asa.         Plan  Proceed with immunotherapy alone today  Follow up in 3 weeks with Dr. Stewart to reassess chemotherapy     ECOG Performance    2 - Ambulatory and independent in all ADLs; cannot work; up > 50% of the time    Distress Screening (within last 30 days)    No data recorded     Pain  Pain Score: No Pain (0)    Problem List    Patient Active Problem List   Diagnosis     COPD exacerbation (H)     History of gout     Coronary artery calcification     Lymphocytosis     Essential hypertension     Asymptomatic hypertensive urgency     Age-related osteoporosis without current pathological fracture     Lung mass     Small cell lung cancer (H)     Chronic respiratory failure with hypoxia (H)     Anemia associated with chemotherapy     Hypoxia     Status post chemotherapy     Urinary tract infection with hematuria, site unspecified     Acute sepsis (H)        ______________________________________________________________________________    History of Present Illness    Diagnosis:   Lung cancer, small cell, extensive stage. Presented to ER for SOB, coughing. Hx of COPD, on oxygen.   -2/8/24: CT: Bulky AP and left hilar adenopathy with irregular pleural-based anterior left upper lobe nodule with adjacent smaller satellite nodules. Findings suspicious for a primary lung cancer   -3/1/24: PET: Findings suspicious for a left  upper lobe primary lung neoplasm with adjacent satellite lesion in the left upper lobe, extensive mediastinal lymph node involvement, and several pleural-based metastases in the left hemithorax   -3/14/24: Brain MRI: Findings concerning for a single 3 - 4 mm right occipital lobe cortex metastatic lesion   -5/3/24: repeat brain MRI shows lesion size decrease and appears more vascular       Treatment:   -3/25/24: start palliative treatment with Carboplatin, Etoposide, atezolizumab. Finished 3 cycles  -5/28/24: single agent atezolizumab after hospitalization for COPD exacerbation and pneumonia    Interim History:   Patient is here today for follow up after hospitalization 5/10/24-5/17/24 for COPD exacerbation with possible pneumonia. She is with her family. She is now feeling much better and continuing to regain strength. She denies new headaches, N/V/D, or new symptoms. Her appetite is improved and she is eating well and feeling more energy. Her SOB is much improved since hospitalization and she is back to baseline oxygen of 3L at rest and 4L with activity. She is able to ambulate with a walker and has been trying to move around the house and build strength. She lives with her daughter. She had tolerated the treatment overall well except for the worsening shortness of breath and eventual hospitalization.     Review of Systems    Pertinent items are noted in HPI.    Past History    Past Medical History:   Diagnosis Date     Age-related osteoporosis without current pathological fracture      Arthritis      COPD (chronic obstructive pulmonary disease) (H)      Coronary artery disease      Dependence on nocturnal oxygen therapy      Dyspnea on exertion      Emphysema lung (H)      Gout      HLD (hyperlipidemia)      Hypertension      Lung mass        PHYSICAL EXAM  /63 (BP Location: Right arm, Patient Position: Sitting, Cuff Size: Adult Regular)   Pulse (!) 122   Temp 97.8  F (36.6  C) (Tympanic)   Resp 22   Ht  1.524 m (5')   Wt 43.6 kg (96 lb 1.6 oz)   LMP  (LMP Unknown)   SpO2 93%   BMI 18.77 kg/m      GENERAL: no acute distress. Cooperative in conversation. Here with 5 family members. Using walker  RESP: Regular respiratory rate. No expiratory wheezes   NEURO: non focal. Alert and oriented x3.   PSYCH: within normal limits. No depression or anxiety.  SKIN: exposed skin is dry intact.     Lab Results    Recent Results (from the past 168 hour(s))   Comprehensive metabolic panel   Result Value Ref Range    Sodium 139 135 - 145 mmol/L    Potassium 4.6 3.4 - 5.3 mmol/L    Carbon Dioxide (CO2) 21 (L) 22 - 29 mmol/L    Anion Gap 9 7 - 15 mmol/L    Urea Nitrogen 33.3 (H) 8.0 - 23.0 mg/dL    Creatinine 0.85 0.51 - 0.95 mg/dL    GFR Estimate 73 >60 mL/min/1.73m2    Calcium 8.8 8.8 - 10.2 mg/dL    Chloride 109 (H) 98 - 107 mmol/L    Glucose 228 (H) 70 - 99 mg/dL    Alkaline Phosphatase 134 40 - 150 U/L    AST 16 0 - 45 U/L    ALT 19 0 - 50 U/L    Protein Total 6.7 6.4 - 8.3 g/dL    Albumin 3.4 (L) 3.5 - 5.2 g/dL    Bilirubin Total <0.2 <=1.2 mg/dL   TSH with free T4 reflex   Result Value Ref Range    TSH 1.62 0.30 - 4.20 uIU/mL   CBC with platelets and differential   Result Value Ref Range    WBC Count 13.9 (H) 4.0 - 11.0 10e3/uL    RBC Count 2.80 (L) 3.80 - 5.20 10e6/uL    Hemoglobin 8.1 (L) 11.7 - 15.7 g/dL    Hematocrit 27.5 (L) 35.0 - 47.0 %    MCV 98 78 - 100 fL    MCH 28.9 26.5 - 33.0 pg    MCHC 29.5 (L) 31.5 - 36.5 g/dL    RDW 16.7 (H) 10.0 - 15.0 %    Platelet Count 279 150 - 450 10e3/uL    % Neutrophils 72 %    % Lymphocytes 9 %    % Monocytes 12 %    % Eosinophils 4 %    % Basophils 1 %    % Immature Granulocytes 2 %    NRBCs per 100 WBC 0 <1 /100    Absolute Neutrophils 10.1 (H) 1.6 - 8.3 10e3/uL    Absolute Lymphocytes 1.3 0.8 - 5.3 10e3/uL    Absolute Monocytes 1.7 (H) 0.0 - 1.3 10e3/uL    Absolute Eosinophils 0.5 0.0 - 0.7 10e3/uL    Absolute Basophils 0.1 0.0 - 0.2 10e3/uL    Absolute Immature Granulocytes 0.3  <=0.4 10e3/uL    Absolute NRBCs 0.0 10e3/uL           Imaging    Echocardiogram Complete    Result Date: 2024   [id number] [place] [address]  Name: JACE GONG MRN: [id number] : 1955 Study Date: 2024 08:21 AM Age: 68 yrs Gender: Female  Performed By: SG BSA: 1.4 m2 Height: 60 in Weight: 94 lb HR: 123 BP: 133/58 mmHg  ______________________________________________________________________________ Procedure Complete Portable Echo Adult. ______________________________________________________________________________ Interpretation Summary  Left ventricular size, wall motion and function are normal. The ejection fraction is 60-65%. Normal right ventricle size and systolic function. No hemodynamically significant valvular abnormalities on 2D or color flow imaging. The study was technically difficult. ______________________________________________________________________________ Left Ventricle Left ventricular size, wall motion and function are normal. The ejection fraction is 60-65%. There is normal left ventricular wall thickness. Left ventricular diastolic function is indeterminate. No regional wall motion abnormalities noted.  Right Ventricle Normal right ventricle size and systolic function.  Atria Normal left atrial size. Right atrial size is normal. There is no color Doppler evidence of an atrial shunt.  Mitral Valve Mitral valve leaflets appear normal. There is no evidence of mitral stenosis or clinically significant mitral regurgitation.  Tricuspid Valve Tricuspid valve leaflets appear normal. There is no evidence of tricuspid stenosis or clinically significant tricuspid regurgitation. There is trace to mild tricuspid regurgitation. Right ventricle systolic pressure estimate normal.  Aortic Valve Aortic valve leaflets appear normal. There is no evidence of aortic stenosis or clinically significant aortic regurgitation.  Pulmonic Valve The pulmonic valve is not  well seen, but is grossly normal. This degree of valvular regurgitation is within normal limits.  Vessels The aorta root is normal. Normal size ascending aorta. IVC diameter <2.1 cm collapsing >50% with sniff suggests a normal RA pressure of 3 mmHg.  Pericardium There is no pericardial effusion. ______________________________________________________________________________ MMode/2D Measurements & Calculations  IVSd: 0.95 cm LVIDd: 3.1 cm LVIDs: 1.8 cm LVPWd: 0.97 cm FS: 42.7 % LV mass(C)d: 81.5 grams LV mass(C)dI: 60.2 grams/m2 Ao root diam: 2.8 cm Ao root diam index Ht(cm/m): 1.8 Ao root diam index BSA (cm/m2): 2.0 EF Biplane: 60.9 % RV Base: 2.7 cm RWT: 0.62 TAPSE: 1.5 cm  Time Measurements MM HR: 116.0 BPM  Doppler Measurements & Calculations MV E max charanjit: 66.4 cm/sec MV A max charanjit: 114.0 cm/sec MV E/A: 0.58 MV max P.1 mmHg MV mean PG: 3.8 mmHg MV V2 VTI: 16.9 cm MV dec time: 0.08 sec Ao V2 max: 112.0 cm/sec Ao max P.0 mmHg Ao V2 mean: 81.8 cm/sec Ao mean PG: 3.0 mmHg Ao V2 VTI: 15.1 cm LV V1 max PG: 3.5 mmHg LV V1 max: 93.0 cm/sec LV V1 VTI: 11.9 cm PA acc time: 0.07 sec TR max charanjit: 251.9 cm/sec TR max P.4 mmHg AV Charanjit Ratio (DI): 0.83 E/E': 9.5 E/E' avg: 10.6 Lateral E/e': 11.7 Medial E/e': 9.5 Peak E' Charanjit: 7.0 cm/sec RV S Charanjit: 10.4 cm/sec  ______________________________________________________________________________ Report approved by: Marisol Up 2024 04:18 PM       CTA Abdomen Pelvis with Contrast    Result Date: 5/10/2024  EXAM: CTA ABDOMEN PELVIS WITH CONTRAST LOCATION: [facility] DATE: 5/10/2024 INDICATION: followup on abnormal ct from 5 3 24 COMPARISON: 5/3/2024 TECHNIQUE: CT angiogram abdomen pelvis during arterial phase of injection of IV contrast. 2D and 3D MIP reconstructions were performed by the CT technologist. Dose reduction techniques were used. Exam is limited by lack of thin section sagittal and coronal reconstructions. Thin section  reconstructions were created on the [employer] workstation from the initial data set, however. 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 Intraluminal thrombus identified within the first 1.5 cm of the celiac artery. Patent splenic artery with scattered calcified plaque. Likewise, common hepatic artery is patent. Conventional hepatic arterial anatomy. Patent SMA, bilateral renal arteries, and DWAYNE as well as runoff to the upper legs. Large amount of calcified plaque within the right common femoral artery, with approximately 90% luminal stenosis. High-grade stenoses of both common iliac arteries, although contrast bolus limits further assessment. Infrarenal abdominal aortic aneurysm measuring 2.5 cm. Multifocal noncalcified plaque/thrombus scattered in the abdominal aorta. LOWER CHEST: Advanced centrilobular emphysema with patchy parenchymal opacities in both lung bases and bronchial wall thickening redemonstrated. HEPATOBILIARY: No biliary dilatation PANCREAS: Unremarkable SPLEEN: Moderate sized splenic infarct redemonstrated ADRENAL GLANDS: Left adrenal nodular hyperplasia. KIDNEYS/BLADDER: No hydronephrosis. Decompressed bladder. BOWEL: Colonic diverticulosis. LYMPH NODES: No significant retroperitoneal adenopathy PELVIC ORGANS: No free fluid MUSCULOSKELETAL: No acute bony abnormalities.     IMPRESSION: 1.  Near occlusive stenosis of the celiac axis, with intraluminal thrombus. 2.  Multifocal atherosclerotic disease, abdominal aortic aneurysm, and additional multifocal stenoses. 3.  Additional findings as above.    CT Chest Pulmonary Embolism w Contrast    Result Date: 5/10/2024  EXAM: CT CHEST PULMONARY EMBOLISM W CONTRAST LOCATION: [facility] DATE: 5/10/2024 INDICATION: Hypoxia, shortness of breath, lung cancer, tachycardia COMPARISON: 5/3/2024 TECHNIQUE: CT chest pulmonary angiogram during arterial phase  injection of IV contrast. Multiplanar reformats and MIP reconstructions were performed. Dose reduction techniques were used. CONTRAST: [id number] 90ml FINDINGS: ANGIOGRAM CHEST: Pulmonary arteries are normal caliber and negative for pulmonary emboli. Thoracic aorta is negative for dissection. No CT evidence of right heart strain. LUNGS AND PLEURA: Severe emphysema. Unchanged small area of subpleural soft tissue thickening in the anterior left upper lobe (8/147), as well as an area of consolidation in the left upper lobe posteriorly with associated bronchiectasis. A few other nodular opacities elsewhere also remain unchanged. No pleural effusion or pneumothorax. MEDIASTINUM/AXILLAE: Unchanged enlarged lymph nodes in the AP window and left hilum. No enlarging lymph nodes. Right internal jugular port catheter tip at the SVC-RA junction. CORONARY ARTERY CALCIFICATION: Severe. UPPER ABDOMEN: Partially imaged splenic infarcts. Unchanged narrowing and filling defect in the proximal celiac axis. MUSCULOSKELETAL: Normal.     IMPRESSION: 1.  No acute pulmonary emboli. 2.  Unchanged left upper lobe subpleural nodule, consolidation in the left upper lobe posteriorly, and a few other scattered nodular airspace opacities elsewhere. 3.  Unchanged mildly enlarged mediastinal and left hilar lymph nodes. 4.  Unchanged high-grade narrowing of the celiac artery proximally with noncalcified plaque or thrombus.    MR Brain w/o & w Contrast    Result Date: 5/3/2024  EXAM: MR BRAIN W/O and W CONTRAST LOCATION: [facility] DATE: 5/3/2024 INDICATION:  Small cell lung cancer (H), Brain lesion COMPARISON: Brain MRI: 3/14/2024. CONTRAST: 4.5 mL Gadavist. TECHNIQUE: Routine multiplanar multisequence head MRI without and with intravenous contrast. High-resolution postcontrast T1-weighted images were also obtained. FINDINGS: INTRACRANIAL CONTENTS: Previously described tiny nodular focus of enhancement in the right occipital  lobe is decreased in size/conspicuity compared to study from March 2024 and has a more vascular appearance on today's examination (series 1/4/2001/images  91-94). No vasogenic edema or mass effect. No new enhancing intracranial lesions. No restricted diffusion to suggest acute or subacute infarct. No acute intracranial hemorrhage or extra-axial fluid collections. Scattered nonspecific T2/FLAIR hyperintensities within the cerebral white matter most consistent with mild chronic microvascular ischemic change. Mild generalized cerebral atrophy. No hydrocephalus. Normal position of the cerebellar tonsils. No pathologic contrast enhancement. SELLA: No abnormality accounting for technique. OSSEOUS STRUCTURES/SOFT TISSUES: Normal marrow signal. The major intracranial vascular flow voids are maintained. ORBITS: No abnormality accounting for technique. SINUSES/MASTOIDS: No paranasal sinus mucosal disease. No middle ear or mastoid effusion.     IMPRESSION: 1.  Previously described tiny nodular focus of enhancement in the right occipital lobe is decreased in size/conspicuity compared to study from March 2024 and appears more vascular in nature on today's examination. No vasogenic edema or mass effect. Advise continued attention to this area on future imaging follow-up. 2.  No new enhancing intracranial lesions. 3.  Mild age-related changes as above.    CT Chest/Abdomen/Pelvis w Contrast    Result Date: 5/3/2024  EXAM: CT CHEST/ABDOMEN/PELVIS W CONTRAST LOCATION: [facility] DATE: 5/3/2024 INDICATION: Small cell lung cancer (H) COMPARISON: Chest CT from 02/08/2024, CT abdomen and pelvis from 02/11/2024. TECHNIQUE: CT scan of the chest, abdomen, and pelvis was performed following injection of IV contrast. Multiplanar reformats were obtained. Dose reduction techniques were used. CONTRAST: [id number] 48 ml FINDINGS: LUNGS AND PLEURA: Advanced emphysema. The previously seen pleural-based mass in the anterior left  upper lobe has nearly resolved with only a faint residual triangular opacity measuring 0.8 x 0.8 cm on series 4 image 95. There is a new pleural-based consolidative opacity in the left upper lobe on series 4 image 81. There are a few new irregular nodular opacities in the bilateral lower lobes and left lingula. No effusions. Mild bronchial wall thickening. Mild bronchiectasis. MEDIASTINUM/AXILLAE: Significant interval reduction in the AP window and left hilar adenopathy. This measures 3.1 x 1.4 cm compared to 6 x 3 cm previously (series 3/58). Decreased size of a left hilar lymph node measuring 1.8 x 1.4 cm compared to 3.4 x 1.7 cm previously (series 3/73). The other remaining mediastinal and hilar lymph nodes are relatively similar. Small hiatal hernia. CORONARY ARTERY CALCIFICATION: Severe. HEPATOBILIARY: Hepatic steatosis. Benign left hepatic lobe cyst. The common bile duct is at the upper limits of normal measuring 0.8 cm. This may be age-related. PANCREAS: Normal. SPLEEN: There are new wedge-shaped infarcts in the upper and lower pole of the spleen on series 3 image 133 and 160. ADRENAL GLANDS: There is nodular thickening of the left adrenal gland. KIDNEYS/BLADDER: Bilateral cortical thinning. Benign renal cysts do not require follow-up. No hydronephrosis or genitourinary calculi. BOWEL: Diverticulosis of the colon. No acute inflammatory change. No obstruction. Mild to moderate colonic stool burden. LYMPH NODES: No new or enlarging adenopathy. VASCULATURE: There is new or more conspicuous high-grade stenosis of the celiac artery origin on series 3 image 140. Additionally, there is a punctate filling defect in the splenic artery on series 3 image 150. Extensive atherosclerosis of the abdominal aorta and its branches. There is ectasia of the infrarenal aorta measuring up to 2.4 cm. Heavily calcified atherosclerosis of the bilateral renal arteries, bilateral common iliac arteries, right greater than left, and the  visualized right common femoral and superficial femoral arteries. PELVIC ORGANS: Hysterectomy. MUSCULOSKELETAL: Degenerative changes of the spine. No concerning osseous lesions. Osteopenia.     IMPRESSION: Chest: 1.  Nearly resolved anterior left upper lobe pleural-based nodule/mass. 2.  Significant interval reduction in the AP window and left hilar adenopathy. The remaining mediastinal and hilar lymph nodes are otherwise not significantly changed. 3.  There are a few new scattered, predominantly bilateral lower lobe and left lingular irregular nodular opacities. New subpleural consolidative opacity in the left upper lobe along the fissure. Findings could be related to infectious/inflammatory etiology or posttreatment related changes. Recurrence is in the differential although felt to be less likely given the otherwise interval improvement of the remaining findings discussed above. Abdomen and Pelvis: 1.  New multifocal wedge-shaped infarcts in the spleen. There is new or more conspicuous high-grade narrowing of the celiac artery. It is difficult to discern whether this is noncalcified atherosclerotic plaque or thrombus. Additionally, there is a new or more conspicuous punctate filling defect in the splenic artery that could represent atherosclerotic plaque or thrombus. A CTA of the abdomen and pelvis should be considered. 2.  No evidence of metastatic disease in the abdomen or pelvis. [Critical Result: New splenic infarcts with possible thrombus in the celiac artery. Finding was identified on 5/3/2024 11:32 AM CDT. Dr. Mathur was contacted by me on 5/3/2024 12:18 PM CDT and verbalized understanding of the critical result.     IR Chest Port Placement > 5 Yrs of Age    Result Date: 5/2/2024  [facility] LOCATION: [facility] DATE: 5/2/2024 PROCEDURE: IMPLANTABLE VENOUS CHEST PORT PLACEMENT (POWER INJECTABLE) INTERVENTIONAL RADIOLOGIST: Jonathan Spann MD. INDICATION: 68-year-old female  with left-sided lung cancer presents for chest port placement for chemotherapy. CONSENT: The risks, benefits and alternatives of implantable venous chest port placement were discussed with the patient  in detail. All questions were answered. Informed consent was given to proceed with the procedure. MODERATE SEDATION: IV fentanyl and Versed were administered for sedation.  During the timeout, immediately prior to the administration of medications, the patient was reassessed for adequacy to receive conscious sedation. Under physician supervision, Versed and fentanyl were administered for moderate sedation. Pulse oximetry, heart rate and blood pressure were continuously monitored by an independent trained observer. The physician spent 15 minutes of face-to-face sedation time with the patient. CONTRAST: None. ANTIBIOTICS: Ancef 2 g IV. ADDITIONAL MEDICATIONS: None. FLUOROSCOPIC TIME: 0.4 minutes. RADIATION DOSE: Air Kerma: 1 mGy. COMPLICATIONS: No immediate complications. STERILE BARRIER TECHNIQUE: Maximum sterile barrier technique was used. Cutaneous antisepsis was performed at the operative site with application of 2% chlorhexidine and large sterile drape. Prior to the procedure, the  and assistant performed hand hygiene and wore hat, mask, sterile gown, and sterile gloves during the entire procedure. PROCEDURE:  Using real-time ultrasound guidance the right internal jugular vein was accessed. A subcutaneous pocket was created and irrigated with sterile normal saline. The catheter tubing was tunneled in an antegrade fashion from the port pocket to the dermatotomy site. Over a guidewire, and under direct fluoroscopic visualization a peel-away sheath was advanced over the wire. Through the peel-away sheath, the catheter tubing was advanced until the tip was at the cavoatrial junction. The catheter tubing was cut to length and attached firmly to the port. The port was placed within the subcutaneous pocket and  tested. The port pocket incision was closed with absorbable suture and surgical glue. The dermatotomy site was closed with surgical glue. FINDINGS: Ultrasound demonstrates an anechoic and compressible jugular vein. A permanent image was stored. At the completion of the study the port tip lies near the cavoatrial junction.     IMPRESSION:  Successful implantable venous chest port placement as detailed above.        The longitudinal plan of care for the diagnosis(es)/condition(s) as documented were addressed during this visit. Due to the added complexity in care, I will continue to support Jasmyn in the subsequent management and with ongoing continuity of care.    Signed by: Zayra Pickard PA-C    I, JIMENEZ Butt CNP, saw this patient and agree with the findings and plan of care as documented in the note.      Items personally reviewed/procedural attestation: vitals, labs, and I was present for key portions of the visit and agree with Assessment and plan        Oncology Rooming Note    May 28, 2024 8:53 AM   Jasmyn Green is a 68 year old female who presents for:    Chief Complaint   Patient presents with     Oncology Clinic Visit     3 week return visit with lab/infusion related to Small cell lung cancer     Initial Vitals: /63 (BP Location: Right arm, Patient Position: Sitting, Cuff Size: Adult Regular)   Pulse (!) 122   Temp 97.8  F (36.6  C) (Tympanic)   Resp 22   Ht 1.524 m (5')   Wt 43.6 kg (96 lb 1.6 oz)   LMP  (LMP Unknown)   SpO2 93%   BMI 18.77 kg/m   Estimated body mass index is 18.77 kg/m  as calculated from the following:    Height as of this encounter: 1.524 m (5').    Weight as of this encounter: 43.6 kg (96 lb 1.6 oz). Body surface area is 1.36 meters squared.  No Pain (0) Comment: Data Unavailable   No LMP recorded (lmp unknown). Patient is postmenopausal.  Allergies reviewed: Yes  Medications reviewed: Yes    Medications: Medication refills not needed today.  Pharmacy  name entered into EPIC:    [facility] - [place], [place] - [address] [facility] - [place], [place] - [address]    Frailty Screening:   Is the patient here for a new oncology consult visit in cancer care? 2. No      Clinical concerns: none.       Jina Espinoza CMA                Again, thank you for allowing me to participate in the care of your patient.        Sincerely,        JIMENEZ Butt CNP

## 2024-05-29 ENCOUNTER — OFFICE VISIT (OUTPATIENT)
Dept: INTERNAL MEDICINE | Facility: CLINIC | Age: 69
End: 2024-05-29
Payer: COMMERCIAL

## 2024-05-29 VITALS
TEMPERATURE: 97.6 F | WEIGHT: 97.3 LBS | OXYGEN SATURATION: 97 % | DIASTOLIC BLOOD PRESSURE: 50 MMHG | HEART RATE: 126 BPM | RESPIRATION RATE: 18 BRPM | HEIGHT: 60 IN | BODY MASS INDEX: 19.1 KG/M2 | SYSTOLIC BLOOD PRESSURE: 110 MMHG

## 2024-05-29 DIAGNOSIS — J44.1 COPD EXACERBATION (H): Chronic | ICD-10-CM

## 2024-05-29 DIAGNOSIS — I25.10 CORONARY ARTERY CALCIFICATION: Primary | Chronic | ICD-10-CM

## 2024-05-29 DIAGNOSIS — C34.90 SMALL CELL LUNG CANCER (H): ICD-10-CM

## 2024-05-29 DIAGNOSIS — D73.5 SPLENIC INFARCT: ICD-10-CM

## 2024-05-29 DIAGNOSIS — I10 ESSENTIAL HYPERTENSION: ICD-10-CM

## 2024-05-29 PROCEDURE — 99214 OFFICE O/P EST MOD 30 MIN: CPT | Performed by: NURSE PRACTITIONER

## 2024-05-29 RX ORDER — RESPIRATORY SYNCYTIAL VIRUS VACCINE 120MCG/0.5
0.5 KIT INTRAMUSCULAR ONCE
Qty: 1 EACH | Refills: 0 | Status: CANCELLED | OUTPATIENT
Start: 2024-05-29 | End: 2024-05-29

## 2024-05-29 NOTE — PROGRESS NOTES
Assessment & Plan     COPD exacerbation  Admitted to the hospital for COPD exacerbation with possible pneumonia.  Back to baseline oxygen needs, 3 L nasal cannula at rest and 4 L with activity.     Coronary artery calcification  Seen on CT of the chest from 5/10, severe.  Patient is on atorvastatin 20 mg daily, full-strength aspirin, Eliquis.  It is unclear if she needs further evaluation, and if she would be a candidate for PCI or other intervention.  There was mention of cardiology referral during her hospitalization but she does not have any outpatient follow-up appointments scheduled.  They are interested in meeting with cardiology to further discussion    Small cell lung cancer (H)  Continuing to follow with oncology, palliative chemotherapy and immunotherapy.    Essential hypertension  Controlled on lisinopril    Splenic infarct  Now on Eliquis and full-strength aspirin, following with vascular as an outpatient.    Patient Instructions   It appears as though hematology is aware of your full-strength aspirin and Eliquis.    No change in medications today.    I did put in a referral to cardiology to talk about your coronary artery calcifications.  It is unclear if you would be a candidate for cardiac stents or other interventions.    I am okay with your heart rate being elevated for now but you can discuss this more when you meet with cardiology.          MED REC REQUIRED  Post Medication Reconciliation Status:  Discharge medications reconciled, continue medications without change        Daniela Vines is a 68 year old, presenting for the following health issues:    Hospital F/U (5/10-5/17 @ [facility] for SOB)        4/4/2024    10:10 AM   Additional Questions   Roomed by Maryjane BOSS        [facility] Follow-up Visit:    Hospital/Nursing Home/IP Rehab Facility: [facility]  Date of Admission: 5/10/24   Date of Discharge: 5/17/24  Reason(s) for Admission: SOB  Was the patient in  the ICU or did the patient experience delirium during hospitalization?  No  Do you have any other stressors you would like to discuss with your provider? No    Problems taking medications regularly:  None  Medication changes since discharge: None  Problems adhering to non-medication therapy:  None    Summary of hospitalization:  [facility] discharge summary reviewed  Diagnostic Tests/Treatments reviewed.  Follow up needed: none  Other Healthcare Providers Involved in Patient s Care:         None  Update since discharge: improved.         Plan of care communicated with patient and family                     Objective    /50 (BP Location: Left arm, Patient Position: Sitting, Cuff Size: Adult Regular)   Pulse (!) 126   Temp 97.6  F (36.4  C) (Oral)   Resp 18   Ht 1.524 m (5')   Wt 44.1 kg (97 lb 4.8 oz)   LMP  (LMP Unknown)   SpO2 97%   Breastfeeding No   BMI 19.00 kg/m    Body mass index is 19 kg/m .  Physical Exam   Diminished breath sounds bilateral lung exam          Signed Electronically by: Jean Marie Calle CNP

## 2024-05-29 NOTE — PATIENT INSTRUCTIONS
It appears as though hematology is aware of your full-strength aspirin and Eliquis.    No change in medications today.    I did put in a referral to cardiology to talk about your coronary artery calcifications.  It is unclear if you would be a candidate for cardiac stents or other interventions.    I am okay with your heart rate being elevated for now but you can discuss this more when you meet with cardiology.

## 2024-06-05 ENCOUNTER — VIRTUAL VISIT (OUTPATIENT)
Dept: PALLIATIVE MEDICINE | Facility: CLINIC | Age: 69
End: 2024-06-05
Payer: COMMERCIAL

## 2024-06-05 VITALS — HEIGHT: 60 IN | WEIGHT: 97 LBS | BODY MASS INDEX: 19.04 KG/M2

## 2024-06-05 DIAGNOSIS — C34.90 MALIGNANT NEOPLASM OF LUNG, UNSPECIFIED LATERALITY, UNSPECIFIED PART OF LUNG (H): ICD-10-CM

## 2024-06-05 DIAGNOSIS — G89.3 CANCER ASSOCIATED PAIN: ICD-10-CM

## 2024-06-05 DIAGNOSIS — J44.9 COPD, GROUP D, BY GOLD 2017 CLASSIFICATION (H): ICD-10-CM

## 2024-06-05 DIAGNOSIS — R11.2 NAUSEA AND VOMITING, UNSPECIFIED VOMITING TYPE: ICD-10-CM

## 2024-06-05 PROCEDURE — 99215 OFFICE O/P EST HI 40 MIN: CPT | Mod: 95 | Performed by: NURSE PRACTITIONER

## 2024-06-05 ASSESSMENT — PAIN SCALES - GENERAL: PAINLEVEL: NO PAIN (0)

## 2024-06-05 NOTE — PROGRESS NOTES
Virtual Visit Details    Type of service:  Video Visit   Originating Location (pt. Location): Home    Distant Location (provider location):  On-site  Platform used for Video Visit: [facility]      Palliative Care Outpatient Clinic      Patient ID/Chief Complaint: Jasmyn Green 68 year old female who is presenting to the palliative medicine clinic today at the request of Tata Lainez NP for a palliative care follow-up secondary to symptom management/goals of care.   The patient's primary care provider is:  Miesha Fernando.       Impression & Recommendations:  68-year-old female with new diagnosis of extensive stage small cell lung cancer (including possible small solitary brain metastasis) on palliative chemoimmunotherapy with plans for possible radiation, severe COPD on chronic supplemental oxygen (3-5 LPM depending on activity level), history of tobacco use that is now stopped, very remote history of heavier alcohol use that stopped decades ago, CAD, HTN, osteoporosis, history of gout, and problems with anxiety.    Social history includes having 3 children Georgette, Britni, and Braden, lives with daughter Georgette, , good family support.    Recently admitted to hospital with COPD exacerbation.  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  Appetite and abdominal pain have improved.    Continues on chemotherapy but recently only received immunotherapy due to health status.  Intends to follow-up with oncology.        Symptoms/recommendations/discussion:  Chronic dyspnea related to advanced COPD worsened by recent diagnosis of lung cancer with accompanying worsening of anxiety.  Recently admitted to hospital for COPD exacerbation.  Reports positive response to low-dose oxycodone 2.5 mg maybe 1-2 times per day, does not take daily.  Consider Butrans patch for likely improvement in chronic dyspnea.  Discussed with patient and daughter Karie  today.  They will research on their own and let me know if they wish to start patch.  Anxiety: Improved with recent addition of Remeron 7.5 mg (15 mg was too sedating) and Atarax/hydroxyzine 25 mg as needed.  Decreased appetite with weight loss: Appetite is improved significantly since treatment of celiac artery thrombosis.  Medical cannabis to continue as daughter feels helps with sleep. GI expressed concerns for Cannabis associated hyperemesis syndrome---would be very uncommon as she is not long-term cannabis user.   New onset LT flank pain, pain has resolved with treatment of celiac artery thrombosis  Narcan nasal spray sent for safety reasons  She has already completed a healthcare directive expressing her wishes. Georgette is her primary healthcare agent.  POLST in EMR.  No CPR and DO NOT INTUBATE.   Palliative care follow-up in approximately 4 weeks, sooner if decides to start Butrans patch.        How to get a hold of us:  For non-urgent matters, MyChart works best.    For more urgent matters, or if you prefer not to use MyChart, call our clinic nurse coordinator Hailee Paredes RN at [phone] or [phone]    We have an on-call number for evenings and weekends. Please call this only if you are having uncontrolled symptoms or serious side effects from your medicines: [phone].     For refills, please give us a week (5 working days) notice. We don't always have providers available everyday to do refills. If you call the day you run out of your medicine, we may not be able to refill it in time, so call 5 days in advance        History:  History gathered today from: patient, family/loved ones, medical chart, health care directive/s      PE: Ht 1.524 m (5')   Wt 44 kg (97 lb)   LMP  (LMP Unknown)   BMI 18.94 kg/m     Wt Readings from Last 3 Encounters:   06/05/24 44 kg (97 lb)   05/29/24 44.1 kg (97 lb 4.8 oz)   05/28/24 43.6 kg (96 lb 1.6 oz)       Gen tachypneic, no apparent distress, alert  Head  NCAT.  Eyes anicteric without injection  Face symmetric, eyes conjugate  Lungs tachypneic, no cough, speaking full sentences nasal cannula oxygen  Skin no rashes or lesions evident on face/neck  Neuro Face symmetric, eyes conjugate; speech fluent.  Neuropsych exam normal including affect, sensorium, gross memory, thought processes, and fund of knowledge.         Data reviewed:  I reviewed electrolytes, BUN/creatinine, liver profile, hemoglobin and hematocrit, platelet count, and most recent imaging  Oncology notes     database reviewed: 4/30        40 minutes spent on the date of the encounter doing chart review, history and exam, patient education & counseling, documentation and other activities as noted above.        Thank you for involving us in the patient's care.   JIMENEZ Huggins, [facility] Palliative Care Service

## 2024-06-05 NOTE — NURSING NOTE
Is the patient currently in the state of [place]? YES    Visit mode:VIDEO    If the visit is dropped, the patient can be reconnected by: VIDEO VISIT: Text to cell phone:   Telephone Information:   Mobile [phone]       Will anyone else be joining the visit? NO  (If patient encounters technical issues they should call [phone]    How would you like to obtain your AVS? MyChart    Are changes needed to the allergy or medication list? No    Are refills needed on medications prescribed by this physician? NO    Reason for visit: RAY SHETTY

## 2024-06-05 NOTE — PATIENT INSTRUCTIONS
Thank you for meeting with us in the [facility].    How to get a hold of us:  For non-urgent matters, MyChart works best.    For more urgent matters, or if you prefer not to use MyChart, call our clinic nurse coordinator Hailee Paredes RN at [phone] or [phone]    We have an on-call number for evenings and weekends. Please call this only if you are having uncontrolled symptoms or serious side effects from your medicines: [phone].     For refills, please give us a week (5 working days) notice. We don't always have providers available everyday to do refills. If you call the day you run out of your medicine, we may not be able to refill it in time, so call 5 days in advance!

## 2024-06-06 DIAGNOSIS — C34.90 SMALL CELL LUNG CANCER (H): Primary | ICD-10-CM

## 2024-06-06 DIAGNOSIS — R06.02 SHORTNESS OF BREATH: ICD-10-CM

## 2024-06-06 RX ORDER — BUPRENORPHINE 5 UG/H
1 PATCH TRANSDERMAL
Qty: 4 PATCH | Refills: 0 | Status: SHIPPED | OUTPATIENT
Start: 2024-06-06 | End: 2024-06-17 | Stop reason: SINTOL

## 2024-06-10 ENCOUNTER — TELEPHONE (OUTPATIENT)
Dept: PALLIATIVE MEDICINE | Facility: CLINIC | Age: 69
End: 2024-06-10

## 2024-06-11 NOTE — TELEPHONE ENCOUNTER
Prior Authorization Approval    Medication: BUPRENORPHINE 5 MCG/HR TD PTWK  Authorization Effective Date: 6/11/2024  Authorization Expiration Date: 6/11/2025  Approved Dose/Quantity:   Reference #:     Insurance Company: [employer] - Phone [phone] Fax [phone]  Expected CoPay: $    CoPay Card Available:      Financial Assistance Needed:   Which Pharmacy is filling the prescription: [facility] - [place], [place] - [address]  Pharmacy Notified: Yes  Patient Notified:

## 2024-06-11 NOTE — TELEPHONE ENCOUNTER
Retail Pharmacy Prior Authorization Team   Phone: [phone]    PA Initiation    Medication: BUPRENORPHINE 5 MCG/HR TD PTWK  Insurance Company:  [employer] Filling the Rx: [facility] [place] - [address]  Filling Pharmacy Phone: [phone]  Filling Pharmacy Fax: [phone]  Start Date: 6/10/2024

## 2024-06-17 ENCOUNTER — INFUSION THERAPY VISIT (OUTPATIENT)
Dept: INFUSION THERAPY | Facility: HOSPITAL | Age: 69
End: 2024-06-17
Attending: INTERNAL MEDICINE
Payer: COMMERCIAL

## 2024-06-17 ENCOUNTER — ONCOLOGY VISIT (OUTPATIENT)
Dept: ONCOLOGY | Facility: HOSPITAL | Age: 69
End: 2024-06-17
Attending: NURSE PRACTITIONER
Payer: COMMERCIAL

## 2024-06-17 ENCOUNTER — LAB (OUTPATIENT)
Dept: INFUSION THERAPY | Facility: HOSPITAL | Age: 69
End: 2024-06-17
Attending: NURSE PRACTITIONER
Payer: COMMERCIAL

## 2024-06-17 VITALS
BODY MASS INDEX: 18.79 KG/M2 | OXYGEN SATURATION: 96 % | HEART RATE: 125 BPM | DIASTOLIC BLOOD PRESSURE: 57 MMHG | HEIGHT: 60 IN | WEIGHT: 95.7 LBS | SYSTOLIC BLOOD PRESSURE: 111 MMHG | TEMPERATURE: 97.7 F | RESPIRATION RATE: 16 BRPM

## 2024-06-17 DIAGNOSIS — C34.2 MALIGNANT NEOPLASM OF MIDDLE LOBE, BRONCHUS OR LUNG (H): ICD-10-CM

## 2024-06-17 DIAGNOSIS — C34.90 MALIGNANT NEOPLASM OF LUNG, UNSPECIFIED LATERALITY, UNSPECIFIED PART OF LUNG (H): ICD-10-CM

## 2024-06-17 DIAGNOSIS — C34.90 SMALL CELL LUNG CANCER (H): Primary | ICD-10-CM

## 2024-06-17 DIAGNOSIS — G89.3 CANCER ASSOCIATED PAIN: ICD-10-CM

## 2024-06-17 DIAGNOSIS — J44.9 COPD, GROUP D, BY GOLD 2017 CLASSIFICATION (H): ICD-10-CM

## 2024-06-17 LAB
ALBUMIN SERPL BCG-MCNC: 3.7 G/DL (ref 3.5–5.2)
ALP SERPL-CCNC: 100 U/L (ref 40–150)
ALT SERPL W P-5'-P-CCNC: 15 U/L (ref 0–50)
ANION GAP SERPL CALCULATED.3IONS-SCNC: 8 MMOL/L (ref 7–15)
AST SERPL W P-5'-P-CCNC: 19 U/L (ref 0–45)
BASOPHILS # BLD AUTO: 0 10E3/UL (ref 0–0.2)
BASOPHILS NFR BLD AUTO: 0 %
BILIRUB SERPL-MCNC: <0.2 MG/DL
BUN SERPL-MCNC: 40.3 MG/DL (ref 8–23)
CALCIUM SERPL-MCNC: 9.4 MG/DL (ref 8.8–10.2)
CHLORIDE SERPL-SCNC: 109 MMOL/L (ref 98–107)
CREAT SERPL-MCNC: 1.26 MG/DL (ref 0.51–0.95)
DEPRECATED HCO3 PLAS-SCNC: 22 MMOL/L (ref 22–29)
EGFRCR SERPLBLD CKD-EPI 2021: 46 ML/MIN/1.73M2
EOSINOPHIL # BLD AUTO: 0.6 10E3/UL (ref 0–0.7)
EOSINOPHIL NFR BLD AUTO: 5 %
ERYTHROCYTE [DISTWIDTH] IN BLOOD BY AUTOMATED COUNT: 17.4 % (ref 10–15)
GLUCOSE SERPL-MCNC: 143 MG/DL (ref 70–99)
HCT VFR BLD AUTO: 27.3 % (ref 35–47)
HGB BLD-MCNC: 8.3 G/DL (ref 11.7–15.7)
IMM GRANULOCYTES # BLD: 0.1 10E3/UL
IMM GRANULOCYTES NFR BLD: 1 %
LYMPHOCYTES # BLD AUTO: 1.3 10E3/UL (ref 0.8–5.3)
LYMPHOCYTES NFR BLD AUTO: 11 %
MCH RBC QN AUTO: 29.5 PG (ref 26.5–33)
MCHC RBC AUTO-ENTMCNC: 30.4 G/DL (ref 31.5–36.5)
MCV RBC AUTO: 97 FL (ref 78–100)
MONOCYTES # BLD AUTO: 1.1 10E3/UL (ref 0–1.3)
MONOCYTES NFR BLD AUTO: 9 %
NEUTROPHILS # BLD AUTO: 8.9 10E3/UL (ref 1.6–8.3)
NEUTROPHILS NFR BLD AUTO: 74 %
NRBC # BLD AUTO: 0 10E3/UL
NRBC BLD AUTO-RTO: 0 /100
PLATELET # BLD AUTO: 263 10E3/UL (ref 150–450)
POTASSIUM SERPL-SCNC: 5.9 MMOL/L (ref 3.4–5.3)
PROT SERPL-MCNC: 7 G/DL (ref 6.4–8.3)
RBC # BLD AUTO: 2.81 10E6/UL (ref 3.8–5.2)
SODIUM SERPL-SCNC: 139 MMOL/L (ref 135–145)
TSH SERPL DL<=0.005 MIU/L-ACNC: 2.49 UIU/ML (ref 0.3–4.2)
WBC # BLD AUTO: 12 10E3/UL (ref 4–11)

## 2024-06-17 PROCEDURE — 84155 ASSAY OF PROTEIN SERUM: CPT | Performed by: INTERNAL MEDICINE

## 2024-06-17 PROCEDURE — 85025 COMPLETE CBC W/AUTO DIFF WBC: CPT

## 2024-06-17 PROCEDURE — 84443 ASSAY THYROID STIM HORMONE: CPT | Performed by: INTERNAL MEDICINE

## 2024-06-17 PROCEDURE — 96413 CHEMO IV INFUSION 1 HR: CPT

## 2024-06-17 PROCEDURE — 258N000003 HC RX IP 258 OP 636: Mod: JZ | Performed by: INTERNAL MEDICINE

## 2024-06-17 PROCEDURE — G2211 COMPLEX E/M VISIT ADD ON: HCPCS | Performed by: INTERNAL MEDICINE

## 2024-06-17 PROCEDURE — 99214 OFFICE O/P EST MOD 30 MIN: CPT | Performed by: INTERNAL MEDICINE

## 2024-06-17 PROCEDURE — G0463 HOSPITAL OUTPT CLINIC VISIT: HCPCS | Mod: 25 | Performed by: INTERNAL MEDICINE

## 2024-06-17 PROCEDURE — 250N000011 HC RX IP 250 OP 636: Mod: JZ | Performed by: INTERNAL MEDICINE

## 2024-06-17 PROCEDURE — 36591 DRAW BLOOD OFF VENOUS DEVICE: CPT

## 2024-06-17 RX ORDER — HEPARIN SODIUM (PORCINE) LOCK FLUSH IV SOLN 100 UNIT/ML 100 UNIT/ML
5 SOLUTION INTRAVENOUS
Status: DISCONTINUED | OUTPATIENT
Start: 2024-06-17 | End: 2024-06-17 | Stop reason: HOSPADM

## 2024-06-17 RX ORDER — DIPHENHYDRAMINE HYDROCHLORIDE 50 MG/ML
50 INJECTION INTRAMUSCULAR; INTRAVENOUS
Status: CANCELLED
Start: 2024-07-30

## 2024-06-17 RX ORDER — EPINEPHRINE 1 MG/ML
0.3 INJECTION, SOLUTION INTRAMUSCULAR; SUBCUTANEOUS EVERY 5 MIN PRN
Status: CANCELLED | OUTPATIENT
Start: 2024-07-09

## 2024-06-17 RX ORDER — ALBUTEROL SULFATE 0.83 MG/ML
2.5 SOLUTION RESPIRATORY (INHALATION)
Status: CANCELLED | OUTPATIENT
Start: 2024-07-09

## 2024-06-17 RX ORDER — LIDOCAINE/PRILOCAINE 2.5 %-2.5%
CREAM (GRAM) TOPICAL PRN
Qty: 30 G | Refills: 5 | Status: SHIPPED | OUTPATIENT
Start: 2024-06-17

## 2024-06-17 RX ORDER — ALBUTEROL SULFATE 90 UG/1
1-2 AEROSOL, METERED RESPIRATORY (INHALATION)
Status: CANCELLED
Start: 2024-07-30

## 2024-06-17 RX ORDER — METHYLPREDNISOLONE SODIUM SUCCINATE 125 MG/2ML
125 INJECTION, POWDER, LYOPHILIZED, FOR SOLUTION INTRAMUSCULAR; INTRAVENOUS
Status: CANCELLED
Start: 2024-07-30

## 2024-06-17 RX ORDER — EPINEPHRINE 1 MG/ML
0.3 INJECTION, SOLUTION INTRAMUSCULAR; SUBCUTANEOUS EVERY 5 MIN PRN
Status: CANCELLED | OUTPATIENT
Start: 2024-07-30

## 2024-06-17 RX ORDER — METHYLPREDNISOLONE SODIUM SUCCINATE 125 MG/2ML
125 INJECTION, POWDER, LYOPHILIZED, FOR SOLUTION INTRAMUSCULAR; INTRAVENOUS
Status: CANCELLED
Start: 2024-07-09

## 2024-06-17 RX ORDER — ALBUTEROL SULFATE 0.83 MG/ML
2.5 SOLUTION RESPIRATORY (INHALATION)
Status: CANCELLED | OUTPATIENT
Start: 2024-07-30

## 2024-06-17 RX ORDER — LORAZEPAM 2 MG/ML
0.5 INJECTION INTRAMUSCULAR EVERY 4 HOURS PRN
Status: CANCELLED | OUTPATIENT
Start: 2024-07-09

## 2024-06-17 RX ORDER — ALBUTEROL SULFATE 90 UG/1
1-2 AEROSOL, METERED RESPIRATORY (INHALATION)
Status: CANCELLED
Start: 2024-07-09

## 2024-06-17 RX ORDER — DIPHENHYDRAMINE HYDROCHLORIDE 50 MG/ML
50 INJECTION INTRAMUSCULAR; INTRAVENOUS
Status: CANCELLED
Start: 2024-07-09

## 2024-06-17 RX ORDER — LORAZEPAM 2 MG/ML
0.5 INJECTION INTRAMUSCULAR EVERY 4 HOURS PRN
Status: CANCELLED | OUTPATIENT
Start: 2024-07-30

## 2024-06-17 RX ORDER — MEPERIDINE HYDROCHLORIDE 25 MG/ML
25 INJECTION INTRAMUSCULAR; INTRAVENOUS; SUBCUTANEOUS EVERY 30 MIN PRN
Status: CANCELLED | OUTPATIENT
Start: 2024-07-09

## 2024-06-17 RX ORDER — HEPARIN SODIUM (PORCINE) LOCK FLUSH IV SOLN 100 UNIT/ML 100 UNIT/ML
5 SOLUTION INTRAVENOUS
Status: CANCELLED | OUTPATIENT
Start: 2024-07-09

## 2024-06-17 RX ORDER — HEPARIN SODIUM,PORCINE 10 UNIT/ML
5-20 VIAL (ML) INTRAVENOUS DAILY PRN
Status: CANCELLED | OUTPATIENT
Start: 2024-07-30

## 2024-06-17 RX ORDER — HEPARIN SODIUM,PORCINE 10 UNIT/ML
5-20 VIAL (ML) INTRAVENOUS DAILY PRN
Status: CANCELLED | OUTPATIENT
Start: 2024-07-09

## 2024-06-17 RX ORDER — HEPARIN SODIUM (PORCINE) LOCK FLUSH IV SOLN 100 UNIT/ML 100 UNIT/ML
5 SOLUTION INTRAVENOUS
Status: CANCELLED | OUTPATIENT
Start: 2024-07-30

## 2024-06-17 RX ORDER — OXYCODONE HYDROCHLORIDE 5 MG/1
2.5-5 TABLET ORAL EVERY 4 HOURS PRN
Qty: 40 TABLET | Refills: 0 | Status: SHIPPED | OUTPATIENT
Start: 2024-06-17 | End: 2024-08-09

## 2024-06-17 RX ADMIN — SODIUM CHLORIDE 250 ML: 9 INJECTION, SOLUTION INTRAVENOUS at 10:34

## 2024-06-17 RX ADMIN — ATEZOLIZUMAB 1200 MG: 1200 INJECTION, SOLUTION INTRAVENOUS at 10:56

## 2024-06-17 RX ADMIN — Medication 5 ML: at 11:56

## 2024-06-17 ASSESSMENT — PAIN SCALES - GENERAL: PAINLEVEL: NO PAIN (0)

## 2024-06-17 NOTE — PROGRESS NOTES
[facility] Hematology and Oncology Consult Note    Patient: Jasmyn Green  MRN: [id number]  Date of Service: Jun 17, 2024           Reason for consultation      Problem List Items Addressed This Visit          Respiratory    Small cell lung cancer (H) - Primary    Relevant Orders    Infusion Appointment Request    Infusion Appointment Request    PET Oncology (Eyes to Thighs)     Other Visit Diagnoses       Malignant neoplasm of middle lobe, bronchus or lung (H)        Relevant Orders    PET Oncology (Eyes to Thighs)              Assessment / number of problems addressed      1.  A very pleasant 68 year old  woman with looks like extensive stage small cell lung cancer.  Started on palliative chemotherapy with carboplatin, etoposide and atezolizumab.  After 2 cycles seemed to be responding quite well on the CT scan.  Had significant side effects after third cycle requiring discontinuation of cytotoxic chemotherapy and continues on maintenance atezolizumab  2.  Severe COPD.  She is on 2-3 L of oxygen at rest.  3.  Significantly decreased FEV1 of 29% and reduced diffusion capacity with DLCO of 30%.  4.  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  On baby aspirin.  5.  Other medical conditions stable.  6.  Brain lesion which seems to have changed a little bit.  Still not very clear if it is metastatic or not.  7.  Severe anemia secondary to chemotherapy.    Plan and medical decision making      Patient presenting with extensive stage small cell lung cancer.  Significant side effects from treatment.  Multiple medical conditions including COPD.Reviewed notes from each unique source.  Reviewed each unique test.  Ordered tests if indicated.  Independently interpreted lab tests and radiological exams performed by other physicians.  Personally reviewed the images.  Independent historian account also obtained from family.  .  At this time recommend next treatment with atezolizumab.  PET scan after this cycle.  We will  also need to do an MRI at some point for surveillance of the brain area in August.  Advised to continue using inhalers as well as flapper valve to help get the secretions out of the lung.  If secretions become more thicker to let us know she may need antibiotic.  Continue to follow-up with palliative care.  Continue to monitor for autoimmune side effects.  Continues baby aspirin.  The longitudinal plan of care for the diagnosis(es)/condition(s) as documented were addressed during this visit. Due to the added complexity in care, I will continue to support Jasmyn in the subsequent management and with ongoing continuity of care.     Clinical/pathological stage       Cancer Staging   No matching staging information was found for the patient.      History of present illness      Ms. Jasmyn Green is a 68 year old woman who has been referred to me for evaluation of newly diagnosed small cell lung cancer.  She appears to have extensive stage disease.  She presented to the [facility] with increasing shortness of breath coughing and symptoms suggestive of pneumonia.  She has a known history of severe obstructive lung disease with an FEV1 of 29% with hyperinflation and air trapping.  Presented with a 1 to 2-day history of worsening shortness of breath and with exertion as well as at rest.  Requiring increasing supplemental oxygen at home.  CT scan done in the emergency room showed bulky aortopulmonary as well as left hilar adenopathy with irregular pleural-based mass in the left upper lobe.  There are also some scattered satellite nodules.  This was very suspicious for malignancy.    After discharge she was seen by Dr. Levin and and underwent endobronchial ultrasound-guided biopsy which came back positive for small cell lung cancer.  The PET scan also showed hypermetabolic lesions in the mediastinum as well as the peripheral part of the left upper lobe.    She is on oxygen at least 2 L at rest and sometimes  increased oxygen at minimal activity.  She is 97 pounds.  Appetite is fair to poor.  Comes in accompanied by her 2 daughters regarding her treatment options.    She used to smoke but quit smoking.  She was in fact was being monitored by CT surveillance.  Had a CT scan done in June 2023 which was positive for some sort of pneumonia but no obvious malignancy was noted.    She was started on echemotherapy with carboplatin, etoposide and atezolizumab.  She has had 3 cycles.  After 3 cycles had severe side effects from chemotherapy.  She was had to be hospitalized for post side effect issues including some pneumonia etc.  She was at [facility] for several days.  We made the decision to discontinue cisplatin and etoposide.  We continue with the atezolizumab.    Jasmyn was able to tolerate atezolizumab decently well.  She is following up with palliative care physicians.  Her pain Patch was discontinued and she was put on small dose of Vicodin to help with her breathing.  That seems to be working well.  She is taking rather small dose of Vicodin.  Continues to have some cough with minimal expectoration.    Past medical/surgical/social/family history        Past Medical History:   Diagnosis Date    Age-related osteoporosis without current pathological fracture     Arthritis     COPD (chronic obstructive pulmonary disease) (H)     Coronary artery disease     Dependence on nocturnal oxygen therapy     Dyspnea on exertion     Emphysema lung (H)     Gout     HLD (hyperlipidemia)     Hypertension     Lung mass      Past Surgical History:   Procedure Laterality Date    BRONCHOSCOPY RIGID OR FLEXIBLE W/TRANSENDOSCOPIC ENDOBRONCHIAL ULTRASOUND GUIDED N/A 2/16/2024    Procedure: BRONCHOSCOPY, WITH ENDOBRONCHIAL ULTRASOUND;  Surgeon: Ruben Levin MD;  Location: [facility] OR    COLONOSCOPY N/A 10/21/2021    Procedure: COLONOSCOPY;  Surgeon: Wilma Hester DO;  Location: [facility]    IR CHEST PORT PLACEMENT > 5  YRS OF AGE  2024    VAGINAL DELIVERY      x 3 ; remote    WISDOM TOOTH EXTRACTION         Social History     Socioeconomic History    Marital status:      Spouse name: None    Number of children: None    Years of education: None    Highest education level: None   Tobacco Use    Smoking status: Former     Types: Cigarettes     Quit date: 2021     Years since quittin.6     Passive exposure: Past    Smokeless tobacco: Never    Tobacco comments:     updated 8/3/2021 by [facility]   Vaping Use    Vaping Use: Never used   Substance and Sexual Activity    Alcohol use: Not Currently    Drug use: Never    Sexual activity: Not Currently   Social History Narrative     many years 3 grown children. 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 Last cigarette a week ago.non drinker  Her primary MD (Wilda) retired several years ago/ tends to avoid doctors/medical care in general       Social Determinants of Health     Financial Resource Strain: Low Risk  (2024)    Financial Resource Strain     Within the past 12 months, have you or your family members you live with been unable to get utilities (heat, electricity) when it was really needed?: No   Food Insecurity: Low Risk  (2024)    Food Insecurity     Within the past 12 months, did you worry that your food would run out before you got money to buy more?: No     Within the past 12 months, did the food you bought just not last and you didn t have money to get more?: No   Transportation Needs: Low Risk  (2024)    Transportation Needs     Within the past 12 months, has lack of transportation kept you from medical appointments, getting your medicines, non-medical meetings or appointments, work, or from getting things that you need?: No   Physical Activity: Sufficiently Active (11/10/2021)    Exercise Vital Sign     Days of Exercise per Week: 5 days     Minutes of Exercise per Session: 30 min   Stress: No Stress Concern Present (11/10/2021)    [employer] - Occupational Stress Questionnaire     Feeling of Stress : Not at all   Social Connections: Socially Isolated (11/10/2021)    Social Connection and Isolation Panel [NHANES]     Frequency of Communication with Friends and Family: More than three times a week     Frequency of Social Gatherings with Friends and Family: Once a week     Attends Muslim Services: Never     Active Member of Clubs or Organizations: No     Marital Status:    Interpersonal Safety: Low Risk  (2/1/2024)    Interpersonal Safety     Do you feel physically and emotionally safe where you currently live?: Yes     Within the past 12 months, have you been hit, slapped, kicked or otherwise physically hurt by someone?: No     Within the past 12 months, have you been humiliated or emotionally abused in other ways by your partner or ex-partner?: No   Housing Stability: Low Risk  (2/1/2024)    Housing Stability     Do you have housing? : Yes     Are you worried about losing your housing?: No         Review of system      Details noted in the history of present illness.  A detailed review of systems is otherwise negative.      Physical exam        /57 (BP Location: Left arm, Patient Position: Sitting, Cuff Size: Adult Small)   Pulse (!) 125   Temp 97.7  F (36.5  C) (Oral)   Resp 16   Ht 1.524 m (5')   Wt 43.4 kg (95 lb 11.2 oz)   LMP  (LMP Unknown)   SpO2 96%   BMI 18.69 kg/m      GENERAL: No acute distress. Cooperative in conversation.   HEENT:  Pupils are equal, round and reactive. Oral mucosa is clean and intact. No ulcerations or mucositis noted. No bleeding noted.  RESP:Chest symmetric lungs are clear bilaterally per auscultation. Regular respiratory rate.  Some wheezes and rhonchi.  CV: Normal S1 S2 Regular, rate and rhythm.     ABD: Nondistended, soft, nontender. Positive bowel sounds. No organomegaly.   EXTREMITIES: No lower extremity edema.   NEURO: Non- focal. Alert and oriented x3.   Cranial nerves appear intact.  PSYCH: Within normal limits. No depression or anxiety.  SKIN: Warm dry intact.      Lab results Reviewed      Recent Results (from the past 168 hour(s))   Comprehensive metabolic panel   Result Value Ref Range    Sodium 139 135 - 145 mmol/L    Potassium 5.9 (H) 3.4 - 5.3 mmol/L    Carbon Dioxide (CO2) 22 22 - 29 mmol/L    Anion Gap 8 7 - 15 mmol/L    Urea Nitrogen 40.3 (H) 8.0 - 23.0 mg/dL    Creatinine 1.26 (H) 0.51 - 0.95 mg/dL    GFR Estimate 46 (L) >60 mL/min/1.73m2    Calcium 9.4 8.8 - 10.2 mg/dL    Chloride 109 (H) 98 - 107 mmol/L    Glucose 143 (H) 70 - 99 mg/dL    Alkaline Phosphatase 100 40 - 150 U/L    AST 19 0 - 45 U/L    ALT 15 0 - 50 U/L    Protein Total 7.0 6.4 - 8.3 g/dL    Albumin 3.7 3.5 - 5.2 g/dL    Bilirubin Total <0.2 <=1.2 mg/dL   TSH with free T4 reflex   Result Value Ref Range    TSH 2.49 0.30 - 4.20 uIU/mL   CBC with platelets and differential   Result Value Ref Range    WBC Count 12.0 (H) 4.0 - 11.0 10e3/uL    RBC Count 2.81 (L) 3.80 - 5.20 10e6/uL    Hemoglobin 8.3 (L) 11.7 - 15.7 g/dL    Hematocrit 27.3 (L) 35.0 - 47.0 %    MCV 97 78 - 100 fL    MCH 29.5 26.5 - 33.0 pg    MCHC 30.4 (L) 31.5 - 36.5 g/dL    RDW 17.4 (H) 10.0 - 15.0 %    Platelet Count 263 150 - 450 10e3/uL    % Neutrophils 74 %    % Lymphocytes 11 %    % Monocytes 9 %    % Eosinophils 5 %    % Basophils 0 %    % Immature Granulocytes 1 %    NRBCs per 100 WBC 0 <1 /100    Absolute Neutrophils 8.9 (H) 1.6 - 8.3 10e3/uL    Absolute Lymphocytes 1.3 0.8 - 5.3 10e3/uL    Absolute Monocytes 1.1 0.0 - 1.3 10e3/uL    Absolute Eosinophils 0.6 0.0 - 0.7 10e3/uL    Absolute Basophils 0.0 0.0 - 0.2 10e3/uL    Absolute Immature Granulocytes 0.1 <=0.4 10e3/uL    Absolute NRBCs 0.0 10e3/uL       Imaging results Reviewed        No results found.      Signed by: Amador Stewart MD      This note has been dictated using voice recognition software. Any grammatical or context distortions are unintentional and  inherent to the software

## 2024-06-17 NOTE — PROGRESS NOTES
Oncology Rooming Note    June 17, 2024 9:36 AM   Jasmyn Green is a 68 year old female who presents for:    Chief Complaint   Patient presents with    Oncology Clinic Visit     Return visit 3 weeks with lab and infusion. Small cell lung cancer     Initial Vitals: /57 (BP Location: Left arm, Patient Position: Sitting, Cuff Size: Adult Small)   Pulse (!) 125   Temp 97.7  F (36.5  C) (Oral)   Resp 16   Ht 1.524 m (5')   Wt 43.4 kg (95 lb 11.2 oz)   LMP  (LMP Unknown)   SpO2 96%   BMI 18.69 kg/m   Estimated body mass index is 18.69 kg/m  as calculated from the following:    Height as of this encounter: 1.524 m (5').    Weight as of this encounter: 43.4 kg (95 lb 11.2 oz). Body surface area is 1.36 meters squared.  No Pain (0) Comment: Data Unavailable   No LMP recorded (lmp unknown). Patient is postmenopausal.  Allergies reviewed: Yes  Medications reviewed: Yes    Medications: MEDICATION REFILLS NEEDED TODAY. Provider was notified.  Pharmacy name entered into [employer]:    [facility] - [place], [place] - [address] [facility] - [place], [place] - [address]    Frailty Screening:   Is the patient here for a new oncology consult visit in cancer care? 2. No      Clinical concerns: none       Елена Deutsch CMA

## 2024-06-17 NOTE — TELEPHONE ENCOUNTER
Received [employer] message from patient requesting refill of oxycodone. Pt has stopped the butrans due to  nausea.     Last refill: 5/1/24  Last office visit: 6/5/24  Scheduled for follow up 7/1/24     Will route request to NP for review.     Reviewed [place]  Report.

## 2024-06-17 NOTE — LETTER
6/17/2024      Jasmyn Green  [address] [place] [address]      Dear Colleague,    Thank you for referring your patient, Jasmyn Green, to the [facility]. Please see a copy of my visit note below.    Oncology Rooming Note    June 17, 2024 9:36 AM   Jasmyn Green is a 68 year old female who presents for:    Chief Complaint   Patient presents with     Oncology Clinic Visit     Return visit 3 weeks with lab and infusion. Small cell lung cancer     Initial Vitals: /57 (BP Location: Left arm, Patient Position: Sitting, Cuff Size: Adult Small)   Pulse (!) 125   Temp 97.7  F (36.5  C) (Oral)   Resp 16   Ht 1.524 m (5')   Wt 43.4 kg (95 lb 11.2 oz)   LMP  (LMP Unknown)   SpO2 96%   BMI 18.69 kg/m   Estimated body mass index is 18.69 kg/m  as calculated from the following:    Height as of this encounter: 1.524 m (5').    Weight as of this encounter: 43.4 kg (95 lb 11.2 oz). Body surface area is 1.36 meters squared.  No Pain (0) Comment: Data Unavailable   No LMP recorded (lmp unknown). Patient is postmenopausal.  Allergies reviewed: Yes  Medications reviewed: Yes    Medications: MEDICATION REFILLS NEEDED TODAY. Provider was notified.  Pharmacy name entered into [employer]:    [facility] - [place], [place] - [address] [facility] [place], [place] - [address] [place] [address]    Frailty Screening:   Is the patient here for a new oncology consult visit in cancer care? 2. No      Clinical concerns: none       Елена Deutsch, [facility] Hematology and Oncology Consult Note    Patient: Jasmyn Green  MRN: [id number]  Date of Service: Jun 17, 2024           Reason for consultation      Problem List Items Addressed This Visit          Respiratory    Small cell lung cancer (H) - Primary    Relevant Orders    Infusion Appointment Request    Infusion Appointment Request    PET Oncology (Eyes to Thighs)     Other Visit  Diagnoses       Malignant neoplasm of middle lobe, bronchus or lung (H)        Relevant Orders    PET Oncology (Eyes to Thighs)              Assessment / number of problems addressed      1.  A very pleasant 68 year old  woman with looks like extensive stage small cell lung cancer.  Started on palliative chemotherapy with carboplatin, etoposide and atezolizumab.  After 2 cycles seemed to be responding quite well on the CT scan.  Had significant side effects after third cycle requiring discontinuation of cytotoxic chemotherapy and continues on maintenance atezolizumab  2.  Severe COPD.  She is on 2-3 L of oxygen at rest.  3.  Significantly decreased FEV1 of 29% and reduced diffusion capacity with DLCO of 30%.  4.  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  On baby aspirin.  5.  Other medical conditions stable.  6.  Brain lesion which seems to have changed a little bit.  Still not very clear if it is metastatic or not.  7.  Severe anemia secondary to chemotherapy.    Plan and medical decision making      Patient presenting with extensive stage small cell lung cancer.  Significant side effects from treatment.  Multiple medical conditions including COPD.Reviewed notes from each unique source.  Reviewed each unique test.  Ordered tests if indicated.  Independently interpreted lab tests and radiological exams performed by other physicians.  Personally reviewed the images.  Independent historian account also obtained from family.  .  At this time recommend next treatment with atezolizumab.  PET scan after this cycle.  We will also need to do an MRI at some point for surveillance of the brain area in August.  Advised to continue using inhalers as well as flapper valve to help get the secretions out of the lung.  If secretions become more thicker to let us know she may need antibiotic.  Continue to follow-up with palliative care.  Continue to monitor for autoimmune side effects.  Continues baby aspirin.  The longitudinal plan of  care for the diagnosis(es)/condition(s) as documented were addressed during this visit. Due to the added complexity in care, I will continue to support Jasmyn in the subsequent management and with ongoing continuity of care.     Clinical/pathological stage       Cancer Staging   No matching staging information was found for the patient.      History of present illness      Ms. Jasmyn Green is a 68 year old woman who has been referred to me for evaluation of newly diagnosed small cell lung cancer.  She appears to have extensive stage disease.  She presented to the [facility] with increasing shortness of breath coughing and symptoms suggestive of pneumonia.  She has a known history of severe obstructive lung disease with an FEV1 of 29% with hyperinflation and air trapping.  Presented with a 1 to 2-day history of worsening shortness of breath and with exertion as well as at rest.  Requiring increasing supplemental oxygen at home.  CT scan done in the emergency room showed bulky aortopulmonary as well as left hilar adenopathy with irregular pleural-based mass in the left upper lobe.  There are also some scattered satellite nodules.  This was very suspicious for malignancy.    After discharge she was seen by Dr. Levin and and underwent endobronchial ultrasound-guided biopsy which came back positive for small cell lung cancer.  The PET scan also showed hypermetabolic lesions in the mediastinum as well as the peripheral part of the left upper lobe.    She is on oxygen at least 2 L at rest and sometimes increased oxygen at minimal activity.  She is 97 pounds.  Appetite is fair to poor.  Comes in accompanied by her 2 daughters regarding her treatment options.    She used to smoke but quit smoking.  She was in fact was being monitored by CT surveillance.  Had a CT scan done in June 2023 which was positive for some sort of pneumonia but no obvious malignancy was noted.    She was started on echemotherapy with  carboplatin, etoposide and atezolizumab.  She has had 3 cycles.  After 3 cycles had severe side effects from chemotherapy.  She was had to be hospitalized for post side effect issues including some pneumonia etc.  She was at [facility] for several days.  We made the decision to discontinue cisplatin and etoposide.  We continue with the atezolizumab.    Jasmyn was able to tolerate atezolizumab decently well.  She is following up with palliative care physicians.  Her pain Patch was discontinued and she was put on small dose of Vicodin to help with her breathing.  That seems to be working well.  She is taking rather small dose of Vicodin.  Continues to have some cough with minimal expectoration.    Past medical/surgical/social/family history        Past Medical History:   Diagnosis Date     Age-related osteoporosis without current pathological fracture      Arthritis      COPD (chronic obstructive pulmonary disease) (H)      Coronary artery disease      Dependence on nocturnal oxygen therapy      Dyspnea on exertion      Emphysema lung (H)      Gout      HLD (hyperlipidemia)      Hypertension      Lung mass      Past Surgical History:   Procedure Laterality Date     BRONCHOSCOPY RIGID OR FLEXIBLE W/TRANSENDOSCOPIC ENDOBRONCHIAL ULTRASOUND GUIDED N/A 2024    Procedure: BRONCHOSCOPY, WITH ENDOBRONCHIAL ULTRASOUND;  Surgeon: Ruben Levin MD;  Location: [facility] OR     COLONOSCOPY N/A 10/21/2021    Procedure: COLONOSCOPY;  Surgeon: Wilma Hester DO;  Location: [facility] OR     IR CHEST PORT PLACEMENT > 5 YRS OF AGE  2024     VAGINAL DELIVERY      x 3 ; remote     WISDOM TOOTH EXTRACTION         Social History     Socioeconomic History     Marital status:      Spouse name: None     Number of children: None     Years of education: None     Highest education level: None   Tobacco Use     Smoking status: Former     Types: Cigarettes     Quit date: 2021     Years since quittin.6      Passive exposure: Past     Smokeless tobacco: Never     Tobacco comments:     updated 8/3/2021 by TTS   Vaping Use     Vaping Use: Never used   Substance and Sexual Activity     Alcohol use: Not Currently     Drug use: Never     Sexual activity: Not Currently   Social History Narrative     many years 3 grown children. 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 Last cigarette a week ago.non drinker  Her primary MD (Wilda) retired several years ago/ tends to avoid doctors/medical care in general       Social Determinants of Health     Financial Resource Strain: Low Risk  (2/1/2024)    Financial Resource Strain      Within the past 12 months, have you or your family members you live with been unable to get utilities (heat, electricity) when it was really needed?: No   Food Insecurity: Low Risk  (2/1/2024)    Food Insecurity      Within the past 12 months, did you worry that your food would run out before you got money to buy more?: No      Within the past 12 months, did the food you bought just not last and you didn t have money to get more?: No   Transportation Needs: Low Risk  (2/1/2024)    Transportation Needs      Within the past 12 months, has lack of transportation kept you from medical appointments, getting your medicines, non-medical meetings or appointments, work, or from getting things that you need?: No   Physical Activity: Sufficiently Active (11/10/2021)    Exercise Vital Sign      Days of Exercise per Week: 5 days      Minutes of Exercise per Session: 30 min   Stress: No Stress Concern Present (11/10/2021)    [employer] - Occupational Stress Questionnaire      Feeling of Stress : Not at all   Social Connections: Socially Isolated (11/10/2021)    Social Connection and Isolation Panel [NHANES]      Frequency of Communication with Friends and Family: More than three times a week      Frequency of Social Gatherings with Friends and Family: Once a week      Attends  Buddhism Services: Never      Active Member of Clubs or Organizations: No      Marital Status:    Interpersonal Safety: Low Risk  (2/1/2024)    Interpersonal Safety      Do you feel physically and emotionally safe where you currently live?: Yes      Within the past 12 months, have you been hit, slapped, kicked or otherwise physically hurt by someone?: No      Within the past 12 months, have you been humiliated or emotionally abused in other ways by your partner or ex-partner?: No   Housing Stability: Low Risk  (2/1/2024)    Housing Stability      Do you have housing? : Yes      Are you worried about losing your housing?: No         Review of system      Details noted in the history of present illness.  A detailed review of systems is otherwise negative.      Physical exam        /57 (BP Location: Left arm, Patient Position: Sitting, Cuff Size: Adult Small)   Pulse (!) 125   Temp 97.7  F (36.5  C) (Oral)   Resp 16   Ht 1.524 m (5')   Wt 43.4 kg (95 lb 11.2 oz)   LMP  (LMP Unknown)   SpO2 96%   BMI 18.69 kg/m      GENERAL: No acute distress. Cooperative in conversation.   HEENT:  Pupils are equal, round and reactive. Oral mucosa is clean and intact. No ulcerations or mucositis noted. No bleeding noted.  RESP:Chest symmetric lungs are clear bilaterally per auscultation. Regular respiratory rate.  Some wheezes and rhonchi.  CV: Normal S1 S2 Regular, rate and rhythm.     ABD: Nondistended, soft, nontender. Positive bowel sounds. No organomegaly.   EXTREMITIES: No lower extremity edema.   NEURO: Non- focal. Alert and oriented x3.  Cranial nerves appear intact.  PSYCH: Within normal limits. No depression or anxiety.  SKIN: Warm dry intact.      Lab results Reviewed      Recent Results (from the past 168 hour(s))   Comprehensive metabolic panel   Result Value Ref Range    Sodium 139 135 - 145 mmol/L    Potassium 5.9 (H) 3.4 - 5.3 mmol/L    Carbon Dioxide (CO2) 22 22 - 29 mmol/L    Anion Gap 8 7 - 15  mmol/L    Urea Nitrogen 40.3 (H) 8.0 - 23.0 mg/dL    Creatinine 1.26 (H) 0.51 - 0.95 mg/dL    GFR Estimate 46 (L) >60 mL/min/1.73m2    Calcium 9.4 8.8 - 10.2 mg/dL    Chloride 109 (H) 98 - 107 mmol/L    Glucose 143 (H) 70 - 99 mg/dL    Alkaline Phosphatase 100 40 - 150 U/L    AST 19 0 - 45 U/L    ALT 15 0 - 50 U/L    Protein Total 7.0 6.4 - 8.3 g/dL    Albumin 3.7 3.5 - 5.2 g/dL    Bilirubin Total <0.2 <=1.2 mg/dL   TSH with free T4 reflex   Result Value Ref Range    TSH 2.49 0.30 - 4.20 uIU/mL   CBC with platelets and differential   Result Value Ref Range    WBC Count 12.0 (H) 4.0 - 11.0 10e3/uL    RBC Count 2.81 (L) 3.80 - 5.20 10e6/uL    Hemoglobin 8.3 (L) 11.7 - 15.7 g/dL    Hematocrit 27.3 (L) 35.0 - 47.0 %    MCV 97 78 - 100 fL    MCH 29.5 26.5 - 33.0 pg    MCHC 30.4 (L) 31.5 - 36.5 g/dL    RDW 17.4 (H) 10.0 - 15.0 %    Platelet Count 263 150 - 450 10e3/uL    % Neutrophils 74 %    % Lymphocytes 11 %    % Monocytes 9 %    % Eosinophils 5 %    % Basophils 0 %    % Immature Granulocytes 1 %    NRBCs per 100 WBC 0 <1 /100    Absolute Neutrophils 8.9 (H) 1.6 - 8.3 10e3/uL    Absolute Lymphocytes 1.3 0.8 - 5.3 10e3/uL    Absolute Monocytes 1.1 0.0 - 1.3 10e3/uL    Absolute Eosinophils 0.6 0.0 - 0.7 10e3/uL    Absolute Basophils 0.0 0.0 - 0.2 10e3/uL    Absolute Immature Granulocytes 0.1 <=0.4 10e3/uL    Absolute NRBCs 0.0 10e3/uL       Imaging results Reviewed        No results found.      Signed by: Amador Stewart MD      This note has been dictated using voice recognition software. Any grammatical or context distortions are unintentional and inherent to the software        Again, thank you for allowing me to participate in the care of your patient.        Sincerely,        Amador Stewart MD

## 2024-06-17 NOTE — PROGRESS NOTES
Infusion Nursing Note:  Jasmyn Green presents today for C4D1.    Patient seen by provider today: Yes,    present during visit today: No    Note:  VSS.  Pt assessed and has no complaints,  pt met with provider. Pt taken off her oxygen and was placed on our O2 at 3 liters. Port was accessed. Treatment reviewed with pt. Jasmyn received treatment as ordered. Will return 7/8.      Intravenous Access:  Implanted Port.    Treatment Conditions:  Lab Results   Component Value Date    HGB 8.3 (L) 06/17/2024    WBC 12.0 (H) 06/17/2024    ANEU 8.0 05/12/2024    ANEUTAUTO 8.9 (H) 06/17/2024     06/17/2024        Lab Results   Component Value Date     06/17/2024    POTASSIUM 5.9 (H) 06/17/2024    MAG 2.1 05/17/2024    CR 1.26 (H) 06/17/2024    FLOR 9.4 06/17/2024    BILITOTAL <0.2 06/17/2024    ALBUMIN 3.7 06/17/2024    ALT 15 06/17/2024    AST 19 06/17/2024       Results reviewed, labs MET treatment parameters, ok to proceed with treatment.      Post Infusion Assessment:  Patient tolerated infusion without incident.  Blood return noted pre and post infusion.  No evidence of extravasations.  Access discontinued per protocol.       Discharge Plan:   Discharge instructions reviewed with: Patient.  Patient and/or family verbalized understanding of discharge instructions and all questions answered.  Patient discharged in stable condition accompanied by: self and daughter.  Departure Mode: Ambulatory.      Ce Angulo RN

## 2024-06-19 ENCOUNTER — PATIENT OUTREACH (OUTPATIENT)
Dept: CARE COORDINATION | Facility: CLINIC | Age: 69
End: 2024-06-19
Payer: COMMERCIAL

## 2024-06-19 NOTE — PROGRESS NOTES
Clinic Care Coordination Contact  Community Health Worker Follow Up    Care Gaps:     Health Maintenance Due   Topic Date Due    COPD ACTION PLAN  Never done    ZOSTER IMMUNIZATION (1 of 2) Never done    RSV VACCINE (Pregnancy & 60+) (1 - 1-dose 60+ series) Never done    COVID-19 Vaccine (3 - Moderna risk series) 03/07/2022       Patient accepted scheduling phone number for [facility]  to schedule independently     Care Plan:   Care Plan: Community Resources       Problem: Community Resources       Goal: I have accomplished being approved for community programs.  Completed 4/18/2024      Start Date: 2/28/2024    This Visit's Progress: 100%    Note:     Personal Plan  I have accomplished obtaining needed services. If I have any questions about Social Security I will call the Social Security office. I have been approved for 2 grants to help me financially. If I have any questions about those grants I will call my Oncology Social Worker.                             Problem: Food Support       Goal: I have accomplished establishing services with [employer].  Completed 4/18/2024      Start Date: 2/28/2024    This Visit's Progress: 100%    Note:     Personal Plan  If we have any questions about [employer] I will contact them directly.                         Problem: In-home support       Goal: I have accomplished starting the process for applying for in home support.  Completed 4/18/2024      Start Date: 2/28/2024    This Visit's Progress: 100%    Note:     Personal Plan  If I have any questions about the [facility] Waiver programs I will contact them directly at [phone].                              Intervention and Education during outreach: I spoke with patients daughter Julissa. Julissa let me know everything was going good for Jasmyn currently. No other needs at this time.    CHW Plan: Patient has been on CCC Maintenance since 4/18/24 and has no other goals that this patient would like to work on with Clinic Care  Coordination. CHW sent request to [facility] RN pool to review for Graduation.    Edelmira Morales  [facility] Health Worker  [facility] Care Coordination   Kamila Villaseñor, [place], [place], [facility]  Office: [phone]

## 2024-06-20 ENCOUNTER — TELEPHONE (OUTPATIENT)
Dept: ONCOLOGY | Facility: HOSPITAL | Age: 69
End: 2024-06-20
Payer: COMMERCIAL

## 2024-06-20 NOTE — TELEPHONE ENCOUNTER
I called Jasmyn today and spoke to her daughter Julissa.  There is a consent to communicate on file to speak with Julissa.  Jasmyn was requesting some medications to help with claustrophobia during the PET scan.  I called Julissa back to let her know that the PET scan is different than an MRI machine as it is more open and a shorter imaging study.  After our discussion, she feels like her mom will be just fine getting through a PET scan without sedation.  She has no other questions or concerns at this time.    Shelia Aponte RN on 6/20/2024 at 1:49 PM

## 2024-06-24 ENCOUNTER — OFFICE VISIT (OUTPATIENT)
Dept: CARDIOLOGY | Facility: CLINIC | Age: 69
End: 2024-06-24
Attending: NURSE PRACTITIONER
Payer: COMMERCIAL

## 2024-06-24 VITALS
HEIGHT: 59 IN | DIASTOLIC BLOOD PRESSURE: 40 MMHG | BODY MASS INDEX: 19.48 KG/M2 | RESPIRATION RATE: 12 BRPM | HEART RATE: 100 BPM | WEIGHT: 96.6 LBS | SYSTOLIC BLOOD PRESSURE: 92 MMHG

## 2024-06-24 DIAGNOSIS — I74.9 ARTERIAL THROMBOSIS (H): ICD-10-CM

## 2024-06-24 DIAGNOSIS — I10 HYPERTENSION, UNSPECIFIED TYPE: Primary | ICD-10-CM

## 2024-06-24 DIAGNOSIS — I25.10 CORONARY ARTERY CALCIFICATION: Chronic | ICD-10-CM

## 2024-06-24 DIAGNOSIS — R00.0 SINUS TACHYCARDIA: ICD-10-CM

## 2024-06-24 DIAGNOSIS — E78.5 DYSLIPIDEMIA: ICD-10-CM

## 2024-06-24 PROCEDURE — 99204 OFFICE O/P NEW MOD 45 MIN: CPT | Performed by: INTERNAL MEDICINE

## 2024-06-24 RX ORDER — LISINOPRIL 10 MG/1
10 TABLET ORAL DAILY
Qty: 30 TABLET | Refills: 12 | Status: SHIPPED | OUTPATIENT
Start: 2024-06-24

## 2024-06-24 RX ORDER — APIXABAN 5 MG/1
5 TABLET, FILM COATED ORAL 2 TIMES DAILY
Qty: 60 TABLET | Refills: 0 | Status: SHIPPED | OUTPATIENT
Start: 2024-06-24 | End: 2024-07-30

## 2024-06-24 RX ORDER — ATORVASTATIN CALCIUM 40 MG/1
40 TABLET, FILM COATED ORAL DAILY
Qty: 90 TABLET | Refills: 3 | Status: SHIPPED | OUTPATIENT
Start: 2024-06-24

## 2024-06-24 NOTE — LETTER
6/24/2024    Ana Maria Bermudez MD  [address] [facility] [address]    RE: Jasmyn KING Peter       Dear Colleague,     I had the pleasure of seeing Jasmyn Green in the [facility].         [facility]   [address], [place] [address]   [email or web address]   OFFICE: [phone]          Thank you Dr. Calle for asking the [facility] team to participate in the care of your patient, Jasmyn Green.     Impression and Plan     1.  Coronary artery disease.  Jasmyn has evidence of coronary artery disease by virtue of CT scan of chest 10 May 2024 revealing evidence of severe coronary calcification.  As noted below, Jasmyn was recently seen by my colleague, Dr. Abdias Trujillo, on 14 May 2024.  Continued medical therapy was advocated for her coronary disease identified by CT. on interview she denies any chest pain symptoms.  Parenthetically, ECG 12 May 2024 despite heart rate of 130 bpm revealed no ischemic ST/T wave changes.    I did discuss possible further workup such as with a regadenoson nuclear perfusion study.  Jasmyn reports at this time, with all her other issue ,she would rather defer and reports that even if she had an abnormal nuclear perfusion study she would be would be somewhat reticent on forging ahead with angiography.    Given all the aforementioned, will continue with conservative medical management in accordance with patient wishes.  Of note, I do see that patient is on aspirin in addition to apixaban (for history of thrombosis).    From a cardiac standpoint, do not feel that she needs to be on aspirin in addition to apixaban.    Jasmyn and family plan to discuss with Dr. Stewart in upcoming Hematology/Oncology follow-up.    2.  Hypertension.  If anything, blood pressure most recently has been trending somewhat on the lower side.  Plan to decrease lisinopril from 20 mg daily to 10 mg daily.    3.  Dyslipidemia.   Lipid profile 7 August 2023 revealed LDL 99 mg/dL and HDL 44 mg/dL.  Given evidence of coronary calcification, would advocate trying to fully suppress LDL less than 70 mg/dL if possible.  Increase atorvastatin from 20 mg daily to 40 mg daily.    4.  Sinus tachycardia.  Jasmyn has long documented history of baseline elevated heart rate.  This dates back at least as far as a visit note dated May 2020. Twelve-lead ECGs all suggests sinus tachycardia since 2021.  Echocardiogram 14 May 2024 revealed no evidence of structural heart disease.  As aforementioned, Jasmyn was recently seen by my colleague, Dr. Abdias Trujillo and her tachycardia was felt related to multiple underlying comorbid conditions/stressors.  I am in agreement with Dr. Trujillo and do not feel specific treatment or further workup is required in this regard.    35 minutes spent reviewing prior records (including documentation, laboratory studies, cardiac testing/imaging), interview with patient along with physical exam, planning, and subsequent documentation/crafting of note).           History of Present Illness    Once again I would like to thank you again for asking me to participate in the care of your patient, Jasmyn Green.  As you know, but to reiterate for my own records, Jasmyn Green is a 68 year old female recently seen by my colleague, Dr. Abdias Trujillo, on 14 May 2024.  At that time, she was seen for tachycardia as well as evidence of coronary disease identified on CT scan.  Dr. Trujillo felt  etiology of sinus tachycardia was felt related to multiple underlying comorbid conditions/stressors.  Continued medical therapy was advocated for her coronary disease identified by CT.    Jasmyn has a history of extensive small cell lung malignancy.  She is followed by Dr. Amador Stewart and was recently seen in the Oncology Clinic 17 June 2024 at which time documentation indicates that she was started on palliative chemotherapy with  "carboplatin, etoposide and atezolizumab.  She has documented severe anemia related to her chemotherapy (last hemoglobin 17 June 2024 was 8.3 g/dL).    Further review of systems is otherwise negative/noncontributory (medical record and 13 point review of systems reviewed as well and pertinent positives noted).         Cardiac Diagnostics      Echocardiogram 14 May 2024:  Technically difficult study.  Normal left ventricular size and systolic performance with ejection fraction of 60-65%.  No significant valvular heart disease.  Normal right ventricular size and systolic performance.  Normal atrial dimensions.    Twelve-lead ECG (personally reviewed) 12 May 2024: Sinus rhythm with heart rate of 130 bpm.    Twelve-lead ECG (personally reviewed) 11 May 2024: Sinus rhythm with heart rate of 121 bpm.    Twelve-lead ECG (personally reviewed) 8 February 2024: Sinus rhythm with heart rate of 131 bpm.    Twelve-lead ECG (personally reviewed) 27 June 2021: Sinus rhythm with heart rate of 119 bpm.    CT scan of chest 10 May 2024:  No acute pulmonary emboli.  Unchanged left upper lobe subpleural nodule, consolidation in the left upper lobe posteriorly, and a few other scattered nodular airspace opacities elsewhere.  Unchanged mildly enlarged mediastinal and left hilar lymph nodes.  Unchanged high-grade narrowing of the celiac artery proximally with noncalcified plaque or thrombus.  Severe coronary calcification.         Physical Examination       BP 92/40 (BP Location: Right arm, Patient Position: Sitting, Cuff Size: Adult Small)   Pulse 100   Resp 12   Ht 1.499 m (4' 11\")   Wt 43.8 kg (96 lb 9.6 oz)   LMP  (LMP Unknown)   BMI 19.51 kg/m          Wt Readings from Last 3 Encounters:   06/24/24 43.8 kg (96 lb 9.6 oz)   06/17/24 43.4 kg (95 lb 11.2 oz)   06/05/24 44 kg (97 lb)     The patient is alert and oriented times three. Sclerae are anicteric. Mucosal membranes are moist. Jugular venous pressure is reasonable. No " significant adenopathy/thyromegally appreciated. Lungs noticeably diminished throughout bilaterally. On cardiovascular exam, the patient has a regular S1 and S2. Abdomen is soft and non-tender. Extremities reveal no clubbing, cyanosis.       Family History/Social History/Risk Factors   Patient does not smoke.  Family history reviewed, and family history includes Chronic Obstructive Pulmonary Disease in her sister; Diabetes in her mother; Heart Disease in her father and mother; Lung Cancer in her mother.          Medical History  Surgical History Family History Social History   Past Medical History:   Diagnosis Date    Age-related osteoporosis without current pathological fracture     Arthritis     COPD (chronic obstructive pulmonary disease) (H)     Coronary artery disease     Dependence on nocturnal oxygen therapy     Dyspnea on exertion     Emphysema lung (H)     Gout     HLD (hyperlipidemia)     Hypertension     Lung mass      Past Surgical History:   Procedure Laterality Date    BRONCHOSCOPY RIGID OR FLEXIBLE W/TRANSENDOSCOPIC ENDOBRONCHIAL ULTRASOUND GUIDED N/A 2/16/2024    Procedure: BRONCHOSCOPY, WITH ENDOBRONCHIAL ULTRASOUND;  Surgeon: Ruben Levin MD;  [facility]    COLONOSCOPY N/A 10/21/2021    Procedure: COLONOSCOPY;  Surgeon: Wilma Hester DO;  Location: [facility] OR    IR CHEST PORT PLACEMENT > 5 YRS OF AGE  5/2/2024    VAGINAL DELIVERY      x 3 ; remote    WISDOM TOOTH EXTRACTION       Family History   Problem Relation Age of Onset    Chronic Obstructive Pulmonary Disease Sister     Diabetes Mother     Heart Disease Mother     Lung Cancer Mother     Heart Disease Father         Social History     Socioeconomic History    Marital status:      Spouse name: Not on file    Number of children: Not on file    Years of education: Not on file    Highest education level: Not on file   Occupational History    Not on file   Tobacco Use    Smoking status: Former     Current  packs/day: 0.00     Types: Cigarettes     Quit date: 2021     Years since quittin.9     Passive exposure: Past    Smokeless tobacco: Never    Tobacco comments:     updated 8/3/2021 by TTS   Vaping Use    Vaping status: Never Used   Substance and Sexual Activity    Alcohol use: Not Currently    Drug use: Never    Sexual activity: Not Currently   Other Topics Concern    Not on file   Social History Narrative     many years 3 grown children. 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 Last cigarette a week ago.non drinker  Her primary MD (Wilda) retired several years ago/ tends to avoid doctors/medical care in general       Social Determinants of Health     Financial Resource Strain: Low Risk  (2024)    Financial Resource Strain     Within the past 12 months, have you or your family members you live with been unable to get utilities (heat, electricity) when it was really needed?: No   Food Insecurity: Low Risk  (2024)    Food Insecurity     Within the past 12 months, did you worry that your food would run out before you got money to buy more?: No     Within the past 12 months, did the food you bought just not last and you didn t have money to get more?: No   Transportation Needs: Low Risk  (2024)    Transportation Needs     Within the past 12 months, has lack of transportation kept you from medical appointments, getting your medicines, non-medical meetings or appointments, work, or from getting things that you need?: No   Physical Activity: Sufficiently Active (11/10/2021)    Exercise Vital Sign     Days of Exercise per Week: 5 days     Minutes of Exercise per Session: 30 min   Stress: No Stress Concern Present (11/10/2021)    Citizen of [place] of Occupational Health - Occupational Stress Questionnaire     Feeling of Stress : Not at all   Social Connections: Socially Isolated (11/10/2021)    Social Connection and Isolation Panel [NHANES]     Frequency of Communication with Friends and  Family: More than three times a week     Frequency of Social Gatherings with Friends and Family: Once a week     Attends Restorationism Services: Never     Active Member of Clubs or Organizations: No     Attends Club or Organization Meetings: Not on file     Marital Status:    Interpersonal Safety: Low Risk  (2/1/2024)    Interpersonal Safety     Do you feel physically and emotionally safe where you currently live?: Yes     Within the past 12 months, have you been hit, slapped, kicked or otherwise physically hurt by someone?: No     Within the past 12 months, have you been humiliated or emotionally abused in other ways by your partner or ex-partner?: No   Housing Stability: Low Risk  (2/1/2024)    Housing Stability     Do you have housing? : Yes     Are you worried about losing your housing?: No           Medications  Allergies   Current Outpatient Medications   Medication Sig Dispense Refill    acetaminophen (TYLENOL) 500 MG tablet Take 2 tablets (1,000 mg) by mouth every 8 hours as needed for pain      albuterol (PROAIR HFA/PROVENTIL HFA/VENTOLIN HFA) 108 (90 Base) MCG/ACT inhaler Inhale 1-2 puffs into the lungs every 6 hours as needed for shortness of breath, wheezing or cough      apixaban ANTICOAGULANT (ELIQUIS) 5 MG tablet Take 1 tablet (5 mg) by mouth 2 times daily 60 tablet 0    aspirin (ASA) 325 MG EC tablet Take 325 mg by mouth daily      atorvastatin (LIPITOR) 20 MG tablet Take 1 tablet (20 mg) by mouth daily 90 tablet 3    calcium carbonate 600 mg-vitamin D 400 units (CALTRATE) 600-400 MG-UNIT per tablet Take 1 tablet by mouth 2 times daily With calcium      diazepam (VALIUM) 5 MG tablet Take 1 tablet (5 mg) by mouth every 6 hours as needed for anxiety 2 tablet 2    fexofenadine (ALLEGRA ALLERGY) 60 MG tablet Take 60 mg by mouth 2 times daily      fluticasone-salmeterol (AIRDUO RESPICLICK) 232-14 MCG/ACT inhaler Inhale 1 puff into the lungs 2 times daily 1 each 11    hydrOXYzine HCl (ATARAX) 25 MG  tablet Take 1-2 tablets (25-50 mg) by mouth every 8 hours as needed for anxiety (Insomnia) 30 tablet 3    ipratropium - albuterol 0.5 mg/2.5 mg/3 mL (DUONEB) 0.5-2.5 (3) MG/3ML neb solution Take 1 vial (3 mLs) by nebulization every 6 hours as needed for shortness of breath, wheezing or cough 180 mL 11    ipratropium-albuterol (COMBIVENT RESPIMAT)  MCG/ACT inhaler Inhale 1 puff into the lungs 4 times daily 4 g 11    lactobacillus rhamnosus, GG, (CULTURELL) capsule Take 1 capsule by mouth 2 times daily      lidocaine-prilocaine (EMLA) 2.5-2.5 % external cream Apply topically as needed for moderate pain 30 g 5    lisinopril (ZESTRIL) 20 MG tablet Take 1 tablet (20 mg) by mouth daily 60 tablet 3    mirtazapine (REMERON) 7.5 MG tablet Take 1 tablet (7.5 mg) by mouth at bedtime 90 tablet 3    naloxone (NARCAN) 4 MG/0.1ML nasal spray Spray 1 spray (4 mg) into one nostril alternating nostrils as needed for opioid reversal every 2-3 minutes until assistance arrives 0.2 mL 1    omeprazole (PRILOSEC) 10 MG DR capsule Take 10 mg by mouth daily      ondansetron (ZOFRAN ODT) 8 MG ODT tab Take 1 tablet (8 mg) by mouth every 8 hours as needed for nausea 30 tablet 1    oxyCODONE (ROXICODONE) 5 MG tablet Take 0.5-1 tablets (2.5-5 mg) by mouth every 4 hours as needed for pain or moderate to severe pain (Try one-half tab first to see response) 40 tablet 0    OXYGEN-HELIUM IN Inhale 3-4 L into the lungs continuous prn      prochlorperazine (COMPAZINE) 10 MG tablet Take 1 tablet (10 mg) by mouth every 6 hours as needed for nausea or vomiting 30 tablet 2    tiotropium (SPIRIVA RESPIMAT) 2.5 MCG/ACT inhaler Inhale 2 puffs into the lungs daily 4 g 11     No Known Allergies       Lab Results    Chemistry/lipid CBC Cardiac Enzymes/BNP/TSH/INR   Recent Labs   Lab Test 08/07/23  0815   CHOL 163   HDL 44*   LDL 99   TRIG 99     Recent Labs   Lab Test 08/07/23  0815 09/21/21  0952 08/09/21  0742   LDL 99 82 157*     Recent Labs   Lab Test  "06/17/24  0858      POTASSIUM 5.9*   CHLORIDE 109*   CO2 22   *   BUN 40.3*   CR 1.26*   GFRESTIMATED 46*   FLOR 9.4     Recent Labs   Lab Test 06/17/24  0858 05/28/24  0810 05/17/24  0602   CR 1.26* 0.85 0.80     No results for input(s): \"A1C\" in the last 21089 hours.       Recent Labs   Lab Test 06/17/24  0858   WBC 12.0*   HGB 8.3*   HCT 27.3*   MCV 97        Recent Labs   Lab Test 06/17/24  0858 05/28/24  0810 05/12/24  2102   HGB 8.3* 8.1* 8.9*    Recent Labs   Lab Test 06/27/21  1301   TROPONINI 0.03     Recent Labs   Lab Test 05/12/24  2102 05/10/24  1850 02/12/24  1340 02/08/24  0910 06/27/21  1302   BNP  --   --   --   --  127*   NTBNPI  --  7,702* 2,768* 774  --    NTBNP 1,303*  --   --   --   --      Recent Labs   Lab Test 06/17/24  0858   TSH 2.49     Recent Labs   Lab Test 05/10/24  1850 06/27/21  1301   INR 1.00 1.32*          Medications  Allergies   Current Outpatient Medications   Medication Sig Dispense Refill    acetaminophen (TYLENOL) 500 MG tablet Take 2 tablets (1,000 mg) by mouth every 8 hours as needed for pain      albuterol (PROAIR HFA/PROVENTIL HFA/VENTOLIN HFA) 108 (90 Base) MCG/ACT inhaler Inhale 1-2 puffs into the lungs every 6 hours as needed for shortness of breath, wheezing or cough      apixaban ANTICOAGULANT (ELIQUIS) 5 MG tablet Take 1 tablet (5 mg) by mouth 2 times daily 60 tablet 0    aspirin (ASA) 325 MG EC tablet Take 325 mg by mouth daily      atorvastatin (LIPITOR) 20 MG tablet Take 1 tablet (20 mg) by mouth daily 90 tablet 3    calcium carbonate 600 mg-vitamin D 400 units (CALTRATE) 600-400 MG-UNIT per tablet Take 1 tablet by mouth 2 times daily With calcium      diazepam (VALIUM) 5 MG tablet Take 1 tablet (5 mg) by mouth every 6 hours as needed for anxiety 2 tablet 2    fexofenadine (ALLEGRA ALLERGY) 60 MG tablet Take 60 mg by mouth 2 times daily      fluticasone-salmeterol (AIRDUO RESPICLICK) 232-14 MCG/ACT inhaler Inhale 1 puff into the lungs 2 times " daily 1 each 11    hydrOXYzine HCl (ATARAX) 25 MG tablet Take 1-2 tablets (25-50 mg) by mouth every 8 hours as needed for anxiety (Insomnia) 30 tablet 3    ipratropium - albuterol 0.5 mg/2.5 mg/3 mL (DUONEB) 0.5-2.5 (3) MG/3ML neb solution Take 1 vial (3 mLs) by nebulization every 6 hours as needed for shortness of breath, wheezing or cough 180 mL 11    ipratropium-albuterol (COMBIVENT RESPIMAT)  MCG/ACT inhaler Inhale 1 puff into the lungs 4 times daily 4 g 11    lactobacillus rhamnosus, GG, (CULTURELL) capsule Take 1 capsule by mouth 2 times daily      lidocaine-prilocaine (EMLA) 2.5-2.5 % external cream Apply topically as needed for moderate pain 30 g 5    lisinopril (ZESTRIL) 20 MG tablet Take 1 tablet (20 mg) by mouth daily 60 tablet 3    mirtazapine (REMERON) 7.5 MG tablet Take 1 tablet (7.5 mg) by mouth at bedtime 90 tablet 3    naloxone (NARCAN) 4 MG/0.1ML nasal spray Spray 1 spray (4 mg) into one nostril alternating nostrils as needed for opioid reversal every 2-3 minutes until assistance arrives 0.2 mL 1    omeprazole (PRILOSEC) 10 MG DR capsule Take 10 mg by mouth daily      ondansetron (ZOFRAN ODT) 8 MG ODT tab Take 1 tablet (8 mg) by mouth every 8 hours as needed for nausea 30 tablet 1    oxyCODONE (ROXICODONE) 5 MG tablet Take 0.5-1 tablets (2.5-5 mg) by mouth every 4 hours as needed for pain or moderate to severe pain (Try one-half tab first to see response) 40 tablet 0    OXYGEN-HELIUM IN Inhale 3-4 L into the lungs continuous prn      prochlorperazine (COMPAZINE) 10 MG tablet Take 1 tablet (10 mg) by mouth every 6 hours as needed for nausea or vomiting 30 tablet 2    tiotropium (SPIRIVA RESPIMAT) 2.5 MCG/ACT inhaler Inhale 2 puffs into the lungs daily 4 g 11      No Known Allergies       Lab Results   Lab Results   Component Value Date     06/17/2024    CO2 22 06/17/2024    CO2 23 10/18/2021    BUN 40.3 06/17/2024    BUN 19 10/18/2021     Lab Results   Component Value Date    WBC 12.0  06/17/2024    HGB 8.3 06/17/2024    HCT 27.3 06/17/2024    MCV 97 06/17/2024     06/17/2024     Lab Results   Component Value Date    CHOL 163 08/07/2023    TRIG 99 08/07/2023    HDL 44 08/07/2023     Lab Results   Component Value Date    INR 1.00 05/10/2024     Lab Results   Component Value Date     06/27/2021     Lab Results   Component Value Date    TROPONINI 0.03 06/27/2021     Lab Results   Component Value Date    TSH 2.49 06/17/2024    TSH 1.06 09/21/2021                      Thank you for allowing me to participate in the care of your patient.      Sincerely,     Naheed Mulligan MD     [facility]  cc:   Jean Marie Calle, CNP  [id number]  [place] [address]

## 2024-06-24 NOTE — PROGRESS NOTES
[facility]   [address], [place] [address]   [email or web address]   OFFICE: [phone]          Thank you Dr. Calle for asking the [facility] team to participate in the care of your patient, Jasmyn Green.     Impression and Plan     1.  Coronary artery disease.  Jasmyn has evidence of coronary artery disease by virtue of CT scan of chest 10 May 2024 revealing evidence of severe coronary calcification.  As noted below, Jasmyn was recently seen by my colleague, Dr. Abdias Trujillo, on 14 May 2024.  Continued medical therapy was advocated for her coronary disease identified by CT. on interview she denies any chest pain symptoms.  Parenthetically, ECG 12 May 2024 despite heart rate of 130 bpm revealed no ischemic ST/T wave changes.    I did discuss possible further workup such as with a regadenoson nuclear perfusion study.  Jasmyn reports at this time, with all her other issue ,she would rather defer and reports that even if she had an abnormal nuclear perfusion study she would be would be somewhat reticent on forging ahead with angiography.    Given all the aforementioned, will continue with conservative medical management in accordance with patient wishes.  Of note, I do see that patient is on aspirin in addition to apixaban (for history of thrombosis).    From a cardiac standpoint, do not feel that she needs to be on aspirin in addition to apixaban.    Jasmyn and family plan to discuss with Dr. Stewart in upcoming Hematology/Oncology follow-up.    2.  Hypertension.  If anything, blood pressure most recently has been trending somewhat on the lower side.  Plan to decrease lisinopril from 20 mg daily to 10 mg daily.    3.  Dyslipidemia.  Lipid profile 7 August 2023 revealed LDL 99 mg/dL and HDL 44 mg/dL.  Given evidence of coronary calcification, would advocate trying to fully suppress LDL less than 70 mg/dL if possible.  Increase atorvastatin  from 20 mg daily to 40 mg daily.    4.  Sinus tachycardia.  Jasmyn has long documented history of baseline elevated heart rate.  This dates back at least as far as a visit note dated May 2020. Twelve-lead ECGs all suggests sinus tachycardia since 2021.  Echocardiogram 14 May 2024 revealed no evidence of structural heart disease.  As aforementioned, Jasmyn was recently seen by my colleague, Dr. Abdias Trujillo and her tachycardia was felt related to multiple underlying comorbid conditions/stressors.  I am in agreement with Dr. Trujillo and do not feel specific treatment or further workup is required in this regard.    35 minutes spent reviewing prior records (including documentation, laboratory studies, cardiac testing/imaging), interview with patient along with physical exam, planning, and subsequent documentation/crafting of note).           History of Present Illness    Once again I would like to thank you again for asking me to participate in the care of your patient, Jasmyn Green.  As you know, but to reiterate for my own records, Jasmyn Green is a 68 year old female recently seen by my colleague, Dr. Abdias Trujillo, on 14 May 2024.  At that time, she was seen for tachycardia as well as evidence of coronary disease identified on CT scan.  Dr. Trujillo felt  etiology of sinus tachycardia was felt related to multiple underlying comorbid conditions/stressors.  Continued medical therapy was advocated for her coronary disease identified by CT.    Jasmyn has a history of extensive small cell lung malignancy.  She is followed by Dr. Amador Stewart and was recently seen in the Oncology Clinic 17 June 2024 at which time documentation indicates that she was started on palliative chemotherapy with carboplatin, etoposide and atezolizumab.  She has documented severe anemia related to her chemotherapy (last hemoglobin 17 June 2024 was 8.3 g/dL).    Further review of systems is otherwise negative/noncontributory  "(medical record and 13 point review of systems reviewed as well and pertinent positives noted).         Cardiac Diagnostics      Echocardiogram 14 May 2024:  Technically difficult study.  Normal left ventricular size and systolic performance with ejection fraction of 60-65%.  No significant valvular heart disease.  Normal right ventricular size and systolic performance.  Normal atrial dimensions.    Twelve-lead ECG (personally reviewed) 12 May 2024: Sinus rhythm with heart rate of 130 bpm.    Twelve-lead ECG (personally reviewed) 11 May 2024: Sinus rhythm with heart rate of 121 bpm.    Twelve-lead ECG (personally reviewed) 8 February 2024: Sinus rhythm with heart rate of 131 bpm.    Twelve-lead ECG (personally reviewed) 27 June 2021: Sinus rhythm with heart rate of 119 bpm.    CT scan of chest 10 May 2024:  No acute pulmonary emboli.  Unchanged left upper lobe subpleural nodule, consolidation in the left upper lobe posteriorly, and a few other scattered nodular airspace opacities elsewhere.  Unchanged mildly enlarged mediastinal and left hilar lymph nodes.  Unchanged high-grade narrowing of the celiac artery proximally with noncalcified plaque or thrombus.  Severe coronary calcification.         Physical Examination       BP 92/40 (BP Location: Right arm, Patient Position: Sitting, Cuff Size: Adult Small)   Pulse 100   Resp 12   Ht 1.499 m (4' 11\")   Wt 43.8 kg (96 lb 9.6 oz)   LMP  (LMP Unknown)   BMI 19.51 kg/m          Wt Readings from Last 3 Encounters:   06/24/24 43.8 kg (96 lb 9.6 oz)   06/17/24 43.4 kg (95 lb 11.2 oz)   06/05/24 44 kg (97 lb)     The patient is alert and oriented times three. Sclerae are anicteric. Mucosal membranes are moist. Jugular venous pressure is reasonable. No significant adenopathy/thyromegally appreciated. Lungs noticeably diminished throughout bilaterally. On cardiovascular exam, the patient has a regular S1 and S2. Abdomen is soft and non-tender. Extremities reveal no " clubbing, cyanosis.       Family History/Social History/Risk Factors   Patient does not smoke.  Family history reviewed, and family history includes Chronic Obstructive Pulmonary Disease in her sister; Diabetes in her mother; Heart Disease in her father and mother; Lung Cancer in her mother.          Medical History  Surgical History Family History Social History   Past Medical History:   Diagnosis Date     Age-related osteoporosis without current pathological fracture      Arthritis      COPD (chronic obstructive pulmonary disease) (H)      Coronary artery disease      Dependence on nocturnal oxygen therapy      Dyspnea on exertion      Emphysema lung (H)      Gout      HLD (hyperlipidemia)      Hypertension      Lung mass      Past Surgical History:   Procedure Laterality Date     BRONCHOSCOPY RIGID OR FLEXIBLE W/TRANSENDOSCOPIC ENDOBRONCHIAL ULTRASOUND GUIDED N/A 2024    Procedure: BRONCHOSCOPY, WITH ENDOBRONCHIAL ULTRASOUND;  Surgeon: Ruben Levin MD;  Location: [facility] OR     COLONOSCOPY N/A 10/21/2021    Procedure: COLONOSCOPY;  Surgeon: Wilma Hester DO;  Location: [facility]     IR CHEST PORT PLACEMENT > 5 YRS OF AGE  2024     VAGINAL DELIVERY      x 3 ; remote     WISDOM TOOTH EXTRACTION       Family History   Problem Relation Age of Onset     Chronic Obstructive Pulmonary Disease Sister      Diabetes Mother      Heart Disease Mother      Lung Cancer Mother      Heart Disease Father         Social History     Socioeconomic History     Marital status:      Spouse name: Not on file     Number of children: Not on file     Years of education: Not on file     Highest education level: Not on file   Occupational History     Not on file   Tobacco Use     Smoking status: Former     Current packs/day: 0.00     Types: Cigarettes     Quit date: 2021     Years since quittin.9     Passive exposure: Past     Smokeless tobacco: Never     Tobacco comments:     updated 8/3/2021  by TTS   Vaping Use     Vaping status: Never Used   Substance and Sexual Activity     Alcohol use: Not Currently     Drug use: Never     Sexual activity: Not Currently   Other Topics Concern     Not on file   Social History Narrative     many years 3 grown children. 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 Last cigarette a week ago.non drinker  Her primary MD (Wilda) retired several years ago/ tends to avoid doctors/medical care in general       Social Determinants of Health     Financial Resource Strain: Low Risk  (2/1/2024)    Financial Resource Strain      Within the past 12 months, have you or your family members you live with been unable to get utilities (heat, electricity) when it was really needed?: No   Food Insecurity: Low Risk  (2/1/2024)    Food Insecurity      Within the past 12 months, did you worry that your food would run out before you got money to buy more?: No      Within the past 12 months, did the food you bought just not last and you didn t have money to get more?: No   Transportation Needs: Low Risk  (2/1/2024)    Transportation Needs      Within the past 12 months, has lack of transportation kept you from medical appointments, getting your medicines, non-medical meetings or appointments, work, or from getting things that you need?: No   Physical Activity: Sufficiently Active (11/10/2021)    Exercise Vital Sign      Days of Exercise per Week: 5 days      Minutes of Exercise per Session: 30 min   Stress: No Stress Concern Present (11/10/2021)    [employer] [place] [employer] - Occupational Stress Questionnaire      Feeling of Stress : Not at all   Social Connections: Socially Isolated (11/10/2021)    Social Connection and Isolation Panel [NHANES]      Frequency of Communication with Friends and Family: More than three times a week      Frequency of Social Gatherings with Friends and Family: Once a week      Attends Taoist Services: Never      Active Member of Clubs or  Organizations: No      Attends Club or Organization Meetings: Not on file      Marital Status:    Interpersonal Safety: Low Risk  (2/1/2024)    Interpersonal Safety      Do you feel physically and emotionally safe where you currently live?: Yes      Within the past 12 months, have you been hit, slapped, kicked or otherwise physically hurt by someone?: No      Within the past 12 months, have you been humiliated or emotionally abused in other ways by your partner or ex-partner?: No   Housing Stability: Low Risk  (2/1/2024)    Housing Stability      Do you have housing? : Yes      Are you worried about losing your housing?: No           Medications  Allergies   Current Outpatient Medications   Medication Sig Dispense Refill     acetaminophen (TYLENOL) 500 MG tablet Take 2 tablets (1,000 mg) by mouth every 8 hours as needed for pain       albuterol (PROAIR HFA/PROVENTIL HFA/VENTOLIN HFA) 108 (90 Base) MCG/ACT inhaler Inhale 1-2 puffs into the lungs every 6 hours as needed for shortness of breath, wheezing or cough       apixaban ANTICOAGULANT (ELIQUIS) 5 MG tablet Take 1 tablet (5 mg) by mouth 2 times daily 60 tablet 0     aspirin (ASA) 325 MG EC tablet Take 325 mg by mouth daily       atorvastatin (LIPITOR) 20 MG tablet Take 1 tablet (20 mg) by mouth daily 90 tablet 3     calcium carbonate 600 mg-vitamin D 400 units (CALTRATE) 600-400 MG-UNIT per tablet Take 1 tablet by mouth 2 times daily With calcium       diazepam (VALIUM) 5 MG tablet Take 1 tablet (5 mg) by mouth every 6 hours as needed for anxiety 2 tablet 2     fexofenadine (ALLEGRA ALLERGY) 60 MG tablet Take 60 mg by mouth 2 times daily       fluticasone-salmeterol (AIRDUO RESPICLICK) 232-14 MCG/ACT inhaler Inhale 1 puff into the lungs 2 times daily 1 each 11     hydrOXYzine HCl (ATARAX) 25 MG tablet Take 1-2 tablets (25-50 mg) by mouth every 8 hours as needed for anxiety (Insomnia) 30 tablet 3     ipratropium - albuterol 0.5 mg/2.5 mg/3 mL (DUONEB)  0.5-2.5 (3) MG/3ML neb solution Take 1 vial (3 mLs) by nebulization every 6 hours as needed for shortness of breath, wheezing or cough 180 mL 11     ipratropium-albuterol (COMBIVENT RESPIMAT)  MCG/ACT inhaler Inhale 1 puff into the lungs 4 times daily 4 g 11     lactobacillus rhamnosus, GG, (CULTURELL) capsule Take 1 capsule by mouth 2 times daily       lidocaine-prilocaine (EMLA) 2.5-2.5 % external cream Apply topically as needed for moderate pain 30 g 5     lisinopril (ZESTRIL) 20 MG tablet Take 1 tablet (20 mg) by mouth daily 60 tablet 3     mirtazapine (REMERON) 7.5 MG tablet Take 1 tablet (7.5 mg) by mouth at bedtime 90 tablet 3     naloxone (NARCAN) 4 MG/0.1ML nasal spray Spray 1 spray (4 mg) into one nostril alternating nostrils as needed for opioid reversal every 2-3 minutes until assistance arrives 0.2 mL 1     omeprazole (PRILOSEC) 10 MG DR capsule Take 10 mg by mouth daily       ondansetron (ZOFRAN ODT) 8 MG ODT tab Take 1 tablet (8 mg) by mouth every 8 hours as needed for nausea 30 tablet 1     oxyCODONE (ROXICODONE) 5 MG tablet Take 0.5-1 tablets (2.5-5 mg) by mouth every 4 hours as needed for pain or moderate to severe pain (Try one-half tab first to see response) 40 tablet 0     OXYGEN-HELIUM IN Inhale 3-4 L into the lungs continuous prn       prochlorperazine (COMPAZINE) 10 MG tablet Take 1 tablet (10 mg) by mouth every 6 hours as needed for nausea or vomiting 30 tablet 2     tiotropium (SPIRIVA RESPIMAT) 2.5 MCG/ACT inhaler Inhale 2 puffs into the lungs daily 4 g 11     No Known Allergies       Lab Results    Chemistry/lipid CBC Cardiac Enzymes/BNP/TSH/INR   Recent Labs   Lab Test 08/07/23  0815   CHOL 163   HDL 44*   LDL 99   TRIG 99     Recent Labs   Lab Test 08/07/23  0815 09/21/21  0952 08/09/21  0742   LDL 99 82 157*     Recent Labs   Lab Test 06/17/24  0858      POTASSIUM 5.9*   CHLORIDE 109*   CO2 22   *   BUN 40.3*   CR 1.26*   GFRESTIMATED 46*   FLOR 9.4     Recent Labs  "  Lab Test 06/17/24  0858 05/28/24  0810 05/17/24  0602   CR 1.26* 0.85 0.80     No results for input(s): \"A1C\" in the last 45982 hours.       Recent Labs   Lab Test 06/17/24  0858   WBC 12.0*   HGB 8.3*   HCT 27.3*   MCV 97        Recent Labs   Lab Test 06/17/24  0858 05/28/24  0810 05/12/24  2102   HGB 8.3* 8.1* 8.9*    Recent Labs   Lab Test 06/27/21  1301   TROPONINI 0.03     Recent Labs   Lab Test 05/12/24  2102 05/10/24  1850 02/12/24  1340 02/08/24  0910 06/27/21  1302   BNP  --   --   --   --  127*   NTBNPI  --  7,702* 2,768* 774  --    NTBNP 1,303*  --   --   --   --      Recent Labs   Lab Test 06/17/24  0858   TSH 2.49     Recent Labs   Lab Test 05/10/24  1850 06/27/21  1301   INR 1.00 1.32*          Medications  Allergies   Current Outpatient Medications   Medication Sig Dispense Refill     acetaminophen (TYLENOL) 500 MG tablet Take 2 tablets (1,000 mg) by mouth every 8 hours as needed for pain       albuterol (PROAIR HFA/PROVENTIL HFA/VENTOLIN HFA) 108 (90 Base) MCG/ACT inhaler Inhale 1-2 puffs into the lungs every 6 hours as needed for shortness of breath, wheezing or cough       apixaban ANTICOAGULANT (ELIQUIS) 5 MG tablet Take 1 tablet (5 mg) by mouth 2 times daily 60 tablet 0     aspirin (ASA) 325 MG EC tablet Take 325 mg by mouth daily       atorvastatin (LIPITOR) 20 MG tablet Take 1 tablet (20 mg) by mouth daily 90 tablet 3     calcium carbonate 600 mg-vitamin D 400 units (CALTRATE) 600-400 MG-UNIT per tablet Take 1 tablet by mouth 2 times daily With calcium       diazepam (VALIUM) 5 MG tablet Take 1 tablet (5 mg) by mouth every 6 hours as needed for anxiety 2 tablet 2     fexofenadine (ALLEGRA ALLERGY) 60 MG tablet Take 60 mg by mouth 2 times daily       fluticasone-salmeterol (AIRDUO RESPICLICK) 232-14 MCG/ACT inhaler Inhale 1 puff into the lungs 2 times daily 1 each 11     hydrOXYzine HCl (ATARAX) 25 MG tablet Take 1-2 tablets (25-50 mg) by mouth every 8 hours as needed for anxiety " (Insomnia) 30 tablet 3     ipratropium - albuterol 0.5 mg/2.5 mg/3 mL (DUONEB) 0.5-2.5 (3) MG/3ML neb solution Take 1 vial (3 mLs) by nebulization every 6 hours as needed for shortness of breath, wheezing or cough 180 mL 11     ipratropium-albuterol (COMBIVENT RESPIMAT)  MCG/ACT inhaler Inhale 1 puff into the lungs 4 times daily 4 g 11     lactobacillus rhamnosus, GG, (CULTURELL) capsule Take 1 capsule by mouth 2 times daily       lidocaine-prilocaine (EMLA) 2.5-2.5 % external cream Apply topically as needed for moderate pain 30 g 5     lisinopril (ZESTRIL) 20 MG tablet Take 1 tablet (20 mg) by mouth daily 60 tablet 3     mirtazapine (REMERON) 7.5 MG tablet Take 1 tablet (7.5 mg) by mouth at bedtime 90 tablet 3     naloxone (NARCAN) 4 MG/0.1ML nasal spray Spray 1 spray (4 mg) into one nostril alternating nostrils as needed for opioid reversal every 2-3 minutes until assistance arrives 0.2 mL 1     omeprazole (PRILOSEC) 10 MG DR capsule Take 10 mg by mouth daily       ondansetron (ZOFRAN ODT) 8 MG ODT tab Take 1 tablet (8 mg) by mouth every 8 hours as needed for nausea 30 tablet 1     oxyCODONE (ROXICODONE) 5 MG tablet Take 0.5-1 tablets (2.5-5 mg) by mouth every 4 hours as needed for pain or moderate to severe pain (Try one-half tab first to see response) 40 tablet 0     OXYGEN-HELIUM IN Inhale 3-4 L into the lungs continuous prn       prochlorperazine (COMPAZINE) 10 MG tablet Take 1 tablet (10 mg) by mouth every 6 hours as needed for nausea or vomiting 30 tablet 2     tiotropium (SPIRIVA RESPIMAT) 2.5 MCG/ACT inhaler Inhale 2 puffs into the lungs daily 4 g 11      No Known Allergies       Lab Results   Lab Results   Component Value Date     06/17/2024    CO2 22 06/17/2024    CO2 23 10/18/2021    BUN 40.3 06/17/2024    BUN 19 10/18/2021     Lab Results   Component Value Date    WBC 12.0 06/17/2024    HGB 8.3 06/17/2024    HCT 27.3 06/17/2024    MCV 97 06/17/2024     06/17/2024     Lab Results    Component Value Date    CHOL 163 08/07/2023    TRIG 99 08/07/2023    HDL 44 08/07/2023     Lab Results   Component Value Date    INR 1.00 05/10/2024     Lab Results   Component Value Date     06/27/2021     Lab Results   Component Value Date    TROPONINI 0.03 06/27/2021     Lab Results   Component Value Date    TSH 2.49 06/17/2024    TSH 1.06 09/21/2021

## 2024-06-25 ENCOUNTER — MYC MEDICAL ADVICE (OUTPATIENT)
Dept: PULMONOLOGY | Facility: CLINIC | Age: 69
End: 2024-06-25
Payer: COMMERCIAL

## 2024-06-25 DIAGNOSIS — J44.9 COPD, SEVERE (H): Primary | ICD-10-CM

## 2024-06-26 RX ORDER — PREDNISONE 20 MG/1
TABLET ORAL
Qty: 10 TABLET | Refills: 0 | Status: SHIPPED | OUTPATIENT
Start: 2024-06-26 | End: 2024-06-26

## 2024-06-27 RX ORDER — PREDNISONE 20 MG/1
TABLET ORAL
Qty: 10 TABLET | Refills: 0 | Status: SHIPPED | OUTPATIENT
Start: 2024-06-27

## 2024-06-27 RX ORDER — DOXYCYCLINE 100 MG/1
100 CAPSULE ORAL 2 TIMES DAILY
Qty: 10 CAPSULE | Refills: 0 | Status: SHIPPED | OUTPATIENT
Start: 2024-06-27 | End: 2024-07-02

## 2024-07-01 ENCOUNTER — VIRTUAL VISIT (OUTPATIENT)
Dept: PALLIATIVE MEDICINE | Facility: CLINIC | Age: 69
End: 2024-07-01
Payer: COMMERCIAL

## 2024-07-01 DIAGNOSIS — C34.90 MALIGNANT NEOPLASM OF LUNG, UNSPECIFIED LATERALITY, UNSPECIFIED PART OF LUNG (H): ICD-10-CM

## 2024-07-01 DIAGNOSIS — J44.9 COPD, GROUP D, BY GOLD 2017 CLASSIFICATION (H): ICD-10-CM

## 2024-07-01 PROCEDURE — 99215 OFFICE O/P EST HI 40 MIN: CPT | Mod: 95 | Performed by: NURSE PRACTITIONER

## 2024-07-01 NOTE — PROGRESS NOTES
Virtual Visit Details    Type of service:  Video Visit   Originating Location (pt. Location): Home    Distant Location (provider location):  On-site  Platform used for Video Visit: [facility]        Palliative Care Outpatient Clinic      Patient ID/Chief Complaint: Jasmyn Green 68 year old female who is presenting to the palliative medicine clinic today at the request of Tata Lainez NP for a palliative care follow-up secondary to symptom management/goals of care.   The patient's primary care provider is:  Miesha Fernando.       Impression & Recommendations:  68-year-old female with new diagnosis of extensive stage small cell lung cancer (including possible small solitary brain metastasis) on palliative chemoimmunotherapy with plans for possible radiation, severe COPD on chronic supplemental oxygen (3-5 LPM depending on activity level), history of tobacco use that is now stopped, very remote history of heavier alcohol use that stopped decades ago, CAD, HTN, osteoporosis, history of gout, and problems with anxiety.    Social history includes having 3 children Georgette, Britni, and Braden, lives with daughter Georgette, , good family support.    Recently admitted to hospital with COPD exacerbation.  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  Appetite and abdominal pain have improved.    Continues on  immunotherapy due to health status.  Upcoming PET scan        Symptoms/recommendations/discussion:  Chronic dyspnea related to advanced COPD worsened by recent diagnosis of lung cancer with accompanying worsening of anxiety.  Recently admitted to hospital for COPD exacerbation.  Reports positive response to low-dose oxycodone IR 2.5 mg maybe 2-3 times per day.  She did not tolerate Butrans patch due to nausea and vomiting.  Continue low-dose oxycodone IR.  Anxiety: Improved with recent addition of Remeron 7.5 mg (15 mg was too sedating) and  Atarax/hydroxyzine 25 mg as needed.  Decreased appetite with weight loss: Appetite is improved significantly since treatment of celiac artery thrombosis.  Medical cannabis--- certified but not currently using as her appetite has improved  New onset LT flank pain, pain has resolved with treatment of celiac artery thrombosis  Narcan nasal spray sent for safety reasons  She has already completed a healthcare directive expressing her wishes. Georgette is her primary healthcare agent.  POLST in EMR.  No CPR and DO NOT INTUBATE.   Palliative care follow-up in approximately 6 weeks        How to get a hold of us:  For non-urgent matters, MyChart works best.    For more urgent matters, or if you prefer not to use MyChart, call our clinic nurse coordinator Hailee Paredes RN at [phone] or [phone]    We have an on-call number for evenings and weekends. Please call this only if you are having uncontrolled symptoms or serious side effects from your medicines: [phone].     For refills, please give us a week (5 working days) notice. We don't always have providers available everyday to do refills. If you call the day you run out of your medicine, we may not be able to refill it in time, so call 5 days in advance        History:  History gathered today from: patient, family/loved ones, medical chart, health care directive/s      PE: LMP  (LMP Unknown)    Wt Readings from Last 3 Encounters:   06/24/24 43.8 kg (96 lb 9.6 oz)   06/17/24 43.4 kg (95 lb 11.2 oz)   06/05/24 44 kg (97 lb)       Gen tachypneic, no apparent distress, alert  Head NCAT.  Eyes anicteric without injection  Face symmetric, eyes conjugate  Lungs tachypneic, no cough, speaking full sentences nasal cannula oxygen  Skin no rashes or lesions evident on face/neck  Neuro Face symmetric, eyes conjugate; speech fluent.  Neuropsych exam normal including affect, sensorium, gross memory, thought processes, and fund of knowledge.         Data reviewed:  I reviewed  electrolytes, BUN/creatinine, liver profile, hemoglobin and hematocrit, platelet count, and most recent imaging  Oncology notes     database reviewed: 7/1        40 minutes spent on the date of the encounter doing chart review, history and exam, patient education & counseling, documentation and other activities as noted above.        Thank you for involving us in the patient's care.   JIMENEZ Huggins, [facility] Palliative Care Service

## 2024-07-01 NOTE — PATIENT INSTRUCTIONS
Thank you for meeting with us in the [facility] Palliative Care Clinic.    How to get a hold of us:  For non-urgent matters, MyChart works best.    For more urgent matters, or if you prefer not to use MyChart, call our clinic nurse coordinator Hailee Paredes RN at [phone] or [phone]    We have an on-call number for evenings and weekends. Please call this only if you are having uncontrolled symptoms or serious side effects from your medicines: [phone].     For refills, please give us a week (5 working days) notice. We don't always have providers available everyday to do refills. If you call the day you run out of your medicine, we may not be able to refill it in time, so call 5 days in advance!

## 2024-07-01 NOTE — NURSING NOTE
Is the patient currently in the state of [place]? YES    Visit mode:VIDEO    If the visit is dropped, the patient can be reconnected by: VIDEO VISIT: Text to cell phone:   Telephone Information:   Mobile [phone]       Will anyone else be joining the visit? NO  (If patient encounters technical issues they should call [phone])    How would you like to obtain your AVS? MyChart    Are changes needed to the allergy or medication list? Yes Per pt's daughter pt's Lisinopril is now 10mg and pt's Atorvastatin is now 40mg.    Are refills needed on medications prescribed by this physician? NO    Reason for visit: RECHECK    Babak SHETTY

## 2024-07-03 ENCOUNTER — HOSPITAL ENCOUNTER (OUTPATIENT)
Dept: PET IMAGING | Facility: HOSPITAL | Age: 69
Discharge: HOME | End: 2024-07-03
Attending: INTERNAL MEDICINE | Admitting: INTERNAL MEDICINE
Payer: COMMERCIAL

## 2024-07-03 ENCOUNTER — TELEPHONE (OUTPATIENT)
Dept: PULMONOLOGY | Facility: CLINIC | Age: 69
End: 2024-07-03

## 2024-07-03 DIAGNOSIS — C34.90 SMALL CELL LUNG CANCER (H): ICD-10-CM

## 2024-07-03 DIAGNOSIS — C34.2 MALIGNANT NEOPLASM OF MIDDLE LOBE, BRONCHUS OR LUNG (H): ICD-10-CM

## 2024-07-03 LAB — GLUCOSE BLDC GLUCOMTR-MCNC: 99 MG/DL (ref 70–99)

## 2024-07-03 PROCEDURE — 82962 GLUCOSE BLOOD TEST: CPT

## 2024-07-03 PROCEDURE — 78816 PET IMAGE W/CT FULL BODY: CPT | Mod: PS

## 2024-07-03 PROCEDURE — 343N000001 HC RX 343: Performed by: INTERNAL MEDICINE

## 2024-07-03 PROCEDURE — A9552 F18 FDG: HCPCS | Performed by: INTERNAL MEDICINE

## 2024-07-03 RX ORDER — FLUDEOXYGLUCOSE F 18 200 MCI/ML
8-18 INJECTION, SOLUTION INTRAVENOUS ONCE
Status: COMPLETED | OUTPATIENT
Start: 2024-07-03 | End: 2024-07-03

## 2024-07-03 RX ADMIN — FLUDEOXYGLUCOSE F 18 10.2 MILLICURIE: 200 INJECTION, SOLUTION INTRAVENOUS at 09:25

## 2024-07-03 NOTE — TELEPHONE ENCOUNTER
Prior Authorization Retail Medication Request    Medication/Dose: Spiriva respimat    Diagnosis and ICD code (if different than what is on RX):  J44.9  New/renewal/insurance change PA/secondary ins. PA:  Previously Tried and Failed:    Rationale:  has been using this medication since feb 2024 after a hospitalization. Needs the additional anticholinergic    Insurance   Primary: [employer]ARE   Insurance ID:  [id number]     Secondary (if applicable):  Insurance ID:      Pharmacy Information (if different than what is on RX)  Name:  [facility]  Phone:  [phone]  Fax:[phone]

## 2024-07-03 NOTE — TELEPHONE ENCOUNTER
Retail Pharmacy Prior Authorization Team   Phone: [phone]    PA Initiation    Medication: SPIRIVA RESPIMAT 2.5 MCG/ACT IN AERS  Insurance Company: [employer] - Phone [phone] Fax [phone]  Pharmacy Filling the Rx: [facility] - [place] - [address]  Filling Pharmacy Phone: [phone]  Filling Pharmacy Fax: [phone]  Start Date: 7/3/2024

## 2024-07-08 ENCOUNTER — ONCOLOGY VISIT (OUTPATIENT)
Dept: ONCOLOGY | Facility: HOSPITAL | Age: 69
End: 2024-07-08
Payer: COMMERCIAL

## 2024-07-08 ENCOUNTER — INFUSION THERAPY VISIT (OUTPATIENT)
Dept: INFUSION THERAPY | Facility: HOSPITAL | Age: 69
End: 2024-07-08
Payer: COMMERCIAL

## 2024-07-08 ENCOUNTER — MYC MEDICAL ADVICE (OUTPATIENT)
Dept: PULMONOLOGY | Facility: CLINIC | Age: 69
End: 2024-07-08

## 2024-07-08 ENCOUNTER — LAB (OUTPATIENT)
Dept: INFUSION THERAPY | Facility: HOSPITAL | Age: 69
End: 2024-07-08
Payer: COMMERCIAL

## 2024-07-08 VITALS
HEIGHT: 59 IN | TEMPERATURE: 97.6 F | HEART RATE: 129 BPM | DIASTOLIC BLOOD PRESSURE: 55 MMHG | OXYGEN SATURATION: 98 % | RESPIRATION RATE: 22 BRPM | BODY MASS INDEX: 19.07 KG/M2 | SYSTOLIC BLOOD PRESSURE: 100 MMHG | WEIGHT: 94.6 LBS

## 2024-07-08 DIAGNOSIS — G93.9 BRAIN LESION: ICD-10-CM

## 2024-07-08 DIAGNOSIS — C34.2 MALIGNANT NEOPLASM OF MIDDLE LOBE, BRONCHUS OR LUNG (H): ICD-10-CM

## 2024-07-08 DIAGNOSIS — C34.90 SMALL CELL LUNG CANCER (H): Primary | ICD-10-CM

## 2024-07-08 DIAGNOSIS — J44.9 COPD, SEVERE (H): Primary | ICD-10-CM

## 2024-07-08 LAB
ALBUMIN SERPL BCG-MCNC: 3.4 G/DL (ref 3.5–5.2)
ALP SERPL-CCNC: 92 U/L (ref 40–150)
ALT SERPL W P-5'-P-CCNC: 26 U/L (ref 0–50)
ANION GAP SERPL CALCULATED.3IONS-SCNC: 7 MMOL/L (ref 7–15)
AST SERPL W P-5'-P-CCNC: 27 U/L (ref 0–45)
BILIRUB SERPL-MCNC: 0.2 MG/DL
BUN SERPL-MCNC: 46.3 MG/DL (ref 8–23)
CALCIUM SERPL-MCNC: 8.7 MG/DL (ref 8.8–10.2)
CHLORIDE SERPL-SCNC: 107 MMOL/L (ref 98–107)
CREAT SERPL-MCNC: 1.2 MG/DL (ref 0.51–0.95)
DEPRECATED HCO3 PLAS-SCNC: 25 MMOL/L (ref 22–29)
EGFRCR SERPLBLD CKD-EPI 2021: 48 ML/MIN/1.73M2
GLUCOSE SERPL-MCNC: 126 MG/DL (ref 70–99)
POTASSIUM SERPL-SCNC: 5.9 MMOL/L (ref 3.4–5.3)
PROT SERPL-MCNC: 6.1 G/DL (ref 6.4–8.3)
SODIUM SERPL-SCNC: 139 MMOL/L (ref 135–145)
TSH SERPL DL<=0.005 MIU/L-ACNC: 1.56 UIU/ML (ref 0.3–4.2)

## 2024-07-08 PROCEDURE — 80053 COMPREHEN METABOLIC PANEL: CPT | Performed by: INTERNAL MEDICINE

## 2024-07-08 PROCEDURE — 258N000003 HC RX IP 258 OP 636: Performed by: INTERNAL MEDICINE

## 2024-07-08 PROCEDURE — 99214 OFFICE O/P EST MOD 30 MIN: CPT | Performed by: INTERNAL MEDICINE

## 2024-07-08 PROCEDURE — 36591 DRAW BLOOD OFF VENOUS DEVICE: CPT | Performed by: INTERNAL MEDICINE

## 2024-07-08 PROCEDURE — G0463 HOSPITAL OUTPT CLINIC VISIT: HCPCS | Performed by: INTERNAL MEDICINE

## 2024-07-08 PROCEDURE — 96413 CHEMO IV INFUSION 1 HR: CPT

## 2024-07-08 PROCEDURE — G2211 COMPLEX E/M VISIT ADD ON: HCPCS | Performed by: INTERNAL MEDICINE

## 2024-07-08 PROCEDURE — 84443 ASSAY THYROID STIM HORMONE: CPT | Performed by: INTERNAL MEDICINE

## 2024-07-08 PROCEDURE — 250N000011 HC RX IP 250 OP 636: Performed by: INTERNAL MEDICINE

## 2024-07-08 RX ORDER — ALBUTEROL SULFATE 0.83 MG/ML
2.5 SOLUTION RESPIRATORY (INHALATION)
Status: CANCELLED | OUTPATIENT
Start: 2024-09-24

## 2024-07-08 RX ORDER — HEPARIN SODIUM,PORCINE 10 UNIT/ML
5-20 VIAL (ML) INTRAVENOUS DAILY PRN
Status: CANCELLED | OUTPATIENT
Start: 2024-08-20

## 2024-07-08 RX ORDER — METHYLPREDNISOLONE SODIUM SUCCINATE 125 MG/2ML
125 INJECTION, POWDER, LYOPHILIZED, FOR SOLUTION INTRAMUSCULAR; INTRAVENOUS
Status: CANCELLED
Start: 2024-08-20

## 2024-07-08 RX ORDER — LORAZEPAM 2 MG/ML
0.5 INJECTION INTRAMUSCULAR EVERY 4 HOURS PRN
Status: CANCELLED | OUTPATIENT
Start: 2024-08-20

## 2024-07-08 RX ORDER — DIPHENHYDRAMINE HYDROCHLORIDE 50 MG/ML
50 INJECTION INTRAMUSCULAR; INTRAVENOUS
Status: CANCELLED
Start: 2024-09-24

## 2024-07-08 RX ORDER — HEPARIN SODIUM (PORCINE) LOCK FLUSH IV SOLN 100 UNIT/ML 100 UNIT/ML
5 SOLUTION INTRAVENOUS
Status: DISCONTINUED | OUTPATIENT
Start: 2024-07-08 | End: 2024-07-08 | Stop reason: HOSPADM

## 2024-07-08 RX ORDER — LORAZEPAM 2 MG/ML
0.5 INJECTION INTRAMUSCULAR EVERY 4 HOURS PRN
Status: CANCELLED | OUTPATIENT
Start: 2024-09-24

## 2024-07-08 RX ORDER — DIPHENHYDRAMINE HYDROCHLORIDE 50 MG/ML
50 INJECTION INTRAMUSCULAR; INTRAVENOUS
Status: CANCELLED
Start: 2024-08-20

## 2024-07-08 RX ORDER — HEPARIN SODIUM,PORCINE 10 UNIT/ML
5-20 VIAL (ML) INTRAVENOUS DAILY PRN
Status: CANCELLED | OUTPATIENT
Start: 2024-09-24

## 2024-07-08 RX ORDER — ALBUTEROL SULFATE 90 UG/1
1-2 AEROSOL, METERED RESPIRATORY (INHALATION)
Status: CANCELLED
Start: 2024-09-24

## 2024-07-08 RX ORDER — ALBUTEROL SULFATE 90 UG/1
1-2 AEROSOL, METERED RESPIRATORY (INHALATION)
Status: CANCELLED
Start: 2024-08-20

## 2024-07-08 RX ORDER — HEPARIN SODIUM (PORCINE) LOCK FLUSH IV SOLN 100 UNIT/ML 100 UNIT/ML
5 SOLUTION INTRAVENOUS
Status: CANCELLED | OUTPATIENT
Start: 2024-08-20

## 2024-07-08 RX ORDER — ALBUTEROL SULFATE 0.83 MG/ML
2.5 SOLUTION RESPIRATORY (INHALATION)
Status: CANCELLED | OUTPATIENT
Start: 2024-08-20

## 2024-07-08 RX ORDER — EPINEPHRINE 1 MG/ML
0.3 INJECTION, SOLUTION INTRAMUSCULAR; SUBCUTANEOUS EVERY 5 MIN PRN
Status: CANCELLED | OUTPATIENT
Start: 2024-09-24

## 2024-07-08 RX ORDER — HEPARIN SODIUM (PORCINE) LOCK FLUSH IV SOLN 100 UNIT/ML 100 UNIT/ML
5 SOLUTION INTRAVENOUS
Status: CANCELLED | OUTPATIENT
Start: 2024-09-24

## 2024-07-08 RX ORDER — DIAZEPAM 5 MG
5 TABLET ORAL
Qty: 2 TABLET | Refills: 0 | Status: SHIPPED | OUTPATIENT
Start: 2024-07-08

## 2024-07-08 RX ORDER — METHYLPREDNISOLONE SODIUM SUCCINATE 125 MG/2ML
125 INJECTION, POWDER, LYOPHILIZED, FOR SOLUTION INTRAMUSCULAR; INTRAVENOUS
Status: CANCELLED
Start: 2024-09-24

## 2024-07-08 RX ORDER — EPINEPHRINE 1 MG/ML
0.3 INJECTION, SOLUTION INTRAMUSCULAR; SUBCUTANEOUS EVERY 5 MIN PRN
Status: CANCELLED | OUTPATIENT
Start: 2024-08-20

## 2024-07-08 RX ADMIN — SODIUM CHLORIDE 250 ML: 9 INJECTION, SOLUTION INTRAVENOUS at 11:35

## 2024-07-08 RX ADMIN — ATEZOLIZUMAB 1200 MG: 1200 INJECTION, SOLUTION INTRAVENOUS at 11:39

## 2024-07-08 RX ADMIN — Medication 5 ML: at 12:11

## 2024-07-08 ASSESSMENT — PAIN SCALES - GENERAL: PAINLEVEL: NO PAIN (0)

## 2024-07-08 NOTE — PROGRESS NOTES
Pt arrived ambulatory to clinic for Cycle # 6 Day # 1 of her chemotherapy regimen.  Port was accessed using aseptic technique without difficulties with excellent blood return.  Labs were reviewed with rene Lewis to proceed with treatment.  Administered NS and Atezolizumab per MD order.  Pt tolerated infusion well, no s/s of hypersensitivity reaction.  Instructed pt that Dr. Stewart will round with RN CC at end of day to discuss hyperkalemia.  Pt requested that [employer] message be sent so that daughter can review information.  Instructed pt that communication will probably be sent tomorrow instead of tonight.  Instructed pt to call clinic with any further questions or concerns.  Pt will push PO intake to help with renal function.  Pt verbalized understanding of plan of care and return to clinic.

## 2024-07-08 NOTE — LETTER
"7/8/2024      Jasmyn Green  [address] [place] [address]      Dear Colleague,    Thank you for referring your patient, Jasmyn Green, to the [facility]. Please see a copy of my visit note below.    Oncology Rooming Note    July 8, 2024 10:11 AM   Jasmyn Green is a 68 year old female who presents for:    Chief Complaint   Patient presents with     Oncology Clinic Visit     3 week return visit with labs/infusion related to Small cell lung cancer.     Initial Vitals: /55 (BP Location: Right arm, Patient Position: Sitting, Cuff Size: Adult Small)   Pulse (!) 129   Temp 97.6  F (36.4  C) (Tympanic)   Resp 22   Ht 1.499 m (4' 11\")   Wt 42.9 kg (94 lb 9.6 oz)   LMP  (LMP Unknown)   SpO2 98%   BMI 19.11 kg/m   Estimated body mass index is 19.11 kg/m  as calculated from the following:    Height as of this encounter: 1.499 m (4' 11\").    Weight as of this encounter: 42.9 kg (94 lb 9.6 oz). Body surface area is 1.34 meters squared.  No Pain (0) Comment: Data Unavailable   No LMP recorded (lmp unknown). Patient is postmenopausal.  Allergies reviewed: Yes  Medications reviewed: Yes    Medications: Medication refills not needed today.  Pharmacy name entered into [employer]:    [facility] - [place], [place] - [address] [facility] - [place], [place] - [address] [place] [address]    Frailty Screening:   Is the patient here for a new oncology consult visit in cancer care? 2. No      Clinical concerns: Jasmyn's cardiologist would like to discuss w/Dr. Stewart if she should be on both the aspirin and the Elequis at this time.  Dr. Stewart was notified.      Jina Espinoza, [facility] Oncology Consult Note    Patient: Jasmyn Green  MRN: [id number]  Date of Service: Jul 8, 2024           Reason for consultation      Problem List Items Addressed This Visit          Nervous and Auditory    Brain lesion    Relevant " Medications    diazepam (VALIUM) 5 MG tablet    Other Relevant Orders    MR Brain w Contrast       Respiratory    Small cell lung cancer (H) - Primary    Relevant Medications    diazepam (VALIUM) 5 MG tablet    Other Relevant Orders    Infusion Appointment Request - Adult    MR Brain w Contrast    Malignant neoplasm of middle lobe, bronchus or lung (H)    Relevant Medications    diazepam (VALIUM) 5 MG tablet    Other Relevant Orders    MR Brain w Contrast       Assessment / number of problems addressed      1.  A very pleasant 68 year old  woman with looks like extensive stage small cell lung cancer.  Started on palliative chemotherapy with carboplatin, etoposide and atezolizumab.  After 2 cycles seemed to be responding quite well on the CT scan.  Had significant side effects after third cycle requiring discontinuation of cytotoxic chemotherapy and continues on maintenance atezolizumab.  Current PET scan shows very good metabolic response.  2.  Severe COPD.  She is on 2-3 L of oxygen at rest.  3.  Significantly decreased FEV1 of 29% and reduced diffusion capacity with DLCO of 30%.  4.  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  On baby aspirin.  5.  Other medical conditions stable.  6.  Brain lesion which seems to have changed a little bit.  Still not very clear if it is metastatic or not.  7.  Severe anemia secondary to chemotherapy.    Plan and medical decision making      Patient presenting with extensive stage small cell lung cancer with significant toxicity from treatment.Reviewed notes from each unique source.  Reviewed each unique test.  Ordered tests if indicated.  Independently interpreted lab tests and radiological exams performed by other physicians.  Personally reviewed the images of the PET scan.  Independent historian account obtained from the family.    Treatment with atezolizumab 1200 mg every 3-week.  MRI of the brain in 6 weeks or so.  Continue good diet and exercise.  COPD management with inhalers  exercise breathing exercise etc.  Monitor for autoimmune side effects.  Continue with baby aspirin.  The longitudinal plan of care for the diagnosis(es)/condition(s) as documented were addressed during this visit. Due to the added complexity in care, I will continue to support Jasmyn in the subsequent management and with ongoing continuity of care.     Clinical/pathological stage       Cancer Staging   No matching staging information was found for the patient.      History of present illness      Ms. Jasmyn Green is a 68 year old woman who has been referred to me for evaluation of newly diagnosed small cell lung cancer.  She appears to have extensive stage disease.  She presented to the [facility] with increasing shortness of breath coughing and symptoms suggestive of pneumonia.  She has a known history of severe obstructive lung disease with an FEV1 of 29% with hyperinflation and air trapping.  Presented with a 1 to 2-day history of worsening shortness of breath and with exertion as well as at rest.  Requiring increasing supplemental oxygen at home.  CT scan done in the emergency room showed bulky aortopulmonary as well as left hilar adenopathy with irregular pleural-based mass in the left upper lobe.  There are also some scattered satellite nodules.  This was very suspicious for malignancy.    After discharge she was seen by Dr. Levin and and underwent endobronchial ultrasound-guided biopsy which came back positive for small cell lung cancer.  The PET scan also showed hypermetabolic lesions in the mediastinum as well as the peripheral part of the left upper lobe.    She is on oxygen at least 2 L at rest and sometimes increased oxygen at minimal activity.  She is 97 pounds.  Appetite is fair to poor.  Comes in accompanied by her 2 daughters regarding her treatment options.    She used to smoke but quit smoking.  She was in fact was being monitored by CT surveillance.  Had a CT scan done in June 2023  which was positive for some sort of pneumonia but no obvious malignancy was noted.    She was started on echemotherapy with carboplatin, etoposide and atezolizumab.  She has had 3 cycles.  After 3 cycles had severe side effects from chemotherapy.  She was had to be hospitalized for post side effect issues including some pneumonia etc.  She was at [facility] for several days.  We made the decision to discontinue cisplatin and etoposide.  We continue with the atezolizumab.    Jasmyn was able to tolerate atezolizumab only mild toxicity.  She is following up with palliative care physicians.  Her pain Patch was discontinued and she was put on small dose of Vicodin to help with her breathing.  That seems to be working well.    Comes in today after PET scan.  Continue to have some breathing issues more humidity did make it slightly worse.  Otherwise eating well.  Able to walk a little bit more.    Past medical/surgical/social/family history        Past Medical History:   Diagnosis Date     Age-related osteoporosis without current pathological fracture      Arthritis      COPD (chronic obstructive pulmonary disease) (H)      Coronary artery disease      Dependence on nocturnal oxygen therapy      Dyspnea on exertion      Emphysema lung (H)      Gout      HLD (hyperlipidemia)      Hypertension      Lung mass      Past Surgical History:   Procedure Laterality Date     BRONCHOSCOPY RIGID OR FLEXIBLE W/TRANSENDOSCOPIC ENDOBRONCHIAL ULTRASOUND GUIDED N/A 2/16/2024    Procedure: BRONCHOSCOPY, WITH ENDOBRONCHIAL ULTRASOUND;  Surgeon: Ruben Levin MD;  Location: [facility] OR     COLONOSCOPY N/A 10/21/2021    Procedure: COLONOSCOPY;  Surgeon: Wilma Hester DO;  Location: [facility] OR     IR CHEST PORT PLACEMENT > 5 YRS OF AGE  5/2/2024     VAGINAL DELIVERY      x 3 ; remote     WISDOM TOOTH EXTRACTION         Review of system      Details noted in the history of present illness.  A detailed review of systems  "is otherwise negative.      Physical exam        /55 (BP Location: Right arm, Patient Position: Sitting, Cuff Size: Adult Small)   Pulse (!) 129   Temp 97.6  F (36.4  C) (Tympanic)   Resp 22   Ht 1.499 m (4' 11\")   Wt 42.9 kg (94 lb 9.6 oz)   LMP  (LMP Unknown)   SpO2 98%   BMI 19.11 kg/m      GENERAL: No acute distress. Cooperative in conversation.   HEENT:  Pupils are equal, round and reactive. Oral mucosa is clean and intact. No ulcerations or mucositis noted. No bleeding noted.  RESP:Chest symmetric lungs are clear bilaterally per auscultation. Regular respiratory rate. No wheezes or rhonchi.  CV: Normal S1 S2 Regular, rate and rhythm.     ABD: Nondistended, soft, nontender. Positive bowel sounds. No organomegaly.   EXTREMITIES: No lower extremity edema.   NEURO: Non- focal. Alert and oriented x3.  Cranial nerves appear intact.  PSYCH: Within normal limits. No depression or anxiety.  SKIN: Warm dry intact.      Lab results Reviewed      Recent Results (from the past 168 hour(s))   Glucose by meter   Result Value Ref Range    GLUCOSE BY METER POCT 99 70 - 99 mg/dL   Comprehensive metabolic panel   Result Value Ref Range    Sodium 139 135 - 145 mmol/L    Potassium 5.9 (H) 3.4 - 5.3 mmol/L    Carbon Dioxide (CO2) 25 22 - 29 mmol/L    Anion Gap 7 7 - 15 mmol/L    Urea Nitrogen 46.3 (H) 8.0 - 23.0 mg/dL    Creatinine 1.20 (H) 0.51 - 0.95 mg/dL    GFR Estimate 48 (L) >60 mL/min/1.73m2    Calcium 8.7 (L) 8.8 - 10.2 mg/dL    Chloride 107 98 - 107 mmol/L    Glucose 126 (H) 70 - 99 mg/dL    Alkaline Phosphatase 92 40 - 150 U/L    AST 27 0 - 45 U/L    ALT 26 0 - 50 U/L    Protein Total 6.1 (L) 6.4 - 8.3 g/dL    Albumin 3.4 (L) 3.5 - 5.2 g/dL    Bilirubin Total 0.2 <=1.2 mg/dL   TSH with free T4 reflex   Result Value Ref Range    TSH 1.56 0.30 - 4.20 uIU/mL       Imaging results Reviewed        PET Oncology Whole Body    Result Date: 7/3/2024  EXAM: PET ONCOLOGY WHOLE BODY LOCATION: [facility]" HOSPITAL DATE: 7/3/2024 INDICATION: Subsequent treatment planning and restaging for malignant neoplasm of upper lobe, left bronchus or lung. Extensive stage small cell lung cancer. Interval chemoimmunotherapy. COMPARISON: FDG PET/CT 03/01/2024. CONTRAST: None. TECHNIQUE: Serum glucose level 99 mg/dL. One hour post intravenous administration of 10.2 mCi F-18 FDG, PET imaging was performed from the skull vertex to feet, utilizing attenuation correction with concurrent axial CT and PET/CT image fusion. PET/CT FINDINGS: Interval resolution of FDG avid left upper lobe subpleural nodule and additional left pleural nodules. Mediastinal and left hilar lymph nodes have decreased in size and no longer demonstrate FDG avidity above that of blood pool. No new site of FDG avid disease. Activation of the bilateral neck, thoracic paraspinal, and chest wall musculature. New mildly FDG avid linear opacity in the left upper lobe which is favored inflammatory in etiology. Few new mildly FDG avid linear and nodular opacities of the bilateral lung bases Mild uptake of the lower thoracic esophagus is favored secondary to esophagitis. CT FINDINGS: Mild intracranial senescent changes. Mild mucosal thickening of the paranasal sinuses. Right chest port with distal tip terminating at the superior cavoatrial junction. Severe burden of vascular calcifications including coronary artery calcifications. Small hiatal hernia. Advanced destructive centrilobular pulmonary emphysema. Infrarenal abdominal aortic ectasia measuring up to 2.4 cm in diameter, stable. Degenerative changes of the spine.     IMPRESSION: 1.  Findings suggestive of a complete metabolic response to therapy. The previously noted left lung, left pleura, and thoracic gisela disease has resolved. 2.  There are a few new linear and nodular opacities of both lungs which are mildly FDG avid, favored inflammatory in etiology. Attention on follow-up.       Signed by: Amador Stewart  MD      This note has been dictated using voice recognition software. Any grammatical or context distortions are unintentional and inherent to the software      Again, thank you for allowing me to participate in the care of your patient.        Sincerely,        Amador Stewart MD

## 2024-07-08 NOTE — PROGRESS NOTES
"Oncology Rooming Note    July 8, 2024 10:11 AM   Jasmyn Green is a 68 year old female who presents for:    Chief Complaint   Patient presents with    Oncology Clinic Visit     3 week return visit with labs/infusion related to Small cell lung cancer.     Initial Vitals: /55 (BP Location: Right arm, Patient Position: Sitting, Cuff Size: Adult Small)   Pulse (!) 129   Temp 97.6  F (36.4  C) (Tympanic)   Resp 22   Ht 1.499 m (4' 11\")   Wt 42.9 kg (94 lb 9.6 oz)   LMP  (LMP Unknown)   SpO2 98%   BMI 19.11 kg/m   Estimated body mass index is 19.11 kg/m  as calculated from the following:    Height as of this encounter: 1.499 m (4' 11\").    Weight as of this encounter: 42.9 kg (94 lb 9.6 oz). Body surface area is 1.34 meters squared.  No Pain (0) Comment: Data Unavailable   No LMP recorded (lmp unknown). Patient is postmenopausal.  Allergies reviewed: Yes  Medications reviewed: Yes    Medications: Medication refills not needed today.  Pharmacy name entered into [employer]:    [facility] - [place], [place] - [address] [facility] - [place], [place] - [address]    Frailty Screening:   Is the patient here for a new oncology consult visit in cancer care? 2. No      Clinical concerns: Jasmyn's cardiologist would like to discuss w/Dr. Stewart if she should be on both the aspirin and the Elequis at this time.  Dr. Stewart was notified.      Jina Espinoza CMA            "

## 2024-07-08 NOTE — PROGRESS NOTES
[facility] Hematology and Oncology Consult Note    Patient: Jasmyn Green  MRN: [id number]  Date of Service: Jul 8, 2024           Reason for consultation      Problem List Items Addressed This Visit          Nervous and Auditory    Brain lesion    Relevant Medications    diazepam (VALIUM) 5 MG tablet    Other Relevant Orders    MR Brain w Contrast       Respiratory    Small cell lung cancer (H) - Primary    Relevant Medications    diazepam (VALIUM) 5 MG tablet    Other Relevant Orders    Infusion Appointment Request - Adult    MR Brain w Contrast    Malignant neoplasm of middle lobe, bronchus or lung (H)    Relevant Medications    diazepam (VALIUM) 5 MG tablet    Other Relevant Orders    MR Brain w Contrast       Assessment / number of problems addressed      1.  A very pleasant 68 year old  woman with looks like extensive stage small cell lung cancer.  Started on palliative chemotherapy with carboplatin, etoposide and atezolizumab.  After 2 cycles seemed to be responding quite well on the CT scan.  Had significant side effects after third cycle requiring discontinuation of cytotoxic chemotherapy and continues on maintenance atezolizumab.  Current PET scan shows very good metabolic response.  2.  Severe COPD.  She is on 2-3 L of oxygen at rest.  3.  Significantly decreased FEV1 of 29% and reduced diffusion capacity with DLCO of 30%.  4.  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  On baby aspirin.  5.  Other medical conditions stable.  6.  Brain lesion which seems to have changed a little bit.  Still not very clear if it is metastatic or not.  7.  Severe anemia secondary to chemotherapy.  8.  Slight hyperkalemia.  Potassium has been 5.9 couple of times.    Plan and medical decision making      Patient presenting with extensive stage small cell lung cancer with significant toxicity from treatment.Reviewed notes from each unique source.  Reviewed each unique test.  Ordered tests if indicated.  Independently  interpreted lab tests and radiological exams performed by other physicians.  Personally reviewed the images of the PET scan.  Independent historian account obtained from the family.    Treatment with atezolizumab 1200 mg every 3-week.  MRI of the brain in 6 weeks or so.  Continue good diet and exercise.  COPD management with inhalers exercise breathing exercise etc.  Monitor for autoimmune side effects.  Continue with aspirin since she has had a TIA-like event when she was on baby aspirin and Eliquis.  Did discuss that this increases the risk of bleeding.  Monitor potassium levels.  The longitudinal plan of care for the diagnosis(es)/condition(s) as documented were addressed during this visit. Due to the added complexity in care, I will continue to support Jasmyn in the subsequent management and with ongoing continuity of care.     Clinical/pathological stage       Cancer Staging   No matching staging information was found for the patient.      History of present illness      Ms. Jasmyn Green is a 68 year old woman who has been referred to me for evaluation of newly diagnosed small cell lung cancer.  She appears to have extensive stage disease.  She presented to the [facility] with increasing shortness of breath coughing and symptoms suggestive of pneumonia.  She has a known history of severe obstructive lung disease with an FEV1 of 29% with hyperinflation and air trapping.  Presented with a 1 to 2-day history of worsening shortness of breath and with exertion as well as at rest.  Requiring increasing supplemental oxygen at home.  CT scan done in the emergency room showed bulky aortopulmonary as well as left hilar adenopathy with irregular pleural-based mass in the left upper lobe.  There are also some scattered satellite nodules.  This was very suspicious for malignancy.    After discharge she was seen by Dr. Levin and and underwent endobronchial ultrasound-guided biopsy which came back positive for  small cell lung cancer.  The PET scan also showed hypermetabolic lesions in the mediastinum as well as the peripheral part of the left upper lobe.    She is on oxygen at least 2 L at rest and sometimes increased oxygen at minimal activity.  She is 97 pounds.  Appetite is fair to poor.  Comes in accompanied by her 2 daughters regarding her treatment options.    She used to smoke but quit smoking.  She was in fact was being monitored by CT surveillance.  Had a CT scan done in June 2023 which was positive for some sort of pneumonia but no obvious malignancy was noted.    She was started on echemotherapy with carboplatin, etoposide and atezolizumab.  She has had 3 cycles.  After 3 cycles had severe side effects from chemotherapy.  She was had to be hospitalized for post side effect issues including some pneumonia etc.  She was at [facility] for several days.  We made the decision to discontinue cisplatin and etoposide.  We continue with the atezolizumab.    Jasmyn was able to tolerate atezolizumab only mild toxicity.  She is following up with palliative care physicians.  Her pain Patch was discontinued and she was put on small dose of Vicodin to help with her breathing.  That seems to be working well.    Comes in today after PET scan.  Continue to have some breathing issues more humidity did make it slightly worse.  Otherwise eating well.  Able to walk a little bit more.    Past medical/surgical/social/family history        Past Medical History:   Diagnosis Date    Age-related osteoporosis without current pathological fracture     Arthritis     COPD (chronic obstructive pulmonary disease) (H)     Coronary artery disease     Dependence on nocturnal oxygen therapy     Dyspnea on exertion     Emphysema lung (H)     Gout     HLD (hyperlipidemia)     Hypertension     Lung mass      Past Surgical History:   Procedure Laterality Date    BRONCHOSCOPY RIGID OR FLEXIBLE W/TRANSENDOSCOPIC ENDOBRONCHIAL ULTRASOUND GUIDED N/A  "2/16/2024    Procedure: BRONCHOSCOPY, WITH ENDOBRONCHIAL ULTRASOUND;  Surgeon: Ruben Levin MD;  Location: [facility] Main OR    COLONOSCOPY N/A 10/21/2021    Procedure: COLONOSCOPY;  Surgeon: Wilma Hester DO;  Location: [facility] Main OR    IR CHEST PORT PLACEMENT > 5 YRS OF AGE  5/2/2024    VAGINAL DELIVERY      x 3 ; remote    WISDOM TOOTH EXTRACTION         Review of system      Details noted in the history of present illness.  A detailed review of systems is otherwise negative.      Physical exam        /55 (BP Location: Right arm, Patient Position: Sitting, Cuff Size: Adult Small)   Pulse (!) 129   Temp 97.6  F (36.4  C) (Tympanic)   Resp 22   Ht 1.499 m (4' 11\")   Wt 42.9 kg (94 lb 9.6 oz)   LMP  (LMP Unknown)   SpO2 98%   BMI 19.11 kg/m      GENERAL: No acute distress. Cooperative in conversation.   HEENT:  Pupils are equal, round and reactive. Oral mucosa is clean and intact. No ulcerations or mucositis noted. No bleeding noted.  RESP:Chest symmetric lungs are clear bilaterally per auscultation. Regular respiratory rate. No wheezes or rhonchi.  CV: Normal S1 S2 Regular, rate and rhythm.     ABD: Nondistended, soft, nontender. Positive bowel sounds. No organomegaly.   EXTREMITIES: No lower extremity edema.   NEURO: Non- focal. Alert and oriented x3.  Cranial nerves appear intact.  PSYCH: Within normal limits. No depression or anxiety.  SKIN: Warm dry intact.      Lab results Reviewed      Recent Results (from the past 168 hour(s))   Glucose by meter   Result Value Ref Range    GLUCOSE BY METER POCT 99 70 - 99 mg/dL   Comprehensive metabolic panel   Result Value Ref Range    Sodium 139 135 - 145 mmol/L    Potassium 5.9 (H) 3.4 - 5.3 mmol/L    Carbon Dioxide (CO2) 25 22 - 29 mmol/L    Anion Gap 7 7 - 15 mmol/L    Urea Nitrogen 46.3 (H) 8.0 - 23.0 mg/dL    Creatinine 1.20 (H) 0.51 - 0.95 mg/dL    GFR Estimate 48 (L) >60 mL/min/1.73m2    Calcium 8.7 (L) 8.8 - 10.2 mg/dL    Chloride 107 98 - " 107 mmol/L    Glucose 126 (H) 70 - 99 mg/dL    Alkaline Phosphatase 92 40 - 150 U/L    AST 27 0 - 45 U/L    ALT 26 0 - 50 U/L    Protein Total 6.1 (L) 6.4 - 8.3 g/dL    Albumin 3.4 (L) 3.5 - 5.2 g/dL    Bilirubin Total 0.2 <=1.2 mg/dL   TSH with free T4 reflex   Result Value Ref Range    TSH 1.56 0.30 - 4.20 uIU/mL       Imaging results Reviewed        PET Oncology Whole Body    Result Date: 7/3/2024  EXAM: PET ONCOLOGY WHOLE BODY LOCATION: [facility] DATE: 7/3/2024 INDICATION: Subsequent treatment planning and restaging for malignant neoplasm of upper lobe, left bronchus or lung. Extensive stage small cell lung cancer. Interval chemoimmunotherapy. COMPARISON: FDG PET/CT 03/01/2024. CONTRAST: None. TECHNIQUE: Serum glucose level 99 mg/dL. One hour post intravenous administration of 10.2 mCi F-18 FDG, PET imaging was performed from the skull vertex to feet, utilizing attenuation correction with concurrent axial CT and PET/CT image fusion. PET/CT FINDINGS: Interval resolution of FDG avid left upper lobe subpleural nodule and additional left pleural nodules. Mediastinal and left hilar lymph nodes have decreased in size and no longer demonstrate FDG avidity above that of blood pool. No new site of FDG avid disease. Activation of the bilateral neck, thoracic paraspinal, and chest wall musculature. New mildly FDG avid linear opacity in the left upper lobe which is favored inflammatory in etiology. Few new mildly FDG avid linear and nodular opacities of the bilateral lung bases Mild uptake of the lower thoracic esophagus is favored secondary to esophagitis. CT FINDINGS: Mild intracranial senescent changes. Mild mucosal thickening of the paranasal sinuses. Right chest port with distal tip terminating at the superior cavoatrial junction. Severe burden of vascular calcifications including coronary artery calcifications. Small hiatal hernia. Advanced destructive centrilobular pulmonary emphysema.  Infrarenal abdominal aortic ectasia measuring up to 2.4 cm in diameter, stable. Degenerative changes of the spine.     IMPRESSION: 1.  Findings suggestive of a complete metabolic response to therapy. The previously noted left lung, left pleura, and thoracic gisela disease has resolved. 2.  There are a few new linear and nodular opacities of both lungs which are mildly FDG avid, favored inflammatory in etiology. Attention on follow-up.       Signed by: Amador Stewart MD      This note has been dictated using voice recognition software. Any grammatical or context distortions are unintentional and inherent to the software

## 2024-07-09 ENCOUNTER — PATIENT OUTREACH (OUTPATIENT)
Dept: ONCOLOGY | Facility: HOSPITAL | Age: 69
End: 2024-07-09
Payer: COMMERCIAL

## 2024-07-09 NOTE — TELEPHONE ENCOUNTER
PRIOR AUTHORIZATION DENIED    Medication: SPIRIVA RESPIMAT 2.5 MCG/ACT IN AERS  Insurance Company: [employer] - Phone [phone] Fax [phone]  Denial Date: 7/5/2024  Denial Reason(s):       Appeal Information:

## 2024-07-10 ENCOUNTER — PATIENT OUTREACH (OUTPATIENT)
Dept: CARE COORDINATION | Facility: CLINIC | Age: 69
End: 2024-07-10
Payer: COMMERCIAL

## 2024-07-24 ENCOUNTER — PATIENT OUTREACH (OUTPATIENT)
Dept: CARE COORDINATION | Facility: CLINIC | Age: 69
End: 2024-07-24
Payer: COMMERCIAL

## 2024-07-29 ENCOUNTER — LAB (OUTPATIENT)
Dept: INFUSION THERAPY | Facility: HOSPITAL | Age: 69
End: 2024-07-29
Attending: INTERNAL MEDICINE
Payer: COMMERCIAL

## 2024-07-29 VITALS
TEMPERATURE: 97.7 F | SYSTOLIC BLOOD PRESSURE: 123 MMHG | RESPIRATION RATE: 20 BRPM | DIASTOLIC BLOOD PRESSURE: 60 MMHG | HEART RATE: 103 BPM | OXYGEN SATURATION: 99 %

## 2024-07-29 DIAGNOSIS — C34.90 SMALL CELL LUNG CANCER (H): Primary | ICD-10-CM

## 2024-07-29 LAB
ALBUMIN SERPL BCG-MCNC: 3.4 G/DL (ref 3.5–5.2)
ALP SERPL-CCNC: 103 U/L (ref 40–150)
ALT SERPL W P-5'-P-CCNC: 33 U/L (ref 0–50)
ANION GAP SERPL CALCULATED.3IONS-SCNC: 10 MMOL/L (ref 7–15)
AST SERPL W P-5'-P-CCNC: 22 U/L (ref 0–45)
BILIRUB SERPL-MCNC: 0.2 MG/DL
BUN SERPL-MCNC: 45.5 MG/DL (ref 8–23)
CALCIUM SERPL-MCNC: 8.7 MG/DL (ref 8.8–10.4)
CHLORIDE SERPL-SCNC: 111 MMOL/L (ref 98–107)
CREAT SERPL-MCNC: 1.08 MG/DL (ref 0.51–0.95)
EGFRCR SERPLBLD CKD-EPI 2021: 55 ML/MIN/1.73M2
GLUCOSE SERPL-MCNC: 124 MG/DL (ref 70–99)
HCO3 SERPL-SCNC: 21 MMOL/L (ref 22–29)
POTASSIUM SERPL-SCNC: 5.5 MMOL/L (ref 3.4–5.3)
PROT SERPL-MCNC: 5.9 G/DL (ref 6.4–8.3)
SODIUM SERPL-SCNC: 142 MMOL/L (ref 135–145)
TSH SERPL DL<=0.005 MIU/L-ACNC: 2.23 UIU/ML (ref 0.3–4.2)

## 2024-07-29 PROCEDURE — 84443 ASSAY THYROID STIM HORMONE: CPT | Performed by: INTERNAL MEDICINE

## 2024-07-29 PROCEDURE — 250N000011 HC RX IP 250 OP 636: Performed by: INTERNAL MEDICINE

## 2024-07-29 PROCEDURE — 258N000003 HC RX IP 258 OP 636: Performed by: INTERNAL MEDICINE

## 2024-07-29 PROCEDURE — 36591 DRAW BLOOD OFF VENOUS DEVICE: CPT | Performed by: INTERNAL MEDICINE

## 2024-07-29 PROCEDURE — 96413 CHEMO IV INFUSION 1 HR: CPT

## 2024-07-29 PROCEDURE — 80053 COMPREHEN METABOLIC PANEL: CPT | Performed by: INTERNAL MEDICINE

## 2024-07-29 RX ORDER — ALBUTEROL SULFATE 90 UG/1
1-2 AEROSOL, METERED RESPIRATORY (INHALATION)
Status: DISCONTINUED | OUTPATIENT
Start: 2024-07-29 | End: 2024-07-29 | Stop reason: HOSPADM

## 2024-07-29 RX ORDER — HEPARIN SODIUM (PORCINE) LOCK FLUSH IV SOLN 100 UNIT/ML 100 UNIT/ML
5 SOLUTION INTRAVENOUS
Status: DISCONTINUED | OUTPATIENT
Start: 2024-07-29 | End: 2024-07-29 | Stop reason: HOSPADM

## 2024-07-29 RX ORDER — EPINEPHRINE 1 MG/ML
0.3 INJECTION, SOLUTION INTRAMUSCULAR; SUBCUTANEOUS EVERY 5 MIN PRN
Status: DISCONTINUED | OUTPATIENT
Start: 2024-07-29 | End: 2024-07-29 | Stop reason: HOSPADM

## 2024-07-29 RX ORDER — ALBUTEROL SULFATE 0.83 MG/ML
2.5 SOLUTION RESPIRATORY (INHALATION)
Status: DISCONTINUED | OUTPATIENT
Start: 2024-07-29 | End: 2024-07-29 | Stop reason: HOSPADM

## 2024-07-29 RX ORDER — LORAZEPAM 2 MG/ML
0.5 INJECTION INTRAMUSCULAR EVERY 4 HOURS PRN
Status: DISCONTINUED | OUTPATIENT
Start: 2024-07-29 | End: 2024-07-29 | Stop reason: HOSPADM

## 2024-07-29 RX ORDER — DOXYCYCLINE 100 MG/1
100 TABLET ORAL DAILY
COMMUNITY

## 2024-07-29 RX ORDER — METHYLPREDNISOLONE SODIUM SUCCINATE 125 MG/2ML
125 INJECTION, POWDER, LYOPHILIZED, FOR SOLUTION INTRAMUSCULAR; INTRAVENOUS
Status: DISCONTINUED | OUTPATIENT
Start: 2024-07-29 | End: 2024-07-29 | Stop reason: HOSPADM

## 2024-07-29 RX ORDER — DIPHENHYDRAMINE HYDROCHLORIDE 50 MG/ML
50 INJECTION INTRAMUSCULAR; INTRAVENOUS
Status: DISCONTINUED | OUTPATIENT
Start: 2024-07-29 | End: 2024-07-29 | Stop reason: HOSPADM

## 2024-07-29 RX ADMIN — SODIUM CHLORIDE 250 ML: 9 INJECTION, SOLUTION INTRAVENOUS at 10:30

## 2024-07-29 RX ADMIN — ATEZOLIZUMAB 1200 MG: 1200 INJECTION, SOLUTION INTRAVENOUS at 09:57

## 2024-07-29 RX ADMIN — Medication 5 ML: at 10:38

## 2024-07-29 ASSESSMENT — PAIN SCALES - GENERAL: PAINLEVEL: NO PAIN (0)

## 2024-07-29 NOTE — PROGRESS NOTES
Infusion Nursing Note:  Jasmyn Green presents today for C7D1.    Patient seen by provider today: No   present during visit today: Not Applicable.    Note: VSS.  Pt assessed and has no complaints. Pt taken off her oxygen and was placed on our O2 at 3 liters. Port was accessed. Treatment reviewed with pt. Jasmyn received treatment as ordered. Will return 8/19.      Intravenous Access:  Implanted Port.    Treatment Conditions:  Lab Results   Component Value Date     07/29/2024    POTASSIUM 5.5 (H) 07/29/2024    MAG 2.1 05/17/2024    CR 1.08 (H) 07/29/2024    FLOR 8.7 (L) 07/29/2024    BILITOTAL 0.2 07/29/2024    ALBUMIN 3.4 (L) 07/29/2024    ALT 33 07/29/2024    AST 22 07/29/2024       Results reviewed, labs MET treatment parameters, ok to proceed with treatment.      Post Infusion Assessment:  Patient tolerated infusion without incident.  Blood return noted pre and post infusion.  No evidence of extravasations.  Access discontinued per protocol.       Discharge Plan:   Discharge instructions reviewed with: Patient.  Patient and/or family verbalized understanding of discharge instructions and all questions answered.  Patient discharged in stable condition accompanied by: self.  Departure Mode: Ambulatory.      Ce Angulo RN

## 2024-07-30 DIAGNOSIS — I74.9 ARTERIAL THROMBOSIS (H): ICD-10-CM

## 2024-08-06 NOTE — PROGRESS NOTES
[facility]: Cancer Care Follow-Up Note                                    investUP message sent to patient:                                                      Patient had been in the clinic on 7/8 to see Dr Stewart in follow-up.  She then had infusion.  After the patient completed her visit with Dr Stewart, some additional labs resulted.  Dr Stewart reviewed them and states that we will continue to monitor both her creatinine/kidney function as well as her potassium.    We made sure that she was not taking any supplements that have potassium in it.  She can however continue to eat foods with potassium in them as she normally would.  We asked her to increase her fluid intake to help with her kidney function.     Goals          General    Maintain ability to perform ADLs without difficulty             Dates of Treatment:                                                      Infusion given in last 28 days       Administered MAR Action Medication Dose Rate Visit    07/29/2024 09:57 New Bag atezolizumab (TECENTRIQ) 1,200 mg in sodium chloride 0.9 % 130 mL infusion 1,200 mg 260 mL/hr Infusion Therapy Visit on 07/29/2024 in [facility] [place]          Intervention/Education provided during outreach:                                                       We will continue to monitor patient as she comes in for her treatments.    Signature:  Wilma Carmona RN

## 2024-08-09 ENCOUNTER — VIRTUAL VISIT (OUTPATIENT)
Dept: PALLIATIVE MEDICINE | Facility: CLINIC | Age: 69
End: 2024-08-09
Attending: NURSE PRACTITIONER
Payer: COMMERCIAL

## 2024-08-09 VITALS — HEIGHT: 59 IN | BODY MASS INDEX: 19.15 KG/M2 | WEIGHT: 95 LBS

## 2024-08-09 DIAGNOSIS — J44.9 COPD, GROUP D, BY GOLD 2017 CLASSIFICATION (H): ICD-10-CM

## 2024-08-09 DIAGNOSIS — C34.90 MALIGNANT NEOPLASM OF LUNG, UNSPECIFIED LATERALITY, UNSPECIFIED PART OF LUNG (H): ICD-10-CM

## 2024-08-09 DIAGNOSIS — G89.3 CANCER ASSOCIATED PAIN: ICD-10-CM

## 2024-08-09 PROCEDURE — 99215 OFFICE O/P EST HI 40 MIN: CPT | Mod: 95 | Performed by: NURSE PRACTITIONER

## 2024-08-09 RX ORDER — OXYCODONE HYDROCHLORIDE 5 MG/1
2.5-5 TABLET ORAL EVERY 4 HOURS PRN
Qty: 40 TABLET | Refills: 0 | Status: SHIPPED | OUTPATIENT
Start: 2024-08-09 | End: 2024-09-11

## 2024-08-09 RX ORDER — BUPRENORPHINE 5 UG/H
1 PATCH TRANSDERMAL
Qty: 4 PATCH | Refills: 0 | Status: SHIPPED | OUTPATIENT
Start: 2024-08-09 | End: 2024-09-11

## 2024-08-09 ASSESSMENT — PAIN SCALES - GENERAL: PAINLEVEL: NO PAIN (0)

## 2024-08-09 NOTE — PROGRESS NOTES
Virtual Visit Details    Type of service:  Video Visit   Video Start Time: 10:55 AM  Video End Time: 11:23 AM    Originating Location (pt. Location): Home    Distant Location (provider location):  On-site  Platform used for Video Visit: [facility]        Palliative Care Outpatient Clinic      Patient ID/Chief Complaint: Jasmyn Green 68 year old female who is presenting to the palliative medicine clinic today at the request of Tata Lainez NP for a palliative care follow-up secondary to symptom management/goals of care.   The patient's primary care provider is:  Miesha Fernando.       Impression & Recommendations:  68-year-old female with new diagnosis of extensive stage small cell lung cancer (including possible small solitary brain metastasis) on palliative chemoimmunotherapy with plans for possible radiation, severe COPD on chronic supplemental oxygen (3-5 LPM depending on activity level), history of tobacco use that is now stopped, very remote history of heavier alcohol use that stopped decades ago, CAD, HTN, osteoporosis, history of gout, and problems with anxiety.    Social history includes having 3 children Georgette, Britni, and Braden, lives with daughter Georgette, , good family support.    Recently admitted to hospital with COPD exacerbation.  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  Appetite and abdominal pain have improved.    Continues on  immunotherapy due to health status.  She follows with Dr Stewart.         Symptoms/recommendations/discussion:  Chronic dyspnea related to advanced COPD worsened by recent diagnosis of lung cancer with accompanying worsening of anxiety.  Recently admitted to hospital for COPD exacerbation.  Reports positive response to low-dose oxycodone IR 2.5 mg maybe 2-3 times per day.  She wants to retrial Butrans patch because she believes it helped her with pain and dyspnea (previously stopped patch  "due to belief it was causing nausea and vomiting but now thinks it may have been related to cancer treatment).  Butrans 5 mcg/h weekly patch prescription sent to pharmacy.  Oxycodone IR also refilled.  Anxiety: Improved with recent addition of Remeron 7.5 mg (15 mg was too sedating) and Atarax/hydroxyzine 25 mg as needed.  Decreased appetite with weight loss: Appetite is improved significantly since treatment of celiac artery thrombosis.  Medical cannabis--- certified but not currently using as her appetite has improved  New onset LT flank pain, pain has resolved with treatment of celiac artery thrombosis  Narcan nasal spray sent for safety reasons  She has already completed a healthcare directive expressing her wishes. Georgette is her primary healthcare agent.  POLST in EMR.  No CPR and DO NOT INTUBATE.   Palliative care follow-up in approximately 4 weeks  Daughter Georgette present for entire visit today.        How to get a hold of us:  For non-urgent matters, MyChart works best.    For more urgent matters, or if you prefer not to use MyChart, call our clinic nurse coordinator Hailee Paredes RN at [phone] or [phone]    We have an on-call number for evenings and weekends. Please call this only if you are having uncontrolled symptoms or serious side effects from your medicines: [phone].     For refills, please give us a week (5 working days) notice. We don't always have providers available everyday to do refills. If you call the day you run out of your medicine, we may not be able to refill it in time, so call 5 days in advance        History:  History gathered today from: patient, family/loved ones, medical chart, health care directive/s      PE: Ht 1.499 m (4' 11\")   Wt 43.1 kg (95 lb)   LMP  (LMP Unknown)   BMI 19.19 kg/m     Wt Readings from Last 3 Encounters:   08/09/24 43.1 kg (95 lb)   07/08/24 42.9 kg (94 lb 9.6 oz)   06/24/24 43.8 kg (96 lb 9.6 oz)       Gen tachypneic, no apparent distress, " alert  Head NCAT.  Eyes anicteric without injection  Face symmetric, eyes conjugate  Lungs tachypneic, no cough, speaking full sentences nasal cannula oxygen  Skin no rashes or lesions evident on face/neck  Neuro Face symmetric, eyes conjugate; speech fluent.  Neuropsych exam normal including affect, sensorium, gross memory, thought processes, and fund of knowledge.         Data reviewed:  I reviewed electrolytes, BUN/creatinine, liver profile, hemoglobin and hematocrit, platelet count, and most recent imaging  Oncology notes     database reviewed: 8/9        45 minutes spent on the date of the encounter doing chart review, history and exam, patient education & counseling, documentation and other activities as noted above.        Thank you for involving us in the patient's care.   JIMENEZ Huggins, [facility] Palliative Care Service

## 2024-08-09 NOTE — PATIENT INSTRUCTIONS
Thank you for meeting with us in the [facility].    How to get a hold of us:  For non-urgent matters, MyChart works best.    For more urgent matters, or if you prefer not to use MyChart, call our clinic nurse coordinator Hailee Paredes RN at [phone] or [phone]    We have an on-call number for evenings and weekends. Please call this only if you are having uncontrolled symptoms or serious side effects from your medicines: [phone].     For refills, please give us a week (5 working days) notice. We don't always have providers available everyday to do refills. If you call the day you run out of your medicine, we may not be able to refill it in time, so call 5 days in advance!

## 2024-08-09 NOTE — NURSING NOTE
Patient's Daughter declined medication review due to no changes since last seen.       Current patient location: [address] [place] [address]    Is the patient currently in the state of [place]? YES    Visit mode:VIDEO    If the visit is dropped, the patient can be reconnected by: VIDEO VISIT: Text to cell phone:   Telephone Information:   Mobile [phone]       Will anyone else be joining the visit? Julissa-Pt's Daughter   (If patient encounters technical issues they should call [phone])    How would you like to obtain your AVS? MyChart    Are changes needed to the allergy or medication list? Pt stated no med changes    Are refills needed on medications prescribed by this physician? YES    Rooming Documentation:  Not applicable      Reason for visit: RECHECK (Trying patch again due to breathing being off lately. )    Berenice SHETTY

## 2024-08-15 ENCOUNTER — HOSPITAL ENCOUNTER (OUTPATIENT)
Dept: MRI IMAGING | Facility: HOSPITAL | Age: 69
Discharge: HOME | End: 2024-08-15
Attending: INTERNAL MEDICINE | Admitting: INTERNAL MEDICINE
Payer: COMMERCIAL

## 2024-08-15 DIAGNOSIS — C34.2 MALIGNANT NEOPLASM OF MIDDLE LOBE, BRONCHUS OR LUNG (H): ICD-10-CM

## 2024-08-15 DIAGNOSIS — G93.9 BRAIN LESION: ICD-10-CM

## 2024-08-15 DIAGNOSIS — C34.90 SMALL CELL LUNG CANCER (H): ICD-10-CM

## 2024-08-15 PROCEDURE — 70553 MRI BRAIN STEM W/O & W/DYE: CPT

## 2024-08-15 PROCEDURE — A9585 GADOBUTROL INJECTION: HCPCS | Performed by: INTERNAL MEDICINE

## 2024-08-15 PROCEDURE — 255N000002 HC RX 255 OP 636: Performed by: INTERNAL MEDICINE

## 2024-08-15 PROCEDURE — 250N000011 HC RX IP 250 OP 636: Performed by: RADIOLOGY

## 2024-08-15 RX ORDER — HEPARIN SODIUM,PORCINE 10 UNIT/ML
5-10 VIAL (ML) INTRAVENOUS
Status: DISCONTINUED | OUTPATIENT
Start: 2024-08-15 | End: 2024-08-16 | Stop reason: HOSPADM

## 2024-08-15 RX ORDER — GADOBUTROL 604.72 MG/ML
5 INJECTION INTRAVENOUS ONCE
Status: COMPLETED | OUTPATIENT
Start: 2024-08-15 | End: 2024-08-15

## 2024-08-15 RX ORDER — HEPARIN SODIUM (PORCINE) LOCK FLUSH IV SOLN 100 UNIT/ML 100 UNIT/ML
5-10 SOLUTION INTRAVENOUS
Status: DISCONTINUED | OUTPATIENT
Start: 2024-08-15 | End: 2024-08-16 | Stop reason: HOSPADM

## 2024-08-15 RX ORDER — HEPARIN SODIUM,PORCINE 10 UNIT/ML
5-10 VIAL (ML) INTRAVENOUS EVERY 24 HOURS
Status: DISCONTINUED | OUTPATIENT
Start: 2024-08-15 | End: 2024-08-16 | Stop reason: HOSPADM

## 2024-08-15 RX ADMIN — GADOBUTROL 5 ML: 604.72 INJECTION INTRAVENOUS at 13:12

## 2024-08-15 RX ADMIN — Medication 5 ML: at 14:02

## 2024-08-16 ENCOUNTER — MYC MEDICAL ADVICE (OUTPATIENT)
Dept: PULMONOLOGY | Facility: CLINIC | Age: 69
End: 2024-08-16
Payer: COMMERCIAL

## 2024-08-16 ENCOUNTER — PATIENT OUTREACH (OUTPATIENT)
Dept: ONCOLOGY | Facility: CLINIC | Age: 69
End: 2024-08-16
Payer: COMMERCIAL

## 2024-08-16 ENCOUNTER — PATIENT OUTREACH (OUTPATIENT)
Dept: ONCOLOGY | Facility: HOSPITAL | Age: 69
End: 2024-08-16
Payer: COMMERCIAL

## 2024-08-16 DIAGNOSIS — C34.90 SMALL CELL LUNG CANCER (H): ICD-10-CM

## 2024-08-16 DIAGNOSIS — G93.9 BRAIN LESION: Primary | ICD-10-CM

## 2024-08-16 DIAGNOSIS — J44.9 COPD, GROUP D, BY GOLD 2017 CLASSIFICATION (H): Primary | ICD-10-CM

## 2024-08-16 LAB — RADIOLOGIST FLAGS: ABNORMAL

## 2024-08-16 RX ORDER — DEXAMETHASONE 4 MG/1
4 TABLET ORAL 2 TIMES DAILY WITH MEALS
Qty: 20 TABLET | Refills: 1 | Status: SHIPPED | OUTPATIENT
Start: 2024-08-16

## 2024-08-16 RX ORDER — PREDNISONE 20 MG/1
40 TABLET ORAL DAILY
Qty: 10 TABLET | Refills: 0 | Status: SHIPPED | OUTPATIENT
Start: 2024-08-16 | End: 2024-08-21

## 2024-08-16 NOTE — PROGRESS NOTES
Phone note:    MRI of the brain showing new brain mets.  Some vasogenic edema is there.  Will start the patient on dexamethasone.  Make a referral to radiation oncology.  Will try to get her in on Monday if not sooner.    Amador Stewart MD

## 2024-08-16 NOTE — PROGRESS NOTES
New Patient Oncology Nurse Navigator Note     Referring provider: Dr. Amador Stewart    Referring Clinic/Organization: [facility]  Referred to: Radiation Oncology  Requested provider (if applicable): First available - did not specify   Referral Received: 08/16/24       Evaluation for :   Diagnosis   G93.9 (ICD-10-CM) - Brain lesion     Referral updates and Plan:     Pt is a return pt of Dr. Jacinto's. Per chart review, the clinic has scheduled follow up with rad onc on 8/20.     IVAN MarrN, RN, OCN  [facility] Oncology Nurse Navigator  [phone] / [phone]

## 2024-08-16 NOTE — PROGRESS NOTES
[facility]: Cancer Care                                                                                          Per Dr. Stewart's request, I call patient to report the following:    MRI is showing new brain metastases. She to start taking dexamethasone 4 mg twice daily, prescription sent to local pharmacy. Patient is to be seen by Rad Onc as soon as possible.     I spoke with Julissa, patient's daughter. She verbalized understanding and appreciation of our conversation.       Signature:  Bozena Mak RN Care Coordinator  [facility]  Cancer [facility]

## 2024-08-16 NOTE — TELEPHONE ENCOUNTER
Per Dr Levin:  Can you please put in for prednisone 20 mg 2 tabs daily for 5 days and set up for COPD visit with nP   Cesario bains

## 2024-08-19 ENCOUNTER — INFUSION THERAPY VISIT (OUTPATIENT)
Dept: INFUSION THERAPY | Facility: HOSPITAL | Age: 69
End: 2024-08-19
Attending: INTERNAL MEDICINE
Payer: COMMERCIAL

## 2024-08-19 ENCOUNTER — ONCOLOGY VISIT (OUTPATIENT)
Dept: ONCOLOGY | Facility: HOSPITAL | Age: 69
End: 2024-08-19
Attending: INTERNAL MEDICINE
Payer: COMMERCIAL

## 2024-08-19 ENCOUNTER — PATIENT OUTREACH (OUTPATIENT)
Dept: ONCOLOGY | Facility: HOSPITAL | Age: 69
End: 2024-08-19

## 2024-08-19 VITALS
SYSTOLIC BLOOD PRESSURE: 110 MMHG | HEART RATE: 112 BPM | RESPIRATION RATE: 16 BRPM | OXYGEN SATURATION: 94 % | WEIGHT: 96.5 LBS | HEIGHT: 59 IN | BODY MASS INDEX: 19.45 KG/M2 | DIASTOLIC BLOOD PRESSURE: 59 MMHG | TEMPERATURE: 97.9 F

## 2024-08-19 DIAGNOSIS — C34.90 SMALL CELL LUNG CANCER (H): Primary | ICD-10-CM

## 2024-08-19 LAB
ALBUMIN SERPL BCG-MCNC: 3.6 G/DL (ref 3.5–5.2)
ALP SERPL-CCNC: 87 U/L (ref 40–150)
ALT SERPL W P-5'-P-CCNC: 26 U/L (ref 0–50)
ANION GAP SERPL CALCULATED.3IONS-SCNC: 9 MMOL/L (ref 7–15)
AST SERPL W P-5'-P-CCNC: 19 U/L (ref 0–45)
BILIRUB SERPL-MCNC: <0.2 MG/DL
BUN SERPL-MCNC: 52.6 MG/DL (ref 8–23)
CALCIUM SERPL-MCNC: 8.8 MG/DL (ref 8.8–10.4)
CHLORIDE SERPL-SCNC: 105 MMOL/L (ref 98–107)
CREAT SERPL-MCNC: 1.01 MG/DL (ref 0.51–0.95)
EGFRCR SERPLBLD CKD-EPI 2021: 60 ML/MIN/1.73M2
GLUCOSE SERPL-MCNC: 160 MG/DL (ref 70–99)
HCO3 SERPL-SCNC: 23 MMOL/L (ref 22–29)
POTASSIUM SERPL-SCNC: 5.7 MMOL/L (ref 3.4–5.3)
PROT SERPL-MCNC: 6.1 G/DL (ref 6.4–8.3)
SODIUM SERPL-SCNC: 137 MMOL/L (ref 135–145)
T4 FREE SERPL-MCNC: 0.92 NG/DL (ref 0.9–1.7)
TSH SERPL DL<=0.005 MIU/L-ACNC: 0.28 UIU/ML (ref 0.3–4.2)

## 2024-08-19 PROCEDURE — G2211 COMPLEX E/M VISIT ADD ON: HCPCS | Performed by: INTERNAL MEDICINE

## 2024-08-19 PROCEDURE — 82247 BILIRUBIN TOTAL: CPT | Performed by: INTERNAL MEDICINE

## 2024-08-19 PROCEDURE — 96413 CHEMO IV INFUSION 1 HR: CPT

## 2024-08-19 PROCEDURE — 258N000003 HC RX IP 258 OP 636: Performed by: INTERNAL MEDICINE

## 2024-08-19 PROCEDURE — 84443 ASSAY THYROID STIM HORMONE: CPT | Performed by: INTERNAL MEDICINE

## 2024-08-19 PROCEDURE — 99215 OFFICE O/P EST HI 40 MIN: CPT | Performed by: INTERNAL MEDICINE

## 2024-08-19 PROCEDURE — 84439 ASSAY OF FREE THYROXINE: CPT | Performed by: INTERNAL MEDICINE

## 2024-08-19 PROCEDURE — 250N000011 HC RX IP 250 OP 636: Performed by: INTERNAL MEDICINE

## 2024-08-19 PROCEDURE — G0463 HOSPITAL OUTPT CLINIC VISIT: HCPCS | Mod: 25 | Performed by: INTERNAL MEDICINE

## 2024-08-19 PROCEDURE — 36591 DRAW BLOOD OFF VENOUS DEVICE: CPT | Performed by: INTERNAL MEDICINE

## 2024-08-19 RX ORDER — HEPARIN SODIUM,PORCINE 10 UNIT/ML
5-20 VIAL (ML) INTRAVENOUS DAILY PRN
OUTPATIENT
Start: 2024-10-15

## 2024-08-19 RX ORDER — ALBUTEROL SULFATE 0.83 MG/ML
2.5 SOLUTION RESPIRATORY (INHALATION)
OUTPATIENT
Start: 2024-10-15

## 2024-08-19 RX ORDER — HEPARIN SODIUM (PORCINE) LOCK FLUSH IV SOLN 100 UNIT/ML 100 UNIT/ML
5 SOLUTION INTRAVENOUS
OUTPATIENT
Start: 2024-10-15

## 2024-08-19 RX ORDER — EPINEPHRINE 1 MG/ML
0.3 INJECTION, SOLUTION INTRAMUSCULAR; SUBCUTANEOUS EVERY 5 MIN PRN
OUTPATIENT
Start: 2024-10-15

## 2024-08-19 RX ORDER — DIPHENHYDRAMINE HYDROCHLORIDE 50 MG/ML
50 INJECTION INTRAMUSCULAR; INTRAVENOUS
Start: 2024-10-15

## 2024-08-19 RX ORDER — ALBUTEROL SULFATE 90 UG/1
1-2 AEROSOL, METERED RESPIRATORY (INHALATION)
Start: 2024-10-15

## 2024-08-19 RX ORDER — LORAZEPAM 2 MG/ML
0.5 INJECTION INTRAMUSCULAR EVERY 4 HOURS PRN
OUTPATIENT
Start: 2024-10-15

## 2024-08-19 RX ORDER — METHYLPREDNISOLONE SODIUM SUCCINATE 125 MG/2ML
125 INJECTION, POWDER, LYOPHILIZED, FOR SOLUTION INTRAMUSCULAR; INTRAVENOUS
Start: 2024-10-15

## 2024-08-19 RX ORDER — HEPARIN SODIUM (PORCINE) LOCK FLUSH IV SOLN 100 UNIT/ML 100 UNIT/ML
5 SOLUTION INTRAVENOUS
Status: DISCONTINUED | OUTPATIENT
Start: 2024-08-19 | End: 2024-08-19 | Stop reason: HOSPADM

## 2024-08-19 RX ADMIN — ATEZOLIZUMAB 1200 MG: 1200 INJECTION, SOLUTION INTRAVENOUS at 14:54

## 2024-08-19 RX ADMIN — Medication 5 ML: at 15:24

## 2024-08-19 ASSESSMENT — PAIN SCALES - GENERAL: PAINLEVEL: SEVERE PAIN (7)

## 2024-08-19 NOTE — PROGRESS NOTES
[facility]: Cancer Care Follow-Up Note                                    Discussion with Patient:                                                      Patient comes to the clinic today to follow-up with Dr Stewart after having a brain MRI on 8/15.     Goals          General    Maintain ability to perform ADLs without difficulty             Dates of Treatment:                                                      Infusion given in last 28 days       Administered MAR Action Medication Dose Rate Visit    07/29/2024 09:57 New Bag atezolizumab (TECENTRIQ) 1,200 mg in sodium chloride 0.9 % 130 mL infusion 1,200 mg 260 mL/hr Infusion Therapy Visit on 07/29/2024 in [facility]    08/19/2024 14:54 New Bag atezolizumab (TECENTRIQ) 1,200 mg in sodium chloride 0.9 % 130 mL infusion 1,200 mg 260 mL/hr Infusion Therapy Visit on 08/19/2024 in [facility]            Assessment:                                                      Last week these results were called to our clinic.  Patient has been set up to see Dr. Milligan, radiation oncologist on 8/20 with a radiation simulation directly following.  I will monitor results of these appointments and update Dr Stewart on plan for radiation.    Intervention/Education provided during outreach:                                                       Per Dr Stewart, patient should keep these appointments and will receive her next infusion around 9/24.    Patient has been scheduled on 9/24 for labs, Dana Mathur CNP and infusion.    Patient to follow up as scheduled at next appt.  Patient to call/MyChart message with updates.  Confirmed patient has clinic and triage numbers.    Signature:  Wilma Carmona RN

## 2024-08-19 NOTE — PROGRESS NOTES
PT here in wheelchair /self transfer to recliner for tecentriq infusion. Lab results approved for txt. Txt reviewed and administered/pt tolerated without any problems. Txt completed and port flushed/deaccessed with 2x2 to site. Follow up reviewed and pt dc'd in wheelchair with family member.

## 2024-08-19 NOTE — PROGRESS NOTES
"Oncology Rooming Note    August 19, 2024 1:38 PM   Jasmyn Green is a 68 year old female who presents for:    Chief Complaint   Patient presents with    Oncology Clinic Visit     Return visit 6 weeks with lab and infusion. MRI 08/15/24. Small cell lung cancer (H) Malignant neoplasm of middle lobe, bronchus or lung (H) Brain lesion       Initial Vitals: /59 (BP Location: Left arm, Patient Position: Sitting, Cuff Size: Adult Small)   Pulse 112   Temp 97.9  F (36.6  C) (Oral)   Resp 16   Ht 1.499 m (4' 11\")   Wt 43.8 kg (96 lb 8 oz)   LMP  (LMP Unknown)   SpO2 94%   BMI 19.49 kg/m   Estimated body mass index is 19.49 kg/m  as calculated from the following:    Height as of this encounter: 1.499 m (4' 11\").    Weight as of this encounter: 43.8 kg (96 lb 8 oz). Body surface area is 1.35 meters squared.  Severe Pain (7) Comment: Data Unavailable   No LMP recorded (lmp unknown). Patient is postmenopausal.  Allergies reviewed: Yes  Medications reviewed: Yes    Medications: Medication refills not needed today.  Pharmacy name entered into [employer]:    [facility] - [place], [place] - [address] [facility] - [place], [place] - [address]    Frailty Screening:   Is the patient here for a new oncology consult visit in cancer care? 2. No      Clinical concerns: lump on right hip 5-7/10.  Dr Stewart was notified.      Елена Deutsch CMA            "

## 2024-08-19 NOTE — LETTER
"8/19/2024      Jasmyn Green  [address] [place] [address]      Dear Colleague,    Thank you for referring your patient, Jasmyn Green, to the [facility]. Please see a copy of my visit note below.    Oncology Rooming Note    August 19, 2024 1:38 PM   Jasmyn Green is a 68 year old female who presents for:    Chief Complaint   Patient presents with     Oncology Clinic Visit     Return visit 6 weeks with lab and infusion. MRI 08/15/24. Small cell lung cancer (H) Malignant neoplasm of middle lobe, bronchus or lung (H) Brain lesion       Initial Vitals: /59 (BP Location: Left arm, Patient Position: Sitting, Cuff Size: Adult Small)   Pulse 112   Temp 97.9  F (36.6  C) (Oral)   Resp 16   Ht 1.499 m (4' 11\")   Wt 43.8 kg (96 lb 8 oz)   LMP  (LMP Unknown)   SpO2 94%   BMI 19.49 kg/m   Estimated body mass index is 19.49 kg/m  as calculated from the following:    Height as of this encounter: 1.499 m (4' 11\").    Weight as of this encounter: 43.8 kg (96 lb 8 oz). Body surface area is 1.35 meters squared.  Severe Pain (7) Comment: Data Unavailable   No LMP recorded (lmp unknown). Patient is postmenopausal.  Allergies reviewed: Yes  Medications reviewed: Yes    Medications: Medication refills not needed today.  Pharmacy name entered into [employer]:    [facility] - [place], [place] - [address] [facility] - [place], [place] - [address] [place] [address]    Frailty Screening:   Is the patient here for a new oncology consult visit in cancer care? 2. No      Clinical concerns: lump on right hip 5-7/10.  Dr Stewatr was notified.      Елена Deutsch, [facility] Consult Note    Patient: Jasmyn Green  MRN: [id number]  Date of Service: Aug 19, 2024           Reason for consultation      Problem List Items Addressed This Visit          Respiratory    Small cell lung cancer (H) - Primary    Relevant " Orders    Infusion Appointment Request - Adult       Assessment / number of problems addressed      1.  A very pleasant 68 year old  woman with looks like extensive stage small cell lung cancer.  Started on palliative chemotherapy with carboplatin, etoposide and atezolizumab.  After 2 cycles seemed to be responding quite well on the CT scan.  Had significant side effects after third cycle requiring discontinuation of cytotoxic chemotherapy and continues on maintenance atezolizumab.  Last PET scan had shown very good response in July 2024.  Unfortunately now has developed new brain metastases.  2.  Severe COPD.  She is on 2-3 L of oxygen at rest.  3.  Significantly decreased FEV1 of 29% and reduced diffusion capacity with DLCO of 30%.  4.  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  On baby aspirin.  5.  Other medical conditions stable.  6.  Severe anemia secondary to chemotherapy.      Plan and medical decision making      Patient presenting with new brain metastases.  This obviously puts a difference in her prognosis.Reviewed notes from each unique source.  Reviewed each unique test.    Ordered tests.    Independently interpreted lab tests and radiological exams performed by other physicians.  Personally reviewed the images of her MRI.  Independent historian account obtained from her family.  Decision made to proceed with radiation therapy.    At this time I would recommend to proceed with atezolizumab treatment today.  Radiation therapy as per the advice of radiation oncologist.  We are going to hold her next treatment and delayed by 2 weeks.  She will follow-up with me in about 5 weeks timeframe.  Discussed with the patient and her family that at this time we will focus on her quality of life.  No need to change any treatment.  If she is able to handle the treatment with tislelizumab and if her extracranial disease remain under control then we will continue that.  Eventually however her extracranial disease may also  start acting up and then we will have to make a change.  We will discuss at that time what can be done and it will depend upon how her clinical situation is.  Did talk to the patient and her family that she can expect some degree of cognitive decline after whole brain radiation therapy.  I am hopeful that the radiation therapist will do hippocampal sparing radiation therapy if it is possible.  Monitor for autoimmune side effects.  Continue with baby aspirin.  The longitudinal plan of care for the diagnosis(es)/condition(s) as documented were addressed during this visit. Due to the added complexity in care, I will continue to support Jasmyn in the subsequent management and with ongoing continuity of care.     Clinical/pathological stage       Cancer Staging   No matching staging information was found for the patient.      History of present illness      Ms. Jasmyn Green is  68 year old woman who has been referred to me for evaluation of newly diagnosed small cell lung cancer.  She appears to have extensive stage disease.  She presented to the [facility] with increasing shortness of breath coughing and symptoms suggestive of pneumonia.  She has a known history of severe obstructive lung disease with an FEV1 of 29% with hyperinflation and air trapping.  Presented with a 1 to 2-day history of worsening shortness of breath and with exertion as well as at rest.  Requiring increasing supplemental oxygen at home.  CT scan done in the emergency room showed bulky aortopulmonary as well as left hilar adenopathy with irregular pleural-based mass in the left upper lobe.  There are also some scattered satellite nodules.  This was very suspicious for malignancy.    After discharge she was seen by Dr. Levin and and underwent endobronchial ultrasound-guided biopsy which came back positive for small cell lung cancer.  The PET scan also showed hypermetabolic lesions in the mediastinum as well as the peripheral part of  the left upper lobe.    She is on oxygen at least 2 L at rest and sometimes increased oxygen at minimal activity.  She is 97 pounds.  Appetite is fair to poor.  Comes in accompanied by her 2 daughters regarding her treatment options.    She used to smoke but quit smoking.  She was in fact was being monitored by CT surveillance.  Had a CT scan done in June 2023 which was positive for some sort of pneumonia but no obvious malignancy was noted.    She was started on echemotherapy with carboplatin, etoposide and atezolizumab.  She has had 3 cycles.  After 3 cycles had severe side effects from chemotherapy.  She was had to be hospitalized for post side effect issues including some pneumonia etc.  She was at [facility] for several days.  We made the decision to discontinue cisplatin and etoposide.  We continue with the atezolizumab.    Jasmyn was able to tolerate atezolizumab only mild toxicity.  She is following up with palliative care physicians.  Her pain Patch was discontinued and she was put on small dose of Vicodin to help with her breathing.  That seems to be working well.  She had a PET scan done in July 2024.  The PET scan showed complete metabolic response.    She was asked to get the MRI of the brain done.  She had it done last week.  I was called by radiology because she had multiple new brain lesion with some with genic edema.  She was already started on dexamethasone.  She has scheduled appointment with radiation oncology.  She is otherwise asymptomatic.  Quite tearful due to the new diagnosis.    Past medical/surgical/social/family history        Past Medical History:   Diagnosis Date     Age-related osteoporosis without current pathological fracture      Arthritis      COPD (chronic obstructive pulmonary disease) (H)      Coronary artery disease      Dependence on nocturnal oxygen therapy      Dyspnea on exertion      Emphysema lung (H)      Gout      HLD (hyperlipidemia)      Hypertension      Lung  "mass      Past Surgical History:   Procedure Laterality Date     BRONCHOSCOPY RIGID OR FLEXIBLE W/TRANSENDOSCOPIC ENDOBRONCHIAL ULTRASOUND GUIDED N/A 2/16/2024    Procedure: BRONCHOSCOPY, WITH ENDOBRONCHIAL ULTRASOUND;  Surgeon: Ruben Levin MD;  Location: [facility] Main OR     COLONOSCOPY N/A 10/21/2021    Procedure: COLONOSCOPY;  Surgeon: Wilma Hester DO;  Location: [facility] Main OR     IR CHEST PORT PLACEMENT > 5 YRS OF AGE  5/2/2024     VAGINAL DELIVERY      x 3 ; remote     WISDOM TOOTH EXTRACTION         Review of system      Details noted in the history of present illness.  A detailed review of systems is otherwise negative.      Physical exam        /59 (BP Location: Left arm, Patient Position: Sitting, Cuff Size: Adult Small)   Pulse 112   Temp 97.9  F (36.6  C) (Oral)   Resp 16   Ht 1.499 m (4' 11\")   Wt 43.8 kg (96 lb 8 oz)   LMP  (LMP Unknown)   SpO2 94%   BMI 19.49 kg/m      GENERAL: No acute distress. Cooperative in conversation.   HEENT: Mild chemotherapy-induced alopecia.  Pupils are equal, round and reactive. Oral mucosa is clean and intact. No ulcerations or mucositis noted. No bleeding noted.  RESP:Chest symmetric lungs are clear bilaterally per auscultation. Regular respiratory rate. No wheezes or rhonchi.  CV: Normal S1 S2 Regular, rate and rhythm.     ABD: Nondistended, soft, nontender. Positive bowel sounds. No organomegaly.   EXTREMITIES: No lower extremity edema.   NEURO: Non- focal. Alert and oriented x3.  Cranial nerves appear intact.  PSYCH: Within normal limits. No depression or anxiety.  SKIN: Warm dry intact.      Lab results Reviewed      Recent Results (from the past 168 hour(s))   MR Brain w/o & w Contrast   Result Value Ref Range    Radiologist flags Multiple new intracranial metastasis (Urgent)    Comprehensive metabolic panel   Result Value Ref Range    Sodium 137 135 - 145 mmol/L    Potassium 5.7 (H) 3.4 - 5.3 mmol/L    Carbon Dioxide (CO2) 23 22 - 29 " mmol/L    Anion Gap 9 7 - 15 mmol/L    Urea Nitrogen 52.6 (H) 8.0 - 23.0 mg/dL    Creatinine 1.01 (H) 0.51 - 0.95 mg/dL    GFR Estimate 60 (L) >60 mL/min/1.73m2    Calcium 8.8 8.8 - 10.4 mg/dL    Chloride 105 98 - 107 mmol/L    Glucose 160 (H) 70 - 99 mg/dL    Alkaline Phosphatase 87 40 - 150 U/L    AST 19 0 - 45 U/L    ALT 26 0 - 50 U/L    Protein Total 6.1 (L) 6.4 - 8.3 g/dL    Albumin 3.6 3.5 - 5.2 g/dL    Bilirubin Total <0.2 <=1.2 mg/dL   TSH with free T4 reflex   Result Value Ref Range    TSH 0.28 (L) 0.30 - 4.20 uIU/mL   T4 free   Result Value Ref Range    Free T4 0.92 0.90 - 1.70 ng/dL       Imaging results Reviewed        MR Brain w/o & w Contrast    Result Date: 8/16/2024  EXAM: MR BRAIN W/O and W CONTRAST LOCATION: [facility] DATE: 8/15/2024 INDICATION:  Small cell lung cancer (H), Malignant neoplasm of middle lobe, bronchus or lung (H), Brain lesion COMPARISON: Head MRI 5/3/2024 and 3/14/2024 CONTRAST: 5ml Gadavist TECHNIQUE: Routine multiplanar multisequence head MRI without and with intravenous contrast. FINDINGS: INTRACRANIAL CONTENTS: No acute or subacute infarct. Multiple new nodular intra-axial enhancing juxtacortical lesions consistent with metastasis are now identified. These include an 8 mm lesion within the left middle frontal gyrus superiorly (series 19 image 21), separate 3 mm and 5 mm foci within left precentral gyrus (series 19 image 20), a 5 mm focus within the parasagittal right frontal lobe (series 19 image 18), an 8 mm lesion within the parasagittal left parietal lobe and a 15 x 10 mm lesion at the right parieto-occipital junction superiorly (series 19 image 17), a 5 mm lesion along the right inferior frontal gyrus and a 9 x 8 mm lesion along the right parieto-occipital junction (series 19 image 14), a 6 mm lesion along the right medial temporal lobe (series 19 image 11), a 6 mm lesion along the anterior superior right cerebellum (series 19 image 8), a 9 x 12 mm  lesion within the anterior left cerebellum (series 19 image 6), and a 5 mm lesion within the left cerebellum (series 19 image 5). Mild vasogenic edema associated with the largest right parieto-occipital junction and the left cerebellar lesions. No significant associated mass effect. Unchanged suspected arachnoid cyst over the parasagittal left frontal lobe. Scattered nonspecific T2/FLAIR hyperintensities within the cerebral white matter most consistent with mild chronic microvascular ischemic change. Minor cerebral volume loss. No hydrocephalus. Normal position of the cerebellar tonsils. SELLA: No abnormality accounting for technique. OSSEOUS STRUCTURES/SOFT TISSUES: Normal marrow signal. The major intracranial vascular flow voids are maintained. ORBITS: No abnormality accounting for technique. SINUSES/MASTOIDS: Mild mucosal thickening scattered about the paranasal sinuses. Patchy opacification of right mastoid air cells.     IMPRESSION: 1.  At least 12 nodular enhancing lesions within the supratentorial and infratentorial compartments consistent with multiple new/progressive intracranial metastasis. 2.  Dominant lesions at the right parieto-occipital junction superiorly and within the anterior left cerebellum demonstrate mild associated vasogenic edema without significant mass effect. 3.  Background of mild presumed senescent changes similar to previous exams. [Access Center: Multiple new intracranial metastasis] This report will be copied to the [facility] to ensure a provider acknowledges the finding. [facility] Center is available Monday through Friday 8am-3:30 pm.        Signed by: Amador Stewart MD      This note has been dictated using voice recognition software. Any grammatical or context distortions are unintentional and inherent to the software        Again, thank you for allowing me to participate in the care of your patient.        Sincerely,        Amador Stewart MD

## 2024-08-20 ENCOUNTER — OFFICE VISIT (OUTPATIENT)
Dept: RADIATION ONCOLOGY | Facility: CLINIC | Age: 69
End: 2024-08-20
Attending: STUDENT IN AN ORGANIZED HEALTH CARE EDUCATION/TRAINING PROGRAM
Payer: COMMERCIAL

## 2024-08-20 ENCOUNTER — DOCUMENTATION ONLY (OUTPATIENT)
Dept: RADIATION ONCOLOGY | Facility: HOSPITAL | Age: 69
End: 2024-08-20
Payer: COMMERCIAL

## 2024-08-20 ENCOUNTER — APPOINTMENT (OUTPATIENT)
Dept: RADIATION ONCOLOGY | Facility: HOSPITAL | Age: 69
End: 2024-08-20
Attending: STUDENT IN AN ORGANIZED HEALTH CARE EDUCATION/TRAINING PROGRAM
Payer: COMMERCIAL

## 2024-08-20 VITALS
DIASTOLIC BLOOD PRESSURE: 59 MMHG | TEMPERATURE: 97.7 F | OXYGEN SATURATION: 96 % | SYSTOLIC BLOOD PRESSURE: 130 MMHG | HEART RATE: 114 BPM | RESPIRATION RATE: 20 BRPM

## 2024-08-20 DIAGNOSIS — C34.90 SMALL CELL LUNG CANCER (H): Primary | ICD-10-CM

## 2024-08-20 DIAGNOSIS — C79.31 SECONDARY MALIGNANT NEOPLASM OF BRAIN (H): ICD-10-CM

## 2024-08-20 PROCEDURE — G0463 HOSPITAL OUTPT CLINIC VISIT: HCPCS | Performed by: STUDENT IN AN ORGANIZED HEALTH CARE EDUCATION/TRAINING PROGRAM

## 2024-08-20 PROCEDURE — 99417 PROLNG OP E/M EACH 15 MIN: CPT | Performed by: STUDENT IN AN ORGANIZED HEALTH CARE EDUCATION/TRAINING PROGRAM

## 2024-08-20 PROCEDURE — 77263 THER RADIOLOGY TX PLNG CPLX: CPT | Performed by: STUDENT IN AN ORGANIZED HEALTH CARE EDUCATION/TRAINING PROGRAM

## 2024-08-20 PROCEDURE — 77290 THER RAD SIMULAJ FIELD CPLX: CPT | Performed by: RADIOLOGY

## 2024-08-20 PROCEDURE — 77334 RADIATION TREATMENT AID(S): CPT | Mod: 26 | Performed by: RADIOLOGY

## 2024-08-20 PROCEDURE — 99215 OFFICE O/P EST HI 40 MIN: CPT | Mod: 25 | Performed by: STUDENT IN AN ORGANIZED HEALTH CARE EDUCATION/TRAINING PROGRAM

## 2024-08-20 PROCEDURE — 77470 SPECIAL RADIATION TREATMENT: CPT | Mod: 26 | Performed by: RADIOLOGY

## 2024-08-20 PROCEDURE — 77334 RADIATION TREATMENT AID(S): CPT | Performed by: RADIOLOGY

## 2024-08-20 PROCEDURE — 77470 SPECIAL RADIATION TREATMENT: CPT | Performed by: RADIOLOGY

## 2024-08-20 PROCEDURE — 77290 THER RAD SIMULAJ FIELD CPLX: CPT | Mod: 26 | Performed by: RADIOLOGY

## 2024-08-20 ASSESSMENT — PAIN SCALES - GENERAL: PAINLEVEL: NO PAIN (0)

## 2024-08-20 NOTE — PROGRESS NOTES
[facility] Radiation Oncology Consult Note     Patient: Jasmyn Green  MRN: [id number]  Date of Service: 08/20/2024          No referring provider defined for this encounter.       Dear Dr. Stewart:    Thank you very much for referring this patient for consideration of radiotherapy. As you know Ms. 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 She was seen today for consideration of palliative radiation.     HISTORY OF PRESENT ILLNESS:   Ms. Green is a 68 year old female who is initially seen by radiation oncology, MD Jacinto, 3/14/2024 please refer to that consult note for additional details.    She initially presented with extensive stage small cell lung cancer involving left upper lobe primary, left hilar and mediastinal lymphadenopathy, several pleural based metastases in the left hemithorax.  Severe COPD- FEV1 of 29% and reduced diffusion capacity with DLCO of 30% . She is on 2-3 L of oxygen at rest.     2/16/2024 endobronchial ultrasound-guided biopsy, pathology: station 4L and 10L: Small cell lung cancer .    carboplatin, etoposide and atezolizumab x3 c -- significant side effects after third cycle requiring discontinuation carbo/etoposide continues on maintenance atezolizumab.     7/3/2024 PET/CT: Complete metabolic response to therapy of previous left lung, left pleura and thoracic gisela disease.    8/15/2024 MRI: 612 nodular enhancing lesions within the supratentorial infratentorial compartments consistent with multiple new progressive intracranial metastases.  Dominant lesions right parietal-occipital junction superiorly within the anterior left cerebellum demonstrate mild vasogenic edema without significant mass effect.    8/16/2024: She was started on dexamethasone.  8/18/2024: atezolizumab 8c  Next cycle planned 9/24/2022    She is O2 dependent and feels her shortness of breath has become worse.  Currently on 2 L while  at rest but increases to 3 or 4 sometimes 5 L with activity.  She reports no headaches.  She has some nausea from time to time.  She has had a few forgetful episodes but otherwise denies any difficulty with short-term memory.  She has not been sleeping well the last few days.  She was started on dexamethasone 4 mg twice daily.  She has not noticed any change in symptoms with initiation of steroids.  She is connected with palliative care.     Former smoker: >40 pk yr    CHEMOTHERAPY HISTORY: Concurrent Chemotherapy: between cycles of atezolizumab  Last 8/18/2024: atezolizumab 8c  Next 9/24/2024: atezolizumab     RADIATION THERAPY HISTORY: Prior Radiation: No    IMPLANTED CARDIAC DEVICE: none     PREGNANCY: N/A  G3, LMP 26 yrs ago  Current Outpatient Medications   Medication Sig Dispense Refill    memantine (NAMENDA) 5 MG tablet Take 2 tablets (10 mg) by mouth 2 times daily. Starting with 5 mg in the morning week 1, 5 mg twice a day week 2, morning dose 10 mg, and evening dose 5 mg week 3, and 10 mg twice a day weeks 4 through 24. 60 tablet 5    acetaminophen (TYLENOL) 500 MG tablet Take 2 tablets (1,000 mg) by mouth every 8 hours as needed for pain      albuterol (PROAIR HFA/PROVENTIL HFA/VENTOLIN HFA) 108 (90 Base) MCG/ACT inhaler Inhale 1-2 puffs into the lungs every 6 hours as needed for shortness of breath, wheezing or cough      apixaban ANTICOAGULANT (ELIQUIS ANTICOAGULANT) 5 MG tablet Take 1 tablet (5 mg) by mouth 2 times daily 60 tablet 5    aspirin (ASA) 325 MG EC tablet Take 325 mg by mouth daily      atorvastatin (LIPITOR) 40 MG tablet Take 1 tablet (40 mg) by mouth daily 90 tablet 3    buprenorphine (BUTRANS) 5 MCG/HR WK patch Place 1 patch onto the skin every 7 days 4 patch 0    calcium carbonate 600 mg-vitamin D 400 units (CALTRATE) 600-400 MG-UNIT per tablet Take 1 tablet by mouth 2 times daily With calcium      dexAMETHasone (DECADRON) 4 MG tablet Take 1 tablet (4 mg) by mouth 2 times daily (with  meals) 20 tablet 1    diazepam (VALIUM) 5 MG tablet Take 1 tablet (5 mg) by mouth once as needed for anxiety Before MRI (Patient not taking: Reported on 8/19/2024) 2 tablet 0    diazepam (VALIUM) 5 MG tablet Take 1 tablet (5 mg) by mouth every 6 hours as needed for anxiety (Patient not taking: Reported on 8/19/2024) 2 tablet 2    doxycycline monohydrate (ADOXA) 100 MG tablet Take 100 mg by mouth daily (Patient not taking: Reported on 8/19/2024)      fexofenadine (ALLEGRA ALLERGY) 60 MG tablet Take 60 mg by mouth 2 times daily      fluticasone-salmeterol (AIRDUO RESPICLICK) 232-14 MCG/ACT inhaler Inhale 1 puff into the lungs 2 times daily 1 each 11    hydrOXYzine HCl (ATARAX) 25 MG tablet Take 1-2 tablets (25-50 mg) by mouth every 8 hours as needed for anxiety (Insomnia) 30 tablet 3    ipratropium - albuterol 0.5 mg/2.5 mg/3 mL (DUONEB) 0.5-2.5 (3) MG/3ML neb solution Take 1 vial (3 mLs) by nebulization every 6 hours as needed for shortness of breath, wheezing or cough 180 mL 11    ipratropium-albuterol (COMBIVENT RESPIMAT)  MCG/ACT inhaler Inhale 1 puff into the lungs 4 times daily 4 g 11    lactobacillus rhamnosus, GG, (CULTURELL) capsule Take 1 capsule by mouth 2 times daily      lidocaine-prilocaine (EMLA) 2.5-2.5 % external cream Apply topically as needed for moderate pain 30 g 5    lisinopril (ZESTRIL) 10 MG tablet Take 1 tablet (10 mg) by mouth daily 30 tablet 12    mirtazapine (REMERON) 7.5 MG tablet Take 1 tablet (7.5 mg) by mouth at bedtime 90 tablet 3    naloxone (NARCAN) 4 MG/0.1ML nasal spray Spray 1 spray (4 mg) into one nostril alternating nostrils as needed for opioid reversal every 2-3 minutes until assistance arrives 0.2 mL 1    omeprazole (PRILOSEC) 10 MG DR capsule Take 10 mg by mouth daily (Patient not taking: Reported on 7/8/2024)      ondansetron (ZOFRAN ODT) 8 MG ODT tab Take 1 tablet (8 mg) by mouth every 8 hours as needed for nausea 30 tablet 1    oxyCODONE (ROXICODONE) 5 MG tablet  Take 0.5-1 tablets (2.5-5 mg) by mouth every 4 hours as needed for pain or moderate to severe pain (Try one-half tab first to see response) 40 tablet 0    OXYGEN-HELIUM IN Inhale 3-4 L into the lungs continuous prn      predniSONE (DELTASONE) 20 MG tablet Take 2 tabs daily x 5 days (Patient not taking: Reported on 8/19/2024) 10 tablet 0    prochlorperazine (COMPAZINE) 10 MG tablet Take 1 tablet (10 mg) by mouth every 6 hours as needed for nausea or vomiting 30 tablet 2    umeclidinium (INCRUSE ELLIPTA) 62.5 MCG/ACT inhaler Inhale 1 puff into the lungs daily 30 each 11     Past Medical History:   Diagnosis Date    Age-related osteoporosis without current pathological fracture     Arthritis     COPD (chronic obstructive pulmonary disease) (H)     Coronary artery disease     Dependence on nocturnal oxygen therapy     Dyspnea on exertion     Emphysema lung (H)     Gout     HLD (hyperlipidemia)     Hypertension     Lung mass      Past Surgical History:   Procedure Laterality Date    BRONCHOSCOPY RIGID OR FLEXIBLE W/TRANSENDOSCOPIC ENDOBRONCHIAL ULTRASOUND GUIDED N/A 2/16/2024    Procedure: BRONCHOSCOPY, WITH ENDOBRONCHIAL ULTRASOUND;  Surgeon: Ruben Levin MD;  [place] [facility] [place]    COLONOSCOPY N/A 10/21/2021    Procedure: COLONOSCOPY;  Surgeon: Wilma Hester DO;  [facility]    IR CHEST PORT PLACEMENT > 5 YRS OF AGE  5/2/2024    VAGINAL DELIVERY      x 3 ; remote    WISDOM TOOTH EXTRACTION       No Known Allergies  Family History   Problem Relation Age of Onset    Chronic Obstructive Pulmonary Disease Sister     Diabetes Mother     Heart Disease Mother     Lung Cancer Mother     Heart Disease Father      Social History     Socioeconomic History    Marital status:      Spouse name: Not on file    Number of children: Not on file    Years of education: Not on file    Highest education level: Not on file   Occupational History    Not on file   Tobacco Use    Smoking status: Former      Current packs/day: 0.00     Types: Cigarettes     Quit date: 6/27/2021     Years since quitting: 3.1     Passive exposure: Past    Smokeless tobacco: Never    Tobacco comments:     updated 8/3/2021 by TTS   Vaping Use    Vaping status: Never Used   Substance and Sexual Activity    Alcohol use: Not Currently    Drug use: Never    Sexual activity: Not Currently   Other Topics Concern    Not on file   Social History Narrative     many years 3 grown children. 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 Last cigarette a week ago.non drinker  Her primary MD (Wilda) retired several years ago/ tends to avoid doctors/medical care in general       Social Determinants of Health     Financial Resource Strain: Low Risk  (2/1/2024)    Financial Resource Strain     Within the past 12 months, have you or your family members you live with been unable to get utilities (heat, electricity) when it was really needed?: No   Food Insecurity: Low Risk  (2/1/2024)    Food Insecurity     Within the past 12 months, did you worry that your food would run out before you got money to buy more?: No     Within the past 12 months, did the food you bought just not last and you didn t have money to get more?: No   Transportation Needs: Low Risk  (2/1/2024)    Transportation Needs     Within the past 12 months, has lack of transportation kept you from medical appointments, getting your medicines, non-medical meetings or appointments, work, or from getting things that you need?: No   Physical Activity: Sufficiently Active (11/10/2021)    Exercise Vital Sign     Days of Exercise per Week: 5 days     Minutes of Exercise per Session: 30 min   Stress: No Stress Concern Present (11/10/2021)    [employer] [place] [employer] - Occupational Stress Questionnaire     Feeling of Stress : Not at all   Social Connections: Socially Isolated (11/10/2021)    Social Connection and Isolation Panel [NHANES]     Frequency of Communication with Friends  and Family: More than three times a week     Frequency of Social Gatherings with Friends and Family: Once a week     Attends Taoism Services: Never     Active Member of Clubs or Organizations: No     Attends Club or Organization Meetings: Not on file     Marital Status:    Interpersonal Safety: Low Risk  (2/1/2024)    Interpersonal Safety     Do you feel physically and emotionally safe where you currently live?: Yes     Within the past 12 months, have you been hit, slapped, kicked or otherwise physically hurt by someone?: No     Within the past 12 months, have you been humiliated or emotionally abused in other ways by your partner or ex-partner?: No   Housing Stability: Low Risk  (2/1/2024)    Housing Stability     Do you have housing? : Yes     Are you worried about losing your housing?: No        REVIEW OF SYMPTOMS:  A full 14-point review of systems was performed. Pertinent findings are noted in the HPI.    General  Constitutional  Constitutional (WDL): Exceptions to WDL  Fatigue: Fatigue relieved by rest  EENT  Eye Disorders  Eye Disorder (WDL): All eye disorder elements are within defined limits  Ear Disorders  Ear Disorder (WDL): All ear disorder elements are within defined limits  Respiratory  Respiratory  Respiratory (WDL): Exceptions to WDL  Cough: Mild symptoms OR nonprescription intervention indicated  Dyspnea: Shortness of breath with minimal exertion OR limiting instrumental ADL  Hypoxia: Decreased oxygen saturation at rest (e.g., pulse oximeter less than 88% or PaO2 less than or equal to 55 mmHg) (oxygen dependent)  Cardiovascular  Cardiovascular  Cardiovascular (WDL): All cardiovascular elements are within defined limits (no pacemaker)  Gastrointestinal  Gastrointestinal  Gastrointestinal (WDL): Exceptions to WDL  Anorexia: Loss of appetite without alteration in eating habits  Nausea: Loss of appetite without alteration in eating habits  Musculoskeletal  Musculoskeletal and Connective  Tissue Disorders  Musculoskeletal & Connective (WDL): Exceptions to WDL  Arthralgia: Mild pain  Integumentary     Neurological  Neurosensory  Neurosensory (WDL): Exceptions to WDL  Ataxia: Asymptomatic OR clinical or diagnostic observations only OR intervention not indicated  Confusion: Mild disorientation (at times)  Genitourinary/Reproductive  Genitourinary  Genitourinary (WDL): All genitourinary elements are within defined limits  Lymphatic  Lymph System Disorders  Lymph (WDL): All lymph elements are within defined limits  Pain  Pain Score: No Pain (0)  AUA Assessment                                                              Accompanied by  Accompanied By: family    ECOG Status: 2 - Ambulatory and independent in all ADLs; cannot work; up > 50% of the time    KPS Score: 70% Cannot do active work, but can care for self    Pain Management Plan: N/A    Recent Labs:   Recent Results (from the past 168 hour(s))   MR Brain w/o & w Contrast   Result Value Ref Range    Radiologist flags Multiple new intracranial metastasis (Urgent)    Comprehensive metabolic panel   Result Value Ref Range    Sodium 137 135 - 145 mmol/L    Potassium 5.7 (H) 3.4 - 5.3 mmol/L    Carbon Dioxide (CO2) 23 22 - 29 mmol/L    Anion Gap 9 7 - 15 mmol/L    Urea Nitrogen 52.6 (H) 8.0 - 23.0 mg/dL    Creatinine 1.01 (H) 0.51 - 0.95 mg/dL    GFR Estimate 60 (L) >60 mL/min/1.73m2    Calcium 8.8 8.8 - 10.4 mg/dL    Chloride 105 98 - 107 mmol/L    Glucose 160 (H) 70 - 99 mg/dL    Alkaline Phosphatase 87 40 - 150 U/L    AST 19 0 - 45 U/L    ALT 26 0 - 50 U/L    Protein Total 6.1 (L) 6.4 - 8.3 g/dL    Albumin 3.6 3.5 - 5.2 g/dL    Bilirubin Total <0.2 <=1.2 mg/dL   TSH with free T4 reflex   Result Value Ref Range    TSH 0.28 (L) 0.30 - 4.20 uIU/mL   T4 free   Result Value Ref Range    Free T4 0.92 0.90 - 1.70 ng/dL       Imaging: Imaging results 6 weeks:MR Brain w/o & w Contrast    Result Date: 8/16/2024  EXAM: MR BRAIN W/O and W CONTRAST LOCATION: [facility] DATE: 8/15/2024 INDICATION:  Small cell lung cancer (H), Malignant neoplasm of middle lobe, bronchus or lung (H), Brain lesion COMPARISON: Head MRI 5/3/2024 and 3/14/2024 CONTRAST: 5ml Gadavist TECHNIQUE: Routine multiplanar multisequence head MRI without and with intravenous contrast. FINDINGS: INTRACRANIAL CONTENTS: No acute or subacute infarct. Multiple new nodular intra-axial enhancing juxtacortical lesions consistent with metastasis are now identified. These include an 8 mm lesion within the left middle frontal gyrus superiorly (series 19 image 21), separate 3 mm and 5 mm foci within left precentral gyrus (series 19 image 20), a 5 mm focus within the parasagittal right frontal lobe (series 19 image 18), an 8 mm lesion within the parasagittal left parietal lobe and a 15 x 10 mm lesion at the right parieto-occipital junction superiorly (series 19 image 17), a 5 mm lesion along the right inferior frontal gyrus and a 9 x 8 mm lesion along the right parieto-occipital junction (series 19 image 14), a 6 mm lesion along the right medial temporal lobe (series 19 image 11), a 6 mm lesion along the anterior superior right cerebellum (series 19 image 8), a 9 x 12 mm lesion within the anterior left cerebellum (series 19 image 6), and a 5 mm lesion within the left cerebellum (series 19 image 5). Mild vasogenic edema associated with the largest right parieto-occipital junction and the left cerebellar lesions. No significant associated mass effect. Unchanged suspected arachnoid cyst over the parasagittal left frontal lobe. Scattered nonspecific T2/FLAIR hyperintensities within the cerebral white matter most consistent with mild chronic microvascular ischemic change. Minor cerebral volume loss. No hydrocephalus. Normal position of the cerebellar tonsils. SELLA: No abnormality accounting for technique. OSSEOUS STRUCTURES/SOFT TISSUES: Normal marrow signal. The major intracranial vascular flow  voids are maintained. ORBITS: No abnormality accounting for technique. SINUSES/MASTOIDS: Mild mucosal thickening scattered about the paranasal sinuses. Patchy opacification of right mastoid air cells.     IMPRESSION: 1.  At least 12 nodular enhancing lesions within the supratentorial and infratentorial compartments consistent with multiple new/progressive intracranial metastasis. 2.  Dominant lesions at the right parieto-occipital junction superiorly and within the anterior left cerebellum demonstrate mild associated vasogenic edema without significant mass effect. 3.  Background of mild presumed senescent changes similar to previous exams. [Access Center: Multiple new intracranial metastasis] This report will be copied to the [facility] to ensure a provider acknowledges the finding. Access Center is available Monday through Friday 8am-3:30 pm.      Pathology:   No results found for this or any previous visit (from the past 8760 hour(s)).  A) MEDIASTINAL LYMPH NODE, STATION 4R, ENDOBRONCHIAL ULTRASOUND-GUIDED FINE-NEEDLE ASPIRATION:  -PREDOMINANTLY BLOOD AND LYMPHOCYTES  -RARE ATYPICAL EPITHELIOID CELLS ARE PRESENT, SUSPICIOUS FOR NEOPLASIA     B) MEDIASTINAL LYMPH NODE, STATION 7, ENDOBRONCHIAL ULTRASOUND-GUIDED FINE-NEEDLE ASPIRATION:  -BENIGN; BLOOD AND LYMPHOCYTES, OCCASIONAL BENIGN BRONCHIAL EPITHELIAL CELLS  -NO TUMOR SEEN     C) MEDIASTINAL LYMPH NODE, STATION 4L, ENDOBRONCHIAL ULTRASOUND-GUIDED FINE-NEEDLE ASPIRATION:  -METASTATIC SMALL CELL CARCINOMA OF PULMONARY ORIGIN     D) MEDIASTINAL LYMPH NODE, STATION 10 L, ENDOBRONCHIAL ULTRASOUND-GUIDED FINE-NEEDLE ASPIRATION:  -METASTATIC SMALL CELL CARCINOMA OF PULMONARY ORIGIN               Objective:        PHYSICAL EXAMINATION:    /59 (BP Location: Right arm, Patient Position: Sitting)   Pulse 114   Temp 97.7  F (36.5  C)   Resp 20   LMP  (LMP Unknown)   SpO2 96%     Gen: Alert, in NAD pleasant interactive, well nourished  Eyes: EOMI,  sclera anicteric  HENT     Head: NC/AT  Musculoskeletal: Normal muscle bulk and tone  Skin: Normal tone and turgor, warm, dry, intact  Neurologic: A/Ox3,  face symmetric, speech fluent, no focal motor deficits. Gait not assessed as primarily in wheelchair, ambulates with assistance from chair to wheelchair.  Psychiatric: Appropriate mood and affect     Intent of Therapy: Palliative  Side effects that may occur during or within weeks after Radiation Therapy    Fatigue and general weakness  Headache and scalp irritation  Loss of hair  Nausea, vomiting, and decrease in appetite  Darkening, irritation, itchiness, redness, dryness, peeling, thickening, scabbing, and ulceration of the scalp, neck and forehead    Side effects that may occur months or years after Radiation Therapy    Development of another tumor or cancer         Dizziness and balance problems  Decrease in hormone production  Brain inflammation or necrosis that may cause various neurologic symptoms  Seizures  Decrease in memory and thinking abilities  Decrease in hearing and feeling of ear congestion  Eye dryness and irritation  Loss of vision  Cataracts in the eyes  Poor healing after a trauma or surgery in the irradiated area  Facial numbness, pain and weakness    The risks, benefits and alternatives to radiation therapy were outlined with the patient. All questions were answered and a consent was signed.     Impression   68-year-old woman with stage IV small cell lung cancer with multiple brain metastases.    Visit Dx:  (C34.90) Small cell lung cancer (H)  (primary encounter diagnosis)  Plan: memantine (NAMENDA) 5 MG tablet    (C79.31) Secondary malignant neoplasm of brain (H)  Plan: memantine (NAMENDA) 5 MG tablet       Cancer Staging   No matching staging information was found for the patient.      Assessment & Plan:     Discussed palliative whole brain radiation therapy with and without hippocampal avoidance with patient and her family.  The  indications for, process of, alternatives, potential side effects and complications of RT to the brain were discussed.    Discussed short-term memory impairment with whole brain radiation therapy can be improved with use of hippocampal avoidance as well as memantine.  Family wanted to discuss both hippocampal avoidance as well as memantine prior to making any decisions.  Their primary concern with hippocampal avoidance was risk of recurrence in the areas avoided, reassurance given that in studies this possibility is quite low.  They asked multiple questions which were answered to their satisfaction and they verbalized understanding.    They wish to proceed with whole brain radiation therapy and consent is signed today.  They will return to clinic later today CT simulation for RT planning at which time they will confirm if they would like to proceed with hippocampal avoidance as well as memantine.      Anticipate 3000 cGy in 10 fractions, start date pending decision on hippocampal avoidance.     Discussed steroid taper during radiation, while monitoring for symptoms.    Addendum: Patient elected to proceed with whole brain radiation therapy with use of memantine, prescription sent and will begin with initiation of radiation.  Start date 8/22/2024.    Thank you for allowing me to participate in the care of this patient. Feel free to contact me with all questions or concerns.      Total time of this visit, including time spent face-to-face with patient and or via video/audio, and also in preparing for today's visit for MDM and documentation. Medical decision-making included consideration and possible diagnoses, management options, complex record review, review of diagnostic tests and information, consideration and discussion of significant complications based on comorbidities, discussion with providers involved in the care of the patient.     70 Minutes spent.     This note has been dictated using voice recognition  software and as a result may contain minor grammatical errors and unintended word substitutions.    Sincerely,      Tata Nuñez MD  Department of Radiation Oncology   [facility] Radiation Oncology  Tel: [phone]  Page: [phone]    [facility]  [address] [place] [address]     [facility]   [address]    [place] [address]    CC:  Patient Care Team:  Ana Maria Bermudez MD as PCP - General (Internal Medicine)  Aidee Luna RT as Chronic Pulmonary Disease Specialist (Respiratory Therapy)  Rickie Pickens MD as Assigned Endocrinology Provider  Pavan Bee DPM as Assigned Surgical Provider  Ruben Levin MD as MD (Critical Care)  Tata Lainez NP as Assigned Pulmonology Provider  Amador Stewart MD as MD (Hematology)  Wilma Carmona, RN as Specialty Care Coordinator (Hematology & Oncology)  Ana Maria Bermudez MD as Assigned PCP  Evans Gramajo APRN CNP as Nurse Practitioner (Hospice And Palliative Care)  Evans Gramajo APRN CNP as Assigned Palliative Care Provider  Bryant Mckinney MD as Physician (Vascular Surgery)  Naheed Mulligan MD as MD (Cardiovascular Disease)  Naheed Mulligan MD as Assigned Heart and Vascular Provider  Amador Stewart MD as Assigned Cancer Care Provider  Tata Nuñez MD as MD (Radiation Oncology)

## 2024-08-20 NOTE — PROGRESS NOTES
[facility] Hematology and Oncology Consult Note    Patient: Jasmyn Green  MRN: [id number]  Date of Service: Aug 19, 2024           Reason for consultation      Problem List Items Addressed This Visit          Respiratory    Small cell lung cancer (H) - Primary    Relevant Orders    Infusion Appointment Request - Adult       Assessment / number of problems addressed      1.  A very pleasant 68 year old  woman with looks like extensive stage small cell lung cancer.  Started on palliative chemotherapy with carboplatin, etoposide and atezolizumab.  After 2 cycles seemed to be responding quite well on the CT scan.  Had significant side effects after third cycle requiring discontinuation of cytotoxic chemotherapy and continues on maintenance atezolizumab.  Last PET scan had shown very good response in July 2024.  Unfortunately now has developed new brain metastases.  2.  Severe COPD.  She is on 2-3 L of oxygen at rest.  3.  Significantly decreased FEV1 of 29% and reduced diffusion capacity with DLCO of 30%.  4.  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  On baby aspirin.  5.  Other medical conditions stable.  6.  Severe anemia secondary to chemotherapy.      Plan and medical decision making      Patient presenting with new brain metastases.  This obviously puts a difference in her prognosis.Reviewed notes from each unique source.  Reviewed each unique test.    Ordered tests.    Independently interpreted lab tests and radiological exams performed by other physicians.  Personally reviewed the images of her MRI.  Independent historian account obtained from her family.  Decision made to proceed with radiation therapy.    At this time I would recommend to proceed with atezolizumab treatment today.  Radiation therapy as per the advice of radiation oncologist.  We are going to hold her next treatment and delayed by 2 weeks.  She will follow-up with me in about 5 weeks timeframe.  Discussed with the patient and her family  that at this time we will focus on her quality of life.  No need to change any treatment.  If she is able to handle the treatment with tislelizumab and if her extracranial disease remain under control then we will continue that.  Eventually however her extracranial disease may also start acting up and then we will have to make a change.  We will discuss at that time what can be done and it will depend upon how her clinical situation is.  Did talk to the patient and her family that she can expect some degree of cognitive decline after whole brain radiation therapy.  I am hopeful that the radiation therapist will do hippocampal sparing radiation therapy if it is possible.  Monitor for autoimmune side effects.  Continue with baby aspirin.  The longitudinal plan of care for the diagnosis(es)/condition(s) as documented were addressed during this visit. Due to the added complexity in care, I will continue to support Jasmyn in the subsequent management and with ongoing continuity of care.     Clinical/pathological stage       Cancer Staging   No matching staging information was found for the patient.      History of present illness      Ms. Jasmyn Green is  68 year old woman who has been referred to me for evaluation of newly diagnosed small cell lung cancer.  She appears to have extensive stage disease.  She presented to the [facility] with increasing shortness of breath coughing and symptoms suggestive of pneumonia.  She has a known history of severe obstructive lung disease with an FEV1 of 29% with hyperinflation and air trapping.  Presented with a 1 to 2-day history of worsening shortness of breath and with exertion as well as at rest.  Requiring increasing supplemental oxygen at home.  CT scan done in the emergency room showed bulky aortopulmonary as well as left hilar adenopathy with irregular pleural-based mass in the left upper lobe.  There are also some scattered satellite nodules.  This was very  suspicious for malignancy.    After discharge she was seen by Dr. Levin and and underwent endobronchial ultrasound-guided biopsy which came back positive for small cell lung cancer.  The PET scan also showed hypermetabolic lesions in the mediastinum as well as the peripheral part of the left upper lobe.    She is on oxygen at least 2 L at rest and sometimes increased oxygen at minimal activity.  She is 97 pounds.  Appetite is fair to poor.  Comes in accompanied by her 2 daughters regarding her treatment options.    She used to smoke but quit smoking.  She was in fact was being monitored by CT surveillance.  Had a CT scan done in June 2023 which was positive for some sort of pneumonia but no obvious malignancy was noted.    She was started on echemotherapy with carboplatin, etoposide and atezolizumab.  She has had 3 cycles.  After 3 cycles had severe side effects from chemotherapy.  She was had to be hospitalized for post side effect issues including some pneumonia etc.  She was at [facility] for several days.  We made the decision to discontinue cisplatin and etoposide.  We continue with the atezolizumab.    Jasmyn was able to tolerate atezolizumab only mild toxicity.  She is following up with palliative care physicians.  Her pain Patch was discontinued and she was put on small dose of Vicodin to help with her breathing.  That seems to be working well.  She had a PET scan done in July 2024.  The PET scan showed complete metabolic response.    She was asked to get the MRI of the brain done.  She had it done last week.  I was called by radiology because she had multiple new brain lesion with some with genic edema.  She was already started on dexamethasone.  She has scheduled appointment with radiation oncology.  She is otherwise asymptomatic.  Quite tearful due to the new diagnosis.    Past medical/surgical/social/family history        Past Medical History:   Diagnosis Date    Age-related osteoporosis without  "current pathological fracture     Arthritis     COPD (chronic obstructive pulmonary disease) (H)     Coronary artery disease     Dependence on nocturnal oxygen therapy     Dyspnea on exertion     Emphysema lung (H)     Gout     HLD (hyperlipidemia)     Hypertension     Lung mass      Past Surgical History:   Procedure Laterality Date    BRONCHOSCOPY RIGID OR FLEXIBLE W/TRANSENDOSCOPIC ENDOBRONCHIAL ULTRASOUND GUIDED N/A 2/16/2024    Procedure: BRONCHOSCOPY, WITH ENDOBRONCHIAL ULTRASOUND;  Surgeon: Ruben Levin MD;  Location: [facility] OR    COLONOSCOPY N/A 10/21/2021    Procedure: COLONOSCOPY;  Surgeon: Wilma Hester DO;  Location: [facility] OR    IR CHEST PORT PLACEMENT > 5 YRS OF AGE  5/2/2024    VAGINAL DELIVERY      x 3 ; remote    WISDOM TOOTH EXTRACTION         Review of system      Details noted in the history of present illness.  A detailed review of systems is otherwise negative.      Physical exam        /59 (BP Location: Left arm, Patient Position: Sitting, Cuff Size: Adult Small)   Pulse 112   Temp 97.9  F (36.6  C) (Oral)   Resp 16   Ht 1.499 m (4' 11\")   Wt 43.8 kg (96 lb 8 oz)   LMP  (LMP Unknown)   SpO2 94%   BMI 19.49 kg/m      GENERAL: No acute distress. Cooperative in conversation.   HEENT: Mild chemotherapy-induced alopecia.  Pupils are equal, round and reactive. Oral mucosa is clean and intact. No ulcerations or mucositis noted. No bleeding noted.  RESP:Chest symmetric lungs are clear bilaterally per auscultation. Regular respiratory rate. No wheezes or rhonchi.  CV: Normal S1 S2 Regular, rate and rhythm.     ABD: Nondistended, soft, nontender. Positive bowel sounds. No organomegaly.   EXTREMITIES: No lower extremity edema.   NEURO: Non- focal. Alert and oriented x3.  Cranial nerves appear intact.  PSYCH: Within normal limits. No depression or anxiety.  SKIN: Warm dry intact.      Lab results Reviewed      Recent Results (from the past 168 hour(s))   MR Brain w/o & " w Contrast   Result Value Ref Range    Radiologist flags Multiple new intracranial metastasis (Urgent)    Comprehensive metabolic panel   Result Value Ref Range    Sodium 137 135 - 145 mmol/L    Potassium 5.7 (H) 3.4 - 5.3 mmol/L    Carbon Dioxide (CO2) 23 22 - 29 mmol/L    Anion Gap 9 7 - 15 mmol/L    Urea Nitrogen 52.6 (H) 8.0 - 23.0 mg/dL    Creatinine 1.01 (H) 0.51 - 0.95 mg/dL    GFR Estimate 60 (L) >60 mL/min/1.73m2    Calcium 8.8 8.8 - 10.4 mg/dL    Chloride 105 98 - 107 mmol/L    Glucose 160 (H) 70 - 99 mg/dL    Alkaline Phosphatase 87 40 - 150 U/L    AST 19 0 - 45 U/L    ALT 26 0 - 50 U/L    Protein Total 6.1 (L) 6.4 - 8.3 g/dL    Albumin 3.6 3.5 - 5.2 g/dL    Bilirubin Total <0.2 <=1.2 mg/dL   TSH with free T4 reflex   Result Value Ref Range    TSH 0.28 (L) 0.30 - 4.20 uIU/mL   T4 free   Result Value Ref Range    Free T4 0.92 0.90 - 1.70 ng/dL       Imaging results Reviewed        MR Brain w/o & w Contrast    Result Date: 8/16/2024  EXAM: MR BRAIN W/O and W CONTRAST LOCATION: [facility] DATE: 8/15/2024 INDICATION:  Small cell lung cancer (H), Malignant neoplasm of middle lobe, bronchus or lung (H), Brain lesion COMPARISON: Head MRI 5/3/2024 and 3/14/2024 CONTRAST: 5ml Gadavist TECHNIQUE: Routine multiplanar multisequence head MRI without and with intravenous contrast. FINDINGS: INTRACRANIAL CONTENTS: No acute or subacute infarct. Multiple new nodular intra-axial enhancing juxtacortical lesions consistent with metastasis are now identified. These include an 8 mm lesion within the left middle frontal gyrus superiorly (series 19 image 21), separate 3 mm and 5 mm foci within left precentral gyrus (series 19 image 20), a 5 mm focus within the parasagittal right frontal lobe (series 19 image 18), an 8 mm lesion within the parasagittal left parietal lobe and a 15 x 10 mm lesion at the right parieto-occipital junction superiorly (series 19 image 17), a 5 mm lesion along the right inferior  frontal gyrus and a 9 x 8 mm lesion along the right parieto-occipital junction (series 19 image 14), a 6 mm lesion along the right medial temporal lobe (series 19 image 11), a 6 mm lesion along the anterior superior right cerebellum (series 19 image 8), a 9 x 12 mm lesion within the anterior left cerebellum (series 19 image 6), and a 5 mm lesion within the left cerebellum (series 19 image 5). Mild vasogenic edema associated with the largest right parieto-occipital junction and the left cerebellar lesions. No significant associated mass effect. Unchanged suspected arachnoid cyst over the parasagittal left frontal lobe. Scattered nonspecific T2/FLAIR hyperintensities within the cerebral white matter most consistent with mild chronic microvascular ischemic change. Minor cerebral volume loss. No hydrocephalus. Normal position of the cerebellar tonsils. SELLA: No abnormality accounting for technique. OSSEOUS STRUCTURES/SOFT TISSUES: Normal marrow signal. The major intracranial vascular flow voids are maintained. ORBITS: No abnormality accounting for technique. SINUSES/MASTOIDS: Mild mucosal thickening scattered about the paranasal sinuses. Patchy opacification of right mastoid air cells.     IMPRESSION: 1.  At least 12 nodular enhancing lesions within the supratentorial and infratentorial compartments consistent with multiple new/progressive intracranial metastasis. 2.  Dominant lesions at the right parieto-occipital junction superiorly and within the anterior left cerebellum demonstrate mild associated vasogenic edema without significant mass effect. 3.  Background of mild presumed senescent changes similar to previous exams. [Access Center: Multiple new intracranial metastasis] This report will be copied to the [facility] to ensure a provider acknowledges the finding. Access Center is available Monday through Friday 8am-3:30 pm.        Signed by: Amador Stewart MD      This note has been dictated using voice  recognition software. Any grammatical or context distortions are unintentional and inherent to the software

## 2024-08-20 NOTE — PROGRESS NOTES
"Oncology Rooming Note    August 20, 2024 8:35 AM   Jasmyn Green is a 68 year old female who presents for:    Chief Complaint   Patient presents with    Oncology Clinic Visit    Radiation Therapy     Consult for new area of disease     Initial Vitals: /59 (BP Location: Right arm, Patient Position: Sitting)   Pulse 114   Temp 97.7  F (36.5  C)   Resp 20   LMP  (LMP Unknown)   SpO2 96%  Estimated body mass index is 19.49 kg/m  as calculated from the following:    Height as of 8/19/24: 1.499 m (4' 11\").    Weight as of 8/19/24: 43.8 kg (96 lb 8 oz). There is no height or weight on file to calculate BSA.  No Pain (0) Comment: Data Unavailable   No LMP recorded (lmp unknown). Patient is postmenopausal.  Allergies reviewed: Yes  Medications reviewed: Yes    Medications: Medication refills not needed today.  Pharmacy name entered into [employer]:    [facility] - [place], [place] - [address] [facility] - [place], [place] - [address]    Frailty Screening:   Is the patient here for a new oncology consult visit in cancer care? 2. No      Clinical concerns: New consult for brain mets found on MRI done 8/15, previously consulted with Dr. Jacinto in March for lung cancer, pt is oxygen dependent  Dr. Nuñez was notified.    Considerations for radiation treatment   Pregnancy status: Female age 55+   Implanted Cardiac Devices: No   Any previous radiation therapy: No     Radiation Therapy Patient Education    Person involved with teaching: Patient    Patient educational needs for self management of treatment-related side effects assessment completed.  [facility] Patient Ed tab contains Patient Learning Assessment    Education Materials Given  Managing Side Effects of Radiation Therapy: Care Instructions, Learning About External Beam Radiation Treatment, Dealing With Being Tired From Cancer Treatment: Care Instructions, Radiation Treatment For Cancer, Radiation Therapy to the Brain Guidelines, Nutrition " for the Person with Cancer During Treatment, Oncology Supportive Care Services , Welcome Letter, Insurance PA Information, Map [employer], and Radiation Therapy and You    Educational Topics Discussed  Side effects expected, Pain management, Skin care, Activity, Nutrition and weight loss, and When to call MD/RN    Response To Teaching  More review necessary and Verbalizes understanding    GYN Only  Vaginal Dilator-given and educated: N/A    Referrals sent: None    Chemotherapy?  Yes: currently on tx         Cindy Blanca RN

## 2024-08-20 NOTE — LETTER
8/20/2024      Jasmyn Green  [address] [place] [address]      Dear Colleague,    Thank you for referring your patient, Jasmyn Green, to the [facility] RADIATION ONCOLOGY [place]. Please see a copy of my visit note below.    [facility] Radiation Oncology Consult Note     Patient: Jasmyn Green  MRN: [id number]  Date of Service: 08/20/2024          No referring provider defined for this encounter.       Dear Dr. Stewart:    Thank you very much for referring this patient for consideration of radiotherapy. As you know Ms. 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 She was seen today for consideration of palliative radiation.     HISTORY OF PRESENT ILLNESS:   Ms. Green is a 68 year old female who is initially seen by radiation oncology, MD Jacinto, 3/14/2024 please refer to that consult note for additional details.    She initially presented with extensive stage small cell lung cancer involving left upper lobe primary, left hilar and mediastinal lymphadenopathy, several pleural based metastases in the left hemithorax.  Severe COPD- FEV1 of 29% and reduced diffusion capacity with DLCO of 30% . She is on 2-3 L of oxygen at rest.     2/16/2024 endobronchial ultrasound-guided biopsy, pathology: station 4L and 10L: Small cell lung cancer .    carboplatin, etoposide and atezolizumab x3 c -- significant side effects after third cycle requiring discontinuation carbo/etoposide continues on maintenance atezolizumab.     7/3/2024 PET/CT: Complete metabolic response to therapy of previous left lung, left pleura and thoracic gisela disease.    8/15/2024 MRI: 612 nodular enhancing lesions within the supratentorial infratentorial compartments consistent with multiple new progressive intracranial metastases.  Dominant lesions right parietal-occipital junction superiorly within the anterior left cerebellum demonstrate  mild vasogenic edema without significant mass effect.    8/16/2024: She was started on dexamethasone.  8/18/2024: atezolizumab 8c  Next cycle planned 9/24/2022    She is O2 dependent and feels her shortness of breath has become worse.  Currently on 2 L while at rest but increases to 3 or 4 sometimes 5 L with activity.  She reports no headaches.  She has some nausea from time to time.  She has had a few forgetful episodes but otherwise denies any difficulty with short-term memory.  She has not been sleeping well the last few days.  She was started on dexamethasone 4 mg twice daily.  She has not noticed any change in symptoms with initiation of steroids.  She is connected with palliative care.     Former smoker    CHEMOTHERAPY HISTORY: Concurrent Chemotherapy: between cycles of atezolizumab  Last 8/18/2024: atezolizumab 8c  Next 9/24/2024: atezolizumab     RADIATION THERAPY HISTORY: Prior Radiation: No    IMPLANTED CARDIAC DEVICE: none     PREGNANCY: N/A    Current Outpatient Medications   Medication Sig Dispense Refill     memantine (NAMENDA) 5 MG tablet Take 2 tablets (10 mg) by mouth 2 times daily. Starting with 5 mg in the morning week 1, 5 mg twice a day week 2, morning dose 10 mg, and evening dose 5 mg week 3, and 10 mg twice a day weeks 4 through 24. 60 tablet 5     acetaminophen (TYLENOL) 500 MG tablet Take 2 tablets (1,000 mg) by mouth every 8 hours as needed for pain       albuterol (PROAIR HFA/PROVENTIL HFA/VENTOLIN HFA) 108 (90 Base) MCG/ACT inhaler Inhale 1-2 puffs into the lungs every 6 hours as needed for shortness of breath, wheezing or cough       apixaban ANTICOAGULANT (ELIQUIS ANTICOAGULANT) 5 MG tablet Take 1 tablet (5 mg) by mouth 2 times daily 60 tablet 5     aspirin (ASA) 325 MG EC tablet Take 325 mg by mouth daily       atorvastatin (LIPITOR) 40 MG tablet Take 1 tablet (40 mg) by mouth daily 90 tablet 3     buprenorphine (BUTRANS) 5 MCG/HR WK patch Place 1 patch onto the skin every 7 days 4  patch 0     calcium carbonate 600 mg-vitamin D 400 units (CALTRATE) 600-400 MG-UNIT per tablet Take 1 tablet by mouth 2 times daily With calcium       dexAMETHasone (DECADRON) 4 MG tablet Take 1 tablet (4 mg) by mouth 2 times daily (with meals) 20 tablet 1     diazepam (VALIUM) 5 MG tablet Take 1 tablet (5 mg) by mouth once as needed for anxiety Before MRI (Patient not taking: Reported on 8/19/2024) 2 tablet 0     diazepam (VALIUM) 5 MG tablet Take 1 tablet (5 mg) by mouth every 6 hours as needed for anxiety (Patient not taking: Reported on 8/19/2024) 2 tablet 2     doxycycline monohydrate (ADOXA) 100 MG tablet Take 100 mg by mouth daily (Patient not taking: Reported on 8/19/2024)       fexofenadine (ALLEGRA ALLERGY) 60 MG tablet Take 60 mg by mouth 2 times daily       fluticasone-salmeterol (AIRDUO RESPICLICK) 232-14 MCG/ACT inhaler Inhale 1 puff into the lungs 2 times daily 1 each 11     hydrOXYzine HCl (ATARAX) 25 MG tablet Take 1-2 tablets (25-50 mg) by mouth every 8 hours as needed for anxiety (Insomnia) 30 tablet 3     ipratropium - albuterol 0.5 mg/2.5 mg/3 mL (DUONEB) 0.5-2.5 (3) MG/3ML neb solution Take 1 vial (3 mLs) by nebulization every 6 hours as needed for shortness of breath, wheezing or cough 180 mL 11     ipratropium-albuterol (COMBIVENT RESPIMAT)  MCG/ACT inhaler Inhale 1 puff into the lungs 4 times daily 4 g 11     lactobacillus rhamnosus, GG, (CULTURELL) capsule Take 1 capsule by mouth 2 times daily       lidocaine-prilocaine (EMLA) 2.5-2.5 % external cream Apply topically as needed for moderate pain 30 g 5     lisinopril (ZESTRIL) 10 MG tablet Take 1 tablet (10 mg) by mouth daily 30 tablet 12     mirtazapine (REMERON) 7.5 MG tablet Take 1 tablet (7.5 mg) by mouth at bedtime 90 tablet 3     naloxone (NARCAN) 4 MG/0.1ML nasal spray Spray 1 spray (4 mg) into one nostril alternating nostrils as needed for opioid reversal every 2-3 minutes until assistance arrives 0.2 mL 1     omeprazole  (PRILOSEC) 10 MG DR capsule Take 10 mg by mouth daily (Patient not taking: Reported on 7/8/2024)       ondansetron (ZOFRAN ODT) 8 MG ODT tab Take 1 tablet (8 mg) by mouth every 8 hours as needed for nausea 30 tablet 1     oxyCODONE (ROXICODONE) 5 MG tablet Take 0.5-1 tablets (2.5-5 mg) by mouth every 4 hours as needed for pain or moderate to severe pain (Try one-half tab first to see response) 40 tablet 0     OXYGEN-HELIUM IN Inhale 3-4 L into the lungs continuous prn       predniSONE (DELTASONE) 20 MG tablet Take 2 tabs daily x 5 days (Patient not taking: Reported on 8/19/2024) 10 tablet 0     prochlorperazine (COMPAZINE) 10 MG tablet Take 1 tablet (10 mg) by mouth every 6 hours as needed for nausea or vomiting 30 tablet 2     umeclidinium (INCRUSE ELLIPTA) 62.5 MCG/ACT inhaler Inhale 1 puff into the lungs daily 30 each 11     Past Medical History:   Diagnosis Date     Age-related osteoporosis without current pathological fracture      Arthritis      COPD (chronic obstructive pulmonary disease) (H)      Coronary artery disease      Dependence on nocturnal oxygen therapy      Dyspnea on exertion      Emphysema lung (H)      Gout      HLD (hyperlipidemia)      Hypertension      Lung mass      Past Surgical History:   Procedure Laterality Date     BRONCHOSCOPY RIGID OR FLEXIBLE W/TRANSENDOSCOPIC ENDOBRONCHIAL ULTRASOUND GUIDED N/A 2/16/2024    Procedure: BRONCHOSCOPY, WITH ENDOBRONCHIAL ULTRASOUND;  Surgeon: Ruben Levin MD;  Location: [facility] Main OR     COLONOSCOPY N/A 10/21/2021    Procedure: COLONOSCOPY;  Surgeon: Wilma Hester DO;  Location: [facility] OR     IR CHEST PORT PLACEMENT > 5 YRS OF AGE  5/2/2024     VAGINAL DELIVERY      x 3 ; remote     WISDOM TOOTH EXTRACTION       No Known Allergies  Family History   Problem Relation Age of Onset     Chronic Obstructive Pulmonary Disease Sister      Diabetes Mother      Heart Disease Mother      Lung Cancer Mother      Heart Disease Father      Social  History     Socioeconomic History     Marital status:      Spouse name: Not on file     Number of children: Not on file     Years of education: Not on file     Highest education level: Not on file   Occupational History     Not on file   Tobacco Use     Smoking status: Former     Current packs/day: 0.00     Types: Cigarettes     Quit date: 6/27/2021     Years since quitting: 3.1     Passive exposure: Past     Smokeless tobacco: Never     Tobacco comments:     updated 8/3/2021 by TTS   Vaping Use     Vaping status: Never Used   Substance and Sexual Activity     Alcohol use: Not Currently     Drug use: Never     Sexual activity: Not Currently   Other Topics Concern     Not on file   Social History Narrative     many years 3 grown children. 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 Last cigarette a week ago.non drinker  Her primary MD (Wilda) retired several years ago/ tends to avoid doctors/medical care in general       Social Determinants of Health     Financial Resource Strain: Low Risk  (2/1/2024)    Financial Resource Strain      Within the past 12 months, have you or your family members you live with been unable to get utilities (heat, electricity) when it was really needed?: No   Food Insecurity: Low Risk  (2/1/2024)    Food Insecurity      Within the past 12 months, did you worry that your food would run out before you got money to buy more?: No      Within the past 12 months, did the food you bought just not last and you didn t have money to get more?: No   Transportation Needs: Low Risk  (2/1/2024)    Transportation Needs      Within the past 12 months, has lack of transportation kept you from medical appointments, getting your medicines, non-medical meetings or appointments, work, or from getting things that you need?: No   Physical Activity: Sufficiently Active (11/10/2021)    Exercise Vital Sign      Days of Exercise per Week: 5 days      Minutes of Exercise per Session: 30 min   Stress:  No Stress Concern Present (11/10/2021)    [employer] [place] [employer] - Occupational Stress Questionnaire      Feeling of Stress : Not at all   Social Connections: Socially Isolated (11/10/2021)    Social Connection and Isolation Panel [NHANES]      Frequency of Communication with Friends and Family: More than three times a week      Frequency of Social Gatherings with Friends and Family: Once a week      Attends Worship Services: Never      Active Member of Clubs or Organizations: No      Attends Club or Organization Meetings: Not on file      Marital Status:    Interpersonal Safety: Low Risk  (2/1/2024)    Interpersonal Safety      Do you feel physically and emotionally safe where you currently live?: Yes      Within the past 12 months, have you been hit, slapped, kicked or otherwise physically hurt by someone?: No      Within the past 12 months, have you been humiliated or emotionally abused in other ways by your partner or ex-partner?: No   Housing Stability: Low Risk  (2/1/2024)    Housing Stability      Do you have housing? : Yes      Are you worried about losing your housing?: No        REVIEW OF SYMPTOMS:  A full 14-point review of systems was performed. Pertinent findings are noted in the HPI.    General  Constitutional  Constitutional (WDL): Exceptions to WDL  Fatigue: Fatigue relieved by rest  EENT  Eye Disorders  Eye Disorder (WDL): All eye disorder elements are within defined limits  Ear Disorders  Ear Disorder (WDL): All ear disorder elements are within defined limits  Respiratory  Respiratory  Respiratory (WDL): Exceptions to WDL  Cough: Mild symptoms OR nonprescription intervention indicated  Dyspnea: Shortness of breath with minimal exertion OR limiting instrumental ADL  Hypoxia: Decreased oxygen saturation at rest (e.g., pulse oximeter less than 88% or PaO2 less than or equal to 55 mmHg) (oxygen dependent)  Cardiovascular  Cardiovascular  Cardiovascular (WDL): All  cardiovascular elements are within defined limits (no pacemaker)  Gastrointestinal  Gastrointestinal  Gastrointestinal (WDL): Exceptions to WDL  Anorexia: Loss of appetite without alteration in eating habits  Nausea: Loss of appetite without alteration in eating habits  Musculoskeletal  Musculoskeletal and Connective Tissue Disorders  Musculoskeletal & Connective (WDL): Exceptions to WDL  Arthralgia: Mild pain  Integumentary     Neurological  Neurosensory  Neurosensory (WDL): Exceptions to WDL  Ataxia: Asymptomatic OR clinical or diagnostic observations only OR intervention not indicated  Confusion: Mild disorientation (at times)  Genitourinary/Reproductive  Genitourinary  Genitourinary (WDL): All genitourinary elements are within defined limits  Lymphatic  Lymph System Disorders  Lymph (WDL): All lymph elements are within defined limits  Pain  Pain Score: No Pain (0)  AUA Assessment                                                              Accompanied by  Accompanied By: family    ECOG Status: 2 - Ambulatory and independent in all ADLs; cannot work; up > 50% of the time    KPS Score: 70% Cannot do active work, but can care for self    Pain Management Plan: N/A    Recent Labs:   Recent Results (from the past 168 hour(s))   MR Brain w/o & w Contrast   Result Value Ref Range    Radiologist flags Multiple new intracranial metastasis (Urgent)    Comprehensive metabolic panel   Result Value Ref Range    Sodium 137 135 - 145 mmol/L    Potassium 5.7 (H) 3.4 - 5.3 mmol/L    Carbon Dioxide (CO2) 23 22 - 29 mmol/L    Anion Gap 9 7 - 15 mmol/L    Urea Nitrogen 52.6 (H) 8.0 - 23.0 mg/dL    Creatinine 1.01 (H) 0.51 - 0.95 mg/dL    GFR Estimate 60 (L) >60 mL/min/1.73m2    Calcium 8.8 8.8 - 10.4 mg/dL    Chloride 105 98 - 107 mmol/L    Glucose 160 (H) 70 - 99 mg/dL    Alkaline Phosphatase 87 40 - 150 U/L    AST 19 0 - 45 U/L    ALT 26 0 - 50 U/L    Protein Total 6.1 (L) 6.4 - 8.3 g/dL    Albumin 3.6 3.5 - 5.2 g/dL    Bilirubin  Total <0.2 <=1.2 mg/dL   TSH with free T4 reflex   Result Value Ref Range    TSH 0.28 (L) 0.30 - 4.20 uIU/mL   T4 free   Result Value Ref Range    Free T4 0.92 0.90 - 1.70 ng/dL       Imaging: Imaging results 6 weeks:MR Brain w/o & w Contrast    Result Date: 8/16/2024  EXAM: MR BRAIN W/O and W CONTRAST LOCATION: [facility] DATE: 8/15/2024 INDICATION:  Small cell lung cancer (H), Malignant neoplasm of middle lobe, bronchus or lung (H), Brain lesion COMPARISON: Head MRI 5/3/2024 and 3/14/2024 CONTRAST: 5ml Gadavist TECHNIQUE: Routine multiplanar multisequence head MRI without and with intravenous contrast. FINDINGS: INTRACRANIAL CONTENTS: No acute or subacute infarct. Multiple new nodular intra-axial enhancing juxtacortical lesions consistent with metastasis are now identified. These include an 8 mm lesion within the left middle frontal gyrus superiorly (series 19 image 21), separate 3 mm and 5 mm foci within left precentral gyrus (series 19 image 20), a 5 mm focus within the parasagittal right frontal lobe (series 19 image 18), an 8 mm lesion within the parasagittal left parietal lobe and a 15 x 10 mm lesion at the right parieto-occipital junction superiorly (series 19 image 17), a 5 mm lesion along the right inferior frontal gyrus and a 9 x 8 mm lesion along the right parieto-occipital junction (series 19 image 14), a 6 mm lesion along the right medial temporal lobe (series 19 image 11), a 6 mm lesion along the anterior superior right cerebellum (series 19 image 8), a 9 x 12 mm lesion within the anterior left cerebellum (series 19 image 6), and a 5 mm lesion within the left cerebellum (series 19 image 5). Mild vasogenic edema associated with the largest right parieto-occipital junction and the left cerebellar lesions. No significant associated mass effect. Unchanged suspected arachnoid cyst over the parasagittal left frontal lobe. Scattered nonspecific T2/FLAIR hyperintensities within the  cerebral white matter most consistent with mild chronic microvascular ischemic change. Minor cerebral volume loss. No hydrocephalus. Normal position of the cerebellar tonsils. SELLA: No abnormality accounting for technique. OSSEOUS STRUCTURES/SOFT TISSUES: Normal marrow signal. The major intracranial vascular flow voids are maintained. ORBITS: No abnormality accounting for technique. SINUSES/MASTOIDS: Mild mucosal thickening scattered about the paranasal sinuses. Patchy opacification of right mastoid air cells.     IMPRESSION: 1.  At least 12 nodular enhancing lesions within the supratentorial and infratentorial compartments consistent with multiple new/progressive intracranial metastasis. 2.  Dominant lesions at the right parieto-occipital junction superiorly and within the anterior left cerebellum demonstrate mild associated vasogenic edema without significant mass effect. 3.  Background of mild presumed senescent changes similar to previous exams. [Access Center: Multiple new intracranial metastasis] This report will be copied to the [facility] to ensure a provider acknowledges the finding. Access Center is available Monday through Friday 8am-3:30 pm.      Pathology:   No results found for this or any previous visit (from the past 8760 hour(s)).  A) MEDIASTINAL LYMPH NODE, STATION 4R, ENDOBRONCHIAL ULTRASOUND-GUIDED FINE-NEEDLE ASPIRATION:  -PREDOMINANTLY BLOOD AND LYMPHOCYTES  -RARE ATYPICAL EPITHELIOID CELLS ARE PRESENT, SUSPICIOUS FOR NEOPLASIA     B) MEDIASTINAL LYMPH NODE, STATION 7, ENDOBRONCHIAL ULTRASOUND-GUIDED FINE-NEEDLE ASPIRATION:  -BENIGN; BLOOD AND LYMPHOCYTES, OCCASIONAL BENIGN BRONCHIAL EPITHELIAL CELLS  -NO TUMOR SEEN     C) MEDIASTINAL LYMPH NODE, STATION 4L, ENDOBRONCHIAL ULTRASOUND-GUIDED FINE-NEEDLE ASPIRATION:  -METASTATIC SMALL CELL CARCINOMA OF PULMONARY ORIGIN     D) MEDIASTINAL LYMPH NODE, STATION 10 L, ENDOBRONCHIAL ULTRASOUND-GUIDED FINE-NEEDLE ASPIRATION:  -METASTATIC  SMALL CELL CARCINOMA OF PULMONARY ORIGIN               Objective:        PHYSICAL EXAMINATION:    /59 (BP Location: Right arm, Patient Position: Sitting)   Pulse 114   Temp 97.7  F (36.5  C)   Resp 20   LMP  (LMP Unknown)   SpO2 96%     Gen: Alert, in NAD pleasant interactive, well nourished  Eyes: EOMI, sclera anicteric  HENT     Head: NC/AT  Musculoskeletal: Normal muscle bulk and tone  Skin: Normal tone and turgor, warm, dry, intact  Neurologic: A/Ox3,  face symmetric, speech fluent, no focal motor deficits. Gait not assessed as primarily in wheelchair, ambulates with assistance from chair to wheelchair.  Psychiatric: Appropriate mood and affect     Intent of Therapy: Palliative  Side effects that may occur during or within weeks after Radiation Therapy    Fatigue and general weakness  Headache and scalp irritation  Loss of hair  Nausea, vomiting, and decrease in appetite  Darkening, irritation, itchiness, redness, dryness, peeling, thickening, scabbing, and ulceration of the scalp, neck and forehead    Side effects that may occur months or years after Radiation Therapy    Development of another tumor or cancer         Dizziness and balance problems  Decrease in hormone production  Brain inflammation or necrosis that may cause various neurologic symptoms  Seizures  Decrease in memory and thinking abilities  Decrease in hearing and feeling of ear congestion  Eye dryness and irritation  Loss of vision  Cataracts in the eyes  Poor healing after a trauma or surgery in the irradiated area  Facial numbness, pain and weakness    The risks, benefits and alternatives to radiation therapy were outlined with the patient. All questions were answered and a consent was signed.     Impression   68-year-old woman with stage IV small cell lung cancer with multiple brain metastases.    Visit Dx:  (C34.90) Small cell lung cancer (H)  (primary encounter diagnosis)  Plan: memantine (NAMENDA) 5 MG tablet    (C79.31)  Secondary malignant neoplasm of brain (H)  Plan: memantine (NAMENDA) 5 MG tablet       Cancer Staging   No matching staging information was found for the patient.      Assessment & Plan:     Discussed palliative whole brain radiation therapy with and without hippocampal avoidance with patient and her family.  The indications for, process of, alternatives, potential side effects and complications of RT to the brain were discussed.    Discussed short-term memory impairment with whole brain radiation therapy can be improved with use of hippocampal avoidance as well as memantine.  Family wanted to discuss both hippocampal avoidance as well as memantine prior to making any decisions.  Their primary concern with hippocampal avoidance was risk of recurrence in the areas avoided, reassurance given that in studies this possibility is quite low.  They asked multiple questions which were answered to their satisfaction and they verbalized understanding.    They wish to proceed with whole brain radiation therapy and consent is signed today.  They will return to clinic later today CT simulation for RT planning at which time they will confirm if they would like to proceed with hippocampal avoidance as well as memantine.      Anticipate 3000 cGy in 10 fractions, start date pending decision on hippocampal avoidance.     Discussed steroid taper during radiation, while monitoring for symptoms.    Addendum: Patient elected to proceed with whole brain radiation therapy with use of memantine, prescription sent and will begin with initiation of radiation.  Start date 8/22/2024.    Thank you for allowing me to participate in the care of this patient. Feel free to contact me with all questions or concerns.      Total time of this visit, including time spent face-to-face with patient and or via video/audio, and also in preparing for today's visit for MDM and documentation. Medical decision-making included consideration and possible  diagnoses, management options, complex record review, review of diagnostic tests and information, consideration and discussion of significant complications based on comorbidities, discussion with providers involved in the care of the patient.     70 Minutes spent.     This note has been dictated using voice recognition software and as a result may contain minor grammatical errors and unintended word substitutions.    Sincerely,      Tata Nuñez MD  Department of Radiation Oncology   [facility]  Tel: [phone]  Page: [phone]    [facility]  [address] [place] [address]     [facility]   [address] [place] [address]    CC:  Patient Care Team:  Ana Maria Bermudez MD as PCP - General (Internal Medicine)  Aidee Luna RT as Chronic Pulmonary Disease Specialist (Respiratory Therapy)  Rickie Pickens MD as Assigned Endocrinology Provider  Pavan Bee DPM as Assigned Surgical Provider  Ruben Levin MD as MD (Critical Care)  Tata Lainez NP as Assigned Pulmonology Provider  Amador Stewart MD as MD (Hematology)  Wilma Carmona, RN as Specialty Care Coordinator (Hematology & Oncology)  Ana Maria Bermudez MD as Assigned PCP  Evans Gramajo APRN CNP as Nurse Practitioner (Hospice And Palliative Care)  Evans Gramajo APRN CNP as Assigned Palliative Care Provider  Bryant Mckinney MD as Physician (Vascular Surgery)  Naheed Mulligan MD as MD (Cardiovascular Disease)  Naheed Mulligan MD as Assigned Heart and Vascular Provider  Amador Stewart MD as Assigned Cancer Care Provider  Tata Nuñez MD as MD (Radiation Oncology)        Oncology Rooming Note    August 20, 2024 8:35 AM   Jasmyn Green is a 68 year old female who presents for:    Chief Complaint   Patient presents with     Oncology Clinic Visit     Radiation Therapy     Consult for new area of disease     Initial Vitals: /59 (BP  "Location: Right arm, Patient Position: Sitting)   Pulse 114   Temp 97.7  F (36.5  C)   Resp 20   LMP  (LMP Unknown)   SpO2 96%  Estimated body mass index is 19.49 kg/m  as calculated from the following:    Height as of 8/19/24: 1.499 m (4' 11\").    Weight as of 8/19/24: 43.8 kg (96 lb 8 oz). There is no height or weight on file to calculate BSA.  No Pain (0) Comment: Data Unavailable   No LMP recorded (lmp unknown). Patient is postmenopausal.  Allergies reviewed: Yes  Medications reviewed: Yes    Medications: Medication refills not needed today.  Pharmacy name entered into [facility]:    [facility] - [place], [place] - [address] [address] [place], [place] - [address]    Frailty Screening:   Is the patient here for a new oncology consult visit in cancer care? 2. No      Clinical concerns: New consult for brain mets found on MRI done 8/15, previously consulted with Dr. Jacinto in March for lung cancer, pt is oxygen dependent  Dr. Nuñez was notified.    Considerations for radiation treatment   Pregnancy status: Female age 55+   Implanted Cardiac Devices: No   Any previous radiation therapy: No     Radiation Therapy Patient Education    Person involved with teaching: Patient    Patient educational needs for self management of treatment-related side effects assessment completed.  [facility] Patient Ed tab contains Patient Learning Assessment    Education Materials Given  Managing Side Effects of Radiation Therapy: Care Instructions, Learning About External Beam Radiation Treatment, Dealing With Being Tired From Cancer Treatment: Care Instructions, Radiation Treatment For Cancer, Radiation Therapy to the Brain Guidelines, Nutrition for the Person with Cancer During Treatment, Oncology Supportive Care Services , Welcome Letter, Insurance PA Information, Map [place], and Radiation Therapy and You    Educational Topics Discussed  Side effects expected, Pain management, Skin care, Activity, " Nutrition and weight loss, and When to call MD/RN    Response To Teaching  More review necessary and Verbalizes understanding    GYN Only  Vaginal Dilator-given and educated: N/A    Referrals sent: None    Chemotherapy?  Yes: currently on tx         Cindy Blanca RN                Again, thank you for allowing me to participate in the care of your patient.        Sincerely,        Tata Nuñez MD

## 2024-08-20 NOTE — PROGRESS NOTES
..[facility] wanted to notify medical oncology that Jasmyn Green is scheduled to start radiation on Thursday August 22nd.  Please call radiation oncology at [facility]'s or [facility]'s if you have further questions.

## 2024-08-21 ENCOUNTER — APPOINTMENT (OUTPATIENT)
Dept: RADIATION ONCOLOGY | Facility: CLINIC | Age: 69
End: 2024-08-21
Attending: STUDENT IN AN ORGANIZED HEALTH CARE EDUCATION/TRAINING PROGRAM
Payer: COMMERCIAL

## 2024-08-21 PROCEDURE — 77295 3-D RADIOTHERAPY PLAN: CPT | Mod: 26 | Performed by: STUDENT IN AN ORGANIZED HEALTH CARE EDUCATION/TRAINING PROGRAM

## 2024-08-21 PROCEDURE — 77334 RADIATION TREATMENT AID(S): CPT | Mod: 26 | Performed by: STUDENT IN AN ORGANIZED HEALTH CARE EDUCATION/TRAINING PROGRAM

## 2024-08-21 PROCEDURE — 77334 RADIATION TREATMENT AID(S): CPT | Performed by: STUDENT IN AN ORGANIZED HEALTH CARE EDUCATION/TRAINING PROGRAM

## 2024-08-21 PROCEDURE — 77300 RADIATION THERAPY DOSE PLAN: CPT | Mod: 26 | Performed by: STUDENT IN AN ORGANIZED HEALTH CARE EDUCATION/TRAINING PROGRAM

## 2024-08-21 PROCEDURE — 77300 RADIATION THERAPY DOSE PLAN: CPT | Performed by: STUDENT IN AN ORGANIZED HEALTH CARE EDUCATION/TRAINING PROGRAM

## 2024-08-21 PROCEDURE — 77295 3-D RADIOTHERAPY PLAN: CPT | Performed by: STUDENT IN AN ORGANIZED HEALTH CARE EDUCATION/TRAINING PROGRAM

## 2024-08-21 RX ORDER — MEMANTINE HYDROCHLORIDE 5 MG/1
10 TABLET ORAL 2 TIMES DAILY
Qty: 60 TABLET | Refills: 5 | Status: SHIPPED | OUTPATIENT
Start: 2024-08-21

## 2024-08-22 ENCOUNTER — APPOINTMENT (OUTPATIENT)
Dept: RADIATION ONCOLOGY | Facility: CLINIC | Age: 69
End: 2024-08-22
Attending: STUDENT IN AN ORGANIZED HEALTH CARE EDUCATION/TRAINING PROGRAM
Payer: COMMERCIAL

## 2024-08-22 PROCEDURE — 77280 THER RAD SIMULAJ FIELD SMPL: CPT | Mod: 26 | Performed by: STUDENT IN AN ORGANIZED HEALTH CARE EDUCATION/TRAINING PROGRAM

## 2024-08-22 PROCEDURE — 77412 RADIATION TX DELIVERY LVL 3: CPT | Performed by: STUDENT IN AN ORGANIZED HEALTH CARE EDUCATION/TRAINING PROGRAM

## 2024-08-22 PROCEDURE — 77280 THER RAD SIMULAJ FIELD SMPL: CPT | Performed by: STUDENT IN AN ORGANIZED HEALTH CARE EDUCATION/TRAINING PROGRAM

## 2024-08-23 ENCOUNTER — APPOINTMENT (OUTPATIENT)
Dept: RADIATION ONCOLOGY | Facility: CLINIC | Age: 69
End: 2024-08-23
Attending: STUDENT IN AN ORGANIZED HEALTH CARE EDUCATION/TRAINING PROGRAM
Payer: COMMERCIAL

## 2024-08-23 VITALS
TEMPERATURE: 98 F | OXYGEN SATURATION: 98 % | HEART RATE: 124 BPM | BODY MASS INDEX: 19.49 KG/M2 | SYSTOLIC BLOOD PRESSURE: 100 MMHG | DIASTOLIC BLOOD PRESSURE: 54 MMHG | WEIGHT: 96.5 LBS | RESPIRATION RATE: 16 BRPM

## 2024-08-23 DIAGNOSIS — C79.31 SECONDARY MALIGNANT NEOPLASM OF BRAIN (H): Primary | ICD-10-CM

## 2024-08-23 PROCEDURE — 77412 RADIATION TX DELIVERY LVL 3: CPT | Performed by: STUDENT IN AN ORGANIZED HEALTH CARE EDUCATION/TRAINING PROGRAM

## 2024-08-23 PROCEDURE — 77387 GUIDANCE FOR RADJ TX DLVR: CPT | Mod: 26 | Performed by: STUDENT IN AN ORGANIZED HEALTH CARE EDUCATION/TRAINING PROGRAM

## 2024-08-23 PROCEDURE — 77387 GUIDANCE FOR RADJ TX DLVR: CPT | Performed by: STUDENT IN AN ORGANIZED HEALTH CARE EDUCATION/TRAINING PROGRAM

## 2024-08-23 RX ORDER — DEXAMETHASONE 2 MG/1
TABLET ORAL
Qty: 12 TABLET | Refills: 0 | Status: SHIPPED | OUTPATIENT
Start: 2024-08-23

## 2024-08-23 ASSESSMENT — PAIN SCALES - GENERAL: PAINLEVEL: NO PAIN (0)

## 2024-08-23 NOTE — PROGRESS NOTES
RADIATION ONCOLOGY WEEKLY TREATMENT VISIT NOTE      Assessment / Impression       Visit Dx:  (C79.51) Secondary malignant neoplasm of brain (H)  (primary encounter diagnosis)       Cancer Staging   No matching staging information was found for the patient.       Tolerating radiation therapy well.  All questions and concerns addressed.  No acute side effects  Dexamethasone taper prescribed    Plan:     Continue radiation treatment as prescribed.  Radiation:   Site: WB  Stereotactic Radiosurgery: No  Concurrent Therapy: No  Today's Dose: 600  Total Dose for Brain: 3000  Today's Fraction/Total Fraction Brain: 210    Prescribed steroid taper  Pain Management Plan: Pain    Subjective:      HPI: Jasmyn Green is a 68 year old female with  Secondary malignant neoplasm of brain (H) [C79.31]    No headaches or nausea.  No new neurologic symptoms or changes.  Some difficulty sleeping since initiation of steroids.      The following portions of the patient's history were reviewed and updated as appropriate: allergies, current medications, past family history, past medical history, past social history, past surgical history and problem list.    Assessment                  Body Site:  Brain Site: WB  Stereotactic Radiosurgery: No  Concurrent Therapy: No  Today's Dose: 600  Today's Fraction/Total Fraction Brain: 210  Ocular/Visual - other : 0: Normal  Middle ear/hearin: Normal  Tinnitus: 0: No  Depressed level of consciousness: 0: Normal  Oriented x of spheres: 3  Neuropathy - motor: 0: Normal  Ataxia: 0: Normal  Speech Impairment (e.g. Dysphasia or aphasia): 0: Normal  Seizures: 0: None  Urinary Incontinence: 0: None  Bowel Incontinence: 0: None  Insomnia: 0: Normal                                   Sexuality Alteration                    Emotional Alteration    Copin: Effective  Comfort Alteration   KPS: 100 % Normal, no complaints  Fatigue (ONS scale): 0: No Fatigue  Pain Location: denies  Pain  Intensity. Rate degree of pain ranging from 0 (no pain) to 10 (severe pain): 0   Nutrition Alteration   Anorexia: 0: None  Nausea: 0: None  Vomitin: None  Weight: 43.8 kg (96 lb 8 oz)  Skin Alteration   Skin Sensation: 0: No problem  Skin Reaction: 0: None  Alopecia: 0: Normal  AUA Assessment                                           Accompanied by       Objective:     Exam:     Vitals:    24 1440   BP: 100/54   Pulse: (!) 124   Resp: 16   Temp: 98  F (36.7  C)   TempSrc: Oral   SpO2: 98%   Weight: 43.8 kg (96 lb 8 oz)   PainSc: No Pain (0)       Wt Readings from Last 8 Encounters:   24 43.8 kg (96 lb 8 oz)   24 43.8 kg (96 lb 8 oz)   24 43.1 kg (95 lb)   24 42.9 kg (94 lb 9.6 oz)   24 43.8 kg (96 lb 9.6 oz)   24 43.4 kg (95 lb 11.2 oz)   24 44 kg (97 lb)   24 44.1 kg (97 lb 4.8 oz)       General: Alert and oriented, in no acute distress  Jasmyn has no Erythema or alopecia.    Treatment Summary to Date    Aria chart and setup information reviewed    Tata Nuñez MD

## 2024-08-23 NOTE — LETTER
2024      Jasmyn Green  [address] [place] [address]      Dear Colleague,    Thank you for referring your patient, Jasmyn Green, to the [facility] RADIATION ONCOLOGY [place]. Please see a copy of my visit note below.    RADIATION ONCOLOGY WEEKLY TREATMENT VISIT NOTE      Assessment / Impression       Visit Dx:  (C79.54) Secondary malignant neoplasm of brain (H)  (primary encounter diagnosis)       Cancer Staging   No matching staging information was found for the patient.       Tolerating radiation therapy well.  All questions and concerns addressed.  No acute side effects  Dexamethasone taper prescribed    Plan:     Continue radiation treatment as prescribed.  Radiation:   Site: WB  Stereotactic Radiosurgery: No  Concurrent Therapy: No  Today's Dose: 600  Total Dose for Brain: 3000  Today's Fraction/Total Fraction Brain: 210    Prescribed steroid taper  Pain Management Plan: Pain    Subjective:      HPI: Jasmyn Green is a 68 year old female with  Secondary malignant neoplasm of brain (H) [C79.31]    No headaches or nausea.  No new neurologic symptoms or changes.  Some difficulty sleeping since initiation of steroids.      The following portions of the patient's history were reviewed and updated as appropriate: allergies, current medications, past family history, past medical history, past social history, past surgical history and problem list.    Assessment                  Body Site:  Brain Site: WB  Stereotactic Radiosurgery: No  Concurrent Therapy: No  Today's Dose: 600  Today's Fraction/Total Fraction Brain: 2/10  Ocular/Visual - other : 0: Normal  Middle ear/hearin: Normal  Tinnitus: 0: No  Depressed level of consciousness: 0: Normal  Oriented x of spheres: 3  Neuropathy - motor: 0: Normal  Ataxia: 0: Normal  Speech Impairment (e.g. Dysphasia or aphasia): 0: Normal  Seizures: 0: None  Urinary Incontinence: 0: None  Bowel Incontinence: 0: None  Insomnia: 0:  Normal                                   Sexuality Alteration                    Emotional Alteration    Copin: Effective  Comfort Alteration   KPS: 100 % Normal, no complaints  Fatigue (ONS scale): 0: No Fatigue  Pain Location: denies  Pain Intensity. Rate degree of pain ranging from 0 (no pain) to 10 (severe pain): 0   Nutrition Alteration   Anorexia: 0: None  Nausea: 0: None  Vomitin: None  Weight: 43.8 kg (96 lb 8 oz)  Skin Alteration   Skin Sensation: 0: No problem  Skin Reaction: 0: None  Alopecia: 0: Normal  AUA Assessment                                           Accompanied by       Objective:     Exam:     Vitals:    24 1440   BP: 100/54   Pulse: (!) 124   Resp: 16   Temp: 98  F (36.7  C)   TempSrc: Oral   SpO2: 98%   Weight: 43.8 kg (96 lb 8 oz)   PainSc: No Pain (0)       Wt Readings from Last 8 Encounters:   24 43.8 kg (96 lb 8 oz)   24 43.8 kg (96 lb 8 oz)   24 43.1 kg (95 lb)   24 42.9 kg (94 lb 9.6 oz)   24 43.8 kg (96 lb 9.6 oz)   24 43.4 kg (95 lb 11.2 oz)   24 44 kg (97 lb)   24 44.1 kg (97 lb 4.8 oz)       General: Alert and oriented, in no acute distress  Jasmyn has no Erythema or alopecia.    Treatment Summary to Date    Aria chart and setup information reviewed    Tata Nuñez MD      Again, thank you for allowing me to participate in the care of your patient.        Sincerely,        Tata Nuñez MD

## 2024-08-26 ENCOUNTER — APPOINTMENT (OUTPATIENT)
Dept: RADIATION ONCOLOGY | Facility: CLINIC | Age: 69
End: 2024-08-26
Attending: STUDENT IN AN ORGANIZED HEALTH CARE EDUCATION/TRAINING PROGRAM
Payer: COMMERCIAL

## 2024-08-26 PROCEDURE — 77412 RADIATION TX DELIVERY LVL 3: CPT | Performed by: RADIOLOGY

## 2024-08-26 PROCEDURE — 77387 GUIDANCE FOR RADJ TX DLVR: CPT | Performed by: RADIOLOGY

## 2024-08-26 PROCEDURE — 77387 GUIDANCE FOR RADJ TX DLVR: CPT | Mod: 26 | Performed by: RADIOLOGY

## 2024-08-29 ENCOUNTER — APPOINTMENT (OUTPATIENT)
Dept: RADIATION ONCOLOGY | Facility: CLINIC | Age: 69
End: 2024-08-29
Attending: STUDENT IN AN ORGANIZED HEALTH CARE EDUCATION/TRAINING PROGRAM
Payer: COMMERCIAL

## 2024-08-29 PROCEDURE — 77387 GUIDANCE FOR RADJ TX DLVR: CPT | Mod: 26 | Performed by: RADIOLOGY

## 2024-08-29 PROCEDURE — 77387 GUIDANCE FOR RADJ TX DLVR: CPT | Performed by: RADIOLOGY

## 2024-08-29 PROCEDURE — 77412 RADIATION TX DELIVERY LVL 3: CPT | Performed by: RADIOLOGY

## 2024-08-30 ENCOUNTER — OFFICE VISIT (OUTPATIENT)
Dept: RADIATION ONCOLOGY | Facility: CLINIC | Age: 69
End: 2024-08-30
Attending: STUDENT IN AN ORGANIZED HEALTH CARE EDUCATION/TRAINING PROGRAM
Payer: COMMERCIAL

## 2024-08-30 VITALS
OXYGEN SATURATION: 96 % | TEMPERATURE: 98.1 F | HEART RATE: 107 BPM | WEIGHT: 97.9 LBS | SYSTOLIC BLOOD PRESSURE: 106 MMHG | DIASTOLIC BLOOD PRESSURE: 51 MMHG | BODY MASS INDEX: 19.77 KG/M2 | RESPIRATION RATE: 20 BRPM

## 2024-08-30 DIAGNOSIS — C79.31 SECONDARY MALIGNANT NEOPLASM OF BRAIN (H): Primary | ICD-10-CM

## 2024-08-30 PROCEDURE — 77387 GUIDANCE FOR RADJ TX DLVR: CPT | Performed by: RADIOLOGY

## 2024-08-30 PROCEDURE — 77387 GUIDANCE FOR RADJ TX DLVR: CPT | Mod: 26 | Performed by: RADIOLOGY

## 2024-08-30 PROCEDURE — 77427 RADIATION TX MANAGEMENT X5: CPT | Performed by: STUDENT IN AN ORGANIZED HEALTH CARE EDUCATION/TRAINING PROGRAM

## 2024-08-30 PROCEDURE — 77336 RADIATION PHYSICS CONSULT: CPT | Performed by: STUDENT IN AN ORGANIZED HEALTH CARE EDUCATION/TRAINING PROGRAM

## 2024-08-30 PROCEDURE — 77412 RADIATION TX DELIVERY LVL 3: CPT | Performed by: RADIOLOGY

## 2024-08-30 RX ORDER — DEXAMETHASONE 1 MG
TABLET ORAL
COMMUNITY
Start: 2024-08-23

## 2024-08-30 ASSESSMENT — PAIN SCALES - GENERAL: PAINLEVEL: NO PAIN (0)

## 2024-08-30 NOTE — PROGRESS NOTES
RADIATION ONCOLOGY WEEKLY TREATMENT VISIT NOTE      Jasmyn Green  Female, 69 year old, 1955   MRN: [id number]  Date of Service:08/30/2024    Assessment / Impression         Visit Dx:  (C79.31) Secondary malignant neoplasm of brain (H)  (primary encounter diagnosis)   Origin of primary site  : small cell lung cancer, Extensive.  In the course of palliative chemotherapy, she was found  to have brain met.  MRI of brain showed   1. At least 12 nodular enhancing lesions within the supratentorial and infratentorial compartments consistent with multiple new/progressive intracranial metastasis.   2. Dominant lesions at the right parieto-occipital junction superiorly and within the anterior left cerebellum demonstrate mild associated vasogenic edema without significant mass effect.   3. Background of mild presumed senescent changes similar to previous exams. [Access Center: Multiple new intracranial metastasis]      Cancer Staging   No matching staging information was found for the patient.      Patient is rcieving paliaive radiation/  Tolerating radiation therapy well.  All questions and concerns addressed.  No acute side effects  Dexamethasone taperhas been done.     Plan:      Continue radiation treatment as prescribed.  Radiation:   Site: WB  Stereotactic Radiosurgery: No  Concurrent Therapy: No  Today's Dose: 1500  Total Dose for Brain: 3000  Today's Fraction/Total Fraction Brain:  5/10     Prescribed steroid taper  Pain Management Plan:  no     Subjective:      HPI: Jasmyn Green is a 68 year old female with  Secondary malignant neoplasm of brain (H) [C79.31]     No headaches or nausea.  No new neurologic symptoms or changes.  Some difficulty sleeping since initiation of steroids.       The following portions of the patient's history were reviewed and updated as appropriate: allergies, current medications, past family history, past medical history, past social history, past surgical history and problem  list.     Assessment                  Body Site:  Brain Site: WB  Stereotactic Radiosurgery: No  Concurrent Therapy: No  Today's Dose: 1500  Today's Fraction/Total Fraction Brain: 5/10  Ocular/Visual - other : 0: Normal  Middle ear/hearin: Normal  Tinnitus: 0: No  Depressed level of consciousness: 0: Normal  Oriented x of spheres: 3  Neuropathy - motor: 0: Normal  Ataxia: 0: Normal  Speech Impairment (e.g. Dysphasia or aphasia): 0: Normal  Seizures: 0: None  Urinary Incontinence: 0: None  Bowel Incontinence: 0: None  Insomnia: 0: Normal                                Sexuality Alteration                               Emotional Alteration               Copin: Effective  Comfort Alteration   KPS: 100 % Normal, no complaints  Fatigue (ONS scale): 0: No Fatigue  Pain Location: denies  Pain Intensity. Rate degree of pain ranging from 0 (no pain) to 10 (severe pain): 0   Nutrition Alteration   Anorexia: 0: None  Nausea: 0: None  Vomitin: None  Weight: 43.8 kg (96 lb 8 oz)  Skin Alteration   Skin Sensation: 0: No problem  Skin Reaction: 0: None  Alopecia: 0: Normal  AUA Assessment                                                                                                     Accompanied by     Objective:      Exam:          Vitals:     24 1440   BP: 106/57   Pulse: 107   Resp: 20   Temp: 98  F (36.7  C)   TempSrc: Oral   SpO2: 96%   Weight: 97.9 lbs   PainSc: No Pain (0)             Wt Readings from Last 8 Encounters:   24 43.8 kg (96 lb 8 oz)   24 43.8 kg (96 lb 8 oz)   24 43.1 kg (95 lb)   24 42.9 kg (94 lb 9.6 oz)   24 43.8 kg (96 lb 9.6 oz)   24 43.4 kg (95 lb 11.2 oz)   24 44 kg (97 lb)   24 44.1 kg (97 lb 4.8 oz)         General: Alert and oriented, in no acute distress  Jasmyn has no Erythema or alopecia.     Treatment Summary to Date     Aria chart and setup information revived.  Image checked.    Eric Hampton MD  Radiation Oncology

## 2024-08-30 NOTE — LETTER
8/30/2024      Jasmyn Green  [address] [place] [address]      Dear Colleague,    Thank you for referring your patient, Jasmyn Green, to the [facility] RADIATION ONCOLOGY [place]. Please see a copy of my visit note below.    RADIATION ONCOLOGY WEEKLY TREATMENT VISIT NOTE      Jasmyn Green  Female, 69 year old, 1955   MRN: [id number]  Date of Service:08/30/2024    Assessment / Impression         Visit Dx:  (C79.31) Secondary malignant neoplasm of brain (H)  (primary encounter diagnosis)   Origin of primary site  : small cell lung cancer, Extensive.  In the course of palliative chemotherapy, she was found  to have brain met.  MRI of brain showed   1. At least 12 nodular enhancing lesions within the supratentorial and infratentorial compartments consistent with multiple new/progressive intracranial metastasis.   2. Dominant lesions at the right parieto-occipital junction superiorly and within the anterior left cerebellum demonstrate mild associated vasogenic edema without significant mass effect.   3. Background of mild presumed senescent changes similar to previous exams. [Access Center: Multiple new intracranial metastasis]      Cancer Staging   No matching staging information was found for the patient.      Patient is rcieving paliaive radiation/  Tolerating radiation therapy well.  All questions and concerns addressed.  No acute side effects  Dexamethasone taperhas been done.     Plan:      Continue radiation treatment as prescribed.  Radiation:   Site: WB  Stereotactic Radiosurgery: No  Concurrent Therapy: No  Today's Dose: 1500  Total Dose for Brain: 3000  Today's Fraction/Total Fraction Brain:  5/10     Prescribed steroid taper  Pain Management Plan:  no     Subjective:      HPI: Jasmyn Green is a 68 year old female with  Secondary malignant neoplasm of brain (H) [C79.31]     No headaches or nausea.  No new neurologic symptoms or changes.  Some difficulty  sleeping since initiation of steroids.       The following portions of the patient's history were reviewed and updated as appropriate: allergies, current medications, past family history, past medical history, past social history, past surgical history and problem list.     Assessment                  Body Site:  Brain Site: WB  Stereotactic Radiosurgery: No  Concurrent Therapy: No  Today's Dose: 1500  Today's Fraction/Total Fraction Brain: 5/10  Ocular/Visual - other : 0: Normal  Middle ear/hearin: Normal  Tinnitus: 0: No  Depressed level of consciousness: 0: Normal  Oriented x of spheres: 3  Neuropathy - motor: 0: Normal  Ataxia: 0: Normal  Speech Impairment (e.g. Dysphasia or aphasia): 0: Normal  Seizures: 0: None  Urinary Incontinence: 0: None  Bowel Incontinence: 0: None  Insomnia: 0: Normal                                Sexuality Alteration                               Emotional Alteration               Copin: Effective  Comfort Alteration   KPS: 100 % Normal, no complaints  Fatigue (ONS scale): 0: No Fatigue  Pain Location: denies  Pain Intensity. Rate degree of pain ranging from 0 (no pain) to 10 (severe pain): 0   Nutrition Alteration   Anorexia: 0: None  Nausea: 0: None  Vomitin: None  Weight: 43.8 kg (96 lb 8 oz)  Skin Alteration   Skin Sensation: 0: No problem  Skin Reaction: 0: None  Alopecia: 0: Normal  AUA Assessment                                                                                                     Accompanied by     Objective:      Exam:          Vitals:     24 1440   BP: 106/57   Pulse: 107   Resp: 20   Temp: 98  F (36.7  C)   TempSrc: Oral   SpO2: 96%   Weight: 97.9 lbs   PainSc: No Pain (0)             Wt Readings from Last 8 Encounters:   24 43.8 kg (96 lb 8 oz)   24 43.8 kg (96 lb 8 oz)   24 43.1 kg (95 lb)   24 42.9 kg (94 lb 9.6 oz)   24 43.8 kg (96 lb 9.6 oz)   24 43.4 kg (95 lb 11.2 oz)   24 44 kg (97  lb)   05/29/24 44.1 kg (97 lb 4.8 oz)         General: Alert and oriented, in no acute distress  Jasmyn has no Erythema or alopecia.     Treatment Summary to Date     Aria chart and setup information revived.  Image checked.    Eric Hampton MD  Radiation Oncology      Again, thank you for allowing me to participate in the care of your patient.        Sincerely,        Eric Barrios MD

## 2024-09-03 ENCOUNTER — APPOINTMENT (OUTPATIENT)
Dept: RADIATION ONCOLOGY | Facility: CLINIC | Age: 69
End: 2024-09-03
Attending: STUDENT IN AN ORGANIZED HEALTH CARE EDUCATION/TRAINING PROGRAM
Payer: COMMERCIAL

## 2024-09-03 PROCEDURE — 77387 GUIDANCE FOR RADJ TX DLVR: CPT | Mod: 26 | Performed by: STUDENT IN AN ORGANIZED HEALTH CARE EDUCATION/TRAINING PROGRAM

## 2024-09-03 PROCEDURE — 77387 GUIDANCE FOR RADJ TX DLVR: CPT | Performed by: STUDENT IN AN ORGANIZED HEALTH CARE EDUCATION/TRAINING PROGRAM

## 2024-09-03 PROCEDURE — 77412 RADIATION TX DELIVERY LVL 3: CPT | Performed by: STUDENT IN AN ORGANIZED HEALTH CARE EDUCATION/TRAINING PROGRAM

## 2024-09-04 ENCOUNTER — APPOINTMENT (OUTPATIENT)
Dept: RADIATION ONCOLOGY | Facility: CLINIC | Age: 69
End: 2024-09-04
Attending: STUDENT IN AN ORGANIZED HEALTH CARE EDUCATION/TRAINING PROGRAM
Payer: COMMERCIAL

## 2024-09-04 PROCEDURE — 77387 GUIDANCE FOR RADJ TX DLVR: CPT | Mod: 26 | Performed by: RADIOLOGY

## 2024-09-04 PROCEDURE — 77412 RADIATION TX DELIVERY LVL 3: CPT | Performed by: RADIOLOGY

## 2024-09-04 PROCEDURE — 77387 GUIDANCE FOR RADJ TX DLVR: CPT | Performed by: RADIOLOGY

## 2024-09-05 ENCOUNTER — APPOINTMENT (OUTPATIENT)
Dept: RADIATION ONCOLOGY | Facility: CLINIC | Age: 69
End: 2024-09-05
Attending: STUDENT IN AN ORGANIZED HEALTH CARE EDUCATION/TRAINING PROGRAM
Payer: COMMERCIAL

## 2024-09-05 PROCEDURE — 77387 GUIDANCE FOR RADJ TX DLVR: CPT | Performed by: STUDENT IN AN ORGANIZED HEALTH CARE EDUCATION/TRAINING PROGRAM

## 2024-09-05 PROCEDURE — 77387 GUIDANCE FOR RADJ TX DLVR: CPT | Mod: 26 | Performed by: STUDENT IN AN ORGANIZED HEALTH CARE EDUCATION/TRAINING PROGRAM

## 2024-09-05 PROCEDURE — 77412 RADIATION TX DELIVERY LVL 3: CPT | Performed by: STUDENT IN AN ORGANIZED HEALTH CARE EDUCATION/TRAINING PROGRAM

## 2024-09-06 ENCOUNTER — APPOINTMENT (OUTPATIENT)
Dept: RADIATION ONCOLOGY | Facility: CLINIC | Age: 69
End: 2024-09-06
Attending: STUDENT IN AN ORGANIZED HEALTH CARE EDUCATION/TRAINING PROGRAM
Payer: COMMERCIAL

## 2024-09-06 VITALS
BODY MASS INDEX: 20 KG/M2 | WEIGHT: 99 LBS | HEART RATE: 103 BPM | DIASTOLIC BLOOD PRESSURE: 56 MMHG | SYSTOLIC BLOOD PRESSURE: 199 MMHG | TEMPERATURE: 98.3 F | RESPIRATION RATE: 16 BRPM | OXYGEN SATURATION: 98 %

## 2024-09-06 DIAGNOSIS — C34.90 SMALL CELL LUNG CANCER (H): ICD-10-CM

## 2024-09-06 DIAGNOSIS — C79.31 SECONDARY MALIGNANT NEOPLASM OF BRAIN (H): Primary | ICD-10-CM

## 2024-09-06 PROCEDURE — 77387 GUIDANCE FOR RADJ TX DLVR: CPT | Mod: 26 | Performed by: STUDENT IN AN ORGANIZED HEALTH CARE EDUCATION/TRAINING PROGRAM

## 2024-09-06 PROCEDURE — 77412 RADIATION TX DELIVERY LVL 3: CPT | Performed by: STUDENT IN AN ORGANIZED HEALTH CARE EDUCATION/TRAINING PROGRAM

## 2024-09-06 PROCEDURE — 77387 GUIDANCE FOR RADJ TX DLVR: CPT | Performed by: STUDENT IN AN ORGANIZED HEALTH CARE EDUCATION/TRAINING PROGRAM

## 2024-09-06 ASSESSMENT — PAIN SCALES - GENERAL: PAINLEVEL: NO PAIN (0)

## 2024-09-06 NOTE — PROGRESS NOTES
RADIATION ONCOLOGY WEEKLY TREATMENT VISIT NOTE      Assessment / Impression       Visit Dx:  (C79.31) Secondary malignant neoplasm of brain (H)  (primary encounter diagnosis)    (C34.90) Small cell lung cancer (H)       Cancer Staging   No matching staging information was found for the patient.       Tolerating radiation therapy well.  All questions and concerns addressed.  Tapering steroid without issue  Increasing memantine dose without issue    Plan:     Continue radiation treatment as prescribed.  Radiation:   Site: WB  Stereotactic Radiosurgery: No  Concurrent Therapy: No  Today's Dose: 2700  Total Dose for Brain: 3000  Today's Fraction/Total Fraction Brain: 9/10    Continue steroid taper with final dose next week Thursday.    Continue memantine as prescribed  Return to clinic in 2 months with MRI brain sooner if needed    Pain Management Plan: N/A    Subjective:      HPI: Jasmyn Green is a 69 year old female with  No primary diagnosis found.    She is tolerating radiation well.  No headaches or new neurologic symptoms.  Tapering off steroids with last planned dose next week Thursday.  No issue with taper.  No significant scalp irritation.  No hair loss.  Appetite is good.  No dry mouth or altered taste.  Is increasing memantine dose without issue.    The following portions of the patient's history were reviewed and updated as appropriate: allergies, current medications, past family history, past medical history, past social history, past surgical history and problem list.    Assessment                  Body Site:  Brain Site: WB  Stereotactic Radiosurgery: No  Concurrent Therapy: No  Today's Dose: 2700  Today's Fraction/Total Fraction Brain: 9/10  Ocular/Visual - other : 0: Normal  Middle ear/hearin: Normal  Tinnitus: 0: No  Depressed level of consciousness: 0: Normal  Oriented x of spheres: 3  Neuropathy - motor: 0: Normal  Ataxia: 0: Normal  Speech Impairment (e.g. Dysphasia or aphasia): 0:  Normal  Seizures: 0: None  Urinary Incontinence: 0: None  Bowel Incontinence: 0: None  Insomnia: 0: Normal                                   Sexuality Alteration                    Emotional Alteration    Copin: Effective  Comfort Alteration   KPS: 70% Cannot do active work, but can care for self  Fatigue (ONS scale): 5: Moderate Fatigue  Pain Location: denies  Pain Intensity. Rate degree of pain ranging from 0 (no pain) to 10 (severe pain): 0   Nutrition Alteration   Anorexia: 0: None  Nausea: 0: None  Vomitin: None  Weight: 44.9 kg (99 lb)  Skin Alteration   Skin Sensation: 0: No problem  Skin Reaction: 0: None  Alopecia: 0: Normal  AUA Assessment                                           Accompanied by       Objective:     Exam:     Vitals:    24 1211   BP: (!) 199/56   Pulse: 103   Resp: 16   Temp: 98.3  F (36.8  C)   TempSrc: Oral   SpO2: 98%   Weight: 44.9 kg (99 lb)   PainSc: No Pain (0)       Wt Readings from Last 8 Encounters:   24 44.9 kg (99 lb)   24 44.4 kg (97 lb 14.4 oz)   24 43.8 kg (96 lb 8 oz)   24 43.8 kg (96 lb 8 oz)   24 43.1 kg (95 lb)   24 42.9 kg (94 lb 9.6 oz)   24 43.8 kg (96 lb 9.6 oz)   24 43.4 kg (95 lb 11.2 oz)       General: Alert and oriented, in no acute distress-in wheelchair with O2 by nasal cannula  Jasmyn has no Erythema.  No alopecia.    Treatment Summary to Date    Aria chart and setup information reviewed    Tata Nuñez MD

## 2024-09-06 NOTE — LETTER
9/6/2024      Jasmyn Green  [address] [place] [address]      Dear Colleague,    Thank you for referring your patient, Jasmyn Green, to the [facility] RADIATION ONCOLOGY [place]. Please see a copy of my visit note below.    RADIATION ONCOLOGY WEEKLY TREATMENT VISIT NOTE      Assessment / Impression       Visit Dx:  (C79.31) Secondary malignant neoplasm of brain (H)  (primary encounter diagnosis)    (C34.90) Small cell lung cancer (H)       Cancer Staging   No matching staging information was found for the patient.       Tolerating radiation therapy well.  All questions and concerns addressed.  Tapering steroid without issue  Increasing memantine dose without issue    Plan:     Continue radiation treatment as prescribed.  Radiation:   Site: WB  Stereotactic Radiosurgery: No  Concurrent Therapy: No  Today's Dose: 2700  Total Dose for Brain: 3000  Today's Fraction/Total Fraction Brain: 9/10    Continue steroid taper with final dose next week Thursday.    Continue memantine as prescribed  Return to clinic in 2 months with MRI brain sooner if needed    Pain Management Plan: N/A    Subjective:      HPI: Jasmyn Green is a 69 year old female with  No primary diagnosis found.    She is tolerating radiation well.  No headaches or new neurologic symptoms.  Tapering off steroids with last planned dose next week Thursday.  No issue with taper.  No significant scalp irritation.  No hair loss.  Appetite is good.  No dry mouth or altered taste.  Is increasing memantine dose without issue.    The following portions of the patient's history were reviewed and updated as appropriate: allergies, current medications, past family history, past medical history, past social history, past surgical history and problem list.    Assessment                  Body Site:  Brain Site: WB  Stereotactic Radiosurgery: No  Concurrent Therapy: No  Today's Dose: 2700  Today's Fraction/Total Fraction Brain:  9/10  Ocular/Visual - other : 0: Normal  Middle ear/hearin: Normal  Tinnitus: 0: No  Depressed level of consciousness: 0: Normal  Oriented x of spheres: 3  Neuropathy - motor: 0: Normal  Ataxia: 0: Normal  Speech Impairment (e.g. Dysphasia or aphasia): 0: Normal  Seizures: 0: None  Urinary Incontinence: 0: None  Bowel Incontinence: 0: None  Insomnia: 0: Normal                                   Sexuality Alteration                    Emotional Alteration    Copin: Effective  Comfort Alteration   KPS: 70% Cannot do active work, but can care for self  Fatigue (ONS scale): 5: Moderate Fatigue  Pain Location: denies  Pain Intensity. Rate degree of pain ranging from 0 (no pain) to 10 (severe pain): 0   Nutrition Alteration   Anorexia: 0: None  Nausea: 0: None  Vomitin: None  Weight: 44.9 kg (99 lb)  Skin Alteration   Skin Sensation: 0: No problem  Skin Reaction: 0: None  Alopecia: 0: Normal  AUA Assessment                                           Accompanied by       Objective:     Exam:     Vitals:    24 1211   BP: (!) 199/56   Pulse: 103   Resp: 16   Temp: 98.3  F (36.8  C)   TempSrc: Oral   SpO2: 98%   Weight: 44.9 kg (99 lb)   PainSc: No Pain (0)       Wt Readings from Last 8 Encounters:   24 44.9 kg (99 lb)   24 44.4 kg (97 lb 14.4 oz)   24 43.8 kg (96 lb 8 oz)   24 43.8 kg (96 lb 8 oz)   24 43.1 kg (95 lb)   24 42.9 kg (94 lb 9.6 oz)   24 43.8 kg (96 lb 9.6 oz)   24 43.4 kg (95 lb 11.2 oz)       General: Alert and oriented, in no acute distress-in wheelchair with O2 by nasal cannula  Jasmyn has no Erythema.  No alopecia.    Treatment Summary to Date    Aria chart and setup information reviewed    Tata Nuñez MD      Again, thank you for allowing me to participate in the care of your patient.        Sincerely,        Tata Nuñez MD

## 2024-09-09 ENCOUNTER — ALLIED HEALTH/NURSE VISIT (OUTPATIENT)
Dept: RADIATION ONCOLOGY | Facility: CLINIC | Age: 69
End: 2024-09-09
Attending: STUDENT IN AN ORGANIZED HEALTH CARE EDUCATION/TRAINING PROGRAM
Payer: COMMERCIAL

## 2024-09-09 PROCEDURE — 77412 RADIATION TX DELIVERY LVL 3: CPT

## 2024-09-09 PROCEDURE — 77387 GUIDANCE FOR RADJ TX DLVR: CPT | Mod: 26 | Performed by: RADIOLOGY

## 2024-09-09 PROCEDURE — 77336 RADIATION PHYSICS CONSULT: CPT | Performed by: STUDENT IN AN ORGANIZED HEALTH CARE EDUCATION/TRAINING PROGRAM

## 2024-09-09 PROCEDURE — 77427 RADIATION TX MANAGEMENT X5: CPT | Performed by: STUDENT IN AN ORGANIZED HEALTH CARE EDUCATION/TRAINING PROGRAM

## 2024-09-09 PROCEDURE — 77387 GUIDANCE FOR RADJ TX DLVR: CPT

## 2024-09-11 ENCOUNTER — VIRTUAL VISIT (OUTPATIENT)
Dept: PALLIATIVE MEDICINE | Facility: CLINIC | Age: 69
End: 2024-09-11
Attending: NURSE PRACTITIONER
Payer: COMMERCIAL

## 2024-09-11 VITALS — BODY MASS INDEX: 19.44 KG/M2 | WEIGHT: 99 LBS | HEIGHT: 60 IN

## 2024-09-11 DIAGNOSIS — G89.3 CANCER ASSOCIATED PAIN: ICD-10-CM

## 2024-09-11 DIAGNOSIS — C34.90 MALIGNANT NEOPLASM OF LUNG, UNSPECIFIED LATERALITY, UNSPECIFIED PART OF LUNG (H): ICD-10-CM

## 2024-09-11 DIAGNOSIS — J44.9 COPD, GROUP D, BY GOLD 2017 CLASSIFICATION (H): ICD-10-CM

## 2024-09-11 PROCEDURE — 99214 OFFICE O/P EST MOD 30 MIN: CPT | Mod: 95 | Performed by: NURSE PRACTITIONER

## 2024-09-11 RX ORDER — OXYCODONE HYDROCHLORIDE 5 MG/1
2.5-5 TABLET ORAL EVERY 4 HOURS PRN
Qty: 40 TABLET | Refills: 0 | Status: SHIPPED | OUTPATIENT
Start: 2024-09-11

## 2024-09-11 RX ORDER — BUPRENORPHINE 5 UG/H
1 PATCH TRANSDERMAL
Qty: 4 PATCH | Refills: 1 | Status: SHIPPED | OUTPATIENT
Start: 2024-09-11

## 2024-09-11 NOTE — PROGRESS NOTES
Virtual Visit Details    Type of service:  Video Visit   Video Start Time: 9:20 AM  Video End Time: 9:38 AM    Originating Location (pt. Location): Home    Distant Location (provider location):  On-site  Platform used for Video Visit: [facility]      Palliative Care Outpatient Clinic      Patient ID/Chief Complaint: Jasmyn Green 68 year old female who is presenting to the palliative medicine clinic today at the request of Tata Lainez NP for a palliative care follow-up secondary to symptom management/goals of care.   The patient's primary care provider is:  Miesha Fernando.       Impression & Recommendations:  68-year-old female with new diagnosis of extensive stage small cell lung cancer (including possible small solitary brain metastasis) on palliative chemoimmunotherapy with plans for possible radiation, severe COPD on chronic supplemental oxygen (3-5 LPM depending on activity level), history of tobacco use that is now stopped, very remote history of heavier alcohol use that stopped decades ago, CAD, HTN, osteoporosis, history of gout, and problems with anxiety.    Social history includes having 3 children Georgette, Britni, and Braden, lives with daughter Georgette, , good family support.    Recently admitted to hospital with COPD exacerbation.  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  Appetite and abdominal pain have improved.    Continues on  immunotherapy and whole brain radiation.  She follows with Dr Stewart.         Symptoms/recommendations/discussion:  Chronic dyspnea related to advanced COPD worsened by recent diagnosis of lung cancer with accompanying worsening of anxiety.  Butrans 5 mcg/h weekly patch has been beneficial for dyspnea, continue   She takes maybe 4-6 doses of a half a tab of oxycodone IR per week when dyspnea worsens.  Could consider increasing Butrans patch in the future if needed.  Anxiety: Improved with  better treatment of the chronic dyspnea with opioids, and Remeron 7.5 mg (15 mg was too sedating) and Atarax/hydroxyzine 25 mg as needed.  Decreased appetite with weight loss: Appetite is improved significantly since treatment of celiac artery thrombosis.  Medical cannabis--- certified but not currently using as her appetite has improved  New onset LT flank pain, pain resolved with treatment of celiac artery thrombosis  Narcan nasal spray sent for safety reasons  She has already completed a healthcare directive expressing her wishes. Georgette is her primary healthcare agent.  POLST in EMR.  No CPR and DO NOT INTUBATE.   Palliative care follow-up in approximately 2 months  Daughter Georgette present for entire visit today.        How to get a hold of us:  For non-urgent matters, MyChart works best.    For more urgent matters, or if you prefer not to use MyChart, call our clinic nurse coordinator Hailee Paredes RN at [phone] or [phone]    We have an on-call number for evenings and weekends. Please call this only if you are having uncontrolled symptoms or serious side effects from your medicines: [phone].     For refills, please give us a week (5 working days) notice. We don't always have providers available everyday to do refills. If you call the day you run out of your medicine, we may not be able to refill it in time, so call 5 days in advance        History:  History gathered today from: patient, family/loved ones, medical chart, health care directive/s      PE: Ht 1.524 m (5')   Wt 44.9 kg (99 lb)   LMP  (LMP Unknown)   BMI 19.33 kg/m     Wt Readings from Last 3 Encounters:   09/11/24 44.9 kg (99 lb)   09/06/24 44.9 kg (99 lb)   08/30/24 44.4 kg (97 lb 14.4 oz)       Gen tachypneic, no apparent distress, alert  Head NCAT.  Eyes anicteric without injection  Face symmetric, eyes conjugate  Lungs tachypneic, no cough, speaking full sentences nasal cannula oxygen  Skin no rashes or lesions evident on  face/neck  Neuro Face symmetric, eyes conjugate; speech fluent.  Neuropsych exam normal including affect, sensorium, gross memory, thought processes, and fund of knowledge.         Data reviewed:  I reviewed electrolytes, BUN/creatinine, liver profile, hemoglobin and hematocrit, platelet count, and most recent imaging  Oncology notes     database reviewed: 9/11/2024        28 minutes spent on the date of the encounter doing chart review, history and exam, patient education & counseling, documentation and other activities as noted above.        Thank you for involving us in the patient's care.   JIMENEZ Huggins, [facility]

## 2024-09-11 NOTE — PATIENT INSTRUCTIONS
Thank you for meeting with us in the [facility].    How to get a hold of us:  For non-urgent matters, MyChart works best.    For more urgent matters, or if you prefer not to use MyChart, call our clinic nurse coordinator Hailee Paredes RN at [phone] or [phone]    We have an on-call number for evenings and weekends. Please call this only if you are having uncontrolled symptoms or serious side effects from your medicines: [phone].     For refills, please give us a week (5 working days) notice. We don't always have providers available everyday to do refills. If you call the day you run out of your medicine, we may not be able to refill it in time, so call 5 days in advance!

## 2024-09-11 NOTE — NURSING NOTE
Current patient location: [address] [place] [address]    Is the patient currently in the state of [place]? YES    Visit mode:VIDEO    If the visit is dropped, the patient can be reconnected by: TELEPHONE VISIT: Phone number: [phone]    Will anyone else be joining the visit? Yes, daughter Emma is there with her. (If patient encounters technical issues they should call [phone]    How would you like to obtain your AVS? MyChart    Are changes needed to the allergy or medication list? No    Are refills needed on medications prescribed by this physician? NO    Rooming Documentation:  Questionnaire(s) completed      Reason for visit: RECHECK    Jennifer SHETTY

## 2024-09-13 ENCOUNTER — PATIENT OUTREACH (OUTPATIENT)
Dept: CARE COORDINATION | Facility: CLINIC | Age: 69
End: 2024-09-13
Payer: COMMERCIAL

## 2024-09-17 ENCOUNTER — TELEPHONE (OUTPATIENT)
Dept: RADIATION ONCOLOGY | Facility: CLINIC | Age: 69
End: 2024-09-17
Payer: COMMERCIAL

## 2024-09-17 NOTE — TELEPHONE ENCOUNTER
Called pt at this time for a routine post radiation follow up call. I had to leave a message; told pt there is no need to call us back if they are feeling fine, but if they do have any questions or concerns to please call.  phone number provided. Reminded her of her Nov scan and follow up apt with Dr. Nuñez.     Claudette Lopez RN  [facility]

## 2024-09-23 ENCOUNTER — TELEPHONE (OUTPATIENT)
Dept: ONCOLOGY | Facility: HOSPITAL | Age: 69
End: 2024-09-23

## 2024-09-23 ENCOUNTER — OFFICE VISIT (OUTPATIENT)
Dept: PULMONOLOGY | Facility: CLINIC | Age: 69
End: 2024-09-23
Attending: NURSE PRACTITIONER
Payer: COMMERCIAL

## 2024-09-23 VITALS
WEIGHT: 91.4 LBS | SYSTOLIC BLOOD PRESSURE: 112 MMHG | OXYGEN SATURATION: 92 % | HEART RATE: 65 BPM | BODY MASS INDEX: 17.85 KG/M2 | DIASTOLIC BLOOD PRESSURE: 64 MMHG

## 2024-09-23 DIAGNOSIS — C34.90 MALIGNANT NEOPLASM OF LUNG, UNSPECIFIED LATERALITY, UNSPECIFIED PART OF LUNG (H): ICD-10-CM

## 2024-09-23 DIAGNOSIS — J44.9 COPD, SEVERE (H): Primary | ICD-10-CM

## 2024-09-23 DIAGNOSIS — J43.8 OTHER EMPHYSEMA (H): ICD-10-CM

## 2024-09-23 DIAGNOSIS — R11.2 NAUSEA AND VOMITING, UNSPECIFIED VOMITING TYPE: ICD-10-CM

## 2024-09-23 DIAGNOSIS — J96.11 CHRONIC RESPIRATORY FAILURE WITH HYPOXIA (H): ICD-10-CM

## 2024-09-23 PROCEDURE — 99214 OFFICE O/P EST MOD 30 MIN: CPT | Performed by: NURSE PRACTITIONER

## 2024-09-23 RX ORDER — MEMANTINE HYDROCHLORIDE 10 MG/1
10 TABLET ORAL DAILY
COMMUNITY
Start: 2024-09-13

## 2024-09-23 RX ORDER — ONDANSETRON 8 MG/1
8 TABLET, ORALLY DISINTEGRATING ORAL EVERY 8 HOURS PRN
Qty: 30 TABLET | Refills: 1 | Status: SHIPPED | OUTPATIENT
Start: 2024-09-23

## 2024-09-23 NOTE — PATIENT INSTRUCTIONS
It was a pleasure to see you in clinic today.   Here is what we discussed:    Continue Incruse Ellipta one puff daily.  Continue Airduo one puff twice daily, rinse/gargle after use.   Continue Duonebs 2-4 times/day.  Do this with chest congestion and also use flutter valve after neb treatments to help get the mucous up.    You can try mucinex as needed if you want.  Take with full glass of water.  Continue oxygen 3L pulse dose with activity, and 3L continuous at night - to keep oxygen saturations >88%.  Continue Albuterol every 4-6 hours as needed for shortness of breath or wheezing.  Call my nurse, Jamie ([phone]) with any change or worsening of your breathing.  Follow-up in 6 months.     Tata Lainez, CNP  Pulmonary Medicine  [facility]  [phone]

## 2024-09-23 NOTE — TELEPHONE ENCOUNTER
Retail Pharmacy Prior Authorization Team   Phone: [phone]    PA Initiation    Medication: ONDANSETRON 8 MG PO TBDP  Insurance Company: [employer] - Phone [phone] Fax [phone]  Pharmacy Filling the Rx: [facility] - [place] - [address]  Filling Pharmacy Phone: [phone]  Filling Pharmacy Fax: [phone]  Start Date: 9/23/2024

## 2024-09-23 NOTE — PROGRESS NOTES
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    COPD - GOLD E  Chronic hypoxic respiratory failure  Emphysema  Severe obstructive lung disease (FEV1 29%), with hyperinflation and air-trapping.  Severe diffusion capacity defect (DLCO 30%).  Radiographic emphysema.  Utilizes pharmacy from [facility] for some prescriptions.   Hospitalized 2/8 - 2/15/24 with COPD exacerbation, sepsis, acute on chronic hypoxic and hypercapnic respiratory failure, and found new lung mass.  Initiated on Spiriva.    Walk test in clinic indicates 3L pulse dose indicated with activity.  Since last visit, insurance has switched her from Spiriva to Incruse ellipta.  She is doing well with this.   Plan:  - continue Incruse Ellipta (umeclidinium) one puff daily.  - continue Airduo (fluticasone/salmeterol) respiclick 232/14, one puff BID, rinse/gargle after use.  - continue Duonebs BID - QID scheduled.  Discussed that she should increase use of this with exacerbations or when feeling chest congestion.  Also recommended flutter valve afterwards.   - continue Combivent PRN  - continue oxygen 3L pulse dose with activity, to keep oxygen saturations 88-92%.  Continue oxygen 3L at night.  - She is UTD with prevnar 20.  Recommended for this fall:  annual influenza vaccine, COVID booster, RSV vaccine.   - Action plan: prednisone 40mg x5 days, + doxycycline 100mg BID x5 days.     Small cell lung cancer  Nicotine dependence, in remission  Former smoker, quit in 2021.  LDCT for lung cancer screening 6/2023, LungRADS category 2.  Hospitalization in 2/2024 with COPD exacerbation.  CT on admission showed bulky AP and left hilar adenopathy with irregular pleural-based anterior left upper lobe nodule with adjacent smaller  satellite nodules. Findings suspicious for a primary lung cancer.  EBUS with biopsy on 2/16/24, positive for small cell lung cancer.  She is now followed by Dr Nuñez in radiation oncology and Dr Stewart in oncology.    Plan:  - follow closely with Dr Nuñez and Dr Stewart  - continue following with palliative care, last appt 9/11/24.    Follow-up  - 6 months      Tata Lainez, CNP  Pulmonary Medicine  [facility]  [phone]      I certify that this patient, Jasmyn Green has been under my care (or a nurse practitioner or physican's assistant working with me). This is the face-to-face encounter for oxygen medical necessity.      At the time of this encounter supplemental oxygen is reasonable and necessary and is expected to improve the patient's condition in a home setting.       Patient has continued oxygen desaturation due to Chronic Respiratory Failure with Hypoxia J96.11  COPD J44.9.    If portability is ordered, is the patient mobile within the home? yes    At the time of this encounter, I have reviewed the qualifying testing and have determined that supplemental oxygen is reasonable and necessary and is expected to improve the patient's condition in a home setting.           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    Since last visit, hospitalized in May, with increased SOB.  Treated for COPD exacerbation with duonebs, IV steroids, IV antibiotics.  Sputum positive for Aspergillus, but Fungitell negative - likely false positive.  Incidental findings of celiac artery thrombus on CT - started on eliquis.    Duonebs does help.  3-4 times/day.   Combivent helps the best.   Incruse is working okay, but gross taste - rinses mouth.   Humidity affects breathing.   Oximeter has  showed saturations in the 90s.   2-3L pulse dose.   5L when showering.  3L continuous at night, sleep is 'off' but that's the way it has been.     Patient supplied answers from flow sheet for:  COPD Assessment Test (CAT)  2009 [facility]. All rights reserved.      11/2/2021     9:00 AM 6/29/2022     8:00 AM 10/4/2022     9:00 AM 4/21/2023     7:00 AM 11/8/2023     9:37 AM 3/22/2024     8:26 AM 9/23/2024     8:00 AM   COPD assessment test (CAT)   Cough 3 3 3 4 3 3 5   Phlegm 3 3 2 4 1 2 3   Chest tightness 0 0 0 2 0 0 1   Walk up hill 5 5 5 5 4 4 4   Limited activities 0 3 0 4 4 4 4   Leaving my home 0 0 0 0 0 2 2   Sleep 0 0 1 0 1 2 2   Energy 2 4 0 1 2 4 4   Total Score 13 18 11 20 15 21 25      CAT Key:  The CAT consist of 8 items which are each scored 0-5. The total score ranges from 0-40 with higher scores representing a poorer health status. When interpreting CAT scores, the individual s disease severity should be considered.   Low impact  (1-9)  Medium impact  (10-20)  High impact  (21-30)  Very high impact  (31-40)     ROS:     6-point ROS performed and is negative aside from those listed in HPI.         Past Medical History:      Past Medical History:   Diagnosis Date    Age-related osteoporosis without current pathological fracture     Arthritis     COPD (chronic obstructive pulmonary disease) (H)     Coronary artery disease     Dependence on nocturnal oxygen therapy     Dyspnea on exertion     Emphysema lung (H)     Gout     HLD (hyperlipidemia)     Hypertension     Lung mass            Past Surgical History:      Past Surgical History:   Procedure Laterality Date    BRONCHOSCOPY RIGID OR FLEXIBLE W/TRANSENDOSCOPIC ENDOBRONCHIAL ULTRASOUND GUIDED N/A 2/16/2024    Procedure: BRONCHOSCOPY, WITH ENDOBRONCHIAL ULTRASOUND;  Surgeon: Ruben Levin MD;  Location: [facility] OR    COLONOSCOPY N/A 10/21/2021    Procedure: COLONOSCOPY;  Surgeon: Wilma Hester DO;  Location: [facility] OR    IR CHEST PORT  PLACEMENT > 5 YRS OF AGE  5/2/2024    VAGINAL DELIVERY      x 3 ; remote    WISDOM TOOTH EXTRACTION            Social History:     Social History     Tobacco Use    Smoking status: Former     Current packs/day: 0.00     Types: Cigarettes     Quit date: 6/27/2021     Years since quitting: 3.2     Passive exposure: Past    Smokeless tobacco: Never    Tobacco comments:     updated 8/3/2021 by TTS   Substance Use Topics    Alcohol use: Not Currently          Family History:     Family History   Problem Relation Age of Onset    Chronic Obstructive Pulmonary Disease Sister     Diabetes Mother     Heart Disease Mother     Lung Cancer Mother     Heart Disease Father            Allergies:    No Known Allergies       Medications:     Current Outpatient Medications   Medication Sig Dispense Refill    acetaminophen (TYLENOL) 500 MG tablet Take 2 tablets (1,000 mg) by mouth every 8 hours as needed for pain      albuterol (PROAIR HFA/PROVENTIL HFA/VENTOLIN HFA) 108 (90 Base) MCG/ACT inhaler Inhale 1-2 puffs into the lungs every 6 hours as needed for shortness of breath, wheezing or cough      apixaban ANTICOAGULANT (ELIQUIS ANTICOAGULANT) 5 MG tablet Take 1 tablet (5 mg) by mouth 2 times daily 60 tablet 5    aspirin (ASA) 325 MG EC tablet Take 325 mg by mouth daily      atorvastatin (LIPITOR) 40 MG tablet Take 1 tablet (40 mg) by mouth daily 90 tablet 3    buprenorphine (BUTRANS) 5 MCG/HR WK patch Place 1 patch onto the skin every 7 days. 4 patch 1    calcium carbonate 600 mg-vitamin D 400 units (CALTRATE) 600-400 MG-UNIT per tablet Take 1 tablet by mouth 2 times daily With calcium      dexAMETHasone (DECADRON) 1 MG tablet       dexAMETHasone (DECADRON) 2 MG tablet Continue 4 mg until 8/27, then 1 tabs (2 mg) daily x 4 days, then 1/2 tab (1 mg) daily x 4 days then stop 12 tablet 0    fexofenadine (ALLEGRA ALLERGY) 60 MG tablet Take 60 mg by mouth 2 times daily      fluticasone-salmeterol (AIRDUO RESPICLICK) 232-14 MCG/ACT inhaler  Inhale 1 puff into the lungs 2 times daily 1 each 11    hydrOXYzine HCl (ATARAX) 25 MG tablet Take 1-2 tablets (25-50 mg) by mouth every 8 hours as needed for anxiety (Insomnia) 30 tablet 3    ipratropium - albuterol 0.5 mg/2.5 mg/3 mL (DUONEB) 0.5-2.5 (3) MG/3ML neb solution Take 1 vial (3 mLs) by nebulization every 6 hours as needed for shortness of breath, wheezing or cough 180 mL 11    ipratropium-albuterol (COMBIVENT RESPIMAT)  MCG/ACT inhaler Inhale 1 puff into the lungs 4 times daily 4 g 11    lactobacillus rhamnosus, GG, (CULTURELL) capsule Take 1 capsule by mouth 2 times daily      lidocaine-prilocaine (EMLA) 2.5-2.5 % external cream Apply topically as needed for moderate pain 30 g 5    lisinopril (ZESTRIL) 10 MG tablet Take 1 tablet (10 mg) by mouth daily 30 tablet 12    memantine (NAMENDA) 10 MG tablet Take 10 mg by mouth daily.      memantine (NAMENDA) 5 MG tablet Take 2 tablets (10 mg) by mouth 2 times daily. Starting with 5 mg in the morning week 1, 5 mg twice a day week 2, morning dose 10 mg, and evening dose 5 mg week 3, and 10 mg twice a day weeks 4 through 24. 60 tablet 5    mirtazapine (REMERON) 7.5 MG tablet Take 1 tablet (7.5 mg) by mouth at bedtime 90 tablet 3    naloxone (NARCAN) 4 MG/0.1ML nasal spray Spray 1 spray (4 mg) into one nostril alternating nostrils as needed for opioid reversal every 2-3 minutes until assistance arrives 0.2 mL 1    ondansetron (ZOFRAN ODT) 8 MG ODT tab Take 1 tablet (8 mg) by mouth every 8 hours as needed for nausea. 30 tablet 1    oxyCODONE (ROXICODONE) 5 MG tablet Take 0.5-1 tablets (2.5-5 mg) by mouth every 4 hours as needed for pain or moderate to severe pain (PRN shortness of breath). 40 tablet 0    OXYGEN-HELIUM IN Inhale 3-4 L into the lungs continuous prn      prochlorperazine (COMPAZINE) 10 MG tablet Take 1 tablet (10 mg) by mouth every 6 hours as needed for nausea or vomiting 30 tablet 2    umeclidinium (INCRUSE ELLIPTA) 62.5 MCG/ACT inhaler Inhale  1 puff into the lungs daily 30 each 11    dexAMETHasone (DECADRON) 4 MG tablet Take 1 tablet (4 mg) by mouth 2 times daily (with meals) (Patient not taking: Reported on 8/30/2024) 20 tablet 1    diazepam (VALIUM) 5 MG tablet Take 1 tablet (5 mg) by mouth once as needed for anxiety Before MRI (Patient not taking: Reported on 8/19/2024) 2 tablet 0    diazepam (VALIUM) 5 MG tablet Take 1 tablet (5 mg) by mouth every 6 hours as needed for anxiety (Patient not taking: Reported on 9/23/2024) 2 tablet 2    doxycycline monohydrate (ADOXA) 100 MG tablet Take 100 mg by mouth daily (Patient not taking: Reported on 8/19/2024)      omeprazole (PRILOSEC) 10 MG DR capsule Take 10 mg by mouth daily (Patient not taking: Reported on 7/8/2024)      predniSONE (DELTASONE) 20 MG tablet Take 2 tabs daily x 5 days (Patient not taking: Reported on 8/19/2024) 10 tablet 0     No current facility-administered medications for this visit.            Physical Exam:     /64 (BP Location: Left arm, Patient Position: Sitting, Cuff Size: Adult Regular)   Pulse 65   Wt 41.5 kg (91 lb 6.4 oz)   LMP  (LMP Unknown)   SpO2 92%   BMI 17.85 kg/m    Gen: elderly female, frail, appears in NAD.  Daughter present.  HEENT: clear conjunctivae, moist mucous membranes  CV: RRR, no M/G/R  Resp: CTAB, no focal crackles or wheezes, diminished air movement throughout.  Respirations even and unlabored.  On 2L pulse dose oxygen.  Skin: no apparent rashes on visible skin  Ext: no cyanosis, clubbing or edema  Neuro: alert and answering questions appropriately            Current Data:     Chest CT 2/8/24:  IMPRESSION:   1.  Bulky AP and left hilar adenopathy with irregular pleural-based anterior left upper lobe nodule with adjacent smaller satellite nodules. Findings suspicious for a primary lung cancer.  2.  Nodular thickening of the left adrenal gland is likely unchanged.  3.  Patient needs PET/CT for further evaluation.  4.  Extensive emphysema.  5.  Findings  discussed by the undersigned with Dr. Ramos at 1141.    Chest CT 6/2023:  IMPRESSION:  1.  Negative for lung cancer screening purposes.  2.  New mild consolidation in the left upper lobe compatible with infectious / inflammatory change.    EXAM: XR CHEST 2 VIEWS  LOCATION: [facility]  DATE/TIME: 4/4/2023 6:54 AM  INDICATION:  COPD exacerbation (H), Shortness of breath  COMPARISON: 06/27/2021.                                              IMPRESSION: Heart is normal in size. Lungs are hyperinflated suggesting COPD. Minimal bibasilar atelectasis. Possible trace bilateral effusions. Lungs are otherwise clear.    Chest CT 6/2/22:  IMPRESSION:  1.  Negative for lung cancer screening purposes.  2.  Debris throughout the airways with bronchial wall thickening. Bronchitis is suspected.  3.  Severe coronary artery calcification.  4.  Small to moderate sized hiatal hernia.    PFTs 8/2021:  The FVC, FEV1 and FEV1/FVC ratio are reduced, but the QLJ00-06% is within normal limits.  The inspiratory flow rates are reduced.  The FVC is reduced relative to the SVC indicating air trapping.  The TLC, RV, FRC and RV/TLC ratio are all increased   indicating overinflation and air trapping.  Following administration of bronchodilators, there is no significant response.  The diffusing capacity is severely reduced when corrected to hemoglobin.   IMPRESSION:   Very severe Airflow Obstruction. No significant postbronchodilator response.   Mild hyperinflation and moderate airtrapping.   Diffusion capacity was severely reduced when corrected to hemoglobin.

## 2024-09-24 ENCOUNTER — ONCOLOGY VISIT (OUTPATIENT)
Dept: ONCOLOGY | Facility: HOSPITAL | Age: 69
End: 2024-09-24
Payer: COMMERCIAL

## 2024-09-24 ENCOUNTER — PATIENT OUTREACH (OUTPATIENT)
Dept: ONCOLOGY | Facility: HOSPITAL | Age: 69
End: 2024-09-24

## 2024-09-24 ENCOUNTER — INFUSION THERAPY VISIT (OUTPATIENT)
Dept: INFUSION THERAPY | Facility: HOSPITAL | Age: 69
End: 2024-09-24
Attending: INTERNAL MEDICINE
Payer: COMMERCIAL

## 2024-09-24 VITALS — SYSTOLIC BLOOD PRESSURE: 92 MMHG | DIASTOLIC BLOOD PRESSURE: 51 MMHG

## 2024-09-24 VITALS
TEMPERATURE: 97 F | DIASTOLIC BLOOD PRESSURE: 48 MMHG | RESPIRATION RATE: 24 BRPM | OXYGEN SATURATION: 95 % | WEIGHT: 90.1 LBS | HEART RATE: 109 BPM | HEIGHT: 59 IN | SYSTOLIC BLOOD PRESSURE: 86 MMHG | BODY MASS INDEX: 18.16 KG/M2

## 2024-09-24 DIAGNOSIS — M81.0 AGE-RELATED OSTEOPOROSIS WITHOUT CURRENT PATHOLOGICAL FRACTURE: ICD-10-CM

## 2024-09-24 DIAGNOSIS — R79.89 ELEVATED SERUM CREATININE: ICD-10-CM

## 2024-09-24 DIAGNOSIS — C34.90 SMALL CELL LUNG CANCER (H): Primary | ICD-10-CM

## 2024-09-24 DIAGNOSIS — C79.31 MALIGNANT NEOPLASM METASTATIC TO BRAIN (H): ICD-10-CM

## 2024-09-24 DIAGNOSIS — E86.0 DEHYDRATION: ICD-10-CM

## 2024-09-24 DIAGNOSIS — C34.2 MALIGNANT NEOPLASM OF MIDDLE LOBE, BRONCHUS OR LUNG (H): ICD-10-CM

## 2024-09-24 LAB
ALBUMIN SERPL BCG-MCNC: 3.3 G/DL (ref 3.5–5.2)
ALP SERPL-CCNC: 137 U/L (ref 40–150)
ALT SERPL W P-5'-P-CCNC: 13 U/L (ref 0–50)
ANION GAP SERPL CALCULATED.3IONS-SCNC: 11 MMOL/L (ref 7–15)
AST SERPL W P-5'-P-CCNC: 17 U/L (ref 0–45)
BASOPHILS # BLD AUTO: 0 10E3/UL (ref 0–0.2)
BASOPHILS NFR BLD AUTO: 0 %
BILIRUB SERPL-MCNC: 0.2 MG/DL
BUN SERPL-MCNC: 52.8 MG/DL (ref 8–23)
CALCIUM SERPL-MCNC: 9.2 MG/DL (ref 8.8–10.4)
CHLORIDE SERPL-SCNC: 107 MMOL/L (ref 98–107)
CREAT SERPL-MCNC: 1.75 MG/DL (ref 0.51–0.95)
EGFRCR SERPLBLD CKD-EPI 2021: 31 ML/MIN/1.73M2
EOSINOPHIL # BLD AUTO: 0.3 10E3/UL (ref 0–0.7)
EOSINOPHIL NFR BLD AUTO: 2 %
ERYTHROCYTE [DISTWIDTH] IN BLOOD BY AUTOMATED COUNT: 13.7 % (ref 10–15)
GLUCOSE SERPL-MCNC: 203 MG/DL (ref 70–99)
HCO3 SERPL-SCNC: 21 MMOL/L (ref 22–29)
HCT VFR BLD AUTO: 27.8 % (ref 35–47)
HGB BLD-MCNC: 8.6 G/DL (ref 11.7–15.7)
IMM GRANULOCYTES # BLD: 0.2 10E3/UL
IMM GRANULOCYTES NFR BLD: 1 %
LYMPHOCYTES # BLD AUTO: 0.7 10E3/UL (ref 0.8–5.3)
LYMPHOCYTES NFR BLD AUTO: 5 %
MCH RBC QN AUTO: 30 PG (ref 26.5–33)
MCHC RBC AUTO-ENTMCNC: 30.9 G/DL (ref 31.5–36.5)
MCV RBC AUTO: 97 FL (ref 78–100)
MONOCYTES # BLD AUTO: 0.9 10E3/UL (ref 0–1.3)
MONOCYTES NFR BLD AUTO: 6 %
NEUTROPHILS # BLD AUTO: 12.5 10E3/UL (ref 1.6–8.3)
NEUTROPHILS NFR BLD AUTO: 85 %
NRBC # BLD AUTO: 0 10E3/UL
NRBC BLD AUTO-RTO: 0 /100
PLATELET # BLD AUTO: 414 10E3/UL (ref 150–450)
POTASSIUM SERPL-SCNC: 5.7 MMOL/L (ref 3.4–5.3)
PROT SERPL-MCNC: 6.9 G/DL (ref 6.4–8.3)
RBC # BLD AUTO: 2.87 10E6/UL (ref 3.8–5.2)
SODIUM SERPL-SCNC: 139 MMOL/L (ref 135–145)
TSH SERPL DL<=0.005 MIU/L-ACNC: 0.52 UIU/ML (ref 0.3–4.2)
WBC # BLD AUTO: 14.7 10E3/UL (ref 4–11)

## 2024-09-24 PROCEDURE — G0463 HOSPITAL OUTPT CLINIC VISIT: HCPCS | Performed by: NURSE PRACTITIONER

## 2024-09-24 PROCEDURE — 36591 DRAW BLOOD OFF VENOUS DEVICE: CPT | Performed by: INTERNAL MEDICINE

## 2024-09-24 PROCEDURE — 80053 COMPREHEN METABOLIC PANEL: CPT | Performed by: INTERNAL MEDICINE

## 2024-09-24 PROCEDURE — 99214 OFFICE O/P EST MOD 30 MIN: CPT | Performed by: NURSE PRACTITIONER

## 2024-09-24 PROCEDURE — 96413 CHEMO IV INFUSION 1 HR: CPT

## 2024-09-24 PROCEDURE — 96361 HYDRATE IV INFUSION ADD-ON: CPT

## 2024-09-24 PROCEDURE — 258N000003 HC RX IP 258 OP 636: Performed by: NURSE PRACTITIONER

## 2024-09-24 PROCEDURE — 250N000011 HC RX IP 250 OP 636: Performed by: INTERNAL MEDICINE

## 2024-09-24 PROCEDURE — 85025 COMPLETE CBC W/AUTO DIFF WBC: CPT | Performed by: INTERNAL MEDICINE

## 2024-09-24 PROCEDURE — G2211 COMPLEX E/M VISIT ADD ON: HCPCS | Performed by: NURSE PRACTITIONER

## 2024-09-24 PROCEDURE — 258N000003 HC RX IP 258 OP 636: Performed by: INTERNAL MEDICINE

## 2024-09-24 PROCEDURE — 84443 ASSAY THYROID STIM HORMONE: CPT | Performed by: INTERNAL MEDICINE

## 2024-09-24 RX ORDER — HEPARIN SODIUM (PORCINE) LOCK FLUSH IV SOLN 100 UNIT/ML 100 UNIT/ML
5 SOLUTION INTRAVENOUS
Status: CANCELLED | OUTPATIENT
Start: 2024-09-24

## 2024-09-24 RX ORDER — HEPARIN SODIUM (PORCINE) LOCK FLUSH IV SOLN 100 UNIT/ML 100 UNIT/ML
5 SOLUTION INTRAVENOUS
Status: DISCONTINUED | OUTPATIENT
Start: 2024-09-24 | End: 2024-09-24 | Stop reason: HOSPADM

## 2024-09-24 RX ORDER — HEPARIN SODIUM,PORCINE 10 UNIT/ML
5-20 VIAL (ML) INTRAVENOUS DAILY PRN
Status: CANCELLED | OUTPATIENT
Start: 2024-09-24

## 2024-09-24 RX ORDER — HEPARIN SODIUM,PORCINE 10 UNIT/ML
5-20 VIAL (ML) INTRAVENOUS DAILY PRN
OUTPATIENT
Start: 2024-09-24

## 2024-09-24 RX ADMIN — SODIUM CHLORIDE 500 ML: 9 INJECTION, SOLUTION INTRAVENOUS at 09:57

## 2024-09-24 RX ADMIN — Medication 5 ML: at 11:27

## 2024-09-24 RX ADMIN — ATEZOLIZUMAB 1200 MG: 1200 INJECTION, SOLUTION INTRAVENOUS at 10:46

## 2024-09-24 ASSESSMENT — PAIN SCALES - GENERAL: PAINLEVEL: NO PAIN (0)

## 2024-09-24 NOTE — PROGRESS NOTES
Infusion Nursing Note:  Jasmyn Green presents today for D1C9.    Patient seen by provider today: Yes:    present during visit today: Not Applicable.    Note: Pt given 500ml NS Bolus per hydration orders. BP came up slightly post fluids bolus.       Intravenous Access:  Implanted Port.    Treatment Conditions:  Lab Results   Component Value Date    HGB 8.6 (L) 09/24/2024    WBC 14.7 (H) 09/24/2024    ANEU 8.0 05/12/2024    ANEUTAUTO 12.5 (H) 09/24/2024     09/24/2024        Lab Results   Component Value Date     09/24/2024    POTASSIUM 5.7 (H) 09/24/2024    MAG 2.1 05/17/2024    CR 1.75 (H) 09/24/2024    FLOR 9.2 09/24/2024    BILITOTAL 0.2 09/24/2024    ALBUMIN 3.3 (L) 09/24/2024    ALT 13 09/24/2024    AST 17 09/24/2024       Results reviewed, labs MET treatment parameters, ok to proceed with treatment.      Post Infusion Assessment:  Patient tolerated infusion without incident.  Site patent and intact, free from redness, edema or discomfort.  Access discontinued per protocol.       Discharge Plan:   Patient discharged in stable condition accompanied by: daughter.  Departure Mode: Ambulatory.      Claudette Tubbs RN

## 2024-09-24 NOTE — PROGRESS NOTES
[facility] Hematology and Oncology Progress Note    Patient: Jasmyn Green  MRN: [id number]  Date of Service: Sep 24, 2024          Oncologist:Dr. Stewart    Reason for Visit    Chief Complaint   Patient presents with    Oncology Clinic Visit     5 week return visit with labs/infusion related to small cell lung cancer, extensive stage, ARIANNE with pleural mets, mediastinal nodes, and brain       Assessment and Plan     Cancer Staging   No matching staging information was found for the patient.    Small cell lung cancer, extensive stage, ARIANNE with pleural mets, mediastinal nodes, and brain   Patient started palliative chemotherapy with carboplatin, etoposide and Atezolizumab 3/19/24. first cycle given at 20% reduction due to performance status. CT after 2 cycles show good response. However, due to COPD exacerbation following cycle 3, cytotoxic chemotherapy was discontinued and pt continues on maintenance Atezolizumab. PET in July 2024 showed compete metabolic response to previous sites, however, brain MRI 8/15/24 shows new brain metastasis. She is following with Dr. Nuñez and has completed whole brain radiation and steroid taper.  She reports fatigue and low appetite since radiation and has lost 10 pounds in 2 weeks.  She appears to be dehydrated on exam with tachycardia and hypotension.  White count mildly elevated today at 14.7 likely result of recent steroids.  TSH is normal.  Potassium continues to be elevated at 5.7.  Creatinine elevated today at 1.75.  More likely dehydration versus immunotherapy toxicity.  Will add fluids to treatment today.  Will have her return in 1 week for lab recheck and possible fluids.  Next brain MRI in November. Will order CT CAP in 6 weeks. Will proceed with immunotherapy today.  She will be seen for each cycle.    Normocytic anemia:   Likely from chronic disease, COPD.  Hemoglobin stable at 8.6 g/dL today.    Severe COPD:  Decreased FEV1 of 29% and reduced diffusion capacity  with DLCO of 30%. On 2 to 3 L oxygen at rest.  Denies worsening symptoms.    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     5. Elevated Creatinine  Baseline creatinine around 1.20. Today at 1.75. BUN elevated at 52.8. Dehydration likely in the setting of poor oral intake due to fatigue.  Also has tachycardia and hypotension.  Will give fluids today. If continues to be elevated, we will have to hold immunotherapy    6. Weight loss:   Discussed increasing calories, possible dietician consult.       ECOG Performance    2 - Ambulatory and independent in all ADLs; cannot work; up > 50% of the time    Distress Screening (within last 30 days)    No data recorded     Pain  Pain Score: No Pain (0)    Problem List    Patient Active Problem List   Diagnosis    COPD exacerbation (H)    History of gout    Coronary artery calcification    Lymphocytosis    Essential hypertension    Asymptomatic hypertensive urgency    Age-related osteoporosis without current pathological fracture    Lung mass    Small cell lung cancer (H)    Chronic respiratory failure with hypoxia (H)    Anemia associated with chemotherapy    Hypoxia    Status post chemotherapy    Urinary tract infection with hematuria, site unspecified    Acute sepsis (H)    Splenic infarct    Brain lesion    Malignant neoplasm of middle lobe, bronchus or lung (H)    Secondary malignant neoplasm of brain (H)    Dehydration        ______________________________________________________________________________    History of Present Illness    Diagnosis:   Lung cancer, small cell, extensive stage. Presented to ER for SOB, coughing. Hx of COPD, on oxygen.   -2/8/24: CT: Bulky AP and left hilar adenopathy with irregular pleural-based anterior left upper lobe nodule with adjacent smaller satellite nodules. Findings suspicious for a primary lung cancer   -3/1/24: PET: Findings suspicious for a left upper lobe primary lung neoplasm with adjacent satellite lesion in the left upper lobe,  extensive mediastinal lymph node involvement, and several pleural-based metastases in the left hemithorax   -3/14/24: Brain MRI: Findings concerning for a single 3 - 4 mm right occipital lobe cortex metastatic lesion   -5/3/24: repeat brain MRI shows lesion size decrease and appears more vascular   -7/3/24: PET showed completed metabolic response to therapy  -8/15/24: Brain MRI showed at least 12 nodular enhancing lesions within supratentorial and infratentorial compartments consistent with multiple new intracranial metastasis.     Treatment:   -3/25/24: start palliative treatment with Carboplatin, Etoposide, atezolizumab. Finished 3 cycles  -5/28/24: single agent atezolizumab after hospitalization for COPD exacerbation and pneumonia, cycle 9 today     Interim History:   Patient is here today for cycle 9 of single agent atezolizumab. She just completed whole brain radiation in the first week of September.  She reports moderate to severe fatigue since starting radiation.  She has been spending most of her time in the bed or chair and has been too tired to eat well.  She has lost 10 pounds in the last 2 weeks.  She has been trying to drink protein shakes and eat bites of food but has been hard.  She denies any headaches or vision changes.  She denies any breathing changes, sputum changes, or increased cough.  She does not have any fevers or chills.  She reports tolerating immunotherapy well and denies any diarrhea, skin rashes, or leg swelling.    Review of Systems    Pertinent items are noted in HPI.    Past History    Past Medical History:   Diagnosis Date    Age-related osteoporosis without current pathological fracture     Arthritis     COPD (chronic obstructive pulmonary disease) (H)     Coronary artery disease     Dependence on nocturnal oxygen therapy     Dyspnea on exertion     Emphysema lung (H)     Gout     HLD (hyperlipidemia)     Hypertension     Lung mass        PHYSICAL EXAM  BP (!) 86/48 (BP Location:  "Left arm, Patient Position: Sitting, Cuff Size: Adult Regular)   Pulse 109   Temp 97  F (36.1  C) (Tympanic)   Resp 24   Ht 1.499 m (4' 11\")   Wt 40.9 kg (90 lb 1.6 oz)   LMP  (LMP Unknown)   SpO2 95%   BMI 18.20 kg/m      GENERAL: no acute distress. Cooperative in conversation. Here with daughter. In wheelchair.   RESP: Regular respiratory rate. No expiratory wheezes. Diminished lung sounds.   NEURO: non focal. Alert and oriented x3.   PSYCH: within normal limits. No depression or anxiety.  SKIN: exposed skin is dry intact.   EXTREMITIES: no lower extremity edema     Lab Results    Recent Results (from the past 168 hour(s))   Comprehensive metabolic panel   Result Value Ref Range    Sodium 139 135 - 145 mmol/L    Potassium 5.7 (H) 3.4 - 5.3 mmol/L    Carbon Dioxide (CO2) 21 (L) 22 - 29 mmol/L    Anion Gap 11 7 - 15 mmol/L    Urea Nitrogen 52.8 (H) 8.0 - 23.0 mg/dL    Creatinine 1.75 (H) 0.51 - 0.95 mg/dL    GFR Estimate 31 (L) >60 mL/min/1.73m2    Calcium 9.2 8.8 - 10.4 mg/dL    Chloride 107 98 - 107 mmol/L    Glucose 203 (H) 70 - 99 mg/dL    Alkaline Phosphatase 137 40 - 150 U/L    AST 17 0 - 45 U/L    ALT 13 0 - 50 U/L    Protein Total 6.9 6.4 - 8.3 g/dL    Albumin 3.3 (L) 3.5 - 5.2 g/dL    Bilirubin Total 0.2 <=1.2 mg/dL   TSH with free T4 reflex   Result Value Ref Range    TSH 0.52 0.30 - 4.20 uIU/mL   CBC with platelets and differential   Result Value Ref Range    WBC Count 14.7 (H) 4.0 - 11.0 10e3/uL    RBC Count 2.87 (L) 3.80 - 5.20 10e6/uL    Hemoglobin 8.6 (L) 11.7 - 15.7 g/dL    Hematocrit 27.8 (L) 35.0 - 47.0 %    MCV 97 78 - 100 fL    MCH 30.0 26.5 - 33.0 pg    MCHC 30.9 (L) 31.5 - 36.5 g/dL    RDW 13.7 10.0 - 15.0 %    Platelet Count 414 150 - 450 10e3/uL    % Neutrophils 85 %    % Lymphocytes 5 %    % Monocytes 6 %    % Eosinophils 2 %    % Basophils 0 %    % Immature Granulocytes 1 %    NRBCs per 100 WBC 0 <1 /100    Absolute Neutrophils 12.5 (H) 1.6 - 8.3 10e3/uL    Absolute Lymphocytes 0.7 " (L) 0.8 - 5.3 10e3/uL    Absolute Monocytes 0.9 0.0 - 1.3 10e3/uL    Absolute Eosinophils 0.3 0.0 - 0.7 10e3/uL    Absolute Basophils 0.0 0.0 - 0.2 10e3/uL    Absolute Immature Granulocytes 0.2 <=0.4 10e3/uL    Absolute NRBCs 0.0 10e3/uL             Imaging    No results found.      The longitudinal plan of care for the diagnosis(es)/condition(s) as documented were addressed during this visit. Due to the added complexity in care, I will continue to support Jasmyn in the subsequent management and with ongoing continuity of care.    Signed by: Zayra Pickard PA-C    I, JIMENEZ Butt CNP, saw this patient and agree with the findings and plan of care as documented in the note.      Items personally reviewed/procedural attestation: vitals, labs, and I was present for key portions of the visit and agree with Assessment and plan

## 2024-09-24 NOTE — LETTER
9/24/2024      Jasmyn Green  [address] [place] [address]      Dear Colleague,    Thank you for referring your patient, Jasmyn Green, to the [facility] [place]. Please see a copy of my visit note below.    [facility] Hematology and Oncology Progress Note    Patient: Jasmyn Green  MRN: [id number]  Date of Service: Sep 24, 2024          Oncologist:Dr. Stewart    Reason for Visit    Chief Complaint   Patient presents with     Oncology Clinic Visit     5 week return visit with labs/infusion related to small cell lung cancer, extensive stage, AIRANNE with pleural mets, mediastinal nodes, and brain       Assessment and Plan     Cancer Staging   No matching staging information was found for the patient.    Small cell lung cancer, extensive stage, ARIANNE with pleural mets, mediastinal nodes, and brain   Patient started palliative chemotherapy with carboplatin, etoposide and Atezolizumab 3/19/24. first cycle given at 20% reduction due to performance status. CT after 2 cycles show good response. However, due to COPD exacerbation following cycle 3, cytotoxic chemotherapy was discontinued and pt continues on maintenance Atezolizumab. PET in July 2024 showed compete metabolic response to previous sites, however, brain MRI 8/15/24 shows new brain metastasis. She is following with Dr. Nuñez and has completed whole brain radiation and steroid taper.  She reports fatigue and low appetite since radiation and has lost 10 pounds in 2 weeks.  She appears to be dehydrated on exam with tachycardia and hypotension.  White count mildly elevated today at 14.7 likely result of recent steroids.  TSH is normal.  Potassium continues to be elevated at 5.7.  Creatinine elevated today at 1.75.  More likely dehydration versus immunotherapy toxicity.  Will add fluids to treatment today.  Will have her return in 1 week for lab recheck and possible fluids.  Next brain MRI in November. Will order CT  CAP in 6 weeks. Will proceed with immunotherapy today.  She will be seen for each cycle.    Normocytic anemia:   Likely from chronic disease, COPD.  Hemoglobin stable at 8.6 g/dL today.    Severe COPD:  Decreased FEV1 of 29% and reduced diffusion capacity with DLCO of 30%. On 2 to 3 L oxygen at rest.  Denies worsening symptoms.    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     5. Elevated Creatinine  Baseline creatinine around 1.20. Today at 1.75. BUN elevated at 52.8. Dehydration likely in the setting of poor oral intake due to fatigue.  Also has tachycardia and hypotension.  Will give fluids today. If continues to be elevated, we will have to hold immunotherapy    6. Weight loss:   Discussed increasing calories, possible dietician consult.       ECOG Performance    2 - Ambulatory and independent in all ADLs; cannot work; up > 50% of the time    Distress Screening (within last 30 days)    No data recorded     Pain  Pain Score: No Pain (0)    Problem List    Patient Active Problem List   Diagnosis     COPD exacerbation (H)     History of gout     Coronary artery calcification     Lymphocytosis     Essential hypertension     Asymptomatic hypertensive urgency     Age-related osteoporosis without current pathological fracture     Lung mass     Small cell lung cancer (H)     Chronic respiratory failure with hypoxia (H)     Anemia associated with chemotherapy     Hypoxia     Status post chemotherapy     Urinary tract infection with hematuria, site unspecified     Acute sepsis (H)     Splenic infarct     Brain lesion     Malignant neoplasm of middle lobe, bronchus or lung (H)     Secondary malignant neoplasm of brain (H)     Dehydration        ______________________________________________________________________________    History of Present Illness    Diagnosis:   Lung cancer, small cell, extensive stage. Presented to ER for SOB, coughing. Hx of COPD, on oxygen.   -2/8/24: CT: Bulky AP and left hilar adenopathy with  irregular pleural-based anterior left upper lobe nodule with adjacent smaller satellite nodules. Findings suspicious for a primary lung cancer   -3/1/24: PET: Findings suspicious for a left upper lobe primary lung neoplasm with adjacent satellite lesion in the left upper lobe, extensive mediastinal lymph node involvement, and several pleural-based metastases in the left hemithorax   -3/14/24: Brain MRI: Findings concerning for a single 3 - 4 mm right occipital lobe cortex metastatic lesion   -5/3/24: repeat brain MRI shows lesion size decrease and appears more vascular   -7/3/24: PET showed completed metabolic response to therapy  -8/15/24: Brain MRI showed at least 12 nodular enhancing lesions within supratentorial and infratentorial compartments consistent with multiple new intracranial metastasis.     Treatment:   -3/25/24: start palliative treatment with Carboplatin, Etoposide, atezolizumab. Finished 3 cycles  -5/28/24: single agent atezolizumab after hospitalization for COPD exacerbation and pneumonia, cycle 9 today     Interim History:   Patient is here today for cycle 9 of single agent atezolizumab. She just completed whole brain radiation in the first week of September.  She reports moderate to severe fatigue since starting radiation.  She has been spending most of her time in the bed or chair and has been too tired to eat well.  She has lost 10 pounds in the last 2 weeks.  She has been trying to drink protein shakes and eat bites of food but has been hard.  She denies any headaches or vision changes.  She denies any breathing changes, sputum changes, or increased cough.  She does not have any fevers or chills.  She reports tolerating immunotherapy well and denies any diarrhea, skin rashes, or leg swelling.    Review of Systems    Pertinent items are noted in HPI.    Past History    Past Medical History:   Diagnosis Date     Age-related osteoporosis without current pathological fracture      Arthritis       "COPD (chronic obstructive pulmonary disease) (H)      Coronary artery disease      Dependence on nocturnal oxygen therapy      Dyspnea on exertion      Emphysema lung (H)      Gout      HLD (hyperlipidemia)      Hypertension      Lung mass        PHYSICAL EXAM  BP (!) 86/48 (BP Location: Left arm, Patient Position: Sitting, Cuff Size: Adult Regular)   Pulse 109   Temp 97  F (36.1  C) (Tympanic)   Resp 24   Ht 1.499 m (4' 11\")   Wt 40.9 kg (90 lb 1.6 oz)   LMP  (LMP Unknown)   SpO2 95%   BMI 18.20 kg/m      GENERAL: no acute distress. Cooperative in conversation. Here with daughter. In wheelchair.   RESP: Regular respiratory rate. No expiratory wheezes. Diminished lung sounds.   NEURO: non focal. Alert and oriented x3.   PSYCH: within normal limits. No depression or anxiety.  SKIN: exposed skin is dry intact.   EXTREMITIES: no lower extremity edema     Lab Results    Recent Results (from the past 168 hour(s))   Comprehensive metabolic panel   Result Value Ref Range    Sodium 139 135 - 145 mmol/L    Potassium 5.7 (H) 3.4 - 5.3 mmol/L    Carbon Dioxide (CO2) 21 (L) 22 - 29 mmol/L    Anion Gap 11 7 - 15 mmol/L    Urea Nitrogen 52.8 (H) 8.0 - 23.0 mg/dL    Creatinine 1.75 (H) 0.51 - 0.95 mg/dL    GFR Estimate 31 (L) >60 mL/min/1.73m2    Calcium 9.2 8.8 - 10.4 mg/dL    Chloride 107 98 - 107 mmol/L    Glucose 203 (H) 70 - 99 mg/dL    Alkaline Phosphatase 137 40 - 150 U/L    AST 17 0 - 45 U/L    ALT 13 0 - 50 U/L    Protein Total 6.9 6.4 - 8.3 g/dL    Albumin 3.3 (L) 3.5 - 5.2 g/dL    Bilirubin Total 0.2 <=1.2 mg/dL   TSH with free T4 reflex   Result Value Ref Range    TSH 0.52 0.30 - 4.20 uIU/mL   CBC with platelets and differential   Result Value Ref Range    WBC Count 14.7 (H) 4.0 - 11.0 10e3/uL    RBC Count 2.87 (L) 3.80 - 5.20 10e6/uL    Hemoglobin 8.6 (L) 11.7 - 15.7 g/dL    Hematocrit 27.8 (L) 35.0 - 47.0 %    MCV 97 78 - 100 fL    MCH 30.0 26.5 - 33.0 pg    MCHC 30.9 (L) 31.5 - 36.5 g/dL    RDW 13.7 10.0 - " "15.0 %    Platelet Count 414 150 - 450 10e3/uL    % Neutrophils 85 %    % Lymphocytes 5 %    % Monocytes 6 %    % Eosinophils 2 %    % Basophils 0 %    % Immature Granulocytes 1 %    NRBCs per 100 WBC 0 <1 /100    Absolute Neutrophils 12.5 (H) 1.6 - 8.3 10e3/uL    Absolute Lymphocytes 0.7 (L) 0.8 - 5.3 10e3/uL    Absolute Monocytes 0.9 0.0 - 1.3 10e3/uL    Absolute Eosinophils 0.3 0.0 - 0.7 10e3/uL    Absolute Basophils 0.0 0.0 - 0.2 10e3/uL    Absolute Immature Granulocytes 0.2 <=0.4 10e3/uL    Absolute NRBCs 0.0 10e3/uL             Imaging    No results found.      The longitudinal plan of care for the diagnosis(es)/condition(s) as documented were addressed during this visit. Due to the added complexity in care, I will continue to support Jasmyn in the subsequent management and with ongoing continuity of care.    Signed by: Zayra Pickard PA-C    I, JIMENEZ Butt CNP, saw this patient and agree with the findings and plan of care as documented in the note.      Items personally reviewed/procedural attestation: vitals, labs, and I was present for key portions of the visit and agree with Assessment and plan        Oncology Rooming Note    September 24, 2024 8:58 AM   Jasmyn Green is a 69 year old female who presents for:    Chief Complaint   Patient presents with     Oncology Clinic Visit     5 week return visit with labs/infusion related to small cell lung cancer, extensive stage, ARIANNE with pleural mets, mediastinal nodes, and brain     Initial Vitals: BP (!) 86/48 (BP Location: Left arm, Patient Position: Sitting, Cuff Size: Adult Regular)   Pulse 109   Temp 97  F (36.1  C) (Tympanic)   Resp 24   Ht 1.499 m (4' 11\")   Wt 40.9 kg (90 lb 1.6 oz)   LMP  (LMP Unknown)   SpO2 95%   BMI 18.20 kg/m   Estimated body mass index is 18.2 kg/m  as calculated from the following:    Height as of this encounter: 1.499 m (4' 11\").    Weight as of this encounter: 40.9 kg (90 lb 1.6 oz). Body surface area " is 1.3 meters squared.  No Pain (0) Comment: Data Unavailable   No LMP recorded (lmp unknown). Patient is postmenopausal.  Allergies reviewed: Yes  Medications reviewed: Yes    Medications: Medication refills not needed today.  Pharmacy name entered into [employer]:    [facility] - [place], [place] - [address] [facility] - [place], [place] - [address]    Frailty Screening:   Is the patient here for a new oncology consult visit in cancer care? 2. No      Clinical concerns: no concerns.       Jina Espinoza CMA                Again, thank you for allowing me to participate in the care of your patient.        Sincerely,        JIMENEZ Butt CNP

## 2024-09-24 NOTE — PROGRESS NOTES
"Oncology Rooming Note    September 24, 2024 8:58 AM   Jasmyn Green is a 69 year old female who presents for:    Chief Complaint   Patient presents with    Oncology Clinic Visit     5 week return visit with labs/infusion related to small cell lung cancer, extensive stage, ARIANNE with pleural mets, mediastinal nodes, and brain     Initial Vitals: BP (!) 86/48 (BP Location: Left arm, Patient Position: Sitting, Cuff Size: Adult Regular)   Pulse 109   Temp 97  F (36.1  C) (Tympanic)   Resp 24   Ht 1.499 m (4' 11\")   Wt 40.9 kg (90 lb 1.6 oz)   LMP  (LMP Unknown)   SpO2 95%   BMI 18.20 kg/m   Estimated body mass index is 18.2 kg/m  as calculated from the following:    Height as of this encounter: 1.499 m (4' 11\").    Weight as of this encounter: 40.9 kg (90 lb 1.6 oz). Body surface area is 1.3 meters squared.  No Pain (0) Comment: Data Unavailable   No LMP recorded (lmp unknown). Patient is postmenopausal.  Allergies reviewed: Yes  Medications reviewed: Yes    Medications: Medication refills not needed today.  Pharmacy name entered into [employer]:    [facility] - [facility] [place] - [address] [facility] - [place], [place] - [address]    Frailty Screening:   Is the patient here for a new oncology consult visit in cancer care? 2. No      Clinical concerns: no concerns.       Jina Espinoza CMA              "

## 2024-09-24 NOTE — PROGRESS NOTES
[facility]: Cancer Care Follow-Up Note                                    Discussion with Patient:                                                      Patient comes into the clinic today for follow-up with Dana Mathur CNP.     Goals          General    Maintain ability to perform ADLs without difficulty             Dates of Treatment:                                                      Infusion given in last 28 days       Administered MAR Action Medication Dose Rate Visit    09/24/2024 10:46 New Bag atezolizumab (TECENTRIQ) 1,200 mg in sodium chloride 0.9 % 130 mL infusion 1,200 mg 260 mL/hr Infusion Therapy Visit on 09/24/2024 in [facility]            Assessment:                                                      Patient will come back in 1 week for labs and possible IV fluids.  We will keep her appointments in 3 weeks and then in 6 weeks she will come back and see Dr Stewart with imaging prior.    Intervention/Education provided during outreach:                                                       Patient has been scheduled on 10/1 for labs and IV fluids, 10/15 for lab, Dana Mathur CNP and infusion, 11/4 for MRI and CT and then on 11/6 for labs, Dr Stewart and infusion.    Patient to follow up as scheduled at next appt. Patient to call/MyChart message with updates. Confirmed patient has clinic and triage numbers.    Signature:  Wilma Carmona RN

## 2024-09-24 NOTE — TELEPHONE ENCOUNTER
Prior Authorization Approval    Medication: ONDANSETRON 8 MG PO TBDP  Authorization Effective Date: 9/23/2024  Authorization Expiration Date: 9/23/2025  Approved Dose/Quantity:   Reference #:     Insurance Company: [employer] - Phone [phone] Fax [phone]  Expected CoPay: $    CoPay Card Available:      Financial Assistance Needed:   Which Pharmacy is filling the prescription: [facility] - [place], [place] - [address]  Pharmacy Notified: Yes  Patient Notified:

## 2024-09-27 ENCOUNTER — RADIATION TREATMENT SUMMARY (OUTPATIENT)
Dept: RADIATION ONCOLOGY | Facility: CLINIC | Age: 69
End: 2024-09-27
Payer: COMMERCIAL

## 2024-09-27 DIAGNOSIS — C34.90 SMALL CELL LUNG CANCER (H): ICD-10-CM

## 2024-09-27 DIAGNOSIS — C79.31 SECONDARY MALIGNANT NEOPLASM OF BRAIN (H): Primary | ICD-10-CM

## 2024-09-27 NOTE — PROGRESS NOTES
Radiation Treatment Summary    Patient: Jasmyn Green   MRN: [id number]  : 1955  Care Provider: Tata Nuñez MD    Encounter Date: Sep 27, 2024      HISTORY: Jasmyn Green was treated with 3D conformal radiation therapy.    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     PLAN: Discharge instructions were given and Jasmyn knows to call if questions/issues arise. Jasmyn will be seen in f/u in 2  months with a scan-MRI brain-return sooner if needed.     Pain Management Plan: N/A    Signed by: Tata Nuñez MD    cc:    Patient Care Team:  Ana Maria Bermudez MD as PCP - General (Internal Medicine)  Aidee Luna RT as Chronic Pulmonary Disease Specialist (Respiratory Therapy)  Rickie Pickens MD as Assigned Endocrinology Provider  Ruben Levin MD as MD (Critical Care)  Tata Lainez NP as Assigned Pulmonology Provider  Amador Stewart MD as MD (Hematology)  Wilma Carmona, RN as Specialty Care Coordinator (Hematology & Oncology)  Ana Maria Bermudez MD as Assigned PCP  Evans Gramajo APRN CNP as Nurse Practitioner (Hospice And Palliative Care)  Evans Gramajo APRN CNP as Assigned Palliative Care Provider  Bryant Mckinney MD as Physician (Vascular Surgery)  Naheed Mulligan MD as MD (Cardiovascular Disease)  Naheed Mulligan MD as Assigned Heart and Vascular Provider  Amador Stewart MD as Assigned Cancer Care Provider  Tata Nuñez MD as MD (Radiation Oncology)

## 2024-09-27 NOTE — LETTER
Radiation Treatment Summary    Patient: Jasmyn Green   MRN: [id number]  : 1955  Care Provider: Tata Nuñez MD    Encounter Date: Sep 27, 2024      HISTORY: Jasmyn Green was treated with 3D conformal radiation therapy.    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     PLAN: Discharge instructions were given and Jasmyn knows to call if questions/issues arise. Jasmyn will be seen in f/u in 2  months with a scan-MRI brain-return sooner if needed.     Pain Management Plan: N/A    Signed by: Tata Nuñez MD    cc:    Patient Care Team:  Ana Maria Bermudez MD as PCP - General (Internal Medicine)  Aidee Luna RT as Chronic Pulmonary Disease Specialist (Respiratory Therapy)  Rickie Pickens MD as Assigned Endocrinology Provider  Ruben Levin MD as MD (Critical Care)  Tata Lainez NP as Assigned Pulmonology Provider  Amador Stewart MD as MD (Hematology)  Wilma Carmona, RN as Specialty Care Coordinator (Hematology & Oncology)  Ana Maria Bermudez MD as Assigned PCP  Evans Gramajo APRN CNP as Nurse Practitioner (Hospice And Palliative Care)  Evans Gramajo APRN CNP as Assigned Palliative Care Provider  Bryant Mckinney MD as Physician (Vascular Surgery)  Naheed Mulligan MD as MD (Cardiovascular Disease)  Naheed Mulligan MD as Assigned Heart and Vascular Provider  Amador Stewart MD as Assigned Cancer Care Provider  Tata Nuñez MD as MD (Radiation Oncology)

## 2024-09-28 ENCOUNTER — HEALTH MAINTENANCE LETTER (OUTPATIENT)
Age: 69
End: 2024-09-28

## 2024-10-01 ENCOUNTER — LAB (OUTPATIENT)
Dept: INFUSION THERAPY | Facility: HOSPITAL | Age: 69
End: 2024-10-01
Attending: INTERNAL MEDICINE
Payer: COMMERCIAL

## 2024-10-01 VITALS
SYSTOLIC BLOOD PRESSURE: 77 MMHG | DIASTOLIC BLOOD PRESSURE: 60 MMHG | HEART RATE: 115 BPM | OXYGEN SATURATION: 96 % | TEMPERATURE: 97.5 F | RESPIRATION RATE: 16 BRPM

## 2024-10-01 DIAGNOSIS — R79.89 ELEVATED SERUM CREATININE: ICD-10-CM

## 2024-10-01 DIAGNOSIS — C34.90 SMALL CELL LUNG CANCER (H): Primary | ICD-10-CM

## 2024-10-01 DIAGNOSIS — E86.0 DEHYDRATION: Primary | ICD-10-CM

## 2024-10-01 DIAGNOSIS — M81.0 AGE-RELATED OSTEOPOROSIS WITHOUT CURRENT PATHOLOGICAL FRACTURE: ICD-10-CM

## 2024-10-01 LAB
ALBUMIN SERPL BCG-MCNC: 3.3 G/DL (ref 3.5–5.2)
ALP SERPL-CCNC: 143 U/L (ref 40–150)
ALT SERPL W P-5'-P-CCNC: 16 U/L (ref 0–50)
ANION GAP SERPL CALCULATED.3IONS-SCNC: 12 MMOL/L (ref 7–15)
AST SERPL W P-5'-P-CCNC: 23 U/L (ref 0–45)
BILIRUB SERPL-MCNC: <0.2 MG/DL
BUN SERPL-MCNC: 54.5 MG/DL (ref 8–23)
CALCIUM SERPL-MCNC: 9.2 MG/DL (ref 8.8–10.4)
CHLORIDE SERPL-SCNC: 112 MMOL/L (ref 98–107)
CREAT SERPL-MCNC: 1.91 MG/DL (ref 0.51–0.95)
EGFRCR SERPLBLD CKD-EPI 2021: 28 ML/MIN/1.73M2
GLUCOSE SERPL-MCNC: 139 MG/DL (ref 70–99)
HCO3 SERPL-SCNC: 19 MMOL/L (ref 22–29)
POTASSIUM SERPL-SCNC: 6 MMOL/L (ref 3.4–5.3)
PROT SERPL-MCNC: 6.6 G/DL (ref 6.4–8.3)
SODIUM SERPL-SCNC: 143 MMOL/L (ref 135–145)

## 2024-10-01 PROCEDURE — 258N000003 HC RX IP 258 OP 636: Performed by: NURSE PRACTITIONER

## 2024-10-01 PROCEDURE — 80053 COMPREHEN METABOLIC PANEL: CPT

## 2024-10-01 PROCEDURE — 250N000011 HC RX IP 250 OP 636: Performed by: NURSE PRACTITIONER

## 2024-10-01 PROCEDURE — 36591 DRAW BLOOD OFF VENOUS DEVICE: CPT

## 2024-10-01 PROCEDURE — 96360 HYDRATION IV INFUSION INIT: CPT

## 2024-10-01 RX ORDER — HEPARIN SODIUM (PORCINE) LOCK FLUSH IV SOLN 100 UNIT/ML 100 UNIT/ML
5 SOLUTION INTRAVENOUS
Status: CANCELLED | OUTPATIENT
Start: 2024-10-01

## 2024-10-01 RX ORDER — HEPARIN SODIUM,PORCINE 10 UNIT/ML
5-20 VIAL (ML) INTRAVENOUS DAILY PRN
OUTPATIENT
Start: 2024-10-01

## 2024-10-01 RX ORDER — HEPARIN SODIUM (PORCINE) LOCK FLUSH IV SOLN 100 UNIT/ML 100 UNIT/ML
5 SOLUTION INTRAVENOUS
Status: DISCONTINUED | OUTPATIENT
Start: 2024-10-01 | End: 2024-10-01 | Stop reason: HOSPADM

## 2024-10-01 RX ADMIN — Medication 5 ML: at 13:45

## 2024-10-01 NOTE — PROGRESS NOTES
Infusion Nursing Note:  Jasmyn Green presents today for labwork and iv fluids.    Patient seen by provider today: No   present during visit today: Not Applicable.    Note: PT here in wheelchair for labwork and iv fluids. PT able to transfer to recliner by self. PT is on 2 liters nc portable oxygen. PT states is drinking and eating more/fatigue continues. Denies diarrhea or constipation. Port accessed and labs drawn. Normal saline 500 ml infused over one hour than port flushed with heparin/deaccessed and 2x2 to site. PT dc'd in wheelchair with family. Reviewed blood pressure and cmp lab results with Dr Stewart. Serum K 6.0 and Creatinine 1.91. Dr Stewart instructed pt to hold lisinoprol until further notice and schedule labs and iv hydration next week.       Intravenous Access:  Implanted Port.    Treatment Conditions:  Not Applicable.      Post Infusion Assessment:  Patient tolerated infusion without incident.       Discharge Plan:   Follow up reviewed.      Ce Magallon RN

## 2024-10-03 ENCOUNTER — LAB (OUTPATIENT)
Dept: LAB | Facility: CLINIC | Age: 69
End: 2024-10-03
Payer: COMMERCIAL

## 2024-10-03 DIAGNOSIS — I25.10 CORONARY ARTERY CALCIFICATION: Primary | ICD-10-CM

## 2024-10-03 DIAGNOSIS — M81.0 AGE-RELATED OSTEOPOROSIS WITHOUT CURRENT PATHOLOGICAL FRACTURE: ICD-10-CM

## 2024-10-03 DIAGNOSIS — R73.9 BLOOD GLUCOSE ELEVATED: ICD-10-CM

## 2024-10-03 LAB
ALBUMIN SERPL BCG-MCNC: 3.4 G/DL (ref 3.5–5.2)
CALCIUM SERPL-MCNC: 10.1 MG/DL (ref 8.8–10.4)
CHOLEST SERPL-MCNC: 120 MG/DL
CREAT SERPL-MCNC: 1.57 MG/DL (ref 0.51–0.95)
EGFRCR SERPLBLD CKD-EPI 2021: 35 ML/MIN/1.73M2
FASTING STATUS PATIENT QL REPORTED: YES
FASTING STATUS PATIENT QL REPORTED: YES
GLUCOSE SERPL-MCNC: 273 MG/DL (ref 70–99)
HDLC SERPL-MCNC: 30 MG/DL
LDLC SERPL CALC-MCNC: 57 MG/DL
NONHDLC SERPL-MCNC: 90 MG/DL
TRIGL SERPL-MCNC: 163 MG/DL

## 2024-10-03 PROCEDURE — 36415 COLL VENOUS BLD VENIPUNCTURE: CPT

## 2024-10-03 PROCEDURE — 80061 LIPID PANEL: CPT

## 2024-10-03 PROCEDURE — 82565 ASSAY OF CREATININE: CPT

## 2024-10-03 PROCEDURE — 82310 ASSAY OF CALCIUM: CPT

## 2024-10-03 PROCEDURE — 82947 ASSAY GLUCOSE BLOOD QUANT: CPT

## 2024-10-03 PROCEDURE — 82040 ASSAY OF SERUM ALBUMIN: CPT

## 2024-10-03 PROCEDURE — 82306 VITAMIN D 25 HYDROXY: CPT

## 2024-10-07 LAB
DEPRECATED CALCIDIOL+CALCIFEROL SERPL-MC: <43 UG/L (ref 20–75)
VITAMIN D2 SERPL-MCNC: <5 UG/L
VITAMIN D3 SERPL-MCNC: 38 UG/L

## 2024-10-07 NOTE — PROGRESS NOTES
Subjective:    Established patient, the following is a comprehensive summary of her Endocrine care to date.     Jasmyn Green is a 69 year old female who presents to discuss osteoporosis. We previously reviewed the standard osteoporosis dictation template.     # Osteoporosis     DEXA 10/3/2022:  -L1 - L4 T-score -1.6 with 14.6% significant BMD improvement compared to 8/2021  -Lowest T-score at the hips is -3.7 at the left and right total hips, at the right total hip (only that is reported) is a 20.4% significant improvement compared to 8/2021  -TBS T-score L1 - L4 -4.2    DEXA 10/23/2023:  -L1 - L4 T-score -1.7 and stable compared to 2022   -Lowest overall T-score at the hips is -3.9 at the right total hip; overall BMD at the right total hip is stable compared to 2022    -Lumbar Spine L1-L4: TBS: 1.156. TBS T-score: -3.3.TBS Z-score: -1.1.      CT C/A/P 5/3/2024: no vertebral compression fractures     No prior pharmacologic therapy to reduce fracture risk before starting Tymlos 10/2021.     10/2021: She started Tymlos   2/2022: Because of insurance coverage we started Forteo instead, there was a likely 3 week lapse between stopping Tymlos and starting Forteo; Forteo is well tolerated    2/2023: Switched from Forteo to teriparatide and there was an ~30 day lapse in treatment   8/15/2023: she continues on teriparatide, well tolerated     Reclast 5 mg IV given: 10/26/2023     No prior low impact fractures. No falls. No prior nephrolithiasis. Significant GC exposure in the setting of COPD over the years.      Significant history of tobacco use and quit in 7/2021.      As of 11/2022 Jasmyn has completed a complete secondary evaluation for causes of increased fracture risk with the following notable findings:   -No prior hypercalcemia; past few months has had some hypocalcemia   -alkaline phosphatase has been mildly elevated but subsequently bone specific alkaline phosphatase normal 10/2021  -10/2024: 25-OH vitamin D  low normal   -9/2021: 24 hour urine calcium 108 mg (1.8 L collection)   -Abnormal Celiac screen - see below  -CKD    Imaging notes severe coronary artery calcification but no prior ASCVD event.      2020 she had all teeth removed. No recent/plans for dental procedures. She has a hiatal hernia.     She takes a combination calcium vitamin D supplement.    # Metastatic small cell lung cancer    She was diagnosed early 2024.    Started on system chemotherapy spring of 2024 and ultimately transitioned to maintenance atezolizumab. Received whole brain radiation summer of 2024.    MRI brain 8/2024: brain metastases, per radiology the sella is normal     # Diabetes mellitus  -CT A/P 5/2024: per radiology pancreas is normal     Random glucose first >200 mg/dL in early 2024 and has been >400 mg/dL. No prior HbA1c.     10/3/2024:  mg/dL.     10/8/2024: today she endorses polydipsia. Energy and appetite are poor.      She has been intermittently on oral glucocorticoids (prednisone and dexamethasone) - exact timeline unclear. She recalls the last dose of dexaethasone was about 3 weeks ago and last dose of prednisone was months ago. She is on chronic opioids and takes and inhaled GC.     No recent HbA1c.     # Adrenal nodule    CT-PE 6/27/2021: I can't clearly see the adrenal glands, no radiology comment on the adrenal glands     CT chest (low dose for cancer screening without IV contrast): difficult to say for certain, but I think the left adrenal gland is hyperplastic on this scan with unenhanced HU <10    CT A/P with IV contrast 5/3/2024:  -Per radiology: nodular thickening of the left adrenal gland   -Per my review: right adrenal gland looks normal, I don't see a discrete nodule in the left adrenal gland - it looks diffusely hyperplastic     FDG PET/CT 7/3/2024:  -No radiology comment on the adrenal glands   -Per my review, no significant FDG uptake in the adrenal glands     # Possible Celiac disease  # BMI 17 kg/m2      Celiac screen was previously ordered in setting of vitamin D deficiency and severe osteoporosis without obvious cause. She is positive for DQB1*02 and/or DQB1*03(08), she has elevated deamidated gliadin Abs, normal TTG Abs (with normal IgA). No diarrhea, no abdominal pain. She has been anemic. No known Celiac disease in the family.      I referred her to GI 10/2021. She met with her PCP 11/2021 and they decided to defer GI evaluation.     Objective:    Appears frail today. Seated in a wheelchair on supplemental oxygen. BMI 18.2 kg/m2, /64.    8/2023: On supplemental oxygen.  BMI 17.1 kg/meter squared (stable).     9/2021: BMI 18.66 kg/m2. Thoracic kyphosis. Thyroid exam normal. Edentulous. Steady on her feet without a gait aid.     Assessment/Plan:    # Osteoporosis, glucocorticoid associated    At this point, giver her GFR, recent hypocalcemia, and poor appetite I think the risk of antiresorptive therapy outweighs the benefit. For now - continue the combination calcium and vitamin D pill and work on nutrition.    # Diabetes mellitus    Hyperglycemia has been severe at times.    We need to consider the possibility of autoimmune DM in the setting of her immunotherapy.    Will place a diagnostic CGM today and I added autoantibodies to her next blood draw and HbA1c as well as TSH, T4, cortisol, etc.    30 minutes spent on the date of the encounter doing chart review, history and exam, documentation and further activities as noted above.      The longitudinal plan of care for the diagnosis(es)/condition(s) as documented were addressed during this visit. Due to the added complexity in care, I will continue to support Jasmyn in the subsequent management and with ongoing continuity of care.

## 2024-10-08 ENCOUNTER — OFFICE VISIT (OUTPATIENT)
Dept: ENDOCRINOLOGY | Facility: CLINIC | Age: 69
End: 2024-10-08
Payer: COMMERCIAL

## 2024-10-08 ENCOUNTER — TELEPHONE (OUTPATIENT)
Dept: ONCOLOGY | Facility: HOSPITAL | Age: 69
End: 2024-10-08

## 2024-10-08 VITALS
WEIGHT: 90 LBS | SYSTOLIC BLOOD PRESSURE: 138 MMHG | DIASTOLIC BLOOD PRESSURE: 64 MMHG | BODY MASS INDEX: 18.18 KG/M2 | HEART RATE: 120 BPM

## 2024-10-08 DIAGNOSIS — J96.11 CHRONIC RESPIRATORY FAILURE WITH HYPOXIA (H): ICD-10-CM

## 2024-10-08 DIAGNOSIS — E44.0 MODERATE PROTEIN-CALORIE MALNUTRITION (H): ICD-10-CM

## 2024-10-08 DIAGNOSIS — M81.0 AGE-RELATED OSTEOPOROSIS WITHOUT CURRENT PATHOLOGICAL FRACTURE: Primary | ICD-10-CM

## 2024-10-08 DIAGNOSIS — C34.90 SMALL CELL CARCINOMA OF LUNG, UNSPECIFIED LATERALITY, UNSPECIFIED PART OF LUNG (H): ICD-10-CM

## 2024-10-08 DIAGNOSIS — C79.31 SECONDARY MALIGNANT NEOPLASM OF BRAIN (H): ICD-10-CM

## 2024-10-08 DIAGNOSIS — E08.9 DIABETES MELLITUS DUE TO UNDERLYING CONDITION WITHOUT COMPLICATION, WITHOUT LONG-TERM CURRENT USE OF INSULIN (H): ICD-10-CM

## 2024-10-08 PROCEDURE — G2211 COMPLEX E/M VISIT ADD ON: HCPCS | Performed by: INTERNAL MEDICINE

## 2024-10-08 PROCEDURE — 99214 OFFICE O/P EST MOD 30 MIN: CPT | Performed by: INTERNAL MEDICINE

## 2024-10-08 NOTE — LETTER
10/8/2024      Jasmyn Green  [address] [place] [address]      Dear Colleague,    Thank you for referring your patient, Jasmyn Green, to the [facility]. Please see a copy of my visit note below.    Subjective:    Established patient, the following is a comprehensive summary of her Endocrine care to date.     Jasmyn Green is a 69 year old female who presents to discuss osteoporosis. We previously reviewed the standard osteoporosis dictation template.     # Osteoporosis     DEXA 10/3/2022:  -L1 - L4 T-score -1.6 with 14.6% significant BMD improvement compared to 8/2021  -Lowest T-score at the hips is -3.7 at the left and right total hips, at the right total hip (only that is reported) is a 20.4% significant improvement compared to 8/2021  -TBS T-score L1 - L4 -4.2    DEXA 10/23/2023:  -L1 - L4 T-score -1.7 and stable compared to 2022   -Lowest overall T-score at the hips is -3.9 at the right total hip; overall BMD at the right total hip is stable compared to 2022    -Lumbar Spine L1-L4: TBS: 1.156. TBS T-score: -3.3.TBS Z-score: -1.1.      CT C/A/P 5/3/2024: no vertebral compression fractures     No prior pharmacologic therapy to reduce fracture risk before starting Tymlos 10/2021.     10/2021: She started Tymlos   2/2022: Because of insurance coverage we started Forteo instead, there was a likely 3 week lapse between stopping Tymlos and starting Forteo; Forteo is well tolerated    2/2023: Switched from Forteo to teriparatide and there was an ~30 day lapse in treatment   8/15/2023: she continues on teriparatide, well tolerated     Reclast 5 mg IV given: 10/26/2023     No prior low impact fractures. No falls. No prior nephrolithiasis. Significant GC exposure in the setting of COPD over the years.      Significant history of tobacco use and quit in 7/2021.      As of 11/2022 Jasmyn has completed a complete secondary evaluation for causes of increased fracture risk  with the following notable findings:   -No prior hypercalcemia; past few months has had some hypocalcemia   -alkaline phosphatase has been mildly elevated but subsequently bone specific alkaline phosphatase normal 10/2021  -10/2024: 25-OH vitamin D low normal   -9/2021: 24 hour urine calcium 108 mg (1.8 L collection)   -Abnormal Celiac screen - see below  -CKD    Imaging notes severe coronary artery calcification but no prior ASCVD event.      2020 she had all teeth removed. No recent/plans for dental procedures. She has a hiatal hernia.     She takes a combination calcium vitamin D supplement.    # Metastatic small cell lung cancer    She was diagnosed early 2024.    Started on system chemotherapy spring of 2024 and ultimately transitioned to maintenance atezolizumab. Received whole brain radiation summer of 2024.    MRI brain 8/2024: brain metastases, per radiology the sella is normal     # Diabetes mellitus  -CT A/P 5/2024: per radiology pancreas is normal     Random glucose first >200 mg/dL in early 2024 and has been >400 mg/dL. No prior HbA1c.     10/3/2024:  mg/dL.     10/8/2024: today she endorses polydipsia. Energy and appetite are poor.      She has been intermittently on oral glucocorticoids (prednisone and dexamethasone) - exact timeline unclear. She recalls the last dose of dexaethasone was about 3 weeks ago and last dose of prednisone was months ago. She is on chronic opioids and takes and inhaled GC.     No recent HbA1c.     # Adrenal nodule    CT-PE 6/27/2021: I can't clearly see the adrenal glands, no radiology comment on the adrenal glands     CT chest (low dose for cancer screening without IV contrast): difficult to say for certain, but I think the left adrenal gland is hyperplastic on this scan with unenhanced HU <10    CT A/P with IV contrast 5/3/2024:  -Per radiology: nodular thickening of the left adrenal gland   -Per my review: right adrenal gland looks normal, I don't see a discrete  nodule in the left adrenal gland - it looks diffusely hyperplastic     FDG PET/CT 7/3/2024:  -No radiology comment on the adrenal glands   -Per my review, no significant FDG uptake in the adrenal glands     # Possible Celiac disease  # BMI 17 kg/m2     Celiac screen was previously ordered in setting of vitamin D deficiency and severe osteoporosis without obvious cause. She is positive for DQB1*02 and/or DQB1*03(08), she has elevated deamidated gliadin Abs, normal TTG Abs (with normal IgA). No diarrhea, no abdominal pain. She has been anemic. No known Celiac disease in the family.      I referred her to GI 10/2021. She met with her PCP 11/2021 and they decided to defer GI evaluation.     Objective:    Appears frail today. Seated in a wheelchair on supplemental oxygen. BMI 18.2 kg/m2, /64.    8/2023: On supplemental oxygen.  BMI 17.1 kg/meter squared (stable).     9/2021: BMI 18.66 kg/m2. Thoracic kyphosis. Thyroid exam normal. Edentulous. Steady on her feet without a gait aid.     Assessment/Plan:    # Osteoporosis, glucocorticoid associated    At this point, giver her GFR, recent hypocalcemia, and poor appetite I think the risk of antiresorptive therapy outweighs the benefit. For now - continue the combination calcium and vitamin D pill and work on nutrition.    # Diabetes mellitus    Hyperglycemia has been severe at times.    We need to consider the possibility of autoimmune DM in the setting of her immunotherapy.    Will place a diagnostic CGM today and I added autoantibodies to her next blood draw and HbA1c as well as TSH, T4, cortisol, etc.    30 minutes spent on the date of the encounter doing chart review, history and exam, documentation and further activities as noted above.      The longitudinal plan of care for the diagnosis(es)/condition(s) as documented were addressed during this visit. Due to the added complexity in care, I will continue to support Jasmyn in the subsequent management and with  ongoing continuity of care.      Again, thank you for allowing me to participate in the care of your patient.        Sincerely,        Rickie Pickens MD

## 2024-10-15 ENCOUNTER — TELEPHONE (OUTPATIENT)
Dept: ENDOCRINOLOGY | Facility: CLINIC | Age: 69
End: 2024-10-15

## 2024-10-15 ENCOUNTER — INFUSION THERAPY VISIT (OUTPATIENT)
Dept: INFUSION THERAPY | Facility: HOSPITAL | Age: 69
End: 2024-10-15
Attending: INTERNAL MEDICINE
Payer: COMMERCIAL

## 2024-10-15 ENCOUNTER — ONCOLOGY VISIT (OUTPATIENT)
Dept: ONCOLOGY | Facility: HOSPITAL | Age: 69
End: 2024-10-15
Attending: INTERNAL MEDICINE
Payer: COMMERCIAL

## 2024-10-15 VITALS
SYSTOLIC BLOOD PRESSURE: 150 MMHG | HEART RATE: 120 BPM | OXYGEN SATURATION: 92 % | TEMPERATURE: 97.5 F | BODY MASS INDEX: 18.51 KG/M2 | DIASTOLIC BLOOD PRESSURE: 69 MMHG | RESPIRATION RATE: 22 BRPM | WEIGHT: 91.8 LBS | HEIGHT: 59 IN

## 2024-10-15 DIAGNOSIS — J96.11 CHRONIC RESPIRATORY FAILURE WITH HYPOXIA (H): ICD-10-CM

## 2024-10-15 DIAGNOSIS — C34.90 SMALL CELL LUNG CANCER (H): Primary | ICD-10-CM

## 2024-10-15 DIAGNOSIS — C34.80 SMALL CELL CARCINOMA OF OVERLAPPING SITES OF LUNG, UNSPECIFIED LATERALITY (H): Primary | ICD-10-CM

## 2024-10-15 DIAGNOSIS — C34.90 SMALL CELL LUNG CANCER (H): ICD-10-CM

## 2024-10-15 DIAGNOSIS — E08.9 DIABETES MELLITUS DUE TO UNDERLYING CONDITION WITHOUT COMPLICATION, WITHOUT LONG-TERM CURRENT USE OF INSULIN (H): ICD-10-CM

## 2024-10-15 DIAGNOSIS — J44.1 COPD EXACERBATION (H): ICD-10-CM

## 2024-10-15 DIAGNOSIS — E44.0 MODERATE PROTEIN-CALORIE MALNUTRITION (H): ICD-10-CM

## 2024-10-15 DIAGNOSIS — C34.90 SMALL CELL CARCINOMA OF LUNG, UNSPECIFIED LATERALITY, UNSPECIFIED PART OF LUNG (H): ICD-10-CM

## 2024-10-15 DIAGNOSIS — C79.31 SECONDARY MALIGNANT NEOPLASM OF BRAIN (H): ICD-10-CM

## 2024-10-15 DIAGNOSIS — F40.240 CLAUSTROPHOBIA: Primary | ICD-10-CM

## 2024-10-15 DIAGNOSIS — M81.0 AGE-RELATED OSTEOPOROSIS WITHOUT CURRENT PATHOLOGICAL FRACTURE: ICD-10-CM

## 2024-10-15 DIAGNOSIS — C34.80 SMALL CELL CARCINOMA OF OVERLAPPING SITES OF LUNG, UNSPECIFIED LATERALITY (H): ICD-10-CM

## 2024-10-15 LAB
ALBUMIN SERPL BCG-MCNC: 3.4 G/DL (ref 3.5–5.2)
ALP SERPL-CCNC: 98 U/L (ref 40–150)
ALT SERPL W P-5'-P-CCNC: 19 U/L (ref 0–50)
ANION GAP SERPL CALCULATED.3IONS-SCNC: 11 MMOL/L (ref 7–15)
AST SERPL W P-5'-P-CCNC: 28 U/L (ref 0–45)
BILIRUB SERPL-MCNC: 0.2 MG/DL
BUN SERPL-MCNC: 21.6 MG/DL (ref 8–23)
CALCIUM SERPL-MCNC: 8.6 MG/DL (ref 8.8–10.4)
CHLORIDE SERPL-SCNC: 106 MMOL/L (ref 98–107)
CORTIS SERPL-MCNC: 17.4 UG/DL
CREAT SERPL-MCNC: 1.22 MG/DL (ref 0.51–0.95)
EGFRCR SERPLBLD CKD-EPI 2021: 47 ML/MIN/1.73M2
EST. AVERAGE GLUCOSE BLD GHB EST-MCNC: 128 MG/DL
FASTING STATUS PATIENT QL REPORTED: YES
GLUCOSE SERPL-MCNC: 121 MG/DL (ref 70–99)
GLUCOSE SERPL-MCNC: 148 MG/DL (ref 70–99)
HBA1C MFR BLD: 6.1 %
HCO3 SERPL-SCNC: 23 MMOL/L (ref 22–29)
POTASSIUM SERPL-SCNC: 4.4 MMOL/L (ref 3.4–5.3)
PROT SERPL-MCNC: 6 G/DL (ref 6.4–8.3)
SODIUM SERPL-SCNC: 140 MMOL/L (ref 135–145)
T4 FREE SERPL-MCNC: 1.06 NG/DL (ref 0.9–1.7)
TSH SERPL DL<=0.005 MIU/L-ACNC: 1.72 UIU/ML (ref 0.3–4.2)

## 2024-10-15 PROCEDURE — 82533 TOTAL CORTISOL: CPT

## 2024-10-15 PROCEDURE — 96413 CHEMO IV INFUSION 1 HR: CPT

## 2024-10-15 PROCEDURE — 86341 ISLET CELL ANTIBODY: CPT

## 2024-10-15 PROCEDURE — 84439 ASSAY OF FREE THYROXINE: CPT

## 2024-10-15 PROCEDURE — 99214 OFFICE O/P EST MOD 30 MIN: CPT | Performed by: NURSE PRACTITIONER

## 2024-10-15 PROCEDURE — 258N000003 HC RX IP 258 OP 636: Performed by: INTERNAL MEDICINE

## 2024-10-15 PROCEDURE — 82947 ASSAY GLUCOSE BLOOD QUANT: CPT

## 2024-10-15 PROCEDURE — 86337 INSULIN ANTIBODIES: CPT

## 2024-10-15 PROCEDURE — G0463 HOSPITAL OUTPT CLINIC VISIT: HCPCS | Mod: 25 | Performed by: NURSE PRACTITIONER

## 2024-10-15 PROCEDURE — 36591 DRAW BLOOD OFF VENOUS DEVICE: CPT | Performed by: INTERNAL MEDICINE

## 2024-10-15 PROCEDURE — 36591 DRAW BLOOD OFF VENOUS DEVICE: CPT

## 2024-10-15 PROCEDURE — 82947 ASSAY GLUCOSE BLOOD QUANT: CPT | Performed by: INTERNAL MEDICINE

## 2024-10-15 PROCEDURE — 84443 ASSAY THYROID STIM HORMONE: CPT | Performed by: INTERNAL MEDICINE

## 2024-10-15 PROCEDURE — 82024 ASSAY OF ACTH: CPT

## 2024-10-15 PROCEDURE — 83036 HEMOGLOBIN GLYCOSYLATED A1C: CPT

## 2024-10-15 PROCEDURE — 250N000011 HC RX IP 250 OP 636: Mod: JZ | Performed by: INTERNAL MEDICINE

## 2024-10-15 PROCEDURE — G2211 COMPLEX E/M VISIT ADD ON: HCPCS | Performed by: NURSE PRACTITIONER

## 2024-10-15 RX ORDER — DIPHENHYDRAMINE HYDROCHLORIDE 50 MG/ML
50 INJECTION INTRAMUSCULAR; INTRAVENOUS
Status: CANCELLED
Start: 2024-11-05

## 2024-10-15 RX ORDER — HEPARIN SODIUM,PORCINE 10 UNIT/ML
5-20 VIAL (ML) INTRAVENOUS DAILY PRN
Status: CANCELLED | OUTPATIENT
Start: 2024-11-05

## 2024-10-15 RX ORDER — ALBUTEROL SULFATE 0.83 MG/ML
2.5 SOLUTION RESPIRATORY (INHALATION)
Status: DISCONTINUED | OUTPATIENT
Start: 2024-10-15 | End: 2024-10-15 | Stop reason: HOSPADM

## 2024-10-15 RX ORDER — HEPARIN SODIUM (PORCINE) LOCK FLUSH IV SOLN 100 UNIT/ML 100 UNIT/ML
5 SOLUTION INTRAVENOUS
Status: DISCONTINUED | OUTPATIENT
Start: 2024-10-15 | End: 2024-10-15 | Stop reason: HOSPADM

## 2024-10-15 RX ORDER — METHYLPREDNISOLONE SODIUM SUCCINATE 125 MG/2ML
125 INJECTION INTRAMUSCULAR; INTRAVENOUS
Status: DISCONTINUED | OUTPATIENT
Start: 2024-10-15 | End: 2024-10-15 | Stop reason: HOSPADM

## 2024-10-15 RX ORDER — LORAZEPAM 2 MG/ML
0.5 INJECTION INTRAMUSCULAR EVERY 4 HOURS PRN
Status: CANCELLED | OUTPATIENT
Start: 2024-11-05

## 2024-10-15 RX ORDER — HEPARIN SODIUM (PORCINE) LOCK FLUSH IV SOLN 100 UNIT/ML 100 UNIT/ML
5 SOLUTION INTRAVENOUS
Status: CANCELLED | OUTPATIENT
Start: 2024-11-05

## 2024-10-15 RX ORDER — EPINEPHRINE 1 MG/ML
0.3 INJECTION, SOLUTION INTRAMUSCULAR; SUBCUTANEOUS EVERY 5 MIN PRN
Status: DISCONTINUED | OUTPATIENT
Start: 2024-10-15 | End: 2024-10-15 | Stop reason: HOSPADM

## 2024-10-15 RX ORDER — ALBUTEROL SULFATE 0.83 MG/ML
2.5 SOLUTION RESPIRATORY (INHALATION)
Status: CANCELLED | OUTPATIENT
Start: 2024-11-05

## 2024-10-15 RX ORDER — ALBUTEROL SULFATE 90 UG/1
1-2 INHALANT RESPIRATORY (INHALATION)
Status: DISCONTINUED | OUTPATIENT
Start: 2024-10-15 | End: 2024-10-15 | Stop reason: HOSPADM

## 2024-10-15 RX ORDER — EPINEPHRINE 1 MG/ML
0.3 INJECTION, SOLUTION INTRAMUSCULAR; SUBCUTANEOUS EVERY 5 MIN PRN
Status: CANCELLED | OUTPATIENT
Start: 2024-11-05

## 2024-10-15 RX ORDER — DIAZEPAM 5 MG/1
5 TABLET ORAL
Qty: 2 TABLET | Refills: 2 | Status: SHIPPED | OUTPATIENT
Start: 2024-10-15

## 2024-10-15 RX ORDER — ALBUTEROL SULFATE 90 UG/1
1-2 INHALANT RESPIRATORY (INHALATION)
Status: CANCELLED
Start: 2024-11-05

## 2024-10-15 RX ORDER — DIPHENHYDRAMINE HYDROCHLORIDE 50 MG/ML
50 INJECTION INTRAMUSCULAR; INTRAVENOUS
Status: DISCONTINUED | OUTPATIENT
Start: 2024-10-15 | End: 2024-10-15 | Stop reason: HOSPADM

## 2024-10-15 RX ORDER — METHYLPREDNISOLONE SODIUM SUCCINATE 125 MG/2ML
125 INJECTION INTRAMUSCULAR; INTRAVENOUS
Status: CANCELLED
Start: 2024-11-05

## 2024-10-15 RX ADMIN — ATEZOLIZUMAB 1200 MG: 1200 INJECTION, SOLUTION INTRAVENOUS at 09:46

## 2024-10-15 RX ADMIN — Medication 5 ML: at 10:25

## 2024-10-15 RX ADMIN — SODIUM CHLORIDE 100 ML: 9 INJECTION, SOLUTION INTRAVENOUS at 09:30

## 2024-10-15 ASSESSMENT — PAIN SCALES - GENERAL: PAINLEVEL: NO PAIN (0)

## 2024-10-15 NOTE — LETTER
10/15/2024      Jasmyn Green  [address] [place] [address]      Dear Colleague,    Thank you for referring your patient, Jasmyn Green, to the [facility] [place]. Please see a copy of my visit note below.    [facility] Hematology and Oncology Progress Note    Patient: Jasmyn Green  MRN: [id number]  Date of Service: Oct 15, 2024          Oncologist:Dr. Stewart    Reason for Visit    Chief Complaint   Patient presents with     Oncology Clinic Visit     3 week return visit with labs/infusion related to small cell lung cancer, extensive stage, ARIANNE with pleural mets, mediastinal nodes, and brain.       Assessment and Plan     Cancer Staging   No matching staging information was found for the patient.    Small cell lung cancer, extensive stage, ARIANNE with pleural mets, mediastinal nodes, and brain   Patient started palliative chemotherapy with carboplatin, etoposide and Atezolizumab 3/19/24. first cycle given at 20% reduction due to performance status. CT after 2 cycles show good response. However, due to COPD exacerbation following cycle 3, cytotoxic chemotherapy was discontinued and pt continues on maintenance Atezolizumab. PET in July 2024 showed compete metabolic response to previous sites. She has continued on atezoizumab as maintenance. Due for scan in November.      Brain mets, found in August 2024:    Pt completed whole brain radiation and steroids.  Has a lot of fatigue from the radiation but feels like it might be just a little bit better.  Patient will continue to try to be active.  She is david get another brain MRI in November.    Severe COPD:  Decreased FEV1 of 29% and reduced diffusion capacity with DLCO of 30%. On 2 to 3 L oxygen at rest.  Denies worsening symptoms.    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     5. Elevated Creatinine  Baseline creatinine around 1.20.  Her creatinine is much better today.  I do not feel like it was probably  related to the immunotherapy but likely some mild dehydration.  Encouraged her to stay hydrated.  We will continue to monitor this.    6.  Hyperglycemia:  Endocrinology is worried that she may be having some issues related to immunotherapy in her pancreas.  They did do some extra lab work today and she has been wearing a continuous blood sugar monitoring system that she is david turn back into him later this week.  I did tell patient at this point her metastatic lung cancer likely problems before diabetes but we do want to make sure that the immunotherapy is not causing any significant issues.  Hemoglobin A1c is 6.1 which is not too elevated.  Follows up with endocrinology for now but she does not want to be too overly aggressive to treat if possible      ECOG Performance    2 - Ambulatory and independent in all ADLs; cannot work; up > 50% of the time    Distress Screening (within last 30 days)    No data recorded     Pain  Pain Score: No Pain (0)    Problem List    Patient Active Problem List   Diagnosis     COPD exacerbation (H)     History of gout     Coronary artery calcification     Lymphocytosis     Essential hypertension     Asymptomatic hypertensive urgency     Age-related osteoporosis without current pathological fracture     Lung mass     Small cell lung cancer (H)     Chronic respiratory failure with hypoxia (H)     Anemia associated with chemotherapy     Hypoxia     Status post chemotherapy     Urinary tract infection with hematuria, site unspecified     Acute sepsis (H)     Splenic infarct     Brain lesion     Malignant neoplasm of middle lobe, bronchus or lung (H)     Secondary malignant neoplasm of brain (H)     Dehydration        ______________________________________________________________________________    History of Present Illness    Diagnosis:   Lung cancer, small cell, extensive stage. Presented to ER for SOB, coughing. Hx of COPD, on oxygen.   -2/8/24: CT: Bulky AP and left hilar adenopathy  with irregular pleural-based anterior left upper lobe nodule with adjacent smaller satellite nodules. Findings suspicious for a primary lung cancer   -3/1/24: PET: Findings suspicious for a left upper lobe primary lung neoplasm with adjacent satellite lesion in the left upper lobe, extensive mediastinal lymph node involvement, and several pleural-based metastases in the left hemithorax   -3/14/24: Brain MRI: Findings concerning for a single 3 - 4 mm right occipital lobe cortex metastatic lesion   -5/3/24: repeat brain MRI shows lesion size decrease and appears more vascular   -7/3/24: PET showed completed metabolic response to therapy  -8/15/24: Brain MRI showed at least 12 nodular enhancing lesions within supratentorial and infratentorial compartments consistent with multiple new intracranial metastasis.     Treatment:   -3/25/24: start palliative treatment with Carboplatin, Etoposide, atezolizumab. Finished 3 cycles  -5/28/24: single agent atezolizumab after hospitalization for COPD exacerbation and pneumonia, cycle 10 today     Interim History:   Patient is here today to continue on treatment.  She states she is doing okay.  Her biggest issue continues to be a lot of fatigue and poor appetite.  She says she just does not have much energy.  She does feel like it is a little bit better this week than it has been since her radiation but really still feels like the radiation because significant side effects for her.  She said she did a lot worse with the radiation then with the chemotherapy.  She denies any new bone or back pain.  Continues to have chronic shortness of breath wears her oxygen.        Review of Systems    Pertinent items are noted in HPI.    Past History    Past Medical History:   Diagnosis Date     Age-related osteoporosis without current pathological fracture      Arthritis      COPD (chronic obstructive pulmonary disease) (H)      Coronary artery disease      Dependence on nocturnal oxygen therapy   "    Dyspnea on exertion      Emphysema lung (H)      Gout      HLD (hyperlipidemia)      Hypertension      Lung mass        PHYSICAL EXAM  BP (!) 150/69 (BP Location: Left arm, Patient Position: Sitting, Cuff Size: Adult Small)   Pulse 120   Temp 97.5  F (36.4  C) (Oral)   Resp 22   Ht 1.499 m (4' 11\")   Wt 41.6 kg (91 lb 12.8 oz)   LMP  (LMP Unknown)   SpO2 92%   BMI 18.54 kg/m      GENERAL: no acute distress. Cooperative in conversation. Here with daughter. In wheelchair.   RESP: Regular respiratory rate. No expiratory wheezes. Diminished lung sounds.   NEURO: non focal. Alert and oriented x3.   PSYCH: within normal limits. No depression or anxiety.  SKIN: exposed skin is dry intact.   EXTREMITIES: no lower extremity edema     Lab Results    Recent Results (from the past 168 hour(s))   Comprehensive metabolic panel   Result Value Ref Range    Sodium 140 135 - 145 mmol/L    Potassium 4.4 3.4 - 5.3 mmol/L    Carbon Dioxide (CO2) 23 22 - 29 mmol/L    Anion Gap 11 7 - 15 mmol/L    Urea Nitrogen 21.6 8.0 - 23.0 mg/dL    Creatinine 1.22 (H) 0.51 - 0.95 mg/dL    GFR Estimate 47 (L) >60 mL/min/1.73m2    Calcium 8.6 (L) 8.8 - 10.4 mg/dL    Chloride 106 98 - 107 mmol/L    Glucose 148 (H) 70 - 99 mg/dL    Alkaline Phosphatase 98 40 - 150 U/L    AST 28 0 - 45 U/L    ALT 19 0 - 50 U/L    Protein Total 6.0 (L) 6.4 - 8.3 g/dL    Albumin 3.4 (L) 3.5 - 5.2 g/dL    Bilirubin Total 0.2 <=1.2 mg/dL   TSH with free T4 reflex   Result Value Ref Range    TSH 1.72 0.30 - 4.20 uIU/mL   Hemoglobin A1c   Result Value Ref Range    Estimated Average Glucose 128 (H) <117 mg/dL    Hemoglobin A1C 6.1 (H) <5.7 %   Glucose   Result Value Ref Range    Glucose 121 (H) 70 - 99 mg/dL    Patient Fasting > 8hrs? Yes    T4 free   Result Value Ref Range    Free T4 1.06 0.90 - 1.70 ng/dL               Imaging    No results found.      The longitudinal plan of care for the diagnosis(es)/condition(s) as documented were addressed during this visit. " "Due to the added complexity in care, I will continue to support Jasmyn in the subsequent management and with ongoing continuity of care.            Oncology Rooming Note    October 15, 2024 8:30 AM   Jasmyn Green is a 69 year old female who presents for:    Chief Complaint   Patient presents with     Oncology Clinic Visit     3 week return visit with labs/infusion related to small cell lung cancer, extensive stage, ARIANNE with pleural mets, mediastinal nodes, and brain.     Initial Vitals: BP (!) 150/69 (BP Location: Left arm, Patient Position: Sitting, Cuff Size: Adult Small)   Pulse 120   Temp 97.5  F (36.4  C) (Oral)   Resp 22   Ht 1.499 m (4' 11\")   Wt 41.6 kg (91 lb 12.8 oz)   LMP  (LMP Unknown)   SpO2 92%   BMI 18.54 kg/m   Estimated body mass index is 18.54 kg/m  as calculated from the following:    Height as of this encounter: 1.499 m (4' 11\").    Weight as of this encounter: 41.6 kg (91 lb 12.8 oz). Body surface area is 1.32 meters squared.  No Pain (0) Comment: Data Unavailable   No LMP recorded (lmp unknown). Patient is postmenopausal.  Allergies reviewed: Yes  Medications reviewed: Yes    Medications: Medication refills not needed today.  Pharmacy name entered into [employer]:    [facility] - [place], [place] - [address] [facility] - [place], [place] - [address] [place] [address] AV    Frailty Screening:   Is the patient here for a new oncology consult visit in cancer care? 2. No      Clinical concerns: Jasmyn is requesting an Rx for Valium for her upcoming MRI and PET scan coming up.    Dana Mathur was notified.      Jina Espinoza, [facility]                Again, thank you for allowing me to participate in the care of your patient.        Sincerely,        Dana Mathur, APRN CNP  "

## 2024-10-15 NOTE — PROGRESS NOTES
Infusion Nursing Note:  Jasmyn Green presents today for C#10 D#1.    Patient seen by provider today: Yes: Dana Mathur CNP   present during visit today: Not Applicable.    Note: Administered Tecentriq IV as ordered per provider. Tolerated well without issue.      Intravenous Access:  Labs drawn without difficulty.  Implanted Port.    Treatment Conditions:  Lab Results   Component Value Date    HGB 8.6 (L) 09/24/2024    WBC 14.7 (H) 09/24/2024    ANEU 8.0 05/12/2024    ANEUTAUTO 12.5 (H) 09/24/2024     09/24/2024        Lab Results   Component Value Date     10/15/2024    POTASSIUM 4.4 10/15/2024    MAG 2.1 05/17/2024    CR 1.22 (H) 10/15/2024    FLOR 8.6 (L) 10/15/2024    BILITOTAL 0.2 10/15/2024    ALBUMIN 3.4 (L) 10/15/2024    ALT 19 10/15/2024    AST 28 10/15/2024       Results reviewed, labs MET treatment parameters, ok to proceed with treatment.      Post Infusion Assessment:  Patient tolerated infusion without incident.  Blood return noted pre and post infusion.  Site patent and intact, free from redness, edema or discomfort.  No evidence of extravasations.  Access discontinued per protocol.       Discharge Plan:   Patient and/or family verbalized understanding of discharge instructions and all questions answered.  Patient discharged in stable condition accompanied by: daughter and son-in-law.  Departure Mode: Wheelchair.      ALEXYS HARO RN

## 2024-10-15 NOTE — TELEPHONE ENCOUNTER
Patient's daughter dropped off glucose sensor for upload.     Sensor uploaded and report printed put on Dr. Pickens desk to review.     Sensor put in the side bucket at Dora desk incase if needed. Otherwise can be throw away.

## 2024-10-15 NOTE — PROGRESS NOTES
[facility] Hematology and Oncology Progress Note    Patient: Jasmyn Green  MRN: [id number]  Date of Service: Oct 15, 2024          Oncologist:Dr. Stewart    Reason for Visit    Chief Complaint   Patient presents with    Oncology Clinic Visit     3 week return visit with labs/infusion related to small cell lung cancer, extensive stage, ARIANNE with pleural mets, mediastinal nodes, and brain.       Assessment and Plan     Cancer Staging   No matching staging information was found for the patient.    Small cell lung cancer, extensive stage, ARIANNE with pleural mets, mediastinal nodes, and brain   Patient started palliative chemotherapy with carboplatin, etoposide and Atezolizumab 3/19/24. first cycle given at 20% reduction due to performance status. CT after 2 cycles show good response. However, due to COPD exacerbation following cycle 3, cytotoxic chemotherapy was discontinued and pt continues on maintenance Atezolizumab. PET in July 2024 showed compete metabolic response to previous sites. She has continued on atezoizumab as maintenance. Due for scan in November.      Brain mets, found in August 2024:    Pt completed whole brain radiation and steroids.  Has a lot of fatigue from the radiation but feels like it might be just a little bit better.  Patient will continue to try to be active.  She is david get another brain MRI in November.    Severe COPD:  Decreased FEV1 of 29% and reduced diffusion capacity with DLCO of 30%. On 2 to 3 L oxygen at rest.  Denies worsening symptoms.    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     5. Elevated Creatinine  Baseline creatinine around 1.20.  Her creatinine is much better today.  I do not feel like it was probably related to the immunotherapy but likely some mild dehydration.  Encouraged her to stay hydrated.  We will continue to monitor this.    6.  Hyperglycemia:  Endocrinology is worried that she may be having some issues related to immunotherapy in her pancreas.   They did do some extra lab work today and she has been wearing a continuous blood sugar monitoring system that she is david turn back into him later this week.  I did tell patient at this point her metastatic lung cancer likely problems before diabetes but we do want to make sure that the immunotherapy is not causing any significant issues.  Hemoglobin A1c is 6.1 which is not too elevated.  Follows up with endocrinology for now but she does not want to be too overly aggressive to treat if possible      ECOG Performance    2 - Ambulatory and independent in all ADLs; cannot work; up > 50% of the time    Distress Screening (within last 30 days)    No data recorded     Pain  Pain Score: No Pain (0)    Problem List    Patient Active Problem List   Diagnosis    COPD exacerbation (H)    History of gout    Coronary artery calcification    Lymphocytosis    Essential hypertension    Asymptomatic hypertensive urgency    Age-related osteoporosis without current pathological fracture    Lung mass    Small cell lung cancer (H)    Chronic respiratory failure with hypoxia (H)    Anemia associated with chemotherapy    Hypoxia    Status post chemotherapy    Urinary tract infection with hematuria, site unspecified    Acute sepsis (H)    Splenic infarct    Brain lesion    Malignant neoplasm of middle lobe, bronchus or lung (H)    Secondary malignant neoplasm of brain (H)    Dehydration        ______________________________________________________________________________    History of Present Illness    Diagnosis:   Lung cancer, small cell, extensive stage. Presented to ER for SOB, coughing. Hx of COPD, on oxygen.   -2/8/24: CT: Bulky AP and left hilar adenopathy with irregular pleural-based anterior left upper lobe nodule with adjacent smaller satellite nodules. Findings suspicious for a primary lung cancer   -3/1/24: PET: Findings suspicious for a left upper lobe primary lung neoplasm with adjacent satellite lesion in the left upper  lobe, extensive mediastinal lymph node involvement, and several pleural-based metastases in the left hemithorax   -3/14/24: Brain MRI: Findings concerning for a single 3 - 4 mm right occipital lobe cortex metastatic lesion   -5/3/24: repeat brain MRI shows lesion size decrease and appears more vascular   -7/3/24: PET showed completed metabolic response to therapy  -8/15/24: Brain MRI showed at least 12 nodular enhancing lesions within supratentorial and infratentorial compartments consistent with multiple new intracranial metastasis.     Treatment:   -3/25/24: start palliative treatment with Carboplatin, Etoposide, atezolizumab. Finished 3 cycles  -5/28/24: single agent atezolizumab after hospitalization for COPD exacerbation and pneumonia, cycle 10 today     Interim History:   Patient is here today to continue on treatment.  She states she is doing okay.  Her biggest issue continues to be a lot of fatigue and poor appetite.  She says she just does not have much energy.  She does feel like it is a little bit better this week than it has been since her radiation but really still feels like the radiation because significant side effects for her.  She said she did a lot worse with the radiation then with the chemotherapy.  She denies any new bone or back pain.  Continues to have chronic shortness of breath wears her oxygen.        Review of Systems    Pertinent items are noted in HPI.    Past History    Past Medical History:   Diagnosis Date    Age-related osteoporosis without current pathological fracture     Arthritis     COPD (chronic obstructive pulmonary disease) (H)     Coronary artery disease     Dependence on nocturnal oxygen therapy     Dyspnea on exertion     Emphysema lung (H)     Gout     HLD (hyperlipidemia)     Hypertension     Lung mass        PHYSICAL EXAM  BP (!) 150/69 (BP Location: Left arm, Patient Position: Sitting, Cuff Size: Adult Small)   Pulse 120   Temp 97.5  F (36.4  C) (Oral)   Resp 22    "Ht 1.499 m (4' 11\")   Wt 41.6 kg (91 lb 12.8 oz)   LMP  (LMP Unknown)   SpO2 92%   BMI 18.54 kg/m      GENERAL: no acute distress. Cooperative in conversation. Here with daughter. In wheelchair.   RESP: Regular respiratory rate. No expiratory wheezes. Diminished lung sounds.   NEURO: non focal. Alert and oriented x3.   PSYCH: within normal limits. No depression or anxiety.  SKIN: exposed skin is dry intact.   EXTREMITIES: no lower extremity edema     Lab Results    Recent Results (from the past 168 hour(s))   Comprehensive metabolic panel   Result Value Ref Range    Sodium 140 135 - 145 mmol/L    Potassium 4.4 3.4 - 5.3 mmol/L    Carbon Dioxide (CO2) 23 22 - 29 mmol/L    Anion Gap 11 7 - 15 mmol/L    Urea Nitrogen 21.6 8.0 - 23.0 mg/dL    Creatinine 1.22 (H) 0.51 - 0.95 mg/dL    GFR Estimate 47 (L) >60 mL/min/1.73m2    Calcium 8.6 (L) 8.8 - 10.4 mg/dL    Chloride 106 98 - 107 mmol/L    Glucose 148 (H) 70 - 99 mg/dL    Alkaline Phosphatase 98 40 - 150 U/L    AST 28 0 - 45 U/L    ALT 19 0 - 50 U/L    Protein Total 6.0 (L) 6.4 - 8.3 g/dL    Albumin 3.4 (L) 3.5 - 5.2 g/dL    Bilirubin Total 0.2 <=1.2 mg/dL   TSH with free T4 reflex   Result Value Ref Range    TSH 1.72 0.30 - 4.20 uIU/mL   Hemoglobin A1c   Result Value Ref Range    Estimated Average Glucose 128 (H) <117 mg/dL    Hemoglobin A1C 6.1 (H) <5.7 %   Glucose   Result Value Ref Range    Glucose 121 (H) 70 - 99 mg/dL    Patient Fasting > 8hrs? Yes    T4 free   Result Value Ref Range    Free T4 1.06 0.90 - 1.70 ng/dL               Imaging    No results found.      The longitudinal plan of care for the diagnosis(es)/condition(s) as documented were addressed during this visit. Due to the added complexity in care, I will continue to support Jasmyn in the subsequent management and with ongoing continuity of care.          "

## 2024-10-15 NOTE — PROGRESS NOTES
"Oncology Rooming Note    October 15, 2024 8:30 AM   Jasmyn Green is a 69 year old female who presents for:    Chief Complaint   Patient presents with    Oncology Clinic Visit     3 week return visit with labs/infusion related to small cell lung cancer, extensive stage, ARIANNE with pleural mets, mediastinal nodes, and brain.     Initial Vitals: BP (!) 150/69 (BP Location: Left arm, Patient Position: Sitting, Cuff Size: Adult Small)   Pulse 120   Temp 97.5  F (36.4  C) (Oral)   Resp 22   Ht 1.499 m (4' 11\")   Wt 41.6 kg (91 lb 12.8 oz)   LMP  (LMP Unknown)   SpO2 92%   BMI 18.54 kg/m   Estimated body mass index is 18.54 kg/m  as calculated from the following:    Height as of this encounter: 1.499 m (4' 11\").    Weight as of this encounter: 41.6 kg (91 lb 12.8 oz). Body surface area is 1.32 meters squared.  No Pain (0) Comment: Data Unavailable   No LMP recorded (lmp unknown). Patient is postmenopausal.  Allergies reviewed: Yes  Medications reviewed: Yes    Medications: Medication refills not needed today.  Pharmacy name entered into [employer]:    [facility] - [place], [place] - [address] [facility] - [place], [place] - [facility] [address]    Frailty Screening:   Is the patient here for a new oncology consult visit in cancer care? 2. No      Clinical concerns: Jasmyn is requesting an Rx for Valium for her upcoming MRI and PET scan coming up.    Dana Mathur was notified.      Jina Espinoza CMA              "

## 2024-10-16 LAB — ACTH PLAS-MCNC: <10 PG/ML

## 2024-10-17 ENCOUNTER — MYC MEDICAL ADVICE (OUTPATIENT)
Dept: PULMONOLOGY | Facility: CLINIC | Age: 69
End: 2024-10-17
Payer: COMMERCIAL

## 2024-10-17 DIAGNOSIS — J44.9 COPD, SEVERE (H): ICD-10-CM

## 2024-10-17 LAB
GAD65 AB SER IA-ACNC: <5 IU/ML
INSULIN AB SER IA-ACNC: <0.4 U/ML
ISLET CELL512 AB SER IA-ACNC: <5.4 U/ML

## 2024-10-17 RX ORDER — PREDNISONE 20 MG/1
TABLET ORAL
Qty: 10 TABLET | Refills: 0 | Status: SHIPPED | OUTPATIENT
Start: 2024-10-17

## 2024-10-17 RX ORDER — DOXYCYCLINE 100 MG/1
100 CAPSULE ORAL 2 TIMES DAILY
Qty: 10 CAPSULE | Refills: 0 | Status: SHIPPED | OUTPATIENT
Start: 2024-10-17 | End: 2024-10-22

## 2024-10-29 RX ORDER — ALBUTEROL SULFATE 0.83 MG/ML
2.5 SOLUTION RESPIRATORY (INHALATION)
Status: CANCELLED | OUTPATIENT
Start: 2024-11-05

## 2024-10-29 RX ORDER — DIPHENHYDRAMINE HYDROCHLORIDE 50 MG/ML
50 INJECTION INTRAMUSCULAR; INTRAVENOUS
Status: CANCELLED
Start: 2024-11-05

## 2024-10-29 RX ORDER — DIPHENHYDRAMINE HYDROCHLORIDE 50 MG/ML
25 INJECTION INTRAMUSCULAR; INTRAVENOUS
Status: CANCELLED
Start: 2024-11-05

## 2024-10-29 RX ORDER — METHYLPREDNISOLONE SODIUM SUCCINATE 40 MG/ML
40 INJECTION INTRAMUSCULAR; INTRAVENOUS
Status: CANCELLED
Start: 2024-11-05

## 2024-10-29 RX ORDER — ALBUTEROL SULFATE 90 UG/1
1-2 INHALANT RESPIRATORY (INHALATION)
Status: CANCELLED
Start: 2024-11-05

## 2024-10-29 RX ORDER — EPINEPHRINE 1 MG/ML
0.3 INJECTION, SOLUTION, CONCENTRATE INTRAVENOUS EVERY 5 MIN PRN
Status: CANCELLED | OUTPATIENT
Start: 2024-11-05

## 2024-10-29 RX ORDER — MEPERIDINE HYDROCHLORIDE 25 MG/ML
25 INJECTION INTRAMUSCULAR; INTRAVENOUS; SUBCUTANEOUS
Status: CANCELLED | OUTPATIENT
Start: 2024-11-05

## 2024-11-04 ENCOUNTER — HOSPITAL ENCOUNTER (OUTPATIENT)
Dept: CT IMAGING | Facility: CLINIC | Age: 69
Discharge: HOME | End: 2024-11-04
Payer: COMMERCIAL

## 2024-11-04 ENCOUNTER — HOSPITAL ENCOUNTER (OUTPATIENT)
Dept: MRI IMAGING | Facility: CLINIC | Age: 69
Discharge: HOME | End: 2024-11-04
Attending: STUDENT IN AN ORGANIZED HEALTH CARE EDUCATION/TRAINING PROGRAM
Payer: COMMERCIAL

## 2024-11-04 DIAGNOSIS — C79.31 SECONDARY MALIGNANT NEOPLASM OF BRAIN (H): ICD-10-CM

## 2024-11-04 DIAGNOSIS — C34.2 MALIGNANT NEOPLASM OF MIDDLE LOBE, BRONCHUS OR LUNG (H): ICD-10-CM

## 2024-11-04 DIAGNOSIS — C34.90 SMALL CELL LUNG CANCER (H): ICD-10-CM

## 2024-11-04 PROCEDURE — 74177 CT ABD & PELVIS W/CONTRAST: CPT

## 2024-11-04 PROCEDURE — 70553 MRI BRAIN STEM W/O & W/DYE: CPT

## 2024-11-04 PROCEDURE — 250N000011 HC RX IP 250 OP 636

## 2024-11-04 PROCEDURE — A9585 GADOBUTROL INJECTION: HCPCS | Performed by: STUDENT IN AN ORGANIZED HEALTH CARE EDUCATION/TRAINING PROGRAM

## 2024-11-04 PROCEDURE — 255N000002 HC RX 255 OP 636: Performed by: STUDENT IN AN ORGANIZED HEALTH CARE EDUCATION/TRAINING PROGRAM

## 2024-11-04 RX ORDER — HEPARIN SODIUM (PORCINE) LOCK FLUSH IV SOLN 100 UNIT/ML 100 UNIT/ML
5-10 SOLUTION INTRAVENOUS
Status: DISCONTINUED | OUTPATIENT
Start: 2024-11-04 | End: 2024-11-05 | Stop reason: HOSPADM

## 2024-11-04 RX ORDER — IOPAMIDOL 755 MG/ML
75 INJECTION, SOLUTION INTRAVASCULAR ONCE
Status: COMPLETED | OUTPATIENT
Start: 2024-11-04 | End: 2024-11-04

## 2024-11-04 RX ORDER — GADOBUTROL 604.72 MG/ML
7.5 INJECTION INTRAVENOUS ONCE
Status: COMPLETED | OUTPATIENT
Start: 2024-11-04 | End: 2024-11-04

## 2024-11-04 RX ORDER — GADOBUTROL 604.72 MG/ML
8 INJECTION INTRAVENOUS ONCE
Status: DISCONTINUED | OUTPATIENT
Start: 2024-11-04 | End: 2024-11-04

## 2024-11-04 RX ADMIN — GADOBUTROL 7.5 ML: 604.72 INJECTION INTRAVENOUS at 12:34

## 2024-11-04 RX ADMIN — IOPAMIDOL 75 ML: 755 INJECTION, SOLUTION INTRAVENOUS at 13:23

## 2024-11-04 RX ADMIN — HEPARIN SODIUM (PORCINE) LOCK FLUSH IV SOLN 100 UNIT/ML 5 ML: 100 SOLUTION at 13:28

## 2024-11-06 ENCOUNTER — INFUSION THERAPY VISIT (OUTPATIENT)
Dept: INFUSION THERAPY | Facility: HOSPITAL | Age: 69
End: 2024-11-06
Payer: COMMERCIAL

## 2024-11-06 ENCOUNTER — PATIENT OUTREACH (OUTPATIENT)
Dept: ONCOLOGY | Facility: HOSPITAL | Age: 69
End: 2024-11-06

## 2024-11-06 ENCOUNTER — ONCOLOGY VISIT (OUTPATIENT)
Dept: ONCOLOGY | Facility: HOSPITAL | Age: 69
End: 2024-11-06
Payer: COMMERCIAL

## 2024-11-06 ENCOUNTER — LAB (OUTPATIENT)
Dept: INFUSION THERAPY | Facility: HOSPITAL | Age: 69
End: 2024-11-06
Attending: INTERNAL MEDICINE
Payer: COMMERCIAL

## 2024-11-06 VITALS
DIASTOLIC BLOOD PRESSURE: 65 MMHG | TEMPERATURE: 97.3 F | SYSTOLIC BLOOD PRESSURE: 143 MMHG | RESPIRATION RATE: 20 BRPM | HEART RATE: 118 BPM | WEIGHT: 93 LBS | BODY MASS INDEX: 18.75 KG/M2 | HEIGHT: 59 IN | OXYGEN SATURATION: 94 %

## 2024-11-06 DIAGNOSIS — J44.1 COPD EXACERBATION (H): ICD-10-CM

## 2024-11-06 DIAGNOSIS — C34.80 SMALL CELL CARCINOMA OF OVERLAPPING SITES OF LUNG, UNSPECIFIED LATERALITY (H): Primary | ICD-10-CM

## 2024-11-06 DIAGNOSIS — C34.2 MALIGNANT NEOPLASM OF MIDDLE LOBE, BRONCHUS OR LUNG (H): ICD-10-CM

## 2024-11-06 LAB
ALBUMIN SERPL BCG-MCNC: 3.4 G/DL (ref 3.5–5.2)
ALP SERPL-CCNC: 100 U/L (ref 40–150)
ALT SERPL W P-5'-P-CCNC: 24 U/L (ref 0–50)
ANION GAP SERPL CALCULATED.3IONS-SCNC: 8 MMOL/L (ref 7–15)
AST SERPL W P-5'-P-CCNC: 25 U/L (ref 0–45)
BILIRUB SERPL-MCNC: <0.2 MG/DL
BUN SERPL-MCNC: 18.5 MG/DL (ref 8–23)
CALCIUM SERPL-MCNC: 8.4 MG/DL (ref 8.8–10.4)
CHLORIDE SERPL-SCNC: 109 MMOL/L (ref 98–107)
CREAT SERPL-MCNC: 0.99 MG/DL (ref 0.51–0.95)
EGFRCR SERPLBLD CKD-EPI 2021: 61 ML/MIN/1.73M2
GLUCOSE SERPL-MCNC: 144 MG/DL (ref 70–99)
HCO3 SERPL-SCNC: 24 MMOL/L (ref 22–29)
POTASSIUM SERPL-SCNC: 4.1 MMOL/L (ref 3.4–5.3)
PROT SERPL-MCNC: 5.7 G/DL (ref 6.4–8.3)
SODIUM SERPL-SCNC: 141 MMOL/L (ref 135–145)
TSH SERPL DL<=0.005 MIU/L-ACNC: 2.54 UIU/ML (ref 0.3–4.2)

## 2024-11-06 PROCEDURE — 99215 OFFICE O/P EST HI 40 MIN: CPT | Performed by: INTERNAL MEDICINE

## 2024-11-06 PROCEDURE — 258N000003 HC RX IP 258 OP 636: Performed by: NURSE PRACTITIONER

## 2024-11-06 PROCEDURE — G2211 COMPLEX E/M VISIT ADD ON: HCPCS | Performed by: INTERNAL MEDICINE

## 2024-11-06 PROCEDURE — 250N000011 HC RX IP 250 OP 636: Mod: JZ | Performed by: NURSE PRACTITIONER

## 2024-11-06 PROCEDURE — 36591 DRAW BLOOD OFF VENOUS DEVICE: CPT | Performed by: NURSE PRACTITIONER

## 2024-11-06 PROCEDURE — 82040 ASSAY OF SERUM ALBUMIN: CPT | Performed by: NURSE PRACTITIONER

## 2024-11-06 PROCEDURE — 96413 CHEMO IV INFUSION 1 HR: CPT

## 2024-11-06 PROCEDURE — G0463 HOSPITAL OUTPT CLINIC VISIT: HCPCS | Performed by: INTERNAL MEDICINE

## 2024-11-06 PROCEDURE — 84443 ASSAY THYROID STIM HORMONE: CPT | Performed by: NURSE PRACTITIONER

## 2024-11-06 RX ORDER — HEPARIN SODIUM (PORCINE) LOCK FLUSH IV SOLN 100 UNIT/ML 100 UNIT/ML
5 SOLUTION INTRAVENOUS
Status: DISCONTINUED | OUTPATIENT
Start: 2024-11-06 | End: 2024-11-06 | Stop reason: HOSPADM

## 2024-11-06 RX ORDER — LORAZEPAM 2 MG/ML
0.5 INJECTION INTRAMUSCULAR EVERY 4 HOURS PRN
OUTPATIENT
Start: 2024-11-26

## 2024-11-06 RX ORDER — ALBUTEROL SULFATE 0.83 MG/ML
2.5 SOLUTION RESPIRATORY (INHALATION)
OUTPATIENT
Start: 2024-11-26

## 2024-11-06 RX ORDER — DIPHENHYDRAMINE HYDROCHLORIDE 50 MG/ML
25 INJECTION INTRAMUSCULAR; INTRAVENOUS
Start: 2024-11-26

## 2024-11-06 RX ORDER — EPINEPHRINE 1 MG/ML
0.3 INJECTION, SOLUTION INTRAMUSCULAR; SUBCUTANEOUS EVERY 5 MIN PRN
OUTPATIENT
Start: 2024-11-26

## 2024-11-06 RX ORDER — MEPERIDINE HYDROCHLORIDE 25 MG/ML
25 INJECTION INTRAMUSCULAR; INTRAVENOUS; SUBCUTANEOUS
OUTPATIENT
Start: 2024-11-26

## 2024-11-06 RX ORDER — HEPARIN SODIUM,PORCINE 10 UNIT/ML
5-20 VIAL (ML) INTRAVENOUS DAILY PRN
OUTPATIENT
Start: 2024-11-26

## 2024-11-06 RX ORDER — ALBUTEROL SULFATE 90 UG/1
1-2 INHALANT RESPIRATORY (INHALATION)
Start: 2024-11-26

## 2024-11-06 RX ORDER — METHYLPREDNISOLONE SODIUM SUCCINATE 40 MG/ML
40 INJECTION INTRAMUSCULAR; INTRAVENOUS
Start: 2024-11-26

## 2024-11-06 RX ORDER — HEPARIN SODIUM (PORCINE) LOCK FLUSH IV SOLN 100 UNIT/ML 100 UNIT/ML
5 SOLUTION INTRAVENOUS
OUTPATIENT
Start: 2024-11-26

## 2024-11-06 RX ORDER — DIPHENHYDRAMINE HYDROCHLORIDE 50 MG/ML
50 INJECTION INTRAMUSCULAR; INTRAVENOUS
Start: 2024-11-26

## 2024-11-06 RX ADMIN — Medication 5 ML: at 11:30

## 2024-11-06 RX ADMIN — ATEZOLIZUMAB 1200 MG: 1200 INJECTION, SOLUTION INTRAVENOUS at 10:59

## 2024-11-06 ASSESSMENT — PAIN SCALES - GENERAL: PAINLEVEL_OUTOF10: NO PAIN (0)

## 2024-11-06 NOTE — PROGRESS NOTES
"Oncology Rooming Note    November 6, 2024 9:47 AM   Jasmyn Green is a 69 year old female who presents for:    Chief Complaint   Patient presents with    Oncology Clinic Visit     Return visit with labs/infusion and prior CT review      Initial Vitals: BP (!) 143/65 (BP Location: Left arm, Patient Position: Sitting, Cuff Size: Adult Small)   Pulse 118   Temp 97.3  F (36.3  C) (Tympanic)   Resp 20   Ht 1.499 m (4' 11\")   Wt 42.2 kg (93 lb)   LMP  (LMP Unknown)   SpO2 94%   BMI 18.78 kg/m   Estimated body mass index is 18.78 kg/m  as calculated from the following:    Height as of this encounter: 1.499 m (4' 11\").    Weight as of this encounter: 42.2 kg (93 lb). Body surface area is 1.33 meters squared.  No Pain (0) Comment: Data Unavailable   No LMP recorded (lmp unknown). Patient is postmenopausal.  Allergies reviewed: Yes  Medications reviewed: Yes    Medications: Medication refills not needed today.  Pharmacy name entered into [employer]:    [facility] - [place], [place] - [address] [facility] - [place], [place] - [address]    Frailty Screening:   Is the patient here for a new oncology consult visit in cancer care? 2. No      Clinical concerns:       TAPAN GOTTLIEB CMA              "

## 2024-11-06 NOTE — PROGRESS NOTES
Infusion Nursing Note:  Jasmyn Green presents today for cycle 11 day 1 atezolizumab.    Patient seen by provider today: Yes: Dr. Stewart   present during visit today: Not Applicable.    Note: Jasmyn arrived via wheelchair and in stable condition. Port accessed using sterile technique, good blood return noted, and labs collected. Treatment administered per orders. Will return on 11/27 for next appointment.      Intravenous Access:  Labs drawn without difficulty.  Implanted Port.    Treatment Conditions:  Lab Results   Component Value Date     11/06/2024    POTASSIUM 4.1 11/06/2024    MAG 2.1 05/17/2024    CR 0.99 (H) 11/06/2024    FLOR 8.4 (L) 11/06/2024    BILITOTAL <0.2 11/06/2024    ALBUMIN 3.4 (L) 11/06/2024    ALT 24 11/06/2024    AST 25 11/06/2024       Results reviewed, labs MET treatment parameters, ok to proceed with treatment.      Post Infusion Assessment:  Patient tolerated infusion without incident.  Blood return noted pre and post infusion.  Site patent and intact, free from redness, edema or discomfort.  No evidence of extravasations.  Access discontinued per protocol.       Discharge Plan:   Patient and/or family verbalized understanding of discharge instructions and all questions answered.  AVS to patient via [email or web address].  Patient will return 11/27 for next appointment.   Patient discharged in stable condition accompanied by: daughter.  Departure Mode: Wheelchair.      Debby Birmingham RN

## 2024-11-06 NOTE — PROGRESS NOTES
[facility]: Cancer Care Follow-Up Note                                    Discussion with Patient:                                                      Patient comes in today for follow-up with Dr Stewart after having had a brain MRI and CT scan prior to this appointment.     Goals          General    Maintain ability to perform ADLs without difficulty             Dates of Treatment:                                                      Infusion given in last 28 days       Administered MAR Action Medication Dose Rate Visit    10/15/2024 09:46 New Bag atezolizumab (TECENTRIQ) 1,200 mg in sodium chloride 0.9 % 130 mL infusion 1,200 mg 260 mL/hr Infusion Therapy Visit on 10/15/2024 in [facility]    11/06/2024 10:59 New Bag atezolizumab (TECENTRIQ) 1,200 mg in sodium chloride 0.9 % 130 mL infusion 1,200 mg 260 mL/hr Infusion Therapy Visit on 11/06/2024 in [facility]            Assessment:                                                      Patient comes in today on oxygen.  She uses between 2-3 L while at rest for her COPD.  She also has severe anemia secondary to chemotherapy.    Intervention/Education provided during outreach:                                                       Patient should follow-up in 3 weeks with a PET scan prior to labs, Dr Stewart and infusion.    He is following up on a lesion from the CT scan.  If the lesion is hypermetabolic then we will need to switch her therapy.  If the lesion is not hypermetabolic, we will continue treatment with atezolizumab/Tecentriq.    Patient to follow up as scheduled at next appt. Patient to call/Domain Media[employer] message with updates. Confirmed patient has clinic and triage numbers.     Signature:  Wilma Carmona RN

## 2024-11-06 NOTE — LETTER
11/6/2024      Jasmyn Green  [address] [place] [address]      Dear Colleague,    Thank you for referring your patient, Jasmyn Green, to the [facility] [place]. Please see a copy of my visit note below.    [facility] Hematology and Oncology Consult Note    Patient: Jasmyn Green  MRN: [id number]  Date of Service: Nov 6, 2024           Reason for consultation      Problem List Items Addressed This Visit          Respiratory    COPD exacerbation (H) (Chronic)    Relevant Orders    PET Oncology (Eyes to Thighs)    Comprehensive metabolic panel    Small cell lung cancer (H) - Primary    Relevant Orders    PET Oncology (Eyes to Thighs)    Comprehensive metabolic panel    Malignant neoplasm of middle lobe, bronchus or lung (H)    Relevant Orders    PET Oncology (Eyes to Thighs)    Comprehensive metabolic panel       Assessment / number of problems addressed      1.  A very pleasant 69 year old  woman with looks like extensive stage small cell lung cancer.  Started on palliative chemotherapy with carboplatin, etoposide and atezolizumab.  After 2 cycles seemed to be responding quite well on the CT scan.  Had significant side effects after third cycle requiring discontinuation of cytotoxic chemotherapy and continues on maintenance atezolizumab.  Last PET scan had shown very good response in July 2024.  In August 2024 developed new brain metastases and received radiation therapy.  Current MRI showing good treatment response.  CT of the chest showing growth of scan left upper lobe nodule.  This is worrisome for recurrence.  2.  Severe COPD.  She is on 2-3 L of oxygen at rest.  3.  Significantly decreased FEV1 of 29% and reduced diffusion capacity with DLCO of 30%.  4.  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  On baby aspirin.  5.  Other medical conditions stable.  6.  Severe anemia secondary to chemotherapy.      Plan and medical decision making      Patient presenting  with advanced small cell lung cancer.Reviewed notes from each unique source.  Reviewed each unique test.    Ordered tests.    Independently interpreted lab tests and radiological exams performed by other physicians.  Personally reviewed the images of the CT scan which showed some progression and the brain MRI which showed some improvement.  Independent historian account obtained from the family.    At this time we will continue with the treatment with atezolizumab.  I would get a PET scan in 3 weeks timeframe.  If this lesion is hypermetabolic then we will have to switch therapy.  If this is the only lesion we can consider radiation therapy since her tolerance to chemotherapy is limited. However radiation may make her lung function worse. We may end up with Lubrinectidin.  If the lesion is not hypermetabolic we will continue to treatment.  Continue monitoring of the side effects of both radiation and immunotherapy.  COPD management with supplemental oxygen.  Fluid electrolyte and nutritional optimization. She does appear to have diabetes. She is following with her primary.  Make sure that she is up-to-date on flu and COVID vaccination.  The longitudinal plan of care for the diagnosis(es)/condition(s) as documented were addressed during this visit. Due to the added complexity in care, I will continue to support Jasmyn in the subsequent management and with ongoing continuity of care.     Clinical/pathological stage       Cancer Staging   No matching staging information was found for the patient.      History of present illness      Ms. Jasmyn Green is  69 year old woman who has been referred to me for evaluation of newly diagnosed small cell lung cancer.  She appears to have extensive stage disease.  She presented to the [facility] with increasing shortness of breath coughing and symptoms suggestive of pneumonia.  She has a known history of severe obstructive lung disease with an FEV1 of 29% with  hyperinflation and air trapping.  Presented with a 1 to 2-day history of worsening shortness of breath and with exertion as well as at rest.  Requiring increasing supplemental oxygen at home.  CT scan done in the emergency room showed bulky aortopulmonary as well as left hilar adenopathy with irregular pleural-based mass in the left upper lobe.  There are also some scattered satellite nodules.  This was very suspicious for malignancy.    After discharge she was seen by Dr. Levin and and underwent endobronchial ultrasound-guided biopsy which came back positive for small cell lung cancer.  The PET scan also showed hypermetabolic lesions in the mediastinum as well as the peripheral part of the left upper lobe.    She is on oxygen at least 2 L at rest and sometimes increased oxygen at minimal activity.  She is 97 pounds.  Appetite is fair to poor.  Comes in accompanied by her 2 daughters regarding her treatment options.    She used to smoke but quit smoking.  She was in fact was being monitored by CT surveillance.  Had a CT scan done in June 2023 which was positive for some sort of pneumonia but no obvious malignancy was noted.    She was started on echemotherapy with carboplatin, etoposide and atezolizumab.  She has had 3 cycles.  After 3 cycles had severe side effects from chemotherapy.  She was had to be hospitalized for post side effect issues including some pneumonia etc.  She was at [facility] for several days.  We made the decision to discontinue cisplatin and etoposide.  We continue with the atezolizumab.    Jasmyn was able to tolerate atezolizumab only mild toxicity.  She is following up with palliative care physicians.  Her pain Patch was discontinued and she was put on small dose of Vicodin to help with her breathing.  That seems to be working well.  She had a PET scan done in July 2024.  That PET scan showed complete metabolic response.    Unfortunately in August 2024 her brain MRI showed multiple  "brain metastases.  She was seen by radiation oncology.  Received radiation therapy.  In the meantime has continued on her atezolizumab.      Comes in today after another PET scan.  Having some radiation induced side effects.  Slowly recovering from that.    Past medical/surgical/social/family history        Past Medical History:   Diagnosis Date     Age-related osteoporosis without current pathological fracture      Arthritis      COPD (chronic obstructive pulmonary disease) (H)      Coronary artery disease      Dependence on nocturnal oxygen therapy      Dyspnea on exertion      Emphysema lung (H)      Gout      HLD (hyperlipidemia)      Hypertension      Lung mass      Past Surgical History:   Procedure Laterality Date     BRONCHOSCOPY RIGID OR FLEXIBLE W/TRANSENDOSCOPIC ENDOBRONCHIAL ULTRASOUND GUIDED N/A 2/16/2024    Procedure: BRONCHOSCOPY, WITH ENDOBRONCHIAL ULTRASOUND;  Surgeon: Ruben Levin MD;  Location: [facility] Main OR     COLONOSCOPY N/A 10/21/2021    Procedure: COLONOSCOPY;  Surgeon: Wilma Hester DO;  Location: [facility] Main OR     IR CHEST PORT PLACEMENT > 5 YRS OF AGE  5/2/2024     VAGINAL DELIVERY      x 3 ; remote     WISDOM TOOTH EXTRACTION         Review of system      Details noted in the history of present illness.  A detailed review of systems is otherwise negative.      Physical exam        BP (!) 143/65 (BP Location: Left arm, Patient Position: Sitting, Cuff Size: Adult Small)   Pulse 118   Temp 97.3  F (36.3  C) (Tympanic)   Resp 20   Ht 1.499 m (4' 11\")   Wt 42.2 kg (93 lb)   LMP  (LMP Unknown)   SpO2 94%   BMI 18.78 kg/m      GENERAL: No acute distress. Cooperative in conversation.   HEENT:  Pupils are equal, round and reactive. Oral mucosa is clean and intact. No ulcerations or mucositis noted. No bleeding noted.  RESP:Chest symmetric lungs are clear bilaterally per auscultation. Regular respiratory rate. No wheezes or rhonchi.  CV: Normal S1 S2 Regular, rate and rhythm.  "    ABD: Nondistended, soft, nontender. Positive bowel sounds. No organomegaly.   EXTREMITIES: No lower extremity edema.   NEURO: Non- focal. Alert and oriented x3.  Cranial nerves appear intact.  PSYCH: Within normal limits. No depression or anxiety.  SKIN: Warm dry intact.      Lab results Reviewed      Recent Results (from the past week)   Comprehensive metabolic panel   Result Value Ref Range    Sodium 141 135 - 145 mmol/L    Potassium 4.1 3.4 - 5.3 mmol/L    Carbon Dioxide (CO2) 24 22 - 29 mmol/L    Anion Gap 8 7 - 15 mmol/L    Urea Nitrogen 18.5 8.0 - 23.0 mg/dL    Creatinine 0.99 (H) 0.51 - 0.95 mg/dL    GFR Estimate 61 >60 mL/min/1.73m2    Calcium 8.4 (L) 8.8 - 10.4 mg/dL    Chloride 109 (H) 98 - 107 mmol/L    Glucose 144 (H) 70 - 99 mg/dL    Alkaline Phosphatase 100 40 - 150 U/L    AST 25 0 - 45 U/L    ALT 24 0 - 50 U/L    Protein Total 5.7 (L) 6.4 - 8.3 g/dL    Albumin 3.4 (L) 3.5 - 5.2 g/dL    Bilirubin Total <0.2 <=1.2 mg/dL   TSH with free T4 reflex   Result Value Ref Range    TSH 2.54 0.30 - 4.20 uIU/mL       Imaging results Reviewed        CT Chest/Abdomen/Pelvis w Contrast    Result Date: 11/5/2024  EXAM: CT CHEST/ABDOMEN/PELVIS W CONTRAST LOCATION: [facility] DATE: 11/4/2024 INDICATION: Small cell lung cancer (H), Malignant neoplasm of middle lobe, bronchus or lung (H). COMPARISON: Multiple with most recent studies a PET scan from 7/3/2024 and CT scan from 5/10/2024. TECHNIQUE: CT scan of the chest, abdomen, and pelvis was performed following injection of IV contrast. Multiplanar reformats were obtained. Dose reduction techniques were used. CONTRAST: 75ml isovue 370. FINDINGS: LUNGS AND PLEURA: Marked emphysematous changes in the lungs. 1.1 x 1.0 cm focal subpleural nodular density left upper lobe anteriorly on series 4 image 162 has increased in size since 5/10/2024 and is at the location of the patient's previous lung malignancy. This is concerning for potential recurrent  disease. Additional indeterminate new 8 mm irregular nodular density right upper lobe on series 4 image 125. Mild scarring and volume loss in the left upper lobe posteriorly improved since the prior study. No acute appearing infiltrates or consolidation. Multiple tiny granulomas in the upper lobes, unchanged. No pleural effusions. MEDIASTINUM/AXILLAE: Previously seen adenopathy in the AP window and left hilum are less pronounced today. Largest remaining node measuring 1.1 cm in short axis diameter on series 3 image 63 (1.3 cm previously). No new or progressive adenopathy. Normal heart size. No pericardial effusion. Normal caliber thoracic aorta. Esophagus is grossly negative. CORONARY ARTERY CALCIFICATION: Severe. HEPATOBILIARY: Small benign cyst left hepatic lobe is unchanged. Liver is otherwise negative. No calcified gallstones or biliary dilatation. PANCREAS: Normal. SPLEEN: Normal. ADRENAL GLANDS: Stable mild benign-appearing thickening left adrenal gland. Right adrenal gland is negative. KIDNEYS/BLADDER: Bilateral renal cortical thinning. Small benign right renal cyst with no follow-up needed. No hydronephrosis. Bladder is unremarkable. BOWEL: Normal. LYMPH NODES: Normal. VASCULATURE: Diffuse aortoiliac atheromatous disease. Ectasia of the distal abdominal aorta measuring 2.4 cm. Severe calcification/stenosis again noted at the origin of the celiac artery. PELVIC ORGANS: No pelvic masses. MUSCULOSKELETAL: No destructive bony lesions. Mild degenerative changes in the spine.     IMPRESSION: 1.  1.1 cm subpleural nodular density left upper lobe. This is new/increased compared with the prior study and is in the location of the patient's previous lung malignancy. This is suspicious for potential recurrent disease. PET scan could be helpful in further evaluation. 2.  New indeterminate 8 mm irregular nodular density right upper lobe. Metastatic nodule not excluded and again could be better evaluated with PET scan. 3.   No findings elsewhere raising concern for new/progressive disease. Previously seen left hilar and mediastinal adenopathy is less pronounced today with no new or enlarging lymph nodes. 4.  Marked emphysematous changes in the lungs. 5.  Mild scarring and volume loss left upper lobe posteriorly improved from previous. 6.  Diffuse atheromatous disease including severe coronary artery calcification and marked calcification/stenosis at the origin of the celiac artery, similar to previous. See remainder of the details above.    MRI Brain w & w/o contrast    Result Date: 11/5/2024  EXAM: MR BRAIN W/O and W CONTRAST LOCATION: [facility] DATE: 11/4/2024 INDICATION: SCLC with jayy mets s p WBRT ecal response prog COMPARISON: 08/15/2024 brain MRI. CONTRAST: 7.5 mL Gadavist TECHNIQUE: Multiplanar multisequence head MRI without and with intravenous contrast including dynamic susceptibility contrast perfusion imaging. FINDINGS: INTRACRANIAL CONTENTS: Enhancing metastatic lesion in the lateral left cerebellar hemisphere demonstrates an interval decrease in size, now measuring 6 mm AP and 5 mm transverse, as compared to 12 mm AP and 10 mm transverse previously. Small amount of adjacent FLAIR hyperintensity has improved in the interim. Rounded focus of enhancement at the superior margin of the right hippocampus measuring 5 mm AP and 7 mm transverse is relatively unchanged, though it is worth noting that this may reflect vascular enhancement or choroid plexus in the medial margin of the right temporal horn. Additional metastatic lesions elsewhere have resolved. Provided perfusion imaging is nondiagnostic. No finding for acute infarct, intracranial hemorrhage, or new extra-axial collection. Mild burden FLAIR hyperintense presumed chronic small vessel ischemic change. Mild diffuse parenchymal volume loss. Nondilated ventricles. Major intracranial vascular flow voids at the skull base are preserved. Cerebellar  "tonsils are normally positioned. SELLA: No abnormality accounting for technique. OSSEOUS STRUCTURES/SOFT TISSUES: Normal marrow signal. Stress ORBITS: No abnormality accounting for technique. SINUSES/MASTOIDS: No paranasal sinus mucosal disease. Fluid in the mastoid air cells bilaterally, more so on the right.     IMPRESSION: 1.  Overall findings compatible with treatment response. Metastatic lesion in the lateral left cerebellar hemisphere has decreased in size since previous, with resolution of the additional metastatic lesions previously seen elsewhere in the brain. 2.  Age-related changes as above.       Signed by: Amador Stewart MD      This note has been dictated using voice recognition software. Any grammatical or context distortions are unintentional and inherent to the software      Oncology Rooming Note    November 6, 2024 9:47 AM   Jasmyn Green is a 69 year old female who presents for:    Chief Complaint   Patient presents with     Oncology Clinic Visit     Return visit with labs/infusion and prior CT review      Initial Vitals: BP (!) 143/65 (BP Location: Left arm, Patient Position: Sitting, Cuff Size: Adult Small)   Pulse 118   Temp 97.3  F (36.3  C) (Tympanic)   Resp 20   Ht 1.499 m (4' 11\")   Wt 42.2 kg (93 lb)   LMP  (LMP Unknown)   SpO2 94%   BMI 18.78 kg/m   Estimated body mass index is 18.78 kg/m  as calculated from the following:    Height as of this encounter: 1.499 m (4' 11\").    Weight as of this encounter: 42.2 kg (93 lb). Body surface area is 1.33 meters squared.  No Pain (0) Comment: Data Unavailable   No LMP recorded (lmp unknown). Patient is postmenopausal.  Allergies reviewed: Yes  Medications reviewed: Yes    Medications: Medication refills not needed today.  Pharmacy name entered into Login[employer]:    [facility] - [place], [place] - [address] [facility] - [place], [place] - [address]    Frailty Screening:   Is the patient here for a new oncology " consult visit in cancer care? 2. No      Clinical concerns:       TAPAN GOTTLIEB CMA                Again, thank you for allowing me to participate in the care of your patient.        Sincerely,        Amador Stewart MD

## 2024-11-06 NOTE — PROGRESS NOTES
[facility] Hematology and Oncology Consult Note    Patient: Jasmyn Green  MRN: [id number]  Date of Service: Nov 6, 2024           Reason for consultation      Problem List Items Addressed This Visit          Respiratory    COPD exacerbation (H) (Chronic)    Relevant Orders    PET Oncology (Eyes to Thighs)    Comprehensive metabolic panel    Small cell lung cancer (H) - Primary    Relevant Orders    PET Oncology (Eyes to Thighs)    Comprehensive metabolic panel    Malignant neoplasm of middle lobe, bronchus or lung (H)    Relevant Orders    PET Oncology (Eyes to Thighs)    Comprehensive metabolic panel       Assessment / number of problems addressed      1.  A very pleasant 69 year old  woman with looks like extensive stage small cell lung cancer.  Started on palliative chemotherapy with carboplatin, etoposide and atezolizumab.  After 2 cycles seemed to be responding quite well on the CT scan.  Had significant side effects after third cycle requiring discontinuation of cytotoxic chemotherapy and continues on maintenance atezolizumab.  Last PET scan had shown very good response in July 2024.  In August 2024 developed new brain metastases and received radiation therapy.  Current MRI showing good treatment response.  CT of the chest showing growth of scan left upper lobe nodule.  This is worrisome for recurrence.  2.  Severe COPD.  She is on 2-3 L of oxygen at rest.  3.  Significantly decreased FEV1 of 29% and reduced diffusion capacity with DLCO of 30%.  4.  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  On baby aspirin.  5.  Other medical conditions stable.  6.  Severe anemia secondary to chemotherapy.      Plan and medical decision making      Patient presenting with advanced small cell lung cancer.Reviewed notes from each unique source.  Reviewed each unique test.    Ordered tests.    Independently interpreted lab tests and radiological exams performed by other physicians.  Personally reviewed the images of the  CT scan which showed some progression and the brain MRI which showed some improvement.  Independent historian account obtained from the family.    At this time we will continue with the treatment with atezolizumab.  I would get a PET scan in 3 weeks timeframe.  If this lesion is hypermetabolic then we will have to switch therapy.  If this is the only lesion we can consider radiation therapy since her tolerance to chemotherapy is limited. However radiation may make her lung function worse. We may end up with Lubrinectidin.  If the lesion is not hypermetabolic we will continue to treatment.  Continue monitoring of the side effects of both radiation and immunotherapy.  COPD management with supplemental oxygen.  Fluid electrolyte and nutritional optimization. She does appear to have diabetes. She is following with her primary.  Make sure that she is up-to-date on flu and COVID vaccination.  The longitudinal plan of care for the diagnosis(es)/condition(s) as documented were addressed during this visit. Due to the added complexity in care, I will continue to support Jasmyn in the subsequent management and with ongoing continuity of care.     Clinical/pathological stage       Cancer Staging   No matching staging information was found for the patient.      History of present illness      Ms. Jasmyn Green is  69 year old woman who has been referred to me for evaluation of newly diagnosed small cell lung cancer.  She appears to have extensive stage disease.  She presented to the [facility] with increasing shortness of breath coughing and symptoms suggestive of pneumonia.  She has a known history of severe obstructive lung disease with an FEV1 of 29% with hyperinflation and air trapping.  Presented with a 1 to 2-day history of worsening shortness of breath and with exertion as well as at rest.  Requiring increasing supplemental oxygen at home.  CT scan done in the emergency room showed bulky aortopulmonary as well  as left hilar adenopathy with irregular pleural-based mass in the left upper lobe.  There are also some scattered satellite nodules.  This was very suspicious for malignancy.    After discharge she was seen by Dr. Levin and and underwent endobronchial ultrasound-guided biopsy which came back positive for small cell lung cancer.  The PET scan also showed hypermetabolic lesions in the mediastinum as well as the peripheral part of the left upper lobe.    She is on oxygen at least 2 L at rest and sometimes increased oxygen at minimal activity.  She is 97 pounds.  Appetite is fair to poor.  Comes in accompanied by her 2 daughters regarding her treatment options.    She used to smoke but quit smoking.  She was in fact was being monitored by CT surveillance.  Had a CT scan done in June 2023 which was positive for some sort of pneumonia but no obvious malignancy was noted.    She was started on echemotherapy with carboplatin, etoposide and atezolizumab.  She has had 3 cycles.  After 3 cycles had severe side effects from chemotherapy.  She was had to be hospitalized for post side effect issues including some pneumonia etc.  She was at [facility] for several days.  We made the decision to discontinue cisplatin and etoposide.  We continue with the atezolizumab.    Jasmyn was able to tolerate atezolizumab only mild toxicity.  She is following up with palliative care physicians.  Her pain Patch was discontinued and she was put on small dose of Vicodin to help with her breathing.  That seems to be working well.  She had a PET scan done in July 2024.  That PET scan showed complete metabolic response.    Unfortunately in August 2024 her brain MRI showed multiple brain metastases.  She was seen by radiation oncology.  Received radiation therapy.  In the meantime has continued on her atezolizumab.      Comes in today after another PET scan.  Having some radiation induced side effects.  Slowly recovering from that.    Past  "medical/surgical/social/family history        Past Medical History:   Diagnosis Date    Age-related osteoporosis without current pathological fracture     Arthritis     COPD (chronic obstructive pulmonary disease) (H)     Coronary artery disease     Dependence on nocturnal oxygen therapy     Dyspnea on exertion     Emphysema lung (H)     Gout     HLD (hyperlipidemia)     Hypertension     Lung mass      Past Surgical History:   Procedure Laterality Date    BRONCHOSCOPY RIGID OR FLEXIBLE W/TRANSENDOSCOPIC ENDOBRONCHIAL ULTRASOUND GUIDED N/A 2/16/2024    Procedure: BRONCHOSCOPY, WITH ENDOBRONCHIAL ULTRASOUND;  Surgeon: Ruben Levin MD;  Location: [facility] Main OR    COLONOSCOPY N/A 10/21/2021    Procedure: COLONOSCOPY;  Surgeon: Wilma Hester DO;  Location: [facility] OR    IR CHEST PORT PLACEMENT > 5 YRS OF AGE  5/2/2024    VAGINAL DELIVERY      x 3 ; remote    WISDOM TOOTH EXTRACTION         Review of system      Details noted in the history of present illness.  A detailed review of systems is otherwise negative.      Physical exam        BP (!) 143/65 (BP Location: Left arm, Patient Position: Sitting, Cuff Size: Adult Small)   Pulse 118   Temp 97.3  F (36.3  C) (Tympanic)   Resp 20   Ht 1.499 m (4' 11\")   Wt 42.2 kg (93 lb)   LMP  (LMP Unknown)   SpO2 94%   BMI 18.78 kg/m      GENERAL: No acute distress. Cooperative in conversation.   HEENT:  Pupils are equal, round and reactive. Oral mucosa is clean and intact. No ulcerations or mucositis noted. No bleeding noted.  RESP:Chest symmetric lungs are clear bilaterally per auscultation. Regular respiratory rate. No wheezes or rhonchi.  CV: Normal S1 S2 Regular, rate and rhythm.     ABD: Nondistended, soft, nontender. Positive bowel sounds. No organomegaly.   EXTREMITIES: No lower extremity edema.   NEURO: Non- focal. Alert and oriented x3.  Cranial nerves appear intact.  PSYCH: Within normal limits. No depression or anxiety.  SKIN: Warm dry " intact.      Lab results Reviewed      Recent Results (from the past week)   Comprehensive metabolic panel   Result Value Ref Range    Sodium 141 135 - 145 mmol/L    Potassium 4.1 3.4 - 5.3 mmol/L    Carbon Dioxide (CO2) 24 22 - 29 mmol/L    Anion Gap 8 7 - 15 mmol/L    Urea Nitrogen 18.5 8.0 - 23.0 mg/dL    Creatinine 0.99 (H) 0.51 - 0.95 mg/dL    GFR Estimate 61 >60 mL/min/1.73m2    Calcium 8.4 (L) 8.8 - 10.4 mg/dL    Chloride 109 (H) 98 - 107 mmol/L    Glucose 144 (H) 70 - 99 mg/dL    Alkaline Phosphatase 100 40 - 150 U/L    AST 25 0 - 45 U/L    ALT 24 0 - 50 U/L    Protein Total 5.7 (L) 6.4 - 8.3 g/dL    Albumin 3.4 (L) 3.5 - 5.2 g/dL    Bilirubin Total <0.2 <=1.2 mg/dL   TSH with free T4 reflex   Result Value Ref Range    TSH 2.54 0.30 - 4.20 uIU/mL       Imaging results Reviewed        CT Chest/Abdomen/Pelvis w Contrast    Result Date: 11/5/2024  EXAM: CT CHEST/ABDOMEN/PELVIS W CONTRAST LOCATION: [facility] DATE: 11/4/2024 INDICATION: Small cell lung cancer (H), Malignant neoplasm of middle lobe, bronchus or lung (H). COMPARISON: Multiple with most recent studies a PET scan from 7/3/2024 and CT scan from 5/10/2024. TECHNIQUE: CT scan of the chest, abdomen, and pelvis was performed following injection of IV contrast. Multiplanar reformats were obtained. Dose reduction techniques were used. CONTRAST: 75ml isovue 370. FINDINGS: LUNGS AND PLEURA: Marked emphysematous changes in the lungs. 1.1 x 1.0 cm focal subpleural nodular density left upper lobe anteriorly on series 4 image 162 has increased in size since 5/10/2024 and is at the location of the patient's previous lung malignancy. This is concerning for potential recurrent disease. Additional indeterminate new 8 mm irregular nodular density right upper lobe on series 4 image 125. Mild scarring and volume loss in the left upper lobe posteriorly improved since the prior study. No acute appearing infiltrates or consolidation. Multiple  tiny granulomas in the upper lobes, unchanged. No pleural effusions. MEDIASTINUM/AXILLAE: Previously seen adenopathy in the AP window and left hilum are less pronounced today. Largest remaining node measuring 1.1 cm in short axis diameter on series 3 image 63 (1.3 cm previously). No new or progressive adenopathy. Normal heart size. No pericardial effusion. Normal caliber thoracic aorta. Esophagus is grossly negative. CORONARY ARTERY CALCIFICATION: Severe. HEPATOBILIARY: Small benign cyst left hepatic lobe is unchanged. Liver is otherwise negative. No calcified gallstones or biliary dilatation. PANCREAS: Normal. SPLEEN: Normal. ADRENAL GLANDS: Stable mild benign-appearing thickening left adrenal gland. Right adrenal gland is negative. KIDNEYS/BLADDER: Bilateral renal cortical thinning. Small benign right renal cyst with no follow-up needed. No hydronephrosis. Bladder is unremarkable. BOWEL: Normal. LYMPH NODES: Normal. VASCULATURE: Diffuse aortoiliac atheromatous disease. Ectasia of the distal abdominal aorta measuring 2.4 cm. Severe calcification/stenosis again noted at the origin of the celiac artery. PELVIC ORGANS: No pelvic masses. MUSCULOSKELETAL: No destructive bony lesions. Mild degenerative changes in the spine.     IMPRESSION: 1.  1.1 cm subpleural nodular density left upper lobe. This is new/increased compared with the prior study and is in the location of the patient's previous lung malignancy. This is suspicious for potential recurrent disease. PET scan could be helpful in further evaluation. 2.  New indeterminate 8 mm irregular nodular density right upper lobe. Metastatic nodule not excluded and again could be better evaluated with PET scan. 3.  No findings elsewhere raising concern for new/progressive disease. Previously seen left hilar and mediastinal adenopathy is less pronounced today with no new or enlarging lymph nodes. 4.  Marked emphysematous changes in the lungs. 5.  Mild scarring and volume  loss left upper lobe posteriorly improved from previous. 6.  Diffuse atheromatous disease including severe coronary artery calcification and marked calcification/stenosis at the origin of the celiac artery, similar to previous. See remainder of the details above.    MRI Brain w & w/o contrast    Result Date: 11/5/2024  EXAM: MR BRAIN W/O and W CONTRAST LOCATION: [facility] DATE: 11/4/2024 INDICATION: SCLC with jayy mets s p WBRT ecal response prog COMPARISON: 08/15/2024 brain MRI. CONTRAST: 7.5 mL Gadavist TECHNIQUE: Multiplanar multisequence head MRI without and with intravenous contrast including dynamic susceptibility contrast perfusion imaging. FINDINGS: INTRACRANIAL CONTENTS: Enhancing metastatic lesion in the lateral left cerebellar hemisphere demonstrates an interval decrease in size, now measuring 6 mm AP and 5 mm transverse, as compared to 12 mm AP and 10 mm transverse previously. Small amount of adjacent FLAIR hyperintensity has improved in the interim. Rounded focus of enhancement at the superior margin of the right hippocampus measuring 5 mm AP and 7 mm transverse is relatively unchanged, though it is worth noting that this may reflect vascular enhancement or choroid plexus in the medial margin of the right temporal horn. Additional metastatic lesions elsewhere have resolved. Provided perfusion imaging is nondiagnostic. No finding for acute infarct, intracranial hemorrhage, or new extra-axial collection. Mild burden FLAIR hyperintense presumed chronic small vessel ischemic change. Mild diffuse parenchymal volume loss. Nondilated ventricles. Major intracranial vascular flow voids at the skull base are preserved. Cerebellar tonsils are normally positioned. SELLA: No abnormality accounting for technique. OSSEOUS STRUCTURES/SOFT TISSUES: Normal marrow signal. Stress ORBITS: No abnormality accounting for technique. SINUSES/MASTOIDS: No paranasal sinus mucosal disease. Fluid in the  mastoid air cells bilaterally, more so on the right.     IMPRESSION: 1.  Overall findings compatible with treatment response. Metastatic lesion in the lateral left cerebellar hemisphere has decreased in size since previous, with resolution of the additional metastatic lesions previously seen elsewhere in the brain. 2.  Age-related changes as above.       Signed by: Amador Stewart MD      This note has been dictated using voice recognition software. Any grammatical or context distortions are unintentional and inherent to the software

## 2024-11-07 ENCOUNTER — OFFICE VISIT (OUTPATIENT)
Dept: RADIATION ONCOLOGY | Facility: CLINIC | Age: 69
End: 2024-11-07
Attending: STUDENT IN AN ORGANIZED HEALTH CARE EDUCATION/TRAINING PROGRAM
Payer: COMMERCIAL

## 2024-11-07 VITALS
OXYGEN SATURATION: 95 % | HEART RATE: 129 BPM | SYSTOLIC BLOOD PRESSURE: 125 MMHG | DIASTOLIC BLOOD PRESSURE: 58 MMHG | TEMPERATURE: 98 F | BODY MASS INDEX: 19.31 KG/M2 | RESPIRATION RATE: 20 BRPM | WEIGHT: 95.6 LBS

## 2024-11-07 DIAGNOSIS — C79.31 SECONDARY MALIGNANT NEOPLASM OF BRAIN (H): Primary | ICD-10-CM

## 2024-11-07 PROCEDURE — 99214 OFFICE O/P EST MOD 30 MIN: CPT | Mod: 24 | Performed by: STUDENT IN AN ORGANIZED HEALTH CARE EDUCATION/TRAINING PROGRAM

## 2024-11-07 PROCEDURE — G0463 HOSPITAL OUTPT CLINIC VISIT: HCPCS | Performed by: STUDENT IN AN ORGANIZED HEALTH CARE EDUCATION/TRAINING PROGRAM

## 2024-11-07 PROCEDURE — G2211 COMPLEX E/M VISIT ADD ON: HCPCS | Performed by: STUDENT IN AN ORGANIZED HEALTH CARE EDUCATION/TRAINING PROGRAM

## 2024-11-07 ASSESSMENT — PAIN SCALES - GENERAL: PAINLEVEL_OUTOF10: NO PAIN (0)

## 2024-11-07 NOTE — PROGRESS NOTES
"Oncology Rooming Note    November 7, 2024 9:30 AM   Jasmyn Green is a 69 year old female who presents for:    Chief Complaint   Patient presents with    Oncology Clinic Visit     Follow up with Dr. Nuñez     Initial Vitals: /58 (BP Location: Left arm, Patient Position: Sitting, Cuff Size: Adult Regular)   Pulse (!) 129   Temp 98  F (36.7  C) (Oral)   Resp 20   Wt 43.4 kg (95 lb 9.6 oz)   LMP  (LMP Unknown)   SpO2 95%   BMI 19.31 kg/m   Estimated body mass index is 19.31 kg/m  as calculated from the following:    Height as of 11/6/24: 1.499 m (4' 11\").    Weight as of this encounter: 43.4 kg (95 lb 9.6 oz). Body surface area is 1.34 meters squared.  No Pain (0) Comment: Data Unavailable   No LMP recorded (lmp unknown). Patient is postmenopausal.  Allergies reviewed: Yes  Medications reviewed: Yes    Medications: Medication refills not needed today.  Pharmacy name entered into Tapit:    [facility] - [place], [place] - [address] [facility] - [place], [place] - [address]    Frailty Screening:   Is the patient here for a new oncology consult visit in cancer care? 2. No      Clinical concerns: Patient here per wheelchair accompanied by family for follow-up status post radiation for her metastatic lung cancer to the brain.  Patient states she had a lot of fatigue post radiation but is starting to recover now and feels like she has more energy.  Patient has been having an increased cough and was on a short-term antibiotics for potential lung infection.  Continues with productive cough and is on oxygen at home at 3 L.  Patient is done with her dexamethasone.  MRI done on Monday here today for results.  Patient seen by medical oncology yesterday and will be having a pet scan done in a couple of weeks followed by a visit with the medical oncologist.  Plan return to clinic for follow-up as directed by provider.   Dr. Nuñez was notified.      Amber Wallace RN              "

## 2024-11-07 NOTE — LETTER
11/7/2024      Jasmyn Green  [address] [place] [address]      Dear Colleague,    Thank you for referring your patient, Jasmyn Green, to the [facility] RADIATION ONCOLOGY [place]. Please see a copy of my visit note below.    [facility] Radiation Oncology Follow Up     Patient: Jasmyn Green  MRN: [id number]  Date of Service: 11/07/2024       DISEASE TREATED:  extensive stage small cell lung cancer with brain metastases.       TYPE OF RADIATION THERAPY ADMINISTERED:    SITE TREATED: Whole brain  TOTAL DOSE: 3000 cGy  NUMBER OF FRACTIONS: 10  RADIATION TREATMENT 8/22/2024 - 9/9/2024   CONCURRENT CHEMOTHERAPY: No  ADJUVANT THERAPY: Yes- atezolizumab      INTERVAL SINCE COMPLETION OF RADIATION THERAPY: 2 mo      SUBJECTIVE:  Ms. Green is a 69 year old female who initially presented with extensive stage small cell lung cancer involving left upper lobe primary, left hilar and mediastinal lymphadenopathy, several pleural based metastases in the left hemithorax.  Severe COPD- FEV1 of 29% and reduced diffusion capacity with DLCO of 30% . She is on 2-3 L of oxygen at rest.      2/16/2024 endobronchial ultrasound-guided biopsy, pathology: station 4L and 10L: Small cell lung cancer .     carboplatin, etoposide and atezolizumab x3 c -- significant side effects after third cycle requiring discontinuation carbo/etoposide continues on maintenance atezolizumab.      7/3/2024 PET/CT: Complete metabolic response to therapy of previous left lung, left pleura and thoracic gisela disease.     8/15/2024 MRI: at least 12 nodular enhancing lesions within the supratentorial infratentorial compartments consistent with multiple new progressive intracranial metastases.  Dominant lesions right parietal-occipital junction superiorly within the anterior left cerebellum demonstrate mild vasogenic edema without significant mass effect.     8/16/2024: She was started on dexamethasone.  8/18/2024:  atezolizumab c8  8/22/2024 - 9/9/2024  WBRT 3000 cGy/ 10 fx  9/24/2022: atezolizumab c9  10/15/2024: atezolizumab c10  11/4/2024 MRI brain:  Metastatic lesion in the lateral left cerebellar hemisphere has decreased in size since previous, with resolution of the additional metastatic lesions previously seen elsewhere in the brain.   11/4/2024 Ct chest:   1.1 cm subpleural nodular density left upper lobe. This is new/increased compared with the prior study and is in the location of the patient's previous lung malignancy. This is suspicious for potential recurrent disease. New indeterminate 8 mm irregular nodular density right upper lobe.    She returns to clinic today for routine follow-up visit.  She continues to have fatigue however her appetite is good.  She continues to have some scalp dryness and minimal hair regrowth.  No headaches.  No seizure-like symptoms.  Her memory is good, her family feels she is still quite sharp.  She is O2 dependent becomes very short of breath with any activity.  Currently on 1-2 L while at rest but increases to 3 or 4 L with activity.  Restaging PET scan scheduled later this month.    Former smoker: >40 pk yr    Medications were reviewed and are up to date on EPIC.    The following portions of the patient's history were reviewed and updated as appropriate: allergies, current medications, past family history, past medical history, past social history, past surgical history and problem list.    Review of Systems:      General  Constitutional  Constitutional (WDL): Exceptions to WDL  Fatigue: Fatigue relieved by rest  EENT  Eye Disorders  Eye Disorder (WDL): All eye disorder elements are within defined limits  Ear Disorders  Ear Disorder (WDL): Exceptions to WDL (some decreased hearing)  Respiratory  Respiratory  Respiratory (WDL): Exceptions to WDL  Cough: Mild symptoms OR nonprescription intervention indicated  Dyspnea: Shortness of breath with minimal exertion OR limiting  instrumental ADL  Hypoxia: Decreased oxygen saturation at rest (e.g., pulse oximeter less than 88% or PaO2 less than or equal to 55 mmHg) (oxygen dependent, 2-3 liters home O2)  Cardiovascular  Cardiovascular  Cardiovascular (WDL): All cardiovascular elements are within defined limits (no pacemaker)  Gastrointestinal  Gastrointestinal  Gastrointestinal (WDL): Exceptions to WDL  Anorexia: Loss of appetite without alteration in eating habits  Nausea: Loss of appetite without alteration in eating habits  Musculoskeletal  Musculoskeletal and Connective Tissue Disorders  Musculoskeletal & Connective (WDL): Exceptions to WDL  Arthralgia: Mild pain  Integumentary  Integumentary  Integumentary (WDL): Exceptions to WDL  Alopecia: Hair loss of greater than or equal to 50% normal for that individual that is readily apparent to others OR a wig or hair piece is necessary if the patient desires to completely camouflage the hair loss OR associated with psychosocial impact  Neurological  Neurosensory  Neurosensory (WDL): Exceptions to WDL  Ataxia: Asymptomatic OR clinical or diagnostic observations only OR intervention not indicated (in wheelchair today)  Confusion: Mild disorientation (at times)  Genitourinary/Reproductive  Genitourinary  Genitourinary (WDL): All genitourinary elements are within defined limits  Lymphatic  Lymph System Disorders  Lymph (WDL): All lymph elements are within defined limits  Pain  Pain Score: No Pain (0)  AUA Assessment                                                              Accompanied by  Accompanied By: family    Objective:     PHYSICAL EXAMINATION:    /58 (BP Location: Left arm, Patient Position: Sitting, Cuff Size: Adult Regular)   Pulse (!) 129   Temp 98  F (36.7  C) (Oral)   Resp 20   Wt 43.4 kg (95 lb 9.6 oz)   LMP  (LMP Unknown)   SpO2 95%   BMI 19.31 kg/m      Gen: Alert, in NAD pleasant interactive, thin  Eyes:  EOMI, sclera anicteric  HENT     Head: NC/AT, alopecia with  beginning of hair growth O2 by nasal cannula  Musculoskeletal: Decreased muscle bulk and tone  Skin: Normal tone and turgor, warm, dry, intact  Neurologic: A/Ox3,  face symmetric, speech fluent, no focal motor deficits.  Gait not assessed   psychiatric: Appropriate mood and affect        Personally reviewed images  Imaging: Imaging results 30 days: CT Chest/Abdomen/Pelvis w Contrast    Result Date: 11/5/2024  EXAM: CT CHEST/ABDOMEN/PELVIS W CONTRAST LOCATION: [facility] DATE: 11/4/2024 INDICATION: Small cell lung cancer (H), Malignant neoplasm of middle lobe, bronchus or lung (H). COMPARISON: Multiple with most recent studies a PET scan from 7/3/2024 and CT scan from 5/10/2024. TECHNIQUE: CT scan of the chest, abdomen, and pelvis was performed following injection of IV contrast. Multiplanar reformats were obtained. Dose reduction techniques were used. CONTRAST: 75ml isovue 370. FINDINGS: LUNGS AND PLEURA: Marked emphysematous changes in the lungs. 1.1 x 1.0 cm focal subpleural nodular density left upper lobe anteriorly on series 4 image 162 has increased in size since 5/10/2024 and is at the location of the patient's previous lung malignancy. This is concerning for potential recurrent disease. Additional indeterminate new 8 mm irregular nodular density right upper lobe on series 4 image 125. Mild scarring and volume loss in the left upper lobe posteriorly improved since the prior study. No acute appearing infiltrates or consolidation. Multiple tiny granulomas in the upper lobes, unchanged. No pleural effusions. MEDIASTINUM/AXILLAE: Previously seen adenopathy in the AP window and left hilum are less pronounced today. Largest remaining node measuring 1.1 cm in short axis diameter on series 3 image 63 (1.3 cm previously). No new or progressive adenopathy. Normal heart size. No pericardial effusion. Normal caliber thoracic aorta. Esophagus is grossly negative. CORONARY ARTERY CALCIFICATION:  Severe. HEPATOBILIARY: Small benign cyst left hepatic lobe is unchanged. Liver is otherwise negative. No calcified gallstones or biliary dilatation. PANCREAS: Normal. SPLEEN: Normal. ADRENAL GLANDS: Stable mild benign-appearing thickening left adrenal gland. Right adrenal gland is negative. KIDNEYS/BLADDER: Bilateral renal cortical thinning. Small benign right renal cyst with no follow-up needed. No hydronephrosis. Bladder is unremarkable. BOWEL: Normal. LYMPH NODES: Normal. VASCULATURE: Diffuse aortoiliac atheromatous disease. Ectasia of the distal abdominal aorta measuring 2.4 cm. Severe calcification/stenosis again noted at the origin of the celiac artery. PELVIC ORGANS: No pelvic masses. MUSCULOSKELETAL: No destructive bony lesions. Mild degenerative changes in the spine.     IMPRESSION: 1.  1.1 cm subpleural nodular density left upper lobe. This is new/increased compared with the prior study and is in the location of the patient's previous lung malignancy. This is suspicious for potential recurrent disease. PET scan could be helpful in further evaluation. 2.  New indeterminate 8 mm irregular nodular density right upper lobe. Metastatic nodule not excluded and again could be better evaluated with PET scan. 3.  No findings elsewhere raising concern for new/progressive disease. Previously seen left hilar and mediastinal adenopathy is less pronounced today with no new or enlarging lymph nodes. 4.  Marked emphysematous changes in the lungs. 5.  Mild scarring and volume loss left upper lobe posteriorly improved from previous. 6.  Diffuse atheromatous disease including severe coronary artery calcification and marked calcification/stenosis at the origin of the celiac artery, similar to previous. See remainder of the details above.    MRI Brain w & w/o contrast    Result Date: 11/5/2024  EXAM: MR BRAIN W/O and W CONTRAST LOCATION: [facility] DATE: 11/4/2024 INDICATION: SCLC with jayy rawls  WBRT ecal response prog COMPARISON: 08/15/2024 brain MRI. CONTRAST: 7.5 mL Gadavist TECHNIQUE: Multiplanar multisequence head MRI without and with intravenous contrast including dynamic susceptibility contrast perfusion imaging. FINDINGS: INTRACRANIAL CONTENTS: Enhancing metastatic lesion in the lateral left cerebellar hemisphere demonstrates an interval decrease in size, now measuring 6 mm AP and 5 mm transverse, as compared to 12 mm AP and 10 mm transverse previously. Small amount of adjacent FLAIR hyperintensity has improved in the interim. Rounded focus of enhancement at the superior margin of the right hippocampus measuring 5 mm AP and 7 mm transverse is relatively unchanged, though it is worth noting that this may reflect vascular enhancement or choroid plexus in the medial margin of the right temporal horn. Additional metastatic lesions elsewhere have resolved. Provided perfusion imaging is nondiagnostic. No finding for acute infarct, intracranial hemorrhage, or new extra-axial collection. Mild burden FLAIR hyperintense presumed chronic small vessel ischemic change. Mild diffuse parenchymal volume loss. Nondilated ventricles. Major intracranial vascular flow voids at the skull base are preserved. Cerebellar tonsils are normally positioned. SELLA: No abnormality accounting for technique. OSSEOUS STRUCTURES/SOFT TISSUES: Normal marrow signal. Stress ORBITS: No abnormality accounting for technique. SINUSES/MASTOIDS: No paranasal sinus mucosal disease. Fluid in the mastoid air cells bilaterally, more so on the right.     IMPRESSION: 1.  Overall findings compatible with treatment response. Metastatic lesion in the lateral left cerebellar hemisphere has decreased in size since previous, with resolution of the additional metastatic lesions previously seen elsewhere in the brain. 2.  Age-related changes as above.      Impression   69-year-old woman with stage IV small cell lung cancer with multiple brain metastases  status post whole brain radiation therapy.  Good response to whole brain radiation  Recovering from acute side effects of radiation  Potential intrathoracic progression, restaging planned  Visit Dx:    (C79.31) Secondary malignant neoplasm of brain (H)  (primary encounter diagnosis)  Plan: MRI Brain w & w/o contrast       Cancer Staging   No matching staging information was found for the patient.      Assessment & Plan:   1.  PET/CT later this month for further evaluation of potential intrathoracic progression  2.  Return to clinic in 2 months with MRI brain    Patient and family wondering about potential additional radiation.  Discussed potential of palliative radiation therapy to intrathoracic progression as well as potential additional SRS palliative radiation therapy if any residual or progressive intracranial disease.      Pain Management Plan: N/A    Total time of this visit, including time spent face-to-face with patient and or via video/audio, and also in preparing for today's visit for MDM and documentation. Medical decision-making included consideration and possible diagnoses, management options, complex record review, review of diagnostic tests and information, consideration and discussion of significant complications based on comorbidities, discussion with providers involved in the care of the patient.     30 Minutes spent.     This note has been dictated using voice recognition software and as a result may contain minor grammatical errors and unintended word substitutions.      Tata Nuñez MD  Department of Radiation Oncology   [facility] Radiation Oncology  Tel: [phone]  Page: [phone]    [facility]  [address] [place] [address]     [address]   [place] [address]    CC:  Patient Care Team:  Ana Maria Bermudez MD as PCP - General (Internal Medicine)  Aidee Luna RT as Chronic Pulmonary Disease Specialist (Respiratory  "Therapy)  Rickie Pickens MD as Assigned Endocrinology Provider  Ruben Levin MD as MD (Critical Care)  Tata Lainez NP as Assigned Pulmonology Provider  Amador Stewart MD as MD (Hematology)  Wilma Carmona, RN as Specialty Care Coordinator (Hematology & Oncology)  Ana Maria Bermudez MD as Assigned PCP  Evans Gramajo APRN CNP as Nurse Practitioner (Hospice And Palliative Care)  Evans Gramajo APRN CNP as Assigned Palliative Care Provider  Bryant Mckinney MD as Physician (Vascular Surgery)  Naheed Mulligan MD as MD (Cardiovascular Disease)  Naheed Mulligan MD as Assigned Heart and Vascular Provider  Tata Nuñez MD as MD (Radiation Oncology)  Dana Mathur APRN CNP as Assigned Cancer Care Provider      Oncology Rooming Note    November 7, 2024 9:30 AM   Jasmyn Green is a 69 year old female who presents for:    Chief Complaint   Patient presents with     Oncology Clinic Visit     Follow up with Dr. Nuñez     Initial Vitals: /58 (BP Location: Left arm, Patient Position: Sitting, Cuff Size: Adult Regular)   Pulse (!) 129   Temp 98  F (36.7  C) (Oral)   Resp 20   Wt 43.4 kg (95 lb 9.6 oz)   LMP  (LMP Unknown)   SpO2 95%   BMI 19.31 kg/m   Estimated body mass index is 19.31 kg/m  as calculated from the following:    Height as of 11/6/24: 1.499 m (4' 11\").    Weight as of this encounter: 43.4 kg (95 lb 9.6 oz). Body surface area is 1.34 meters squared.  No Pain (0) Comment: Data Unavailable   No LMP recorded (lmp unknown). Patient is postmenopausal.  Allergies reviewed: Yes  Medications reviewed: Yes    Medications: Medication refills not needed today.  Pharmacy name entered into [employer]:    [facility] - [place], [place] - [address] [facility] - [place], [place] - [address]    Frailty Screening:   Is the patient here for a new oncology consult visit in cancer care? 2. No      Clinical concerns: Patient here per wheelchair " accompanied by family for follow-up status post radiation for her metastatic lung cancer to the brain.  Patient states she had a lot of fatigue post radiation but is starting to recover now and feels like she has more energy.  Patient has been having an increased cough and was on a short-term antibiotics for potential lung infection.  Continues with productive cough and is on oxygen at home at 3 L.  Patient is done with her dexamethasone.  MRI done on Monday here today for results.  Patient seen by medical oncology yesterday and will be having a pet scan done in a couple of weeks followed by a visit with the medical oncologist.  Plan return to clinic for follow-up as directed by provider.   Dr. Nuñez was notified.      Amber Wallace RN                Again, thank you for allowing me to participate in the care of your patient.        Sincerely,        Tata Nuñez MD

## 2024-11-07 NOTE — PROGRESS NOTES
[facility] Radiation Oncology Follow Up     Patient: Jasmyn Green  MRN: [id number]  Date of Service: 11/07/2024       DISEASE TREATED:  extensive stage small cell lung cancer with brain metastases.       TYPE OF RADIATION THERAPY ADMINISTERED:    SITE TREATED: Whole brain  TOTAL DOSE: 3000 cGy  NUMBER OF FRACTIONS: 10  RADIATION TREATMENT 8/22/2024 - 9/9/2024   CONCURRENT CHEMOTHERAPY: No  ADJUVANT THERAPY: Yes- atezolizumab      INTERVAL SINCE COMPLETION OF RADIATION THERAPY: 2 mo      SUBJECTIVE:  Ms. Green is a 69 year old female who initially presented with extensive stage small cell lung cancer involving left upper lobe primary, left hilar and mediastinal lymphadenopathy, several pleural based metastases in the left hemithorax.  Severe COPD- FEV1 of 29% and reduced diffusion capacity with DLCO of 30% . She is on 2-3 L of oxygen at rest.      2/16/2024 endobronchial ultrasound-guided biopsy, pathology: station 4L and 10L: Small cell lung cancer .     carboplatin, etoposide and atezolizumab x3 c -- significant side effects after third cycle requiring discontinuation carbo/etoposide continues on maintenance atezolizumab.      7/3/2024 PET/CT: Complete metabolic response to therapy of previous left lung, left pleura and thoracic gisela disease.     8/15/2024 MRI: at least 12 nodular enhancing lesions within the supratentorial infratentorial compartments consistent with multiple new progressive intracranial metastases.  Dominant lesions right parietal-occipital junction superiorly within the anterior left cerebellum demonstrate mild vasogenic edema without significant mass effect.     8/16/2024: She was started on dexamethasone.  8/18/2024: atezolizumab c8  8/22/2024 - 9/9/2024  WBRT 3000 cGy/ 10 fx  9/24/2022: atezolizumab c9  10/15/2024: atezolizumab c10  11/4/2024 MRI brain:  Metastatic lesion in the lateral left cerebellar hemisphere has decreased in size since previous, with resolution of the  additional metastatic lesions previously seen elsewhere in the brain.   11/4/2024 Ct chest:   1.1 cm subpleural nodular density left upper lobe. This is new/increased compared with the prior study and is in the location of the patient's previous lung malignancy. This is suspicious for potential recurrent disease. New indeterminate 8 mm irregular nodular density right upper lobe.    She returns to clinic today for routine follow-up visit.  She continues to have fatigue however her appetite is good.  She continues to have some scalp dryness and minimal hair regrowth.  No headaches.  No seizure-like symptoms.  Her memory is good, her family feels she is still quite sharp.  She is O2 dependent becomes very short of breath with any activity.  Currently on 1-2 L while at rest but increases to 3 or 4 L with activity.  Restaging PET scan scheduled later this month.    Former smoker: >40 pk yr    Medications were reviewed and are up to date on EPIC.    The following portions of the patient's history were reviewed and updated as appropriate: allergies, current medications, past family history, past medical history, past social history, past surgical history and problem list.    Review of Systems:      General  Constitutional  Constitutional (WDL): Exceptions to WDL  Fatigue: Fatigue relieved by rest  EENT  Eye Disorders  Eye Disorder (WDL): All eye disorder elements are within defined limits  Ear Disorders  Ear Disorder (WDL): Exceptions to WDL (some decreased hearing)  Respiratory  Respiratory  Respiratory (WDL): Exceptions to WDL  Cough: Mild symptoms OR nonprescription intervention indicated  Dyspnea: Shortness of breath with minimal exertion OR limiting instrumental ADL  Hypoxia: Decreased oxygen saturation at rest (e.g., pulse oximeter less than 88% or PaO2 less than or equal to 55 mmHg) (oxygen dependent, 2-3 liters home O2)  Cardiovascular  Cardiovascular  Cardiovascular (WDL): All cardiovascular elements are within  defined limits (no pacemaker)  Gastrointestinal  Gastrointestinal  Gastrointestinal (WDL): Exceptions to WDL  Anorexia: Loss of appetite without alteration in eating habits  Nausea: Loss of appetite without alteration in eating habits  Musculoskeletal  Musculoskeletal and Connective Tissue Disorders  Musculoskeletal & Connective (WDL): Exceptions to WDL  Arthralgia: Mild pain  Integumentary  Integumentary  Integumentary (WDL): Exceptions to WDL  Alopecia: Hair loss of greater than or equal to 50% normal for that individual that is readily apparent to others OR a wig or hair piece is necessary if the patient desires to completely camouflage the hair loss OR associated with psychosocial impact  Neurological  Neurosensory  Neurosensory (WDL): Exceptions to WDL  Ataxia: Asymptomatic OR clinical or diagnostic observations only OR intervention not indicated (in wheelchair today)  Confusion: Mild disorientation (at times)  Genitourinary/Reproductive  Genitourinary  Genitourinary (WDL): All genitourinary elements are within defined limits  Lymphatic  Lymph System Disorders  Lymph (WDL): All lymph elements are within defined limits  Pain  Pain Score: No Pain (0)  AUA Assessment                                                              Accompanied by  Accompanied By: family    Objective:     PHYSICAL EXAMINATION:    /58 (BP Location: Left arm, Patient Position: Sitting, Cuff Size: Adult Regular)   Pulse (!) 129   Temp 98  F (36.7  C) (Oral)   Resp 20   Wt 43.4 kg (95 lb 9.6 oz)   LMP  (LMP Unknown)   SpO2 95%   BMI 19.31 kg/m      Gen: Alert, in NAD pleasant interactive, thin  Eyes:  EOMI, sclera anicteric  HENT     Head: NC/AT, alopecia with beginning of hair growth O2 by nasal cannula  Musculoskeletal: Decreased muscle bulk and tone  Skin: Normal tone and turgor, warm, dry, intact  Neurologic: A/Ox3,  face symmetric, speech fluent, no focal motor deficits.  Gait not assessed   psychiatric: Appropriate mood  and affect        Personally reviewed images  Imaging: Imaging results 30 days: CT Chest/Abdomen/Pelvis w Contrast    Result Date: 11/5/2024  EXAM: CT CHEST/ABDOMEN/PELVIS W CONTRAST LOCATION: [facility] DATE: 11/4/2024 INDICATION: Small cell lung cancer (H), Malignant neoplasm of middle lobe, bronchus or lung (H). COMPARISON: Multiple with most recent studies a PET scan from 7/3/2024 and CT scan from 5/10/2024. TECHNIQUE: CT scan of the chest, abdomen, and pelvis was performed following injection of IV contrast. Multiplanar reformats were obtained. Dose reduction techniques were used. CONTRAST: 75ml isovue 370. FINDINGS: LUNGS AND PLEURA: Marked emphysematous changes in the lungs. 1.1 x 1.0 cm focal subpleural nodular density left upper lobe anteriorly on series 4 image 162 has increased in size since 5/10/2024 and is at the location of the patient's previous lung malignancy. This is concerning for potential recurrent disease. Additional indeterminate new 8 mm irregular nodular density right upper lobe on series 4 image 125. Mild scarring and volume loss in the left upper lobe posteriorly improved since the prior study. No acute appearing infiltrates or consolidation. Multiple tiny granulomas in the upper lobes, unchanged. No pleural effusions. MEDIASTINUM/AXILLAE: Previously seen adenopathy in the AP window and left hilum are less pronounced today. Largest remaining node measuring 1.1 cm in short axis diameter on series 3 image 63 (1.3 cm previously). No new or progressive adenopathy. Normal heart size. No pericardial effusion. Normal caliber thoracic aorta. Esophagus is grossly negative. CORONARY ARTERY CALCIFICATION: Severe. HEPATOBILIARY: Small benign cyst left hepatic lobe is unchanged. Liver is otherwise negative. No calcified gallstones or biliary dilatation. PANCREAS: Normal. SPLEEN: Normal. ADRENAL GLANDS: Stable mild benign-appearing thickening left adrenal gland. Right adrenal  gland is negative. KIDNEYS/BLADDER: Bilateral renal cortical thinning. Small benign right renal cyst with no follow-up needed. No hydronephrosis. Bladder is unremarkable. BOWEL: Normal. LYMPH NODES: Normal. VASCULATURE: Diffuse aortoiliac atheromatous disease. Ectasia of the distal abdominal aorta measuring 2.4 cm. Severe calcification/stenosis again noted at the origin of the celiac artery. PELVIC ORGANS: No pelvic masses. MUSCULOSKELETAL: No destructive bony lesions. Mild degenerative changes in the spine.     IMPRESSION: 1.  1.1 cm subpleural nodular density left upper lobe. This is new/increased compared with the prior study and is in the location of the patient's previous lung malignancy. This is suspicious for potential recurrent disease. PET scan could be helpful in further evaluation. 2.  New indeterminate 8 mm irregular nodular density right upper lobe. Metastatic nodule not excluded and again could be better evaluated with PET scan. 3.  No findings elsewhere raising concern for new/progressive disease. Previously seen left hilar and mediastinal adenopathy is less pronounced today with no new or enlarging lymph nodes. 4.  Marked emphysematous changes in the lungs. 5.  Mild scarring and volume loss left upper lobe posteriorly improved from previous. 6.  Diffuse atheromatous disease including severe coronary artery calcification and marked calcification/stenosis at the origin of the celiac artery, similar to previous. See remainder of the details above.    MRI Brain w & w/o contrast    Result Date: 11/5/2024  EXAM: MR BRAIN W/O and W CONTRAST LOCATION: [facility] DATE: 11/4/2024 INDICATION: SCLC with jayy mets s p WBRT ecal response prog COMPARISON: 08/15/2024 brain MRI. CONTRAST: 7.5 mL Gadavist TECHNIQUE: Multiplanar multisequence head MRI without and with intravenous contrast including dynamic susceptibility contrast perfusion imaging. FINDINGS: INTRACRANIAL CONTENTS: Enhancing  metastatic lesion in the lateral left cerebellar hemisphere demonstrates an interval decrease in size, now measuring 6 mm AP and 5 mm transverse, as compared to 12 mm AP and 10 mm transverse previously. Small amount of adjacent FLAIR hyperintensity has improved in the interim. Rounded focus of enhancement at the superior margin of the right hippocampus measuring 5 mm AP and 7 mm transverse is relatively unchanged, though it is worth noting that this may reflect vascular enhancement or choroid plexus in the medial margin of the right temporal horn. Additional metastatic lesions elsewhere have resolved. Provided perfusion imaging is nondiagnostic. No finding for acute infarct, intracranial hemorrhage, or new extra-axial collection. Mild burden FLAIR hyperintense presumed chronic small vessel ischemic change. Mild diffuse parenchymal volume loss. Nondilated ventricles. Major intracranial vascular flow voids at the skull base are preserved. Cerebellar tonsils are normally positioned. SELLA: No abnormality accounting for technique. OSSEOUS STRUCTURES/SOFT TISSUES: Normal marrow signal. Stress ORBITS: No abnormality accounting for technique. SINUSES/MASTOIDS: No paranasal sinus mucosal disease. Fluid in the mastoid air cells bilaterally, more so on the right.     IMPRESSION: 1.  Overall findings compatible with treatment response. Metastatic lesion in the lateral left cerebellar hemisphere has decreased in size since previous, with resolution of the additional metastatic lesions previously seen elsewhere in the brain. 2.  Age-related changes as above.      Impression   69-year-old woman with stage IV small cell lung cancer with multiple brain metastases status post whole brain radiation therapy.  Good response to whole brain radiation  Recovering from acute side effects of radiation  Potential intrathoracic progression, restaging planned  Visit Dx:    (C79.31) Secondary malignant neoplasm of brain (H)  (primary encounter  diagnosis)  Plan: MRI Brain w & w/o contrast       Cancer Staging   No matching staging information was found for the patient.      Assessment & Plan:   1.  PET/CT later this month for further evaluation of potential intrathoracic progression  2.  Return to clinic in 2 months with MRI brain    Patient and family wondering about potential additional radiation.  Discussed potential of palliative radiation therapy to intrathoracic progression as well as potential additional SRS palliative radiation therapy if any residual or progressive intracranial disease.      Pain Management Plan: N/A    Total time of this visit, including time spent face-to-face with patient and or via video/audio, and also in preparing for today's visit for MDM and documentation. Medical decision-making included consideration and possible diagnoses, management options, complex record review, review of diagnostic tests and information, consideration and discussion of significant complications based on comorbidities, discussion with providers involved in the care of the patient.     30 Minutes spent.     This note has been dictated using voice recognition software and as a result may contain minor grammatical errors and unintended word substitutions.      Tata Nuñez MD  Department of Radiation Oncology   [facility] Radiation Oncology  Tel: [phone]  Page: [phone]    [facility]  [address] [place] [address]     [address] [address]    CC:  Patient Care Team:  Ana Maria Bermudez MD as PCP - General (Internal Medicine)  Aidee Luna RT as Chronic Pulmonary Disease Specialist (Respiratory Therapy)  Rickie Pickens MD as Assigned Endocrinology Provider  Ruben Levin MD as MD (Critical Care)  Tata Lainez NP as Assigned Pulmonology Provider  Amador Stewart MD as MD (Hematology)  Wilma Carmona, RN as Specialty Care Coordinator (Hematology &  Oncology)  Ana Maria Bermudez MD as Assigned PCP  Evans Gramajo APRN CNP as Nurse Practitioner (Hospice And Palliative Care)  Evans Gramajo APRN CNP as Assigned Palliative Care Provider  Bryant Mckinney MD as Physician (Vascular Surgery)  Naheed Mulligan MD as MD (Cardiovascular Disease)  Naheed Mulligan MD as Assigned Heart and Vascular Provider  Tata Nuñez MD as MD (Radiation Oncology)  Dana Mathur APRN CNP as Assigned Cancer Care Provider

## 2024-11-11 ENCOUNTER — TELEPHONE (OUTPATIENT)
Dept: PALLIATIVE MEDICINE | Facility: CLINIC | Age: 69
End: 2024-11-11

## 2024-11-11 ENCOUNTER — TELEPHONE (OUTPATIENT)
Dept: PALLIATIVE CARE | Facility: CLINIC | Age: 69
End: 2024-11-11

## 2024-11-11 ENCOUNTER — VIRTUAL VISIT (OUTPATIENT)
Dept: PALLIATIVE MEDICINE | Facility: CLINIC | Age: 69
End: 2024-11-11
Attending: NURSE PRACTITIONER
Payer: COMMERCIAL

## 2024-11-11 VITALS — BODY MASS INDEX: 18.75 KG/M2 | HEIGHT: 59 IN | WEIGHT: 93 LBS

## 2024-11-11 DIAGNOSIS — J44.9 COPD, GROUP D, BY GOLD 2017 CLASSIFICATION (H): ICD-10-CM

## 2024-11-11 DIAGNOSIS — C34.90 MALIGNANT NEOPLASM OF LUNG, UNSPECIFIED LATERALITY, UNSPECIFIED PART OF LUNG (H): ICD-10-CM

## 2024-11-11 DIAGNOSIS — G89.3 CANCER ASSOCIATED PAIN: ICD-10-CM

## 2024-11-11 PROCEDURE — 99215 OFFICE O/P EST HI 40 MIN: CPT | Mod: 95 | Performed by: NURSE PRACTITIONER

## 2024-11-11 RX ORDER — BUPRENORPHINE 10 UG/H
1 PATCH TRANSDERMAL
Qty: 4 PATCH | Refills: 1 | Status: SHIPPED | OUTPATIENT
Start: 2024-11-11

## 2024-11-11 RX ORDER — OXYCODONE HYDROCHLORIDE 5 MG/1
2.5-5 TABLET ORAL EVERY 4 HOURS PRN
Qty: 60 TABLET | Refills: 0 | Status: SHIPPED | OUTPATIENT
Start: 2024-11-11

## 2024-11-11 ASSESSMENT — PAIN SCALES - GENERAL: PAINLEVEL_OUTOF10: NO PAIN (0)

## 2024-11-11 NOTE — PROGRESS NOTES
Virtual Visit Details    Type of service:  Video Visit   Video Start Time: 10:20 AM  Video End Time: 10:45 AM    Originating Location (pt. Location): Home    Distant Location (provider location):  On-site  Platform used for Video Visit: [facility]      Palliative Care Outpatient Clinic      Patient ID/Chief Complaint: Jasmyn Green 68 year old female who is presenting to the palliative medicine clinic today at the request of Tata Lainez NP for a palliative care follow-up secondary to symptom management/goals of care.   The patient's primary care provider is:  Miesha Fernando.       Impression & Recommendations:  68-year-old female with new diagnosis of extensive stage small cell lung cancer with brain metastasis on palliative treatment (currently on single agent due to significant side effects from previous chemotherapy), severe COPD on chronic supplemental oxygen (3-5 LPM depending on activity level), history of tobacco use that is now stopped, very remote history of heavier alcohol use that stopped decades ago, CAD, HTN, osteoporosis, history of gout, and problems with anxiety.    Social history includes having 3 children Georgette, Britni, and Braden, lives with daughter Georgette, , good family support.    Continues on  immunotherapy and status post whole brain radiation.  She follows with Dr Stewart.     PET scan planned 11/25 with follow-up with oncology.    Recently dyspnea has worsened, especially over the last month approximately.        Symptoms/recommendations/discussion:  Chronic dyspnea related to advanced COPD worsened by lung cancer with accompanying worsening of anxiety.  Increase Butrans to 10 mcg/h weekly patch for pain and dyspnea  Oxycodone IR use has increased as dyspnea has worsened recently.  Hopefully increased Butrans will help.  Continue oxycodone IR as needed 2.5 to 5 mg  Anxiety: Remeron 7.5 mg (15 mg was too sedating) and Atarax/hydroxyzine 25 mg as needed.  Decreased appetite  "with weight loss: Appetite is recently decreased with worsened dyspnea.  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 Georgette is her primary healthcare agent.  POLST in EMR.  No CPR and DO NOT INTUBATE.   Palliative care follow-up in early December after she meets with oncology team and to further assess symptoms  Daughter Georgette present for entire visit today.        How to get a hold of us:  For non-urgent matters, MyChart works best.    For more urgent matters, or if you prefer not to use MyChart, call our clinic nurse coordinator Hailee Paredes RN at [phone] or [phone]    We have an on-call number for evenings and weekends. Please call this only if you are having uncontrolled symptoms or serious side effects from your medicines: [phone].     For refills, please give us a week (5 working days) notice. We don't always have providers available everyday to do refills. If you call the day you run out of your medicine, we may not be able to refill it in time, so call 5 days in advance        History:  History gathered today from: patient, family/loved ones, medical chart, health care directive/s      PE: Ht 1.499 m (4' 11\")   Wt 42.2 kg (93 lb)   LMP  (LMP Unknown)   BMI 18.78 kg/m     Wt Readings from Last 3 Encounters:   11/11/24 42.2 kg (93 lb)   11/07/24 43.4 kg (95 lb 9.6 oz)   11/06/24 42.2 kg (93 lb)       Gen tachypneic, breathing pattern is worse than during previous visits, mild increased work of breathing  Head NCAT.  Eyes anicteric without injection  Face symmetric, eyes conjugate  Lungs tachypneic, no cough, mild increased work of breathing   Skin no rashes or lesions evident on face/neck  Neuro Face symmetric, eyes conjugate; speech fluent.  Neuropsych not obviously depressed, somewhat " anxious with increased work of breathing, mentation is decreased from previous visits, relies on her daughter much more than previously to assist with communication during visit         Data reviewed:  I reviewed electrolytes, BUN/creatinine, liver profile, hemoglobin and hematocrit, platelet count, and most recent imaging  Oncology notes     database reviewed: 11/11        45 minutes spent on the date of the encounter doing chart review, history and exam, patient education & counseling, documentation and other activities as noted above.        Thank you for involving us in the patient's care.   JIMENEZ Huggins, [facility] Palliative Care Service

## 2024-11-11 NOTE — TELEPHONE ENCOUNTER
Retail Pharmacy Prior Authorization Team   Phone: [phone]    PA Initiation    Medication: BUPRENORPHINE 10 MCG/HR TD PTWK  Insurance Company: [employer] - Phone [phone] Fax [phone]  Pharmacy Filling the Rx: [facility] - [place] - [address]  Filling Pharmacy Phone: [phone]  Filling Pharmacy Fax: [phone]  Start Date: 11/11/2024

## 2024-11-11 NOTE — NURSING NOTE
Current patient location: [address] [place] [address]    Is the patient currently in the state of [place]? YES    Visit mode:VIDEO    If the visit is dropped, the patient can be reconnected by: VIDEO VISIT: Text to cell phone:   Telephone Information:   Mobile [phone]       Will anyone else be joining the visit? NO  (If patient encounters technical issues they should call [phone]    Are changes needed to the allergy or medication list? No    Are refills needed on medications prescribed by this physician? Discuss with provider    Rooming Documentation:  Questionnaire(s) not done per department protocol    Reason for visit: RAY SHETTY

## 2024-11-11 NOTE — PATIENT INSTRUCTIONS
Thank you for meeting with us in the [facility] Palliative Care Clinic.    How to get a hold of us:  For non-urgent matters, MyChart works best.    For more urgent matters, or if you prefer not to use MyChart, call our clinic nurse coordinator Hailee Paredes RN at [phone] or [phone]    We have an on-call number for evenings and weekends. Please call this only if you are having uncontrolled symptoms or serious side effects from your medicines: [phone].     For refills, please give us a week (5 working days) notice. We don't always have providers available everyday to do refills. If you call the day you run out of your medicine, we may not be able to refill it in time, so call 5 days in advance!

## 2024-11-12 NOTE — TELEPHONE ENCOUNTER
Prior Authorization Not Needed per Insurance    Medication: BUPRENORPHINE 10 MCG/HR TD PTWK  Insurance Company: [employer] - Phone [phone] Fax [phone]  Expected CoPay: $    Pharmacy Filling the Rx: [facility] - [place] - [address]  Pharmacy Notified: No  Patient Notified:

## 2024-11-14 ENCOUNTER — TELEPHONE (OUTPATIENT)
Dept: RADIATION ONCOLOGY | Facility: CLINIC | Age: 69
End: 2024-11-14
Payer: COMMERCIAL

## 2024-11-14 LAB — ZNT8 AB SERPL IA-ACNC: <10 U/ML

## 2024-11-14 NOTE — TELEPHONE ENCOUNTER
Received faxed request for refill on memantine.  Per the patients original prescription she should have 5 refills and then stop in January 2025 per protocol following her brain radiation.    Called pharmacy and spoke with the pharmacist.  Clarified the 5 refills on original prescription and patient instructed that patient should take all 5 refills and then stop drug in January 2025.    Refill will be filled and patient notified.

## 2024-11-17 ENCOUNTER — MYC MEDICAL ADVICE (OUTPATIENT)
Dept: PULMONOLOGY | Facility: CLINIC | Age: 69
End: 2024-11-17
Payer: COMMERCIAL

## 2024-11-17 DIAGNOSIS — J44.9 COPD, SEVERE (H): ICD-10-CM

## 2024-11-18 ENCOUNTER — ALLIED HEALTH/NURSE VISIT (OUTPATIENT)
Dept: FAMILY MEDICINE | Facility: CLINIC | Age: 69
End: 2024-11-18
Payer: COMMERCIAL

## 2024-11-18 DIAGNOSIS — Z23 ENCOUNTER FOR IMMUNIZATION: Primary | ICD-10-CM

## 2024-11-18 PROCEDURE — G0008 ADMIN INFLUENZA VIRUS VAC: HCPCS

## 2024-11-18 PROCEDURE — 90662 IIV NO PRSV INCREASED AG IM: CPT

## 2024-11-18 RX ORDER — DOXYCYCLINE 100 MG/1
100 CAPSULE ORAL 2 TIMES DAILY
Qty: 10 CAPSULE | Refills: 0 | Status: SHIPPED | OUTPATIENT
Start: 2024-11-18 | End: 2024-11-23

## 2024-11-18 RX ORDER — PREDNISONE 20 MG/1
TABLET ORAL
Qty: 10 TABLET | Refills: 0 | Status: SHIPPED | OUTPATIENT
Start: 2024-11-18

## 2024-11-18 NOTE — PROGRESS NOTES
Prior to immunization administration, verified patients identity using patient s name and date of birth. Please see Immunization Activity for additional information.     Screening Questionnaire for Adult Immunization    Are you sick today?   No   Do you have allergies to medications, food, a vaccine component or latex?   No   Have you ever had a serious reaction after receiving a vaccination?   No   Do you have a long-term health problem with heart, lung, kidney, or metabolic disease (e.g., diabetes), asthma, a blood disorder, no spleen, complement component deficiency, a cochlear implant, or a spinal fluid leak?  Are you on long-term aspirin therapy?   No   Do you have cancer, leukemia, HIV/AIDS, or any other immune system problem?   No   Do you have a parent, brother, or sister with an immune system problem?   No   In the past 3 months, have you taken medications that affect  your immune system, such as prednisone, other steroids, or anticancer drugs; drugs for the treatment of rheumatoid arthritis, Crohn s disease, or psoriasis; or have you had radiation treatments?   No   Have you had a seizure, or a brain or other nervous system problem?   No   During the past year, have you received a transfusion of blood or blood    products, or been given immune (gamma) globulin or antiviral drug?   No   For women: Are you pregnant or is there a chance you could become       pregnant during the next month?   No   Have you received any vaccinations in the past 4 weeks?   No     Immunization questionnaire answers were all negative.    I have reviewed the following standing orders:   This patient is due and qualifies for the Influenza vaccine.    Click here for Influenza Vaccine Standing Order    I have reviewed the vaccines inclusion and exclusion criteria; No concerns regarding eligibility.     Patient instructed to remain in clinic for 15 minutes afterwards, and to report any adverse reactions.     Screening performed by Jonas  PETER Barrios on 11/18/2024 at 10:05 AM.

## 2024-11-25 ENCOUNTER — HOSPITAL ENCOUNTER (OUTPATIENT)
Dept: PET IMAGING | Facility: HOSPITAL | Age: 69
Discharge: HOME | End: 2024-11-25
Attending: INTERNAL MEDICINE | Admitting: INTERNAL MEDICINE
Payer: COMMERCIAL

## 2024-11-25 DIAGNOSIS — C34.80 SMALL CELL CARCINOMA OF OVERLAPPING SITES OF LUNG, UNSPECIFIED LATERALITY (H): ICD-10-CM

## 2024-11-25 DIAGNOSIS — J44.1 COPD EXACERBATION (H): ICD-10-CM

## 2024-11-25 DIAGNOSIS — C34.2 MALIGNANT NEOPLASM OF MIDDLE LOBE, BRONCHUS OR LUNG (H): ICD-10-CM

## 2024-11-25 LAB — GLUCOSE BLDC GLUCOMTR-MCNC: 89 MG/DL (ref 70–99)

## 2024-11-25 PROCEDURE — A9552 F18 FDG: HCPCS | Performed by: INTERNAL MEDICINE

## 2024-11-25 PROCEDURE — 78815 PET IMAGE W/CT SKULL-THIGH: CPT | Mod: PS

## 2024-11-25 PROCEDURE — 343N000001 HC RX 343 MED OP 636: Performed by: INTERNAL MEDICINE

## 2024-11-25 PROCEDURE — 82962 GLUCOSE BLOOD TEST: CPT

## 2024-11-25 RX ORDER — FLUDEOXYGLUCOSE F 18 200 MCI/ML
7-17 INJECTION, SOLUTION INTRAVENOUS ONCE
Status: COMPLETED | OUTPATIENT
Start: 2024-11-25 | End: 2024-11-25

## 2024-11-25 RX ADMIN — FLUDEOXYGLUCOSE F 18 10.11 MILLICURIE: 200 INJECTION, SOLUTION INTRAVENOUS at 12:04

## 2024-11-27 ENCOUNTER — ONCOLOGY VISIT (OUTPATIENT)
Dept: ONCOLOGY | Facility: HOSPITAL | Age: 69
End: 2024-11-27
Payer: COMMERCIAL

## 2024-11-27 ENCOUNTER — PATIENT OUTREACH (OUTPATIENT)
Dept: ONCOLOGY | Facility: HOSPITAL | Age: 69
End: 2024-11-27

## 2024-11-27 ENCOUNTER — INFUSION THERAPY VISIT (OUTPATIENT)
Dept: INFUSION THERAPY | Facility: HOSPITAL | Age: 69
End: 2024-11-27
Payer: COMMERCIAL

## 2024-11-27 VITALS
TEMPERATURE: 97.9 F | WEIGHT: 89.9 LBS | SYSTOLIC BLOOD PRESSURE: 141 MMHG | DIASTOLIC BLOOD PRESSURE: 60 MMHG | BODY MASS INDEX: 18.16 KG/M2 | RESPIRATION RATE: 16 BRPM | OXYGEN SATURATION: 98 % | HEART RATE: 118 BPM

## 2024-11-27 DIAGNOSIS — C34.80 SMALL CELL CARCINOMA OF OVERLAPPING SITES OF LUNG, UNSPECIFIED LATERALITY (H): Primary | ICD-10-CM

## 2024-11-27 DIAGNOSIS — J44.1 COPD EXACERBATION (H): ICD-10-CM

## 2024-11-27 LAB
ALBUMIN SERPL BCG-MCNC: 3.4 G/DL (ref 3.5–5.2)
ALP SERPL-CCNC: 92 U/L (ref 40–150)
ALT SERPL W P-5'-P-CCNC: 38 U/L (ref 0–50)
ANION GAP SERPL CALCULATED.3IONS-SCNC: 8 MMOL/L (ref 7–15)
AST SERPL W P-5'-P-CCNC: 32 U/L (ref 0–45)
BILIRUB SERPL-MCNC: 0.2 MG/DL
BUN SERPL-MCNC: 18.9 MG/DL (ref 8–23)
CALCIUM SERPL-MCNC: 8.6 MG/DL (ref 8.8–10.4)
CHLORIDE SERPL-SCNC: 108 MMOL/L (ref 98–107)
CREAT SERPL-MCNC: 0.93 MG/DL (ref 0.51–0.95)
EGFRCR SERPLBLD CKD-EPI 2021: 65 ML/MIN/1.73M2
GLUCOSE SERPL-MCNC: 114 MG/DL (ref 70–99)
HCO3 SERPL-SCNC: 26 MMOL/L (ref 22–29)
POTASSIUM SERPL-SCNC: 4.5 MMOL/L (ref 3.4–5.3)
PROT SERPL-MCNC: 5.6 G/DL (ref 6.4–8.3)
SODIUM SERPL-SCNC: 142 MMOL/L (ref 135–145)
TSH SERPL DL<=0.005 MIU/L-ACNC: 1.72 UIU/ML (ref 0.3–4.2)

## 2024-11-27 PROCEDURE — 96413 CHEMO IV INFUSION 1 HR: CPT

## 2024-11-27 PROCEDURE — 258N000003 HC RX IP 258 OP 636: Performed by: INTERNAL MEDICINE

## 2024-11-27 PROCEDURE — 250N000011 HC RX IP 250 OP 636: Mod: JZ | Performed by: INTERNAL MEDICINE

## 2024-11-27 PROCEDURE — 99214 OFFICE O/P EST MOD 30 MIN: CPT | Performed by: INTERNAL MEDICINE

## 2024-11-27 PROCEDURE — 80053 COMPREHEN METABOLIC PANEL: CPT | Performed by: INTERNAL MEDICINE

## 2024-11-27 PROCEDURE — G2211 COMPLEX E/M VISIT ADD ON: HCPCS | Performed by: INTERNAL MEDICINE

## 2024-11-27 PROCEDURE — 84443 ASSAY THYROID STIM HORMONE: CPT | Performed by: INTERNAL MEDICINE

## 2024-11-27 PROCEDURE — G0463 HOSPITAL OUTPT CLINIC VISIT: HCPCS | Performed by: INTERNAL MEDICINE

## 2024-11-27 PROCEDURE — 36591 DRAW BLOOD OFF VENOUS DEVICE: CPT | Performed by: INTERNAL MEDICINE

## 2024-11-27 RX ORDER — MEPERIDINE HYDROCHLORIDE 25 MG/ML
25 INJECTION INTRAMUSCULAR; INTRAVENOUS; SUBCUTANEOUS
OUTPATIENT
Start: 2025-01-07

## 2024-11-27 RX ORDER — ALBUTEROL SULFATE 0.83 MG/ML
2.5 SOLUTION RESPIRATORY (INHALATION)
OUTPATIENT
Start: 2025-01-07

## 2024-11-27 RX ORDER — METHYLPREDNISOLONE SODIUM SUCCINATE 40 MG/ML
40 INJECTION INTRAMUSCULAR; INTRAVENOUS
Start: 2024-12-17

## 2024-11-27 RX ORDER — LORAZEPAM 2 MG/ML
0.5 INJECTION INTRAMUSCULAR EVERY 4 HOURS PRN
OUTPATIENT
Start: 2025-01-07

## 2024-11-27 RX ORDER — DIPHENHYDRAMINE HYDROCHLORIDE 50 MG/ML
25 INJECTION INTRAMUSCULAR; INTRAVENOUS
Start: 2025-01-07

## 2024-11-27 RX ORDER — HEPARIN SODIUM,PORCINE 10 UNIT/ML
5-20 VIAL (ML) INTRAVENOUS DAILY PRN
OUTPATIENT
Start: 2025-01-07

## 2024-11-27 RX ORDER — MEPERIDINE HYDROCHLORIDE 25 MG/ML
25 INJECTION INTRAMUSCULAR; INTRAVENOUS; SUBCUTANEOUS
OUTPATIENT
Start: 2024-12-17

## 2024-11-27 RX ORDER — HEPARIN SODIUM (PORCINE) LOCK FLUSH IV SOLN 100 UNIT/ML 100 UNIT/ML
5 SOLUTION INTRAVENOUS
OUTPATIENT
Start: 2025-01-07

## 2024-11-27 RX ORDER — DIPHENHYDRAMINE HYDROCHLORIDE 50 MG/ML
50 INJECTION INTRAMUSCULAR; INTRAVENOUS
Start: 2024-12-17

## 2024-11-27 RX ORDER — ALBUTEROL SULFATE 90 UG/1
1-2 INHALANT RESPIRATORY (INHALATION)
Start: 2024-12-17

## 2024-11-27 RX ORDER — HEPARIN SODIUM,PORCINE 10 UNIT/ML
5-20 VIAL (ML) INTRAVENOUS DAILY PRN
OUTPATIENT
Start: 2024-12-17

## 2024-11-27 RX ORDER — LORAZEPAM 2 MG/ML
0.5 INJECTION INTRAMUSCULAR EVERY 4 HOURS PRN
OUTPATIENT
Start: 2024-12-17

## 2024-11-27 RX ORDER — DIPHENHYDRAMINE HYDROCHLORIDE 50 MG/ML
50 INJECTION INTRAMUSCULAR; INTRAVENOUS
Start: 2025-01-07

## 2024-11-27 RX ORDER — HEPARIN SODIUM (PORCINE) LOCK FLUSH IV SOLN 100 UNIT/ML 100 UNIT/ML
5 SOLUTION INTRAVENOUS
Status: DISCONTINUED | OUTPATIENT
Start: 2024-11-27 | End: 2024-11-27 | Stop reason: HOSPADM

## 2024-11-27 RX ORDER — EPINEPHRINE 1 MG/ML
0.3 INJECTION, SOLUTION INTRAMUSCULAR; SUBCUTANEOUS EVERY 5 MIN PRN
OUTPATIENT
Start: 2025-01-07

## 2024-11-27 RX ORDER — ALBUTEROL SULFATE 0.83 MG/ML
2.5 SOLUTION RESPIRATORY (INHALATION)
OUTPATIENT
Start: 2024-12-17

## 2024-11-27 RX ORDER — ALBUTEROL SULFATE 90 UG/1
1-2 INHALANT RESPIRATORY (INHALATION)
Start: 2025-01-07

## 2024-11-27 RX ORDER — HEPARIN SODIUM (PORCINE) LOCK FLUSH IV SOLN 100 UNIT/ML 100 UNIT/ML
5 SOLUTION INTRAVENOUS
OUTPATIENT
Start: 2024-12-17

## 2024-11-27 RX ORDER — DIPHENHYDRAMINE HYDROCHLORIDE 50 MG/ML
25 INJECTION INTRAMUSCULAR; INTRAVENOUS
Start: 2024-12-17

## 2024-11-27 RX ORDER — METHYLPREDNISOLONE SODIUM SUCCINATE 40 MG/ML
40 INJECTION INTRAMUSCULAR; INTRAVENOUS
Start: 2025-01-07

## 2024-11-27 RX ORDER — EPINEPHRINE 1 MG/ML
0.3 INJECTION, SOLUTION INTRAMUSCULAR; SUBCUTANEOUS EVERY 5 MIN PRN
OUTPATIENT
Start: 2024-12-17

## 2024-11-27 RX ADMIN — HEPARIN SODIUM (PORCINE) LOCK FLUSH IV SOLN 100 UNIT/ML 5 ML: 100 SOLUTION at 12:55

## 2024-11-27 RX ADMIN — ATEZOLIZUMAB 1200 MG: 1200 INJECTION, SOLUTION INTRAVENOUS at 12:26

## 2024-11-27 ASSESSMENT — PAIN SCALES - GENERAL: PAINLEVEL_OUTOF10: NO PAIN (0)

## 2024-11-27 NOTE — PROGRESS NOTES
"[facility]: Cancer Care Follow-Up Note                                    Discussion with Patient:                                                      Patient comes in today for follow-up with Dr Stewart after having had a PET scan prior.     Goals          General    Maintain ability to perform ADLs without difficulty             Dates of Treatment:                                                      Infusion given in last 28 days       Administered MAR Action Medication Dose Rate Visit    11/06/2024 10:59 New Bag atezolizumab (TECENTRIQ) 1,200 mg in sodium chloride 0.9 % 130 mL infusion 1,200 mg 260 mL/hr Infusion Therapy Visit on 11/06/2024 in [facility]    11/27/2024 12:26 New Bag atezolizumab (TECENTRIQ) 1,200 mg in sodium chloride 0.9 % 130 mL infusion 1,200 mg 260 mL/hr Infusion Therapy Visit on 11/27/2024 in [facility]            Assessment:                                                      Per Dr Stewart, he recommends patient have a biopsy of the left lung to check for recurrent cancer.  This abnormality was noted on the PET scan from 11/25/2024.  Patient is going to proceed with this, she wants to do it after [place].    Intervention/Education provided during outreach:                                                       I will continue to check patient's chart to see that the \"IR referral\" changes over to a biopsy order.  Once it does I will check to make sure that the IR scheduling staff reach out to her to schedule.    Patient is scheduled to come back on 12/18 for labs and infusion, 1/7 for brain MRI and will be back on 1/8 for labs, Dr Stewart and her Tecentriq infusion.    Patient to follow up as scheduled at next appt.  Patient to call/[employer] message with updates.  Confirmed patient has clinic and triage numbers.    Signature:  Wilma Carmona RN  "

## 2024-11-27 NOTE — PROGRESS NOTES
[facility] Hematology and Oncology Consult Note    Patient: Jasmyn Green  MRN: [id number]  Date of Service: Nov 27, 2024           Reason for consultation      Problem List Items Addressed This Visit          Respiratory    COPD exacerbation (H) (Chronic)    Small cell lung cancer (H) - Primary       Assessment / number of problems addressed      1.  A very pleasant 69 year old  woman with looks like extensive stage small cell lung cancer.  Started on palliative chemotherapy with carboplatin, etoposide and atezolizumab.  After 2 cycles seemed to be responding quite well on the CT scan.  Had significant side effects after third cycle requiring discontinuation of cytotoxic chemotherapy and continues on maintenance atezolizumab.  Last PET scan had shown very good response in July 2024.  In August 2024 developed new brain metastases and received radiation therapy.  Current MRI showing good treatment response.  CT of the chest showing growth of scan left upper lobe nodule.  This is worrisome for recurrence.  2.  Severe COPD.  She is on 2-3 L of oxygen at rest.  3.  Significantly decreased FEV1 of 29% and reduced diffusion capacity with DLCO of 30%.  4.  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  On baby aspirin.  5.  Other medical conditions stable.  6.  Severe anemia secondary to chemotherapy.      Plan and medical decision making      Reviewed notes from each unique source.  Reviewed each unique test.    Ordered tests.    Independently interpreted lab tests and radiological exams performed by other physicians.  Personally reviewed the images of the PET scan which shows hypermetabolic left upper lobe lesion.  Independent historian account obtained as noted.      I reviewed the PET scan findings with the patient and her family.  Since when she was diagnosed we only biopsied the endobronchial lesion, this lesion also was considered slightly on and its appearance and location.  Therefore I have recommended we  should consider biopsying this lesion to confirm that this indeed is a small cell lung cancer.  If it turns out to be that then we will treat her with Lurbinectedin.  If it turns out to be non-small cell lung cancer then we will have to talk about treatment of that as well.  Continue COPD management and supplemental oxygen.  Continue with fluid electrolyte and nutritional optimization.  The longitudinal plan of care for the diagnosis(es)/condition(s) as documented were addressed during this visit. Due to the added complexity in care, I will continue to support Jasmyn in the subsequent management and with ongoing continuity of care.     Clinical/pathological stage       Cancer Staging   No matching staging information was found for the patient.      History of present illness      Ms. Jasmyn Green is  69 year old woman who has been referred to me for evaluation of newly diagnosed small cell lung cancer.  She appears to have extensive stage disease.  She presented to the [facility] with increasing shortness of breath coughing and symptoms suggestive of pneumonia.  She has a known history of severe obstructive lung disease with an FEV1 of 29% with hyperinflation and air trapping.  Presented with a 1 to 2-day history of worsening shortness of breath and with exertion as well as at rest.  Requiring increasing supplemental oxygen at home.  CT scan done in the emergency room showed bulky aortopulmonary as well as left hilar adenopathy with irregular pleural-based mass in the left upper lobe.  There are also some scattered satellite nodules.  This was very suspicious for malignancy.    After discharge she was seen by Dr. Levin and and underwent endobronchial ultrasound-guided biopsy which came back positive for small cell lung cancer.  The PET scan also showed hypermetabolic lesions in the mediastinum as well as the peripheral part of the left upper lobe.      She used to smoke but quit smoking.  She was in  fact was being monitored by CT surveillance.  Had a CT scan done in June 2023 which was positive for some sort of pneumonia but no obvious malignancy was noted.    She was started on echemotherapy with carboplatin, etoposide and atezolizumab.  She has had 3 cycles.  After 3 cycles had severe side effects from chemotherapy.  She was had to be hospitalized for post side effect issues including some pneumonia etc.  She was at [facility] for several days.  We made the decision to discontinue cisplatin and etoposide.  We continue with the atezolizumab.    Jasmyn was able to tolerate atezolizumab only mild toxicity.  She is following up with palliative care physicians.  Her pain Patch was discontinued and she was put on small dose of Vicodin to help with her breathing.  That seems to be working well.  She had a PET scan done in July 2024.  That PET scan showed complete metabolic response.    Unfortunately in August 2024 her brain MRI showed multiple brain metastases.  She was seen by radiation oncology.  Received radiation therapy.  In the meantime has continued on her atezolizumab.        Past medical/surgical/social/family history        Past Medical History:   Diagnosis Date    Age-related osteoporosis without current pathological fracture     Arthritis     COPD (chronic obstructive pulmonary disease) (H)     Coronary artery disease     Dependence on nocturnal oxygen therapy     Dyspnea on exertion     Emphysema lung (H)     Gout     HLD (hyperlipidemia)     Hypertension     Lung mass      Past Surgical History:   Procedure Laterality Date    BRONCHOSCOPY RIGID OR FLEXIBLE W/TRANSENDOSCOPIC ENDOBRONCHIAL ULTRASOUND GUIDED N/A 2/16/2024    Procedure: BRONCHOSCOPY, WITH ENDOBRONCHIAL ULTRASOUND;  Surgeon: Ruben Levin MD;  Location: [facility] Main OR    COLONOSCOPY N/A 10/21/2021    Procedure: COLONOSCOPY;  Surgeon: Wilma Hester DO;  Location: [facility] Main OR    IR CHEST PORT PLACEMENT > 5 YRS OF AGE   5/2/2024    VAGINAL DELIVERY      x 3 ; remote    WISDOM TOOTH EXTRACTION         Review of system      Details noted in the history of present illness.  A detailed review of systems is otherwise negative.      Physical exam        BP (!) 141/60 (BP Location: Left arm, Patient Position: Sitting, Cuff Size: Adult Regular)   Pulse 118   Temp 97.9  F (36.6  C) (Oral)   Resp 16   Wt 40.8 kg (89 lb 14.4 oz)   LMP  (LMP Unknown)   SpO2 98%   BMI 18.16 kg/m      GENERAL: No acute distress. Cooperative in conversation.   HEENT:  Pupils are equal, round and reactive. Oral mucosa is clean and intact. No ulcerations or mucositis noted. No bleeding noted.  RESP:Chest symmetric lungs are clear bilaterally per auscultation. Regular respiratory rate. No wheezes or rhonchi.  CV: Normal S1 S2 Regular, rate and rhythm.     ABD: Nondistended, soft, nontender. Positive bowel sounds. No organomegaly.   EXTREMITIES: No lower extremity edema.   NEURO: Non- focal. Alert and oriented x3.  Cranial nerves appear intact.  PSYCH: Within normal limits. No depression or anxiety.  SKIN: Warm dry intact.      Lab results Reviewed      Recent Results (from the past week)   Glucose by meter   Result Value Ref Range    GLUCOSE BY METER POCT 89 70 - 99 mg/dL   Comprehensive metabolic panel   Result Value Ref Range    Sodium 142 135 - 145 mmol/L    Potassium 4.5 3.4 - 5.3 mmol/L    Carbon Dioxide (CO2) 26 22 - 29 mmol/L    Anion Gap 8 7 - 15 mmol/L    Urea Nitrogen 18.9 8.0 - 23.0 mg/dL    Creatinine 0.93 0.51 - 0.95 mg/dL    GFR Estimate 65 >60 mL/min/1.73m2    Calcium 8.6 (L) 8.8 - 10.4 mg/dL    Chloride 108 (H) 98 - 107 mmol/L    Glucose 114 (H) 70 - 99 mg/dL    Alkaline Phosphatase 92 40 - 150 U/L    AST 32 0 - 45 U/L    ALT 38 0 - 50 U/L    Protein Total 5.6 (L) 6.4 - 8.3 g/dL    Albumin 3.4 (L) 3.5 - 5.2 g/dL    Bilirubin Total 0.2 <=1.2 mg/dL   TSH with free T4 reflex   Result Value Ref Range    TSH 1.72 0.30 - 4.20 uIU/mL       Imaging  results Reviewed        PET Oncology (Eyes to Thighs)    Result Date: 11/26/2024  EXAM: PET ONCOLOGY (EYES TO THIGHS) LOCATION: [facility] DATE: 11/25/2024 INDICATION: Lung cancer, left, restaging. Malignant neoplasm of upper lobe, left bronchus or lung. Small cell carcinoma. Status post radiation therapy to brain. Status post systemic immunotherapy. Recent CT demonstrating enlarging left upper lobe nodule suspicious for recurrence. Subsequent treatment strategy. COMPARISON: PET/CT 7/3/2024. CT chest 6/15/2023, 2/8/2024, PET/CT 3/1/2024, MRI brain 8/15/2024, 11/4/2024 reviewed. TECHNIQUE: Serum glucose level 89 mg/dL. One hour post intravenous administration of 10.1 mCi F-18 FDG, PET imaging was performed from the skull vertex to mid thighs, utilizing attenuation correction with concurrent axial CT and PET/CT image fusion. Dose  reduction techniques were used. FINDINGS: An irregular pulmonary nodule in the peripheral left upper lobe lingula region has developed since 7/3/2024, similar to CT 11/4/2024, located at site of original primary malignancy depicted 3/1/2024, and demonstrates marked FDG uptake (SUVmax 11.9), highly suspicious for locally recurrent malignancy. A mildly enlarged FDG avid left upper lobar station 12L node has developed measuring 1.0 x 1.0 cm associated with mild uptake (SUVmax 3.8), suspicious for a gisela metastasis. No FDG avid adenopathy elsewhere. An irregular subcentimeter nodule in the right upper lobe demonstrates no focal FDG activity, favoring a benign process, likely infectious/inflammatory. No suspicious focal uptake in the liver or skeleton. Known intracranial metastases are better characterized on comparison MRIs. Mild senescent intracranial changes. Right IJ Port-A-Cath with tip near the SVC/RA junction. Advanced emphysema. Mild linear and reticular areas of scarring in the mid and lower lungs. Marked atherosclerotic calcifications including the coronary  arteries. Bony demineralization. Mild scattered hypertrophic degenerative changes in the spine.     IMPRESSION: Findings suspicious for locally recurrent lung cancer in the peripheral left upper lobe as well as a left upper lobar gisela metastasis.    CT Chest/Abdomen/Pelvis w Contrast    Result Date: 11/5/2024  EXAM: CT CHEST/ABDOMEN/PELVIS W CONTRAST LOCATION: [facility] DATE: 11/4/2024 INDICATION: Small cell lung cancer (H), Malignant neoplasm of middle lobe, bronchus or lung (H). COMPARISON: Multiple with most recent studies a PET scan from 7/3/2024 and CT scan from 5/10/2024. TECHNIQUE: CT scan of the chest, abdomen, and pelvis was performed following injection of IV contrast. Multiplanar reformats were obtained. Dose reduction techniques were used. CONTRAST: 75ml isovue 370. FINDINGS: LUNGS AND PLEURA: Marked emphysematous changes in the lungs. 1.1 x 1.0 cm focal subpleural nodular density left upper lobe anteriorly on series 4 image 162 has increased in size since 5/10/2024 and is at the location of the patient's previous lung malignancy. This is concerning for potential recurrent disease. Additional indeterminate new 8 mm irregular nodular density right upper lobe on series 4 image 125. Mild scarring and volume loss in the left upper lobe posteriorly improved since the prior study. No acute appearing infiltrates or consolidation. Multiple tiny granulomas in the upper lobes, unchanged. No pleural effusions. MEDIASTINUM/AXILLAE: Previously seen adenopathy in the AP window and left hilum are less pronounced today. Largest remaining node measuring 1.1 cm in short axis diameter on series 3 image 63 (1.3 cm previously). No new or progressive adenopathy. Normal heart size. No pericardial effusion. Normal caliber thoracic aorta. Esophagus is grossly negative. CORONARY ARTERY CALCIFICATION: Severe. HEPATOBILIARY: Small benign cyst left hepatic lobe is unchanged. Liver is otherwise negative.  No calcified gallstones or biliary dilatation. PANCREAS: Normal. SPLEEN: Normal. ADRENAL GLANDS: Stable mild benign-appearing thickening left adrenal gland. Right adrenal gland is negative. KIDNEYS/BLADDER: Bilateral renal cortical thinning. Small benign right renal cyst with no follow-up needed. No hydronephrosis. Bladder is unremarkable. BOWEL: Normal. LYMPH NODES: Normal. VASCULATURE: Diffuse aortoiliac atheromatous disease. Ectasia of the distal abdominal aorta measuring 2.4 cm. Severe calcification/stenosis again noted at the origin of the celiac artery. PELVIC ORGANS: No pelvic masses. MUSCULOSKELETAL: No destructive bony lesions. Mild degenerative changes in the spine.     IMPRESSION: 1.  1.1 cm subpleural nodular density left upper lobe. This is new/increased compared with the prior study and is in the location of the patient's previous lung malignancy. This is suspicious for potential recurrent disease. PET scan could be helpful in further evaluation. 2.  New indeterminate 8 mm irregular nodular density right upper lobe. Metastatic nodule not excluded and again could be better evaluated with PET scan. 3.  No findings elsewhere raising concern for new/progressive disease. Previously seen left hilar and mediastinal adenopathy is less pronounced today with no new or enlarging lymph nodes. 4.  Marked emphysematous changes in the lungs. 5.  Mild scarring and volume loss left upper lobe posteriorly improved from previous. 6.  Diffuse atheromatous disease including severe coronary artery calcification and marked calcification/stenosis at the origin of the celiac artery, similar to previous. See remainder of the details above.    MRI Brain w & w/o contrast    Result Date: 11/5/2024  EXAM: MR BRAIN W/O and W CONTRAST LOCATION: [facility] DATE: 11/4/2024 INDICATION: SCLC with jayy mets s p WBRT ecal response prog COMPARISON: 08/15/2024 brain MRI. CONTRAST: 7.5 mL Gadavist TECHNIQUE:  Multiplanar multisequence head MRI without and with intravenous contrast including dynamic susceptibility contrast perfusion imaging. FINDINGS: INTRACRANIAL CONTENTS: Enhancing metastatic lesion in the lateral left cerebellar hemisphere demonstrates an interval decrease in size, now measuring 6 mm AP and 5 mm transverse, as compared to 12 mm AP and 10 mm transverse previously. Small amount of adjacent FLAIR hyperintensity has improved in the interim. Rounded focus of enhancement at the superior margin of the right hippocampus measuring 5 mm AP and 7 mm transverse is relatively unchanged, though it is worth noting that this may reflect vascular enhancement or choroid plexus in the medial margin of the right temporal horn. Additional metastatic lesions elsewhere have resolved. Provided perfusion imaging is nondiagnostic. No finding for acute infarct, intracranial hemorrhage, or new extra-axial collection. Mild burden FLAIR hyperintense presumed chronic small vessel ischemic change. Mild diffuse parenchymal volume loss. Nondilated ventricles. Major intracranial vascular flow voids at the skull base are preserved. Cerebellar tonsils are normally positioned. SELLA: No abnormality accounting for technique. OSSEOUS STRUCTURES/SOFT TISSUES: Normal marrow signal. Stress ORBITS: No abnormality accounting for technique. SINUSES/MASTOIDS: No paranasal sinus mucosal disease. Fluid in the mastoid air cells bilaterally, more so on the right.     IMPRESSION: 1.  Overall findings compatible with treatment response. Metastatic lesion in the lateral left cerebellar hemisphere has decreased in size since previous, with resolution of the additional metastatic lesions previously seen elsewhere in the brain. 2.  Age-related changes as above.       Signed by: Amador Stewart MD      This note has been dictated using voice recognition software. Any grammatical or context distortions are unintentional and inherent to the software       mastoid air cells bilaterally, more so on the right.     IMPRESSION: 1.  Overall findings compatible with treatment response. Metastatic lesion in the lateral left cerebellar hemisphere has decreased in size since previous, with resolution of the additional metastatic lesions previously seen elsewhere in the brain. 2.  Age-related changes as above.       Signed by: Amador Stewart MD      This note has been dictated using voice recognition software. Any grammatical or context distortions are unintentional and inherent to the software

## 2024-11-27 NOTE — LETTER
"11/27/2024      Jasmyn Green  [address] [place] [address]      Dear Colleague,    Thank you for referring your patient, Jasmyn Green, to the [facility]. Please see a copy of my visit note below.    Oncology Rooming Note    November 27, 2024 11:09 AM   Jasmyn Green is a 69 year old female who presents for:    Chief Complaint   Patient presents with     Oncology Clinic Visit     Return visit 3 weeks with lab and infusion. PET 11/25/24. Small cell carcinoma of overlapping sites of lung, unspecified laterality.     Initial Vitals: BP (!) 141/60 (BP Location: Left arm, Patient Position: Sitting, Cuff Size: Adult Regular)   Pulse 118   Temp 97.9  F (36.6  C) (Oral)   Resp 16   Wt 40.8 kg (89 lb 14.4 oz)   LMP  (LMP Unknown)   SpO2 98%   BMI 18.16 kg/m   Estimated body mass index is 18.16 kg/m  as calculated from the following:    Height as of 11/11/24: 1.499 m (4' 11\").    Weight as of this encounter: 40.8 kg (89 lb 14.4 oz). Body surface area is 1.3 meters squared.  No Pain (0) Comment: Data Unavailable   No LMP recorded (lmp unknown). Patient is postmenopausal.  Allergies reviewed: Yes  Medications reviewed: Yes    Medications: Medication refills not needed today.  Pharmacy name entered into [employer]:    [facility] - [place], [place] - [address] [facility] - [address]    Frailty Screening:   Is the patient here for a new oncology consult visit in cancer care? 2. No      Clinical concerns: none       Елена Deutsch, [facility] Hematology and Oncology Consult Note    Patient: Jasmyn Green  MRN: [id number]  Date of Service: Nov 27, 2024           Reason for consultation      Problem List Items Addressed This Visit          Respiratory    COPD exacerbation (H) (Chronic)    Small cell lung cancer (H) - Primary       Assessment / number of problems addressed      1.  A very " pleasant 69 year old  woman with looks like extensive stage small cell lung cancer.  Started on palliative chemotherapy with carboplatin, etoposide and atezolizumab.  After 2 cycles seemed to be responding quite well on the CT scan.  Had significant side effects after third cycle requiring discontinuation of cytotoxic chemotherapy and continues on maintenance atezolizumab.  Last PET scan had shown very good response in July 2024.  In August 2024 developed new brain metastases and received radiation therapy.  Current MRI showing good treatment response.  CT of the chest showing growth of scan left upper lobe nodule.  This is worrisome for recurrence.  2.  Severe COPD.  She is on 2-3 L of oxygen at rest.  3.  Significantly decreased FEV1 of 29% and reduced diffusion capacity with DLCO of 30%.  4.  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  On baby aspirin.  5.  Other medical conditions stable.  6.  Severe anemia secondary to chemotherapy.      Plan and medical decision making      Reviewed notes from each unique source.  Reviewed each unique test.    Ordered tests.    Independently interpreted lab tests and radiological exams performed by other physicians.  Personally reviewed the images of the PET scan which shows hypermetabolic left upper lobe lesion.  Independent historian account obtained as noted.      I reviewed the PET scan findings with the patient and her family.  Since when she was diagnosed we only biopsied the endobronchial lesion, this lesion also was considered slightly on and its appearance and location.  Therefore I have recommended we should consider biopsying this lesion to confirm that this indeed is a small cell lung cancer.  If it turns out to be that then we will treat her with Lurbinectedin.  We also talked about treating it with that without even going for the biopsy.  The pros and cons were discussed.  Since the lesion has not changed that much from about a month ago I have recommended we can talk  about it after the holidays.  If it turns out to be non-small cell lung cancer then we will have to talk about treatment of that as well.  Continue COPD management and supplemental oxygen.  Continue with fluid electrolyte and nutritional optimization.  The longitudinal plan of care for the diagnosis(es)/condition(s) as documented were addressed during this visit. Due to the added complexity in care, I will continue to support Jasmyn in the subsequent management and with ongoing continuity of care.     Clinical/pathological stage       Cancer Staging   No matching staging information was found for the patient.      History of present illness      Ms. Jasmyn Green is  69 year old woman who has been referred to me for evaluation of newly diagnosed small cell lung cancer.  She appears to have extensive stage disease.  She presented to the [facility] with increasing shortness of breath coughing and symptoms suggestive of pneumonia.  She has a known history of severe obstructive lung disease with an FEV1 of 29% with hyperinflation and air trapping.  Presented with a 1 to 2-day history of worsening shortness of breath and with exertion as well as at rest.  Requiring increasing supplemental oxygen at home.  CT scan done in the emergency room showed bulky aortopulmonary as well as left hilar adenopathy with irregular pleural-based mass in the left upper lobe.  There are also some scattered satellite nodules.  This was very suspicious for malignancy.    After discharge she was seen by Dr. Levin and and underwent endobronchial ultrasound-guided biopsy which came back positive for small cell lung cancer.  The PET scan also showed hypermetabolic lesions in the mediastinum as well as the peripheral part of the left upper lobe.      She used to smoke but quit smoking.  She was in fact was being monitored by CT surveillance.  Had a CT scan done in June 2023 which was positive for some sort of pneumonia but no obvious  malignancy was noted.    She was started on echemotherapy with carboplatin, etoposide and atezolizumab.  She has had 3 cycles.  After 3 cycles had severe side effects from chemotherapy.  She was had to be hospitalized for post side effect issues including some pneumonia etc.  She was at [facility] for several days.  We made the decision to discontinue cisplatin and etoposide.  We continue with the atezolizumab.    Jasmyn was able to tolerate atezolizumab only mild toxicity.  She is following up with palliative care physicians.  Her pain Patch was discontinued and she was put on small dose of Vicodin to help with her breathing.  That seems to be working well.  She had a PET scan done in July 2024.  That PET scan showed complete metabolic response.    Unfortunately in August 2024 her brain MRI showed multiple brain metastases.  She was seen by radiation oncology.  Received radiation therapy.  In the meantime has continued on her atezolizumab.    She had a CT scan on 4 November 2024.  That CT scan showed 1.1 cm subpleural nodule in the left upper lobe which she is showing slightly increased size compared to the prior PET study.  Therefore she was asked to come back with a PET scan.  She is here after that.  Continues to have issues with breathing.      Past medical/surgical/social/family history        Past Medical History:   Diagnosis Date     Age-related osteoporosis without current pathological fracture      Arthritis      COPD (chronic obstructive pulmonary disease) (H)      Coronary artery disease      Dependence on nocturnal oxygen therapy      Dyspnea on exertion      Emphysema lung (H)      Gout      HLD (hyperlipidemia)      Hypertension      Lung mass      Past Surgical History:   Procedure Laterality Date     BRONCHOSCOPY RIGID OR FLEXIBLE W/TRANSENDOSCOPIC ENDOBRONCHIAL ULTRASOUND GUIDED N/A 2/16/2024    Procedure: BRONCHOSCOPY, WITH ENDOBRONCHIAL ULTRASOUND;  Surgeon: Ruben Levin MD;   Location: [facility] OR     COLONOSCOPY N/A 10/21/2021    Procedure: COLONOSCOPY;  Surgeon: Wilma Hester DO;  Location: [facility] OR     IR CHEST PORT PLACEMENT > 5 YRS OF AGE  5/2/2024     VAGINAL DELIVERY      x 3 ; remote     WISDOM TOOTH EXTRACTION         Review of system      Details noted in the history of present illness.  A detailed review of systems is otherwise negative.      Physical exam        BP (!) 141/60 (BP Location: Left arm, Patient Position: Sitting, Cuff Size: Adult Regular)   Pulse 118   Temp 97.9  F (36.6  C) (Oral)   Resp 16   Wt 40.8 kg (89 lb 14.4 oz)   LMP  (LMP Unknown)   SpO2 98%   BMI 18.16 kg/m      GENERAL: No acute distress. Cooperative in conversation.   HEENT:  Pupils are equal, round and reactive. Oral mucosa is clean and intact. No ulcerations or mucositis noted. No bleeding noted.  RESP:Chest symmetric lungs are clear bilaterally per auscultation. Regular respiratory rate. No wheezes or rhonchi.  CV: Normal S1 S2 Regular, rate and rhythm.     ABD: Nondistended, soft, nontender. Positive bowel sounds. No organomegaly.   EXTREMITIES: No lower extremity edema.   NEURO: Non- focal. Alert and oriented x3.  Cranial nerves appear intact.  PSYCH: Within normal limits. No depression or anxiety.  SKIN: Warm dry intact.      Lab results Reviewed      Recent Results (from the past week)   Glucose by meter   Result Value Ref Range    GLUCOSE BY METER POCT 89 70 - 99 mg/dL   Comprehensive metabolic panel   Result Value Ref Range    Sodium 142 135 - 145 mmol/L    Potassium 4.5 3.4 - 5.3 mmol/L    Carbon Dioxide (CO2) 26 22 - 29 mmol/L    Anion Gap 8 7 - 15 mmol/L    Urea Nitrogen 18.9 8.0 - 23.0 mg/dL    Creatinine 0.93 0.51 - 0.95 mg/dL    GFR Estimate 65 >60 mL/min/1.73m2    Calcium 8.6 (L) 8.8 - 10.4 mg/dL    Chloride 108 (H) 98 - 107 mmol/L    Glucose 114 (H) 70 - 99 mg/dL    Alkaline Phosphatase 92 40 - 150 U/L    AST 32 0 - 45 U/L    ALT 38 0 - 50 U/L    Protein Total  5.6 (L) 6.4 - 8.3 g/dL    Albumin 3.4 (L) 3.5 - 5.2 g/dL    Bilirubin Total 0.2 <=1.2 mg/dL   TSH with free T4 reflex   Result Value Ref Range    TSH 1.72 0.30 - 4.20 uIU/mL       Imaging results Reviewed        PET Oncology (Eyes to Thighs)    Result Date: 11/26/2024  EXAM: PET ONCOLOGY (EYES TO THIGHS) LOCATION: [facility] DATE: 11/25/2024 INDICATION: Lung cancer, left, restaging. Malignant neoplasm of upper lobe, left bronchus or lung. Small cell carcinoma. Status post radiation therapy to brain. Status post systemic immunotherapy. Recent CT demonstrating enlarging left upper lobe nodule suspicious for recurrence. Subsequent treatment strategy. COMPARISON: PET/CT 7/3/2024. CT chest 6/15/2023, 2/8/2024, PET/CT 3/1/2024, MRI brain 8/15/2024, 11/4/2024 reviewed. TECHNIQUE: Serum glucose level 89 mg/dL. One hour post intravenous administration of 10.1 mCi F-18 FDG, PET imaging was performed from the skull vertex to mid thighs, utilizing attenuation correction with concurrent axial CT and PET/CT image fusion. Dose  reduction techniques were used. FINDINGS: An irregular pulmonary nodule in the peripheral left upper lobe lingula region has developed since 7/3/2024, similar to CT 11/4/2024, located at site of original primary malignancy depicted 3/1/2024, and demonstrates marked FDG uptake (SUVmax 11.9), highly suspicious for locally recurrent malignancy. A mildly enlarged FDG avid left upper lobar station 12L node has developed measuring 1.0 x 1.0 cm associated with mild uptake (SUVmax 3.8), suspicious for a gisela metastasis. No FDG avid adenopathy elsewhere. An irregular subcentimeter nodule in the right upper lobe demonstrates no focal FDG activity, favoring a benign process, likely infectious/inflammatory. No suspicious focal uptake in the liver or skeleton. Known intracranial metastases are better characterized on comparison MRIs. Mild senescent intracranial changes. Right IJ Port-A-Cath  with tip near the SVC/RA junction. Advanced emphysema. Mild linear and reticular areas of scarring in the mid and lower lungs. Marked atherosclerotic calcifications including the coronary arteries. Bony demineralization. Mild scattered hypertrophic degenerative changes in the spine.     IMPRESSION: Findings suspicious for locally recurrent lung cancer in the peripheral left upper lobe as well as a left upper lobar gisela metastasis.    CT Chest/Abdomen/Pelvis w Contrast    Result Date: 11/5/2024  EXAM: CT CHEST/ABDOMEN/PELVIS W CONTRAST LOCATION: [facility] DATE: 11/4/2024 INDICATION: Small cell lung cancer (H), Malignant neoplasm of middle lobe, bronchus or lung (H). COMPARISON: Multiple with most recent studies a PET scan from 7/3/2024 and CT scan from 5/10/2024. TECHNIQUE: CT scan of the chest, abdomen, and pelvis was performed following injection of IV contrast. Multiplanar reformats were obtained. Dose reduction techniques were used. CONTRAST: 75ml isovue 370. FINDINGS: LUNGS AND PLEURA: Marked emphysematous changes in the lungs. 1.1 x 1.0 cm focal subpleural nodular density left upper lobe anteriorly on series 4 image 162 has increased in size since 5/10/2024 and is at the location of the patient's previous lung malignancy. This is concerning for potential recurrent disease. Additional indeterminate new 8 mm irregular nodular density right upper lobe on series 4 image 125. Mild scarring and volume loss in the left upper lobe posteriorly improved since the prior study. No acute appearing infiltrates or consolidation. Multiple tiny granulomas in the upper lobes, unchanged. No pleural effusions. MEDIASTINUM/AXILLAE: Previously seen adenopathy in the AP window and left hilum are less pronounced today. Largest remaining node measuring 1.1 cm in short axis diameter on series 3 image 63 (1.3 cm previously). No new or progressive adenopathy. Normal heart size. No pericardial effusion. Normal  caliber thoracic aorta. Esophagus is grossly negative. CORONARY ARTERY CALCIFICATION: Severe. HEPATOBILIARY: Small benign cyst left hepatic lobe is unchanged. Liver is otherwise negative. No calcified gallstones or biliary dilatation. PANCREAS: Normal. SPLEEN: Normal. ADRENAL GLANDS: Stable mild benign-appearing thickening left adrenal gland. Right adrenal gland is negative. KIDNEYS/BLADDER: Bilateral renal cortical thinning. Small benign right renal cyst with no follow-up needed. No hydronephrosis. Bladder is unremarkable. BOWEL: Normal. LYMPH NODES: Normal. VASCULATURE: Diffuse aortoiliac atheromatous disease. Ectasia of the distal abdominal aorta measuring 2.4 cm. Severe calcification/stenosis again noted at the origin of the celiac artery. PELVIC ORGANS: No pelvic masses. MUSCULOSKELETAL: No destructive bony lesions. Mild degenerative changes in the spine.     IMPRESSION: 1.  1.1 cm subpleural nodular density left upper lobe. This is new/increased compared with the prior study and is in the location of the patient's previous lung malignancy. This is suspicious for potential recurrent disease. PET scan could be helpful in further evaluation. 2.  New indeterminate 8 mm irregular nodular density right upper lobe. Metastatic nodule not excluded and again could be better evaluated with PET scan. 3.  No findings elsewhere raising concern for new/progressive disease. Previously seen left hilar and mediastinal adenopathy is less pronounced today with no new or enlarging lymph nodes. 4.  Marked emphysematous changes in the lungs. 5.  Mild scarring and volume loss left upper lobe posteriorly improved from previous. 6.  Diffuse atheromatous disease including severe coronary artery calcification and marked calcification/stenosis at the origin of the celiac artery, similar to previous. See remainder of the details above.    MRI Brain w & w/o contrast    Result Date: 11/5/2024  EXAM: MR BRAIN W/O and W CONTRAST LOCATION: [facility] DATE: 11/4/2024 INDICATION: SCLC with jayy mets s p WBRT ecal response prog COMPARISON: 08/15/2024 brain MRI. CONTRAST: 7.5 mL Gadavist TECHNIQUE: Multiplanar multisequence head MRI without and with intravenous contrast including dynamic susceptibility contrast perfusion imaging. FINDINGS: INTRACRANIAL CONTENTS: Enhancing metastatic lesion in the lateral left cerebellar hemisphere demonstrates an interval decrease in size, now measuring 6 mm AP and 5 mm transverse, as compared to 12 mm AP and 10 mm transverse previously. Small amount of adjacent FLAIR hyperintensity has improved in the interim. Rounded focus of enhancement at the superior margin of the right hippocampus measuring 5 mm AP and 7 mm transverse is relatively unchanged, though it is worth noting that this may reflect vascular enhancement or choroid plexus in the medial margin of the right temporal horn. Additional metastatic lesions elsewhere have resolved. Provided perfusion imaging is nondiagnostic. No finding for acute infarct, intracranial hemorrhage, or new extra-axial collection. Mild burden FLAIR hyperintense presumed chronic small vessel ischemic change. Mild diffuse parenchymal volume loss. Nondilated ventricles. Major intracranial vascular flow voids at the skull base are preserved. Cerebellar tonsils are normally positioned. SELLA: No abnormality accounting for technique. OSSEOUS STRUCTURES/SOFT TISSUES: Normal marrow signal. Stress ORBITS: No abnormality accounting for technique. SINUSES/MASTOIDS: No paranasal sinus mucosal disease. Fluid in the mastoid air cells bilaterally, more so on the right.     IMPRESSION: 1.  Overall findings compatible with treatment response. Metastatic lesion in the lateral left cerebellar hemisphere has decreased in size since previous, with resolution of the additional metastatic lesions previously seen elsewhere in the brain. 2.  Age-related changes as above.       Signed by:  Amador Stewart MD      This note has been dictated using voice recognition software. Any grammatical or context distortions are unintentional and inherent to the software        Again, thank you for allowing me to participate in the care of your patient.        Sincerely,        Amador Stewart MD

## 2024-11-27 NOTE — PROGRESS NOTES
Infusion Nursing Note:  Jasmyn Green presents today for D1C12.    Patient seen by provider today: Yes: Dr Stewart   present during visit today: Not Applicable.    Note: treatment administered as directed and port flushed and deaccessed. Pt will take some time to decide if she wants BX. Until then no treatment.      Intravenous Access:  Implanted Port.    Treatment Conditions:  Results reviewed, labs MET treatment parameters, ok to proceed with treatment.      Post Infusion Assessment:  Patient tolerated infusion without incident.       Discharge Plan:   Patient discharged in stable condition accompanied by: son.      America Martinez RN

## 2024-11-27 NOTE — PROGRESS NOTES
"Oncology Rooming Note    November 27, 2024 11:09 AM   Jasmyn Green is a 69 year old female who presents for:    Chief Complaint   Patient presents with    Oncology Clinic Visit     Return visit 3 weeks with lab and infusion. PET 11/25/24. Small cell carcinoma of overlapping sites of lung, unspecified laterality.     Initial Vitals: BP (!) 141/60 (BP Location: Left arm, Patient Position: Sitting, Cuff Size: Adult Regular)   Pulse 118   Temp 97.9  F (36.6  C) (Oral)   Resp 16   Wt 40.8 kg (89 lb 14.4 oz)   LMP  (LMP Unknown)   SpO2 98%   BMI 18.16 kg/m   Estimated body mass index is 18.16 kg/m  as calculated from the following:    Height as of 11/11/24: 1.499 m (4' 11\").    Weight as of this encounter: 40.8 kg (89 lb 14.4 oz). Body surface area is 1.3 meters squared.  No Pain (0) Comment: Data Unavailable   No LMP recorded (lmp unknown). Patient is postmenopausal.  Allergies reviewed: Yes  Medications reviewed: Yes    Medications: Medication refills not needed today.  Pharmacy name entered into [employer]:    [facility] - [place], [place] - [address] [facility] - [place], [place] - [address]    Frailty Screening:   Is the patient here for a new oncology consult visit in cancer care? 2. No      Clinical concerns: none       Елена Deutsch CMA            "

## 2024-12-02 ENCOUNTER — MYC MEDICAL ADVICE (OUTPATIENT)
Dept: PULMONOLOGY | Facility: CLINIC | Age: 69
End: 2024-12-02
Payer: COMMERCIAL

## 2024-12-02 DIAGNOSIS — J44.9 COPD, GROUP D, BY GOLD 2017 CLASSIFICATION (H): ICD-10-CM

## 2024-12-02 DIAGNOSIS — J44.1 COPD EXACERBATION (H): ICD-10-CM

## 2024-12-02 DIAGNOSIS — J44.9 COPD, SEVERE (H): Primary | ICD-10-CM

## 2024-12-03 RX ORDER — PREDNISONE 10 MG/1
TABLET ORAL
Qty: 30 TABLET | Refills: 0 | Status: SHIPPED | OUTPATIENT
Start: 2024-12-03

## 2024-12-07 ENCOUNTER — HEALTH MAINTENANCE LETTER (OUTPATIENT)
Age: 69
End: 2024-12-07

## 2024-12-11 ENCOUNTER — OFFICE VISIT (OUTPATIENT)
Dept: VASCULAR SURGERY | Facility: CLINIC | Age: 69
End: 2024-12-11
Attending: HOSPITALIST
Payer: COMMERCIAL

## 2024-12-11 VITALS
SYSTOLIC BLOOD PRESSURE: 138 MMHG | HEART RATE: 96 BPM | DIASTOLIC BLOOD PRESSURE: 64 MMHG | TEMPERATURE: 97.8 F | RESPIRATION RATE: 16 BRPM

## 2024-12-11 DIAGNOSIS — I77.4 CELIAC ARTERY STENOSIS (H): Primary | ICD-10-CM

## 2024-12-11 PROCEDURE — G0463 HOSPITAL OUTPT CLINIC VISIT: HCPCS | Performed by: HOSPITALIST

## 2024-12-11 ASSESSMENT — PAIN SCALES - GENERAL: PAINLEVEL_OUTOF10: NO PAIN (0)

## 2024-12-11 NOTE — PROGRESS NOTES
VASCULAR MEDICINE CONSULT NOTE          LOCATION:  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 She is accompanied by her Julissa.    Ms. 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    Terra and daughter were not aware of the reason for the visit. She was referred to us after her hospitalization back in May 2024.  At that time and specifically after a CT chest abdomen pelvis done 5/3/2024, she was noted to have high-grade narrowing of the celiac artery with noncalcified plaque versus thrombus with similar finding in the splenic artery associated with wedge-shaped infarct in the spleen.  Subsequent CTA completed 5/10/2024 confirmed the near occlusion of the proximal celiac axis with mural thrombus with otherwise patent splenic artery apart from scattered plaques.  Common hepatic artery, SMA, bilateral renal arteries and DWAYNE were all patent.    Additional findings were: Large amount of calcified plaque within the right common femoral artery, with approximately 90% luminal stenosis, high-grade stenoses of both common iliac arteries, multifocal noncalcified plaque/thrombus scattered in the abdominal aorta and infrarenal abdominal aortic ectasia 2.5 cm.    She was seen by our Vasc surg colleagues in the hospital who advised therapeutic anticoagulation either  heparin/LMWH or apixaban.    She was discharged on apixaban that she continues on 5 mg twice daily as well as increase in her pre-existing aspirin to 325 mg daily.    She has been on these without any bleeding consequences since.    Today, Ms. Green denies postprandial abdominal pain.  She has had weight loss as part of her underlying malignancy and she and chemotherapy treatment with fluctuating weight.  Reports decent appetite lately.    Denies intermittent claudication.  Was fully ambulatory with no limitation apart from the recent hospitalizations.      Cardiovascular risk factors:  Smoking: Smoked since ninth grade, 1 PPD, quit 3-4 years ago.  Hypertension: Patient reports that she was not following up with any doctor for a long time and had no known medical history.  Approximately 3-4 years ago, she started seeing PCP and was diagnosed with hypertension for which she was on lisinopril.  She was also started on aspirin 81 mg at that time.  Denies known history of dyslipidemia.  No history of diabetes.    Denies prior history of MI or stroke.        REVIEW OF SYSTEMS:    A 12 point ROS was reviewed and is negative except what is mentioned in HPI.       Past medical history, surgical history, medications, family history, social history and allergies were reviewed. Pertinent points mentioned under HPI.             OBJECTIVE:    Vital signs:  /64   Pulse 96   Temp 97.8  F (36.6  C)   Resp 16   LMP  (LMP Unknown)   Wt Readings from Last 1 Encounters:   11/29/24 89 lb (40.4 kg)     There is no height or weight on file to calculate BMI.    Physical exam:  General appearance: Pleasant female in no apparent distress.  On oxygen.  Frail.  HEENT: NC/AT.    Neck: Carotids +2/2 bilaterally without bruits.  No jugular venous distension.   Heart: Regular rhythm, slightly tachycardic.  Normal S1, S2.   Chest: No crackles or wheezing.  Abdomen: Soft, nontender, nondistended. No pulsatile mass.  Unable to hear any  bruit.    Extremities: No swelling.  Lower extremity vascular pulse exam:  Right: Femoral 0/2, no bruit heard, popliteal 0/2, DP and PT 0/2.  Left: Femoral strong 2/2, no bruit heard, popliteal 0/2, DP and PT 2/2.  Skin: Normal color and temperature.  No wounds.  No ecchymosis where seen.  Neurological: Alert, awake and oriented       DIAGNOSTIC STUDIES:   Labs and diagnostics reviewed including outside records. Pertinent points are mentioned under HPI and assessment and plan sections.        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  I was able to show her and her daughter her CTA from May 2024 explaining the finding.  Fortunately, SMA and DWAYNE appear patent and she has no evidence of symptomatic mesenteric ischemia.  She has diffuse atherosclerosis with multiple risk factors including her long history of smoking, hypertension and age.  She has been on apixaban and aspirin 325 mg since May 2024 with no evidence of further thrombosis.  Continuing apixaban will be reasonable especially with underlying hypercoagulable status that is her advanced malignancy.  She is tolerating this well with no evidence of bleeding.  I would however suggest changing aspirin to 81 mg to decrease her risk of bleeding. We discussed few circumstances such as if she were to develop any more infarcts or evidence of embolic atherosclerotic plaque or thrombus, we would likely suggest low molecular weight heparin instead.    We  discussed the importance of preventative care in such cases with antiplatelet therapy and statin therapy.  She has been started on atorvastatin 40 mg and her LDL is less than 70.    With her cancer diagnosis, she is expected to have frequent CT chest abdomen with IV contrast which will be reasonable for surveillance.  Would not necessarily suggest repeating CTA especially with stable findings of her most recent CT chest abdomen done last month.  We will plan on following up with her in 6 months to review her images and if there has been any change.    We discussed symptoms of mesenteric ischemia to watch for.  We also discussed intermittent claudication symptoms.        Recommendations:  Continue apixaban 5 mg twice daily as long as she is tolerating it with no bleeding.  Decrease aspirin to 81 mg.  Continue atorvastatin 40 mg.  Follow-up in 6 months which could be virtual to review recent scans and ensure stability.  Discussed symptoms of mesenteric ischemia, intermittent claudication, acute limb ischemia, etc.  Ideally would follow her infrarenal abdominal aortic ectasia in 1-2 year but she will be getting frequent scans throughout depending on her underlying malignancy.    It was a pleasure meeting Ms. Green and her daughter in our clinic today.      Natty Rodriguez MD, [facility]  Vascular Medicine  December 11, 2024

## 2024-12-11 NOTE — Clinical Note
Needs 6 month follow up around 6/11/25 with Dr. Rodriguez, no imaging.  6 month follow up, celiac and splenic artery thrombus.  Gets imaging through oncology.

## 2024-12-11 NOTE — PATIENT INSTRUCTIONS
Fitz Vines,    Thank you for entrusting your care with us today. After your visit today with MD Natty Rodriguez this is the plan that was discussed at your appointment.    Continue eliquis and atorvastatin.     Decrease aspirin to 81mg daily.     Follow up in 6 months with a virtual visit with Dr. Rodriguez.  No imaging is needed prior to this appointment since you are already having imaging done through oncology.    I am including additional information on these things and our contact information if you have any questions or concerns.   Please do not hesitate to reach out to us if you felt we did not answer your questions or you are unsure of the treatment plan after your visit today. Our number is [phone].Thank you for trusting us with your care.         Again thank you for your time.

## 2024-12-16 ENCOUNTER — OFFICE VISIT (OUTPATIENT)
Dept: INTERNAL MEDICINE | Facility: CLINIC | Age: 69
End: 2024-12-16
Payer: COMMERCIAL

## 2024-12-16 VITALS
HEIGHT: 59 IN | BODY MASS INDEX: 18.91 KG/M2 | WEIGHT: 93.8 LBS | TEMPERATURE: 97.6 F | DIASTOLIC BLOOD PRESSURE: 70 MMHG | HEART RATE: 75 BPM | SYSTOLIC BLOOD PRESSURE: 120 MMHG | RESPIRATION RATE: 16 BRPM | OXYGEN SATURATION: 93 %

## 2024-12-16 DIAGNOSIS — T45.1X5A ANEMIA ASSOCIATED WITH CHEMOTHERAPY: ICD-10-CM

## 2024-12-16 DIAGNOSIS — D64.81 ANEMIA ASSOCIATED WITH CHEMOTHERAPY: ICD-10-CM

## 2024-12-16 DIAGNOSIS — C79.31 SECONDARY MALIGNANT NEOPLASM OF BRAIN (H): ICD-10-CM

## 2024-12-16 DIAGNOSIS — I25.10 CORONARY ARTERY CALCIFICATION: Chronic | ICD-10-CM

## 2024-12-16 DIAGNOSIS — Z01.818 PREOP GENERAL PHYSICAL EXAM: Primary | ICD-10-CM

## 2024-12-16 DIAGNOSIS — R91.8 LUNG MASS: ICD-10-CM

## 2024-12-16 DIAGNOSIS — D73.5 SPLENIC INFARCT: ICD-10-CM

## 2024-12-16 DIAGNOSIS — C34.2 MALIGNANT NEOPLASM OF MIDDLE LOBE, BRONCHUS OR LUNG (H): ICD-10-CM

## 2024-12-16 DIAGNOSIS — I10 ESSENTIAL HYPERTENSION: ICD-10-CM

## 2024-12-16 DIAGNOSIS — J96.11 CHRONIC RESPIRATORY FAILURE WITH HYPOXIA (H): ICD-10-CM

## 2024-12-16 LAB
ANION GAP SERPL CALCULATED.3IONS-SCNC: 9 MMOL/L (ref 7–15)
ATRIAL RATE - MUSE: 113 BPM
BUN SERPL-MCNC: 26.8 MG/DL (ref 8–23)
CALCIUM SERPL-MCNC: 9.3 MG/DL (ref 8.8–10.4)
CHLORIDE SERPL-SCNC: 108 MMOL/L (ref 98–107)
CREAT SERPL-MCNC: 0.94 MG/DL (ref 0.51–0.95)
DIASTOLIC BLOOD PRESSURE - MUSE: NORMAL MMHG
EGFRCR SERPLBLD CKD-EPI 2021: 65 ML/MIN/1.73M2
GLUCOSE SERPL-MCNC: 108 MG/DL (ref 70–99)
HCO3 SERPL-SCNC: 27 MMOL/L (ref 22–29)
HGB BLD-MCNC: 8.9 G/DL (ref 11.7–15.7)
INTERPRETATION ECG - MUSE: NORMAL
P AXIS - MUSE: 88 DEGREES
POTASSIUM SERPL-SCNC: 5.3 MMOL/L (ref 3.4–5.3)
PR INTERVAL - MUSE: 138 MS
QRS DURATION - MUSE: 52 MS
QT - MUSE: 298 MS
QTC - MUSE: 408 MS
R AXIS - MUSE: 83 DEGREES
SODIUM SERPL-SCNC: 144 MMOL/L (ref 135–145)
SYSTOLIC BLOOD PRESSURE - MUSE: NORMAL MMHG
T AXIS - MUSE: 80 DEGREES
VENTRICULAR RATE- MUSE: 113 BPM

## 2024-12-16 PROCEDURE — 80048 BASIC METABOLIC PNL TOTAL CA: CPT | Performed by: NURSE PRACTITIONER

## 2024-12-16 PROCEDURE — 93010 ELECTROCARDIOGRAM REPORT: CPT | Performed by: INTERNAL MEDICINE

## 2024-12-16 PROCEDURE — 85018 HEMOGLOBIN: CPT | Performed by: NURSE PRACTITIONER

## 2024-12-16 PROCEDURE — 93005 ELECTROCARDIOGRAM TRACING: CPT | Performed by: NURSE PRACTITIONER

## 2024-12-16 PROCEDURE — 36415 COLL VENOUS BLD VENIPUNCTURE: CPT | Performed by: NURSE PRACTITIONER

## 2024-12-16 PROCEDURE — 99214 OFFICE O/P EST MOD 30 MIN: CPT | Performed by: NURSE PRACTITIONER

## 2024-12-16 RX ORDER — ASPIRIN 81 MG/1
81 TABLET ORAL DAILY
COMMUNITY

## 2024-12-16 NOTE — PATIENT INSTRUCTIONS
How to Take Your Medication Before Surgery  Preoperative Medication Instructions   Antiplatelet or Anticoagulation Medication Instructions   - aspirin: Discontinue aspirin 7-10 days prior to procedure to reduce bleeding risk. It should be resumed postoperatively.    - apixaban (Eliquis), edoxaban (Savaysa), rivaroxaban (Xarelto): Neuraxial or regional block anticipated AND CrCL (>=) 50mL/min. DO NOT TAKE 3 days before surgery.       Hold your Eliquis 3 days prior to procedure and less determined by the provider who manages the medication to hold for shorter amount of time prior to procedure.  Do not take your lisinopril the morning of surgery if you do restart this medication prior to your procedure.    It is okay to hold all your medications until after your procedure on the day of.    Additional Medication Instructions   - ACE/ARB: DO NOT TAKE on day of surgery (minimum 11 hours for general anesthesia).   - Statins: Continue taking on the day of surgery.    - Herbal medications and vitamins: DO NOT TAKE 14 days prior to surgery.   - Opioids: Continue without modification.   956}   - LABA, inhaled corticosteroid, long-acting anticholinergics: Continue without modification.   - rescue Inhaler: Continue PRN. Bring to hospital on the day of surgery.       Patient Education   Preparing for Your Surgery  For Adults  Getting started  In most cases, a nurse will call to review your health history and instructions. They will give you an arrival time based on your scheduled surgery time. Please be ready to share:  Your doctor's clinic name and phone number  Your medical, surgical, and anesthesia history  A list of allergies and sensitivities  A list of medicines, including herbal treatments and over-the-counter drugs  Whether the patient has a legal guardian (ask how to send us the papers in advance)  Note: You may not receive a call if you were seen at our PAC (Preoperative Assessment Center).  Please tell us if you're  pregnant--or if there's any chance you might be pregnant. Some surgeries may injure a fetus (unborn baby), so they require a pregnancy test. Surgeries that are safe for a fetus don't always need a test, and you can choose whether to have one.   Preparing for surgery  Within 10 to 30 days of surgery: Have a pre-op exam (sometimes called an H&P, or History and Physical). This can be done at a clinic or pre-operative center.  If you're having a , you may not need this exam. Talk to your care team.  At your pre-op exam, talk to your care team about all medicines you take. (This includes CBD oil and any drugs, such as THC, marijuana, and other forms of cannabis.) If you need to stop any medicine before surgery, ask when to start taking it again.  This is for your safety. Many medicines and drugs can make you bleed too much during surgery. Some change how well surgery (anesthesia) drugs work.  Call your insurance company to let them know you're having surgery. (If you don't have insurance, call [phone].)  Call your clinic if there's any change in your health. This includes a scrape or scratch near the surgery site, or any signs of a cold (sore throat, runny nose, cough, rash, fever).  Eating and drinking guidelines  For your safety: Unless your surgeon tells you otherwise, follow the guidelines below.  Eat and drink as normal until 8 hours before you arrive for surgery. After that, no food or milk. You can spit out gum when you arrive.  Drink clear liquids until 2 hours before you arrive. These are liquids you can see through, like water, Gatorade, and Propel Water. They also include plain black coffee and tea (no cream or milk).  No alcohol for 24 hours before you arrive. The night before surgery, stop any drinks that contain THC.  If your care team tells you to take medicine on the morning of surgery, it's okay to take it with a sip of water. No other medicines or drugs are allowed (including CBD  oil)--follow your care team's instructions.  If you have questions the day of surgery, call your hospital or surgery center.   Preventing infection  Shower or bathe the night before and the morning of surgery. Follow the instructions your clinic gave you. (If no instructions, use regular soap.)  Don't shave or clip hair near your surgery site. We'll remove the hair if needed.  Don't smoke or vape the morning of surgery. No chewing tobacco for 6 hours before you arrive. A nicotine patch is okay. You may spit out nicotine gum when you arrive.  For some surgeries, the surgeon will tell you to fully quit smoking and nicotine.  We will make every effort to keep you safe from infection. We will:  Clean our hands often with soap and water (or an alcohol-based hand rub).  Clean the skin at your surgery site with a special soap that kills germs.  Give you a special gown to keep you warm. (Cold raises the risk of infection.)  Wear hair covers, masks, gowns, and gloves during surgery.  Give antibiotic medicine, if prescribed. Not all surgeries need this medicine.  What to bring on the day of surgery  Photo ID and insurance card  Copy of your health care directive, if you have one  Glasses and hearing aids (bring cases)  You can't wear contacts during surgery  Inhaler and eye drops, if you use them (tell us about these when you arrive)  CPAP machine or breathing device, if you use them  A few personal items, if spending the night  If you have . . .  A pacemaker, ICD (cardiac defibrillator), or other implant: Bring the ID card.  An implanted stimulator: Bring the remote control.  A legal guardian: Bring a copy of the certified (court-stamped) guardianship papers.  Please remove any jewelry, including body piercings. Leave jewelry and other valuables at home.  If you're going home the day of surgery  You must have a responsible adult drive you home. They should stay with you overnight as well.  If you don't have someone to stay  with you, and you aren't safe to go home alone, we may keep you overnight. Insurance often won't pay for this.  After surgery  If it's hard to control your pain or you need more pain medicine, please call your surgeon's office.  Questions?   If you have any questions for your care team, list them here:   ____________________________________________________________________________________________________________________________________________________________________________________________________________________________________________________________  For informational purposes only. Not to replace the advice of your health care provider. Copyright   2003, 2019 [facility]. All rights reserved. Clinically reviewed by Kameron Marquez MD. [employer] [id number] - REV 08/24.

## 2024-12-16 NOTE — PROGRESS NOTES
Preoperative Evaluation  [facility]  [address] [place] [address]  Phone: [phone]  Fax: [phone]  Primary Provider: Ana Maria Bermudez MD  Pre-op Performing Provider: Maria Alejandra Olivera NP  Dec 16, 2024             12/16/2024   Surgical Information   What procedure is being done? biopsy    Facility or Hospital where procedure/surgery will be performed: [facility]    Who is doing the procedure / surgery? unknown radiologist    Date of surgery / procedure: 12/27/24    Time of surgery / procedure: 820am    Where do you plan to recover after surgery? at home with family        Patient-reported     Fax number for surgical facility: Note does not need to be faxed, will be available electronically in Epic.    Assessment & Plan     The proposed surgical procedure is considered INTERMEDIATE risk.    Preop general physical exam  Lung mass  Jasmyn is a pleasant 69-year-old female here today for preoperative evaluation prior to a lung biopsy of a new nodule that was noted in the left upper lobe.  No contraindication to proceed with planned procedure.  Reviewed preoperative guidelines as noted below.  - EKG 12-lead, tracing only  - Basic metabolic panel; Future  - Hemoglobin; Future  - Basic metabolic panel  - Hemoglobin      Secondary malignant neoplasm of brain (H)  Malignant neoplasm of middle lobe, bronchus or lung (H)  Significant history of lung cancer with metastasis to the brain.  Managed by oncology.  Currently undergoing chemotherapy.  She was diagnosed with lung cancer about 1 year ago.  She is on multiple pain medications to include oxycodone, Tylenol, and buprenorphine patch that she can continue prior to surgery without modification.    Chronic respiratory failure with hypoxia (H)  Chronic COPD and is on long-term oxygen 2 to 3 L.  No acute exacerbation at this time.  Can continue with inhalers and oxygen as prescribed the day of surgery.Uses diazepam as needed to  help relax her breathing.  Can continue with this medication without modifications prior to surgery.     Essential hypertension  History of hypertension was previously on lisinopril.  This medication was recently held due to some hypotension noted during previous visits and at home.  Blood pressure stable today at 120/70.  Can remain off of lisinopril at this time.  Advised if she restarts this medication prior to surgery to hold this medication the morning of the procedure.    - Basic metabolic panel; Future  - Basic metabolic panel    Coronary artery calcification  Currently on atorvastatin.  Can continue this medication without modification prior to surgery.    Splenic infarct  History of splenic infarct and is currently on aspirin 81 mg daily and Eliquis 5 mg daily.  Advised to hold Eliquis 3 days prior to surgery however waiting official preoperative guidelines on the Eliquis from oncology.  Can hold aspirin 1 week prior to surgery.    Anemia associated with chemotherapy  Due for hemoglobin today.  Hemoglobin is stable today at 8.9.            - No identified additional risk factors other than previously addressed    Preoperative Medication Instructions  Antiplatelet or Anticoagulation Medication Instructions   - aspirin: Discontinue aspirin 7-10 days prior to procedure to reduce bleeding risk. It should be resumed postoperatively.    - apixaban (Eliquis), edoxaban (Savaysa), rivaroxaban (Xarelto): Neuraxial or regional block anticipated AND CrCL (>=) 50mL/min. DO NOT TAKE 3 days before surgery.       Hold your Eliquis 3 days prior to procedure and less determined by the provider who manages the medication to hold for shorter amount of time prior to procedure.  Do not take your lisinopril the morning of surgery if you do restart this medication prior to your procedure.    It is okay to hold all your medications until after your procedure on the day of.    Additional Medication Instructions   - ACE/ARB: DO NOT  TAKE on day of surgery (minimum 11 hours for general anesthesia).   - Statins: Continue taking on the day of surgery.    - Herbal medications and vitamins: DO NOT TAKE 14 days prior to surgery.   - Opioids: Continue without modification.   956}   - LABA, inhaled corticosteroid, long-acting anticholinergics: Continue without modification.   - rescue Inhaler: Continue PRN. Bring to hospital on the day of surgery.    Recommendation  Approval given to proceed with proposed procedure pending review of diagnostic evaluation.    Subjective   Jasmyn is a 69 year old, presenting for the following:  Pre-Op Exam (Lung biopsy on 12/27 at [facility] by Dr Maciel)          12/16/2024     8:47 AM   Additional Questions   Roomed by Vivi AVELAR     Via the Health Maintenance questionnaire, the patient has reported the following services have been completed -Mammogram: dont remember 2023-10-26, this information has been sent to the abstraction team.  HPI related to upcoming procedure: Jasmyn is a pleasant 69-year-old female here with her daughter for preoperative evaluation.  She has a significant history of extensive small cell lung cancer and is currently on palliative chemotherapy which is managed by oncology.  So far after 2 cycles it appears it has been responding well based off of previous oncology notes.  Last PET scan had shown good response in July 2024.  August 2024 developed new brain metastasis and received radiation therapy.  Current MRI is showing good response to treatment.  CT of the chest shows a growth of the left upper lobe which is planned for biopsy on 12/27/2024.  She does have a significant past medical history of COPD and is currently on oxygen 2 to 3 L while at rest.  She also has significant history of splenic infarct that was noticed on CT scan and is currently on baby aspirin 81 mg daily and Eliquis to 5 mg daily.  She states she does have a history of hypertension however due to some lower blood pressure she is  recently stopped her lisinopril and is not currently taking this medication.        12/16/2024   Pre-Op Questionnaire   Have you ever had a heart attack or stroke? No    Have you ever had surgery on your heart or blood vessels, such as a stent placement, a coronary artery bypass, or surgery on an artery in your head, neck, heart, or legs? No    Do you have chest pain with activity? No    Do you have a history of heart failure? No    Do you currently have a cold, bronchitis or symptoms of other infection? No    Do you have a cough, shortness of breath, or wheezing? (!) YES  Chronic shortness of breath and wheezing due to COPD.    Do you or anyone in your family have previous history of blood clots? (!) YES  History of blood clots   Do you or does anyone in your family have a serious bleeding problem such as prolonged bleeding following surgeries or cuts? No    Have you ever had problems with anemia or been told to take iron pills? (!) YES  Anemia secondary to chemotherapy.    Have you had any abnormal blood loss such as black, tarry or bloody stools, or abnormal vaginal bleeding? No    Have you ever had a blood transfusion? No    Are you willing to have a blood transfusion if it is medically needed before, during, or after your surgery? Yes    Have you or any of your relatives ever had problems with anesthesia? No    Do you have sleep apnea, excessive snoring or daytime drowsiness? No    Do you have any artifical heart valves or other implanted medical devices like a pacemaker, defibrillator, or continuous glucose monitor? No    Do you have artificial joints? No    Are you allergic to latex? No        Patient-reported     Health Care Directive  Patient has a Health Care Directive on file      Preoperative Review of    reviewed - controlled substances reflected in medication list.          Patient Active Problem List    Diagnosis Date Noted    Dehydration 09/24/2024     Priority: Medium    Secondary malignant  neoplasm of brain (H) 08/20/2024     Priority: Medium    Brain lesion 07/08/2024     Priority: Medium    Malignant neoplasm of middle lobe, bronchus or lung (H) 07/08/2024     Priority: Medium    Splenic infarct 05/29/2024     Priority: Medium    Hypoxia 05/10/2024     Priority: Medium    Status post chemotherapy 05/10/2024     Priority: Medium    Urinary tract infection with hematuria, site unspecified 05/10/2024     Priority: Medium    Acute sepsis (H) 05/10/2024     Priority: Medium    Anemia associated with chemotherapy 05/06/2024     Priority: Medium    Small cell lung cancer (H) 03/06/2024     Priority: Medium    Chronic respiratory failure with hypoxia (H) 03/06/2024     Priority: Medium    Lung mass 02/08/2024     Priority: Medium    Age-related osteoporosis without current pathological fracture 08/15/2023     Priority: Medium    Essential hypertension 06/30/2021     Priority: Medium    Asymptomatic hypertensive urgency 06/30/2021     Priority: Medium    Lymphocytosis 06/28/2021     Priority: Medium    COPD exacerbation (H) 06/27/2021     Priority: Medium    History of gout 06/27/2021     Priority: Medium    Coronary artery calcification 06/27/2021     Priority: Medium     IMPRESSION: CTA chest 6/27/2021  1.  Negative for pulmonary embolism.  2.  Emphysema. Lower lobe bronchial wall thickening may be infectious or   inflammatory. No airspace consolidation.  3.  Severe coronary artery disease.          Past Medical History:   Diagnosis Date    Age-related osteoporosis without current pathological fracture     Arthritis     COPD (chronic obstructive pulmonary disease) (H)     Coronary artery disease     Dependence on nocturnal oxygen therapy     Dyspnea on exertion     Emphysema lung (H)     Gout     HLD (hyperlipidemia)     Hypertension     Lung mass      Past Surgical History:   Procedure Laterality Date    BRONCHOSCOPY RIGID OR FLEXIBLE W/TRANSENDOSCOPIC ENDOBRONCHIAL ULTRASOUND GUIDED N/A 2/16/2024     Procedure: BRONCHOSCOPY, WITH ENDOBRONCHIAL ULTRASOUND;  Surgeon: Ruben Levin MD;  Location: [facility] Main OR    COLONOSCOPY N/A 10/21/2021    Procedure: COLONOSCOPY;  Surgeon: Wilma Hester DO;  Location: [place] Main OR    IR CHEST PORT PLACEMENT > 5 YRS OF AGE  5/2/2024    VAGINAL DELIVERY      x 3 ; remote    WISDOM TOOTH EXTRACTION       Current Outpatient Medications   Medication Sig Dispense Refill    acetaminophen (TYLENOL) 500 MG tablet Take 2 tablets (1,000 mg) by mouth every 8 hours as needed for pain      albuterol (PROAIR HFA/PROVENTIL HFA/VENTOLIN HFA) 108 (90 Base) MCG/ACT inhaler Inhale 1-2 puffs into the lungs every 6 hours as needed for shortness of breath, wheezing or cough      apixaban ANTICOAGULANT (ELIQUIS ANTICOAGULANT) 5 MG tablet Take 1 tablet (5 mg) by mouth 2 times daily 60 tablet 5    aspirin 81 MG EC tablet Take 81 mg by mouth daily.      atorvastatin (LIPITOR) 40 MG tablet Take 1 tablet (40 mg) by mouth daily 90 tablet 3    buprenorphine (BUTRANS) 10 MCG/HR WK patch Place 1 patch over 168 hours onto the skin every 7 days. 4 patch 1    calcium carbonate 600 mg-vitamin D 400 units (CALTRATE) 600-400 MG-UNIT per tablet Take 1 tablet by mouth 2 times daily With calcium      dexAMETHasone (DECADRON) 4 MG tablet Take 1 tablet (4 mg) by mouth 2 times daily (with meals) 20 tablet 1    diazepam (VALIUM) 5 MG tablet Take 1 tablet (5 mg) by mouth once as needed for anxiety. 2 tablet 2    doxycycline monohydrate (ADOXA) 100 MG tablet Take 100 mg by mouth daily.      fexofenadine (ALLEGRA ALLERGY) 60 MG tablet Take 60 mg by mouth daily.      fluticasone-salmeterol (AIRDUO RESPICLICK) 232-14 MCG/ACT inhaler Inhale 1 puff into the lungs 2 times daily 1 each 11    hydrOXYzine HCl (ATARAX) 25 MG tablet Take 1-2 tablets (25-50 mg) by mouth every 8 hours as needed for anxiety (Insomnia) 30 tablet 3    ipratropium - albuterol 0.5 mg/2.5 mg/3 mL (DUONEB) 0.5-2.5 (3) MG/3ML neb solution Take 1  vial (3 mLs) by nebulization every 6 hours as needed for shortness of breath, wheezing or cough 180 mL 11    ipratropium-albuterol (COMBIVENT RESPIMAT)  MCG/ACT inhaler Inhale 1 puff into the lungs 4 times daily 4 g 11    lactobacillus rhamnosus, GG, (CULTURELL) capsule Take 1 capsule by mouth 2 times daily      lidocaine-prilocaine (EMLA) 2.5-2.5 % external cream Apply topically as needed for moderate pain 30 g 5    memantine (NAMENDA) 10 MG tablet Take 10 mg by mouth 2 times daily. Patient should take all 5 refills given in original Rx 8/21/24.  Stop medication January 2025 after final refill finished.      mirtazapine (REMERON) 7.5 MG tablet Take 1 tablet (7.5 mg) by mouth at bedtime 90 tablet 3    naloxone (NARCAN) 4 MG/0.1ML nasal spray Spray 1 spray (4 mg) into one nostril alternating nostrils as needed for opioid reversal every 2-3 minutes until assistance arrives 0.2 mL 1    ondansetron (ZOFRAN ODT) 8 MG ODT tab Take 1 tablet (8 mg) by mouth every 8 hours as needed for nausea. 30 tablet 1    oxyCODONE (ROXICODONE) 5 MG tablet Take 0.5-1 tablets (2.5-5 mg) by mouth every 4 hours as needed for pain or moderate to severe pain (PRN shortness of breath). 60 tablet 0    OXYGEN-HELIUM IN Inhale 3-4 L into the lungs continuous prn      predniSONE (DELTASONE) 10 MG tablet Take 4 tabs (40mg) for 3 days, then 3 tabs (30mg) for 3 days, then 2 tabs (20mg) for 3 days, then 1 tab (10mg) for 3 days. 30 tablet 0    predniSONE (DELTASONE) 20 MG tablet Take 2 tabs daily x 5 days 10 tablet 0    prochlorperazine (COMPAZINE) 10 MG tablet Take 1 tablet (10 mg) by mouth every 6 hours as needed for nausea or vomiting 30 tablet 2    sennosides (SENOKOT) 8.6 MG tablet Take 1 tablet by mouth daily. May increase to dose up to 6 tabs per day (3 tabs twice daily) if needed to assure bowel movement every 1-2 days. 60 tablet 3    umeclidinium (INCRUSE ELLIPTA) 62.5 MCG/ACT inhaler Inhale 1 puff into the lungs daily 30 each 11     "aspirin (ASA) 325 MG EC tablet Take 325 mg by mouth daily      lisinopril (ZESTRIL) 10 MG tablet Take 1 tablet (10 mg) by mouth daily (Patient not taking: Reported on 12/16/2024) 30 tablet 12       No Known Allergies     Social History     Tobacco Use    Smoking status: Former     Current packs/day: 0.00     Types: Cigarettes     Quit date: 6/27/2021     Years since quitting: 3.4     Passive exposure: Past    Smokeless tobacco: Never    Tobacco comments:     updated 8/3/2021 by TTS   Substance Use Topics    Alcohol use: Not Currently       History   Drug Use Unknown           Constitutional: No fever or unexplained weight loss.  No severe fatigue.    HEENT: Negative for ear pain, changes in hearing, frequent nosebleeds, nasal congestion, runny nose, sore throat.  Wears dentures.    Eyes: Denies any changes in vision or eye pain.    Respiratory: Negative for cough, wheezing or shortness of breath.    Cardiovascular: No palpitations, tachycardia, chest pain or lower extremity edema.    Gastrointestinal: Negative for nausea, vomiting, abdominal pain, uncontrolled heartburn or changes in bowel movements.    Genitourinary: Negative for any urinary symptoms or changes in urinary habits.    Integumentary: Negative for any rash or suspicious lesions.    Musculoskeletal: No difficulty with weakness, walking or joint pain.    Neurological: Negative for severe dizziness, headaches or numbness.    Psychiatric: No severe anxiety.  No issues with sleep or significant depression.  Denies recreational drug use or regular use of alcohol.    Allergic/immunologic: Negative for any history of complications with anesthesia.  No history of seasonal allergies.  No known exposure to HIV or hepatitis C.      Objective    /70 (BP Location: Right arm, Patient Position: Sitting)   Pulse 75   Temp 97.6  F (36.4  C)   Resp 16   Ht 1.499 m (4' 11.02\")   Wt 42.5 kg (93 lb 12.8 oz)   LMP  (LMP Unknown)   SpO2 93%   BMI 18.94 kg/m   " "  Estimated body mass index is 18.94 kg/m  as calculated from the following:    Height as of this encounter: 1.499 m (4' 11.02\").    Weight as of this encounter: 42.5 kg (93 lb 12.8 oz).  Physical Exam  Constitutional: In no acute distress.  Clean appearance.  Patient appears thin.  Eyes: PERRLA.  EOMI.  No conjunctival redness.  Ears: Bilateral TMs are intact without any erythema or effusion.  Grossly normal hearing.  Nose: Nares patent bilaterally.  Normal mucosa.  Oropharynx: Normal mucosa.  Wears dentures.  Posterior oropharynx without any abnormalities.  Neck: Supple.  Trachea is midline.  No thyromegaly.  Neck is without tenderness or masses.  Cardiovascular: Regular rate and rhythm.  Normal peripheral perfusion.  No edema.  No varicosities.  Respiratory: Lungs are clear bilaterally.  Normal respiratory effort.  Occasional productive sounding cough noted.  Skin: Skin is pink, warm and dry.  Gastrointestinal: Soft and flat.  Normal bowel sounds.  Nontender throughout upon palpation.  No organomegaly or masses.  Negative for CVA tenderness.  Musculoskeletal: Able to mount exam table without difficulties.  Ambulatory with a wheeled walker.  Psychiatric: Appropriate affect and demeanor.  Memory intact.  Good insight and judgment.  Neurologic: Sensation and temperature of extremities appropriate.  No tremor or involuntary movement noted.    Recent Labs   Lab Test 11/27/24  0952 11/06/24  0843 10/15/24  0912 10/01/24  1229 09/24/24  0837 07/08/24  0934 06/17/24  0858 05/11/24  0509 05/10/24  1850   HGB  --   --   --   --  8.6*  --  8.3*   < > 9.9*   PLT  --   --   --   --  414  --  263   < > 466*   INR  --   --   --   --   --   --   --   --  1.00    141  --    < > 139   < > 139   < > 142   POTASSIUM 4.5 4.1  --    < > 5.7*   < > 5.9*   < > 5.2   CR 0.93 0.99*  --    < > 1.75*   < > 1.26*   < > 0.94   A1C  --   --  6.1*  --   --   --   --   --   --     < > = values in this interval not displayed.    "     Diagnostics  Labs pending at this time.  Results will be reviewed when available.   EKG: sinus tachycardia, normal axis, normal intervals, no acute ST/T changes c/w ischemia, no LVH by voltage criteria, unchanged from previous tracings    Revised Cardiac Risk Index (RCRI)  The patient has the following serious cardiovascular risks for perioperative complications:   - Coronary Artery Disease (MI, positive stress test, angina, Qs on EKG) = 1 point     RCRI Interpretation: 1 point: Class II (low risk - 0.9% complication rate)         Signed Electronically by: Maria Alejandra Olivera NP  A copy of this evaluation report is provided to the requesting physician.

## 2024-12-18 ENCOUNTER — INFUSION THERAPY VISIT (OUTPATIENT)
Dept: INFUSION THERAPY | Facility: HOSPITAL | Age: 69
End: 2024-12-18
Attending: INTERNAL MEDICINE
Payer: COMMERCIAL

## 2024-12-18 VITALS
RESPIRATION RATE: 22 BRPM | SYSTOLIC BLOOD PRESSURE: 126 MMHG | HEART RATE: 109 BPM | TEMPERATURE: 97.7 F | DIASTOLIC BLOOD PRESSURE: 59 MMHG | OXYGEN SATURATION: 95 %

## 2024-12-18 DIAGNOSIS — C34.80 SMALL CELL CARCINOMA OF OVERLAPPING SITES OF LUNG, UNSPECIFIED LATERALITY (H): Primary | ICD-10-CM

## 2024-12-18 LAB
ALBUMIN SERPL BCG-MCNC: 3.6 G/DL (ref 3.5–5.2)
ALP SERPL-CCNC: 97 U/L (ref 40–150)
ALT SERPL W P-5'-P-CCNC: 34 U/L (ref 0–50)
ANION GAP SERPL CALCULATED.3IONS-SCNC: 9 MMOL/L (ref 7–15)
AST SERPL W P-5'-P-CCNC: 32 U/L (ref 0–45)
BILIRUB SERPL-MCNC: 0.2 MG/DL
BUN SERPL-MCNC: 20.5 MG/DL (ref 8–23)
CALCIUM SERPL-MCNC: 9.4 MG/DL (ref 8.8–10.4)
CHLORIDE SERPL-SCNC: 103 MMOL/L (ref 98–107)
CREAT SERPL-MCNC: 0.86 MG/DL (ref 0.51–0.95)
EGFRCR SERPLBLD CKD-EPI 2021: 72 ML/MIN/1.73M2
GLUCOSE SERPL-MCNC: 136 MG/DL (ref 70–99)
HCO3 SERPL-SCNC: 27 MMOL/L (ref 22–29)
POTASSIUM SERPL-SCNC: 4.5 MMOL/L (ref 3.4–5.3)
PROT SERPL-MCNC: 5.9 G/DL (ref 6.4–8.3)
SODIUM SERPL-SCNC: 139 MMOL/L (ref 135–145)
TSH SERPL DL<=0.005 MIU/L-ACNC: 2.62 UIU/ML (ref 0.3–4.2)

## 2024-12-18 PROCEDURE — 250N000011 HC RX IP 250 OP 636: Performed by: INTERNAL MEDICINE

## 2024-12-18 PROCEDURE — 258N000003 HC RX IP 258 OP 636: Mod: JZ | Performed by: INTERNAL MEDICINE

## 2024-12-18 PROCEDURE — 36591 DRAW BLOOD OFF VENOUS DEVICE: CPT | Performed by: INTERNAL MEDICINE

## 2024-12-18 PROCEDURE — 82040 ASSAY OF SERUM ALBUMIN: CPT | Performed by: INTERNAL MEDICINE

## 2024-12-18 PROCEDURE — 84443 ASSAY THYROID STIM HORMONE: CPT | Performed by: INTERNAL MEDICINE

## 2024-12-18 PROCEDURE — 96413 CHEMO IV INFUSION 1 HR: CPT

## 2024-12-18 PROCEDURE — 82374 ASSAY BLOOD CARBON DIOXIDE: CPT | Performed by: INTERNAL MEDICINE

## 2024-12-18 RX ORDER — EPINEPHRINE 1 MG/ML
0.3 INJECTION, SOLUTION INTRAMUSCULAR; SUBCUTANEOUS EVERY 5 MIN PRN
Status: DISCONTINUED | OUTPATIENT
Start: 2024-12-18 | End: 2024-12-18 | Stop reason: HOSPADM

## 2024-12-18 RX ORDER — DIPHENHYDRAMINE HYDROCHLORIDE 50 MG/ML
50 INJECTION INTRAMUSCULAR; INTRAVENOUS
Status: DISCONTINUED | OUTPATIENT
Start: 2024-12-18 | End: 2024-12-18 | Stop reason: HOSPADM

## 2024-12-18 RX ORDER — LORAZEPAM 2 MG/ML
0.5 INJECTION INTRAMUSCULAR EVERY 4 HOURS PRN
Status: DISCONTINUED | OUTPATIENT
Start: 2024-12-18 | End: 2024-12-18 | Stop reason: HOSPADM

## 2024-12-18 RX ORDER — METHYLPREDNISOLONE SODIUM SUCCINATE 40 MG/ML
40 INJECTION INTRAMUSCULAR; INTRAVENOUS
Status: DISCONTINUED | OUTPATIENT
Start: 2024-12-18 | End: 2024-12-18 | Stop reason: HOSPADM

## 2024-12-18 RX ORDER — DIPHENHYDRAMINE HYDROCHLORIDE 50 MG/ML
25 INJECTION INTRAMUSCULAR; INTRAVENOUS
Status: DISCONTINUED | OUTPATIENT
Start: 2024-12-18 | End: 2024-12-18 | Stop reason: HOSPADM

## 2024-12-18 RX ORDER — ALBUTEROL SULFATE 90 UG/1
1-2 INHALANT RESPIRATORY (INHALATION)
Status: DISCONTINUED | OUTPATIENT
Start: 2024-12-18 | End: 2024-12-18 | Stop reason: HOSPADM

## 2024-12-18 RX ORDER — HEPARIN SODIUM (PORCINE) LOCK FLUSH IV SOLN 100 UNIT/ML 100 UNIT/ML
5 SOLUTION INTRAVENOUS
Status: DISCONTINUED | OUTPATIENT
Start: 2024-12-18 | End: 2024-12-18 | Stop reason: HOSPADM

## 2024-12-18 RX ORDER — MEPERIDINE HYDROCHLORIDE 25 MG/ML
25 INJECTION INTRAMUSCULAR; INTRAVENOUS; SUBCUTANEOUS
Status: DISCONTINUED | OUTPATIENT
Start: 2024-12-18 | End: 2024-12-18 | Stop reason: HOSPADM

## 2024-12-18 RX ORDER — ALBUTEROL SULFATE 0.83 MG/ML
2.5 SOLUTION RESPIRATORY (INHALATION)
Status: DISCONTINUED | OUTPATIENT
Start: 2024-12-18 | End: 2024-12-18 | Stop reason: HOSPADM

## 2024-12-18 RX ADMIN — ATEZOLIZUMAB 1200 MG: 1200 INJECTION, SOLUTION INTRAVENOUS at 09:46

## 2024-12-18 RX ADMIN — HEPARIN SODIUM (PORCINE) LOCK FLUSH IV SOLN 100 UNIT/ML 5 ML: 100 SOLUTION at 10:19

## 2024-12-18 ASSESSMENT — PAIN SCALES - GENERAL: PAINLEVEL_OUTOF10: NO PAIN (0)

## 2024-12-18 NOTE — PROGRESS NOTES
Infusion Nursing Note:  Jasmyn Green presents today for C13D1.    Patient seen by provider today: No   present during visit today: Not Applicable.    Note: Jasmyn arrived via wheelchair and in stable condition. Port accessed using sterile technique, good blood return noted, and labs collected. Treatment administered per orders. Will return on 1/8 for next appointment.      Intravenous Access:  Implanted Port.    Treatment Conditions:  Lab Results   Component Value Date     12/18/2024    POTASSIUM 4.5 12/18/2024    MAG 2.1 05/17/2024    CR 0.86 12/18/2024    FLOR 9.4 12/18/2024    BILITOTAL 0.2 12/18/2024    ALBUMIN 3.6 12/18/2024    ALT 34 12/18/2024    AST 32 12/18/2024       Results reviewed, labs MET treatment parameters, ok to proceed with treatment.      Post Infusion Assessment:  Patient tolerated infusion without incident.  Blood return noted pre and post infusion.  No evidence of extravasations.  Access discontinued per protocol.       Discharge Plan:   Discharge instructions reviewed with: Patient.  Patient and/or family verbalized understanding of discharge instructions and all questions answered.  Patient discharged in stable condition accompanied by: self.  Departure Mode: Ambulatory.      Ce Angulo RN

## 2024-12-20 ENCOUNTER — TELEPHONE (OUTPATIENT)
Dept: INTERVENTIONAL RADIOLOGY/VASCULAR | Facility: CLINIC | Age: 69
End: 2024-12-20
Payer: COMMERCIAL

## 2024-12-23 ENCOUNTER — MYC REFILL (OUTPATIENT)
Dept: PALLIATIVE MEDICINE | Facility: CLINIC | Age: 69
End: 2024-12-23
Payer: COMMERCIAL

## 2024-12-23 DIAGNOSIS — C34.90 MALIGNANT NEOPLASM OF LUNG, UNSPECIFIED LATERALITY, UNSPECIFIED PART OF LUNG (H): ICD-10-CM

## 2024-12-23 DIAGNOSIS — J44.9 COPD, GROUP D, BY GOLD 2017 CLASSIFICATION (H): ICD-10-CM

## 2024-12-23 DIAGNOSIS — G89.3 CANCER ASSOCIATED PAIN: ICD-10-CM

## 2024-12-23 NOTE — TELEPHONE ENCOUNTER
Received [employer] [place] message from patient requesting refill of oxycodone.     Last refill: 11/15/2024  Last office visit: 11/29/2024  Scheduled for follow up 1/24/2025     Will route request to MD/ for review.     Reviewed [place]  Report.

## 2024-12-25 RX ORDER — OXYCODONE HYDROCHLORIDE 5 MG/1
2.5-5 TABLET ORAL EVERY 4 HOURS PRN
Qty: 60 TABLET | Refills: 0 | Status: SHIPPED | OUTPATIENT
Start: 2024-12-25

## 2024-12-27 ENCOUNTER — HOSPITAL ENCOUNTER (OUTPATIENT)
Dept: RADIOLOGY | Facility: HOSPITAL | Age: 69
Discharge: HOME | End: 2024-12-27
Attending: STUDENT IN AN ORGANIZED HEALTH CARE EDUCATION/TRAINING PROGRAM
Payer: COMMERCIAL

## 2024-12-27 ENCOUNTER — HOSPITAL ENCOUNTER (OUTPATIENT)
Dept: CT IMAGING | Facility: HOSPITAL | Age: 69
Discharge: HOME | End: 2024-12-27
Attending: INTERNAL MEDICINE
Payer: COMMERCIAL

## 2024-12-27 VITALS
HEART RATE: 119 BPM | DIASTOLIC BLOOD PRESSURE: 65 MMHG | RESPIRATION RATE: 20 BRPM | OXYGEN SATURATION: 96 % | SYSTOLIC BLOOD PRESSURE: 126 MMHG | TEMPERATURE: 97.4 F

## 2024-12-27 DIAGNOSIS — Z01.818 PRE-OP EXAM: Primary | ICD-10-CM

## 2024-12-27 DIAGNOSIS — C34.80 SMALL CELL CARCINOMA OF OVERLAPPING SITES OF LUNG, UNSPECIFIED LATERALITY (H): ICD-10-CM

## 2024-12-27 LAB
HGB BLD-MCNC: 8.4 G/DL (ref 11.7–15.7)
INR PPP: 1.05 (ref 0.85–1.15)
PLATELET # BLD AUTO: 342 10E3/UL (ref 150–450)

## 2024-12-27 PROCEDURE — 85610 PROTHROMBIN TIME: CPT | Performed by: RADIOLOGY

## 2024-12-27 PROCEDURE — 85018 HEMOGLOBIN: CPT | Performed by: RADIOLOGY

## 2024-12-27 PROCEDURE — 36591 DRAW BLOOD OFF VENOUS DEVICE: CPT | Performed by: RADIOLOGY

## 2024-12-27 PROCEDURE — 85049 AUTOMATED PLATELET COUNT: CPT | Performed by: RADIOLOGY

## 2024-12-27 PROCEDURE — 99152 MOD SED SAME PHYS/QHP 5/>YRS: CPT

## 2024-12-27 PROCEDURE — 88305 TISSUE EXAM BY PATHOLOGIST: CPT | Mod: TC | Performed by: INTERNAL MEDICINE

## 2024-12-27 PROCEDURE — 250N000011 HC RX IP 250 OP 636: Performed by: STUDENT IN AN ORGANIZED HEALTH CARE EDUCATION/TRAINING PROGRAM

## 2024-12-27 PROCEDURE — 71045 X-RAY EXAM CHEST 1 VIEW: CPT

## 2024-12-27 PROCEDURE — 88360 TUMOR IMMUNOHISTOCHEM/MANUAL: CPT | Mod: TC | Performed by: INTERNAL MEDICINE

## 2024-12-27 RX ORDER — NALOXONE HYDROCHLORIDE 0.4 MG/ML
0.2 INJECTION, SOLUTION INTRAMUSCULAR; INTRAVENOUS; SUBCUTANEOUS
Status: DISCONTINUED | OUTPATIENT
Start: 2024-12-27 | End: 2024-12-28 | Stop reason: HOSPADM

## 2024-12-27 RX ORDER — HEPARIN SODIUM (PORCINE) LOCK FLUSH IV SOLN 100 UNIT/ML 100 UNIT/ML
500 SOLUTION INTRAVENOUS ONCE
Status: DISCONTINUED | OUTPATIENT
Start: 2024-12-27 | End: 2024-12-28 | Stop reason: HOSPADM

## 2024-12-27 RX ORDER — NALOXONE HYDROCHLORIDE 0.4 MG/ML
0.4 INJECTION, SOLUTION INTRAMUSCULAR; INTRAVENOUS; SUBCUTANEOUS
Status: DISCONTINUED | OUTPATIENT
Start: 2024-12-27 | End: 2024-12-28 | Stop reason: HOSPADM

## 2024-12-27 RX ORDER — FLUMAZENIL 0.1 MG/ML
0.2 INJECTION, SOLUTION INTRAVENOUS
Status: DISCONTINUED | OUTPATIENT
Start: 2024-12-27 | End: 2024-12-28 | Stop reason: HOSPADM

## 2024-12-27 RX ORDER — FENTANYL CITRATE 50 UG/ML
25-50 INJECTION, SOLUTION INTRAMUSCULAR; INTRAVENOUS EVERY 5 MIN PRN
Status: DISCONTINUED | OUTPATIENT
Start: 2024-12-27 | End: 2024-12-28 | Stop reason: HOSPADM

## 2024-12-27 RX ADMIN — MIDAZOLAM HYDROCHLORIDE 0.5 MG: 1 INJECTION, SOLUTION INTRAMUSCULAR; INTRAVENOUS at 08:36

## 2024-12-27 RX ADMIN — FENTANYL CITRATE 25 MCG: 50 INJECTION, SOLUTION INTRAMUSCULAR; INTRAVENOUS at 08:39

## 2024-12-27 NOTE — IR NOTE
Patient Name: Jasmyn Green  Medical Record Number: [id number]  Today's Date: 12/27/2024    Procedure: lung biopsy  Proceduralist: Alberto    Procedure Start: 0837  Procedure end: 0902  Sedation medications administered: .5 mg midazolam and 25 mcg fentanyl   Sedation time: 25 minutes

## 2024-12-27 NOTE — PRE-PROCEDURE
GENERAL PRE-PROCEDURE:   Procedure:  CT guided lung biopsy with moderate sedation, possible chest tube placement  Date/Time:  12/27/2024 7:42 AM    Written consent obtained?: Yes    Risks and benefits: Risks, benefits and alternatives were discussed    Consent given by:  Patient  Patient states understanding of procedure being performed: Yes    Patient's understanding of procedure matches consent: Yes    Procedure consent matches procedure scheduled: Yes    Expected level of sedation:  Moderate  Appropriately NPO:  Yes  ASA Class:  1  Mallampati  :  Grade 1- soft palate, uvula, tonsillar pillars, and posterior pharyngeal wall visible  Lungs:  Lungs clear with good breath sounds bilaterally  Heart:  Normal heart sounds and rate  History & Physical reviewed:  History and physical reviewed and no updates needed  Statement of review:  I have reviewed the lab findings, diagnostic data, medications, and the plan for sedation

## 2024-12-27 NOTE — PROCEDURES
[facility]    Procedure: CT guided lung biopsy with moderate sedation    Date/Time: 12/27/2024 9:07 AM    Performed by: Ari Garrett MD  Authorized by: Ari Garrett MD  IR Fellow Physician:    Pre Procedure Diagnosis: Metastatic small cell lung cancer  Post Procedure Diagnosis: Same    UNIVERSAL PROTOCOL   Site Marked: Yes  Prior Images Obtained and Reviewed:  Yes  Required items: Required blood products, implants, devices and special equipment available    Patient identity confirmed:  Verbally with patient, arm band and provided demographic data  Patient was reevaluated immediately before administering moderate or deep sedation or anesthesia  Confirmation Checklist:  Patient's identity using two indicators, relevant allergies and procedure was appropriate and matched the consent or emergent situation  Time out: Immediately prior to the procedure a time out was called    Universal Protocol: the Joint Commission Universal Protocol was followed    Preparation: Patient was prepped and draped in usual sterile fashion       ANESTHESIA    Anesthesia:  Local infiltration  Local Anesthetic:  Lidocaine 1% without epinephrine      SEDATION  Patient Sedated: Yes    Sedation Type:  Moderate (conscious) sedation  Sedation:  Fentanyl, midazolam and see MAR for details  Vital signs: Vital signs monitored during sedation    Findings: Successful biopsy of peripheral left upper lobe nodule.     Specimens: core needle biopsy specimens sent for pathological analysis    Procedural Complications: None    Condition: Stable    Plan: Bedrest for 1 hour followed by chest radiograph.   If radiograph looks okay, can eat/drink and monitor for 2 more hours.       PROCEDURE    Length of time physician/provider present for 1:1 monitoring during sedation:  23-37 min

## 2024-12-27 NOTE — DISCHARGE INSTRUCTIONS
1. You are required to have someone accompany you home. Do not drive or operate machinery today as the medication may cause sleepiness.    2. Rest today and avoid strenuous activity or heavy lifting for 48 hours. Over-activity may produce dizziness and or nausea.    3. You should follow your normal diet. Drink plenty of fluids. No alcoholic beverages for 24 hours. *(Alcohol may interact with the medications you received today)    4. Leave bandage on today, you may remove tomorrow.    5. You may shower tomorrow. Do not soak in a bath tub, hot tub, or swim until the site is completely healed and the skin glue is off. Keep the site clean and dry.    ADDITIONAL INSTRUCTIONS    When to call your Doctor:     1. Watch your biopsy site for signs of infection, increase pain, redness, swelling, or any drainage and or fever or chills.    2. On-going nausea, vomiting, or un-usual increase in pain.    3. If you experience any of the above or sudden weakness, dizziness, abdominal pain, flank pain or a temperature above 100.0 degree F for more than 24 hours, call your Doctor.    4. Sudden on-set of shortness of breath - call 911 or go to the emergency room.

## 2024-12-30 LAB
PATH REPORT.COMMENTS IMP SPEC: ABNORMAL
PATH REPORT.COMMENTS IMP SPEC: YES
PATH REPORT.FINAL DX SPEC: ABNORMAL
PATH REPORT.GROSS SPEC: ABNORMAL
PATH REPORT.MICROSCOPIC SPEC OTHER STN: ABNORMAL
PATH REPORT.MICROSCOPIC SPEC OTHER STN: ABNORMAL
PATH REPORT.RELEVANT HX SPEC: ABNORMAL
PHOTO IMAGE: ABNORMAL

## 2024-12-30 PROCEDURE — 88342 IMHCHEM/IMCYTCHM 1ST ANTB: CPT | Mod: 26 | Performed by: PATHOLOGY

## 2024-12-30 PROCEDURE — 88333 PATH CONSLTJ SURG CYTO XM 1: CPT | Mod: 26 | Performed by: PATHOLOGY

## 2024-12-30 PROCEDURE — 88341 IMHCHEM/IMCYTCHM EA ADD ANTB: CPT | Mod: 26 | Performed by: PATHOLOGY

## 2024-12-30 PROCEDURE — 88360 TUMOR IMMUNOHISTOCHEM/MANUAL: CPT | Mod: 26 | Performed by: PATHOLOGY

## 2024-12-30 PROCEDURE — 88305 TISSUE EXAM BY PATHOLOGIST: CPT | Mod: 26 | Performed by: PATHOLOGY

## 2024-12-31 ENCOUNTER — OFFICE VISIT (OUTPATIENT)
Dept: PULMONOLOGY | Facility: CLINIC | Age: 69
End: 2024-12-31
Payer: COMMERCIAL

## 2024-12-31 VITALS
DIASTOLIC BLOOD PRESSURE: 70 MMHG | HEART RATE: 124 BPM | SYSTOLIC BLOOD PRESSURE: 130 MMHG | WEIGHT: 94.4 LBS | OXYGEN SATURATION: 92 % | BODY MASS INDEX: 19.06 KG/M2

## 2024-12-31 DIAGNOSIS — J96.11 CHRONIC RESPIRATORY FAILURE WITH HYPOXIA (H): ICD-10-CM

## 2024-12-31 DIAGNOSIS — J44.9 COPD, GROUP E, BY GOLD 2023 CLASSIFICATION (H): Primary | ICD-10-CM

## 2024-12-31 DIAGNOSIS — J43.8 OTHER EMPHYSEMA (H): ICD-10-CM

## 2024-12-31 PROCEDURE — 99214 OFFICE O/P EST MOD 30 MIN: CPT | Performed by: NURSE PRACTITIONER

## 2024-12-31 RX ORDER — IPRATROPIUM BROMIDE AND ALBUTEROL SULFATE 2.5; .5 MG/3ML; MG/3ML
1 SOLUTION RESPIRATORY (INHALATION) EVERY 6 HOURS PRN
Qty: 360 ML | Refills: 11 | Status: SHIPPED | OUTPATIENT
Start: 2024-12-31

## 2024-12-31 RX ORDER — AZITHROMYCIN 250 MG/1
250 TABLET, FILM COATED ORAL DAILY
Qty: 90 TABLET | Refills: 5 | Status: SHIPPED | OUTPATIENT
Start: 2024-12-31

## 2024-12-31 NOTE — PATIENT INSTRUCTIONS
It was a pleasure to see you in clinic today.   Here is what we discussed:    Start azithromycin 250mg daily.  Continue Spiriva + Airduo as you have been.  Continue Duonebs four times daily.  Continue Combivent every 4-6 hours as needed for shortness of breath or wheezing.  Continue 3L oxygen to keep oxygen saturations 88-92%.   Call my nurse, Jamie ([phone]) with any change or worsening of your breathing.  Follow-up in 2 months virtually.    Tata Lainez, CNP  Pulmonary Medicine  [facility]  [phone]

## 2024-12-31 NOTE — PROGRESS NOTES
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     COPD - GOLD E  Chronic hypoxic respiratory failure  Emphysema  Severe obstructive lung disease (FEV1 29%), with hyperinflation and air-trapping.  Severe diffusion capacity defect (DLCO 30%).  Radiographic emphysema.  Utilizes pharmacy from [email or web address] for some prescriptions.   Over the last 3 months, she has required her action plan monthly.  She states that it is mucous that is her main concern.  Clear CXR on 12/27/24.  Plan:  - start azithromycin 250mg daily.  Plan for repeat ECG next in person visit, last QTc in December was 408.  May need audiology referral for yearly checks.   - next visit consider switch from Spiriva + Airduo to Trelegy Ellipta.  Continue these inhalers for now.   - continue Duonebs QID scheduled + flutter valve.   - continue Combivent PRN.  - continue oxygen 3L pulse dose with activity, to keep oxygen saturations 88-92%.  Continue oxygen 3L at night.  - She is UTD with prevnar 20, annual influenza vaccine.   - Action plan: prednisone 40mg x3 days, 30mg x3 days, 20mg x3 days, 10mg x3 days + doxycycline 100mg BID x5 days.  No need to hold azithromycin.    Small cell lung cancer with brain metastases  Nicotine dependence, in remission  Former smoker, quit in 2021.  Hospitalization in 2/2024, CT on admission showed bulky AP and left hilar adenopathy with irregular pleural-based anterior left upper lobe nodule with adjacent smaller satellite nodules.  EBUS with biopsy on 2/16/24, positive for small cell lung cancer.  She is now followed by Dr Nuñez in radiation oncology and Dr Stewart in oncology.    Plan:  - follow closely with Dr Nuñez and Dr Stewart  - continue following with  palliative care, next appt 1/24/25.     Follow-up  - 2 months virtually, then next will be in person with ECG.      Tata Lainez, CNP  Pulmonary Medicine  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     Requested action plan in October, November, December.  CT guided biopsy 12/27/24 due to growth of ARIANNE nodule.  Main symptom is mucous, when requesting action plans.  The color changes.  She continues on spiriva + airduo, combivent PRN, duonebs QID - does get up mucous with the duonebs.  3-5L oxygen during day, 3L at night.      Patient supplied answers from flow sheet for:  COPD Assessment Test (CAT)  2009 [employer]. All rights reserved.      6/29/2022     8:00 AM 10/4/2022     9:00 AM 4/21/2023     7:00 AM 11/8/2023     9:37 AM 3/22/2024     8:26 AM 9/23/2024     8:00 AM 12/31/2024     8:16 AM   COPD assessment test (CAT)   Cough 3 3 4 3 3 5 3   Phlegm 3 2 4 1 2 3 2   Chest tightness 0 0 2 0 0 1 0   Walk up hill 5 5 5 4 4 4 4   Limited activities 3 0 4 4 4 4 4   Leaving my home 0 0 0 0 2 2 0   Sleep 0 1 0 1 2 2 0   Energy 4 0 1 2 4 4 4   Total Score 18 11 20 15 21 25 17        Patient-reported      CAT Key:  The CAT consist of 8 items which are each scored 0-5. The total score ranges from 0-40 with higher scores representing a poorer health status. When interpreting CAT scores, the individual s disease severity should be considered.   Low impact  (1-9)  Medium impact  (10-20)  High impact  (21-30)  Very high impact  (31-40)     ROS:     6-point ROS performed and is negative aside from those listed in HPI.     Medical history:     Past Medical History:   Diagnosis Date    Age-related  osteoporosis without current pathological fracture     Arthritis     COPD (chronic obstructive pulmonary disease) (H)     Coronary artery disease     Dependence on nocturnal oxygen therapy     Dyspnea on exertion     Emphysema lung (H)     Gout     HLD (hyperlipidemia)     Hypertension     Lung mass      Past Surgical History:   Procedure Laterality Date    BRONCHOSCOPY RIGID OR FLEXIBLE W/TRANSENDOSCOPIC ENDOBRONCHIAL ULTRASOUND GUIDED N/A 2/16/2024    Procedure: BRONCHOSCOPY, WITH ENDOBRONCHIAL ULTRASOUND;  Surgeon: Ruben Levin MD;  Location: [facility] OR    COLONOSCOPY N/A 10/21/2021    Procedure: COLONOSCOPY;  Surgeon: Wilma Hester DO;  Location: [facility] OR    IR CHEST PORT PLACEMENT > 5 YRS OF AGE  5/2/2024    VAGINAL DELIVERY      x 3 ; remote    WISDOM TOOTH EXTRACTION       Social History     Tobacco Use    Smoking status: Former     Current packs/day: 0.00     Types: Cigarettes     Quit date: 6/27/2021     Years since quitting: 3.5     Passive exposure: Past    Smokeless tobacco: Never    Tobacco comments:     updated 8/3/2021 by TTS   Vaping Use    Vaping status: Never Used   Substance Use Topics    Alcohol use: Not Currently    Drug use: Never     Current Outpatient Medications   Medication Sig Dispense Refill    acetaminophen (TYLENOL) 500 MG tablet Take 2 tablets (1,000 mg) by mouth every 8 hours as needed for pain      albuterol (PROAIR HFA/PROVENTIL HFA/VENTOLIN HFA) 108 (90 Base) MCG/ACT inhaler Inhale 1-2 puffs into the lungs every 6 hours as needed for shortness of breath, wheezing or cough      apixaban ANTICOAGULANT (ELIQUIS ANTICOAGULANT) 5 MG tablet Take 1 tablet (5 mg) by mouth 2 times daily 60 tablet 5    aspirin 81 MG EC tablet Take 81 mg by mouth daily.      atorvastatin (LIPITOR) 40 MG tablet Take 1 tablet (40 mg) by mouth daily 90 tablet 3    azithromycin (ZITHROMAX) 250 MG tablet Take 1 tablet (250 mg) by mouth daily. 90 tablet 5    buprenorphine (BUTRANS) 10 MCG/HR  WK patch Place 1 patch over 168 hours onto the skin every 7 days. 4 patch 1    calcium carbonate 600 mg-vitamin D 400 units (CALTRATE) 600-400 MG-UNIT per tablet Take 1 tablet by mouth 2 times daily With calcium      diazepam (VALIUM) 5 MG tablet Take 1 tablet (5 mg) by mouth once as needed for anxiety. 2 tablet 2    fexofenadine (ALLEGRA ALLERGY) 60 MG tablet Take 60 mg by mouth daily.      fluticasone-salmeterol (AIRDUO RESPICLICK) 232-14 MCG/ACT inhaler Inhale 1 puff into the lungs 2 times daily 1 each 11    hydrOXYzine HCl (ATARAX) 25 MG tablet Take 1-2 tablets (25-50 mg) by mouth every 8 hours as needed for anxiety (Insomnia) 30 tablet 3    ipratropium - albuterol 0.5 mg/2.5 mg/3 mL (DUONEB) 0.5-2.5 (3) MG/3ML neb solution Take 1 vial (3 mLs) by nebulization every 6 hours as needed for shortness of breath, wheezing or cough. 360 mL 11    ipratropium-albuterol (COMBIVENT RESPIMAT)  MCG/ACT inhaler Inhale 1 puff into the lungs 4 times daily. 4 g 11    lactobacillus rhamnosus, GG, (CULTURELL) capsule Take 1 capsule by mouth 2 times daily      lidocaine-prilocaine (EMLA) 2.5-2.5 % external cream Apply topically as needed for moderate pain 30 g 5    lisinopril (ZESTRIL) 10 MG tablet Take 1 tablet (10 mg) by mouth daily 30 tablet 12    mirtazapine (REMERON) 7.5 MG tablet Take 1 tablet (7.5 mg) by mouth at bedtime 90 tablet 3    naloxone (NARCAN) 4 MG/0.1ML nasal spray Spray 1 spray (4 mg) into one nostril alternating nostrils as needed for opioid reversal every 2-3 minutes until assistance arrives 0.2 mL 1    ondansetron (ZOFRAN ODT) 8 MG ODT tab Take 1 tablet (8 mg) by mouth every 8 hours as needed for nausea. 30 tablet 1    oxyCODONE (ROXICODONE) 5 MG tablet Take 0.5-1 tablets (2.5-5 mg) by mouth every 4 hours as needed for pain or moderate to severe pain (PRN shortness of breath). 60 tablet 0    OXYGEN-HELIUM IN Inhale 3-4 L into the lungs continuous prn      predniSONE (DELTASONE) 10 MG tablet Take 4 tabs  (40mg) for 3 days, then 3 tabs (30mg) for 3 days, then 2 tabs (20mg) for 3 days, then 1 tab (10mg) for 3 days. 30 tablet 0    prochlorperazine (COMPAZINE) 10 MG tablet Take 1 tablet (10 mg) by mouth every 6 hours as needed for nausea or vomiting 30 tablet 2    sennosides (SENOKOT) 8.6 MG tablet Take 1 tablet by mouth daily. May increase to dose up to 6 tabs per day (3 tabs twice daily) if needed to assure bowel movement every 1-2 days. 60 tablet 3    umeclidinium (INCRUSE ELLIPTA) 62.5 MCG/ACT inhaler Inhale 1 puff into the lungs daily 30 each 11    dexAMETHasone (DECADRON) 4 MG tablet Take 1 tablet (4 mg) by mouth 2 times daily (with meals) (Patient not taking: Reported on 12/31/2024) 20 tablet 1     No current facility-administered medications for this visit.        Physical Exam:     /70 (BP Location: Left arm, Patient Position: Sitting, Cuff Size: Child)   Pulse (!) 124   Wt 42.8 kg (94 lb 6.4 oz)   LMP  (LMP Unknown)   SpO2 92%   BMI 19.06 kg/m    Gen: adult female, appears in NAD, frail.  Son and daughter present.   HEENT: clear conjunctivae, moist mucous membranes  CV: RRR, no M/G/R  Resp: decreased air movement throughout.  Respirations even and unlabored.  On 3L oxygen.  Congested cough.  Skin: no apparent rashes on visible skin  Ext: no cyanosis, clubbing or edema  Neuro: alert and answering questions appropriately       Data:     Chest CT 2/8/24:  IMPRESSION:   1.  Bulky AP and left hilar adenopathy with irregular pleural-based anterior left upper lobe nodule with adjacent smaller satellite nodules. Findings suspicious for a primary lung cancer.  2.  Nodular thickening of the left adrenal gland is likely unchanged.  3.  Patient needs PET/CT for further evaluation.  4.  Extensive emphysema.  5.  Findings discussed by the undersigned with Dr. Ramos at 1141.    PFTs 8/2021:  The FVC, FEV1 and FEV1/FVC ratio are reduced, but the FDU65-06% is within normal limits.  The inspiratory flow rates are  reduced.  The FVC is reduced relative to the SVC indicating air trapping.  The TLC, RV, FRC and RV/TLC ratio are all increased   indicating overinflation and air trapping.  Following administration of bronchodilators, there is no significant response.  The diffusing capacity is severely reduced when corrected to hemoglobin.   IMPRESSION:   Very severe Airflow Obstruction. No significant postbronchodilator response.   Mild hyperinflation and moderate airtrapping.   Diffusion capacity was severely reduced when corrected to hemoglobin.

## 2025-01-06 ENCOUNTER — TELEPHONE (OUTPATIENT)
Dept: PALLIATIVE CARE | Facility: CLINIC | Age: 70
End: 2025-01-06

## 2025-01-06 DIAGNOSIS — G89.3 CANCER ASSOCIATED PAIN: Primary | ICD-10-CM

## 2025-01-06 DIAGNOSIS — C34.90 MALIGNANT NEOPLASM OF LUNG, UNSPECIFIED LATERALITY, UNSPECIFIED PART OF LUNG (H): ICD-10-CM

## 2025-01-06 DIAGNOSIS — J44.9 COPD, GROUP D, BY GOLD 2017 CLASSIFICATION (H): ICD-10-CM

## 2025-01-06 DIAGNOSIS — G89.3 CANCER ASSOCIATED PAIN: ICD-10-CM

## 2025-01-06 DIAGNOSIS — C34.90 SMALL CELL CARCINOMA OF LUNG, UNSPECIFIED LATERALITY, UNSPECIFIED PART OF LUNG (H): ICD-10-CM

## 2025-01-06 RX ORDER — BUPRENORPHINE 20 UG/H
1 PATCH TRANSDERMAL
Qty: 4 PATCH | Refills: 1 | Status: SHIPPED | OUTPATIENT
Start: 2025-01-06

## 2025-01-06 NOTE — TELEPHONE ENCOUNTER
Prior Authorization Retail Medication Request    Medication/Dose: Butrans 20 mcg/hr patch  Diagnosis and ICD code (if different than what is on RX):     New/renewal/insurance change PA/secondary ins. PA:  Previously Tried and Failed:     Rationale:       Insurance   Primary:    Insurance ID:       Secondary (if applicable):   Insurance ID:       Pharmacy Information (if different than what is on RX)  Name:     Phone:     Fax:     Clinic Information  Preferred routing pool for dept communication: Amanda Paredes RN

## 2025-01-06 NOTE — PROGRESS NOTES
Palliative Care:    Per discussion with daughter Karie, increase Butrans patch to 20 mcg/h based upon as needed use of oxycodone IR and recent worsening in symptom burden.     JIMENEZ Huggins

## 2025-01-06 NOTE — TELEPHONE ENCOUNTER
Prior Authorization Not Needed per Insurance and Pharmacy    Medication: BUTRANS 20 MCG/HR TD PTWK  Insurance Company: [employer] - Phone [phone] Fax [phone]  Expected CoPay: $    Pharmacy Filling the Rx: [facility] - [place], [place] - [address] [place] [address]  Pharmacy Notified: Yes they sent this request in error.  Patient Notified: The pharmacy will notify the patient.

## 2025-01-07 ENCOUNTER — HOSPITAL ENCOUNTER (OUTPATIENT)
Dept: MRI IMAGING | Facility: CLINIC | Age: 70
Discharge: HOME | End: 2025-01-07
Attending: STUDENT IN AN ORGANIZED HEALTH CARE EDUCATION/TRAINING PROGRAM
Payer: COMMERCIAL

## 2025-01-07 DIAGNOSIS — C79.31 SECONDARY MALIGNANT NEOPLASM OF BRAIN (H): ICD-10-CM

## 2025-01-07 PROCEDURE — 70553 MRI BRAIN STEM W/O & W/DYE: CPT

## 2025-01-07 PROCEDURE — A9585 GADOBUTROL INJECTION: HCPCS | Performed by: STUDENT IN AN ORGANIZED HEALTH CARE EDUCATION/TRAINING PROGRAM

## 2025-01-07 PROCEDURE — 255N000002 HC RX 255 OP 636: Performed by: STUDENT IN AN ORGANIZED HEALTH CARE EDUCATION/TRAINING PROGRAM

## 2025-01-07 RX ORDER — HEPARIN SODIUM,PORCINE 10 UNIT/ML
5-10 VIAL (ML) INTRAVENOUS EVERY 24 HOURS
Status: DISCONTINUED | OUTPATIENT
Start: 2025-01-07 | End: 2025-01-08 | Stop reason: HOSPADM

## 2025-01-07 RX ORDER — HEPARIN SODIUM (PORCINE) LOCK FLUSH IV SOLN 100 UNIT/ML 100 UNIT/ML
5-10 SOLUTION INTRAVENOUS
Status: DISCONTINUED | OUTPATIENT
Start: 2025-01-07 | End: 2025-01-08 | Stop reason: HOSPADM

## 2025-01-07 RX ORDER — GADOBUTROL 604.72 MG/ML
4.2 INJECTION INTRAVENOUS ONCE
Status: COMPLETED | OUTPATIENT
Start: 2025-01-07 | End: 2025-01-07

## 2025-01-07 RX ORDER — HEPARIN SODIUM,PORCINE 10 UNIT/ML
5-10 VIAL (ML) INTRAVENOUS
Status: DISCONTINUED | OUTPATIENT
Start: 2025-01-07 | End: 2025-01-08 | Stop reason: HOSPADM

## 2025-01-07 RX ADMIN — HEPARIN SODIUM (PORCINE) LOCK FLUSH IV SOLN 100 UNIT/ML 5 ML: 100 SOLUTION at 14:22

## 2025-01-07 RX ADMIN — GADOBUTROL 4.2 ML: 604.72 INJECTION INTRAVENOUS at 14:07

## 2025-01-08 ENCOUNTER — INFUSION THERAPY VISIT (OUTPATIENT)
Dept: INFUSION THERAPY | Facility: HOSPITAL | Age: 70
End: 2025-01-08
Attending: INTERNAL MEDICINE
Payer: COMMERCIAL

## 2025-01-08 ENCOUNTER — ONCOLOGY VISIT (OUTPATIENT)
Dept: ONCOLOGY | Facility: HOSPITAL | Age: 70
End: 2025-01-08
Attending: INTERNAL MEDICINE
Payer: COMMERCIAL

## 2025-01-08 VITALS
OXYGEN SATURATION: 94 % | TEMPERATURE: 97.8 F | WEIGHT: 89.2 LBS | RESPIRATION RATE: 16 BRPM | DIASTOLIC BLOOD PRESSURE: 62 MMHG | BODY MASS INDEX: 18.01 KG/M2 | SYSTOLIC BLOOD PRESSURE: 131 MMHG | HEART RATE: 110 BPM

## 2025-01-08 DIAGNOSIS — C34.80 SMALL CELL CARCINOMA OF OVERLAPPING SITES OF LUNG, UNSPECIFIED LATERALITY (H): ICD-10-CM

## 2025-01-08 DIAGNOSIS — E86.0 DEHYDRATION: Primary | ICD-10-CM

## 2025-01-08 DIAGNOSIS — M81.0 AGE-RELATED OSTEOPOROSIS WITHOUT CURRENT PATHOLOGICAL FRACTURE: ICD-10-CM

## 2025-01-08 DIAGNOSIS — G93.9 BRAIN LESION: ICD-10-CM

## 2025-01-08 DIAGNOSIS — C34.90 SMALL CELL CARCINOMA OF LUNG, UNSPECIFIED LATERALITY, UNSPECIFIED PART OF LUNG (H): Primary | ICD-10-CM

## 2025-01-08 DIAGNOSIS — C34.80 SMALL CELL CARCINOMA OF OVERLAPPING SITES OF LUNG, UNSPECIFIED LATERALITY (H): Primary | ICD-10-CM

## 2025-01-08 LAB
ALBUMIN SERPL BCG-MCNC: 3.4 G/DL (ref 3.5–5.2)
ALP SERPL-CCNC: 97 U/L (ref 40–150)
ALT SERPL W P-5'-P-CCNC: 24 U/L (ref 0–50)
ANION GAP SERPL CALCULATED.3IONS-SCNC: 10 MMOL/L (ref 7–15)
AST SERPL W P-5'-P-CCNC: 23 U/L (ref 0–45)
BILIRUB SERPL-MCNC: <0.2 MG/DL
BUN SERPL-MCNC: 19.9 MG/DL (ref 8–23)
CALCIUM SERPL-MCNC: 9.4 MG/DL (ref 8.8–10.4)
CHLORIDE SERPL-SCNC: 102 MMOL/L (ref 98–107)
CREAT SERPL-MCNC: 0.92 MG/DL (ref 0.51–0.95)
EGFRCR SERPLBLD CKD-EPI 2021: 66 ML/MIN/1.73M2
GLUCOSE SERPL-MCNC: 295 MG/DL (ref 70–99)
HCO3 SERPL-SCNC: 32 MMOL/L (ref 22–29)
POTASSIUM SERPL-SCNC: 4 MMOL/L (ref 3.4–5.3)
PROT SERPL-MCNC: 6.2 G/DL (ref 6.4–8.3)
SODIUM SERPL-SCNC: 144 MMOL/L (ref 135–145)
TSH SERPL DL<=0.005 MIU/L-ACNC: 1.08 UIU/ML (ref 0.3–4.2)

## 2025-01-08 PROCEDURE — G2211 COMPLEX E/M VISIT ADD ON: HCPCS | Performed by: INTERNAL MEDICINE

## 2025-01-08 PROCEDURE — 99215 OFFICE O/P EST HI 40 MIN: CPT | Performed by: INTERNAL MEDICINE

## 2025-01-08 PROCEDURE — 80053 COMPREHEN METABOLIC PANEL: CPT | Performed by: INTERNAL MEDICINE

## 2025-01-08 PROCEDURE — 36591 DRAW BLOOD OFF VENOUS DEVICE: CPT | Performed by: INTERNAL MEDICINE

## 2025-01-08 PROCEDURE — 84443 ASSAY THYROID STIM HORMONE: CPT | Performed by: INTERNAL MEDICINE

## 2025-01-08 PROCEDURE — 250N000011 HC RX IP 250 OP 636: Performed by: INTERNAL MEDICINE

## 2025-01-08 PROCEDURE — G0463 HOSPITAL OUTPT CLINIC VISIT: HCPCS | Performed by: INTERNAL MEDICINE

## 2025-01-08 RX ORDER — HEPARIN SODIUM (PORCINE) LOCK FLUSH IV SOLN 100 UNIT/ML 100 UNIT/ML
SOLUTION INTRAVENOUS
Status: DISCONTINUED
Start: 2025-01-08 | End: 2025-01-08 | Stop reason: HOSPADM

## 2025-01-08 RX ORDER — HEPARIN SODIUM,PORCINE 10 UNIT/ML
5-20 VIAL (ML) INTRAVENOUS DAILY PRN
OUTPATIENT
Start: 2025-01-08

## 2025-01-08 RX ORDER — HEPARIN SODIUM (PORCINE) LOCK FLUSH IV SOLN 100 UNIT/ML 100 UNIT/ML
5 SOLUTION INTRAVENOUS EVERY 8 HOURS
Start: 2025-01-08

## 2025-01-08 RX ORDER — PROCHLORPERAZINE MALEATE 10 MG
10 TABLET ORAL EVERY 6 HOURS PRN
Qty: 30 TABLET | Refills: 2 | Status: SHIPPED | OUTPATIENT
Start: 2025-01-27

## 2025-01-08 RX ORDER — HEPARIN SODIUM (PORCINE) LOCK FLUSH IV SOLN 100 UNIT/ML 100 UNIT/ML
5 SOLUTION INTRAVENOUS EVERY 8 HOURS
Status: DISCONTINUED | OUTPATIENT
Start: 2025-01-08 | End: 2025-01-08 | Stop reason: HOSPADM

## 2025-01-08 RX ADMIN — Medication 5 ML: at 10:07

## 2025-01-08 ASSESSMENT — PAIN SCALES - GENERAL: PAINLEVEL_OUTOF10: NO PAIN (0)

## 2025-01-08 NOTE — PROGRESS NOTES
[facility] Hematology and Oncology Consult Note    Patient: Jasmyn Green  MRN: [id number]  Date of Service: Jan 8, 2025           Reason for consultation      Problem List Items Addressed This Visit          Respiratory    Small cell lung cancer (H) - Primary       Assessment / number of problems addressed      1.  A very pleasant 69 year old  woman with looks like extensive stage small cell lung cancer.  Started on palliative chemotherapy with carboplatin, etoposide and atezolizumab.  After 2 cycles seemed to be responding quite well on the CT scan.  Had significant side effects after third cycle requiring discontinuation of cytotoxic chemotherapy and continues on maintenance atezolizumab.  Last PET scan had shown very good response in July 2024.  In August 2024 developed new brain metastases and received radiation therapy.  Current MRI showing good treatment response.  CT of the chest showing growth of scan left upper lobe nodule.  This is consistent with small cell lung cancer.  2.  Severe COPD.  She is on 2-3 L of oxygen at rest.  3.  Significantly decreased FEV1 of 29% and reduced diffusion capacity with DLCO of 30%.  4.  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  On baby aspirin.  5.  Other medical conditions stable.  6.  Severe anemia secondary to chemotherapy.  7.  Enlarging lesion in the Left Cerebellum.      Plan and medical decision making      Reviewed notes from each unique source.  Reviewed each unique test.    Ordered tests.    Independently interpreted lab tests and radiological exams performed by other physicians.  Personally reviewed the images of the biopsy showing proper targeting of the lesion.  Independent historian account obtained as noted from the family that accompanied her.    Due to the fact that she has recurrence we will recommend switching her treatment to Lurbinectedin.  We gave them printed information about it.  Will start in the next few weeks.  Follow up with Rad Onc to Rx  the enlarging Lt cerebellum lesion.  Continue with good diet and supplemental oxygen.  Gentle exercise.  Fluid and electrolyte optimization.  The longitudinal plan of care for the diagnosis(es)/condition(s) as documented were addressed during this visit. Due to the added complexity in care, I will continue to support Jasmyn in the subsequent management and with ongoing continuity of care.     Clinical/pathological stage       Cancer Staging   No matching staging information was found for the patient.      History of present illness      Ms. Jasmyn Green is  69 year old woman who has been referred to me for evaluation of newly diagnosed small cell lung cancer.  She appears to have extensive stage disease.  She presented to the [facility] with increasing shortness of breath coughing and symptoms suggestive of pneumonia.  She has a known history of severe obstructive lung disease with an FEV1 of 29% with hyperinflation and air trapping.  Presented with a 1 to 2-day history of worsening shortness of breath and with exertion as well as at rest.  Requiring increasing supplemental oxygen at home.  CT scan done in the emergency room showed bulky aortopulmonary as well as left hilar adenopathy with irregular pleural-based mass in the left upper lobe.  There are also some scattered satellite nodules.  This was very suspicious for malignancy.    After discharge she was seen by Dr. Levin and and underwent endobronchial ultrasound-guided biopsy which came back positive for small cell lung cancer.  The PET scan also showed hypermetabolic lesions in the mediastinum as well as the peripheral part of the left upper lobe.      She used to smoke but quit smoking.  She was in fact was being monitored by CT surveillance.  Had a CT scan done in June 2023 which was positive for some sort of pneumonia but no obvious malignancy was noted.    She was started on echemotherapy with carboplatin, etoposide and atezolizumab.  She  had 3 cycles.  After 3 cycles had severe side effects from chemotherapy.  She was had to be hospitalized for post side effect issues including some pneumonia etc.  She was at [facility] several days.  We made the decision to discontinue cisplatin and etoposide.  We continue with the atezolizumab.    Jasmyn was able to tolerate atezolizumab only mild toxicity.  She is following up with palliative care physicians.  Her pain Patch was discontinued and she was put on small dose of Vicodin to help with her breathing.  That seems to be working well.  She had a PET scan done in July 2024.  That PET scan showed complete metabolic response.    Unfortunately in August 2024 her brain MRI showed multiple brain metastases.  She was seen by radiation oncology.  Received radiation therapy.  In the meantime has continued on her atezolizumab.    She had a CT scan on 4 November 2024.  That CT scan showed 1.1 cm subpleural nodule in the left upper lobe which she is showing slightly increased size compared to the prior PET study.  Therefore she was asked to come back with a PET scan.  PET scan showed that lesion to be hypermetabolic.    We had lengthy discussion about what to do next.  Eventually decided on biopsying that lesion.  She has continued on her tislelizumab in the meantime.    Comes in today after the biopsy.  Tolerated the biopsy with usual side effects.    Past medical/surgical/social/family history        Past Medical History:   Diagnosis Date    Age-related osteoporosis without current pathological fracture     Arthritis     COPD (chronic obstructive pulmonary disease) (H)     Coronary artery disease     Dependence on nocturnal oxygen therapy     Dyspnea on exertion     Emphysema lung (H)     Gout     HLD (hyperlipidemia)     Hypertension     Lung mass      Past Surgical History:   Procedure Laterality Date    BRONCHOSCOPY RIGID OR FLEXIBLE W/TRANSENDOSCOPIC ENDOBRONCHIAL ULTRASOUND GUIDED N/A 2/16/2024     Procedure: BRONCHOSCOPY, WITH ENDOBRONCHIAL ULTRASOUND;  Surgeon: Ruben Levin MD;  Location: [facility] Main OR    COLONOSCOPY N/A 10/21/2021    Procedure: COLONOSCOPY;  Surgeon: Wilma Hester DO;  Location: [place] Main OR    IR CHEST PORT PLACEMENT > 5 YRS OF AGE  5/2/2024    VAGINAL DELIVERY      x 3 ; remote    WISDOM TOOTH EXTRACTION         Review of system      Details noted in the history of present illness.  A detailed review of systems is otherwise negative.      Physical exam        /62 (BP Location: Left arm, Patient Position: Sitting, Cuff Size: Adult Regular)   Pulse 110   Temp 97.8  F (36.6  C) (Oral)   Resp 16   Wt 40.5 kg (89 lb 3.2 oz)   LMP  (LMP Unknown)   SpO2 94%   BMI 18.01 kg/m      GENERAL: No acute distress. Cooperative in conversation.   HEENT:  Pupils are equal, round and reactive. Oral mucosa is clean and intact. No ulcerations or mucositis noted. No bleeding noted.  RESP:Chest symmetric lungs are clear bilaterally per auscultation. Regular respiratory rate. No wheezes or rhonchi.  CV: Normal S1 S2 Regular, rate and rhythm.     ABD: Nondistended, soft, nontender. Positive bowel sounds. No organomegaly.   EXTREMITIES: No lower extremity edema.   NEURO: Non- focal. Alert and oriented x3.  Cranial nerves appear intact.  PSYCH: Within normal limits. No depression or anxiety.  SKIN: Warm dry intact.      Lab results Reviewed      Recent Results (from the past week)   Comprehensive metabolic panel   Result Value Ref Range    Sodium 144 135 - 145 mmol/L    Potassium 4.0 3.4 - 5.3 mmol/L    Carbon Dioxide (CO2) 32 (H) 22 - 29 mmol/L    Anion Gap 10 7 - 15 mmol/L    Urea Nitrogen 19.9 8.0 - 23.0 mg/dL    Creatinine 0.92 0.51 - 0.95 mg/dL    GFR Estimate 66 >60 mL/min/1.73m2    Calcium 9.4 8.8 - 10.4 mg/dL    Chloride 102 98 - 107 mmol/L    Glucose 295 (H) 70 - 99 mg/dL    Alkaline Phosphatase 97 40 - 150 U/L    AST 23 0 - 45 U/L    ALT 24 0 - 50 U/L    Protein Total 6.2 (L)  6.4 - 8.3 g/dL    Albumin 3.4 (L) 3.5 - 5.2 g/dL    Bilirubin Total <0.2 <=1.2 mg/dL   TSH with free T4 reflex   Result Value Ref Range    TSH 1.08 0.30 - 4.20 uIU/mL       Imaging results Reviewed        MRI Brain w & w/o contrast    Result Date: 1/7/2025  EXAM: MR BRAIN W/O and W CONTRAST LOCATION: [facility] DATE: 1/7/2025 INDICATION: extensive stage small cell lung cancer with brain metastases s p WBRT eval response progression 9 9 2024 COMPARISON: 11/4/2024. CONTRAST: 4.2mL Gadavist TECHNIQUE: Routine multiplanar multisequence head MRI without and with intravenous contrast. FINDINGS: INTRACRANIAL CONTENTS: No acute or subacute infarct. Interval increase in the size of the centrally necrotic enhancing mass lesion in the left cerebellar hemisphere measuring 1.7 x 1.9 x 1.6 cm, previously 0.7 x 0.5 x 0.7 cm. Surrounding vasogenic edema also increased. The lesion currently demonstrates peripheral diffusion restriction. No mass, acute hemorrhage, or extra-axial fluid collections. Patchy nonspecific T2/FLAIR hyperintensities within the cerebral white matter most consistent with mild to moderate chronic microvascular ischemic change. Mild generalized cerebral atrophy. Normal ventricles and sulci. Normal position of the cerebellar tonsils. SELLA: Partially empty sella. OSSEOUS STRUCTURES/SOFT TISSUES: Normal marrow signal. The major intracranial vascular flow voids are maintained. ORBITS: No abnormality accounting for technique. SINUSES/MASTOIDS: Mild mucosal thickening scattered about the paranasal sinuses. Scattered fluid/membrane thickening in the mastoid air cells bilaterally.     IMPRESSION: 1.  Interval increase in the size of surrounding vasogenic edema of left cerebellar lesion. The lesion currently demonstrates diffusion restriction. No new lesions. 2.  Stable age-related chronic findings as detailed above.    CT Lung Mediastinum Biopsy    Result Date: 12/27/2024  EXAM: 1.  PERCUTANEOUS BIOPSY LEFT LUNG 2. CT GUIDANCE 3. CONSCIOUS SEDATION LOCATION: [facility] DATE: 12/27/2024 INDICATION: left upper lobe lung nodule, history of small cell lung cancer TECHNIQUE: Dose reduction techniques were used. PROCEDURE: Informed consent obtained. Site marked. Prior images reviewed. Required items made available. Patient identity confirmed verbally and with arm band. Patient reevaluated immediately before administering sedation. Universal protocol was followed. Time out performed. The site was prepped and draped in sterile fashion. 10 mL of 1% lidocaine was infused into the local soft tissues. Using standard technique and under direct CT guidance, a 20-gauge biopsy device was used to obtain 1 core biopsy. Tissue was submitted to Pathology and was adequate by preliminary review by a pathologist. The patient tolerated the procedure well. No immediate complications. SEDATION: Versed 0.5 mg. Fentanyl 25 mcg. The procedure was performed with administration intravenous conscious sedation with appropriate preoperative, intraoperative, and postoperative evaluation. 25 minutes of supervised face to face conscious sedation time was provided by a radiology nurse under my direct supervision.     IMPRESSION: Successful CT-guided biopsy of left upper lobe nodule. Reference CPT Codes: 88880, 01047, 49293    XR Chest Port 1 View    Result Date: 12/27/2024  EXAM: XR CHEST PORT 1 VIEW LOCATION: [facility] DATE: 12/27/2024 INDICATION: 1 hour after left upper lobe nodule biopsy COMPARISON: CT images from biopsy, earlier today     IMPRESSION: Minimal inflammation around the left upper lobe nodule projecting at the level of the anterior fourth intercostal space related to recent biopsy. No pneumothorax or pleural effusion. Upper lung predominant emphysema and related hyperinflation. Right jugular approach chest port catheter terminates near the SVC right atrial junction.  Cardiac silhouette is not enlarged. Moderate aortic atheromatous calcifications are present.       Signed by: Amador Stewart MD      This note has been dictated using voice recognition software. Any grammatical or context distortions are unintentional and inherent to the software

## 2025-01-08 NOTE — LETTER
"1/8/2025      Jasmyn Green  [address] [place] [address]      Dear Colleague,    Thank you for referring your patient, Jasmyn Green, to the [facility] [place]. Please see a copy of my visit note below.    Oncology Rooming Note    January 8, 2025 8:59 AM   Jasmyn Green is a 69 year old female who presents for:    Chief Complaint   Patient presents with     Oncology Clinic Visit     Return visit 6 weeks with lab and infusion. MRI yesterday. Small cell carcinoma of overlapping sites of lung, unspecified laterality.     Initial Vitals: Temp 97.8  F (36.6  C) (Oral)   Resp 16   Wt 40.5 kg (89 lb 3.2 oz)   LMP  (LMP Unknown)   BMI 18.01 kg/m   Estimated body mass index is 18.01 kg/m  as calculated from the following:    Height as of 12/16/24: 1.499 m (4' 11.02\").    Weight as of this encounter: 40.5 kg (89 lb 3.2 oz). Body surface area is 1.3 meters squared.  No Pain (0) Comment: Data Unavailable   No LMP recorded (lmp unknown). Patient is postmenopausal.  Allergies reviewed: Yes  Medications reviewed: Yes    Medications: Medication refills not needed today.  Pharmacy name entered into [employer]:    [facility] - [place], [place] - [address] [facility] - [place], [place] - [address] [place] [address]    Frailty Screening:   Is the patient here for a new oncology consult visit in cancer care? 2. No      Clinical concerns: lower back pain  Dr Stewart was notified.      Елена Deutsch, [facility] Hematology and Oncology Consult Note    Patient: Jasmyn Green  MRN: [id number]  Date of Service: Jan 8, 2025           Reason for consultation      Problem List Items Addressed This Visit          Respiratory    Small cell lung cancer (H) - Primary       Assessment / number of problems addressed      1.  A very pleasant 69 year old  woman with looks like extensive stage small cell lung cancer.  Started on palliative chemotherapy with " carboplatin, etoposide and atezolizumab.  After 2 cycles seemed to be responding quite well on the CT scan.  Had significant side effects after third cycle requiring discontinuation of cytotoxic chemotherapy and continues on maintenance atezolizumab.  Last PET scan had shown very good response in July 2024.  In August 2024 developed new brain metastases and received radiation therapy.  Current MRI showing good treatment response.  CT of the chest showing growth of scan left upper lobe nodule.  This is consistent with small cell lung cancer.  2.  Severe COPD.  She is on 2-3 L of oxygen at rest.  3.  Significantly decreased FEV1 of 29% and reduced diffusion capacity with DLCO of 30%.  4.  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  On baby aspirin.  5.  Other medical conditions stable.  6.  Severe anemia secondary to chemotherapy.  7.  Enlarging lesion in the Left Cerebellum.      Plan and medical decision making      Reviewed notes from each unique source.  Reviewed each unique test.    Ordered tests.    Independently interpreted lab tests and radiological exams performed by other physicians.  Personally reviewed the images of the biopsy showing proper targeting of the lesion.  Independent historian account obtained as noted from the family that accompanied her.    Due to the fact that she has recurrence we will recommend switching her treatment to Lurbinectedin.  We gave them printed information about it.  Will start in the next few weeks.  Follow up with Rad Onc to Rx the enlarging Lt cerebellum lesion.  Continue with good diet and supplemental oxygen.  Gentle exercise.  Fluid and electrolyte optimization.  The longitudinal plan of care for the diagnosis(es)/condition(s) as documented were addressed during this visit. Due to the added complexity in care, I will continue to support Jasmyn in the subsequent management and with ongoing continuity of care.     Clinical/pathological stage       Cancer Staging   No matching  staging information was found for the patient.      History of present illness      Ms. Jasmyn Green is  69 year old woman who has been referred to me for evaluation of newly diagnosed small cell lung cancer.  She appears to have extensive stage disease.  She presented to the [facility] with increasing shortness of breath coughing and symptoms suggestive of pneumonia.  She has a known history of severe obstructive lung disease with an FEV1 of 29% with hyperinflation and air trapping.  Presented with a 1 to 2-day history of worsening shortness of breath and with exertion as well as at rest.  Requiring increasing supplemental oxygen at home.  CT scan done in the emergency room showed bulky aortopulmonary as well as left hilar adenopathy with irregular pleural-based mass in the left upper lobe.  There are also some scattered satellite nodules.  This was very suspicious for malignancy.    After discharge she was seen by Dr. Levin and and underwent endobronchial ultrasound-guided biopsy which came back positive for small cell lung cancer.  The PET scan also showed hypermetabolic lesions in the mediastinum as well as the peripheral part of the left upper lobe.      She used to smoke but quit smoking.  She was in fact was being monitored by CT surveillance.  Had a CT scan done in June 2023 which was positive for some sort of pneumonia but no obvious malignancy was noted.    She was started on echemotherapy with carboplatin, etoposide and atezolizumab.  She had 3 cycles.  After 3 cycles had severe side effects from chemotherapy.  She was had to be hospitalized for post side effect issues including some pneumonia etc.  She was at [facility] for several days.  We made the decision to discontinue cisplatin and etoposide.  We continue with the atezolizumab.    Jasmyn was able to tolerate atezolizumab only mild toxicity.  She is following up with palliative care physicians.  Her pain Patch was discontinued  and she was put on small dose of Vicodin to help with her breathing.  That seems to be working well.  She had a PET scan done in July 2024.  That PET scan showed complete metabolic response.    Unfortunately in August 2024 her brain MRI showed multiple brain metastases.  She was seen by radiation oncology.  Received radiation therapy.  In the meantime has continued on her atezolizumab.    She had a CT scan on 4 November 2024.  That CT scan showed 1.1 cm subpleural nodule in the left upper lobe which she is showing slightly increased size compared to the prior PET study.  Therefore she was asked to come back with a PET scan.  PET scan showed that lesion to be hypermetabolic.    We had lengthy discussion about what to do next.  Eventually decided on biopsying that lesion.  She has continued on her tislelizumab in the meantime.    Comes in today after the biopsy.  Tolerated the biopsy with usual side effects.    Past medical/surgical/social/family history        Past Medical History:   Diagnosis Date     Age-related osteoporosis without current pathological fracture      Arthritis      COPD (chronic obstructive pulmonary disease) (H)      Coronary artery disease      Dependence on nocturnal oxygen therapy      Dyspnea on exertion      Emphysema lung (H)      Gout      HLD (hyperlipidemia)      Hypertension      Lung mass      Past Surgical History:   Procedure Laterality Date     BRONCHOSCOPY RIGID OR FLEXIBLE W/TRANSENDOSCOPIC ENDOBRONCHIAL ULTRASOUND GUIDED N/A 2/16/2024    Procedure: BRONCHOSCOPY, WITH ENDOBRONCHIAL ULTRASOUND;  Surgeon: Ruben Levin MD;  Location: [facility] OR     COLONOSCOPY N/A 10/21/2021    Procedure: COLONOSCOPY;  Surgeon: Wilma Hester DO;  Location: [place] [facility]     IR CHEST PORT PLACEMENT > 5 YRS OF AGE  5/2/2024     VAGINAL DELIVERY      x 3 ; remote     WISDOM TOOTH EXTRACTION         Review of system      Details noted in the history of present illness.  A detailed review  of systems is otherwise negative.      Physical exam        /62 (BP Location: Left arm, Patient Position: Sitting, Cuff Size: Adult Regular)   Pulse 110   Temp 97.8  F (36.6  C) (Oral)   Resp 16   Wt 40.5 kg (89 lb 3.2 oz)   LMP  (LMP Unknown)   SpO2 94%   BMI 18.01 kg/m      GENERAL: No acute distress. Cooperative in conversation.   HEENT:  Pupils are equal, round and reactive. Oral mucosa is clean and intact. No ulcerations or mucositis noted. No bleeding noted.  RESP:Chest symmetric lungs are clear bilaterally per auscultation. Regular respiratory rate. No wheezes or rhonchi.  CV: Normal S1 S2 Regular, rate and rhythm.     ABD: Nondistended, soft, nontender. Positive bowel sounds. No organomegaly.   EXTREMITIES: No lower extremity edema.   NEURO: Non- focal. Alert and oriented x3.  Cranial nerves appear intact.  PSYCH: Within normal limits. No depression or anxiety.  SKIN: Warm dry intact.      Lab results Reviewed      Recent Results (from the past week)   Comprehensive metabolic panel   Result Value Ref Range    Sodium 144 135 - 145 mmol/L    Potassium 4.0 3.4 - 5.3 mmol/L    Carbon Dioxide (CO2) 32 (H) 22 - 29 mmol/L    Anion Gap 10 7 - 15 mmol/L    Urea Nitrogen 19.9 8.0 - 23.0 mg/dL    Creatinine 0.92 0.51 - 0.95 mg/dL    GFR Estimate 66 >60 mL/min/1.73m2    Calcium 9.4 8.8 - 10.4 mg/dL    Chloride 102 98 - 107 mmol/L    Glucose 295 (H) 70 - 99 mg/dL    Alkaline Phosphatase 97 40 - 150 U/L    AST 23 0 - 45 U/L    ALT 24 0 - 50 U/L    Protein Total 6.2 (L) 6.4 - 8.3 g/dL    Albumin 3.4 (L) 3.5 - 5.2 g/dL    Bilirubin Total <0.2 <=1.2 mg/dL   TSH with free T4 reflex   Result Value Ref Range    TSH 1.08 0.30 - 4.20 uIU/mL       Imaging results Reviewed        MRI Brain w & w/o contrast    Result Date: 1/7/2025  EXAM: MR BRAIN W/O and W CONTRAST LOCATION: [facility] DATE: 1/7/2025 INDICATION: extensive stage small cell lung cancer with brain metastases s p WBRT eval  response progression 9 9 2024 COMPARISON: 11/4/2024. CONTRAST: 4.2mL Gadavist TECHNIQUE: Routine multiplanar multisequence head MRI without and with intravenous contrast. FINDINGS: INTRACRANIAL CONTENTS: No acute or subacute infarct. Interval increase in the size of the centrally necrotic enhancing mass lesion in the left cerebellar hemisphere measuring 1.7 x 1.9 x 1.6 cm, previously 0.7 x 0.5 x 0.7 cm. Surrounding vasogenic edema also increased. The lesion currently demonstrates peripheral diffusion restriction. No mass, acute hemorrhage, or extra-axial fluid collections. Patchy nonspecific T2/FLAIR hyperintensities within the cerebral white matter most consistent with mild to moderate chronic microvascular ischemic change. Mild generalized cerebral atrophy. Normal ventricles and sulci. Normal position of the cerebellar tonsils. SELLA: Partially empty sella. OSSEOUS STRUCTURES/SOFT TISSUES: Normal marrow signal. The major intracranial vascular flow voids are maintained. ORBITS: No abnormality accounting for technique. SINUSES/MASTOIDS: Mild mucosal thickening scattered about the paranasal sinuses. Scattered fluid/membrane thickening in the mastoid air cells bilaterally.     IMPRESSION: 1.  Interval increase in the size of surrounding vasogenic edema of left cerebellar lesion. The lesion currently demonstrates diffusion restriction. No new lesions. 2.  Stable age-related chronic findings as detailed above.    CT Lung Mediastinum Biopsy    Result Date: 12/27/2024  EXAM: 1. PERCUTANEOUS BIOPSY LEFT LUNG 2. CT GUIDANCE 3. CONSCIOUS SEDATION LOCATION: [facility] DATE: 12/27/2024 INDICATION: left upper lobe lung nodule, history of small cell lung cancer TECHNIQUE: Dose reduction techniques were used. PROCEDURE: Informed consent obtained. Site marked. Prior images reviewed. Required items made available. Patient identity confirmed verbally and with arm band. Patient reevaluated immediately before  administering sedation. Universal protocol was followed. Time out performed. The site was prepped and draped in sterile fashion. 10 mL of 1% lidocaine was infused into the local soft tissues. Using standard technique and under direct CT guidance, a 20-gauge biopsy device was used to obtain 1 core biopsy. Tissue was submitted to Pathology and was adequate by preliminary review by a pathologist. The patient tolerated the procedure well. No immediate complications. SEDATION: Versed 0.5 mg. Fentanyl 25 mcg. The procedure was performed with administration intravenous conscious sedation with appropriate preoperative, intraoperative, and postoperative evaluation. 25 minutes of supervised face to face conscious sedation time was provided by a radiology nurse under my direct supervision.     IMPRESSION: Successful CT-guided biopsy of left upper lobe nodule. Reference CPT Codes: 57654, 12245, 17346    XR Chest Port 1 View    Result Date: 12/27/2024  EXAM: XR CHEST PORT 1 VIEW LOCATION: [facility] DATE: 12/27/2024 INDICATION: 1 hour after left upper lobe nodule biopsy COMPARISON: CT images from biopsy, earlier today     IMPRESSION: Minimal inflammation around the left upper lobe nodule projecting at the level of the anterior fourth intercostal space related to recent biopsy. No pneumothorax or pleural effusion. Upper lung predominant emphysema and related hyperinflation. Right jugular approach chest port catheter terminates near the SVC right atrial junction. Cardiac silhouette is not enlarged. Moderate aortic atheromatous calcifications are present.       Signed by: Amador Stewart MD      This note has been dictated using voice recognition software. Any grammatical or context distortions are unintentional and inherent to the software      Again, thank you for allowing me to participate in the care of your patient.        Sincerely,        Amador Stewart MD    Electronically signed

## 2025-01-08 NOTE — PROGRESS NOTES
"Oncology Rooming Note    January 8, 2025 8:59 AM   Jasmyn Green is a 69 year old female who presents for:    Chief Complaint   Patient presents with    Oncology Clinic Visit     Return visit 6 weeks with lab and infusion. MRI yesterday. Small cell carcinoma of overlapping sites of lung, unspecified laterality.     Initial Vitals: Temp 97.8  F (36.6  C) (Oral)   Resp 16   Wt 40.5 kg (89 lb 3.2 oz)   LMP  (LMP Unknown)   BMI 18.01 kg/m   Estimated body mass index is 18.01 kg/m  as calculated from the following:    Height as of 12/16/24: 1.499 m (4' 11.02\").    Weight as of this encounter: 40.5 kg (89 lb 3.2 oz). Body surface area is 1.3 meters squared.  No Pain (0) Comment: Data Unavailable   No LMP recorded (lmp unknown). Patient is postmenopausal.  Allergies reviewed: Yes  Medications reviewed: Yes    Medications: Medication refills not needed today.  Pharmacy name entered into [employer]:    [facility] - [place], [place] - [address] [facility] - [place], [place] - [address]    Frailty Screening:   Is the patient here for a new oncology consult visit in cancer care? 2. No      Clinical concerns: lower back pain  Dr Stewart was notified.      Елена Deutsch CMA            "

## 2025-01-09 ENCOUNTER — TELEPHONE (OUTPATIENT)
Dept: ONCOLOGY | Facility: HOSPITAL | Age: 70
End: 2025-01-09
Payer: COMMERCIAL

## 2025-01-09 ENCOUNTER — OFFICE VISIT (OUTPATIENT)
Dept: RADIATION ONCOLOGY | Facility: CLINIC | Age: 70
End: 2025-01-09
Attending: STUDENT IN AN ORGANIZED HEALTH CARE EDUCATION/TRAINING PROGRAM
Payer: COMMERCIAL

## 2025-01-09 VITALS
DIASTOLIC BLOOD PRESSURE: 64 MMHG | OXYGEN SATURATION: 85 % | WEIGHT: 89.2 LBS | TEMPERATURE: 98.2 F | BODY MASS INDEX: 18.01 KG/M2 | RESPIRATION RATE: 22 BRPM | SYSTOLIC BLOOD PRESSURE: 137 MMHG | HEART RATE: 129 BPM

## 2025-01-09 DIAGNOSIS — C79.31 SECONDARY MALIGNANT NEOPLASM OF BRAIN (H): Primary | ICD-10-CM

## 2025-01-09 DIAGNOSIS — F40.240 CLAUSTROPHOBIA: ICD-10-CM

## 2025-01-09 PROCEDURE — G0463 HOSPITAL OUTPT CLINIC VISIT: HCPCS | Performed by: STUDENT IN AN ORGANIZED HEALTH CARE EDUCATION/TRAINING PROGRAM

## 2025-01-09 RX ORDER — DEXAMETHASONE 2 MG/1
TABLET ORAL
Qty: 20 TABLET | Refills: 0 | Status: SHIPPED | OUTPATIENT
Start: 2025-01-09

## 2025-01-09 RX ORDER — DIAZEPAM 5 MG/1
5 TABLET ORAL
Qty: 2 TABLET | Refills: 2 | Status: SHIPPED | OUTPATIENT
Start: 2025-01-09

## 2025-01-09 ASSESSMENT — PAIN SCALES - GENERAL: PAINLEVEL_OUTOF10: NO PAIN (0)

## 2025-01-09 NOTE — PROGRESS NOTES
[facility] Radiation Oncology Follow Up     Patient: Jasmyn Green  MRN: [id number]  Date of Service: 01/09/2025       DISEASE TREATED:   Extensive stage small cell lung cancer with brain metastases.       TYPE OF RADIATION THERAPY ADMINISTERED:    SITE TREATED: Whole brain  TOTAL DOSE: 3000 cGy  NUMBER OF FRACTIONS: 10  RADIATION TREATMENT 8/22/2024 - 9/9/2024   CONCURRENT CHEMOTHERAPY: No  ADJUVANT THERAPY: Yes- atezolizumab      INTERVAL SINCE COMPLETION OF RADIATION THERAPY: 4 mo       SUBJECTIVE:  Ms. Green is a 69 year old female who  initially presented with extensive stage small cell lung cancer involving left upper lobe primary, left hilar and mediastinal lymphadenopathy, several pleural based metastases in the left hemithorax.  Severe COPD- FEV1 of 29% and reduced diffusion capacity with DLCO of 30% . She is on 2-3 L of oxygen at rest.      2/16/2024 endobronchial ultrasound-guided biopsy, pathology: station 4L and 10L: Small cell lung cancer .     carboplatin, etoposide and atezolizumab x3 c -- significant side effects after third cycle requiring discontinuation carbo/etoposide continues on maintenance atezolizumab.      7/3/2024 PET/CT: Complete metabolic response to therapy of previous left lung, left pleura and thoracic gisela disease.     8/15/2024 MRI: at least 12 nodular enhancing lesions within the supratentorial infratentorial compartments consistent with multiple new progressive intracranial metastases.  Dominant lesions right parietal-occipital junction superiorly within the anterior left cerebellum demonstrate mild vasogenic edema without significant mass effect.     8/16/2024: She was started on dexamethasone.  8/18/2024: atezolizumab c8  8/22/2024 - 9/9/2024  WBRT 3000 cGy/ 10 fx  9/24/2022: atezolizumab c9  10/15/2024: atezolizumab c10  11/4/2024 MRI brain:  Metastatic lesion in the lateral left cerebellar hemisphere has decreased in size since previous, with resolution of  the additional metastatic lesions previously seen elsewhere in the brain.     11/4/2024 CT chest:   1.1 cm subpleural nodular density left upper lobe. This is new/increased compared with the prior study and is in the location of the patient's previous lung malignancy. This is suspicious for potential recurrent disease. New indeterminate 8 mm irregular nodular density right upper lobe.     11/25/2024 PET/CT: Irregular pulmonary nodule peripheral left upper lobe located at site of original primary with marked FDG uptake (SUV max 11.9) suspicious for locally recurrent malignancy.  Mildly enlarged FDG avid left upper lobar station 12L node measuring 1 cm (max SUV 3.8) suspicious for gisela metastasis.  No additional concerns for metastatic disease.    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    1/7/2025 MRI brain: No acute or subacute infarct. Interval increase in the size of the centrally necrotic enhancing mass lesion in the left cerebellar hemisphere measuring 1.7 x 1.9 x 1.6 cm, previously 0.7 x 0.5 x 0.7 cm. Surrounding vasogenic edema also increased. The lesion currently demonstrates peripheral diffusion restriction.    Plan to switch treatment to Lurbinectedin (MD Stewart)    She returns to clinic today for routine follow-up visit.  She is having some hair regrowth.  No headaches.  Notes some nausea which antiemetics help.  No vomiting.  Notes dry mouth for which she is using Biotene.  No vision changes.  No focal weakness.  No falls.  She does feel lightheaded and dizzy sometimes when she stands up.  Manageable slowing down.  No falls.  Uses walker for ambulation.  She is O2 dependent becomes short of breath with activity.       Former smoker: >40 pk yr    Medications were reviewed and are up to date on EPIC.    The following portions of the patient's history were reviewed and updated as appropriate: allergies, current medications, past  family history, past medical history, past social history, past surgical history and problem list.    Review of Systems:      General  Constitutional  Constitutional (WDL): Exceptions to WDL  Fatigue: Fatigue relieved by rest  Hot Flashes: Mild symptoms OR intervention not indicated  EENT  Eye Disorders  Eye Disorder (WDL): All eye disorder elements are within defined limits (wewars glasses)  Ear Disorders  Ear Disorder (WDL): Exceptions to WDL (some decreased hearing)  Respiratory  Respiratory  Respiratory (WDL): Exceptions to WDL  Cough: Mild symptoms OR nonprescription intervention indicated (white phlegm)  Dyspnea: Shortness of breath with minimal exertion OR limiting instrumental ADL  Hypoxia: Decreased oxygen saturation at rest (e.g., pulse oximeter less than 88% or PaO2 less than or equal to 55 mmHg) (oxygen dependent, 2-5 liters home O2)  Cardiovascular  Cardiovascular  Cardiovascular (WDL): All cardiovascular elements are within defined limits (no pacemaker)  Gastrointestinal  Gastrointestinal  Gastrointestinal (WDL): Exceptions to WDL  Anorexia: Loss of appetite without alteration in eating habits  Nausea: Loss of appetite without alteration in eating habits  Vomiting: Intervention not indicated  Dysphagia: Symptomatic, able to eat regular diet  Constipation: Occasional or intermittent symptoms OR occasional use of stool softeners, laxatives, dietary modification, or enema  Musculoskeletal  Musculoskeletal and Connective Tissue Disorders  Musculoskeletal & Connective (WDL): Exceptions to WDL  Arthralgia: Mild pain  Integumentary  Integumentary  Integumentary (WDL): Exceptions to WDL  Alopecia: Hair loss of greater than or equal to 50% normal for that individual that is readily apparent to others OR a wig or hair piece is necessary if the patient desires to completely camouflage the hair loss OR associated with psychosocial impact  Neurological  Neurosensory  Neurosensory (WDL): Exceptions to WDL  Ataxia:  Asymptomatic OR clinical or diagnostic observations only OR intervention not indicated (in wheelchair today)  Confusion: Mild disorientation (at times, more in the past month)  Dizziness: Mild unsteadiness or sensation of movement  Genitourinary/Reproductive  Genitourinary  Genitourinary (WDL): All genitourinary elements are within defined limits  Lymphatic  Lymph System Disorders  Lymph (WDL): All lymph elements are within defined limits  Pain  Pain Score: No Pain (0)  AUA Assessment                                                              Accompanied by  Accompanied By: family    Objective:     PHYSICAL EXAMINATION:    /64 (BP Location: Right arm, Patient Position: Sitting, Cuff Size: Adult Regular)   Pulse 129   Temp 98.2  F (36.8  C) (Oral)   Resp 22   Wt 40.5 kg (89 lb 3.2 oz)   LMP  (LMP Unknown)   SpO2 (!) 85%   BMI 18.01 kg/m      Gen: Alert, in NAD pleasant interactive, thin  Eyes: EOMI, sclera anicteric  HENT     Head: NC/AT-resolving alopecia with visible hair regrowth  O2 by nasal cannula  Musculoskeletal: Decreased muscle bulk and tone  Skin: Normal tone and turgor, warm, dry, intact  Neurologic: A/Ox3, face symmetric, speech fluent, no focal motor deficits. Gait slow and aided by family/walker  Psychiatric: Appropriate mood and affect      Personally reviewed images in addition images were reviewed with neuroradiology  Imaging: Imaging results 6 weeks:MRI Brain w & w/o contrast    Result Date: 1/7/2025  EXAM: MR BRAIN W/O and W CONTRAST LOCATION: [facility] DATE: 1/7/2025 INDICATION: extensive stage small cell lung cancer with brain metastases s p WBRT eval response progression 9 9 2024 COMPARISON: 11/4/2024. CONTRAST: 4.2mL Gadavist TECHNIQUE: Routine multiplanar multisequence head MRI without and with intravenous contrast. FINDINGS: INTRACRANIAL CONTENTS: No acute or subacute infarct. Interval increase in the size of the centrally necrotic enhancing mass  lesion in the left cerebellar hemisphere measuring 1.7 x 1.9 x 1.6 cm, previously 0.7 x 0.5 x 0.7 cm. Surrounding vasogenic edema also increased. The lesion currently demonstrates peripheral diffusion restriction. No mass, acute hemorrhage, or extra-axial fluid collections. Patchy nonspecific T2/FLAIR hyperintensities within the cerebral white matter most consistent with mild to moderate chronic microvascular ischemic change. Mild generalized cerebral atrophy. Normal ventricles and sulci. Normal position of the cerebellar tonsils. SELLA: Partially empty sella. OSSEOUS STRUCTURES/SOFT TISSUES: Normal marrow signal. The major intracranial vascular flow voids are maintained. ORBITS: No abnormality accounting for technique. SINUSES/MASTOIDS: Mild mucosal thickening scattered about the paranasal sinuses. Scattered fluid/membrane thickening in the mastoid air cells bilaterally.     IMPRESSION: 1.  Interval increase in the size of surrounding vasogenic edema of left cerebellar lesion. The lesion currently demonstrates diffusion restriction. No new lesions. 2.  Stable age-related chronic findings as detailed above.    CT Lung Mediastinum Biopsy    Result Date: 12/27/2024  EXAM: 1. PERCUTANEOUS BIOPSY LEFT LUNG 2. CT GUIDANCE 3. CONSCIOUS SEDATION LOCATION: [facility] DATE: 12/27/2024 INDICATION: left upper lobe lung nodule, history of small cell lung cancer TECHNIQUE: Dose reduction techniques were used. PROCEDURE: Informed consent obtained. Site marked. Prior images reviewed. Required items made available. Patient identity confirmed verbally and with arm band. Patient reevaluated immediately before administering sedation. Universal protocol was followed. Time out performed. The site was prepped and draped in sterile fashion. 10 mL of 1% lidocaine was infused into the local soft tissues. Using standard technique and under direct CT guidance, a 20-gauge biopsy device was used to obtain 1 core biopsy.  Tissue was submitted to Pathology and was adequate by preliminary review by a pathologist. The patient tolerated the procedure well. No immediate complications. SEDATION: Versed 0.5 mg. Fentanyl 25 mcg. The procedure was performed with administration intravenous conscious sedation with appropriate preoperative, intraoperative, and postoperative evaluation. 25 minutes of supervised face to face conscious sedation time was provided by a radiology nurse under my direct supervision.     IMPRESSION: Successful CT-guided biopsy of left upper lobe nodule. Reference CPT Codes: 45097, 14480, 95905    XR Chest Port 1 View    Result Date: 12/27/2024  EXAM: XR CHEST PORT 1 VIEW LOCATION: [facility] DATE: 12/27/2024 INDICATION: 1 hour after left upper lobe nodule biopsy COMPARISON: CT images from biopsy, earlier today     IMPRESSION: Minimal inflammation around the left upper lobe nodule projecting at the level of the anterior fourth intercostal space related to recent biopsy. No pneumothorax or pleural effusion. Upper lung predominant emphysema and related hyperinflation. Right jugular approach chest port catheter terminates near the SVC right atrial junction. Cardiac silhouette is not enlarged. Moderate aortic atheromatous calcifications are present.      Impression   69-year-old woman with stage IV small cell lung cancer with multiple brain metastases status post whole brain radiation therapy.  Good response to whole brain radiation, followed by some increase in size of necrotic left cerebellar lesion along with increase in edema but with diffusion restriction which suggests pseudo progression/radiation necrosis rather than true progression.    Recovering from acute side effects of radiation    Intrathoracic progression, change in systemic therapy planned  Visit Dx:    No diagnosis found.    Cancer Staging   No matching staging information was found for the patient.      Assessment & Plan:   Reviewed  MRI imaging with neuroradiology and in agreement-the presence of centrally restricted diffusion often represents radiation necrosis rather than tumor recurrence and restricted diffusion seen on MRI is likely supportive of radiation necrosis rather than true progression.  However close follow-up warranted.    She is currently asymptomatic however given increased surrounding vasogenic edema discussed short course of dexamethasone which she and her family are in agreement with.  They will monitor for any symptoms of increased intracranial pressure and notify us if any.    Continue follow-up with heme-onc as planned    Return to clinic in 6 to 8 weeks with repeat MRI, sooner if needed      Pain Management Plan: N/A    Total time of this visit, including time spent face-to-face with patient and or via video/audio, and also in preparing for today's visit for MDM and documentation. Medical decision-making included consideration and possible diagnoses, management options, complex record review, review of diagnostic tests and information, consideration and discussion of significant complications based on comorbidities, discussion with providers involved in the care of the patient.     30 Minutes spent.     This note has been dictated using voice recognition software and as a result may contain minor grammatical errors and unintended word substitutions.      Tata Nuñez MD  Department of Radiation Oncology   [facility] Radiation Oncology  Tel: [phone]  Page: [phone]    [facility]  [address] [place] [address]     [address]   [place] [address]    CC:  Patient Care Team:  Ana Maria Bermudez MD as PCP - General (Internal Medicine)  Aidee Luna, RT as Chronic Pulmonary Disease Specialist (Respiratory Therapy)  Rickie Pickens MD as Assigned Endocrinology Provider  Ruben Levin MD as MD (Critical Care)  Tata Lainez NP as Assigned  Pulmonology Provider  Amador Stewart MD as MD (Hematology)  Wilma Carmona, RN as Specialty Care Coordinator (Hematology & Oncology)  Ana Maria Bermudez MD as Assigned PCP  Evans Gramajo APRN CNP as Nurse Practitioner (Hospice And Palliative Care)  Evans Gramajo APRN CNP as Assigned Palliative Care Provider  Bryant Mckinney MD as Physician (Vascular Surgery)  Naheed Mulligan MD as MD (Cardiovascular Disease)  Tata Nuñez MD as MD (Radiation Oncology)  Amador Stewart MD as Assigned Cancer Care Provider  Natty Rodriguez MD as Assigned Heart and Vascular Provider

## 2025-01-09 NOTE — PROGRESS NOTES
"Oncology Rooming Note    January 9, 2025 2:17 PM   Jasmyn Green is a 69 year old female who presents for:    Chief Complaint   Patient presents with    Oncology Clinic Visit     Follow up with Dr. Nuñez     Initial Vitals: /64 (BP Location: Right arm, Patient Position: Sitting, Cuff Size: Adult Regular)   Pulse 129   Temp 98.2  F (36.8  C) (Oral)   Resp 22   Wt 40.5 kg (89 lb 3.2 oz)   LMP  (LMP Unknown)   SpO2 (!) 85%   BMI 18.01 kg/m   Estimated body mass index is 18.01 kg/m  as calculated from the following:    Height as of 12/16/24: 1.499 m (4' 11.02\").    Weight as of this encounter: 40.5 kg (89 lb 3.2 oz). Body surface area is 1.3 meters squared.  No Pain (0) Comment: Data Unavailable   No LMP recorded (lmp unknown). Patient is postmenopausal.  Allergies reviewed: Yes  Medications reviewed: Yes    Medications: Medication refills not needed today.  Pharmacy name entered into [employer]:    [facility] - [place], [place] - [address] [facility] - [place], [place] - [address]    Frailty Screening:   Is the patient here for a new oncology consult visit in cancer care? 2. No      Clinical concerns: Patient here per wheelchair accompanied by family for follow-up status post radiation for her metastatic lung cancer.  Patient had a recent MRI and is here today for results.  Patient seen by Dr. Stewart medical oncology yesterday and immunotherapy is being held until decision about radiation is made.  Plan return to clinic for follow-up as directed by provider.   Dr. Nuñez was notified.      Amber Wallace RN              "

## 2025-01-09 NOTE — LETTER
1/9/2025      Jasmyn Green  [address] [place] [address]      Dear Colleague,    Thank you for referring your patient, Jasmyn Green, to the [facility] RADIATION ONCOLOGY [place]. Please see a copy of my visit note below.    [facility] Radiation Oncology Follow Up     Patient: Jasmyn Green  MRN: [id number]  Date of Service: 01/09/2025       DISEASE TREATED:   Extensive stage small cell lung cancer with brain metastases.       TYPE OF RADIATION THERAPY ADMINISTERED:    SITE TREATED: Whole brain  TOTAL DOSE: 3000 cGy  NUMBER OF FRACTIONS: 10  RADIATION TREATMENT 8/22/2024 - 9/9/2024   CONCURRENT CHEMOTHERAPY: No  ADJUVANT THERAPY: Yes- atezolizumab      INTERVAL SINCE COMPLETION OF RADIATION THERAPY: 4 mo       SUBJECTIVE:  Ms. Green is a 69 year old female who  initially presented with extensive stage small cell lung cancer involving left upper lobe primary, left hilar and mediastinal lymphadenopathy, several pleural based metastases in the left hemithorax.  Severe COPD- FEV1 of 29% and reduced diffusion capacity with DLCO of 30% . She is on 2-3 L of oxygen at rest.      2/16/2024 endobronchial ultrasound-guided biopsy, pathology: station 4L and 10L: Small cell lung cancer .     carboplatin, etoposide and atezolizumab x3 c -- significant side effects after third cycle requiring discontinuation carbo/etoposide continues on maintenance atezolizumab.      7/3/2024 PET/CT: Complete metabolic response to therapy of previous left lung, left pleura and thoracic gisela disease.     8/15/2024 MRI: at least 12 nodular enhancing lesions within the supratentorial infratentorial compartments consistent with multiple new progressive intracranial metastases.  Dominant lesions right parietal-occipital junction superiorly within the anterior left cerebellum demonstrate mild vasogenic edema without significant mass effect.     8/16/2024: She was started on dexamethasone.  8/18/2024:  atezolizumab c8  8/22/2024 - 9/9/2024  WBRT 3000 cGy/ 10 fx  9/24/2022: atezolizumab c9  10/15/2024: atezolizumab c10  11/4/2024 MRI brain:  Metastatic lesion in the lateral left cerebellar hemisphere has decreased in size since previous, with resolution of the additional metastatic lesions previously seen elsewhere in the brain.     11/4/2024 CT chest:   1.1 cm subpleural nodular density left upper lobe. This is new/increased compared with the prior study and is in the location of the patient's previous lung malignancy. This is suspicious for potential recurrent disease. New indeterminate 8 mm irregular nodular density right upper lobe.     11/25/2024 PET/CT: Irregular pulmonary nodule peripheral left upper lobe located at site of original primary with marked FDG uptake (SUV max 11.9) suspicious for locally recurrent malignancy.  Mildly enlarged FDG avid left upper lobar station 12L node measuring 1 cm (max SUV 3.8) suspicious for gisela metastasis.  No additional concerns for metastatic disease.    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    1/7/2025 MRI brain: No acute or subacute infarct. Interval increase in the size of the centrally necrotic enhancing mass lesion in the left cerebellar hemisphere measuring 1.7 x 1.9 x 1.6 cm, previously 0.7 x 0.5 x 0.7 cm. Surrounding vasogenic edema also increased. The lesion currently demonstrates peripheral diffusion restriction.    Plan to switch treatment to Lurbinectedin (MD Stewart)    She returns to clinic today for routine follow-up visit.  She is having some hair regrowth.  No headaches.  Notes some nausea which antiemetics help.  No vomiting.  Notes dry mouth for which she is using Biotene.  No vision changes.  No focal weakness.  No falls.  She does feel lightheaded and dizzy sometimes when she stands up.  Manageable slowing down.  No falls.  Uses walker for ambulation.  She is O2 dependent  becomes short of breath with activity.       Former smoker: >40 pk yr    Medications were reviewed and are up to date on EPIC.    The following portions of the patient's history were reviewed and updated as appropriate: allergies, current medications, past family history, past medical history, past social history, past surgical history and problem list.    Review of Systems:      General  Constitutional  Constitutional (WDL): Exceptions to WDL  Fatigue: Fatigue relieved by rest  Hot Flashes: Mild symptoms OR intervention not indicated  EENT  Eye Disorders  Eye Disorder (WDL): All eye disorder elements are within defined limits (wewars glasses)  Ear Disorders  Ear Disorder (WDL): Exceptions to WDL (some decreased hearing)  Respiratory  Respiratory  Respiratory (WDL): Exceptions to WDL  Cough: Mild symptoms OR nonprescription intervention indicated (white phlegm)  Dyspnea: Shortness of breath with minimal exertion OR limiting instrumental ADL  Hypoxia: Decreased oxygen saturation at rest (e.g., pulse oximeter less than 88% or PaO2 less than or equal to 55 mmHg) (oxygen dependent, 2-5 liters home O2)  Cardiovascular  Cardiovascular  Cardiovascular (WDL): All cardiovascular elements are within defined limits (no pacemaker)  Gastrointestinal  Gastrointestinal  Gastrointestinal (WDL): Exceptions to WDL  Anorexia: Loss of appetite without alteration in eating habits  Nausea: Loss of appetite without alteration in eating habits  Vomiting: Intervention not indicated  Dysphagia: Symptomatic, able to eat regular diet  Constipation: Occasional or intermittent symptoms OR occasional use of stool softeners, laxatives, dietary modification, or enema  Musculoskeletal  Musculoskeletal and Connective Tissue Disorders  Musculoskeletal & Connective (WDL): Exceptions to WDL  Arthralgia: Mild pain  Integumentary  Integumentary  Integumentary (WDL): Exceptions to WDL  Alopecia: Hair loss of greater than or equal to 50% normal for that  individual that is readily apparent to others OR a wig or hair piece is necessary if the patient desires to completely camouflage the hair loss OR associated with psychosocial impact  Neurological  Neurosensory  Neurosensory (WDL): Exceptions to WDL  Ataxia: Asymptomatic OR clinical or diagnostic observations only OR intervention not indicated (in wheelchair today)  Confusion: Mild disorientation (at times, more in the past month)  Dizziness: Mild unsteadiness or sensation of movement  Genitourinary/Reproductive  Genitourinary  Genitourinary (WDL): All genitourinary elements are within defined limits  Lymphatic  Lymph System Disorders  Lymph (WDL): All lymph elements are within defined limits  Pain  Pain Score: No Pain (0)  AUA Assessment                                                              Accompanied by  Accompanied By: family    Objective:     PHYSICAL EXAMINATION:    /64 (BP Location: Right arm, Patient Position: Sitting, Cuff Size: Adult Regular)   Pulse 129   Temp 98.2  F (36.8  C) (Oral)   Resp 22   Wt 40.5 kg (89 lb 3.2 oz)   LMP  (LMP Unknown)   SpO2 (!) 85%   BMI 18.01 kg/m      Gen: Alert, in NAD pleasant interactive, thin  Eyes: EOMI, sclera anicteric  HENT     Head: NC/AT-resolving alopecia with visible hair regrowth  O2 by nasal cannula  Musculoskeletal: Decreased muscle bulk and tone  Skin: Normal tone and turgor, warm, dry, intact  Neurologic: A/Ox3, face symmetric, speech fluent, no focal motor deficits. Gait slow and aided by family/walker  Psychiatric: Appropriate mood and affect      Personally reviewed images in addition images were reviewed with neuroradiology  Imaging: Imaging results 6 weeks:MRI Brain w & w/o contrast    Result Date: 1/7/2025  EXAM: MR BRAIN W/O and W CONTRAST LOCATION: [facility] DATE: 1/7/2025 INDICATION: extensive stage small cell lung cancer with brain metastases s p WBRT eval response progression 9 9 2024 COMPARISON:  11/4/2024. CONTRAST: 4.2mL Gadavist TECHNIQUE: Routine multiplanar multisequence head MRI without and with intravenous contrast. FINDINGS: INTRACRANIAL CONTENTS: No acute or subacute infarct. Interval increase in the size of the centrally necrotic enhancing mass lesion in the left cerebellar hemisphere measuring 1.7 x 1.9 x 1.6 cm, previously 0.7 x 0.5 x 0.7 cm. Surrounding vasogenic edema also increased. The lesion currently demonstrates peripheral diffusion restriction. No mass, acute hemorrhage, or extra-axial fluid collections. Patchy nonspecific T2/FLAIR hyperintensities within the cerebral white matter most consistent with mild to moderate chronic microvascular ischemic change. Mild generalized cerebral atrophy. Normal ventricles and sulci. Normal position of the cerebellar tonsils. SELLA: Partially empty sella. OSSEOUS STRUCTURES/SOFT TISSUES: Normal marrow signal. The major intracranial vascular flow voids are maintained. ORBITS: No abnormality accounting for technique. SINUSES/MASTOIDS: Mild mucosal thickening scattered about the paranasal sinuses. Scattered fluid/membrane thickening in the mastoid air cells bilaterally.     IMPRESSION: 1.  Interval increase in the size of surrounding vasogenic edema of left cerebellar lesion. The lesion currently demonstrates diffusion restriction. No new lesions. 2.  Stable age-related chronic findings as detailed above.    CT Lung Mediastinum Biopsy    Result Date: 12/27/2024  EXAM: 1. PERCUTANEOUS BIOPSY LEFT LUNG 2. CT GUIDANCE 3. CONSCIOUS SEDATION LOCATION: [facility] DATE: 12/27/2024 INDICATION: left upper lobe lung nodule, history of small cell lung cancer TECHNIQUE: Dose reduction techniques were used. PROCEDURE: Informed consent obtained. Site marked. Prior images reviewed. Required items made available. Patient identity confirmed verbally and with arm band. Patient reevaluated immediately before administering sedation. Universal protocol was  followed. Time out performed. The site was prepped and draped in sterile fashion. 10 mL of 1% lidocaine was infused into the local soft tissues. Using standard technique and under direct CT guidance, a 20-gauge biopsy device was used to obtain 1 core biopsy. Tissue was submitted to Pathology and was adequate by preliminary review by a pathologist. The patient tolerated the procedure well. No immediate complications. SEDATION: Versed 0.5 mg. Fentanyl 25 mcg. The procedure was performed with administration intravenous conscious sedation with appropriate preoperative, intraoperative, and postoperative evaluation. 25 minutes of supervised face to face conscious sedation time was provided by a radiology nurse under my direct supervision.     IMPRESSION: Successful CT-guided biopsy of left upper lobe nodule. Reference CPT Codes: 56100, 41611, 18358    XR Chest Port 1 View    Result Date: 12/27/2024  EXAM: XR CHEST PORT 1 VIEW LOCATION: [facility] DATE: 12/27/2024 INDICATION: 1 hour after left upper lobe nodule biopsy COMPARISON: CT images from biopsy, earlier today     IMPRESSION: Minimal inflammation around the left upper lobe nodule projecting at the level of the anterior fourth intercostal space related to recent biopsy. No pneumothorax or pleural effusion. Upper lung predominant emphysema and related hyperinflation. Right jugular approach chest port catheter terminates near the SVC right atrial junction. Cardiac silhouette is not enlarged. Moderate aortic atheromatous calcifications are present.      Impression   69-year-old woman with stage IV small cell lung cancer with multiple brain metastases status post whole brain radiation therapy.  Good response to whole brain radiation, followed by some increase in size of necrotic left cerebellar lesion along with increase in edema but with diffusion restriction which suggests pseudo progression/radiation necrosis rather than true  progression.    Recovering from acute side effects of radiation    Intrathoracic progression, change in systemic therapy planned  Visit Dx:    No diagnosis found.    Cancer Staging   No matching staging information was found for the patient.      Assessment & Plan:   Reviewed MRI imaging with neuroradiology and in agreement-the presence of centrally restricted diffusion often represents radiation necrosis rather than tumor recurrence and restricted diffusion seen on MRI is likely supportive of radiation necrosis rather than true progression.  However close follow-up warranted.    She is currently asymptomatic however given increased surrounding vasogenic edema discussed short course of dexamethasone which she and her family are in agreement with.  They will monitor for any symptoms of increased intracranial pressure and notify us if any.    Continue follow-up with heme-onc as planned    Return to clinic in 6 to 8 weeks with repeat MRI, sooner if needed      Pain Management Plan: N/A    Total time of this visit, including time spent face-to-face with patient and or via video/audio, and also in preparing for today's visit for MDM and documentation. Medical decision-making included consideration and possible diagnoses, management options, complex record review, review of diagnostic tests and information, consideration and discussion of significant complications based on comorbidities, discussion with providers involved in the care of the patient.     30 Minutes spent.     This note has been dictated using voice recognition software and as a result may contain minor grammatical errors and unintended word substitutions.      Tata Nuñez MD  Department of Radiation Oncology   [facility] Radiation Oncology  Tel: [phone]  Page: [phone]    [facility]  [address]  [place] [address]     [place] [id number] [facility] [place] [address]    CC:  Patient Care Team:  Ana Maria Bermudez MD  "as PCP - General (Internal Medicine)  Aidee Luna, RT as Chronic Pulmonary Disease Specialist (Respiratory Therapy)  Rickie Pickens MD as Assigned Endocrinology Provider  Ruben Levin MD as MD (Critical Care)  Tata Lainez NP as Assigned Pulmonology Provider  Amador Stewart MD as MD (Hematology)  Wilma Carmona, RN as Specialty Care Coordinator (Hematology & Oncology)  Ana Maria Bermudez MD as Assigned PCP  Evans Gramajo APRN CNP as Nurse Practitioner (Hospice And Palliative Care)  Evans Gramajo APRN CNP as Assigned Palliative Care Provider  Bryant Mckinney MD as Physician (Vascular Surgery)  Naheed Mulligan MD as MD (Cardiovascular Disease)  Tata Nuñez MD as MD (Radiation Oncology)  Amador Stewart MD as Assigned Cancer Care Provider  Natty Rodriguez MD as Assigned Heart and Vascular Provider      Oncology Rooming Note    January 9, 2025 2:17 PM   Jasmyn Green is a 69 year old female who presents for:    Chief Complaint   Patient presents with     Oncology Clinic Visit     Follow up with Dr. Nuñez     Initial Vitals: /64 (BP Location: Right arm, Patient Position: Sitting, Cuff Size: Adult Regular)   Pulse 129   Temp 98.2  F (36.8  C) (Oral)   Resp 22   Wt 40.5 kg (89 lb 3.2 oz)   LMP  (LMP Unknown)   SpO2 (!) 85%   BMI 18.01 kg/m   Estimated body mass index is 18.01 kg/m  as calculated from the following:    Height as of 12/16/24: 1.499 m (4' 11.02\").    Weight as of this encounter: 40.5 kg (89 lb 3.2 oz). Body surface area is 1.3 meters squared.  No Pain (0) Comment: Data Unavailable   No LMP recorded (lmp unknown). Patient is postmenopausal.  Allergies reviewed: Yes  Medications reviewed: Yes    Medications: Medication refills not needed today.  Pharmacy name entered into [employer]:    [facility] [place] [facility] [address] [facility] - [place], [place] - [address]    Frailty Screening:   Is the patient here for a new " oncology consult visit in cancer care? 2. No      Clinical concerns: Patient here per wheelchair accompanied by family for follow-up status post radiation for her metastatic lung cancer.  Patient had a recent MRI and is here today for results.  Patient seen by Dr. Stewart medical oncology yesterday and immunotherapy is being held until decision about radiation is made.  Plan return to clinic for follow-up as directed by provider.   Dr. Nuñez was notified.      Amber Wallace RN                Again, thank you for allowing me to participate in the care of your patient.        Sincerely,        Tata Nuñez MD    Electronically signed

## 2025-01-14 DIAGNOSIS — C34.80 SMALL CELL CARCINOMA OF OVERLAPPING SITES OF LUNG, UNSPECIFIED LATERALITY (H): Primary | ICD-10-CM

## 2025-01-14 RX ORDER — LORAZEPAM 2 MG/ML
0.5 INJECTION INTRAMUSCULAR EVERY 4 HOURS PRN
OUTPATIENT
Start: 2025-03-11

## 2025-01-14 RX ORDER — ALBUTEROL SULFATE 90 UG/1
1-2 INHALANT RESPIRATORY (INHALATION)
Start: 2025-01-28

## 2025-01-14 RX ORDER — DIPHENHYDRAMINE HYDROCHLORIDE 50 MG/ML
25 INJECTION INTRAMUSCULAR; INTRAVENOUS
Start: 2025-03-11

## 2025-01-14 RX ORDER — DIPHENHYDRAMINE HYDROCHLORIDE 50 MG/ML
25 INJECTION INTRAMUSCULAR; INTRAVENOUS
Start: 2025-01-28

## 2025-01-14 RX ORDER — DIPHENHYDRAMINE HYDROCHLORIDE 50 MG/ML
50 INJECTION INTRAMUSCULAR; INTRAVENOUS
Start: 2025-03-11

## 2025-01-14 RX ORDER — HEPARIN SODIUM (PORCINE) LOCK FLUSH IV SOLN 100 UNIT/ML 100 UNIT/ML
5 SOLUTION INTRAVENOUS
OUTPATIENT
Start: 2025-03-11

## 2025-01-14 RX ORDER — MEPERIDINE HYDROCHLORIDE 25 MG/ML
25 INJECTION INTRAMUSCULAR; INTRAVENOUS; SUBCUTANEOUS
OUTPATIENT
Start: 2025-01-28

## 2025-01-14 RX ORDER — HEPARIN SODIUM,PORCINE 10 UNIT/ML
5-20 VIAL (ML) INTRAVENOUS DAILY PRN
OUTPATIENT
Start: 2025-03-11

## 2025-01-14 RX ORDER — ALBUTEROL SULFATE 90 UG/1
1-2 INHALANT RESPIRATORY (INHALATION)
Start: 2025-02-18

## 2025-01-14 RX ORDER — ALBUTEROL SULFATE 0.83 MG/ML
2.5 SOLUTION RESPIRATORY (INHALATION)
OUTPATIENT
Start: 2025-02-18

## 2025-01-14 RX ORDER — DIPHENHYDRAMINE HYDROCHLORIDE 50 MG/ML
25 INJECTION INTRAMUSCULAR; INTRAVENOUS
Start: 2025-02-18

## 2025-01-14 RX ORDER — HEPARIN SODIUM (PORCINE) LOCK FLUSH IV SOLN 100 UNIT/ML 100 UNIT/ML
5 SOLUTION INTRAVENOUS
OUTPATIENT
Start: 2025-01-28

## 2025-01-14 RX ORDER — EPINEPHRINE 1 MG/ML
0.3 INJECTION, SOLUTION, CONCENTRATE INTRAVENOUS EVERY 5 MIN PRN
OUTPATIENT
Start: 2025-03-11

## 2025-01-14 RX ORDER — MEPERIDINE HYDROCHLORIDE 25 MG/ML
25 INJECTION INTRAMUSCULAR; INTRAVENOUS; SUBCUTANEOUS
OUTPATIENT
Start: 2025-03-11

## 2025-01-14 RX ORDER — EPINEPHRINE 1 MG/ML
0.3 INJECTION, SOLUTION, CONCENTRATE INTRAVENOUS EVERY 5 MIN PRN
OUTPATIENT
Start: 2025-02-18

## 2025-01-14 RX ORDER — ALBUTEROL SULFATE 0.83 MG/ML
2.5 SOLUTION RESPIRATORY (INHALATION)
OUTPATIENT
Start: 2025-01-28

## 2025-01-14 RX ORDER — ALBUTEROL SULFATE 90 UG/1
1-2 INHALANT RESPIRATORY (INHALATION)
Start: 2025-03-11

## 2025-01-14 RX ORDER — LORAZEPAM 2 MG/ML
0.5 INJECTION INTRAMUSCULAR EVERY 4 HOURS PRN
OUTPATIENT
Start: 2025-01-28

## 2025-01-14 RX ORDER — HEPARIN SODIUM (PORCINE) LOCK FLUSH IV SOLN 100 UNIT/ML 100 UNIT/ML
5 SOLUTION INTRAVENOUS
OUTPATIENT
Start: 2025-02-18

## 2025-01-14 RX ORDER — MEPERIDINE HYDROCHLORIDE 25 MG/ML
25 INJECTION INTRAMUSCULAR; INTRAVENOUS; SUBCUTANEOUS
OUTPATIENT
Start: 2025-02-18

## 2025-01-14 RX ORDER — LORAZEPAM 2 MG/ML
0.5 INJECTION INTRAMUSCULAR EVERY 4 HOURS PRN
OUTPATIENT
Start: 2025-02-18

## 2025-01-14 RX ORDER — ALBUTEROL SULFATE 0.83 MG/ML
2.5 SOLUTION RESPIRATORY (INHALATION)
OUTPATIENT
Start: 2025-03-11

## 2025-01-14 RX ORDER — EPINEPHRINE 1 MG/ML
0.3 INJECTION, SOLUTION, CONCENTRATE INTRAVENOUS EVERY 5 MIN PRN
OUTPATIENT
Start: 2025-01-28

## 2025-01-14 RX ORDER — HEPARIN SODIUM,PORCINE 10 UNIT/ML
5-20 VIAL (ML) INTRAVENOUS DAILY PRN
OUTPATIENT
Start: 2025-02-18

## 2025-01-14 RX ORDER — DIPHENHYDRAMINE HYDROCHLORIDE 50 MG/ML
50 INJECTION INTRAMUSCULAR; INTRAVENOUS
Start: 2025-02-18

## 2025-01-14 RX ORDER — HEPARIN SODIUM,PORCINE 10 UNIT/ML
5-20 VIAL (ML) INTRAVENOUS DAILY PRN
OUTPATIENT
Start: 2025-01-28

## 2025-01-14 RX ORDER — DIPHENHYDRAMINE HYDROCHLORIDE 50 MG/ML
50 INJECTION INTRAMUSCULAR; INTRAVENOUS
Start: 2025-01-28

## 2025-01-16 DIAGNOSIS — I74.9 ARTERIAL THROMBOSIS (H): ICD-10-CM

## 2025-01-16 RX ORDER — APIXABAN 5 MG/1
5 TABLET, FILM COATED ORAL 2 TIMES DAILY
Qty: 60 TABLET | Refills: 4 | Status: SHIPPED | OUTPATIENT
Start: 2025-01-16

## 2025-01-20 ENCOUNTER — INFUSION THERAPY VISIT (OUTPATIENT)
Dept: INFUSION THERAPY | Facility: HOSPITAL | Age: 70
End: 2025-01-20
Payer: COMMERCIAL

## 2025-01-20 ENCOUNTER — ONCOLOGY VISIT (OUTPATIENT)
Dept: ONCOLOGY | Facility: HOSPITAL | Age: 70
End: 2025-01-20
Payer: COMMERCIAL

## 2025-01-20 VITALS
DIASTOLIC BLOOD PRESSURE: 72 MMHG | TEMPERATURE: 98.3 F | HEART RATE: 125 BPM | SYSTOLIC BLOOD PRESSURE: 129 MMHG | RESPIRATION RATE: 22 BRPM | OXYGEN SATURATION: 98 %

## 2025-01-20 DIAGNOSIS — T45.1X5A ANEMIA ASSOCIATED WITH CHEMOTHERAPY: Primary | ICD-10-CM

## 2025-01-20 DIAGNOSIS — C34.80 SMALL CELL CARCINOMA OF OVERLAPPING SITES OF LUNG, UNSPECIFIED LATERALITY (H): Primary | ICD-10-CM

## 2025-01-20 DIAGNOSIS — G93.9 BRAIN LESION: ICD-10-CM

## 2025-01-20 DIAGNOSIS — D64.81 ANEMIA ASSOCIATED WITH CHEMOTHERAPY: Primary | ICD-10-CM

## 2025-01-20 DIAGNOSIS — C34.80 SMALL CELL CARCINOMA OF OVERLAPPING SITES OF LUNG, UNSPECIFIED LATERALITY (H): ICD-10-CM

## 2025-01-20 DIAGNOSIS — T45.1X5A ANEMIA ASSOCIATED WITH CHEMOTHERAPY: ICD-10-CM

## 2025-01-20 DIAGNOSIS — D64.81 ANEMIA ASSOCIATED WITH CHEMOTHERAPY: ICD-10-CM

## 2025-01-20 LAB
ABO + RH BLD: NORMAL
ALBUMIN SERPL BCG-MCNC: 3.4 G/DL (ref 3.5–5.2)
ALP SERPL-CCNC: 80 U/L (ref 40–150)
ALT SERPL W P-5'-P-CCNC: 30 U/L (ref 0–50)
ANION GAP SERPL CALCULATED.3IONS-SCNC: 9 MMOL/L (ref 7–15)
AST SERPL W P-5'-P-CCNC: 20 U/L (ref 0–45)
BASO STIPL BLD QL SMEAR: PRESENT
BASO STIPL BLD QL SMEAR: PRESENT
BASOPHILS # BLD AUTO: 0 10E3/UL (ref 0–0.2)
BASOPHILS # BLD AUTO: 0.1 10E3/UL (ref 0–0.2)
BASOPHILS NFR BLD AUTO: 0 %
BASOPHILS NFR BLD AUTO: 0 %
BILIRUB SERPL-MCNC: <0.2 MG/DL
BLD GP AB SCN SERPL QL: NEGATIVE
BLD PROD TYP BPU: NORMAL
BLOOD COMPONENT TYPE: NORMAL
BUN SERPL-MCNC: 27.5 MG/DL (ref 8–23)
CALCIUM SERPL-MCNC: 9.3 MG/DL (ref 8.8–10.4)
CHLORIDE SERPL-SCNC: 105 MMOL/L (ref 98–107)
CODING SYSTEM: NORMAL
CREAT SERPL-MCNC: 0.87 MG/DL (ref 0.51–0.95)
CROSSMATCH: NORMAL
EGFRCR SERPLBLD CKD-EPI 2021: 71 ML/MIN/1.73M2
ELLIPTOCYTES BLD QL SMEAR: SLIGHT
ELLIPTOCYTES BLD QL SMEAR: SLIGHT
EOSINOPHIL # BLD AUTO: 1 10E3/UL (ref 0–0.7)
EOSINOPHIL # BLD AUTO: 1 10E3/UL (ref 0–0.7)
EOSINOPHIL NFR BLD AUTO: 5 %
EOSINOPHIL NFR BLD AUTO: 5 %
ERYTHROCYTE [DISTWIDTH] IN BLOOD BY AUTOMATED COUNT: 16.6 % (ref 10–15)
ERYTHROCYTE [DISTWIDTH] IN BLOOD BY AUTOMATED COUNT: 16.8 % (ref 10–15)
GLUCOSE SERPL-MCNC: 96 MG/DL (ref 70–99)
HCO3 SERPL-SCNC: 28 MMOL/L (ref 22–29)
HCT VFR BLD AUTO: 22.7 % (ref 35–47)
HCT VFR BLD AUTO: 25.5 % (ref 35–47)
HGB BLD-MCNC: 6.9 G/DL (ref 11.7–15.7)
HGB BLD-MCNC: 7.5 G/DL (ref 11.7–15.7)
IMM GRANULOCYTES # BLD: 0.2 10E3/UL
IMM GRANULOCYTES # BLD: 0.3 10E3/UL
IMM GRANULOCYTES NFR BLD: 1 %
IMM GRANULOCYTES NFR BLD: 1 %
LYMPHOCYTES # BLD AUTO: 1.5 10E3/UL (ref 0.8–5.3)
LYMPHOCYTES # BLD AUTO: 2 10E3/UL (ref 0.8–5.3)
LYMPHOCYTES NFR BLD AUTO: 8 %
LYMPHOCYTES NFR BLD AUTO: 9 %
MCH RBC QN AUTO: 28.8 PG (ref 26.5–33)
MCH RBC QN AUTO: 29 PG (ref 26.5–33)
MCHC RBC AUTO-ENTMCNC: 29.4 G/DL (ref 31.5–36.5)
MCHC RBC AUTO-ENTMCNC: 30.4 G/DL (ref 31.5–36.5)
MCV RBC AUTO: 95 FL (ref 78–100)
MCV RBC AUTO: 98 FL (ref 78–100)
MONOCYTES # BLD AUTO: 1.6 10E3/UL (ref 0–1.3)
MONOCYTES # BLD AUTO: 1.7 10E3/UL (ref 0–1.3)
MONOCYTES NFR BLD AUTO: 8 %
MONOCYTES NFR BLD AUTO: 8 %
NEUTROPHILS # BLD AUTO: 15.3 10E3/UL (ref 1.6–8.3)
NEUTROPHILS # BLD AUTO: 16 10E3/UL (ref 1.6–8.3)
NEUTROPHILS NFR BLD AUTO: 76 %
NEUTROPHILS NFR BLD AUTO: 79 %
NRBC # BLD AUTO: 0 10E3/UL
NRBC # BLD AUTO: 0 10E3/UL
NRBC BLD AUTO-RTO: 0 /100
NRBC BLD AUTO-RTO: 0 /100
PLAT MORPH BLD: ABNORMAL
PLAT MORPH BLD: ABNORMAL
PLATELET # BLD AUTO: 441 10E3/UL (ref 150–450)
PLATELET # BLD AUTO: 457 10E3/UL (ref 150–450)
POLYCHROMASIA BLD QL SMEAR: SLIGHT
POTASSIUM SERPL-SCNC: 4.5 MMOL/L (ref 3.4–5.3)
PROT SERPL-MCNC: 6 G/DL (ref 6.4–8.3)
RBC # BLD AUTO: 2.38 10E6/UL (ref 3.8–5.2)
RBC # BLD AUTO: 2.6 10E6/UL (ref 3.8–5.2)
RBC MORPH BLD: ABNORMAL
RBC MORPH BLD: ABNORMAL
SODIUM SERPL-SCNC: 142 MMOL/L (ref 135–145)
SPECIMEN EXP DATE BLD: NORMAL
UNIT ABO/RH: NORMAL
UNIT NUMBER: NORMAL
UNIT STATUS: NORMAL
UNIT TYPE ISBT: 6200
WBC # BLD AUTO: 19.5 10E3/UL (ref 4–11)
WBC # BLD AUTO: 21 10E3/UL (ref 4–11)

## 2025-01-20 PROCEDURE — 85041 AUTOMATED RBC COUNT: CPT | Performed by: INTERNAL MEDICINE

## 2025-01-20 PROCEDURE — 96413 CHEMO IV INFUSION 1 HR: CPT

## 2025-01-20 PROCEDURE — 84450 TRANSFERASE (AST) (SGOT): CPT | Performed by: INTERNAL MEDICINE

## 2025-01-20 PROCEDURE — 96367 TX/PROPH/DG ADDL SEQ IV INF: CPT

## 2025-01-20 PROCEDURE — 86901 BLOOD TYPING SEROLOGIC RH(D): CPT | Performed by: INTERNAL MEDICINE

## 2025-01-20 PROCEDURE — 36415 COLL VENOUS BLD VENIPUNCTURE: CPT | Performed by: INTERNAL MEDICINE

## 2025-01-20 PROCEDURE — 258N000003 HC RX IP 258 OP 636: Performed by: INTERNAL MEDICINE

## 2025-01-20 PROCEDURE — 86923 COMPATIBILITY TEST ELECTRIC: CPT | Performed by: INTERNAL MEDICINE

## 2025-01-20 PROCEDURE — 99214 OFFICE O/P EST MOD 30 MIN: CPT | Performed by: INTERNAL MEDICINE

## 2025-01-20 PROCEDURE — 86900 BLOOD TYPING SEROLOGIC ABO: CPT | Performed by: INTERNAL MEDICINE

## 2025-01-20 PROCEDURE — 250N000011 HC RX IP 250 OP 636: Performed by: INTERNAL MEDICINE

## 2025-01-20 PROCEDURE — 85025 COMPLETE CBC W/AUTO DIFF WBC: CPT | Performed by: INTERNAL MEDICINE

## 2025-01-20 PROCEDURE — 36591 DRAW BLOOD OFF VENOUS DEVICE: CPT | Performed by: INTERNAL MEDICINE

## 2025-01-20 PROCEDURE — 84155 ASSAY OF PROTEIN SERUM: CPT | Performed by: INTERNAL MEDICINE

## 2025-01-20 PROCEDURE — G2211 COMPLEX E/M VISIT ADD ON: HCPCS | Performed by: INTERNAL MEDICINE

## 2025-01-20 RX ORDER — METHYLPREDNISOLONE SODIUM SUCCINATE 40 MG/ML
40 INJECTION INTRAMUSCULAR; INTRAVENOUS
Status: DISCONTINUED | OUTPATIENT
Start: 2025-01-20 | End: 2025-01-20 | Stop reason: HOSPADM

## 2025-01-20 RX ORDER — EPINEPHRINE 1 MG/ML
0.3 INJECTION, SOLUTION INTRAMUSCULAR; SUBCUTANEOUS EVERY 5 MIN PRN
OUTPATIENT
Start: 2025-01-20

## 2025-01-20 RX ORDER — ALBUTEROL SULFATE 90 UG/1
1-2 INHALANT RESPIRATORY (INHALATION)
Status: DISCONTINUED | OUTPATIENT
Start: 2025-01-20 | End: 2025-01-20 | Stop reason: HOSPADM

## 2025-01-20 RX ORDER — HEPARIN SODIUM (PORCINE) LOCK FLUSH IV SOLN 100 UNIT/ML 100 UNIT/ML
5 SOLUTION INTRAVENOUS
OUTPATIENT
Start: 2025-01-20

## 2025-01-20 RX ORDER — ALBUTEROL SULFATE 0.83 MG/ML
2.5 SOLUTION RESPIRATORY (INHALATION)
Status: DISCONTINUED | OUTPATIENT
Start: 2025-01-20 | End: 2025-01-20 | Stop reason: HOSPADM

## 2025-01-20 RX ORDER — DIPHENHYDRAMINE HYDROCHLORIDE 50 MG/ML
50 INJECTION INTRAMUSCULAR; INTRAVENOUS
Start: 2025-01-20

## 2025-01-20 RX ORDER — HEPARIN SODIUM (PORCINE) LOCK FLUSH IV SOLN 100 UNIT/ML 100 UNIT/ML
5 SOLUTION INTRAVENOUS
Status: DISCONTINUED | OUTPATIENT
Start: 2025-01-20 | End: 2025-01-20 | Stop reason: HOSPADM

## 2025-01-20 RX ORDER — DIPHENHYDRAMINE HYDROCHLORIDE 50 MG/ML
25 INJECTION INTRAMUSCULAR; INTRAVENOUS
Status: DISCONTINUED | OUTPATIENT
Start: 2025-01-20 | End: 2025-01-20 | Stop reason: HOSPADM

## 2025-01-20 RX ORDER — MEPERIDINE HYDROCHLORIDE 25 MG/ML
25 INJECTION INTRAMUSCULAR; INTRAVENOUS; SUBCUTANEOUS
Status: DISCONTINUED | OUTPATIENT
Start: 2025-01-20 | End: 2025-01-20 | Stop reason: HOSPADM

## 2025-01-20 RX ORDER — HEPARIN SODIUM,PORCINE 10 UNIT/ML
5-20 VIAL (ML) INTRAVENOUS DAILY PRN
OUTPATIENT
Start: 2025-01-20

## 2025-01-20 RX ORDER — EPINEPHRINE 1 MG/ML
0.3 INJECTION, SOLUTION INTRAMUSCULAR; SUBCUTANEOUS EVERY 5 MIN PRN
Status: DISCONTINUED | OUTPATIENT
Start: 2025-01-20 | End: 2025-01-20 | Stop reason: HOSPADM

## 2025-01-20 RX ORDER — DIPHENHYDRAMINE HYDROCHLORIDE 50 MG/ML
50 INJECTION INTRAMUSCULAR; INTRAVENOUS
Status: DISCONTINUED | OUTPATIENT
Start: 2025-01-20 | End: 2025-01-20 | Stop reason: HOSPADM

## 2025-01-20 RX ADMIN — DEXAMETHASONE SODIUM PHOSPHATE: 10 INJECTION, SOLUTION INTRAMUSCULAR; INTRAVENOUS at 14:30

## 2025-01-20 RX ADMIN — LURBINECTEDIN 4.15 MG: 0.5 INJECTION, POWDER, LYOPHILIZED, FOR SOLUTION INTRAVENOUS at 14:54

## 2025-01-20 RX ADMIN — SODIUM CHLORIDE 250 ML: 9 INJECTION, SOLUTION INTRAVENOUS at 14:30

## 2025-01-20 RX ADMIN — HEPARIN 5 ML: 100 SYRINGE at 15:56

## 2025-01-20 NOTE — LETTER
1/20/2025      Jasmyn Green  [address] [place] [address]      Dear Colleague,    Thank you for referring your patient, Jasmyn Green, to the [facility]. Please see a copy of my visit note below.    [facility] Hematology and Oncology Consult Note    Patient: Jasmyn Green  MRN: [id number]  Date of Service: Jan 20, 2025           Reason for consultation      Problem List Items Addressed This Visit          Nervous and Auditory    Brain lesion       Respiratory    Small cell lung cancer (H) - Primary       Hematologic    Anemia associated with chemotherapy       Assessment / number of problems addressed      1.  A very pleasant 69 year old  woman with looks like extensive stage small cell lung cancer.  Started on palliative chemotherapy with carboplatin, etoposide and atezolizumab.  After 2 cycles seemed to be responding quite well on the CT scan.  Had significant side effects after third cycle requiring discontinuation of cytotoxic chemotherapy and continues on maintenance atezolizumab.  Last PET scan had shown very good response in July 2024.  In August 2024 developed new brain metastases and received radiation therapy.  Current MRI showing good treatment response.  CT of the chest showing growth of scan left upper lobe nodule.  This is consistent with small cell lung cancer.  2.  Severe COPD.  She is on 2-3 L of oxygen at rest.  3.  Significantly decreased FEV1 of 29% and reduced diffusion capacity with DLCO of 30%.  4.  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  On baby aspirin.  5.  Other medical conditions stable.  6.  Severe anemia secondary to chemotherapy.  7.  Enlarging lesion in the Left Cerebellum.      Plan and medical decision making      Patient presenting with advanced small cell lung cancer.  Severe anemia as well.Reviewed notes from each unique source.  Reviewed each unique test.    Ordered tests.    Independently interpreted lab tests and  radiological exams performed by other physicians.  Independent historian account obtained from her son.  Decision made to proceed with Lurbinectedin.  Very close monitoring.      Lurbinectedin at 3.2 mg/m  IV today.  Standard antinausea medications.  Very close monitoring for cytopenias.  Continue good diet and gentle exercise.  Very close monitoring of her hemoglobin.  May need transfusion.  Fluid electrolyte and nutritional optimization.  The longitudinal plan of care for the diagnosis(es)/condition(s) as documented were addressed during this visit. Due to the added complexity in care, I will continue to support Jasmyn in the subsequent management and with ongoing continuity of care.     Clinical/pathological stage       Cancer Staging   No matching staging information was found for the patient.      History of present illness      Ms. Jasmyn Green is  69 year old woman who has been referred to me for evaluation of newly diagnosed small cell lung cancer.  She appears to have extensive stage disease.  She presented to the [facility] with increasing shortness of breath coughing and symptoms suggestive of pneumonia.  She has a known history of severe obstructive lung disease with an FEV1 of 29% with hyperinflation and air trapping.  Presented with a 1 to 2-day history of worsening shortness of breath and with exertion as well as at rest.  Requiring increasing supplemental oxygen at home.  CT scan done in the emergency room showed bulky aortopulmonary as well as left hilar adenopathy with irregular pleural-based mass in the left upper lobe.  There are also some scattered satellite nodules.  This was very suspicious for malignancy.    After discharge she was seen by Dr. Levin and and underwent endobronchial ultrasound-guided biopsy which came back positive for small cell lung cancer.  The PET scan also showed hypermetabolic lesions in the mediastinum as well as the peripheral part of the left upper  lobe.      She used to smoke but quit smoking.  She was in fact was being monitored by CT surveillance.  Had a CT scan done in June 2023 which was positive for some sort of pneumonia but no obvious malignancy was noted.    She was started on echemotherapy with carboplatin, etoposide and atezolizumab.  She had 3 cycles.  After 3 cycles had severe side effects from chemotherapy.  She was had to be hospitalized for post side effect issues including some pneumonia etc.  She was at [facility] for several days.  We made the decision to discontinue cisplatin and etoposide.  We continue with the atezolizumab.    Jasmyn was able to tolerate atezolizumab only mild toxicity.  She is following up with palliative care physicians.  Her pain Patch was discontinued and she was put on small dose of Vicodin to help with her breathing.  That seems to be working well.  She had a PET scan done in July 2024.  That PET scan showed complete metabolic response.    Unfortunately in August 2024 her brain MRI showed multiple brain metastases.  She was seen by radiation oncology.  Received radiation therapy.  In the meantime has continued on her atezolizumab.    She had a CT scan on 4 November 2024.  That CT scan showed 1.1 cm subpleural nodule in the left upper lobe which she is showing slightly increased size compared to the prior PET study.  Therefore she was asked to come back with a PET scan.  PET scan showed that lesion to be hypermetabolic.    We had lengthy discussion about what to do next.  Eventually decided on biopsying that lesion.  The biopsy was done in the end of December 2024.  The biopsy did come back positive for metastatic or recurrent small cell lung cancer.    After that we made a decision to discontinue atezolizumab.  She is here to start treatment with Lurbinectedin.  She is overall feeling okay.  Somewhat anxious about the new chemo.  Took some Valium.        Past medical/surgical/social/family history        Past  Medical History:   Diagnosis Date     Age-related osteoporosis without current pathological fracture      Arthritis      COPD (chronic obstructive pulmonary disease) (H)      Coronary artery disease      Dependence on nocturnal oxygen therapy      Dyspnea on exertion      Emphysema lung (H)      Gout      HLD (hyperlipidemia)      Hypertension      Lung mass      Past Surgical History:   Procedure Laterality Date     BRONCHOSCOPY RIGID OR FLEXIBLE W/TRANSENDOSCOPIC ENDOBRONCHIAL ULTRASOUND GUIDED N/A 2/16/2024    Procedure: BRONCHOSCOPY, WITH ENDOBRONCHIAL ULTRASOUND;  Surgeon: Ruben Levin MD;  Location: [facility] Main OR     COLONOSCOPY N/A 10/21/2021    Procedure: COLONOSCOPY;  Surgeon: Wilma Hester DO;  Location: [facility] Main OR     IR CHEST PORT PLACEMENT > 5 YRS OF AGE  5/2/2024     VAGINAL DELIVERY      x 3 ; remote     WISDOM TOOTH EXTRACTION         Review of system      Details noted in the history of present illness.  A detailed review of systems is otherwise negative.      Physical exam        LMP  (LMP Unknown)     GENERAL: No acute distress. Cooperative in conversation.   HEENT:  Pupils are equal, round and reactive. Oral mucosa is clean and intact. No ulcerations or mucositis noted. No bleeding noted.  RESP:Chest symmetric lungs are clear bilaterally per auscultation. Regular respiratory rate. No wheezes or rhonchi.  CV: Normal S1 S2 Regular, rate and rhythm.     ABD: Nondistended, soft, nontender. Positive bowel sounds. No organomegaly.   EXTREMITIES: No lower extremity edema.   NEURO: Non- focal. Alert and oriented x3.  Cranial nerves appear intact.  PSYCH: Within normal limits. No depression or anxiety.  SKIN: Warm dry intact.      Lab results Reviewed      Recent Results (from the past week)   Comprehensive metabolic panel   Result Value Ref Range    Sodium 142 135 - 145 mmol/L    Potassium 4.5 3.4 - 5.3 mmol/L    Carbon Dioxide (CO2) 28 22 - 29 mmol/L    Anion Gap 9 7 - 15 mmol/L    Urea  Nitrogen 27.5 (H) 8.0 - 23.0 mg/dL    Creatinine 0.87 0.51 - 0.95 mg/dL    GFR Estimate 71 >60 mL/min/1.73m2    Calcium 9.3 8.8 - 10.4 mg/dL    Chloride 105 98 - 107 mmol/L    Glucose 96 70 - 99 mg/dL    Alkaline Phosphatase 80 40 - 150 U/L    AST 20 0 - 45 U/L    ALT 30 0 - 50 U/L    Protein Total 6.0 (L) 6.4 - 8.3 g/dL    Albumin 3.4 (L) 3.5 - 5.2 g/dL    Bilirubin Total <0.2 <=1.2 mg/dL   CBC with platelets and differential   Result Value Ref Range    WBC Count 19.5 (H) 4.0 - 11.0 10e3/uL    RBC Count 2.38 (L) 3.80 - 5.20 10e6/uL    Hemoglobin 6.9 (LL) 11.7 - 15.7 g/dL    Hematocrit 22.7 (L) 35.0 - 47.0 %    MCV 95 78 - 100 fL    MCH 29.0 26.5 - 33.0 pg    MCHC 30.4 (L) 31.5 - 36.5 g/dL    RDW 16.8 (H) 10.0 - 15.0 %    Platelet Count 441 150 - 450 10e3/uL    % Neutrophils 79 %    % Lymphocytes 8 %    % Monocytes 8 %    % Eosinophils 5 %    % Basophils 0 %    % Immature Granulocytes 1 %    NRBCs per 100 WBC 0 <1 /100    Absolute Neutrophils 15.3 (H) 1.6 - 8.3 10e3/uL    Absolute Lymphocytes 1.5 0.8 - 5.3 10e3/uL    Absolute Monocytes 1.6 (H) 0.0 - 1.3 10e3/uL    Absolute Eosinophils 1.0 (H) 0.0 - 0.7 10e3/uL    Absolute Basophils 0.0 0.0 - 0.2 10e3/uL    Absolute Immature Granulocytes 0.2 <=0.4 10e3/uL    Absolute NRBCs 0.0 10e3/uL   RBC and Platelet Morphology   Result Value Ref Range    RBC Morphology Confirmed RBC Indices     Platelet Assessment  Automated Count Confirmed. Platelet morphology is normal.     Automated Count Confirmed. Platelet morphology is normal.    Basophilic Stippling Present (A) None Seen    Elliptocytes Slight (A) None Seen   Adult Type and Screen   Result Value Ref Range    ABO/RH(D) A POS     Antibody Screen Negative Negative    SPECIMEN EXPIRATION DATE [id number]    Prepare red blood cells (unit)   Result Value Ref Range    Blood Component Type Red Blood Cells     Product Code [id number]     Unit Status Not used     Unit Number [id number]     CROSSMATCH Compatible     CODING SYSTEM  [id number]     UNIT ABO/RH A+     UNIT TYPE ISBT 6200    CBC with platelets and differential   Result Value Ref Range    WBC Count 21.0 (H) 4.0 - 11.0 10e3/uL    RBC Count 2.60 (L) 3.80 - 5.20 10e6/uL    Hemoglobin 7.5 (L) 11.7 - 15.7 g/dL    Hematocrit 25.5 (L) 35.0 - 47.0 %    MCV 98 78 - 100 fL    MCH 28.8 26.5 - 33.0 pg    MCHC 29.4 (L) 31.5 - 36.5 g/dL    RDW 16.6 (H) 10.0 - 15.0 %    Platelet Count 457 (H) 150 - 450 10e3/uL    % Neutrophils 76 %    % Lymphocytes 9 %    % Monocytes 8 %    % Eosinophils 5 %    % Basophils 0 %    % Immature Granulocytes 1 %    NRBCs per 100 WBC 0 <1 /100    Absolute Neutrophils 16.0 (H) 1.6 - 8.3 10e3/uL    Absolute Lymphocytes 2.0 0.8 - 5.3 10e3/uL    Absolute Monocytes 1.7 (H) 0.0 - 1.3 10e3/uL    Absolute Eosinophils 1.0 (H) 0.0 - 0.7 10e3/uL    Absolute Basophils 0.1 0.0 - 0.2 10e3/uL    Absolute Immature Granulocytes 0.3 <=0.4 10e3/uL    Absolute NRBCs 0.0 10e3/uL   RBC and Platelet Morphology   Result Value Ref Range    RBC Morphology Confirmed RBC Indices     Platelet Assessment  Automated Count Confirmed. Platelet morphology is normal.     Automated Count Confirmed. Platelet morphology is normal.    Basophilic Stippling Present (A) None Seen    Elliptocytes Slight (A) None Seen    Polychromasia Slight (A) None Seen       Imaging results Reviewed        MRI Brain w & w/o contrast    Addendum Date: 1/10/2025    ADDENDUM: Interval increase in the size of the lesion is favored to be secondary to treatment-related effect rather than tumor progression, given mostly cystic nature of the lesion. However tumor progression cannot be entirely excluded. Short-term follow-up is recommended. END ADDENDUM    Result Date: 1/10/2025  EXAM: MR BRAIN W/O and W CONTRAST LOCATION: [facility] DATE: 1/7/2025 INDICATION: extensive stage small cell lung cancer with brain metastases s p WBRT eval response progression 9 9 2024 COMPARISON: 11/4/2024. CONTRAST: 4.2mL Gadavist  TECHNIQUE: Routine multiplanar multisequence head MRI without and with intravenous contrast. FINDINGS: INTRACRANIAL CONTENTS: No acute or subacute infarct. Interval increase in the size of the centrally necrotic enhancing mass lesion in the left cerebellar hemisphere measuring 1.7 x 1.9 x 1.6 cm, previously 0.7 x 0.5 x 0.7 cm. Surrounding vasogenic edema also increased. The lesion currently demonstrates peripheral diffusion restriction. No mass, acute hemorrhage, or extra-axial fluid collections. Patchy nonspecific T2/FLAIR hyperintensities within the cerebral white matter most consistent with mild to moderate chronic microvascular ischemic change. Mild generalized cerebral atrophy. Normal ventricles and sulci. Normal position of the cerebellar tonsils. SELLA: Partially empty sella. OSSEOUS STRUCTURES/SOFT TISSUES: Normal marrow signal. The major intracranial vascular flow voids are maintained. ORBITS: No abnormality accounting for technique. SINUSES/MASTOIDS: Mild mucosal thickening scattered about the paranasal sinuses. Scattered fluid/membrane thickening in the mastoid air cells bilaterally.     IMPRESSION: 1.  Interval increase in the size of surrounding vasogenic edema of left cerebellar lesion. The lesion currently demonstrates diffusion restriction. No new lesions. 2.  Stable age-related chronic findings as detailed above.    CT Lung Mediastinum Biopsy    Result Date: 12/27/2024  EXAM: 1. PERCUTANEOUS BIOPSY LEFT LUNG 2. CT GUIDANCE 3. CONSCIOUS SEDATION LOCATION: [facility] DATE: 12/27/2024 INDICATION: left upper lobe lung nodule, history of small cell lung cancer TECHNIQUE: Dose reduction techniques were used. PROCEDURE: Informed consent obtained. Site marked. Prior images reviewed. Required items made available. Patient identity confirmed verbally and with arm band. Patient reevaluated immediately before administering sedation. Universal protocol was followed. Time out performed. The  site was prepped and draped in sterile fashion. 10 mL of 1% lidocaine was infused into the local soft tissues. Using standard technique and under direct CT guidance, a 20-gauge biopsy device was used to obtain 1 core biopsy. Tissue was submitted to Pathology and was adequate by preliminary review by a pathologist. The patient tolerated the procedure well. No immediate complications. SEDATION: Versed 0.5 mg. Fentanyl 25 mcg. The procedure was performed with administration intravenous conscious sedation with appropriate preoperative, intraoperative, and postoperative evaluation. 25 minutes of supervised face to face conscious sedation time was provided by a radiology nurse under my direct supervision.     IMPRESSION: Successful CT-guided biopsy of left upper lobe nodule. Reference CPT Codes: 75525, 21587, 80185    XR Chest Port 1 View    Result Date: 12/27/2024  EXAM: XR CHEST PORT 1 VIEW LOCATION: [facility] DATE: 12/27/2024 INDICATION: 1 hour after left upper lobe nodule biopsy COMPARISON: CT images from biopsy, earlier today     IMPRESSION: Minimal inflammation around the left upper lobe nodule projecting at the level of the anterior fourth intercostal space related to recent biopsy. No pneumothorax or pleural effusion. Upper lung predominant emphysema and related hyperinflation. Right jugular approach chest port catheter terminates near the SVC right atrial junction. Cardiac silhouette is not enlarged. Moderate aortic atheromatous calcifications are present.       Signed by: Amador Stewart MD      This note has been dictated using voice recognition software. Any grammatical or context distortions are unintentional and inherent to the software        Again, thank you for allowing me to participate in the care of your patient.        Sincerely,        Amador Stewart MD    Electronically signed

## 2025-01-20 NOTE — PROGRESS NOTES
"Infusion Nursing Note:  Jasmyn Green presents today for cycle 1 day 1 lurbinectedin.    Patient seen by provider today: Yes: Dr. Stewart, chairside   present during visit today: Not Applicable.    Note: Jasmyn arrived via wheelchair and in stable condition. Reports that she has been feeling \"better\" recently with improved energy and appetite. She is on 3-5L O2 at home, but she says her breathing has felt \"better\" as well. Port accessed using sterile technique, good blood return noted, and labs collected. Hgb came back at 6.9. Patient is not symptomatic. Dr. Stewart requested a redraw peripherally. SHILO Lim came to draw the lab from her left arm. Hgb recheck came back at 7.5. Considering this, she would not be needing a transfusion. Medication reviewed with patient, and all questions answered to the best of my ability. She was premedicated and treatment administered per orders. Will return on 2/10 for next appointment.      Intravenous Access:  Labs drawn without difficulty.  Implanted Port.    Treatment Conditions:  Lab Results   Component Value Date    HGB 7.5 (L) 01/20/2025    WBC 21.0 (H) 01/20/2025    ANEU 8.0 05/12/2024    ANEUTAUTO 16.0 (H) 01/20/2025     (H) 01/20/2025        Lab Results   Component Value Date     01/20/2025    POTASSIUM 4.5 01/20/2025    MAG 2.1 05/17/2024    CR 0.87 01/20/2025    FLOR 9.3 01/20/2025    BILITOTAL <0.2 01/20/2025    ALBUMIN 3.4 (L) 01/20/2025    ALT 30 01/20/2025    AST 20 01/20/2025       Results reviewed, labs MET treatment parameters, ok to proceed with treatment.      Post Infusion Assessment:  Patient tolerated infusion without incident.  Blood return noted pre and post infusion.  Site patent and intact, free from redness, edema or discomfort.  No evidence of extravasations.  Access discontinued per protocol.       Discharge Plan:   Patient and/or family verbalized understanding of discharge instructions and all questions answered.  AVS to " patient via [employer].  Patient will return 2/10 for next appointment.   Patient discharged in stable condition accompanied by: son.  Departure Mode: Wheelchair.      Debby Birmingham RN

## 2025-01-21 ENCOUNTER — MYC REFILL (OUTPATIENT)
Dept: PALLIATIVE MEDICINE | Facility: CLINIC | Age: 70
End: 2025-01-21
Payer: COMMERCIAL

## 2025-01-21 DIAGNOSIS — C34.90 MALIGNANT NEOPLASM OF LUNG, UNSPECIFIED LATERALITY, UNSPECIFIED PART OF LUNG (H): ICD-10-CM

## 2025-01-21 DIAGNOSIS — J44.9 COPD, GROUP D, BY GOLD 2017 CLASSIFICATION (H): ICD-10-CM

## 2025-01-21 DIAGNOSIS — G89.3 CANCER ASSOCIATED PAIN: ICD-10-CM

## 2025-01-21 RX ORDER — OXYCODONE HYDROCHLORIDE 5 MG/1
2.5-5 TABLET ORAL EVERY 4 HOURS PRN
Qty: 60 TABLET | Refills: 0 | Status: SHIPPED | OUTPATIENT
Start: 2025-01-21

## 2025-01-21 NOTE — PROGRESS NOTES
[facility] Hematology and Oncology Consult Note    Patient: Jasmyn Green  MRN: [id number]  Date of Service: Jan 20, 2025           Reason for consultation      Problem List Items Addressed This Visit          Nervous and Auditory    Brain lesion       Respiratory    Small cell lung cancer (H) - Primary       Hematologic    Anemia associated with chemotherapy       Assessment / number of problems addressed      1.  A very pleasant 69 year old  woman with looks like extensive stage small cell lung cancer.  Started on palliative chemotherapy with carboplatin, etoposide and atezolizumab.  After 2 cycles seemed to be responding quite well on the CT scan.  Had significant side effects after third cycle requiring discontinuation of cytotoxic chemotherapy and continues on maintenance atezolizumab.  Last PET scan had shown very good response in July 2024.  In August 2024 developed new brain metastases and received radiation therapy.  Current MRI showing good treatment response.  CT of the chest showing growth of scan left upper lobe nodule.  This is consistent with small cell lung cancer.  2.  Severe COPD.  She is on 2-3 L of oxygen at rest.  3.  Significantly decreased FEV1 of 29% and reduced diffusion capacity with DLCO of 30%.  4.  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  On baby aspirin.  5.  Other medical conditions stable.  6.  Severe anemia secondary to chemotherapy.  7.  Enlarging lesion in the Left Cerebellum.      Plan and medical decision making      Patient presenting with advanced small cell lung cancer.  Severe anemia as well.Reviewed notes from each unique source.  Reviewed each unique test.    Ordered tests.    Independently interpreted lab tests and radiological exams performed by other physicians.  Independent historian account obtained from her son.  Decision made to proceed with Lurbinectedin.  Very close monitoring.      Lurbinectedin at 3.2 mg/m  IV today.  Standard antinausea  medications.  Very close monitoring for cytopenias.  Continue good diet and gentle exercise.  Very close monitoring of her hemoglobin.  May need transfusion.  Fluid electrolyte and nutritional optimization.  The longitudinal plan of care for the diagnosis(es)/condition(s) as documented were addressed during this visit. Due to the added complexity in care, I will continue to support Jasmyn in the subsequent management and with ongoing continuity of care.     Clinical/pathological stage       Cancer Staging   No matching staging information was found for the patient.      History of present illness      Ms. Jasmyn Green is  69 year old woman who has been referred to me for evaluation of newly diagnosed small cell lung cancer.  She appears to have extensive stage disease.  She presented to the [facility] with increasing shortness of breath coughing and symptoms suggestive of pneumonia.  She has a known history of severe obstructive lung disease with an FEV1 of 29% with hyperinflation and air trapping.  Presented with a 1 to 2-day history of worsening shortness of breath and with exertion as well as at rest.  Requiring increasing supplemental oxygen at home.  CT scan done in the emergency room showed bulky aortopulmonary as well as left hilar adenopathy with irregular pleural-based mass in the left upper lobe.  There are also some scattered satellite nodules.  This was very suspicious for malignancy.    After discharge she was seen by Dr. Levin and and underwent endobronchial ultrasound-guided biopsy which came back positive for small cell lung cancer.  The PET scan also showed hypermetabolic lesions in the mediastinum as well as the peripheral part of the left upper lobe.      She used to smoke but quit smoking.  She was in fact was being monitored by CT surveillance.  Had a CT scan done in June 2023 which was positive for some sort of pneumonia but no obvious malignancy was noted.    She was started on  echemotherapy with carboplatin, etoposide and atezolizumab.  She had 3 cycles.  After 3 cycles had severe side effects from chemotherapy.  She was had to be hospitalized for post side effect issues including some pneumonia etc.  She was at [facility] for several days.  We made the decision to discontinue cisplatin and etoposide.  We continue with the atezolizumab.    Jasmyn was able to tolerate atezolizumab only mild toxicity.  She is following up with palliative care physicians.  Her pain Patch was discontinued and she was put on small dose of Vicodin to help with her breathing.  That seems to be working well.  She had a PET scan done in July 2024.  That PET scan showed complete metabolic response.    Unfortunately in August 2024 her brain MRI showed multiple brain metastases.  She was seen by radiation oncology.  Received radiation therapy.  In the meantime has continued on her atezolizumab.    She had a CT scan on 4 November 2024.  That CT scan showed 1.1 cm subpleural nodule in the left upper lobe which she is showing slightly increased size compared to the prior PET study.  Therefore she was asked to come back with a PET scan.  PET scan showed that lesion to be hypermetabolic.    We had lengthy discussion about what to do next.  Eventually decided on biopsying that lesion.  The biopsy was done in the end of December 2024.  The biopsy did come back positive for metastatic or recurrent small cell lung cancer.    After that we made a decision to discontinue atezolizumab.  She is here to start treatment with Lurbinectedin.  She is overall feeling okay.  Somewhat anxious about the new chemo.  Took some Valium.        Past medical/surgical/social/family history        Past Medical History:   Diagnosis Date    Age-related osteoporosis without current pathological fracture     Arthritis     COPD (chronic obstructive pulmonary disease) (H)     Coronary artery disease     Dependence on nocturnal oxygen therapy      Dyspnea on exertion     Emphysema lung (H)     Gout     HLD (hyperlipidemia)     Hypertension     Lung mass      Past Surgical History:   Procedure Laterality Date    BRONCHOSCOPY RIGID OR FLEXIBLE W/TRANSENDOSCOPIC ENDOBRONCHIAL ULTRASOUND GUIDED N/A 2/16/2024    Procedure: BRONCHOSCOPY, WITH ENDOBRONCHIAL ULTRASOUND;  Surgeon: Ruben Levin MD;  Location: [facility] Main OR    COLONOSCOPY N/A 10/21/2021    Procedure: COLONOSCOPY;  Surgeon: Wilma Hester DO;  Location: [facility] Main OR    IR CHEST PORT PLACEMENT > 5 YRS OF AGE  5/2/2024    VAGINAL DELIVERY      x 3 ; remote    WISDOM TOOTH EXTRACTION         Review of system      Details noted in the history of present illness.  A detailed review of systems is otherwise negative.      Physical exam        LMP  (LMP Unknown)     GENERAL: No acute distress. Cooperative in conversation.   HEENT:  Pupils are equal, round and reactive. Oral mucosa is clean and intact. No ulcerations or mucositis noted. No bleeding noted.  RESP:Chest symmetric lungs are clear bilaterally per auscultation. Regular respiratory rate. No wheezes or rhonchi.  CV: Normal S1 S2 Regular, rate and rhythm.     ABD: Nondistended, soft, nontender. Positive bowel sounds. No organomegaly.   EXTREMITIES: No lower extremity edema.   NEURO: Non- focal. Alert and oriented x3.  Cranial nerves appear intact.  PSYCH: Within normal limits. No depression or anxiety.  SKIN: Warm dry intact.      Lab results Reviewed      Recent Results (from the past week)   Comprehensive metabolic panel   Result Value Ref Range    Sodium 142 135 - 145 mmol/L    Potassium 4.5 3.4 - 5.3 mmol/L    Carbon Dioxide (CO2) 28 22 - 29 mmol/L    Anion Gap 9 7 - 15 mmol/L    Urea Nitrogen 27.5 (H) 8.0 - 23.0 mg/dL    Creatinine 0.87 0.51 - 0.95 mg/dL    GFR Estimate 71 >60 mL/min/1.73m2    Calcium 9.3 8.8 - 10.4 mg/dL    Chloride 105 98 - 107 mmol/L    Glucose 96 70 - 99 mg/dL    Alkaline Phosphatase 80 40 - 150 U/L    AST 20 0 - 45  U/L    ALT 30 0 - 50 U/L    Protein Total 6.0 (L) 6.4 - 8.3 g/dL    Albumin 3.4 (L) 3.5 - 5.2 g/dL    Bilirubin Total <0.2 <=1.2 mg/dL   CBC with platelets and differential   Result Value Ref Range    WBC Count 19.5 (H) 4.0 - 11.0 10e3/uL    RBC Count 2.38 (L) 3.80 - 5.20 10e6/uL    Hemoglobin 6.9 (LL) 11.7 - 15.7 g/dL    Hematocrit 22.7 (L) 35.0 - 47.0 %    MCV 95 78 - 100 fL    MCH 29.0 26.5 - 33.0 pg    MCHC 30.4 (L) 31.5 - 36.5 g/dL    RDW 16.8 (H) 10.0 - 15.0 %    Platelet Count 441 150 - 450 10e3/uL    % Neutrophils 79 %    % Lymphocytes 8 %    % Monocytes 8 %    % Eosinophils 5 %    % Basophils 0 %    % Immature Granulocytes 1 %    NRBCs per 100 WBC 0 <1 /100    Absolute Neutrophils 15.3 (H) 1.6 - 8.3 10e3/uL    Absolute Lymphocytes 1.5 0.8 - 5.3 10e3/uL    Absolute Monocytes 1.6 (H) 0.0 - 1.3 10e3/uL    Absolute Eosinophils 1.0 (H) 0.0 - 0.7 10e3/uL    Absolute Basophils 0.0 0.0 - 0.2 10e3/uL    Absolute Immature Granulocytes 0.2 <=0.4 10e3/uL    Absolute NRBCs 0.0 10e3/uL   RBC and Platelet Morphology   Result Value Ref Range    RBC Morphology Confirmed RBC Indices     Platelet Assessment  Automated Count Confirmed. Platelet morphology is normal.     Automated Count Confirmed. Platelet morphology is normal.    Basophilic Stippling Present (A) None Seen    Elliptocytes Slight (A) None Seen   Adult Type and Screen   Result Value Ref Range    ABO/RH(D) A POS     Antibody Screen Negative Negative    SPECIMEN EXPIRATION DATE [id number]    Prepare red blood cells (unit)   Result Value Ref Range    Blood Component Type Red Blood Cells     Product Code K8858P51     Unit Status Not used     Unit Number [id number]     CROSSMATCH Compatible     CODING SYSTEM [id number]     UNIT ABO/RH A+     UNIT TYPE ISBT 6200    CBC with platelets and differential   Result Value Ref Range    WBC Count 21.0 (H) 4.0 - 11.0 10e3/uL    RBC Count 2.60 (L) 3.80 - 5.20 10e6/uL    Hemoglobin 7.5 (L) 11.7 - 15.7 g/dL    Hematocrit 25.5  (L) 35.0 - 47.0 %    MCV 98 78 - 100 fL    MCH 28.8 26.5 - 33.0 pg    MCHC 29.4 (L) 31.5 - 36.5 g/dL    RDW 16.6 (H) 10.0 - 15.0 %    Platelet Count 457 (H) 150 - 450 10e3/uL    % Neutrophils 76 %    % Lymphocytes 9 %    % Monocytes 8 %    % Eosinophils 5 %    % Basophils 0 %    % Immature Granulocytes 1 %    NRBCs per 100 WBC 0 <1 /100    Absolute Neutrophils 16.0 (H) 1.6 - 8.3 10e3/uL    Absolute Lymphocytes 2.0 0.8 - 5.3 10e3/uL    Absolute Monocytes 1.7 (H) 0.0 - 1.3 10e3/uL    Absolute Eosinophils 1.0 (H) 0.0 - 0.7 10e3/uL    Absolute Basophils 0.1 0.0 - 0.2 10e3/uL    Absolute Immature Granulocytes 0.3 <=0.4 10e3/uL    Absolute NRBCs 0.0 10e3/uL   RBC and Platelet Morphology   Result Value Ref Range    RBC Morphology Confirmed RBC Indices     Platelet Assessment  Automated Count Confirmed. Platelet morphology is normal.     Automated Count Confirmed. Platelet morphology is normal.    Basophilic Stippling Present (A) None Seen    Elliptocytes Slight (A) None Seen    Polychromasia Slight (A) None Seen       Imaging results Reviewed        MRI Brain w & w/o contrast    Addendum Date: 1/10/2025    ADDENDUM: Interval increase in the size of the lesion is favored to be secondary to treatment-related effect rather than tumor progression, given mostly cystic nature of the lesion. However tumor progression cannot be entirely excluded. Short-term follow-up is recommended. END ADDENDUM    Result Date: 1/10/2025  EXAM: MR BRAIN W/O and W CONTRAST LOCATION: [facility] DATE: 1/7/2025 INDICATION: extensive stage small cell lung cancer with brain metastases s p WBRT eval response progression 9 9 2024 COMPARISON: 11/4/2024. CONTRAST: 4.2mL Gadavist TECHNIQUE: Routine multiplanar multisequence head MRI without and with intravenous contrast. FINDINGS: INTRACRANIAL CONTENTS: No acute or subacute infarct. Interval increase in the size of the centrally necrotic enhancing mass lesion in the left cerebellar  hemisphere measuring 1.7 x 1.9 x 1.6 cm, previously 0.7 x 0.5 x 0.7 cm. Surrounding vasogenic edema also increased. The lesion currently demonstrates peripheral diffusion restriction. No mass, acute hemorrhage, or extra-axial fluid collections. Patchy nonspecific T2/FLAIR hyperintensities within the cerebral white matter most consistent with mild to moderate chronic microvascular ischemic change. Mild generalized cerebral atrophy. Normal ventricles and sulci. Normal position of the cerebellar tonsils. SELLA: Partially empty sella. OSSEOUS STRUCTURES/SOFT TISSUES: Normal marrow signal. The major intracranial vascular flow voids are maintained. ORBITS: No abnormality accounting for technique. SINUSES/MASTOIDS: Mild mucosal thickening scattered about the paranasal sinuses. Scattered fluid/membrane thickening in the mastoid air cells bilaterally.     IMPRESSION: 1.  Interval increase in the size of surrounding vasogenic edema of left cerebellar lesion. The lesion currently demonstrates diffusion restriction. No new lesions. 2.  Stable age-related chronic findings as detailed above.    CT Lung Mediastinum Biopsy    Result Date: 12/27/2024  EXAM: 1. PERCUTANEOUS BIOPSY LEFT LUNG 2. CT GUIDANCE 3. CONSCIOUS SEDATION LOCATION: [facility] DATE: 12/27/2024 INDICATION: left upper lobe lung nodule, history of small cell lung cancer TECHNIQUE: Dose reduction techniques were used. PROCEDURE: Informed consent obtained. Site marked. Prior images reviewed. Required items made available. Patient identity confirmed verbally and with arm band. Patient reevaluated immediately before administering sedation. Universal protocol was followed. Time out performed. The site was prepped and draped in sterile fashion. 10 mL of 1% lidocaine was infused into the local soft tissues. Using standard technique and under direct CT guidance, a 20-gauge biopsy device was used to obtain 1 core biopsy. Tissue was submitted to Pathology  and was adequate by preliminary review by a pathologist. The patient tolerated the procedure well. No immediate complications. SEDATION: Versed 0.5 mg. Fentanyl 25 mcg. The procedure was performed with administration intravenous conscious sedation with appropriate preoperative, intraoperative, and postoperative evaluation. 25 minutes of supervised face to face conscious sedation time was provided by a radiology nurse under my direct supervision.     IMPRESSION: Successful CT-guided biopsy of left upper lobe nodule. Reference CPT Codes: 98905, 09850, 01496    XR Chest Port 1 View    Result Date: 12/27/2024  EXAM: XR CHEST PORT 1 VIEW LOCATION: [facility] DATE: 12/27/2024 INDICATION: 1 hour after left upper lobe nodule biopsy COMPARISON: CT images from biopsy, earlier today     IMPRESSION: Minimal inflammation around the left upper lobe nodule projecting at the level of the anterior fourth intercostal space related to recent biopsy. No pneumothorax or pleural effusion. Upper lung predominant emphysema and related hyperinflation. Right jugular approach chest port catheter terminates near the SVC right atrial junction. Cardiac silhouette is not enlarged. Moderate aortic atheromatous calcifications are present.       Signed by: Amador Stewart MD      This note has been dictated using voice recognition software. Any grammatical or context distortions are unintentional and inherent to the software

## 2025-01-21 NOTE — TELEPHONE ENCOUNTER
Received [employer] message from patient requesting refill of oxycodone.     Last refill: 12/26/24  Last office visit: 1/6/25  Scheduled for follow up 1/24/25     Will route request to MD/ for review.     Reviewed [place]  Report.

## 2025-01-22 ENCOUNTER — PATIENT OUTREACH (OUTPATIENT)
Dept: ONCOLOGY | Facility: HOSPITAL | Age: 70
End: 2025-01-22
Payer: COMMERCIAL

## 2025-01-22 NOTE — PROGRESS NOTES
[facility]: Cancer Care Follow-Up Note                                    Discussion with Patient:                                                      Call placed to patient today to check in and see how she is doing after receiving her first infusion of lurbinectedin on 1/20/2025.  Patient was sleeping so I spoke with her daughter Karie (consent to communicate on file from 12/16/2024).     Goals          General    Maintain ability to perform ADLs without difficulty             Dates of Treatment:                                                      Infusion given in last 28 days       Administered MAR Action Medication Dose Rate Visit    01/20/2025 14:54 New Bag lurbinectedin (ZEPZELCA) 4.15 mg in sodium chloride 0.9 % 283.3 mL infusion 4.15 mg 283.3 mL/hr Infusion Therapy Visit on 01/20/2025 in [facility]            Assessment/Intervention/Education provided during outreach:                                                      She states that after this chemotherapy her mother has been more sleepy than she previously was.  No other symptoms to be concerned about.  I did explain to her that her body likely needs the sleep if that is what she is doing.  I asked her to give us a call if there is any further questions or concerns or any other symptoms that they need assistance with.  She verbalized understanding and was appreciative of the call.    Patient to follow up as scheduled at next appt.  Patient to call/[employer] message with updates.  Confirmed patient has clinic and triage numbers.    Signature:  Wilma Carmona RN

## 2025-02-02 ENCOUNTER — MYC REFILL (OUTPATIENT)
Dept: PALLIATIVE MEDICINE | Facility: CLINIC | Age: 70
End: 2025-02-02
Payer: COMMERCIAL

## 2025-02-02 DIAGNOSIS — J44.9 COPD, GROUP D, BY GOLD 2017 CLASSIFICATION (H): ICD-10-CM

## 2025-02-02 DIAGNOSIS — C34.90 MALIGNANT NEOPLASM OF LUNG, UNSPECIFIED LATERALITY, UNSPECIFIED PART OF LUNG (H): ICD-10-CM

## 2025-02-02 DIAGNOSIS — G89.3 CANCER ASSOCIATED PAIN: ICD-10-CM

## 2025-02-03 RX ORDER — OXYCODONE HYDROCHLORIDE 5 MG/1
2.5-5 TABLET ORAL EVERY 4 HOURS PRN
Qty: 90 TABLET | Refills: 0 | Status: SHIPPED | OUTPATIENT
Start: 2025-02-03

## 2025-02-03 NOTE — TELEPHONE ENCOUNTER
Received [employer] message from patient requesting refill of oxycodone.     Last refill: 1/22/25  Last office visit: 1/24/25  Scheduled for follow up 3/28/25     Will route request to NP for review.     Reviewed [place]  Report.

## 2025-02-05 ENCOUNTER — PATIENT OUTREACH (OUTPATIENT)
Dept: CARE COORDINATION | Facility: CLINIC | Age: 70
End: 2025-02-05
Payer: COMMERCIAL

## 2025-02-06 ENCOUNTER — MYC MEDICAL ADVICE (OUTPATIENT)
Dept: PULMONOLOGY | Facility: CLINIC | Age: 70
End: 2025-02-06
Payer: COMMERCIAL

## 2025-02-06 DIAGNOSIS — J44.9 COPD, GROUP D, BY GOLD 2017 CLASSIFICATION (H): ICD-10-CM

## 2025-02-06 RX ORDER — PREDNISONE 10 MG/1
TABLET ORAL
Qty: 30 TABLET | Refills: 0 | Status: SHIPPED | OUTPATIENT
Start: 2025-02-06

## 2025-02-06 RX ORDER — DOXYCYCLINE 100 MG/1
100 CAPSULE ORAL 2 TIMES DAILY
Qty: 10 CAPSULE | Refills: 0 | Status: SHIPPED | OUTPATIENT
Start: 2025-02-06 | End: 2025-02-11

## 2025-02-10 ENCOUNTER — ONCOLOGY VISIT (OUTPATIENT)
Dept: ONCOLOGY | Facility: HOSPITAL | Age: 70
End: 2025-02-10
Payer: COMMERCIAL

## 2025-02-10 ENCOUNTER — INFUSION THERAPY VISIT (OUTPATIENT)
Dept: INFUSION THERAPY | Facility: HOSPITAL | Age: 70
End: 2025-02-10
Payer: COMMERCIAL

## 2025-02-10 ENCOUNTER — DOCUMENTATION ONLY (OUTPATIENT)
Dept: ONCOLOGY | Facility: HOSPITAL | Age: 70
End: 2025-02-10

## 2025-02-10 VITALS
SYSTOLIC BLOOD PRESSURE: 122 MMHG | OXYGEN SATURATION: 97 % | WEIGHT: 89.2 LBS | RESPIRATION RATE: 16 BRPM | TEMPERATURE: 97.9 F | BODY MASS INDEX: 18.02 KG/M2 | HEART RATE: 123 BPM | DIASTOLIC BLOOD PRESSURE: 72 MMHG

## 2025-02-10 VITALS
HEART RATE: 101 BPM | RESPIRATION RATE: 18 BRPM | DIASTOLIC BLOOD PRESSURE: 71 MMHG | OXYGEN SATURATION: 100 % | TEMPERATURE: 98.6 F | SYSTOLIC BLOOD PRESSURE: 156 MMHG

## 2025-02-10 DIAGNOSIS — D64.81 ANEMIA ASSOCIATED WITH CHEMOTHERAPY: Primary | ICD-10-CM

## 2025-02-10 DIAGNOSIS — T45.1X5A ANEMIA ASSOCIATED WITH CHEMOTHERAPY: ICD-10-CM

## 2025-02-10 DIAGNOSIS — D64.81 ANEMIA ASSOCIATED WITH CHEMOTHERAPY: ICD-10-CM

## 2025-02-10 DIAGNOSIS — R53.82 CHRONIC FATIGUE, UNSPECIFIED: ICD-10-CM

## 2025-02-10 DIAGNOSIS — T45.1X5A ANEMIA ASSOCIATED WITH CHEMOTHERAPY: Primary | ICD-10-CM

## 2025-02-10 DIAGNOSIS — C34.80 SMALL CELL CARCINOMA OF OVERLAPPING SITES OF LUNG, UNSPECIFIED LATERALITY (H): Primary | ICD-10-CM

## 2025-02-10 DIAGNOSIS — C34.12 MALIGNANT NEOPLASM OF UPPER LOBE OF LEFT LUNG (H): ICD-10-CM

## 2025-02-10 DIAGNOSIS — C34.80 SMALL CELL CARCINOMA OF OVERLAPPING SITES OF LUNG, UNSPECIFIED LATERALITY (H): ICD-10-CM

## 2025-02-10 LAB
ABO + RH BLD: NORMAL
ALBUMIN SERPL BCG-MCNC: 3.7 G/DL (ref 3.5–5.2)
ALP SERPL-CCNC: 93 U/L (ref 40–150)
ALT SERPL W P-5'-P-CCNC: 20 U/L (ref 0–50)
ANION GAP SERPL CALCULATED.3IONS-SCNC: 10 MMOL/L (ref 7–15)
AST SERPL W P-5'-P-CCNC: 18 U/L (ref 0–45)
BASOPHILS # BLD AUTO: 0 10E3/UL (ref 0–0.2)
BASOPHILS # BLD AUTO: 0 10E3/UL (ref 0–0.2)
BASOPHILS NFR BLD AUTO: 0 %
BASOPHILS NFR BLD AUTO: 0 %
BILIRUB SERPL-MCNC: <0.2 MG/DL
BLD GP AB SCN SERPL QL: NEGATIVE
BLD PROD TYP BPU: NORMAL
BLD PROD TYP BPU: NORMAL
BLOOD COMPONENT TYPE: NORMAL
BLOOD COMPONENT TYPE: NORMAL
BUN SERPL-MCNC: 30.3 MG/DL (ref 8–23)
CALCIUM SERPL-MCNC: 9.2 MG/DL (ref 8.8–10.4)
CHLORIDE SERPL-SCNC: 107 MMOL/L (ref 98–107)
CODING SYSTEM: NORMAL
CODING SYSTEM: NORMAL
CREAT SERPL-MCNC: 0.98 MG/DL (ref 0.51–0.95)
CROSSMATCH: NORMAL
CROSSMATCH: NORMAL
EGFRCR SERPLBLD CKD-EPI 2021: 61 ML/MIN/1.73M2
EOSINOPHIL # BLD AUTO: 0 10E3/UL (ref 0–0.7)
EOSINOPHIL # BLD AUTO: 0 10E3/UL (ref 0–0.7)
EOSINOPHIL NFR BLD AUTO: 0 %
EOSINOPHIL NFR BLD AUTO: 0 %
ERYTHROCYTE [DISTWIDTH] IN BLOOD BY AUTOMATED COUNT: 17.2 % (ref 10–15)
ERYTHROCYTE [DISTWIDTH] IN BLOOD BY AUTOMATED COUNT: 17.3 % (ref 10–15)
FERRITIN SERPL-MCNC: 34 NG/ML (ref 11–328)
GLUCOSE SERPL-MCNC: 228 MG/DL (ref 70–99)
HCO3 SERPL-SCNC: 27 MMOL/L (ref 22–29)
HCT VFR BLD AUTO: 21.1 % (ref 35–47)
HCT VFR BLD AUTO: 23 % (ref 35–47)
HGB BLD-MCNC: 6.1 G/DL (ref 11.7–15.7)
HGB BLD-MCNC: 6.6 G/DL (ref 11.7–15.7)
IMM GRANULOCYTES # BLD: 0.5 10E3/UL
IMM GRANULOCYTES # BLD: 0.5 10E3/UL
IMM GRANULOCYTES NFR BLD: 5 %
IMM GRANULOCYTES NFR BLD: 5 %
IRON BINDING CAPACITY (ROCHE): 271 UG/DL (ref 240–430)
IRON SATN MFR SERPL: 27 % (ref 15–46)
IRON SERPL-MCNC: 72 UG/DL (ref 37–145)
ISSUE DATE AND TIME: NORMAL
ISSUE DATE AND TIME: NORMAL
LYMPHOCYTES # BLD AUTO: 0.7 10E3/UL (ref 0.8–5.3)
LYMPHOCYTES # BLD AUTO: 0.8 10E3/UL (ref 0.8–5.3)
LYMPHOCYTES NFR BLD AUTO: 6 %
LYMPHOCYTES NFR BLD AUTO: 7 %
MCH RBC QN AUTO: 27.8 PG (ref 26.5–33)
MCH RBC QN AUTO: 28 PG (ref 26.5–33)
MCHC RBC AUTO-ENTMCNC: 28.7 G/DL (ref 31.5–36.5)
MCHC RBC AUTO-ENTMCNC: 28.9 G/DL (ref 31.5–36.5)
MCV RBC AUTO: 97 FL (ref 78–100)
MCV RBC AUTO: 97 FL (ref 78–100)
MONOCYTES # BLD AUTO: 0.4 10E3/UL (ref 0–1.3)
MONOCYTES # BLD AUTO: 0.5 10E3/UL (ref 0–1.3)
MONOCYTES NFR BLD AUTO: 4 %
MONOCYTES NFR BLD AUTO: 5 %
NEUTROPHILS # BLD AUTO: 8.9 10E3/UL (ref 1.6–8.3)
NEUTROPHILS # BLD AUTO: 9.5 10E3/UL (ref 1.6–8.3)
NEUTROPHILS NFR BLD AUTO: 84 %
NEUTROPHILS NFR BLD AUTO: 85 %
NRBC # BLD AUTO: 0.1 10E3/UL
NRBC # BLD AUTO: 0.1 10E3/UL
NRBC BLD AUTO-RTO: 1 /100
NRBC BLD AUTO-RTO: 1 /100
PLATELET # BLD AUTO: 656 10E3/UL (ref 150–450)
PLATELET # BLD AUTO: 658 10E3/UL (ref 150–450)
POTASSIUM SERPL-SCNC: 4.1 MMOL/L (ref 3.4–5.3)
PROT SERPL-MCNC: 6 G/DL (ref 6.4–8.3)
RBC # BLD AUTO: 2.18 10E6/UL (ref 3.8–5.2)
RBC # BLD AUTO: 2.37 10E6/UL (ref 3.8–5.2)
RETICS # AUTO: 0.11 10E6/UL (ref 0.03–0.1)
RETICS/RBC NFR AUTO: 4.8 % (ref 0.5–2)
SODIUM SERPL-SCNC: 144 MMOL/L (ref 135–145)
SPECIMEN EXP DATE BLD: NORMAL
TSH SERPL DL<=0.005 MIU/L-ACNC: 1.42 UIU/ML (ref 0.3–4.2)
UNIT ABO/RH: NORMAL
UNIT ABO/RH: NORMAL
UNIT NUMBER: NORMAL
UNIT NUMBER: NORMAL
UNIT STATUS: NORMAL
UNIT STATUS: NORMAL
UNIT TYPE ISBT: 6200
UNIT TYPE ISBT: 6200
VIT B12 SERPL-MCNC: 511 PG/ML (ref 232–1245)
WBC # BLD AUTO: 10.6 10E3/UL (ref 4–11)
WBC # BLD AUTO: 11.2 10E3/UL (ref 4–11)

## 2025-02-10 PROCEDURE — G0463 HOSPITAL OUTPT CLINIC VISIT: HCPCS | Performed by: NURSE PRACTITIONER

## 2025-02-10 PROCEDURE — 96367 TX/PROPH/DG ADDL SEQ IV INF: CPT

## 2025-02-10 PROCEDURE — 82947 ASSAY GLUCOSE BLOOD QUANT: CPT | Performed by: INTERNAL MEDICINE

## 2025-02-10 PROCEDURE — P9016 RBC LEUKOCYTES REDUCED: HCPCS | Performed by: NURSE PRACTITIONER

## 2025-02-10 PROCEDURE — 85041 AUTOMATED RBC COUNT: CPT | Performed by: INTERNAL MEDICINE

## 2025-02-10 PROCEDURE — 96413 CHEMO IV INFUSION 1 HR: CPT

## 2025-02-10 PROCEDURE — 82607 VITAMIN B-12: CPT

## 2025-02-10 PROCEDURE — 36430 TRANSFUSION BLD/BLD COMPNT: CPT

## 2025-02-10 PROCEDURE — 86900 BLOOD TYPING SEROLOGIC ABO: CPT | Performed by: NURSE PRACTITIONER

## 2025-02-10 PROCEDURE — 85004 AUTOMATED DIFF WBC COUNT: CPT | Performed by: INTERNAL MEDICINE

## 2025-02-10 PROCEDURE — 99214 OFFICE O/P EST MOD 30 MIN: CPT | Performed by: NURSE PRACTITIONER

## 2025-02-10 PROCEDURE — 85041 AUTOMATED RBC COUNT: CPT

## 2025-02-10 PROCEDURE — 84443 ASSAY THYROID STIM HORMONE: CPT

## 2025-02-10 PROCEDURE — 80053 COMPREHEN METABOLIC PANEL: CPT | Performed by: INTERNAL MEDICINE

## 2025-02-10 PROCEDURE — 83550 IRON BINDING TEST: CPT

## 2025-02-10 PROCEDURE — 82728 ASSAY OF FERRITIN: CPT

## 2025-02-10 PROCEDURE — 36415 COLL VENOUS BLD VENIPUNCTURE: CPT

## 2025-02-10 PROCEDURE — 82310 ASSAY OF CALCIUM: CPT | Performed by: INTERNAL MEDICINE

## 2025-02-10 PROCEDURE — 86923 COMPATIBILITY TEST ELECTRIC: CPT | Performed by: NURSE PRACTITIONER

## 2025-02-10 PROCEDURE — G2211 COMPLEX E/M VISIT ADD ON: HCPCS | Performed by: NURSE PRACTITIONER

## 2025-02-10 PROCEDURE — 85045 AUTOMATED RETICULOCYTE COUNT: CPT

## 2025-02-10 PROCEDURE — 85025 COMPLETE CBC W/AUTO DIFF WBC: CPT

## 2025-02-10 PROCEDURE — 36591 DRAW BLOOD OFF VENOUS DEVICE: CPT | Performed by: INTERNAL MEDICINE

## 2025-02-10 PROCEDURE — 258N000003 HC RX IP 258 OP 636: Performed by: INTERNAL MEDICINE

## 2025-02-10 PROCEDURE — 85014 HEMATOCRIT: CPT

## 2025-02-10 PROCEDURE — 250N000011 HC RX IP 250 OP 636: Performed by: INTERNAL MEDICINE

## 2025-02-10 RX ORDER — MEPERIDINE HYDROCHLORIDE 25 MG/ML
25 INJECTION INTRAMUSCULAR; INTRAVENOUS; SUBCUTANEOUS
Status: DISCONTINUED | OUTPATIENT
Start: 2025-02-10 | End: 2025-02-10 | Stop reason: HOSPADM

## 2025-02-10 RX ORDER — EPINEPHRINE 1 MG/ML
0.3 INJECTION, SOLUTION INTRAMUSCULAR; SUBCUTANEOUS EVERY 5 MIN PRN
OUTPATIENT
Start: 2025-02-10

## 2025-02-10 RX ORDER — HEPARIN SODIUM (PORCINE) LOCK FLUSH IV SOLN 100 UNIT/ML 100 UNIT/ML
5 SOLUTION INTRAVENOUS
OUTPATIENT
Start: 2025-02-10

## 2025-02-10 RX ORDER — DIPHENHYDRAMINE HYDROCHLORIDE 50 MG/ML
25 INJECTION INTRAMUSCULAR; INTRAVENOUS
Status: DISCONTINUED | OUTPATIENT
Start: 2025-02-10 | End: 2025-02-10 | Stop reason: HOSPADM

## 2025-02-10 RX ORDER — METHYLPREDNISOLONE SODIUM SUCCINATE 40 MG/ML
40 INJECTION INTRAMUSCULAR; INTRAVENOUS
Status: DISCONTINUED | OUTPATIENT
Start: 2025-02-10 | End: 2025-02-10 | Stop reason: HOSPADM

## 2025-02-10 RX ORDER — DIPHENHYDRAMINE HYDROCHLORIDE 50 MG/ML
50 INJECTION INTRAMUSCULAR; INTRAVENOUS
Start: 2025-02-10

## 2025-02-10 RX ORDER — HEPARIN SODIUM,PORCINE 10 UNIT/ML
5-20 VIAL (ML) INTRAVENOUS DAILY PRN
OUTPATIENT
Start: 2025-02-10

## 2025-02-10 RX ORDER — DIPHENHYDRAMINE HYDROCHLORIDE 50 MG/ML
50 INJECTION INTRAMUSCULAR; INTRAVENOUS
Status: DISCONTINUED | OUTPATIENT
Start: 2025-02-10 | End: 2025-02-10 | Stop reason: HOSPADM

## 2025-02-10 RX ORDER — ALBUTEROL SULFATE 0.83 MG/ML
2.5 SOLUTION RESPIRATORY (INHALATION)
Status: DISCONTINUED | OUTPATIENT
Start: 2025-02-10 | End: 2025-02-10 | Stop reason: HOSPADM

## 2025-02-10 RX ORDER — EPINEPHRINE 1 MG/ML
0.3 INJECTION, SOLUTION INTRAMUSCULAR; SUBCUTANEOUS EVERY 5 MIN PRN
Status: DISCONTINUED | OUTPATIENT
Start: 2025-02-10 | End: 2025-02-10 | Stop reason: HOSPADM

## 2025-02-10 RX ORDER — ALBUTEROL SULFATE 90 UG/1
1-2 INHALANT RESPIRATORY (INHALATION)
Status: DISCONTINUED | OUTPATIENT
Start: 2025-02-10 | End: 2025-02-10 | Stop reason: HOSPADM

## 2025-02-10 RX ORDER — HEPARIN SODIUM (PORCINE) LOCK FLUSH IV SOLN 100 UNIT/ML 100 UNIT/ML
5 SOLUTION INTRAVENOUS
Status: DISCONTINUED | OUTPATIENT
Start: 2025-02-10 | End: 2025-02-10 | Stop reason: HOSPADM

## 2025-02-10 RX ADMIN — LURBINECTEDIN 4 MG: 0.5 INJECTION, POWDER, LYOPHILIZED, FOR SOLUTION INTRAVENOUS at 12:10

## 2025-02-10 RX ADMIN — HEPARIN 5 ML: 100 SYRINGE at 16:38

## 2025-02-10 RX ADMIN — DEXAMETHASONE SODIUM PHOSPHATE: 10 INJECTION, SOLUTION INTRAMUSCULAR; INTRAVENOUS at 11:53

## 2025-02-10 RX ADMIN — SODIUM CHLORIDE 250 ML: 9 INJECTION, SOLUTION INTRAVENOUS at 11:52

## 2025-02-10 ASSESSMENT — PAIN SCALES - GENERAL: PAINLEVEL_OUTOF10: NO PAIN (0)

## 2025-02-10 NOTE — PROGRESS NOTES
DATE/TIME OF CALL RECEIVED FROM LAB:  02/10/25 at 11:36AM   LAB TEST:  Hgb  LAB VALUE:  6.1  PROVIDER NOTIFIED?: Yes  PROVIDER NAME: Neeraj  DATE/TIME LAB VALUE REPORTED TO PROVIDER: 1140  MECHANISM OF PROVIDER NOTIFICATION: Phone Call  PROVIDER RESPONSE: Proceed with 2 units of blood was initial response then at 1150 provider wanted Hgb to be redrawn peripherally as well to double check accuracy.

## 2025-02-10 NOTE — PROGRESS NOTES
"Oncology Rooming Note    February 10, 2025 11:08 AM   Jasmyn Green is a 69 year old female who presents for:    Chief Complaint   Patient presents with    Oncology Clinic Visit     Return visit 6 weeks with lab and infusion. Small cell carcinoma of overlapping sites of lung, unspecified laterality.     Initial Vitals: /72 (BP Location: Left arm, Patient Position: Sitting, Cuff Size: Adult Small)   Pulse (!) 123   Temp 97.9  F (36.6  C) (Oral)   Resp 16   Wt 40.5 kg (89 lb 3.2 oz)   LMP  (LMP Unknown)   SpO2 97%   BMI 18.02 kg/m   Estimated body mass index is 18.02 kg/m  as calculated from the following:    Height as of 1/24/25: 1.499 m (4' 11\").    Weight as of this encounter: 40.5 kg (89 lb 3.2 oz). Body surface area is 1.3 meters squared.  No Pain (0) Comment: Data Unavailable   No LMP recorded (lmp unknown). Patient is postmenopausal.  Allergies reviewed: Yes  Medications reviewed: Yes    Medications: Medication refills not needed today.  Pharmacy name entered into [employer]:    [facility] - [place], [place] - [address] [facility] - [place], [place] - [address]    Frailty Screening:   Is the patient here for a new oncology consult visit in cancer care? 2. No      Clinical concerns: fatigue after last treatment.  Dana Mathur CNP was notified.      Елена Deutsch CMA              "

## 2025-02-10 NOTE — PROGRESS NOTES
[facility] Hematology and Oncology Progress Note    Patient: Jasmyn Green  MRN: [id number]  Date of Service: Feb 10, 2025    Oncologist:Dr. Stewart    Reason for Visit    Chief Complaint   Patient presents with    Oncology Clinic Visit     Return visit 6 weeks with lab and infusion. Small cell carcinoma of overlapping sites of lung, unspecified laterality.       Assessment and Plan     Cancer Staging   No matching staging information was found for the patient.    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 first cycle given at 20% reduction due to performance status. CT after 2 cycles show good response. However, due to COPD exacerbation following cycle 3, cytotoxic chemotherapy was discontinued and pt continued on maintenance Atezolizumab. PET in July 2024 showed compete metabolic response to previous sites. She continued on atezoizumab as maintenance. Had signs of progression in November. We did biopsy lung and it was consistent with recurrent small cell lung cancer so she has now started lurbinectedin.  Today is cycle 2.  She tolerated okay with moderate/severe fatigue for 2.5 weeks where she spent most of the time in bed. CBC showing hgb of 6.1 today. Normocytic. Was 6.7 three weeks on port draw, but 7.5 when the peripheral blood was checked. Rechecked hgb in peripheral today at 6.6 g/dL. Retic mildly elevated at 0.109 which is adequate response to anemia. Normal TSH. B12 pending. Anemia likely from chemotherapy and underlying chronic disease, but will follow up on B12 and folate. No bleeding concerns. Discussed possibility of dose reduction. She would like to continue full dose for now. Will proceed with cycle 2 today with 2 units pRBC. Will recheck CBC mid cycle. She will be seen prior to cycle  3. We will plan to re image after 3 cycles.    Brain mets, found in August 2024:    Pt completed whole brain radiation and steroids.  Brain MRI in January that showed some slight edema but it is unclear if it is just treatment effect or worsening disease.  Completed dex taper. Asymptomatic. Radiation oncology planning for repeat brain MRI at end of Feb. Sent message to their team to schedule.     Severe COPD:  Recent COPD exacerbation  Decreased FEV1 of 29% and reduced diffusion capacity with DLCO of 30%. On 2 L oxygen at rest for baseline.  Started doxycycline and prednisone taper 2/6/25 for exacerbation with green/yellow sputum. Now improving. Currently on 3 L oxygen. Will complete prednisone in one week. Following with Pulmonology.    6. Splenic infarct  On Eliquis and baby asa. Denies any bleeding concerns.     ECOG Performance    2 - Ambulatory and independent in all ADLs; cannot work; up > 50% of the time    Distress Screening (within last 30 days)    No data recorded     Pain  Pain Score: No Pain (0)    Problem List    Patient Active Problem List   Diagnosis    COPD exacerbation (H)    History of gout    Coronary artery calcification    Lymphocytosis    Essential hypertension    Asymptomatic hypertensive urgency    Age-related osteoporosis without current pathological fracture    Lung mass    Small cell lung cancer (H)    Chronic respiratory failure with hypoxia (H)    Anemia associated with chemotherapy    Hypoxia    Status post chemotherapy    Urinary tract infection with hematuria, site unspecified    Acute sepsis (H)    Splenic infarct    Brain lesion    Malignant neoplasm of middle lobe, bronchus or lung (H)    Secondary malignant neoplasm of brain (H)    Dehydration        ______________________________________________________________________________    History of Present Illness    Diagnosis:   Lung cancer, small cell, extensive stage. Presented to ER for SOB, coughing. Hx of COPD, on oxygen.    -2/8/24: CT: Bulky AP and left hilar adenopathy with irregular pleural-based anterior left upper lobe nodule with adjacent smaller satellite nodules. Findings suspicious for a primary lung cancer   -3/1/24: PET: Findings suspicious for a left upper lobe primary lung neoplasm with adjacent satellite lesion in the left upper lobe, extensive mediastinal lymph node involvement, and several pleural-based metastases in the left hemithorax   -3/14/24: Brain MRI: Findings concerning for a single 3 - 4 mm right occipital lobe cortex metastatic lesion   -5/3/24: repeat brain MRI shows lesion size decrease and appears more vascular   -7/3/24: PET showed completed metabolic response to therapy  -8/15/24: Brain MRI showed at least 12 nodular enhancing lesions within supratentorial and infratentorial compartments consistent with multiple new intracranial metastasis.     Treatment:   -3/25/24: start palliative treatment with Carboplatin, Etoposide, atezolizumab. Finished 3 cycles  -5/28/24-12/18/24: single agent atezolizumab  -1/20/25: lurbinectedin. Today is cycle 2.     Interim History:   Patient is here today to continue on treatment. She has completed 1 cycle of lurbinectidin.     Had worsening fatigue for 2.5 weeks and was mostly bed ridden. Got up only to eat. Weight is stable. Drinking two protein drinks daily. Denies any other side effects. No nausea or diarrhea. No mouth sores or taste changes. No neuropathy. Reports dry mouth which is improving with biotene.     Had yellow/green mucus and SOB. Called Pulmonology. Started on doxy and prednisone taper on 2/6/25 for COPD exacerbation. On 3 L from baseline of 2 L. Neb 3x daily. Symptoms are improving. Sputum is now white.     Denies fevers or chills.     Review of Systems    Pertinent items are noted in HPI.    Past History    Past Medical History:   Diagnosis Date    Age-related osteoporosis without current pathological fracture     Arthritis     COPD (chronic obstructive  pulmonary disease) (H)     Coronary artery disease     Dependence on nocturnal oxygen therapy     Dyspnea on exertion     Emphysema lung (H)     Gout     HLD (hyperlipidemia)     Hypertension     Lung mass        PHYSICAL EXAM  /72 (BP Location: Left arm, Patient Position: Sitting, Cuff Size: Adult Small)   Pulse (!) 123   Temp 97.9  F (36.6  C) (Oral)   Resp 16   Wt 40.5 kg (89 lb 3.2 oz)   LMP  (LMP Unknown)   SpO2 97%   BMI 18.02 kg/m      GENERAL: no acute distress. Fatigued, pallor, Here with daughter. In wheelchair. On 3 L oxygen.   RESP: Regular respiratory rate. No expiratory wheezes. Diminished lung sounds throughout.  CARDIAC: regular rate and rhythm   NEURO: non focal. Alert and oriented x3.   PSYCH: within normal limits. No depression or anxiety.  SKIN: exposed skin is dry intact.   EXTREMITIES: no lower extremity edema     Lab Results    Recent Results (from the past week)   Comprehensive metabolic panel   Result Value Ref Range    Sodium 144 135 - 145 mmol/L    Potassium 4.1 3.4 - 5.3 mmol/L    Carbon Dioxide (CO2) 27 22 - 29 mmol/L    Anion Gap 10 7 - 15 mmol/L    Urea Nitrogen 30.3 (H) 8.0 - 23.0 mg/dL    Creatinine 0.98 (H) 0.51 - 0.95 mg/dL    GFR Estimate 61 >60 mL/min/1.73m2    Calcium 9.2 8.8 - 10.4 mg/dL    Chloride 107 98 - 107 mmol/L    Glucose 228 (H) 70 - 99 mg/dL    Alkaline Phosphatase 93 40 - 150 U/L    AST 18 0 - 45 U/L    ALT 20 0 - 50 U/L    Protein Total 6.0 (L) 6.4 - 8.3 g/dL    Albumin 3.7 3.5 - 5.2 g/dL    Bilirubin Total <0.2 <=1.2 mg/dL   CBC with platelets and differential   Result Value Ref Range    WBC Count 10.6 4.0 - 11.0 10e3/uL    RBC Count 2.18 (L) 3.80 - 5.20 10e6/uL    Hemoglobin 6.1 (LL) 11.7 - 15.7 g/dL    Hematocrit 21.1 (L) 35.0 - 47.0 %    MCV 97 78 - 100 fL    MCH 28.0 26.5 - 33.0 pg    MCHC 28.9 (L) 31.5 - 36.5 g/dL    RDW 17.2 (H) 10.0 - 15.0 %    Platelet Count 656 (H) 150 - 450 10e3/uL    % Neutrophils 85 %    % Lymphocytes 6 %    % Monocytes 5 %     % Eosinophils 0 %    % Basophils 0 %    % Immature Granulocytes 5 %    NRBCs per 100 WBC 1 (H) <1 /100    Absolute Neutrophils 8.9 (H) 1.6 - 8.3 10e3/uL    Absolute Lymphocytes 0.7 (L) 0.8 - 5.3 10e3/uL    Absolute Monocytes 0.5 0.0 - 1.3 10e3/uL    Absolute Eosinophils 0.0 0.0 - 0.7 10e3/uL    Absolute Basophils 0.0 0.0 - 0.2 10e3/uL    Absolute Immature Granulocytes 0.5 (H) <=0.4 10e3/uL    Absolute NRBCs 0.1 10e3/uL   Adult Type and Screen   Result Value Ref Range    ABO/RH(D) A POS     Antibody Screen Negative Negative    SPECIMEN EXPIRATION DATE [id number]    TSH with free T4 reflex   Result Value Ref Range    TSH 1.42 0.30 - 4.20 uIU/mL   Iron & Iron Binding Capacity   Result Value Ref Range    Iron 72 37 - 145 ug/dL    Iron Binding Capacity 271 240 - 430 ug/dL    Iron Sat Index 27 15 - 46 %   Prepare red blood cells (unit)   Result Value Ref Range    Blood Component Type Red Blood Cells     Product Code U8353S28     Unit Status Transfused     Unit Number [id number]     CROSSMATCH Compatible     CODING SYSTEM [id number]     ISSUE DATE AND TIME 81790164308627     UNIT ABO/RH A+     UNIT TYPE ISBT 6200    Prepare red blood cells (unit)   Result Value Ref Range    Blood Component Type Red Blood Cells     Product Code S1143Z73     Unit Status Transfused     Unit Number [id number]     CROSSMATCH Compatible     CODING SYSTEM [id number]     ISSUE DATE AND TIME [id number]     UNIT ABO/RH A+     UNIT TYPE ISBT 6200    CBC with platelets and differential   Result Value Ref Range    WBC Count 11.2 (H) 4.0 - 11.0 10e3/uL    RBC Count 2.37 (L) 3.80 - 5.20 10e6/uL    Hemoglobin 6.6 (LL) 11.7 - 15.7 g/dL    Hematocrit 23.0 (L) 35.0 - 47.0 %    MCV 97 78 - 100 fL    MCH 27.8 26.5 - 33.0 pg    MCHC 28.7 (L) 31.5 - 36.5 g/dL    RDW 17.3 (H) 10.0 - 15.0 %    Platelet Count 658 (H) 150 - 450 10e3/uL    % Neutrophils 84 %    % Lymphocytes 7 %    % Monocytes 4 %    % Eosinophils 0 %    % Basophils 0 %    % Immature  Granulocytes 5 %    NRBCs per 100 WBC 1 (H) <1 /100    Absolute Neutrophils 9.5 (H) 1.6 - 8.3 10e3/uL    Absolute Lymphocytes 0.8 0.8 - 5.3 10e3/uL    Absolute Monocytes 0.4 0.0 - 1.3 10e3/uL    Absolute Eosinophils 0.0 0.0 - 0.7 10e3/uL    Absolute Basophils 0.0 0.0 - 0.2 10e3/uL    Absolute Immature Granulocytes 0.5 (H) <=0.4 10e3/uL    Absolute NRBCs 0.1 10e3/uL   Reticulocyte count   Result Value Ref Range    % Reticulocyte 4.8 (H) 0.5 - 2.0 %    Absolute Reticulocyte 0.109 (H) 0.025 - 0.095 10e6/uL         Imaging    No results found.      The longitudinal plan of care for the diagnosis(es)/condition(s) as documented were addressed during this visit. Due to the added complexity in care, I will continue to support Jasmyn in the subsequent management and with ongoing continuity of care.    JIMENEZ Butt CNP     I, JIMENEZ Butt CNP, saw this patient and agree with the findings and plan of care as documented in the note.  LEONEL Mcneill saw pt for part of the visit.     Items personally reviewed/procedural attestation: vitals, labs, and I was present for key portions of the visit and agree with Assessment and plan

## 2025-02-10 NOTE — PROGRESS NOTES
Infusion Nursing Note:  Jasmyn Green presents today for Cycle 2 day 1 treatment with Lurbinectedin as well as transfusion of 2 units PRBC.    Patient seen by provider today: Yes: Zayra CASTANEDA/ Dana Mathur CNP   present during visit today: Not Applicable.    Note: Jasmyn was educated on her plan of care and each medication given was reviewed prior to administration.  Zayra CASTANEDA wanted Hgb re-checked prior to blood transfusion with peripheral lab draw.  This was done.  Second Hgb came back 6.6 (previous result 6.1)  Per Zayra CASTANEDA continue to proceed with 2 units PRBC today after chemo..      Intravenous Access:  Implanted Port.    Treatment Conditions:  Lab Results   Component Value Date    HGB 6.6 (LL) 02/10/2025    WBC 11.2 (H) 02/10/2025    ANEU 8.0 05/12/2024    ANEUTAUTO 9.5 (H) 02/10/2025     (H) 02/10/2025        Lab Results   Component Value Date     02/10/2025    POTASSIUM 4.1 02/10/2025    MAG 2.1 05/17/2024    CR 0.98 (H) 02/10/2025    FLOR 9.2 02/10/2025    BILITOTAL <0.2 02/10/2025    ALBUMIN 3.7 02/10/2025    ALT 20 02/10/2025    AST 18 02/10/2025       Results reviewed, labs MET treatment parameters, ok to proceed with treatment.  Blood transfusion consent signed 05/07/2024.      Post Infusion Assessment:  Patient tolerated infusion and transfusions without incident.  Blood return noted pre and post infusion.  Site patent and intact, free from redness, edema or discomfort.  No evidence of extravasations.  Access discontinued per protocol.       Discharge Plan:   Patient discharged in stable condition accompanied by: daughter.  Departure Mode: Wheelchair.  After transfusion discharge instructions given in AVS and reviewed.    Gretta Ferrari RN

## 2025-02-10 NOTE — PROGRESS NOTES
DATE/TIME OF CALL RECEIVED FROM LAB:  02/10/25 at 11:58 AM  LAB TEST:  CBC/diff  LAB VALUE:  hemoglobin 6.6   PROVIDER NOTIFIED?: Yes  PROVIDER NAME: Dana Mathur CNP and CYNDY Dillon  DATE/TIME LAB VALUE REPORTED TO PROVIDER: 2/10/25 at 11:58 AM  MECHANISM OF PROVIDER NOTIFICATION:  Secure Chat  PROVIDER RESPONSE: JASON Cortez will see the patient in the clinic today as scheduled to review results and discuss a plan.   Shelia Aponte RN on 2/10/2025 at 12:03 PM

## 2025-02-10 NOTE — LETTER
2/10/2025      Jasmyn Green  [address] [place] [address]      Dear Colleague,    Thank you for referring your patient, Jasmyn Green, to the [facility] [place]. Please see a copy of my visit note below.    [facility] Hematology and Oncology Progress Note    Patient: Jasmyn Green  MRN: [id number]  Date of Service: Feb 10, 2025    Oncologist:Dr. Stewart    Reason for Visit    Chief Complaint   Patient presents with     Oncology Clinic Visit     Return visit 6 weeks with lab and infusion. Small cell carcinoma of overlapping sites of lung, unspecified laterality.       Assessment and Plan     Cancer Staging   No matching staging information was found for the patient.    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 first cycle given at 20% reduction due to performance status. CT after 2 cycles show good response. However, due to COPD exacerbation following cycle 3, cytotoxic chemotherapy was discontinued and pt continued on maintenance Atezolizumab. PET in July 2024 showed compete metabolic response to previous sites. She continued on atezoizumab as maintenance. Had signs of progression in November. We did biopsy lung and it was consistent with recurrent small cell lung cancer so she has now started lurbinectedin.  Today is cycle 2.  She tolerated okay with moderate/severe fatigue for 2.5 weeks where she spent most of the time in bed. CBC showing hgb of 6.1 today. Normocytic. Was 6.7 three weeks on port draw, but 7.5 when the peripheral blood was checked. Rechecked hgb in peripheral today at 6.6 g/dL. Retic mildly elevated at 0.109 which is adequate response to anemia. Normal TSH. B12 pending. Anemia likely from chemotherapy and underlying chronic disease, but  will follow up on B12 and folate. No bleeding concerns. Discussed possibility of dose reduction. She would like to continue full dose for now. Will proceed with cycle 2 today with 2 units pRBC. Will recheck CBC mid cycle. She will be seen prior to cycle 3. We will plan to re image after 3 cycles.    Brain mets, found in August 2024:    Pt completed whole brain radiation and steroids.  Brain MRI in January that showed some slight edema but it is unclear if it is just treatment effect or worsening disease.  Completed dex taper. Asymptomatic. Radiation oncology planning for repeat brain MRI at end of Feb. Sent message to their team to schedule.     Severe COPD:  Recent COPD exacerbation  Decreased FEV1 of 29% and reduced diffusion capacity with DLCO of 30%. On 2 L oxygen at rest for baseline.  Started doxycycline and prednisone taper 2/6/25 for exacerbation with green/yellow sputum. Now improving. Currently on 3 L oxygen. Will complete prednisone in one week. Following with Pulmonology.    6. Splenic infarct  On Eliquis and baby asa. Denies any bleeding concerns.     ECOG Performance    2 - Ambulatory and independent in all ADLs; cannot work; up > 50% of the time    Distress Screening (within last 30 days)    No data recorded     Pain  Pain Score: No Pain (0)    Problem List    Patient Active Problem List   Diagnosis     COPD exacerbation (H)     History of gout     Coronary artery calcification     Lymphocytosis     Essential hypertension     Asymptomatic hypertensive urgency     Age-related osteoporosis without current pathological fracture     Lung mass     Small cell lung cancer (H)     Chronic respiratory failure with hypoxia (H)     Anemia associated with chemotherapy     Hypoxia     Status post chemotherapy     Urinary tract infection with hematuria, site unspecified     Acute sepsis (H)     Splenic infarct     Brain lesion     Malignant neoplasm of middle lobe, bronchus or lung (H)     Secondary malignant  neoplasm of brain (H)     Dehydration        ______________________________________________________________________________    History of Present Illness    Diagnosis:   Lung cancer, small cell, extensive stage. Presented to ER for SOB, coughing. Hx of COPD, on oxygen.   -2/8/24: CT: Bulky AP and left hilar adenopathy with irregular pleural-based anterior left upper lobe nodule with adjacent smaller satellite nodules. Findings suspicious for a primary lung cancer   -3/1/24: PET: Findings suspicious for a left upper lobe primary lung neoplasm with adjacent satellite lesion in the left upper lobe, extensive mediastinal lymph node involvement, and several pleural-based metastases in the left hemithorax   -3/14/24: Brain MRI: Findings concerning for a single 3 - 4 mm right occipital lobe cortex metastatic lesion   -5/3/24: repeat brain MRI shows lesion size decrease and appears more vascular   -7/3/24: PET showed completed metabolic response to therapy  -8/15/24: Brain MRI showed at least 12 nodular enhancing lesions within supratentorial and infratentorial compartments consistent with multiple new intracranial metastasis.     Treatment:   -3/25/24: start palliative treatment with Carboplatin, Etoposide, atezolizumab. Finished 3 cycles  -5/28/24-12/18/24: single agent atezolizumab  -1/20/25: lurbinectedin. Today is cycle 2.     Interim History:   Patient is here today to continue on treatment. She has completed 1 cycle of lurbinectidin.     Had worsening fatigue for 2.5 weeks and was mostly bed ridden. Got up only to eat. Weight is stable. Drinking two protein drinks daily. Denies any other side effects. No nausea or diarrhea. No mouth sores or taste changes. No neuropathy. Reports dry mouth which is improving with biotene.     Had yellow/green mucus and SOB. Called Pulmonology. Started on doxy and prednisone taper on 2/6/25 for COPD exacerbation. On 3 L from baseline of 2 L. Neb 3x daily. Symptoms are improving. Sputum  is now white.     Denies fevers or chills.     Review of Systems    Pertinent items are noted in HPI.    Past History    Past Medical History:   Diagnosis Date     Age-related osteoporosis without current pathological fracture      Arthritis      COPD (chronic obstructive pulmonary disease) (H)      Coronary artery disease      Dependence on nocturnal oxygen therapy      Dyspnea on exertion      Emphysema lung (H)      Gout      HLD (hyperlipidemia)      Hypertension      Lung mass        PHYSICAL EXAM  /72 (BP Location: Left arm, Patient Position: Sitting, Cuff Size: Adult Small)   Pulse (!) 123   Temp 97.9  F (36.6  C) (Oral)   Resp 16   Wt 40.5 kg (89 lb 3.2 oz)   LMP  (LMP Unknown)   SpO2 97%   BMI 18.02 kg/m      GENERAL: no acute distress. Fatigued, pallor, Here with daughter. In wheelchair. On 3 L oxygen.   RESP: Regular respiratory rate. No expiratory wheezes. Diminished lung sounds throughout.  CARDIAC: regular rate and rhythm   NEURO: non focal. Alert and oriented x3.   PSYCH: within normal limits. No depression or anxiety.  SKIN: exposed skin is dry intact.   EXTREMITIES: no lower extremity edema     Lab Results    Recent Results (from the past week)   Comprehensive metabolic panel   Result Value Ref Range    Sodium 144 135 - 145 mmol/L    Potassium 4.1 3.4 - 5.3 mmol/L    Carbon Dioxide (CO2) 27 22 - 29 mmol/L    Anion Gap 10 7 - 15 mmol/L    Urea Nitrogen 30.3 (H) 8.0 - 23.0 mg/dL    Creatinine 0.98 (H) 0.51 - 0.95 mg/dL    GFR Estimate 61 >60 mL/min/1.73m2    Calcium 9.2 8.8 - 10.4 mg/dL    Chloride 107 98 - 107 mmol/L    Glucose 228 (H) 70 - 99 mg/dL    Alkaline Phosphatase 93 40 - 150 U/L    AST 18 0 - 45 U/L    ALT 20 0 - 50 U/L    Protein Total 6.0 (L) 6.4 - 8.3 g/dL    Albumin 3.7 3.5 - 5.2 g/dL    Bilirubin Total <0.2 <=1.2 mg/dL   CBC with platelets and differential   Result Value Ref Range    WBC Count 10.6 4.0 - 11.0 10e3/uL    RBC Count 2.18 (L) 3.80 - 5.20 10e6/uL    Hemoglobin  6.1 (LL) 11.7 - 15.7 g/dL    Hematocrit 21.1 (L) 35.0 - 47.0 %    MCV 97 78 - 100 fL    MCH 28.0 26.5 - 33.0 pg    MCHC 28.9 (L) 31.5 - 36.5 g/dL    RDW 17.2 (H) 10.0 - 15.0 %    Platelet Count 656 (H) 150 - 450 10e3/uL    % Neutrophils 85 %    % Lymphocytes 6 %    % Monocytes 5 %    % Eosinophils 0 %    % Basophils 0 %    % Immature Granulocytes 5 %    NRBCs per 100 WBC 1 (H) <1 /100    Absolute Neutrophils 8.9 (H) 1.6 - 8.3 10e3/uL    Absolute Lymphocytes 0.7 (L) 0.8 - 5.3 10e3/uL    Absolute Monocytes 0.5 0.0 - 1.3 10e3/uL    Absolute Eosinophils 0.0 0.0 - 0.7 10e3/uL    Absolute Basophils 0.0 0.0 - 0.2 10e3/uL    Absolute Immature Granulocytes 0.5 (H) <=0.4 10e3/uL    Absolute NRBCs 0.1 10e3/uL   Adult Type and Screen   Result Value Ref Range    ABO/RH(D) A POS     Antibody Screen Negative Negative    SPECIMEN EXPIRATION DATE 20250213235900    TSH with free T4 reflex   Result Value Ref Range    TSH 1.42 0.30 - 4.20 uIU/mL   Iron & Iron Binding Capacity   Result Value Ref Range    Iron 72 37 - 145 ug/dL    Iron Binding Capacity 271 240 - 430 ug/dL    Iron Sat Index 27 15 - 46 %   Prepare red blood cells (unit)   Result Value Ref Range    Blood Component Type Red Blood Cells     Product Code Z7461S35     Unit Status Transfused     Unit Number [id number]     CROSSMATCH Compatible     CODING SYSTEM [id number]     ISSUE DATE AND TIME 20250210145900     UNIT ABO/RH A+     UNIT TYPE ISBT 6200    Prepare red blood cells (unit)   Result Value Ref Range    Blood Component Type Red Blood Cells     Product Code F6841B81     Unit Status Transfused     Unit Number [id number]     CROSSMATCH Compatible     CODING SYSTEM [id number]     ISSUE DATE AND TIME 20250210131300     UNIT ABO/RH A+     UNIT TYPE ISBT 6200    CBC with platelets and differential   Result Value Ref Range    WBC Count 11.2 (H) 4.0 - 11.0 10e3/uL    RBC Count 2.37 (L) 3.80 - 5.20 10e6/uL    Hemoglobin 6.6 (LL) 11.7 - 15.7 g/dL    Hematocrit 23.0 (L) 35.0 -  47.0 %    MCV 97 78 - 100 fL    MCH 27.8 26.5 - 33.0 pg    MCHC 28.7 (L) 31.5 - 36.5 g/dL    RDW 17.3 (H) 10.0 - 15.0 %    Platelet Count 658 (H) 150 - 450 10e3/uL    % Neutrophils 84 %    % Lymphocytes 7 %    % Monocytes 4 %    % Eosinophils 0 %    % Basophils 0 %    % Immature Granulocytes 5 %    NRBCs per 100 WBC 1 (H) <1 /100    Absolute Neutrophils 9.5 (H) 1.6 - 8.3 10e3/uL    Absolute Lymphocytes 0.8 0.8 - 5.3 10e3/uL    Absolute Monocytes 0.4 0.0 - 1.3 10e3/uL    Absolute Eosinophils 0.0 0.0 - 0.7 10e3/uL    Absolute Basophils 0.0 0.0 - 0.2 10e3/uL    Absolute Immature Granulocytes 0.5 (H) <=0.4 10e3/uL    Absolute NRBCs 0.1 10e3/uL   Reticulocyte count   Result Value Ref Range    % Reticulocyte 4.8 (H) 0.5 - 2.0 %    Absolute Reticulocyte 0.109 (H) 0.025 - 0.095 10e6/uL         Imaging    No results found.      The longitudinal plan of care for the diagnosis(es)/condition(s) as documented were addressed during this visit. Due to the added complexity in care, I will continue to support Jasmyn in the subsequent management and with ongoing continuity of care.    JIMENEZ Butt CNP     I, JIMENEZ Butt CNP, saw this patient and agree with the findings and plan of care as documented in the note.  LEONEL Mcneill saw pt for part of the visit.     Items personally reviewed/procedural attestation: vitals, labs, and I was present for key portions of the visit and agree with Assessment and plan        Oncology Rooming Note    February 10, 2025 11:08 AM   Jasmyn Green is a 69 year old female who presents for:    Chief Complaint   Patient presents with     Oncology Clinic Visit     Return visit 6 weeks with lab and infusion. Small cell carcinoma of overlapping sites of lung, unspecified laterality.     Initial Vitals: /72 (BP Location: Left arm, Patient Position: Sitting, Cuff Size: Adult Small)   Pulse (!) 123   Temp 97.9  F (36.6  C) (Oral)   Resp 16   Wt 40.5 kg (89 lb 3.2 oz)    "LMP  (LMP Unknown)   SpO2 97%   BMI 18.02 kg/m   Estimated body mass index is 18.02 kg/m  as calculated from the following:    Height as of 1/24/25: 1.499 m (4' 11\").    Weight as of this encounter: 40.5 kg (89 lb 3.2 oz). Body surface area is 1.3 meters squared.  No Pain (0) Comment: Data Unavailable   No LMP recorded (lmp unknown). Patient is postmenopausal.  Allergies reviewed: Yes  Medications reviewed: Yes    Medications: Medication refills not needed today.  Pharmacy name entered into [employer]St[employer]:    [facility] - [place], [place] - [address] [facility] - [place], [place] - [address] [place] [address] AVE    Frailty Screening:   Is the patient here for a new oncology consult visit in cancer care? 2. No      Clinical concerns: fatigue after last treatment.  Dana Mathur CNP was notified.      Елена Deutsch CMA                Again, thank you for allowing me to participate in the care of your patient.        Sincerely,        JIMENEZ Butt CNP    Electronically signed"

## 2025-02-18 ENCOUNTER — MYC REFILL (OUTPATIENT)
Dept: PALLIATIVE MEDICINE | Facility: CLINIC | Age: 70
End: 2025-02-18
Payer: COMMERCIAL

## 2025-02-18 DIAGNOSIS — J44.9 COPD, GROUP D, BY GOLD 2017 CLASSIFICATION (H): ICD-10-CM

## 2025-02-18 DIAGNOSIS — C34.90 MALIGNANT NEOPLASM OF LUNG, UNSPECIFIED LATERALITY, UNSPECIFIED PART OF LUNG (H): ICD-10-CM

## 2025-02-18 DIAGNOSIS — G89.3 CANCER ASSOCIATED PAIN: ICD-10-CM

## 2025-02-18 RX ORDER — OXYCODONE HYDROCHLORIDE 5 MG/1
2.5-5 TABLET ORAL EVERY 4 HOURS PRN
Qty: 90 TABLET | Refills: 0 | Status: SHIPPED | OUTPATIENT
Start: 2025-02-18

## 2025-02-18 NOTE — TELEPHONE ENCOUNTER
Received TouchBistrot message from patient requesting refill of oxycodone.     Last refill: 2/3/25  Last office visit: 1/24/25  Scheduled for follow up 3/28/25     Will route request to MD/ for review.     Reviewed [place]  Report.

## 2025-02-20 ENCOUNTER — INFUSION THERAPY VISIT (OUTPATIENT)
Dept: INFUSION THERAPY | Facility: HOSPITAL | Age: 70
End: 2025-02-20
Payer: COMMERCIAL

## 2025-02-20 DIAGNOSIS — E86.0 DEHYDRATION: Primary | ICD-10-CM

## 2025-02-20 DIAGNOSIS — M81.0 AGE-RELATED OSTEOPOROSIS WITHOUT CURRENT PATHOLOGICAL FRACTURE: ICD-10-CM

## 2025-02-20 DIAGNOSIS — D64.81 ANEMIA ASSOCIATED WITH CHEMOTHERAPY: ICD-10-CM

## 2025-02-20 DIAGNOSIS — T45.1X5A ANEMIA ASSOCIATED WITH CHEMOTHERAPY: ICD-10-CM

## 2025-02-20 LAB
BASOPHILS # BLD AUTO: 0 10E3/UL (ref 0–0.2)
BASOPHILS NFR BLD AUTO: 0 %
EOSINOPHIL # BLD AUTO: 0.7 10E3/UL (ref 0–0.7)
EOSINOPHIL NFR BLD AUTO: 6 %
ERYTHROCYTE [DISTWIDTH] IN BLOOD BY AUTOMATED COUNT: 15.2 % (ref 10–15)
FOLATE SERPL-MCNC: 15 NG/ML (ref 4.6–34.8)
HCT VFR BLD AUTO: 29.8 % (ref 35–47)
HGB BLD-MCNC: 9.2 G/DL (ref 11.7–15.7)
IMM GRANULOCYTES # BLD: 0.1 10E3/UL
IMM GRANULOCYTES NFR BLD: 1 %
LYMPHOCYTES # BLD AUTO: 0.6 10E3/UL (ref 0.8–5.3)
LYMPHOCYTES NFR BLD AUTO: 5 %
MCH RBC QN AUTO: 29.5 PG (ref 26.5–33)
MCHC RBC AUTO-ENTMCNC: 30.9 G/DL (ref 31.5–36.5)
MCV RBC AUTO: 96 FL (ref 78–100)
MONOCYTES # BLD AUTO: 1.1 10E3/UL (ref 0–1.3)
MONOCYTES NFR BLD AUTO: 9 %
NEUTROPHILS # BLD AUTO: 9.7 10E3/UL (ref 1.6–8.3)
NEUTROPHILS NFR BLD AUTO: 80 %
NRBC # BLD AUTO: 0 10E3/UL
NRBC BLD AUTO-RTO: 0 /100
PLATELET # BLD AUTO: 234 10E3/UL (ref 150–450)
RBC # BLD AUTO: 3.12 10E6/UL (ref 3.8–5.2)
WBC # BLD AUTO: 12.2 10E3/UL (ref 4–11)

## 2025-02-20 PROCEDURE — 250N000011 HC RX IP 250 OP 636: Performed by: INTERNAL MEDICINE

## 2025-02-20 PROCEDURE — 82746 ASSAY OF FOLIC ACID SERUM: CPT

## 2025-02-20 PROCEDURE — 36591 DRAW BLOOD OFF VENOUS DEVICE: CPT

## 2025-02-20 PROCEDURE — 85025 COMPLETE CBC W/AUTO DIFF WBC: CPT

## 2025-02-20 RX ORDER — HEPARIN SODIUM,PORCINE 10 UNIT/ML
5-20 VIAL (ML) INTRAVENOUS DAILY PRN
OUTPATIENT
Start: 2025-02-20

## 2025-02-20 RX ORDER — HEPARIN SODIUM (PORCINE) LOCK FLUSH IV SOLN 100 UNIT/ML 100 UNIT/ML
5 SOLUTION INTRAVENOUS EVERY 8 HOURS
Status: DISCONTINUED | OUTPATIENT
Start: 2025-02-20 | End: 2025-02-20 | Stop reason: HOSPADM

## 2025-02-20 RX ORDER — HEPARIN SODIUM (PORCINE) LOCK FLUSH IV SOLN 100 UNIT/ML 100 UNIT/ML
5 SOLUTION INTRAVENOUS EVERY 8 HOURS
Start: 2025-02-20

## 2025-02-20 RX ADMIN — HEPARIN 5 ML: 100 SYRINGE at 10:21

## 2025-02-20 NOTE — PROGRESS NOTES
Jasmyn Green, 69 year old, female, arrived to Chemo Infusion for lab draw/port flush. Port easily accessed with great blood return, labs drawn, and flushed with 20ml Normal Saline and 500 units Heparin. Port de-accessed and site covered with gauze and papertape. Jasmyn Green discharged to lobby with daughter in wheelchair and stable.

## 2025-02-24 ENCOUNTER — MYC MEDICAL ADVICE (OUTPATIENT)
Dept: PULMONOLOGY | Facility: CLINIC | Age: 70
End: 2025-02-24
Payer: COMMERCIAL

## 2025-02-24 DIAGNOSIS — J96.11 CHRONIC RESPIRATORY FAILURE WITH HYPOXIA (H): ICD-10-CM

## 2025-02-24 RX ORDER — ALBUTEROL SULFATE 90 UG/1
1-2 INHALANT RESPIRATORY (INHALATION) EVERY 6 HOURS PRN
Qty: 36 G | Refills: 5 | Status: SHIPPED | OUTPATIENT
Start: 2025-02-24

## 2025-02-25 DIAGNOSIS — C34.90 MALIGNANT NEOPLASM OF LUNG, UNSPECIFIED LATERALITY, UNSPECIFIED PART OF LUNG (H): ICD-10-CM

## 2025-02-25 DIAGNOSIS — J44.9 COPD, GROUP D, BY GOLD 2017 CLASSIFICATION (H): ICD-10-CM

## 2025-02-25 DIAGNOSIS — G89.3 CANCER ASSOCIATED PAIN: ICD-10-CM

## 2025-02-25 RX ORDER — OXYCODONE HYDROCHLORIDE 5 MG/1
7.5-1 TABLET ORAL EVERY 4 HOURS PRN
COMMUNITY
Start: 2025-02-25 | End: 2025-02-27

## 2025-02-26 ENCOUNTER — TELEPHONE (OUTPATIENT)
Dept: PULMONOLOGY | Facility: CLINIC | Age: 70
End: 2025-02-26

## 2025-02-26 ENCOUNTER — MYC MEDICAL ADVICE (OUTPATIENT)
Dept: INTERNAL MEDICINE | Facility: CLINIC | Age: 70
End: 2025-02-26
Payer: COMMERCIAL

## 2025-02-26 ENCOUNTER — E-VISIT (OUTPATIENT)
Dept: FAMILY MEDICINE | Facility: CLINIC | Age: 70
End: 2025-02-26
Payer: COMMERCIAL

## 2025-02-26 DIAGNOSIS — H61.23 BILATERAL IMPACTED CERUMEN: Primary | ICD-10-CM

## 2025-02-26 NOTE — PATIENT INSTRUCTIONS
Thank you for choosing us for your care. I have placed the below referral(s) for you:  Orders Placed This Encounter   Procedures     Adult ENT  Referral     I have placed referral to [facility]-you can call to schedule [phone]     Please click the link for your After Visit Summary to view scheduling instructions for your referral. In most cases, you will be contacted within 2 business days to schedule. If you do not hear from a representative within that time, please call [phone] to be connected to a .

## 2025-02-26 NOTE — TELEPHONE ENCOUNTER
Prior Authorization Retail Medication Request    Medication/Dose: Fluticasone-salmeterol 232-14mcg inhaler  Diagnosis and ICD code (if different than what is on RX):  J44.9  New/renewal/insurance change PA/secondary ins. PA:  Previously Tried and Failed:    Rationale:  has been using this medication since June 2022 with good relief of symptoms    Insurance   Primary: [employer]   Insurance ID:  [id number]     Secondary (if applicable):MEDICAID [place] LIMITED   Insurance ID:  [id number]     Pharmacy Information (if different than what is on RX)  Name:  [facility]  Phone:  [phone]  Fax:[phone]    Clinic Information  Preferred routing pool for dept communication: [facility] RN POOL      COVERMYMEDS KEY:  [id number]

## 2025-02-27 DIAGNOSIS — C34.90 MALIGNANT NEOPLASM OF LUNG, UNSPECIFIED LATERALITY, UNSPECIFIED PART OF LUNG (H): ICD-10-CM

## 2025-02-27 DIAGNOSIS — J44.9 COPD, GROUP D, BY GOLD 2017 CLASSIFICATION (H): ICD-10-CM

## 2025-02-27 DIAGNOSIS — G89.3 CANCER ASSOCIATED PAIN: ICD-10-CM

## 2025-02-27 RX ORDER — OXYCODONE HYDROCHLORIDE 5 MG/1
7.5-1 TABLET ORAL
Qty: 180 TABLET | Refills: 0 | Status: SHIPPED | OUTPATIENT
Start: 2025-02-27

## 2025-02-27 NOTE — TELEPHONE ENCOUNTER
PA Initiation    Medication: FLUTICASONE-SALMETEROL 232-14 MCG/ACT IN AEPB  Insurance Company: [facility] - Phone [phone] Fax [phone]  Pharmacy Filling the Rx: [facility] [place] - [address]  Filling Pharmacy Phone:    Filling Pharmacy Fax:    Start Date: 2/27/2025    [id number]

## 2025-02-27 NOTE — TELEPHONE ENCOUNTER
Received Wesabet message from patient's dtr requesting refill of oxycodone. Recent dose increase has been helpful, however pt is noticing she could use another dose 3 hours from the previous dose. Per previous discussion with Evans, will update dose frequency for this fill.    Last refill: 2/18/25  Last office visit: 1/24/25   Scheduled for follow up 3/28/25     Will route request to MD/ for review.     Reviewed [place]  Report.

## 2025-02-28 ENCOUNTER — APPOINTMENT (OUTPATIENT)
Dept: CT IMAGING | Facility: CLINIC | Age: 70
End: 2025-02-28
Attending: EMERGENCY MEDICINE
Payer: COMMERCIAL

## 2025-02-28 ENCOUNTER — HOSPITAL ENCOUNTER (EMERGENCY)
Facility: CLINIC | Age: 70
Discharge: OTHER INSTITUTION | End: 2025-02-28
Attending: EMERGENCY MEDICINE | Admitting: EMERGENCY MEDICINE
Payer: COMMERCIAL

## 2025-02-28 ENCOUNTER — HOSPITAL ENCOUNTER (INPATIENT)
Facility: CLINIC | Age: 70
LOS: 2 days | Discharge: HOME | End: 2025-03-02
Attending: INTERNAL MEDICINE | Admitting: INTERNAL MEDICINE
Payer: COMMERCIAL

## 2025-02-28 VITALS
DIASTOLIC BLOOD PRESSURE: 57 MMHG | HEART RATE: 93 BPM | RESPIRATION RATE: 63 BRPM | SYSTOLIC BLOOD PRESSURE: 111 MMHG | TEMPERATURE: 98.2 F | OXYGEN SATURATION: 99 %

## 2025-02-28 DIAGNOSIS — J96.11 CHRONIC RESPIRATORY FAILURE WITH HYPOXIA AND HYPERCAPNIA (H): Primary | ICD-10-CM

## 2025-02-28 DIAGNOSIS — J44.1 COPD EXACERBATION (H): Chronic | ICD-10-CM

## 2025-02-28 DIAGNOSIS — J96.12 CHRONIC RESPIRATORY FAILURE WITH HYPOXIA AND HYPERCAPNIA (H): Primary | ICD-10-CM

## 2025-02-28 DIAGNOSIS — J44.1 COPD EXACERBATION (H): ICD-10-CM

## 2025-02-28 PROBLEM — J96.21 ACUTE ON CHRONIC RESPIRATORY FAILURE WITH HYPOXIA AND HYPERCAPNIA (H): Status: ACTIVE | Noted: 2025-02-28

## 2025-02-28 PROBLEM — T45.1X5A ANEMIA ASSOCIATED WITH CHEMOTHERAPY: Status: ACTIVE | Noted: 2024-05-06

## 2025-02-28 PROBLEM — J96.22 ACUTE ON CHRONIC RESPIRATORY FAILURE WITH HYPOXIA AND HYPERCAPNIA (H): Status: ACTIVE | Noted: 2025-02-28

## 2025-02-28 PROBLEM — D64.81 ANEMIA ASSOCIATED WITH CHEMOTHERAPY: Status: ACTIVE | Noted: 2024-05-06

## 2025-02-28 PROBLEM — Z87.39 HISTORY OF GOUT: Status: ACTIVE | Noted: 2021-06-27

## 2025-02-28 PROBLEM — C79.31 SECONDARY MALIGNANT NEOPLASM OF BRAIN (H): Status: ACTIVE | Noted: 2024-08-20

## 2025-02-28 PROBLEM — C34.2: Status: ACTIVE | Noted: 2024-07-08

## 2025-02-28 PROBLEM — D73.5 SPLENIC INFARCT: Status: ACTIVE | Noted: 2024-05-29

## 2025-02-28 PROBLEM — J96.01 ACUTE HYPOXIC RESPIRATORY FAILURE (H): Status: ACTIVE | Noted: 2025-02-28

## 2025-02-28 LAB
ANION GAP SERPL CALCULATED.3IONS-SCNC: 6 MMOL/L (ref 7–15)
ATRIAL RATE - MUSE: 134 BPM
BASE EXCESS BLDA CALC-SCNC: 8.2 MMOL/L (ref -3–3)
BASE EXCESS BLDV CALC-SCNC: 10.6 MMOL/L (ref -3–3)
BASOPHILS # BLD AUTO: 0 10E3/UL (ref 0–0.2)
BASOPHILS NFR BLD AUTO: 0 %
BUN SERPL-MCNC: 12.8 MG/DL (ref 8–23)
CALCIUM SERPL-MCNC: 9.4 MG/DL (ref 8.8–10.4)
CHLORIDE SERPL-SCNC: 100 MMOL/L (ref 98–107)
CREAT SERPL-MCNC: 0.81 MG/DL (ref 0.51–0.95)
DIASTOLIC BLOOD PRESSURE - MUSE: 64 MMHG
EGFRCR SERPLBLD CKD-EPI 2021: 77 ML/MIN/1.73M2
EOSINOPHIL # BLD AUTO: 0.3 10E3/UL (ref 0–0.7)
EOSINOPHIL NFR BLD AUTO: 3 %
ERYTHROCYTE [DISTWIDTH] IN BLOOD BY AUTOMATED COUNT: 14.9 % (ref 10–15)
FLUAV RNA SPEC QL NAA+PROBE: NEGATIVE
FLUBV RNA RESP QL NAA+PROBE: NEGATIVE
GLUCOSE BLDC GLUCOMTR-MCNC: 142 MG/DL (ref 70–99)
GLUCOSE BLDC GLUCOMTR-MCNC: 266 MG/DL (ref 70–99)
GLUCOSE SERPL-MCNC: 214 MG/DL (ref 70–99)
HCO3 BLD-SCNC: 36 MMOL/L (ref 21–28)
HCO3 BLDV-SCNC: 39 MMOL/L (ref 21–28)
HCO3 SERPL-SCNC: 36 MMOL/L (ref 22–29)
HCT VFR BLD AUTO: 29.8 % (ref 35–47)
HGB BLD-MCNC: 8.8 G/DL (ref 11.7–15.7)
HOLD SPECIMEN: NORMAL
IMM GRANULOCYTES # BLD: 0 10E3/UL
IMM GRANULOCYTES NFR BLD: 0 %
INTERPRETATION ECG - MUSE: NORMAL
LACTATE SERPL-SCNC: 1.1 MMOL/L (ref 0.7–2)
LYMPHOCYTES # BLD AUTO: 0.4 10E3/UL (ref 0.8–5.3)
LYMPHOCYTES NFR BLD AUTO: 4 %
MAGNESIUM SERPL-MCNC: 2 MG/DL (ref 1.7–2.3)
MCH RBC QN AUTO: 29 PG (ref 26.5–33)
MCHC RBC AUTO-ENTMCNC: 29.5 G/DL (ref 31.5–36.5)
MCV RBC AUTO: 98 FL (ref 78–100)
MONOCYTES # BLD AUTO: 0.7 10E3/UL (ref 0–1.3)
MONOCYTES NFR BLD AUTO: 6 %
NEUTROPHILS # BLD AUTO: 9.2 10E3/UL (ref 1.6–8.3)
NEUTROPHILS NFR BLD AUTO: 87 %
NRBC # BLD AUTO: 0 10E3/UL
NRBC BLD AUTO-RTO: 0 /100
O2/TOTAL GAS SETTING VFR VENT: 35 %
O2/TOTAL GAS SETTING VFR VENT: 48 %
OXYHGB MFR BLDA: 98 % (ref 92–100)
OXYHGB MFR BLDV: 56 % (ref 70–75)
P AXIS - MUSE: 80 DEGREES
PCO2 BLD: 71 MM HG (ref 35–45)
PCO2 BLDV: 75 MM HG (ref 40–50)
PEEP: 6 CM H2O
PH BLD: 7.32 [PH] (ref 7.35–7.45)
PH BLDV: 7.32 [PH] (ref 7.32–7.43)
PLATELET # BLD AUTO: 437 10E3/UL (ref 150–450)
PO2 BLD: 85 MM HG (ref 80–105)
PO2 BLDV: 32 MM HG (ref 25–47)
POTASSIUM SERPL-SCNC: 4.6 MMOL/L (ref 3.4–5.3)
PR INTERVAL - MUSE: 124 MS
QRS DURATION - MUSE: 50 MS
QT - MUSE: 288 MS
QTC - MUSE: 430 MS
R AXIS - MUSE: 93 DEGREES
RBC # BLD AUTO: 3.03 10E6/UL (ref 3.8–5.2)
RSV RNA SPEC NAA+PROBE: NEGATIVE
SAO2 % BLDA: 97.5 % (ref 95–96)
SAO2 % BLDV: 57.2 % (ref 70–75)
SARS-COV-2 RNA RESP QL NAA+PROBE: NEGATIVE
SODIUM SERPL-SCNC: 142 MMOL/L (ref 135–145)
SYSTOLIC BLOOD PRESSURE - MUSE: 97 MMHG
T AXIS - MUSE: 82 DEGREES
TROPONIN T SERPL HS-MCNC: 24 NG/L
TROPONIN T SERPL HS-MCNC: 24 NG/L
VENTRICULAR RATE- MUSE: 134 BPM
WBC # BLD AUTO: 10.6 10E3/UL (ref 4–11)

## 2025-02-28 PROCEDURE — 71275 CT ANGIOGRAPHY CHEST: CPT

## 2025-02-28 PROCEDURE — 99223 1ST HOSP IP/OBS HIGH 75: CPT

## 2025-02-28 PROCEDURE — 250N000011 HC RX IP 250 OP 636: Mod: JZ | Performed by: EMERGENCY MEDICINE

## 2025-02-28 PROCEDURE — 250N000009 HC RX 250

## 2025-02-28 PROCEDURE — 94640 AIRWAY INHALATION TREATMENT: CPT

## 2025-02-28 PROCEDURE — 82805 BLOOD GASES W/O2 SATURATION: CPT | Performed by: EMERGENCY MEDICINE

## 2025-02-28 PROCEDURE — 93005 ELECTROCARDIOGRAM TRACING: CPT | Performed by: EMERGENCY MEDICINE

## 2025-02-28 PROCEDURE — 85004 AUTOMATED DIFF WBC COUNT: CPT | Performed by: EMERGENCY MEDICINE

## 2025-02-28 PROCEDURE — 87637 SARSCOV2&INF A&B&RSV AMP PRB: CPT | Performed by: EMERGENCY MEDICINE

## 2025-02-28 PROCEDURE — 250N000013 HC RX MED GY IP 250 OP 250 PS 637

## 2025-02-28 PROCEDURE — 200N000001 HC R&B ICU

## 2025-02-28 PROCEDURE — 250N000009 HC RX 250: Performed by: EMERGENCY MEDICINE

## 2025-02-28 PROCEDURE — 94660 CPAP INITIATION&MGMT: CPT

## 2025-02-28 PROCEDURE — 83605 ASSAY OF LACTIC ACID: CPT

## 2025-02-28 PROCEDURE — 36600 WITHDRAWAL OF ARTERIAL BLOOD: CPT

## 2025-02-28 PROCEDURE — 250N000011 HC RX IP 250 OP 636: Performed by: EMERGENCY MEDICINE

## 2025-02-28 PROCEDURE — 83735 ASSAY OF MAGNESIUM: CPT

## 2025-02-28 PROCEDURE — 99285 EMERGENCY DEPT VISIT HI MDM: CPT | Mod: 25

## 2025-02-28 PROCEDURE — 999N000157 HC STATISTIC RCP TIME EA 10 MIN

## 2025-02-28 PROCEDURE — 80048 BASIC METABOLIC PNL TOTAL CA: CPT | Performed by: EMERGENCY MEDICINE

## 2025-02-28 PROCEDURE — 84484 ASSAY OF TROPONIN QUANT: CPT | Performed by: EMERGENCY MEDICINE

## 2025-02-28 PROCEDURE — 96374 THER/PROPH/DIAG INJ IV PUSH: CPT | Mod: 59

## 2025-02-28 PROCEDURE — 36415 COLL VENOUS BLD VENIPUNCTURE: CPT | Performed by: EMERGENCY MEDICINE

## 2025-02-28 PROCEDURE — 82805 BLOOD GASES W/O2 SATURATION: CPT

## 2025-02-28 PROCEDURE — 70450 CT HEAD/BRAIN W/O DYE: CPT

## 2025-02-28 RX ORDER — NALOXONE HYDROCHLORIDE 0.4 MG/ML
0.4 INJECTION, SOLUTION INTRAMUSCULAR; INTRAVENOUS; SUBCUTANEOUS
Status: DISCONTINUED | OUTPATIENT
Start: 2025-02-28 | End: 2025-03-02 | Stop reason: HOSPADM

## 2025-02-28 RX ORDER — ACETAMINOPHEN 650 MG/20.3ML
650 LIQUID ORAL EVERY 4 HOURS PRN
Status: DISCONTINUED | OUTPATIENT
Start: 2025-02-28 | End: 2025-03-02 | Stop reason: HOSPADM

## 2025-02-28 RX ORDER — ENOXAPARIN SODIUM 100 MG/ML
30 INJECTION SUBCUTANEOUS EVERY 24 HOURS
Status: DISCONTINUED | OUTPATIENT
Start: 2025-02-28 | End: 2025-02-28

## 2025-02-28 RX ORDER — FLUTICASONE FUROATE AND VILANTEROL 200; 25 UG/1; UG/1
1 POWDER RESPIRATORY (INHALATION) DAILY
Status: DISCONTINUED | OUTPATIENT
Start: 2025-03-01 | End: 2025-03-02 | Stop reason: HOSPADM

## 2025-02-28 RX ORDER — AMOXICILLIN 250 MG
1 CAPSULE ORAL 2 TIMES DAILY PRN
Status: DISCONTINUED | OUTPATIENT
Start: 2025-02-28 | End: 2025-03-02 | Stop reason: HOSPADM

## 2025-02-28 RX ORDER — ASPIRIN 81 MG/1
81 TABLET ORAL DAILY
Status: DISCONTINUED | OUTPATIENT
Start: 2025-03-01 | End: 2025-03-02 | Stop reason: HOSPADM

## 2025-02-28 RX ORDER — METHYLPREDNISOLONE SODIUM SUCCINATE 125 MG/2ML
125 INJECTION INTRAMUSCULAR; INTRAVENOUS ONCE
Status: COMPLETED | OUTPATIENT
Start: 2025-02-28 | End: 2025-02-28

## 2025-02-28 RX ORDER — HYDROMORPHONE HCL IN WATER/PF 6 MG/30 ML
0.2 PATIENT CONTROLLED ANALGESIA SYRINGE INTRAVENOUS
Status: DISCONTINUED | OUTPATIENT
Start: 2025-02-28 | End: 2025-03-01

## 2025-02-28 RX ORDER — NICOTINE POLACRILEX 4 MG
15-30 LOZENGE BUCCAL
Status: DISCONTINUED | OUTPATIENT
Start: 2025-02-28 | End: 2025-03-01

## 2025-02-28 RX ORDER — PREDNISONE 20 MG/1
20 TABLET ORAL DAILY
Status: DISCONTINUED | OUTPATIENT
Start: 2025-03-07 | End: 2025-03-02 | Stop reason: HOSPADM

## 2025-02-28 RX ORDER — DOXYCYCLINE 100 MG/1
100 CAPSULE ORAL EVERY 12 HOURS SCHEDULED
Status: DISCONTINUED | OUTPATIENT
Start: 2025-02-28 | End: 2025-03-02 | Stop reason: HOSPADM

## 2025-02-28 RX ORDER — MIRTAZAPINE 7.5 MG/1
7.5 TABLET, FILM COATED ORAL AT BEDTIME
Status: DISCONTINUED | OUTPATIENT
Start: 2025-02-28 | End: 2025-03-02 | Stop reason: HOSPADM

## 2025-02-28 RX ORDER — ACETAMINOPHEN 325 MG/1
650 TABLET ORAL EVERY 4 HOURS PRN
Status: DISCONTINUED | OUTPATIENT
Start: 2025-02-28 | End: 2025-03-02 | Stop reason: HOSPADM

## 2025-02-28 RX ORDER — FEXOFENADINE HCL 60 MG/1
60 TABLET, FILM COATED ORAL DAILY
Status: DISCONTINUED | OUTPATIENT
Start: 2025-03-01 | End: 2025-03-02 | Stop reason: HOSPADM

## 2025-02-28 RX ORDER — IPRATROPIUM BROMIDE AND ALBUTEROL SULFATE 2.5; .5 MG/3ML; MG/3ML
3 SOLUTION RESPIRATORY (INHALATION)
Status: DISCONTINUED | OUTPATIENT
Start: 2025-02-28 | End: 2025-03-02 | Stop reason: HOSPADM

## 2025-02-28 RX ORDER — AZITHROMYCIN 250 MG/1
250 TABLET, FILM COATED ORAL DAILY
Status: DISCONTINUED | OUTPATIENT
Start: 2025-03-01 | End: 2025-03-02 | Stop reason: HOSPADM

## 2025-02-28 RX ORDER — NALOXONE HYDROCHLORIDE 0.4 MG/ML
0.2 INJECTION, SOLUTION INTRAMUSCULAR; INTRAVENOUS; SUBCUTANEOUS
Status: DISCONTINUED | OUTPATIENT
Start: 2025-02-28 | End: 2025-03-02 | Stop reason: HOSPADM

## 2025-02-28 RX ORDER — BUPRENORPHINE 20 UG/H
1 PATCH TRANSDERMAL WEEKLY
Status: DISCONTINUED | OUTPATIENT
Start: 2025-03-02 | End: 2025-03-01

## 2025-02-28 RX ORDER — ATORVASTATIN CALCIUM 20 MG/1
40 TABLET, FILM COATED ORAL DAILY
Status: DISCONTINUED | OUTPATIENT
Start: 2025-03-01 | End: 2025-03-02 | Stop reason: HOSPADM

## 2025-02-28 RX ORDER — ONDANSETRON 4 MG/1
4 TABLET, ORALLY DISINTEGRATING ORAL EVERY 6 HOURS PRN
Status: DISCONTINUED | OUTPATIENT
Start: 2025-02-28 | End: 2025-03-02 | Stop reason: HOSPADM

## 2025-02-28 RX ORDER — ONDANSETRON 2 MG/ML
4 INJECTION INTRAMUSCULAR; INTRAVENOUS EVERY 6 HOURS PRN
Status: DISCONTINUED | OUTPATIENT
Start: 2025-02-28 | End: 2025-03-02 | Stop reason: HOSPADM

## 2025-02-28 RX ORDER — OXYCODONE HYDROCHLORIDE 5 MG/1
10 TABLET ORAL
Status: DISCONTINUED | OUTPATIENT
Start: 2025-02-28 | End: 2025-03-01

## 2025-02-28 RX ORDER — IPRATROPIUM BROMIDE AND ALBUTEROL SULFATE 2.5; .5 MG/3ML; MG/3ML
3 SOLUTION RESPIRATORY (INHALATION) ONCE
Status: COMPLETED | OUTPATIENT
Start: 2025-02-28 | End: 2025-02-28

## 2025-02-28 RX ORDER — PROCHLORPERAZINE MALEATE 5 MG/1
5 TABLET ORAL EVERY 6 HOURS PRN
Status: DISCONTINUED | OUTPATIENT
Start: 2025-02-28 | End: 2025-03-02 | Stop reason: HOSPADM

## 2025-02-28 RX ORDER — DEXTROSE MONOHYDRATE 25 G/50ML
25-50 INJECTION, SOLUTION INTRAVENOUS
Status: DISCONTINUED | OUTPATIENT
Start: 2025-02-28 | End: 2025-03-01

## 2025-02-28 RX ORDER — IOPAMIDOL 755 MG/ML
90 INJECTION, SOLUTION INTRAVASCULAR ONCE
Status: COMPLETED | OUTPATIENT
Start: 2025-02-28 | End: 2025-02-28

## 2025-02-28 RX ORDER — PREDNISONE 20 MG/1
40 TABLET ORAL DAILY
Status: DISCONTINUED | OUTPATIENT
Start: 2025-03-01 | End: 2025-03-02 | Stop reason: HOSPADM

## 2025-02-28 RX ORDER — HYDROXYZINE HYDROCHLORIDE 25 MG/1
25-50 TABLET, FILM COATED ORAL EVERY 8 HOURS PRN
Status: DISCONTINUED | OUTPATIENT
Start: 2025-02-28 | End: 2025-03-01

## 2025-02-28 RX ORDER — LISINOPRIL 10 MG/1
10 TABLET ORAL DAILY
Status: DISCONTINUED | OUTPATIENT
Start: 2025-03-01 | End: 2025-02-28

## 2025-02-28 RX ORDER — PREDNISONE 20 MG/1
40 TABLET ORAL DAILY
Status: DISCONTINUED | OUTPATIENT
Start: 2025-03-01 | End: 2025-02-28

## 2025-02-28 RX ORDER — SENNOSIDES 8.6 MG
1 TABLET ORAL DAILY PRN
COMMUNITY

## 2025-02-28 RX ORDER — AMOXICILLIN 250 MG
2 CAPSULE ORAL 2 TIMES DAILY PRN
Status: DISCONTINUED | OUTPATIENT
Start: 2025-02-28 | End: 2025-03-02 | Stop reason: HOSPADM

## 2025-02-28 RX ORDER — PREDNISONE 10 MG/1
10 TABLET ORAL DAILY
Status: DISCONTINUED | OUTPATIENT
Start: 2025-03-10 | End: 2025-03-02 | Stop reason: HOSPADM

## 2025-02-28 RX ADMIN — METHYLPREDNISOLONE SODIUM SUCCINATE 125 MG: 125 INJECTION, POWDER, FOR SOLUTION INTRAMUSCULAR; INTRAVENOUS at 09:41

## 2025-02-28 RX ADMIN — APIXABAN 5 MG: 5 TABLET, FILM COATED ORAL at 21:22

## 2025-02-28 RX ADMIN — IPRATROPIUM BROMIDE AND ALBUTEROL SULFATE 3 ML: .5; 3 SOLUTION RESPIRATORY (INHALATION) at 21:30

## 2025-02-28 RX ADMIN — DOXYCYCLINE HYCLATE 100 MG: 100 CAPSULE ORAL at 21:22

## 2025-02-28 RX ADMIN — IPRATROPIUM BROMIDE AND ALBUTEROL SULFATE 3 ML: .5; 3 SOLUTION RESPIRATORY (INHALATION) at 09:28

## 2025-02-28 RX ADMIN — MIRTAZAPINE 7.5 MG: 7.5 TABLET, FILM COATED ORAL at 21:22

## 2025-02-28 RX ADMIN — HYDROXYZINE HYDROCHLORIDE 50 MG: 25 TABLET, FILM COATED ORAL at 20:59

## 2025-02-28 RX ADMIN — IOPAMIDOL 90 ML: 755 INJECTION, SOLUTION INTRAVENOUS at 10:13

## 2025-02-28 ASSESSMENT — ACTIVITIES OF DAILY LIVING (ADL)
ADLS_ACUITY_SCORE: 57
ADLS_ACUITY_SCORE: 58
ADLS_ACUITY_SCORE: 59
ADLS_ACUITY_SCORE: 59
ADLS_ACUITY_SCORE: 57
ADLS_ACUITY_SCORE: 58
ADLS_ACUITY_SCORE: 59
ADLS_ACUITY_SCORE: 58
ADLS_ACUITY_SCORE: 59

## 2025-02-28 ASSESSMENT — COLUMBIA-SUICIDE SEVERITY RATING SCALE - C-SSRS
2. HAVE YOU ACTUALLY HAD ANY THOUGHTS OF KILLING YOURSELF SINCE LAST CONTACT?: NO
2. HAVE YOU ACTUALLY HAD ANY THOUGHTS OF KILLING YOURSELF IN THE PAST MONTH?: NO
6. HAVE YOU EVER DONE ANYTHING, STARTED TO DO ANYTHING, OR PREPARED TO DO ANYTHING TO END YOUR LIFE?: NO
1. IN THE PAST MONTH, HAVE YOU WISHED YOU WERE DEAD OR WISHED YOU COULD GO TO SLEEP AND NOT WAKE UP?: NO
6. HAVE YOU EVER DONE ANYTHING, STARTED TO DO ANYTHING, OR PREPARED TO DO ANYTHING TO END YOUR LIFE?: NO
2. HAVE YOU ACTUALLY HAD ANY THOUGHTS OF KILLING YOURSELF IN THE PAST MONTH?: NO
1. IN THE PAST MONTH, HAVE YOU WISHED YOU WERE DEAD OR WISHED YOU COULD GO TO SLEEP AND NOT WAKE UP?: NO
6. HAVE YOU EVER DONE ANYTHING, STARTED TO DO ANYTHING, OR PREPARED TO DO ANYTHING TO END YOUR LIFE?: NO

## 2025-02-28 NOTE — ED TRIAGE NOTES
Pt BIB [facility] for increased SOB. The pt has hx of COPD and lung cancer with mets to the brain. She uses 5 lpm at home and has labored breathing at baseline. Family noticed the pt was acting ,ore confused than normal and was have new increased labored breathing today. She has an e-visit and they recommended she go into the ED. Pt is DNR/DNI but is ok with BiPAP/CPAP use. EMS had pt on CPAP upon arrival.     Triage Assessment (Adult)       Row Name 02/28/25 0908          Triage Assessment    Airway WDL WDL        Respiratory WDL    Respiratory WDL X;rhythm/pattern;cough     Rhythm/Pattern, Respiratory deep;shortness of breath;tachypneic;labored     Cough Frequency infrequent     Cough Type congested;dry;fair        Skin Circulation/Temperature WDL    Skin Circulation/Temperature WDL WDL        Cardiac WDL    Cardiac WDL X;rhythm     Pulse Rate & Regularity tachycardic        Peripheral/Neurovascular WDL    Peripheral Neurovascular WDL WDL        Cognitive/Neuro/Behavioral WDL    Cognitive/Neuro/Behavioral WDL WDL

## 2025-02-28 NOTE — ED NOTES
Daughter at bedside, Pt repositioning herself onto her side. Pt is dry with purwick in place. Not making urine at this time.    CINDY CONTRERAS RN

## 2025-02-28 NOTE — PHARMACY-ADMISSION MEDICATION HISTORY
Pharmacist Admission Medication History    Admission medication history is complete. The information provided in this note is only as accurate as the sources available at the time of the update.    Information Source(s): Patient, Family member, and CareEverywhere/SureScripts via in-person    Pertinent Information: None    Changes made to PTA medication list:  Added: None  Deleted: dexamethasone, diazepam, prednisone  Changed: lisinopril on hold , senna is prn , Combivent inhaler is QID prn    Allergies reviewed with patient and updates made in EHR: yes    Medication History Completed By: Zayra Paez [facility] 2/28/2025 12:27 PM    PTA Med List   Medication Sig Note Last Dose/Taking    acetaminophen (TYLENOL) 500 MG tablet Take 2 tablets (1,000 mg) by mouth every 8 hours as needed for pain  Unknown    albuterol (PROAIR HFA/PROVENTIL HFA/VENTOLIN HFA) 108 (90 Base) MCG/ACT inhaler Inhale 1-2 puffs into the lungs every 6 hours as needed for shortness of breath, wheezing or cough.  Unknown    aspirin 81 MG EC tablet Take 81 mg by mouth daily.  2/27/2025 Morning    atorvastatin (LIPITOR) 40 MG tablet Take 1 tablet (40 mg) by mouth daily  2/27/2025 Morning    azithromycin (ZITHROMAX) 250 MG tablet Take 1 tablet (250 mg) by mouth daily.  2/27/2025 Morning    buprenorphine (BUTRANS) 20 MCG/HR WK patch Place 1 patch over 168 hours onto the skin every 7 days. 2/28/2025: Takes on Sundays 2/23/2025    calcium carbonate 600 mg-vitamin D 400 units (CALTRATE) 600-400 MG-UNIT per tablet Take 1 tablet by mouth 2 times daily With calcium  2/27/2025 Morning    ELIQUIS ANTICOAGULANT 5 MG tablet TAKE 1 TABLET (5 MG) BY MOUTH 2 TIMES DAILY  2/27/2025 Evening    fexofenadine (ALLEGRA ALLERGY) 60 MG tablet Take 60 mg by mouth daily.  2/27/2025 Morning    fluticasone-salmeterol (AIRDUO RESPICLICK) 232-14 MCG/ACT inhaler Inhale 1 puff into the lungs 2 times daily  2/27/2025 Evening    hydrOXYzine HCl (ATARAX) 25 MG tablet Take 1-2 tablets  (25-50 mg) by mouth every 8 hours as needed for anxiety (Insomnia)  Unknown    ipratropium - albuterol 0.5 mg/2.5 mg/3 mL (DUONEB) 0.5-2.5 (3) MG/3ML neb solution Take 1 vial (3 mLs) by nebulization every 6 hours as needed for shortness of breath, wheezing or cough.  2/27/2025    ipratropium-albuterol (COMBIVENT RESPIMAT)  MCG/ACT inhaler Inhale 1 puff into the lungs 4 times daily as needed for shortness of breath, wheezing or cough.  Unknown    lactobacillus rhamnosus, GG, (CULTURELL) capsule Take 1 capsule by mouth 2 times daily  2/27/2025 Evening    lidocaine-prilocaine (EMLA) 2.5-2.5 % external cream Apply topically as needed for moderate pain 2/28/2025: For port use Taking As Needed    mirtazapine (REMERON) 7.5 MG tablet Take 1 tablet (7.5 mg) by mouth at bedtime  2/27/2025 Bedtime    naloxone (NARCAN) 4 MG/0.1ML nasal spray Spray 1 spray (4 mg) into one nostril alternating nostrils as needed for opioid reversal every 2-3 minutes until assistance arrives  Unknown    ondansetron (ZOFRAN ODT) 8 MG ODT tab Take 1 tablet (8 mg) by mouth every 8 hours as needed for nausea.  Unknown    oxyCODONE (ROXICODONE) 5 MG tablet Take 1.5-2 tablets (7.5-10 mg) by mouth every 3 hours as needed for pain or moderate to severe pain (PRN shortness of breath).  2/28/2025 Morning    prochlorperazine (COMPAZINE) 10 MG tablet Take 1 tablet (10 mg) by mouth every 6 hours as needed for nausea or vomiting.  Unknown    sennosides (SENOKOT) 8.6 MG tablet Take 1 tablet by mouth daily as needed for constipation.  Unknown    umeclidinium (INCRUSE ELLIPTA) 62.5 MCG/ACT inhaler Inhale 1 puff into the lungs daily  2/27/2025 Morning

## 2025-02-28 NOTE — ED PROVIDER NOTES
EMERGENCY DEPARTMENT ENCOUNTER      NAME: Jasmyn Green  AGE: 69 year old female  YOB: 1955  MRN: [id number]  EVALUATION DATE & TIME: 2025  8:55 AM    PCP: Ana Maria Bermudez    ED PROVIDER: Timothy La D.O.      Chief Complaint   Patient presents with    Shortness of Breath       FINAL IMPRESSION:  1. COPD exacerbation (H)        ED COURSE & MEDICAL DECISION MAKIN:56 AM I met with the patient to gather history and to perform my initial exam. I discussed the plan for care while in the Emergency Department.  10:45 AM I met with and rechecked the patient. Patient appears to be breathing better, but family reports she is confused. CT Head ordered.  2:44 PM spoke with Dr. Chanel, Hospitalist, for admission to [facility]         Pertinent Labs & Imaging studies reviewed. (See chart for details)  69 year old female presents to the Emergency Department for evaluation of shortness of breath and wheezing.  Patient has known history of COPD.  Initial differential does include influenza, COVID-19, RSV, PE, COPD exacerbation, other acute process.  She was in significant respiratory distress when she first arrived, but this did improve with DuoNebs and with a BiPAP.  We were able to eventually get the patient weaned off BiPAP but still have her on increased oxygen supply.  Due to the tachycardia and hypoxia, and known history of cancer, I did decide to perform a CT scan for PE.  I did not believe a D-dimer was indicated due to the fact that she was high risk.  Fortunately, there is no evidence of PE, there is some evidence of increasing mediastinal mass, and no evidence of pneumonia or pneumothorax.  EKG did not show any evidence of ischemia or arrhythmia, first troponin was negative with repeat troponin is remaining essentially unchanged.  Influenza, COVID, RSV testing have all come back negative.  Thus at this time it does appear that this is consistent with COPD exacerbation.  Due to the  patient's increased oxygen demand, and the fact that she has needed BiPAP while here, I do believe she would benefit from inpatient admission for further management.  Patient and family were comfortable this plan.  Discussed with Hospitalist at [facility].      Medical Decision Making  Obtained supplemental history:Supplemental history obtained?: No  Reviewed external records: External records reviewed?: No  Care impacted by chronic illness:Cancer/Chemotherapy and Chronic Lung Disease  Did you consider but not order tests?: Work up considered but not performed and documented in chart, if applicable  Did you interpret images independently?: Independent interpretation of ECG and images noted in documentation, when applicable.  Consultation discussion with other provider:Did you involve another provider (consultant, , pharmacy, etc.)?: No  Admit.      MIPS (CTPE, Dental pain, Vick, Sinusitis, Asthma/COPD, Head Trauma): CT Pulmonary Angiogram:Patient is moderate to high risk for PE.        At the conclusion of the encounter I discussed the results of all of the tests and the disposition. The questions were answered. The patient or family acknowledged understanding and was agreeable with the care plan.        HPI    Patient information was obtained from: Patient and EMS     Use of : N/A        Jasmyn Green is a 69 year old female who presents with shortness of breath.    Per EMS:  Patient was in the 60's on home air, patient has COPD and small cell lung carcinoma. Patient has been tachycardic. Patient was given duo neb and placed on BIPAP with little relief.    Per Patient:  Patient states she has had shortness of breath and this began yesterday. Patient does not report any new pain.        PAST MEDICAL HISTORY:  Past Medical History:   Diagnosis Date    Age-related osteoporosis without current pathological fracture     Arthritis     COPD (chronic obstructive pulmonary disease) (H)     Coronary  artery disease     Dependence on nocturnal oxygen therapy     Dyspnea on exertion     Emphysema lung (H)     Gout     HLD (hyperlipidemia)     Hypertension     Lung mass        PAST SURGICAL HISTORY:  Past Surgical History:   Procedure Laterality Date    BRONCHOSCOPY RIGID OR FLEXIBLE W/TRANSENDOSCOPIC ENDOBRONCHIAL ULTRASOUND GUIDED N/A 2/16/2024    Procedure: BRONCHOSCOPY, WITH ENDOBRONCHIAL ULTRASOUND;  Surgeon: Ruben Levin MD;  Location: [facility] Main OR    COLONOSCOPY N/A 10/21/2021    Procedure: COLONOSCOPY;  Surgeon: Wilma Hester DO;  Location: [facility] Main OR    IR CHEST PORT PLACEMENT > 5 YRS OF AGE  5/2/2024    VAGINAL DELIVERY      x 3 ; remote    WISDOM TOOTH EXTRACTION           CURRENT MEDICATIONS:    No current facility-administered medications for this encounter.     Current Outpatient Medications   Medication Sig Dispense Refill    acetaminophen (TYLENOL) 500 MG tablet Take 2 tablets (1,000 mg) by mouth every 8 hours as needed for pain      albuterol (PROAIR HFA/PROVENTIL HFA/VENTOLIN HFA) 108 (90 Base) MCG/ACT inhaler Inhale 1-2 puffs into the lungs every 6 hours as needed for shortness of breath, wheezing or cough. 36 g 5    aspirin 81 MG EC tablet Take 81 mg by mouth daily.      atorvastatin (LIPITOR) 40 MG tablet Take 1 tablet (40 mg) by mouth daily 90 tablet 3    azithromycin (ZITHROMAX) 250 MG tablet Take 1 tablet (250 mg) by mouth daily. 90 tablet 5    buprenorphine (BUTRANS) 20 MCG/HR WK patch Place 1 patch over 168 hours onto the skin every 7 days. 4 patch 1    calcium carbonate 600 mg-vitamin D 400 units (CALTRATE) 600-400 MG-UNIT per tablet Take 1 tablet by mouth 2 times daily With calcium      ELIQUIS ANTICOAGULANT 5 MG tablet TAKE 1 TABLET (5 MG) BY MOUTH 2 TIMES DAILY 60 tablet 4    fexofenadine (ALLEGRA ALLERGY) 60 MG tablet Take 60 mg by mouth daily.      fluticasone-salmeterol (AIRDUO RESPICLICK) 232-14 MCG/ACT inhaler Inhale 1 puff into the lungs 2 times daily 1 each 11     hydrOXYzine HCl (ATARAX) 25 MG tablet Take 1-2 tablets (25-50 mg) by mouth every 8 hours as needed for anxiety (Insomnia) 30 tablet 3    ipratropium - albuterol 0.5 mg/2.5 mg/3 mL (DUONEB) 0.5-2.5 (3) MG/3ML neb solution Take 1 vial (3 mLs) by nebulization every 6 hours as needed for shortness of breath, wheezing or cough. 360 mL 11    ipratropium-albuterol (COMBIVENT RESPIMAT)  MCG/ACT inhaler Inhale 1 puff into the lungs 4 times daily as needed for shortness of breath, wheezing or cough.      lactobacillus rhamnosus, GG, (CULTURELL) capsule Take 1 capsule by mouth 2 times daily      lidocaine-prilocaine (EMLA) 2.5-2.5 % external cream Apply topically as needed for moderate pain 30 g 5    mirtazapine (REMERON) 7.5 MG tablet Take 1 tablet (7.5 mg) by mouth at bedtime 90 tablet 3    naloxone (NARCAN) 4 MG/0.1ML nasal spray Spray 1 spray (4 mg) into one nostril alternating nostrils as needed for opioid reversal every 2-3 minutes until assistance arrives 0.2 mL 1    ondansetron (ZOFRAN ODT) 8 MG ODT tab Take 1 tablet (8 mg) by mouth every 8 hours as needed for nausea. 30 tablet 1    oxyCODONE (ROXICODONE) 5 MG tablet Take 1.5-2 tablets (7.5-10 mg) by mouth every 3 hours as needed for pain or moderate to severe pain (PRN shortness of breath). 180 tablet 0    prochlorperazine (COMPAZINE) 10 MG tablet Take 1 tablet (10 mg) by mouth every 6 hours as needed for nausea or vomiting. 30 tablet 2    sennosides (SENOKOT) 8.6 MG tablet Take 1 tablet by mouth daily as needed for constipation.      umeclidinium (INCRUSE ELLIPTA) 62.5 MCG/ACT inhaler Inhale 1 puff into the lungs daily 30 each 11    lisinopril (ZESTRIL) 10 MG tablet Take 1 tablet (10 mg) by mouth daily 30 tablet 12    OXYGEN-HELIUM IN Inhale 3-4 L into the lungs continuous prn           ALLERGIES:  No Known Allergies    FAMILY HISTORY:  Family History   Problem Relation Age of Onset    Chronic Obstructive Pulmonary Disease Sister     Diabetes Mother      Heart Disease Mother     Lung Cancer Mother     Heart Disease Father        SOCIAL HISTORY:  Social History     Socioeconomic History    Marital status:    Tobacco Use    Smoking status: Former     Current packs/day: 0.00     Types: Cigarettes     Quit date: 6/27/2021     Years since quitting: 3.6     Passive exposure: Past    Smokeless tobacco: Never    Tobacco comments:     updated 8/3/2021 by TTS   Vaping Use    Vaping status: Never Used   Substance and Sexual Activity    Alcohol use: Not Currently    Drug use: Never    Sexual activity: Not Currently   Social History Narrative     many years 3 grown children. 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 Last cigarette a week ago.non drinker  Her primary MD (Wilda) retired several years ago/ tends to avoid doctors/medical care in general       Social Drivers of Health     Financial Resource Strain: Low Risk  (2/1/2024)    Financial Resource Strain     Within the past 12 months, have you or your family members you live with been unable to get utilities (heat, electricity) when it was really needed?: No   Food Insecurity: Low Risk  (2/1/2024)    Food Insecurity     Within the past 12 months, did you worry that your food would run out before you got money to buy more?: No     Within the past 12 months, did the food you bought just not last and you didn t have money to get more?: No   Transportation Needs: Low Risk  (2/1/2024)    Transportation Needs     Within the past 12 months, has lack of transportation kept you from medical appointments, getting your medicines, non-medical meetings or appointments, work, or from getting things that you need?: No   Physical Activity: Sufficiently Active (11/10/2021)    Exercise Vital Sign     Days of Exercise per Week: 5 days     Minutes of Exercise per Session: 30 min   Stress: No Stress Concern Present (11/10/2021)    [employer] - Occupational Stress Questionnaire     Feeling of  Stress : Not at all   Social Connections: Socially Isolated (11/10/2021)    Social Connection and Isolation Panel [NHANES]     Frequency of Communication with Friends and Family: More than three times a week     Frequency of Social Gatherings with Friends and Family: Once a week     Attends [employer]ism Services: Never     Active Member of Clubs or Organizations: No     Marital Status:    Interpersonal Safety: Low Risk  (12/27/2024)    Interpersonal Safety     Do you feel physically and emotionally safe where you currently live?: Yes     Within the past 12 months, have you been hit, slapped, kicked or otherwise physically hurt by someone?: No     Within the past 12 months, have you been humiliated or emotionally abused in other ways by your partner or ex-partner?: No   Housing Stability: Low Risk  (2/1/2024)    Housing Stability     Do you have housing? : Yes     Are you worried about losing your housing?: No       VITALS:  Patient Vitals for the past 24 hrs:   BP Pulse Resp SpO2   02/28/25 1300 120/59 101 (!) 47 99 %   02/28/25 1058 (!) 153/72 113 (!) 34 99 %   02/28/25 1030 (!) 191/75 115 (!) 37 97 %   02/28/25 1027 (!) 191/75 115 (!) 33 97 %   02/28/25 1012 (!) 136/101 (!) 123 (!) 39 97 %   02/28/25 0957 138/68 (!) 126 (!) 45 97 %   02/28/25 0942 (!) 147/67 (!) 128 (!) 49 98 %   02/28/25 0928 -- -- 24 99 %   02/28/25 0927 128/66 (!) 127 (!) 43 --   02/28/25 0906 97/64 (!) 133 26 --       PHYSICAL EXAM    VITAL SIGNS: /59   Pulse 101   Resp (!) 47   LMP  (LMP Unknown)   SpO2 99%     General Appearance: Well-appearing, well-nourished, Moderate respiratory distress   Head:  Normocephalic, without obvious abnormality, atraumatic  Eyes:  PERRL, conjunctiva/corneas clear, EOM's intact,  ENT:  Lips, mucosa, and tongue normal, membranes are moist without pallor  Neck:  Normal ROM, symmetrical, trachea midline    Cardio:  Regular rate and rhythm, no murmur, rub or gallop, 2+ pulses symmetric in all  extremities  Pulm:  Clear to auscultation bilaterally, Diminished breath sounds, increased work of breathing, occasional respiratory wheezes in R lower lung field.  Abdomen:  Soft, non-tender, no rebound or guarding.  Musculoskeletal: Full ROM, no edema, no cyanosis, good ROM of major joints  Integument:  Warm, Dry, No erythema, No rash.    Neurologic:  Alert & oriented.  No focal deficits appreciated.        LABS  Results for orders placed or performed during the hospital encounter of 02/28/25 (from the past 24 hours)   ECG 12-LEAD WITH MUSE (LHE)   Result Value Ref Range    Systolic Blood Pressure 97 mmHg    Diastolic Blood Pressure 64 mmHg    Ventricular Rate 134 BPM    Atrial Rate 134 BPM    NE Interval 124 ms    QRS Duration 50 ms     ms    QTc 430 ms    P Axis 80 degrees    R AXIS 93 degrees    T Axis 82 degrees    Interpretation ECG       Sinus tachycardia  Rightward axis  Nonspecific ST abnormality  Abnormal ECG  When compared with ECG of 16-Dec-2024 09:23,  Premature ventricular complexes are no longer Present  Confirmed by SEE ED PROVIDER NOTE FOR, ECG INTERPRETATION ([id number]),  Alejandra Lindsay ([id number]) on 2/28/2025 9:13:59 AM     Blood gas arterial   Result Value Ref Range    pH Arterial 7.32 (L) 7.35 - 7.45    pCO2 Arterial 71 (H) 35 - 45 mm Hg    pO2 Arterial 85 80 - 105 mm Hg    FIO2 35     Bicarbonate Arterial 36 (H) 21 - 28 mmol/L    Base Excess/Deficit Arterial 8.2 (H) -3.0 - 3.0 mmol/L    Oxyhemoglobin Arterial 98 92 - 100 %    O2 Sat, Arterial 97.5 (H) 95.0 - 96.0 %    Peep 6 cm H2O    Narrative    In healthy individuals, oxyhemoglobin (O2Hb) and oxygen saturation (SO2) are approximately equal. In the presence of dyshemoglobins, oxyhemoglobin can be considerably lower than oxygen saturation.   CBC with platelets + differential    Narrative    The following orders were created for panel order CBC with platelets + differential.  Procedure                               Abnormality         Status                      ---------                               -----------         ------                     CBC with platelets and d...[[id number]]  Abnormal            Final result                 Please view results for these tests on the individual orders.   Basic metabolic panel   Result Value Ref Range    Sodium 142 135 - 145 mmol/L    Potassium 4.6 3.4 - 5.3 mmol/L    Chloride 100 98 - 107 mmol/L    Carbon Dioxide (CO2) 36 (H) 22 - 29 mmol/L    Anion Gap 6 (L) 7 - 15 mmol/L    Urea Nitrogen 12.8 8.0 - 23.0 mg/dL    Creatinine 0.81 0.51 - 0.95 mg/dL    GFR Estimate 77 >60 mL/min/1.73m2    Calcium 9.4 8.8 - 10.4 mg/dL    Glucose 214 (H) 70 - 99 mg/dL   Troponin T, High Sensitivity   Result Value Ref Range    Troponin T, High Sensitivity 24 (H) <=14 ng/L   Influenza A/B, RSV and SARS-CoV2 PCR (COVID-19) Nasopharyngeal    Specimen: Nasopharyngeal; Swab   Result Value Ref Range    Influenza A PCR Negative Negative    Influenza B PCR Negative Negative    RSV PCR Negative Negative    SARS CoV2 PCR Negative Negative    Narrative    Testing was performed using the Xpert Xpress CoV2/Flu/RSV Assay on the [employer] GeneXpert Instrument. This test should be ordered for the detection of SARS-CoV2, influenza, and RSV viruses in individuals with signs and symptoms of respiratory tract infection. This test is for in vitro diagnostic use under the [place] FDA for laboratories certified under CLIA to perform high or moderate complexity testing. This test has been [place] FDA cleared. A negative result does not rule out the presence of PCR inhibitors in the specimen or target RNA in concentration below the limit of detection for the assay. If only one viral target is positive but coinfection with multiple targets is suspected, the sample should be re-tested with another FDA cleared, approved, or authorized test, if coninfection would change clinical management. This test was validated by the [facility]. These laboratories  are certified under the Clinical Laboratory Improvement Amendments of 1988 (CLIA-88) as qualified to perfom high complexity laboratory testing.   CBC with platelets and differential   Result Value Ref Range    WBC Count 10.6 4.0 - 11.0 10e3/uL    RBC Count 3.03 (L) 3.80 - 5.20 10e6/uL    Hemoglobin 8.8 (L) 11.7 - 15.7 g/dL    Hematocrit 29.8 (L) 35.0 - 47.0 %    MCV 98 78 - 100 fL    MCH 29.0 26.5 - 33.0 pg    MCHC 29.5 (L) 31.5 - 36.5 g/dL    RDW 14.9 10.0 - 15.0 %    Platelet Count 437 150 - 450 10e3/uL    % Neutrophils 87 %    % Lymphocytes 4 %    % Monocytes 6 %    % Eosinophils 3 %    % Basophils 0 %    % Immature Granulocytes 0 %    NRBCs per 100 WBC 0 <1 /100    Absolute Neutrophils 9.2 (H) 1.6 - 8.3 10e3/uL    Absolute Lymphocytes 0.4 (L) 0.8 - 5.3 10e3/uL    Absolute Monocytes 0.7 0.0 - 1.3 10e3/uL    Absolute Eosinophils 0.3 0.0 - 0.7 10e3/uL    Absolute Basophils 0.0 0.0 - 0.2 10e3/uL    Absolute Immature Granulocytes 0.0 <=0.4 10e3/uL    Absolute NRBCs 0.0 10e3/uL   [place] Draw    Narrative    The following orders were created for panel order [place] Draw.  Procedure                               Abnormality         Status                     ---------                               -----------         ------                     Extra Blue Top Tube[[id number]]                              Final result               Extra Red Top Tube[[id number]]                               Final result               Extra Heparinized Syringe[[id number]]                        Final result                 Please view results for these tests on the individual orders.   Extra Blue Top Tube   Result Value Ref Range    Hold Specimen JIC    Extra Red Top Tube   Result Value Ref Range    Hold Specimen JIC    Extra Heparinized Syringe   Result Value Ref Range    Hold Specimen JIC    CT Chest Pulmonary Embolism w Contrast    Narrative    EXAM: CT CHEST PULMONARY EMBOLISM W CONTRAST  LOCATION: [facility]  DATE: 2/28/2025    INDICATION: Dyspnea, tachycardia, history of cancer  COMPARISON: PET CT 11/25/2024, CT CAP 11/4/2024 and older studies  TECHNIQUE: CT chest pulmonary angiogram during arterial phase injection of IV contrast. Multiplanar reformats and MIP reconstructions were performed. Dose reduction techniques were used.   CONTRAST: ISOVUE 370 90mL    FINDINGS: Slight respiratory motion artifact at the lung bases.    ANGIOGRAM CHEST: Excellent opacification of pulmonary arteries and branches. No pulmonary emboli. Aorta and branches are normal caliber.     LUNGS AND PLEURA: Notable increase in size of the pleural-based left upper lobe tumor. This now measures 3.8 x 1.6 cm (axial image 148), was 1.1 x 1 cm.    Extensive emphysema with diffuse peribronchial thickening. No effusions.    MEDIASTINUM/AXILLAE: Right IJ Port-A-Cath. Increased size of the left supraclavicular nodes (coronal image 35, axial image 28). This now measures 1.3 x 0.7 cm.  Adenopathy in the AP window has become confluent and measures 2.2 cm in transverse dimension.    CORONARY ARTERY CALCIFICATION: Cannot evaluate.     UPPER ABDOMEN: Non-FDG avid left adrenal nodule is stable..    MUSCULOSKELETAL: No suspicious bone lesions. No erosion of the ribs adjacent to the enlarging pleural-based left upper lobe tumor.      Impression    IMPRESSION:  1.  Progression of disease with increase in the left upper lobe pleural-based nodule, adenopathy in the AP window and left supraclavicular nodes as noted above.  2.  Extensive emphysema with evidence of chronic bronchitis.  3.  No pulmonary emboli.   Head CT w/o contrast    Narrative    EXAM: CT HEAD W/O CONTRAST  LOCATION: [facility]  DATE: 2/28/2025    INDICATION: Confusion  COMPARISON: None.  TECHNIQUE: Routine CT Head without IV contrast. Multiplanar reformats. Dose reduction techniques were used.    FINDINGS:  INTRACRANIAL CONTENTS: Redemonstration of a peripherally  enhancing and centrally hypoattenuating lesion within the inferior aspect of the left cerebellar hemisphere which measures 21 x 18 mm (series 2, image 8), and displays a prominent degree of   adjacent vasogenic edema. When compared to the 01/07/2025 exam this is and has increased in size. There is no discernible mass effect on the cerebral aqueduct or fourth ventricle. No supratentorial mass effect.    VISUALIZED ORBITS/SINUSES/MASTOIDS: No intraorbital abnormality. No paranasal sinus mucosal disease. No middle ear or mastoid effusion.    BONES/SOFT TISSUES: No acute abnormality.      Impression    IMPRESSION:    Increased size of a peripherally enhancing lesion within the left cerebellar hemisphere when compared to the 01/07/2025 exam. No discernible degree of fourth ventricle effacement.   Troponin T, High Sensitivity   Result Value Ref Range    Troponin T, High Sensitivity 24 (H) <=14 ng/L         RADIOLOGY  Head CT w/o contrast   Final Result   IMPRESSION:      Increased size of a peripherally enhancing lesion within the left cerebellar hemisphere when compared to the 01/07/2025 exam. No discernible degree of fourth ventricle effacement.      CT Chest Pulmonary Embolism w Contrast   Final Result   IMPRESSION:   1.  Progression of disease with increase in the left upper lobe pleural-based nodule, adenopathy in the AP window and left supraclavicular nodes as noted above.   2.  Extensive emphysema with evidence of chronic bronchitis.   3.  No pulmonary emboli.           EKG:    Rate: 134 bpm  Rhythm: Sinus tachycardia  Axis: Right  Interval: Normal  Conduction: Normal  QRS: Normal  ST: Normal  T-wave: Normal  QT: Not prolonged  Comparison EKG: No significant change compared to 16 December 2024  Impression:  No acute ischemic change   I have independently reviewed and interpreted today's EKG, pending Cardiologist read        MEDICATIONS GIVEN IN THE EMERGENCY:  Medications   ipratropium - albuterol 0.5 mg/2.5 mg/3  mL (DUONEB) neb solution 3 mL (3 mLs Nebulization $Given 2/28/25 0998)   methylPREDNISolone Na Suc (solu-MEDROL) injection 125 mg (125 mg Intravenous $Given 2/28/25 0941)   iopamidol (ISOVUE-370) solution 90 mL (90 mLs Intravenous $Given 2/28/25 1013)       NEW PRESCRIPTIONS STARTED AT TODAY'S ER VISIT  New Prescriptions    No medications on file        I, Abdias Pickett, am serving as a scribe to document services personally performed by Timothy La D.O., based on my observations and the provider's statements to me.  I, Timothy La D.O., attest that Abdias Pickett is acting in a scribe capacity, has observed my performance of the services and has documented them in accordance with my direction.     Timothy La D.O.  Emergency Medicine  [facility] EMERGENCY ROOM  [facility] access hospital[address] [id number]  [phone]  Dept: [phone]       Timothy La, DO  02/28/25 1444       Timothy La,   02/28/25 1458

## 2025-03-01 LAB
ANION GAP SERPL CALCULATED.3IONS-SCNC: 5 MMOL/L (ref 7–15)
BASE EXCESS BLDV CALC-SCNC: 11.5 MMOL/L (ref -3–3)
BUN SERPL-MCNC: 28.4 MG/DL (ref 8–23)
CALCIUM SERPL-MCNC: 9.2 MG/DL (ref 8.8–10.4)
CHLORIDE SERPL-SCNC: 101 MMOL/L (ref 98–107)
CREAT SERPL-MCNC: 0.92 MG/DL (ref 0.51–0.95)
EGFRCR SERPLBLD CKD-EPI 2021: 66 ML/MIN/1.73M2
ERYTHROCYTE [DISTWIDTH] IN BLOOD BY AUTOMATED COUNT: 14.9 % (ref 10–15)
GLUCOSE BLDC GLUCOMTR-MCNC: 115 MG/DL (ref 70–99)
GLUCOSE BLDC GLUCOMTR-MCNC: 122 MG/DL (ref 70–99)
GLUCOSE BLDC GLUCOMTR-MCNC: 134 MG/DL (ref 70–99)
GLUCOSE BLDC GLUCOMTR-MCNC: 160 MG/DL (ref 70–99)
GLUCOSE BLDC GLUCOMTR-MCNC: 199 MG/DL (ref 70–99)
GLUCOSE BLDC GLUCOMTR-MCNC: 206 MG/DL (ref 70–99)
GLUCOSE SERPL-MCNC: 110 MG/DL (ref 70–99)
HCO3 BLDV-SCNC: 39 MMOL/L (ref 21–28)
HCO3 SERPL-SCNC: 36 MMOL/L (ref 22–29)
HCT VFR BLD AUTO: 27.1 % (ref 35–47)
HGB BLD-MCNC: 8 G/DL (ref 11.7–15.7)
MCH RBC QN AUTO: 29.3 PG (ref 26.5–33)
MCHC RBC AUTO-ENTMCNC: 29.5 G/DL (ref 31.5–36.5)
MCV RBC AUTO: 99 FL (ref 78–100)
O2/TOTAL GAS SETTING VFR VENT: 48 %
OXYHGB MFR BLDV: 75 % (ref 70–75)
PCO2 BLDV: 76 MM HG (ref 40–50)
PH BLDV: 7.32 [PH] (ref 7.32–7.43)
PLATELET # BLD AUTO: 429 10E3/UL (ref 150–450)
PO2 BLDV: 43 MM HG (ref 25–47)
POTASSIUM SERPL-SCNC: 4.8 MMOL/L (ref 3.4–5.3)
RBC # BLD AUTO: 2.73 10E6/UL (ref 3.8–5.2)
SAO2 % BLDV: 75.9 % (ref 70–75)
SODIUM SERPL-SCNC: 142 MMOL/L (ref 135–145)
WBC # BLD AUTO: 7.8 10E3/UL (ref 4–11)

## 2025-03-01 PROCEDURE — 99207 PR NO BILLABLE SERVICE THIS VISIT: CPT | Performed by: INTERNAL MEDICINE

## 2025-03-01 PROCEDURE — 250N000009 HC RX 250: Performed by: INTERNAL MEDICINE

## 2025-03-01 PROCEDURE — 94640 AIRWAY INHALATION TREATMENT: CPT | Mod: 76

## 2025-03-01 PROCEDURE — 250N000013 HC RX MED GY IP 250 OP 250 PS 637: Performed by: INTERNAL MEDICINE

## 2025-03-01 PROCEDURE — 99232 SBSQ HOSP IP/OBS MODERATE 35: CPT | Performed by: INTERNAL MEDICINE

## 2025-03-01 PROCEDURE — 250N000013 HC RX MED GY IP 250 OP 250 PS 637

## 2025-03-01 PROCEDURE — 80048 BASIC METABOLIC PNL TOTAL CA: CPT

## 2025-03-01 PROCEDURE — 120N000004 HC R&B MS OVERFLOW

## 2025-03-01 PROCEDURE — 85018 HEMOGLOBIN: CPT

## 2025-03-01 PROCEDURE — 250N000012 HC RX MED GY IP 250 OP 636 PS 637

## 2025-03-01 PROCEDURE — 82805 BLOOD GASES W/O2 SATURATION: CPT

## 2025-03-01 PROCEDURE — 250N000009 HC RX 250

## 2025-03-01 RX ORDER — BUPRENORPHINE 10 UG/H
2 PATCH TRANSDERMAL WEEKLY
Status: DISCONTINUED | OUTPATIENT
Start: 2025-03-02 | End: 2025-03-02 | Stop reason: HOSPADM

## 2025-03-01 RX ORDER — BUPRENORPHINE 10 UG/H
2 PATCH TRANSDERMAL WEEKLY
Status: DISCONTINUED | OUTPATIENT
Start: 2025-03-02 | End: 2025-03-01

## 2025-03-01 RX ORDER — OXYCODONE HYDROCHLORIDE 5 MG/1
10 TABLET ORAL
Status: DISCONTINUED | OUTPATIENT
Start: 2025-03-01 | End: 2025-03-02 | Stop reason: HOSPADM

## 2025-03-01 RX ORDER — CARBOXYMETHYLCELLULOSE SODIUM 5 MG/ML
1 SOLUTION/ DROPS OPHTHALMIC
Status: DISCONTINUED | OUTPATIENT
Start: 2025-03-01 | End: 2025-03-01

## 2025-03-01 RX ADMIN — OXYCODONE 7.5 MG: 5 TABLET ORAL at 20:18

## 2025-03-01 RX ADMIN — DOXYCYCLINE HYCLATE 100 MG: 100 CAPSULE ORAL at 08:26

## 2025-03-01 RX ADMIN — MIRTAZAPINE 7.5 MG: 7.5 TABLET, FILM COATED ORAL at 22:01

## 2025-03-01 RX ADMIN — IPRATROPIUM BROMIDE AND ALBUTEROL SULFATE 3 ML: .5; 3 SOLUTION RESPIRATORY (INHALATION) at 20:01

## 2025-03-01 RX ADMIN — ASPIRIN 81 MG: 81 TABLET, COATED ORAL at 08:26

## 2025-03-01 RX ADMIN — UMECLIDINIUM 1 PUFF: 62.5 AEROSOL, POWDER ORAL at 08:23

## 2025-03-01 RX ADMIN — APIXABAN 5 MG: 5 TABLET, FILM COATED ORAL at 20:19

## 2025-03-01 RX ADMIN — PREDNISONE 40 MG: 20 TABLET ORAL at 08:26

## 2025-03-01 RX ADMIN — AZITHROMYCIN DIHYDRATE 250 MG: 250 TABLET ORAL at 08:26

## 2025-03-01 RX ADMIN — FLUTICASONE FUROATE AND VILANTEROL TRIFENATATE 1 PUFF: 200; 25 POWDER RESPIRATORY (INHALATION) at 08:23

## 2025-03-01 RX ADMIN — IPRATROPIUM BROMIDE AND ALBUTEROL SULFATE 3 ML: .5; 3 SOLUTION RESPIRATORY (INHALATION) at 06:50

## 2025-03-01 RX ADMIN — FEXOFENADINE HYDROCHLORIDE 60 MG: 60 TABLET ORAL at 08:26

## 2025-03-01 RX ADMIN — APIXABAN 5 MG: 5 TABLET, FILM COATED ORAL at 08:26

## 2025-03-01 RX ADMIN — HYDROXYZINE HYDROCHLORIDE 25 MG: 25 TABLET, FILM COATED ORAL at 08:42

## 2025-03-01 RX ADMIN — IPRATROPIUM BROMIDE AND ALBUTEROL SULFATE 3 ML: .5; 3 SOLUTION RESPIRATORY (INHALATION) at 16:52

## 2025-03-01 RX ADMIN — DOXYCYCLINE HYCLATE 100 MG: 100 CAPSULE ORAL at 20:19

## 2025-03-01 RX ADMIN — SENNOSIDES AND DOCUSATE SODIUM 1 TABLET: 50; 8.6 TABLET ORAL at 20:19

## 2025-03-01 RX ADMIN — IPRATROPIUM BROMIDE AND ALBUTEROL SULFATE 3 ML: .5; 3 SOLUTION RESPIRATORY (INHALATION) at 12:30

## 2025-03-01 RX ADMIN — ATORVASTATIN CALCIUM 40 MG: 20 TABLET, FILM COATED ORAL at 08:26

## 2025-03-01 ASSESSMENT — ACTIVITIES OF DAILY LIVING (ADL)
ADLS_ACUITY_SCORE: 58
ADLS_ACUITY_SCORE: 58
ADLS_ACUITY_SCORE: 54
ADLS_ACUITY_SCORE: 52
ADLS_ACUITY_SCORE: 58
ADLS_ACUITY_SCORE: 58
ADLS_ACUITY_SCORE: 55
ADLS_ACUITY_SCORE: 58
ADLS_ACUITY_SCORE: 54
ADLS_ACUITY_SCORE: 55
ADLS_ACUITY_SCORE: 55
ADLS_ACUITY_SCORE: 52
ADLS_ACUITY_SCORE: 58
ADLS_ACUITY_SCORE: 58
ADLS_ACUITY_SCORE: 55
ADLS_ACUITY_SCORE: 52
ADLS_ACUITY_SCORE: 58
ADLS_ACUITY_SCORE: 58
ADLS_ACUITY_SCORE: 52
ADLS_ACUITY_SCORE: 55
ADLS_ACUITY_SCORE: 55

## 2025-03-01 NOTE — PROGRESS NOTES
Morning nebulizer done early.  This writer believes patient will not tolerate Bipap mask as she doesn't tolerate aerosol mask for nebs and does mouthpiece.  Patient respiratory seems unchanged from yesterday.  Will reassess for Bipap on day shift.  Holding Bipap for now.

## 2025-03-01 NOTE — PLAN OF CARE
"  Problem: Adult Inpatient Plan of Care  Goal: Plan of Care Review  Description: The Plan of Care Review/Shift note should be completed every shift.  The Outcome Evaluation is a brief statement about your assessment that the patient is improving, declining, or no change.  This information will be displayed automatically on your shift  note.  Outcome: Progressing  Goal: Patient-Specific Goal (Individualized)  Description: You can add care plan individualizations to a care plan. Examples of Individualization might be:  \"Parent requests to be called daily at 9am for status\", \"I have a hard time hearing out of my right ear\", or \"Do not touch me to wake me up as it startles  me\".  Outcome: Progressing  Flowsheets (Taken 2/28/2025 1811)  Individualized Care Needs: none  Anxieties, Fears or Concerns: none  Goal: Absence of Hospital-Acquired Illness or Injury  Outcome: Progressing  Intervention: Identify and Manage Fall Risk  Recent Flowsheet Documentation  Taken 2/28/2025 1811 by Brit Martinez RN  Safety Promotion/Fall Prevention:   clutter free environment maintained   increased rounding and observation   increase visualization of patient   lighting adjusted  Intervention: Prevent Skin Injury  Recent Flowsheet Documentation  Taken 2/28/2025 1807 by Brit Martinez, RN  Body Position:   position maintained   heels elevated   log-rolled   left   right   turned  Goal: Optimal Comfort and Wellbeing  Outcome: Progressing  Goal: Readiness for Transition of Care  Outcome: Progressing  Intervention: Mutually Develop Transition Plan  Recent Flowsheet Documentation  Taken 2/28/2025 1817 by Brit Martinez, RN  Equipment Currently Used at Home: (supplmental oxygen) other (see comments)     Problem: Risk for Delirium  Goal: Optimal Coping  Outcome: Progressing  Intervention: Optimize Psychosocial Adjustment to Delirium  Recent Flowsheet Documentation  Taken 2/28/2025 1811 by Brit Martinez RN  Supportive Measures: active " listening utilized  Goal: Improved Behavioral Control  Outcome: Progressing  Intervention: Prevent and Manage Agitation  Recent Flowsheet Documentation  Taken 2/28/2025 1811 by Brit Martinez RN  Environment Familiarity/Consistency: daily routine followed  Intervention: Minimize Safety Risk  Recent Flowsheet Documentation  Taken 2/28/2025 1811 by Brit Martinez RN  Enhanced Safety Measures: room near unit station  Goal: Improved Attention and Thought Clarity  Outcome: Progressing  Intervention: Maximize Cognitive Function  Recent Flowsheet Documentation  Taken 2/28/2025 1811 by Brit Martinez RN  Sensory Stimulation Regulation:   care clustered   lighting decreased   quiet environment promoted   television on  Reorientation Measures:   clock in view   glasses use encouraged   reorientation provided  Goal: Improved Sleep  Outcome: Progressing   Goal Outcome Evaluation:

## 2025-03-01 NOTE — PLAN OF CARE
Goal Outcome Evaluation:      Plan of Care Reviewed With: patient    Overall Patient Progress: no changeOverall Patient Progress: no change       S/B: Acute on chronic respiratory failure.     A: A&Ox4 very forgetful, calls daughter to answer questions. Flustered this am with equipment, quite anxious with cares. HR elevated up to 100's and air hunger this am Spo2 94% on 5L, mouth breathing at times with talking. 5L NC baseline, weaned to 4L, now while asleep spo2 -99%, Bp stable. ST 100s. Afebrile. Denies pain and nausea. Tolerating regular diet. Electrolytes WNL. Hydroxyzine 25mg given this am with some help for her anxiety. Fan at bedside also helped.     I/O this shift:  In: 560 [P.O.:560]  Out: -     Temp:  [97.6  F (36.4  C)-98.3  F (36.8  C)] 98.3  F (36.8  C)  Pulse:  [] 106  Resp:  [17-75] 23  BP: (111-141)/(57-79) 130/58  SpO2:  [92 %-100 %] 99 %     R: Anxiety measures, calm environment, support with lines and oxygen support. Continue with POC. Notify primary team with changes.

## 2025-03-01 NOTE — PROGRESS NOTES
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 She presented for 2 days of dyspnea and wheezing.    Acute on chronic respiratory failure with hypoxia and hypercapnia  COPD exacerbation - GOLD E    Presented for 2 days of dyspnea. She wears 3L of oxygen at home with rest, and 5L with activity. Admitted on 5L NC at rest. ABG showed pH 7.32, CO2 71, bicarb 36. CT chest showed progression of her cancer as below, along with extensive emphysema with evidence of chronic bronchitis. Her FEV1 is 29% and DLCO 30%. Her last pulmonology visit was 12/31 where she endorsed monthly exacerbations.     - PRN O2 to keep SpO2 88-92%  - Palliative consulted if here Monday   - [place] Q4h WA  - Action plan from pulmonologist- Prednisone 40mg daily for 3 days, 30mg x3d, 20mg x3d, 10mg x3d  - Doxycyline 100mg BID for 5 days (per action plan)  - Continued PTA Azithromycin 250mg daily, Allegra 60mg, Airduo, Incruse Elliipta  - Continue buprenophine patch for dyspnea and prn oxycodone for dyspnea induced anxiety    Acute encephalopathy, multifactorial, on likely cognitive decline  Cognitive decline, likely, related to radiation therapy to brain and prior chemotherapy    Acute confusion could be due to respiratory failure but also patient is on buprenorphine patch and frequent oxycodone PTA. Improving since admission though not at baseline.    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 Started Carbo/Etoposide/Atezolizumab 3/2024. Progressed 11/2024 and started Lurbinectedin. Cycle 2 was on 2/10. Brain mets found 8/2024 s/p whole brain radiation.     CT chest on  "admission showed progression of disease with increase in ARIANNE pleural based nodule, adenopathy in AP window, and left supraclavicular nodes. CT head showed increased size of peripherally enhancing lesion within the left cerebellar hemisphere.  - Continued PTA Buprenorphine patch weekly (Sundays), Mirtazapine 7.5mg Qpm, Oxycodone 7.5-10mg Q3h PRN  - Encouraged use of oxycodone for anxiety as patient uses frequently at home, will stop hydroxyzine for now    Anemia associated with chemotherapy  Hemoglobin 8.8, baseline 7-8.     Splenic infarct  HLD  Vascular visit 12/11/24. 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  - Continued PTA Aspirin 81mg, Apixaban 5mg BID, Atorvastatin 40mg      Clinically Significant Risk Factors Present on Admission                # Drug Induced Coagulation Defect: home medication list includes an anticoagulant medication  # Drug Induced Platelet Defect: home medication list includes an antiplatelet medication   # Hypertension: Noted on problem list      # Anemia: based on hgb <11       # Cachexia: Estimated body mass index is 15.17 kg/m  as calculated from the following:    Height as of this encounter: 1.626 m (5' 4\").    Weight as of this encounter: 40.1 kg (88 lb 6.5 oz).       # Financial/Environmental Concerns:    # COPD: noted on problem list        Diet: Regular Diet Adult Thin Liquids (level 0)    DVT Prophylaxis: DOAC  Vick Catheter: Not present  Lines: PRESENT      Port a Cath 05/11/24 Single Lumen Right Chest wall-Site Assessment: WDL      Cardiac Monitoring: ACTIVE order. Indication: ICU  Code Status: No CPR- Do NOT Intubate      Discussion/Disposition: Improving. Possible discharge tomorrow if baseline. May need 7 minute walk test to determine O2 needs    Medically Ready for Discharge: Anticipated Tomorrow         Medical Decision Making       45 MINUTES SPENT BY ME on the date of service doing chart review, history, exam, documentation & " further activities per the note.        Etienne Olguin MD  [facility]  Securely message with Miew (more info)  Text page via [facility] Paging/Directory     Interval History   Feels better since this morning with less anxiety and less shortness of breath.   Per daughter, patient was needed 7.5-10 mg oxycodone every 3 hours at home for anxiety.     Physical Exam   Temp:  [97.6  F (36.4  C)-98.3  F (36.8  C)] 98  F (36.7  C)  Pulse:  [] 115  Resp:  [17-63] 26  BP: (111-141)/(57-79) 130/58  SpO2:  [83 %-100 %] 83 %    Weights:   Vitals:    02/28/25 1805 03/01/25 0552   Weight: 39.2 kg (86 lb 6.7 oz) 40.1 kg (88 lb 6.5 oz)    Body mass index is 15.17 kg/m .    Constitutional: alert, oriented to place and family, easily confused, nontoxic, mild increased work of breathing at rest, chronically ill appearing  CV: Regular, no edema  Respiratory: Markedly diminished breath sounds throughout and otherwise CTA bilaterally  GI: Soft, non-tender, bowel sounds normal  Skin: Warm and dry  Musculoskeletal: Marked decreased muscle mass for age    Data   Recent Labs   Lab 03/01/25  1245 03/01/25  0820 03/01/25  0550 02/28/25  1854 02/28/25  0939   WBC  --   --  7.8  --  10.6   HGB  --   --  8.0*  --  8.8*   MCV  --   --  99  --  98   PLT  --   --  429  --  437   NA  --   --  142  --  142   POTASSIUM  --   --  4.8  --  4.6   CHLORIDE  --   --  101  --  100   CO2  --   --  36*  --  36*   BUN  --   --  28.4*  --  12.8   CR  --   --  0.92  --  0.81   ANIONGAP  --   --  5*  --  6*   FLOR  --   --  9.2  --  9.4   * 134* 110*   < > 214*    < > = values in this interval not displayed.       Recent Labs   Lab 03/01/25  1245 03/01/25  0820 03/01/25  0550 03/01/25  0540 03/01/25  0211 02/28/25  2203   * 134* 110* 115* 122* 266*        Unresulted Labs Ordered in the Past 30 Days of this Admission       No orders found for last 31 day(s).             Imaging: No results found for this  or any previous visit (from the past 24 hours).     I reviewed all new labs and imaging results over the last 24 hours. I personally reviewed no images or EKG's today.    Medications   Current Facility-Administered Medications   Medication Dose Route Frequency Provider Last Rate Last Admin    No lozenges or gum should be given while patient on BIPAP/AVAPS/AVAPS AE   Does not apply Continuous PRN Amanda Zabala MD        Patient may continue current oral medications   Does not apply Continuous PRN Amanda Zabala MD         Current Facility-Administered Medications   Medication Dose Route Frequency Provider Last Rate Last Admin    apixaban ANTICOAGULANT (ELIQUIS) tablet 5 mg  5 mg Oral BID Johnathan Grant PA-C   5 mg at 03/01/25 0826    aspirin EC tablet 81 mg  81 mg Oral Daily Johnathan Grant PA-C   81 mg at 03/01/25 0826    atorvastatin (LIPITOR) tablet 40 mg  40 mg Oral Daily Johnathan Grant PA-C   40 mg at 03/01/25 0826    azithromycin (ZITHROMAX) tablet 250 mg  250 mg Oral Daily Johnathan Grant PA-C   250 mg at 03/01/25 0826    [START ON 3/2/2025] buprenorphine (BUTRANS) 20 MCG/HR WK patch 1 patch  1 patch Transdermal Weekly Johnathan Grant PA-C        And    buprenorphine (BUTRANS) Patch in Place   Transdermal Q8H [facility] Johnathan Grant PA-C        doxycycline hyclate (VIBRAMYCIN) capsule 100 mg  100 mg Oral Q12H [facility] (08/20) Johnathan Grant PA-C   100 mg at 03/01/25 0826    fexofenadine (ALLEGRA) tablet 60 mg  60 mg Oral Daily Johnathan Grant PA-C   60 mg at 03/01/25 0826    fluticasone-vilanterol (BREO ELLIPTA) 200-25 MCG/ACT inhaler 1 puff  1 puff Inhalation Daily Johnathan Grant PA-C   1 puff at 03/01/25 0823    ipratropium - albuterol 0.5 mg/2.5 mg/3 mL (DUONEB) neb solution 3 mL  3 mL Nebulization Q4H [place] Johnathan Grant PA-C   3 mL at 03/01/25 1230    mirtazapine (REMERON) tablet TABS 7.5 mg  7.5 mg Oral At Bedtime Johnathna Grant PA-C   7.5 mg at 02/28/25 2122    predniSONE  (DELTASONE) tablet 40 mg  40 mg Oral Daily Johnathan Grant PA-C   40 mg at 03/01/25 0826    Followed by    [START ON 3/4/2025] predniSONE (DELTASONE) tablet 30 mg  30 mg Oral Daily Johnathan Grant PA-C        Followed by    [START ON 3/7/2025] predniSONE (DELTASONE) tablet 20 mg  20 mg Oral Daily Johnathan Grant PA-C        Followed by    [START ON 3/10/2025] predniSONE (DELTASONE) tablet 10 mg  10 mg Oral Daily Johnathan Grant PA-C        umeclidinium (INCRUSE ELLIPTA) 62.5 MCG/ACT inhaler 1 puff  1 puff Inhalation Daily Johnathan Grant PA-C   1 puff at 03/01/25 0823       Etienne Olguin MD  [facility] Medicine

## 2025-03-01 NOTE — PROGRESS NOTES
"[facility] ADMISSION NOTE    Patient admitted to room ICU 6 at approximately 1811 via cart from transferring hospital. Patient was accompanied by other:EMS personnel.     Verbal SBAR report received from ED RN prior to patient arrival.     Patient trasferred to bed via four person lift Patient alert and oriented X 4. The patient is not having any pain.  . Admission vital signs: Blood pressure 133/68, temperature 98  F (36.7  C), temperature source Oral, height 1.626 m (5' 4\"), weight 39.2 kg (86 lb 6.7 oz), not currently breastfeeding. Patient was oriented to plan of care, call light, bed controls, tv, telephone, bathroom, and visiting hours.     Risk Assessment    The following safety risks were identified during admission: fall. Yellow risk band applied: YES.     Skin Initial Assessment    This writer admitted this patient and completed a full skin assessment and Calvin score in the Adult PCS flowsheet.   Photo documentation of skin problem and/or wound competed via nPicker application (located under Media):  N/A    Appropriate interventions initiated as needed.     Secondary skin check completed by Karoline POMPA.    Calvin Risk Assessment  Sensory Perception: 4-->no impairment  Moisture: 4-->rarely moist  Activity: 1-->bedfast  Mobility: 3-->slightly limited  Nutrition: 3-->adequate  Friction and Shear: 3-->no apparent problem  Calvin Score: 18  Sensory/Activity/Mobility Interventions: Turn: left/right positioning  Mattress: Standard gel/foam mattress (IsoFlex, Atmos Air, etc.)  Bed Frame: Standard width and length    Education    Patient has a [place] to Observation order: Yes  Observation education completed and documented: No      Brit Martinez RN      "

## 2025-03-01 NOTE — PLAN OF CARE
Problem: Adult Inpatient Plan of Care  Goal: Optimal Comfort and Wellbeing  Outcome: Progressing  Intervention: Provide Person-Centered Care  Recent Flowsheet Documentation  Taken 3/1/2025 0000 by Allyssa Barrios RN  Trust Relationship/Rapport:   care explained   choices provided   emotional support provided   empathic listening provided   questions answered   questions encouraged   reassurance provided   thoughts/feelings acknowledged  Taken 2/28/2025 2000 by Allyssa Barrios RN  Trust Relationship/Rapport:   care explained   choices provided   emotional support provided   empathic listening provided   questions answered   questions encouraged   reassurance provided   thoughts/feelings acknowledged   Goal Outcome Evaluation:      Plan of Care Reviewed With: patient    Overall Patient Progress: no changeOverall Patient Progress: no change    Outcome Evaluation: on 7L nc, pt baseline O2 is 5L nc. state she is anxious.

## 2025-03-01 NOTE — PROGRESS NOTES
End Of Shift Note    Situation: Acute on chronic Respiratory failure    Plan: respiratory support as needed: O2, bipap, duonebs, and continue with antibiotics.     Subjective/Objective:    Neuro: alert and oriented x4, anxious    Cardiac: tachy     Resp: 7L O2 NC, bilateral diminished lungs sounds.    PCO2 @2100 75    PCO2 @0600 this AM 76. Dr. Amanda Zabala notified.    GI/: last bm 2/27/25, using pure wick, replaced pure wick at 2000 due to dislodgement, cleaned and applied new external cath.     Endo: , 266,122, 115    MSK: Dyspnea with exertion. Generalized weakness.    Skin: skin intact    LDAs: right chest port a cath accessed, flushes well, good blood return, clean, dry and intact. Right AC IV. Flushed, clean, intact.        New orders this morning 0700 for bipap and lab rechecks. RT [place] notified of bipap orders. Duo neb given. Day provider and team to re-evaluate.

## 2025-03-01 NOTE — H&P
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 She presented for 2 days of dyspnea and wheezing.    Acute on chronic respiratory failure with hypoxia and hypercapnia  COPD exacerbation - GOLD E    Presented for 2 days of dyspnea. She wears 3L of oxygen at home with rest, and 5L with activity. Admitted on 5L NC at rest. ABG showed pH 7.32, CO2 71, bicarb 36. CT chest showed progression of her cancer as below, along with extensive emphysema with evidence of chronic bronchitis. Her FEV1 is 29% and DLCO 30%. Her last pulmonology visit was 12/31 where she endorsed monthly exacerbations.     - PRN O2 to keep SpO2 88-92%  - Consider BiPAP for hypercapnia  - Palliative consulted  - Henry Q4h WA  - Action plan from pulmonologist- Prednisone 40mg daily for 3 days, 30mg x3d, 20mg x3d, 10mg x3d  - Doxycyline 100mg BID for 5 days (per action plan)  - Continued PTA Azithromycin 250mg daily, Allegra 60mg, Airduo, Incruse Elliipta    Small cell lung cancer with mets to brain, pleura    Last onc visit 2/10. Started Carbo/Etoposide/Atezolizumab 3/2024. Progressed 11/2024 and started Lurbinectedin. Cycle 2 was on 2/10. Brain mets found 8/2024 s/p whole brain radiation.     CT chest on admission showed progression of disease with increase in ARIANNE pleural based nodule, adenopathy in AP window, and left supraclavicular nodes. CT head showed increased size of peripherally enhancing lesion within the left cerebellar hemisphere.  - Continued PTA Buprenorphine patch weekly (Sundays), Atarax 25-50mg PRN, Mirtazapine 7.5mg Qpm, Oxycodone 7.5-10mg Q3h PRN    Anemia associated with chemotherapy  Hemoglobin 8.8, baseline 7-8.     Splenic  "infarct  HLD  Vascular visit 12/11/24. 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  - Continued PTA Aspirin 81mg, Apixaban 5mg BID, Atorvastatin 40mg              Diet: Combination Diet Regular Diet    DVT Prophylaxis: DOAC  Vick Catheter: Not present  Lines: PRESENT             Cardiac Monitoring: ACTIVE order. Indication: ICU  Code Status: No CPR- Do NOT Intubate      Clinically Significant Risk Factors Present on Admission                # Drug Induced Coagulation Defect: home medication list includes an anticoagulant medication  # Drug Induced Platelet Defect: home medication list includes an antiplatelet medication   # Hypertension: Noted on problem list      # Anemia: based on hgb <11       # Cachexia: Estimated body mass index is 14.83 kg/m  as calculated from the following:    Height as of this encounter: 1.626 m (5' 4\").    Weight as of this encounter: 39.2 kg (86 lb 6.7 oz).       # Financial/Environmental Concerns:    # COPD: noted on problem list        Disposition Plan     Medically Ready for Discharge: Anticipated in 2-4 Days         The patient's care was discussed with the Attending Physician, Dr. Chanel and Patient.    Johnathan Grant PA-C  Hospitalist Service  [facility]  Securely message with [employer] (more info)  Text page via [facility] Paging/Directory     ______________________________________________________________________    Chief Complaint   Shortness of breath    History is obtained from the patient    History of Present Illness   Jasmyn Green is a 69 year old female who presented for shortness of breath.    Patient presents for shortness of breath that started yesterday.  She was found to be in respiratory distress with a pulse ox of 60s on room air via EMS.  She was placed on BiPAP with minimal relief.  She was given DuoNebs with good improvement.  She states that this feels like a COPD exacerbation.  She denies nausea, " vomiting, diarrhea, edema, orthopnea.    She has a history of lung cancer with metastasis to the brain.  She recently progressed and started a second line treatment with lurbinectedin.  She started cycle 2 on 2/10.  She also has severe COPD with roughly monthly exacerbations over the last 3 to 4 months.  She follows with palliative care, pulmonology, oncology.    Past Medical History    Past Medical History:   Diagnosis Date    Age-related osteoporosis without current pathological fracture     Arthritis     COPD (chronic obstructive pulmonary disease) (H)     Coronary artery disease     Dependence on nocturnal oxygen therapy     Dyspnea on exertion     Emphysema lung (H)     Gout     HLD (hyperlipidemia)     Hypertension     Lung mass        Past Surgical History   Past Surgical History:   Procedure Laterality Date    BRONCHOSCOPY RIGID OR FLEXIBLE W/TRANSENDOSCOPIC ENDOBRONCHIAL ULTRASOUND GUIDED N/A 2/16/2024    Procedure: BRONCHOSCOPY, WITH ENDOBRONCHIAL ULTRASOUND;  Surgeon: Ruben Levin MD;  Location: [facility] OR    COLONOSCOPY N/A 10/21/2021    Procedure: COLONOSCOPY;  Surgeon: Wilma Hester DO;  Location: [facility]    IR CHEST PORT PLACEMENT > 5 YRS OF AGE  5/2/2024    VAGINAL DELIVERY      x 3 ; remote    WISDOM TOOTH EXTRACTION         Prior to Admission Medications   Prior to Admission Medications   Prescriptions Last Dose Informant Patient Reported? Taking?   ELIQUIS ANTICOAGULANT 5 MG tablet   No No   Sig: TAKE 1 TABLET (5 MG) BY MOUTH 2 TIMES DAILY   OXYGEN-HELIUM IN   Yes No   Sig: Inhale 3-4 L into the lungs continuous prn   acetaminophen (TYLENOL) 500 MG tablet   Yes No   Sig: Take 2 tablets (1,000 mg) by mouth every 8 hours as needed for pain   albuterol (PROAIR HFA/PROVENTIL HFA/VENTOLIN HFA) 108 (90 Base) MCG/ACT inhaler   No No   Sig: Inhale 1-2 puffs into the lungs every 6 hours as needed for shortness of breath, wheezing or cough.   aspirin 81 MG EC tablet   Yes No   Sig:  Take 81 mg by mouth daily.   atorvastatin (LIPITOR) 40 MG tablet   No No   Sig: Take 1 tablet (40 mg) by mouth daily   azithromycin (ZITHROMAX) 250 MG tablet   No No   Sig: Take 1 tablet (250 mg) by mouth daily.   buprenorphine (BUTRANS) 20 MCG/HR WK patch   No No   Sig: Place 1 patch over 168 hours onto the skin every 7 days.   calcium carbonate 600 mg-vitamin D 400 units (CALTRATE) 600-400 MG-UNIT per tablet   Yes No   Sig: Take 1 tablet by mouth 2 times daily With calcium   fexofenadine (ALLEGRA ALLERGY) 60 MG tablet   Yes No   Sig: Take 60 mg by mouth daily.   fluticasone-salmeterol (AIRDUO RESPICLICK) 232-14 MCG/ACT inhaler   No No   Sig: Inhale 1 puff into the lungs 2 times daily   hydrOXYzine HCl (ATARAX) 25 MG tablet   No No   Sig: Take 1-2 tablets (25-50 mg) by mouth every 8 hours as needed for anxiety (Insomnia)   ipratropium - albuterol 0.5 mg/2.5 mg/3 mL (DUONEB) 0.5-2.5 (3) MG/3ML neb solution   No No   Sig: Take 1 vial (3 mLs) by nebulization every 6 hours as needed for shortness of breath, wheezing or cough.   ipratropium-albuterol (COMBIVENT RESPIMAT)  MCG/ACT inhaler   Yes No   Sig: Inhale 1 puff into the lungs 4 times daily as needed for shortness of breath, wheezing or cough.   lactobacillus rhamnosus, GG, (CULTURELL) capsule   Yes No   Sig: Take 1 capsule by mouth 2 times daily   lidocaine-prilocaine (EMLA) 2.5-2.5 % external cream   No No   Sig: Apply topically as needed for moderate pain   lisinopril (ZESTRIL) 10 MG tablet   No No   Sig: Take 1 tablet (10 mg) by mouth daily   mirtazapine (REMERON) 7.5 MG tablet   No No   Sig: Take 1 tablet (7.5 mg) by mouth at bedtime   naloxone (NARCAN) 4 MG/0.1ML nasal spray   No No   Sig: Spray 1 spray (4 mg) into one nostril alternating nostrils as needed for opioid reversal every 2-3 minutes until assistance arrives   ondansetron (ZOFRAN ODT) 8 MG ODT tab   No No   Sig: Take 1 tablet (8 mg) by mouth every 8 hours as needed for nausea.   oxyCODONE  (ROXICODONE) 5 MG tablet   No No   Sig: Take 1.5-2 tablets (7.5-10 mg) by mouth every 3 hours as needed for pain or moderate to severe pain (PRN shortness of breath).   prochlorperazine (COMPAZINE) 10 MG tablet   No No   Sig: Take 1 tablet (10 mg) by mouth every 6 hours as needed for nausea or vomiting.   sennosides (SENOKOT) 8.6 MG tablet   Yes No   Sig: Take 1 tablet by mouth daily as needed for constipation.   umeclidinium (INCRUSE ELLIPTA) 62.5 MCG/ACT inhaler   No No   Sig: Inhale 1 puff into the lungs daily      Facility-Administered Medications: None      2023 UPDATE: these sections are not required for  billing, but can be added when medically relevant     Physical Exam   Vital Signs: Temp: 98  F (36.7  C) Temp src: Oral BP: 133/68 Pulse: 109   Resp: (!) 52 SpO2: 99 %   Oxygen Delivery: 5 LPM  Weight: 86 lbs 6.73 oz    Constitutional: Alert, oriented, cooperative, in no acute distress, appears nontoxic.  HENT: Oropharynx is clear and moist. No evidence of cranial trauma. Normocephalic. Eyes anicteric.   Cardiovascular: Regular rate and rhythm, normal S1 and S2, and no murmur noted. No lower extremity edema.  Respiratory: Prolonged expiratory phase. End-expiratory wheezes. Conversational dyspnea   GI: Soft, non-tender, normal bowel sounds, no hepatosplenomegaly, no masses appreciated.  Musculoskeletal:Frail. FROM in all extremities   Skin: Warm and dry, no rashes on exposed skin.   Psych: Normal affect and speech.  Neurologic: Cranial nerves 2-12 are grossly intact.       Medical Decision Making       80 MINUTES SPENT BY ME on the date of service doing chart review, history, exam, documentation & further activities per the note.      Data     I have personally reviewed the following data over the past 24 hrs:    10.6  \   8.8 (L)   / 437     142 100 12.8 /  266 (H)   4.6 36 (H) 0.81 \     Trop: 24 (H) BNP: N/A     Procal: N/A CRP: N/A Lactic Acid: 1.1         Imaging results reviewed over the past 24 hrs:    Recent Results (from the past 24 hours)   CT Chest Pulmonary Embolism w Contrast    Narrative    EXAM: CT CHEST PULMONARY EMBOLISM W CONTRAST  LOCATION: [facility]  DATE: 2/28/2025    INDICATION: Dyspnea, tachycardia, history of cancer  COMPARISON: PET CT 11/25/2024, CT CAP 11/4/2024 and older studies  TECHNIQUE: CT chest pulmonary angiogram during arterial phase injection of IV contrast. Multiplanar reformats and MIP reconstructions were performed. Dose reduction techniques were used.   CONTRAST: ISOVUE 370 90mL    FINDINGS: Slight respiratory motion artifact at the lung bases.    ANGIOGRAM CHEST: Excellent opacification of pulmonary arteries and branches. No pulmonary emboli. Aorta and branches are normal caliber.     LUNGS AND PLEURA: Notable increase in size of the pleural-based left upper lobe tumor. This now measures 3.8 x 1.6 cm (axial image 148), was 1.1 x 1 cm.    Extensive emphysema with diffuse peribronchial thickening. No effusions.    MEDIASTINUM/AXILLAE: Right IJ Port-A-Cath. Increased size of the left supraclavicular nodes (coronal image 35, axial image 28). This now measures 1.3 x 0.7 cm.  Adenopathy in the AP window has become confluent and measures 2.2 cm in transverse dimension.    CORONARY ARTERY CALCIFICATION: Cannot evaluate.     UPPER ABDOMEN: Non-FDG avid left adrenal nodule is stable..    MUSCULOSKELETAL: No suspicious bone lesions. No erosion of the ribs adjacent to the enlarging pleural-based left upper lobe tumor.      Impression    IMPRESSION:  1.  Progression of disease with increase in the left upper lobe pleural-based nodule, adenopathy in the AP window and left supraclavicular nodes as noted above.  2.  Extensive emphysema with evidence of chronic bronchitis.  3.  No pulmonary emboli.   Head CT w/o contrast    Narrative    EXAM: CT HEAD W/O CONTRAST  LOCATION: [facility]  DATE: 2/28/2025    INDICATION: Confusion  COMPARISON:  None.  TECHNIQUE: Routine CT Head without IV contrast. Multiplanar reformats. Dose reduction techniques were used.    FINDINGS:  INTRACRANIAL CONTENTS: Redemonstration of a peripherally enhancing and centrally hypoattenuating lesion within the inferior aspect of the left cerebellar hemisphere which measures 21 x 18 mm (series 2, image 8), and displays a prominent degree of   adjacent vasogenic edema. When compared to the 01/07/2025 exam this is and has increased in size. There is no discernible mass effect on the cerebral aqueduct or fourth ventricle. No supratentorial mass effect.    VISUALIZED ORBITS/SINUSES/MASTOIDS: No intraorbital abnormality. No paranasal sinus mucosal disease. No middle ear or mastoid effusion.    BONES/SOFT TISSUES: No acute abnormality.      Impression    IMPRESSION:    Increased size of a peripherally enhancing lesion within the left cerebellar hemisphere when compared to the 01/07/2025 exam. No discernible degree of fourth ventricle effacement.

## 2025-03-01 NOTE — PROGRESS NOTES
69-year-old female admitted yesterday for acute on chronic respiratory failure with hypoxia and hypercapnia in addition to the COPD exacerbation.       This morning the pCO2 was 76, this was essentially no different than her admitting pCO2 of 75.  The patient was on oxygen via nasal cannula.      Assessment/plan: Hypercapnia is unresolved on nasal cannula patient will be placed on BiPAP to help improve the hypercapnia.    Plan  - BIPAP 14/5  - ABG in 1hr after BIPAP  - Will adjust according to ABG results.

## 2025-03-01 NOTE — PROVIDER NOTIFICATION
Paged md for diet order? Pt alert and anxious this am. Per Bipap order pt is not on bipap and regular diet placed

## 2025-03-02 VITALS
DIASTOLIC BLOOD PRESSURE: 67 MMHG | HEART RATE: 111 BPM | OXYGEN SATURATION: 95 % | BODY MASS INDEX: 14.98 KG/M2 | TEMPERATURE: 98 F | HEIGHT: 64 IN | WEIGHT: 87.74 LBS | RESPIRATION RATE: 19 BRPM | SYSTOLIC BLOOD PRESSURE: 134 MMHG

## 2025-03-02 LAB
ANION GAP SERPL CALCULATED.3IONS-SCNC: 6 MMOL/L (ref 7–15)
BASE EXCESS BLDV CALC-SCNC: 12.1 MMOL/L (ref -3–3)
BUN SERPL-MCNC: 37.5 MG/DL (ref 8–23)
CALCIUM SERPL-MCNC: 9.2 MG/DL (ref 8.8–10.4)
CHLORIDE SERPL-SCNC: 101 MMOL/L (ref 98–107)
CREAT SERPL-MCNC: 0.89 MG/DL (ref 0.51–0.95)
EGFRCR SERPLBLD CKD-EPI 2021: 69 ML/MIN/1.73M2
ERYTHROCYTE [DISTWIDTH] IN BLOOD BY AUTOMATED COUNT: 15.2 % (ref 10–15)
GLUCOSE BLDC GLUCOMTR-MCNC: 90 MG/DL (ref 70–99)
GLUCOSE SERPL-MCNC: 98 MG/DL (ref 70–99)
HCO3 BLDV-SCNC: 40 MMOL/L (ref 21–28)
HCO3 SERPL-SCNC: 36 MMOL/L (ref 22–29)
HCT VFR BLD AUTO: 27.4 % (ref 35–47)
HGB BLD-MCNC: 8 G/DL (ref 11.7–15.7)
MCH RBC QN AUTO: 29.2 PG (ref 26.5–33)
MCHC RBC AUTO-ENTMCNC: 29.2 G/DL (ref 31.5–36.5)
MCV RBC AUTO: 100 FL (ref 78–100)
O2/TOTAL GAS SETTING VFR VENT: 40 %
OXYHGB MFR BLDV: 72 % (ref 70–75)
PCO2 BLDV: 78 MM HG (ref 40–50)
PH BLDV: 7.32 [PH] (ref 7.32–7.43)
PLATELET # BLD AUTO: 473 10E3/UL (ref 150–450)
PO2 BLDV: 43 MM HG (ref 25–47)
POTASSIUM SERPL-SCNC: 4.5 MMOL/L (ref 3.4–5.3)
RBC # BLD AUTO: 2.74 10E6/UL (ref 3.8–5.2)
SAO2 % BLDV: 73.2 % (ref 70–75)
SODIUM SERPL-SCNC: 143 MMOL/L (ref 135–145)
WBC # BLD AUTO: 12 10E3/UL (ref 4–11)

## 2025-03-02 PROCEDURE — 250N000009 HC RX 250: Performed by: INTERNAL MEDICINE

## 2025-03-02 PROCEDURE — 99239 HOSP IP/OBS DSCHRG MGMT >30: CPT | Performed by: INTERNAL MEDICINE

## 2025-03-02 PROCEDURE — 999N000157 HC STATISTIC RCP TIME EA 10 MIN

## 2025-03-02 PROCEDURE — 250N000011 HC RX IP 250 OP 636: Performed by: INTERNAL MEDICINE

## 2025-03-02 PROCEDURE — 82805 BLOOD GASES W/O2 SATURATION: CPT | Performed by: INTERNAL MEDICINE

## 2025-03-02 PROCEDURE — 250N000013 HC RX MED GY IP 250 OP 250 PS 637: Performed by: INTERNAL MEDICINE

## 2025-03-02 PROCEDURE — 94640 AIRWAY INHALATION TREATMENT: CPT

## 2025-03-02 PROCEDURE — 85027 COMPLETE CBC AUTOMATED: CPT | Performed by: INTERNAL MEDICINE

## 2025-03-02 PROCEDURE — 80048 BASIC METABOLIC PNL TOTAL CA: CPT | Performed by: INTERNAL MEDICINE

## 2025-03-02 PROCEDURE — 94640 AIRWAY INHALATION TREATMENT: CPT | Mod: 76

## 2025-03-02 PROCEDURE — 250N000012 HC RX MED GY IP 250 OP 636 PS 637: Performed by: INTERNAL MEDICINE

## 2025-03-02 RX ORDER — HEPARIN SODIUM (PORCINE) LOCK FLUSH IV SOLN 100 UNIT/ML 100 UNIT/ML
5-10 SOLUTION INTRAVENOUS
Status: DISCONTINUED | OUTPATIENT
Start: 2025-03-02 | End: 2025-03-02 | Stop reason: HOSPADM

## 2025-03-02 RX ORDER — HEPARIN SODIUM,PORCINE 10 UNIT/ML
5-10 VIAL (ML) INTRAVENOUS EVERY 24 HOURS
Status: DISCONTINUED | OUTPATIENT
Start: 2025-03-02 | End: 2025-03-02 | Stop reason: HOSPADM

## 2025-03-02 RX ORDER — DOXYCYCLINE 100 MG/1
100 CAPSULE ORAL EVERY 12 HOURS
Qty: 6 CAPSULE | Refills: 0 | Status: SHIPPED | OUTPATIENT
Start: 2025-03-02 | End: 2025-03-05

## 2025-03-02 RX ORDER — HEPARIN SODIUM,PORCINE 10 UNIT/ML
5-10 VIAL (ML) INTRAVENOUS
Status: DISCONTINUED | OUTPATIENT
Start: 2025-03-02 | End: 2025-03-02 | Stop reason: HOSPADM

## 2025-03-02 RX ORDER — PREDNISONE 10 MG/1
TABLET ORAL
Qty: 22 TABLET | Refills: 0 | Status: SHIPPED | OUTPATIENT
Start: 2025-03-03 | End: 2025-03-13

## 2025-03-02 RX ADMIN — DOXYCYCLINE HYCLATE 100 MG: 100 CAPSULE ORAL at 07:46

## 2025-03-02 RX ADMIN — PREDNISONE 40 MG: 20 TABLET ORAL at 07:46

## 2025-03-02 RX ADMIN — AZITHROMYCIN DIHYDRATE 250 MG: 250 TABLET ORAL at 07:47

## 2025-03-02 RX ADMIN — IPRATROPIUM BROMIDE AND ALBUTEROL SULFATE 3 ML: .5; 3 SOLUTION RESPIRATORY (INHALATION) at 11:29

## 2025-03-02 RX ADMIN — FEXOFENADINE HYDROCHLORIDE 60 MG: 60 TABLET ORAL at 07:46

## 2025-03-02 RX ADMIN — APIXABAN 5 MG: 5 TABLET, FILM COATED ORAL at 07:47

## 2025-03-02 RX ADMIN — BUPRENORPHINE 2 PATCH: 10 PATCH, EXTENDED RELEASE TRANSDERMAL at 09:31

## 2025-03-02 RX ADMIN — ACETAMINOPHEN 650 MG: 325 TABLET, FILM COATED ORAL at 07:46

## 2025-03-02 RX ADMIN — IPRATROPIUM BROMIDE AND ALBUTEROL SULFATE 3 ML: .5; 3 SOLUTION RESPIRATORY (INHALATION) at 07:59

## 2025-03-02 RX ADMIN — ATORVASTATIN CALCIUM 40 MG: 20 TABLET, FILM COATED ORAL at 07:46

## 2025-03-02 RX ADMIN — Medication 5 ML: at 13:30

## 2025-03-02 RX ADMIN — ASPIRIN 81 MG: 81 TABLET, COATED ORAL at 07:47

## 2025-03-02 RX ADMIN — OXYCODONE 7.5 MG: 5 TABLET ORAL at 11:58

## 2025-03-02 RX ADMIN — FLUTICASONE FUROATE AND VILANTEROL TRIFENATATE 1 PUFF: 200; 25 POWDER RESPIRATORY (INHALATION) at 07:50

## 2025-03-02 ASSESSMENT — ACTIVITIES OF DAILY LIVING (ADL)
ADLS_ACUITY_SCORE: 55

## 2025-03-02 NOTE — PROGRESS NOTES
[facility] [facility] DISCHARGE NOTE    Patient discharged to home at 1:31 PM via wheel chair. Accompanied by son and staff. Discharge instructions reviewed with son, opportunity offered to ask questions. Prescriptions sent to patients preferred pharmacy. All belongings sent with patient.    Brit Martinez RN

## 2025-03-02 NOTE — PLAN OF CARE
Problem: Adult Inpatient Plan of Care  Goal: Plan of Care Review  Description: The Plan of Care Review/Shift note should be completed every shift.  The Outcome Evaluation is a brief statement about your assessment that the patient is improving, declining, or no change.  This information will be displayed automatically on your shift  note.  Outcome: Progressing  Flowsheets (Taken 3/1/2025 4757)  Outcome Evaluation: Pt on 5L NC, which is pt's baseline. tolerted assist x1 abmulated to bathroom.  Plan of Care Reviewed With: patient  Overall Patient Progress: improving     Problem: Adult Inpatient Plan of Care  Goal: Optimal Comfort and Wellbeing  Outcome: Progressing   Goal Outcome Evaluation:      Plan of Care Reviewed With: patient    Overall Patient Progress: improvingOverall Patient Progress: improving    Outcome Evaluation: Pt on 5L NC, which is pt's baseline. tolerted assist x1 abmulated to bathroom.

## 2025-03-02 NOTE — PROGRESS NOTES
Patient ambulated again at this time using a rolling walker, noted to require a bump to 7 LPNC for 02 sats to remain above 90%.  Patient wanted a break as she was having increased shortness of breath.  She also asked for prn oxycodone 7.5  mg which was administered, supplemental oxygen decreased to 3 LPNC while sitting in chair, dyspnea decreased and 02 sats reading to 94%.   She ambulated once again with rolling walker with supplemental oxygen increased to 7 LPNC for 02 sats to remain above 90% while ambulating.  MD Olguin at bedside with ambulation test.  Patient is currently 3 LPNC at rest with 02 sats at 98%

## 2025-03-02 NOTE — CONSULTS
Care Management Note:     Care Management team received referral due to elevated unplanned re-admission risk score.       Per IDT rounds, EMR review, discussion with pt's hospital medical provider, and after a brief discussion with pt at bedside, it has been determined that pt will discharge to home with no identifiable discharge needs.        No further care management intervention anticipated at this time.  Please re-consult if further needs arise.  Care management signing off.      Alexandrea Fuentes RN  Care Transitions  [phone]

## 2025-03-02 NOTE — PROGRESS NOTES
Patient ambulated with stand by assist 5 LPNC approximately 12 feet to bathroom, desat to 88% with slight dyspnea.  Once back to her bed, patient 02 sats increased to 97% 5 LPNC.  No shortness of breath or anxiety noted.  Update to Charge RN, RT and MD Olguin.

## 2025-03-02 NOTE — PROGRESS NOTES
SpO2 96% on 3L O2 at rest  SpO2 81% on 5L O2 with ambulation  SpO2 95% with ambulation on 7L O2    Oxygen Documentation  I certify that this patient, Jasmyn Green has been under my care (or a nurse practitioner or physican's assistant working with me). This is the face-to-face encounter for oxygen medical necessity.      At the time of this encounter, I have reviewed the qualifying testing and have determined that supplemental oxygen is reasonable and necessary and is expected to improve the patient's condition in a home setting.         Patient has continued oxygen desaturation due to Chronic Respiratory Failure with Hypoxia J96.11  COPD J44.9  Emphysema J43.9  Pulmonary Neoplasm C34.90.    If portability is ordered, is the patient mobile within the home? yes    Was this visit performed as a telehealth visit: No    Etienne Olguin MD  [facility]

## 2025-03-02 NOTE — PLAN OF CARE
"  Problem: Adult Inpatient Plan of Care  Goal: Plan of Care Review  Description: The Plan of Care Review/Shift note should be completed every shift.  The Outcome Evaluation is a brief statement about your assessment that the patient is improving, declining, or no change.  This information will be displayed automatically on your shift  note.  Outcome: Met  Goal: Patient-Specific Goal (Individualized)  Description: You can add care plan individualizations to a care plan. Examples of Individualization might be:  \"Parent requests to be called daily at 9am for status\", \"I have a hard time hearing out of my right ear\", or \"Do not touch me to wake me up as it startles  me\".  Outcome: Met  Goal: Absence of Hospital-Acquired Illness or Injury  Outcome: Met  Goal: Optimal Comfort and Wellbeing  Outcome: Met  Goal: Readiness for Transition of Care  Outcome: Met     Problem: Risk for Delirium  Goal: Optimal Coping  Outcome: Met  Goal: Improved Behavioral Control  Outcome: Met  Goal: Improved Attention and Thought Clarity  Outcome: Met  Goal: Improved Sleep  Outcome: Met   Goal Outcome Evaluation:                        "

## 2025-03-02 NOTE — PROGRESS NOTES
4 hour follow up care: Patient used BSC x1 this coverage and noted 02 sats dropped to 74%, patient has not used oxycodone and has remained without anxiety with SOB.  Able to eat her meal with tray set up.  Continues on 5 LPNC.

## 2025-03-02 NOTE — PROGRESS NOTES
End Of Shift Note    Situation: Acute on Chronic Respiratory failure    Plan: Oxygen as needed, duonebs, antibiotics, oxycodone for dyspnea induced anxiety.     Subjective/Objective:    Neuro: A&Ox4, able to make needs known. Received 7.5mg Oxycodone one time last evening at 2018. Pt reported feeling well rested and not as anxious this AM.     Cardiac: tachycardic 101-115 bpm.     Resp: 5L NC. Bilateral lung sound diminished. SOB with exertion.   PCO2 =78 @ 0551 3/2/25  PCO2 = 76@ 0550 3/1/25    GI/: transferred/ walked to bedside commode assist x1 and voided x2 overnight.     Endo:  & 90    MSK: SOB with activitiy, assist x1 with walker when up to bathroom/ commode. Generalized weakness    Skin: skin intact.     LDAs: Portcath to right chest, flushes well, blood return. Clean, dry and intact.

## 2025-03-02 NOTE — DISCHARGE SUMMARY
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    Discharge Diagnoses   Acute on chronic respiratory failure with hypoxia and hypercapnia  COPD exacerbation - GOLD E  Acute encephalopathy, multifactorial, on likely cognitive decline  Cognitive decline, likely, related to radiation therapy to brain and prior chemotherapy  Small cell lung cancer with mets to brain, pleura  Anemia associated with chemotherapy  Splenic infarct  Hyperlipidemia      Follow-ups Needed After Discharge   Follow-up Appointments       Follow-up and recommended labs and tests       Follow up with primary care provider, Ana Maria Bermudez, within 7 days for hospital follow- up.  No follow up labs or test are needed. Follow-up with oncology as planned. Follow-up with palliative care as planned.              Hospital Course     Acute on chronic respiratory failure with hypoxia and hypercapnia  COPD exacerbation - GOLD E    Presented for 2 days of dyspnea. She wears 3L of oxygen at home with rest, and 5L with activity. Admitted on 5L NC at rest. ABG showed pH 7.32, CO2 71, bicarb 36. CT chest showed progression of her cancer as below, along with extensive emphysema with evidence of chronic bronchitis. Her FEV1 is 29% and DLCO 30%. Her last pulmonology visit was 12/31 where she endorsed monthly exacerbations.     Treated per patient's action plan from pulmonologist, as below. She improved to baseline.   - Prednisone 40mg daily for 3 days, 30mg x3d, 20mg x3d, 10mg x3d, script sent for remaining doses on discharge  - Doxycyline 100mg BID  for 5 days (per action plan), script sent for remaining doses on discharge.     Continued PTA Azithromycin 250mg daily, Allegra 60mg, Airduo, Incruse Elliipta    Continue buprenophine patch for dyspnea and prn oxycodone for dyspnea induced anxiety    Supplemental oxygen assessment    Patient chronically on 3-5L O2, but notes drop in SpO2 below 88% with ambulation on 5L at home.     Oximetry assessed:  SpO2 96% on 3L O2 at rest   SpO2 81% on 5L O2 with ambulation in room  SpO2 95% on 7L O2 with ambulation in room   - Patient requires 7L with ambulation, new order placed.     Acute encephalopathy, multifactorial, on likely cognitive decline  Cognitive decline, likely, related to radiation therapy to brain and prior chemotherapy    Acute confusion could be due to respiratory failure but also patient is on buprenorphine patch and frequent oxycodone PTA. Improved to baseline by day of discharge.     Discussed patient's buprenorphine patch and prn oxycodone. She appears to be tolerating the patch well at her home dose.  She uses the oral morphine for dyspnea only occasionally here though at times will requested every 3 hours at home per daughter but not receiving every time.     Small cell lung cancer with mets to brain, pleura    Last onc visit 2/10. Started Carbo/Etoposide/Atezolizumab 3/2024. Progressed 11/2024 and started Lurbinectedin. Cycle 2 was on 2/10. Brain mets found 8/2024 s/p whole brain radiation.     CT chest on admission showed progression of disease with increase in ARIANNE pleural based nodule, adenopathy in AP window, and left supraclavicular nodes. CT head showed increased size of peripherally enhancing lesion within the left cerebellar hemisphere.    Continued PTA Buprenorphine patch weekly (Sundays), Mirtazapine 7.5mg Qpm, Oxycodone 7.5-10mg Q3h PRN    Encouraged use of oxycodone for anxiety as patient uses frequently at home, will stop hydroxyzine for now    Anemia associated with chemotherapy     Hemoglobin 8.8, baseline 7-8.     Splenic infarct  Hyperlipidemia     Vascular visit 12/11/24. 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    Continued PTA Aspirin 81mg, Apixaban 5mg BID, Atorvastatin 40mg    Consultations This Hospital Stay   None (Patient was not hospitalized on a weekday when palliative care would have been available so no consult this admission.)    Ancillary Consultations This Hospital Stay   CARE MANAGEMENT / SOCIAL WORK IP CONSULT    Code Status   No CPR- Do NOT Intubate    The discharge plan was discussed with the patient and with daughter by phone, and they expressed understanding.     Time Spent on this Encounter   Total time on this discharge was > 60 minutes.       Etienne Olguin MD  [facility]  ______________________________________________________________________    Physical Exam   Vital Signs: Temp: 98  F (36.7  C) Temp src: Oral BP: 134/67 Pulse: 111   Resp: 19 SpO2: 95 % O2 Device: Nasal cannula Oxygen Delivery: 5 LPM  Weight: 87 lbs 11.89 oz  SpO2 96% on 3L O2 at rest  SpO2 81% on 5L O2 with ambulation  SpO2 95% with ambulation on 7L O2  Constitutional: alert and oriented, mild increased work of breathing at rest  CV: Regular  Respiratory: Markedly diminished breath sounds throughtout  GI: Soft, non-tender   Skin: Warm and dry       Primary Care Physician   Ana Maria Bermudez  [address] [phone]     Discharge Disposition   Discharged to home  Condition at discharge: Stable    Significant Results and Procedures   Results for orders placed or performed during the hospital encounter of 02/28/25   CT Chest Pulmonary Embolism w Contrast    Narrative    EXAM: CT CHEST PULMONARY EMBOLISM W CONTRAST  LOCATION: [facility]  DATE: 2/28/2025    INDICATION: Dyspnea, tachycardia, history of cancer  COMPARISON: PET CT 11/25/2024, CT CAP 11/4/2024 and older studies  TECHNIQUE: CT  chest pulmonary angiogram during arterial phase injection of IV contrast. Multiplanar reformats and MIP reconstructions were performed. Dose reduction techniques were used.   CONTRAST: ISOVUE 370 90mL    FINDINGS: Slight respiratory motion artifact at the lung bases.    ANGIOGRAM CHEST: Excellent opacification of pulmonary arteries and branches. No pulmonary emboli. Aorta and branches are normal caliber.     LUNGS AND PLEURA: Notable increase in size of the pleural-based left upper lobe tumor. This now measures 3.8 x 1.6 cm (axial image 148), was 1.1 x 1 cm.    Extensive emphysema with diffuse peribronchial thickening. No effusions.    MEDIASTINUM/AXILLAE: Right IJ Port-A-Cath. Increased size of the left supraclavicular nodes (coronal image 35, axial image 28). This now measures 1.3 x 0.7 cm.  Adenopathy in the AP window has become confluent and measures 2.2 cm in transverse dimension.    CORONARY ARTERY CALCIFICATION: Cannot evaluate.     UPPER ABDOMEN: Non-FDG avid left adrenal nodule is stable..    MUSCULOSKELETAL: No suspicious bone lesions. No erosion of the ribs adjacent to the enlarging pleural-based left upper lobe tumor.      Impression    IMPRESSION:  1.  Progression of disease with increase in the left upper lobe pleural-based nodule, adenopathy in the AP window and left supraclavicular nodes as noted above.  2.  Extensive emphysema with evidence of chronic bronchitis.  3.  No pulmonary emboli.   Head CT w/o contrast    Narrative    EXAM: CT HEAD W/O CONTRAST  LOCATION: [facility]  DATE: 2/28/2025    INDICATION: Confusion  COMPARISON: None.  TECHNIQUE: Routine CT Head without IV contrast. Multiplanar reformats. Dose reduction techniques were used.    FINDINGS:  INTRACRANIAL CONTENTS: Redemonstration of a peripherally enhancing and centrally hypoattenuating lesion within the inferior aspect of the left cerebellar hemisphere which measures 21 x 18 mm (series 2, image 8), and  displays a prominent degree of   adjacent vasogenic edema. When compared to the 01/07/2025 exam this is and has increased in size. There is no discernible mass effect on the cerebral aqueduct or fourth ventricle. No supratentorial mass effect.    VISUALIZED ORBITS/SINUSES/MASTOIDS: No intraorbital abnormality. No paranasal sinus mucosal disease. No middle ear or mastoid effusion.    BONES/SOFT TISSUES: No acute abnormality.      Impression    IMPRESSION:    Increased size of a peripherally enhancing lesion within the left cerebellar hemisphere when compared to the 01/07/2025 exam. No discernible degree of fourth ventricle effacement.     Procedures  None    Discharge Orders      Reason for your hospital stay    Acute on chronic respiratory failure due to COPD exacerbation and also acute confusion     Follow-up and recommended labs and tests     Follow up with primary care provider, Ana Maria Bermudez, within 7 days for hospital follow- up.  No follow up labs or test are needed. Follow-up with oncology as planned. Follow-up with palliative care as planned.     Activity    Your activity upon discharge: activity as tolerated     Oxygen Adult/Peds    Oxygen Documentation  I certify that this patient, Jasmyn Green has been under my care (or a nurse practitioner or physican's assistant working with me). This is the face-to-face encounter for oxygen medical necessity.      At the time of this encounter, I have reviewed the qualifying testing and have determined that supplemental oxygen is reasonable and necessary and is expected to improve the patient's condition in a home setting.         Patient has continued oxygen desaturation due to Chronic Respiratory Failure with Hypoxia J96.11  COPD J44.9  Emphysema J43.9  Pulmonary Neoplasm C34.90.    If portability is ordered, is the patient mobile within the home? yes    Was this visit performed as a telehealth visit: No     Diet    Follow this diet upon discharge:  Regular  Diet Adult Thin Liquids (level 0)     Discharge Medications   Current Discharge Medication List        START taking these medications    Details   doxycycline hyclate (VIBRAMYCIN) 100 MG capsule Take 1 capsule (100 mg) by mouth every 12 hours for 3 days.  Qty: 6 capsule, Refills: 0    Associated Diagnoses: COPD exacerbation (H)      predniSONE (DELTASONE) 10 MG tablet Take 4 tablets (40 mg) by mouth daily for 1 day, THEN 3 tablets (30 mg) daily for 3 days, THEN 2 tablets (20 mg) daily for 3 days, THEN 1 tablet (10 mg) daily for 3 days.  Qty: 22 tablet, Refills: 0    Associated Diagnoses: COPD exacerbation (H)           CONTINUE these medications which have NOT CHANGED    Details   acetaminophen (TYLENOL) 500 MG tablet Take 2 tablets (1,000 mg) by mouth every 8 hours as needed for pain    Associated Diagnoses: Cancer associated pain      albuterol (PROAIR HFA/PROVENTIL HFA/VENTOLIN HFA) 108 (90 Base) MCG/ACT inhaler Inhale 1-2 puffs into the lungs every 6 hours as needed for shortness of breath, wheezing or cough.  Qty: 36 g, Refills: 5    Comments: Pharmacy may dispense brand covered by insurance (Proair, or proventil or ventolin or generic albuterol inhaler)  please supply 2 inhalers if insurance will allow.  Associated Diagnoses: Chronic respiratory failure with hypoxia (H)      aspirin 81 MG EC tablet Take 81 mg by mouth daily.      atorvastatin (LIPITOR) 40 MG tablet Take 1 tablet (40 mg) by mouth daily  Qty: 90 tablet, Refills: 3    Associated Diagnoses: Dyslipidemia      azithromycin (ZITHROMAX) 250 MG tablet Take 1 tablet (250 mg) by mouth daily.  Qty: 90 tablet, Refills: 5    Associated Diagnoses: COPD, group E, by GOLD 2023 classification (H)      buprenorphine (BUTRANS) 20 MCG/HR WK patch Place 1 patch over 168 hours onto the skin every 7 days.  Qty: 4 patch, Refills: 1    Associated Diagnoses: COPD, group D, by GOLD 2017 classification (H); Malignant neoplasm of lung, unspecified laterality, unspecified  part of lung (H); Cancer associated pain      calcium carbonate 600 mg-vitamin D 400 units (CALTRATE) 600-400 MG-UNIT per tablet Take 1 tablet by mouth 2 times daily With calcium      ELIQUIS ANTICOAGULANT 5 MG tablet TAKE 1 TABLET (5 MG) BY MOUTH 2 TIMES DAILY  Qty: 60 tablet, Refills: 4    Associated Diagnoses: Arterial thrombosis (H)      fexofenadine (ALLEGRA ALLERGY) 60 MG tablet Take 60 mg by mouth daily.      fluticasone-salmeterol (AIRDUO RESPICLICK) 232-14 MCG/ACT inhaler Inhale 1 puff into the lungs 2 times daily  Qty: 1 each, Refills: 11    Associated Diagnoses: COPD, group D, by GOLD 2017 classification (H)      hydrOXYzine HCl (ATARAX) 25 MG tablet Take 1-2 tablets (25-50 mg) by mouth every 8 hours as needed for anxiety (Insomnia)  Qty: 30 tablet, Refills: 3    Associated Diagnoses: Anxiety; Primary insomnia      ipratropium - albuterol 0.5 mg/2.5 mg/3 mL (DUONEB) 0.5-2.5 (3) MG/3ML neb solution Take 1 vial (3 mLs) by nebulization every 6 hours as needed for shortness of breath, wheezing or cough.  Qty: 360 mL, Refills: 11    Associated Diagnoses: COPD, group E, by GOLD 2023 classification (H)      ipratropium-albuterol (COMBIVENT RESPIMAT)  MCG/ACT inhaler Inhale 1 puff into the lungs 4 times daily as needed for shortness of breath, wheezing or cough.      lactobacillus rhamnosus, GG, (CULTURELL) capsule Take 1 capsule by mouth 2 times daily      lidocaine-prilocaine (EMLA) 2.5-2.5 % external cream Apply topically as needed for moderate pain  Qty: 30 g, Refills: 5    Associated Diagnoses: Small cell lung cancer (H); Malignant neoplasm of middle lobe, bronchus or lung (H)      lisinopril (ZESTRIL) 10 MG tablet Take 1 tablet (10 mg) by mouth daily  Qty: 30 tablet, Refills: 12    Associated Diagnoses: Hypertension, unspecified type      mirtazapine (REMERON) 7.5 MG tablet Take 1 tablet (7.5 mg) by mouth at bedtime  Qty: 90 tablet, Refills: 3    Associated Diagnoses: Anxiety; Primary insomnia       naloxone (NARCAN) 4 MG/0.1ML nasal spray Spray 1 spray (4 mg) into one nostril alternating nostrils as needed for opioid reversal every 2-3 minutes until assistance arrives  Qty: 0.2 mL, Refills: 1    Associated Diagnoses: Cancer associated pain      ondansetron (ZOFRAN ODT) 8 MG ODT tab Take 1 tablet (8 mg) by mouth every 8 hours as needed for nausea.  Qty: 30 tablet, Refills: 1    Associated Diagnoses: Malignant neoplasm of lung, unspecified laterality, unspecified part of lung (H); Nausea and vomiting, unspecified vomiting type      oxyCODONE (ROXICODONE) 5 MG tablet Take 1.5-2 tablets (7.5-10 mg) by mouth every 3 hours as needed for pain or moderate to severe pain (PRN shortness of breath).  Qty: 180 tablet, Refills: 0    Associated Diagnoses: COPD, group D, by GOLD 2017 classification (H); Malignant neoplasm of lung, unspecified laterality, unspecified part of lung (H); Cancer associated pain      OXYGEN-HELIUM IN Inhale 3-4 L into the lungs continuous prn      prochlorperazine (COMPAZINE) 10 MG tablet Take 1 tablet (10 mg) by mouth every 6 hours as needed for nausea or vomiting.  Qty: 30 tablet, Refills: 2    Associated Diagnoses: Small cell carcinoma of overlapping sites of lung, unspecified laterality (H)      sennosides (SENOKOT) 8.6 MG tablet Take 1 tablet by mouth daily as needed for constipation.      umeclidinium (INCRUSE ELLIPTA) 62.5 MCG/ACT inhaler Inhale 1 puff into the lungs daily  Qty: 30 each, Refills: 11    Associated Diagnoses: COPD, severe (H)           Allergies   No Known Allergies

## 2025-03-03 ENCOUNTER — ONCOLOGY VISIT (OUTPATIENT)
Dept: ONCOLOGY | Facility: HOSPITAL | Age: 70
End: 2025-03-03
Payer: COMMERCIAL

## 2025-03-03 ENCOUNTER — PATIENT OUTREACH (OUTPATIENT)
Dept: CARE COORDINATION | Facility: CLINIC | Age: 70
End: 2025-03-03
Payer: COMMERCIAL

## 2025-03-03 ENCOUNTER — INFUSION THERAPY VISIT (OUTPATIENT)
Dept: INFUSION THERAPY | Facility: HOSPITAL | Age: 70
End: 2025-03-03
Payer: COMMERCIAL

## 2025-03-03 VITALS
TEMPERATURE: 97.4 F | OXYGEN SATURATION: 97 % | HEART RATE: 116 BPM | SYSTOLIC BLOOD PRESSURE: 157 MMHG | RESPIRATION RATE: 20 BRPM | DIASTOLIC BLOOD PRESSURE: 68 MMHG | BODY MASS INDEX: 15.03 KG/M2 | HEIGHT: 64 IN | WEIGHT: 88 LBS

## 2025-03-03 DIAGNOSIS — C34.80 SMALL CELL CARCINOMA OF OVERLAPPING SITES OF LUNG, UNSPECIFIED LATERALITY (H): Primary | ICD-10-CM

## 2025-03-03 DIAGNOSIS — T45.1X5A ANEMIA ASSOCIATED WITH CHEMOTHERAPY: ICD-10-CM

## 2025-03-03 DIAGNOSIS — D64.81 ANEMIA ASSOCIATED WITH CHEMOTHERAPY: ICD-10-CM

## 2025-03-03 DIAGNOSIS — M81.0 AGE-RELATED OSTEOPOROSIS WITHOUT CURRENT PATHOLOGICAL FRACTURE: ICD-10-CM

## 2025-03-03 DIAGNOSIS — E86.0 DEHYDRATION: Primary | ICD-10-CM

## 2025-03-03 DIAGNOSIS — C79.31 MALIGNANT NEOPLASM METASTATIC TO BRAIN (H): ICD-10-CM

## 2025-03-03 DIAGNOSIS — C34.80 SMALL CELL CARCINOMA OF OVERLAPPING SITES OF LUNG, UNSPECIFIED LATERALITY (H): ICD-10-CM

## 2025-03-03 DIAGNOSIS — J44.1 COPD EXACERBATION (H): ICD-10-CM

## 2025-03-03 LAB
ALBUMIN SERPL BCG-MCNC: 3.5 G/DL (ref 3.5–5.2)
ALP SERPL-CCNC: 97 U/L (ref 40–150)
ALT SERPL W P-5'-P-CCNC: 21 U/L (ref 0–50)
ANION GAP SERPL CALCULATED.3IONS-SCNC: 8 MMOL/L (ref 7–15)
AST SERPL W P-5'-P-CCNC: 17 U/L (ref 0–45)
BASOPHILS # BLD AUTO: 0 10E3/UL (ref 0–0.2)
BASOPHILS NFR BLD AUTO: 0 %
BILIRUB SERPL-MCNC: <0.2 MG/DL
BUN SERPL-MCNC: 36.8 MG/DL (ref 8–23)
CALCIUM SERPL-MCNC: 9.6 MG/DL (ref 8.8–10.4)
CHLORIDE SERPL-SCNC: 100 MMOL/L (ref 98–107)
CREAT SERPL-MCNC: 0.94 MG/DL (ref 0.51–0.95)
DACRYOCYTES BLD QL SMEAR: SLIGHT
EGFRCR SERPLBLD CKD-EPI 2021: 65 ML/MIN/1.73M2
ELLIPTOCYTES BLD QL SMEAR: SLIGHT
EOSINOPHIL # BLD AUTO: 0.2 10E3/UL (ref 0–0.7)
EOSINOPHIL NFR BLD AUTO: 1 %
ERYTHROCYTE [DISTWIDTH] IN BLOOD BY AUTOMATED COUNT: 15.2 % (ref 10–15)
GLUCOSE SERPL-MCNC: 432 MG/DL (ref 70–99)
HCO3 SERPL-SCNC: 33 MMOL/L (ref 22–29)
HCT VFR BLD AUTO: 27.9 % (ref 35–47)
HGB BLD-MCNC: 8 G/DL (ref 11.7–15.7)
IMM GRANULOCYTES # BLD: 0.2 10E3/UL
IMM GRANULOCYTES NFR BLD: 1 %
LYMPHOCYTES # BLD AUTO: 1.4 10E3/UL (ref 0.8–5.3)
LYMPHOCYTES NFR BLD AUTO: 7 %
MCH RBC QN AUTO: 28.5 PG (ref 26.5–33)
MCHC RBC AUTO-ENTMCNC: 28.7 G/DL (ref 31.5–36.5)
MCV RBC AUTO: 99 FL (ref 78–100)
MONOCYTES # BLD AUTO: 1.7 10E3/UL (ref 0–1.3)
MONOCYTES NFR BLD AUTO: 9 %
NEUTROPHILS # BLD AUTO: 15.4 10E3/UL (ref 1.6–8.3)
NEUTROPHILS NFR BLD AUTO: 81 %
NRBC # BLD AUTO: 0 10E3/UL
NRBC BLD AUTO-RTO: 0 /100
PLAT MORPH BLD: ABNORMAL
PLATELET # BLD AUTO: 591 10E3/UL (ref 150–450)
POTASSIUM SERPL-SCNC: 3.9 MMOL/L (ref 3.4–5.3)
PROT SERPL-MCNC: 6.1 G/DL (ref 6.4–8.3)
RBC # BLD AUTO: 2.81 10E6/UL (ref 3.8–5.2)
RBC MORPH BLD: ABNORMAL
SODIUM SERPL-SCNC: 141 MMOL/L (ref 135–145)
WBC # BLD AUTO: 18.9 10E3/UL (ref 4–11)

## 2025-03-03 PROCEDURE — G2211 COMPLEX E/M VISIT ADD ON: HCPCS

## 2025-03-03 PROCEDURE — 36591 DRAW BLOOD OFF VENOUS DEVICE: CPT | Performed by: INTERNAL MEDICINE

## 2025-03-03 PROCEDURE — 84155 ASSAY OF PROTEIN SERUM: CPT | Performed by: INTERNAL MEDICINE

## 2025-03-03 PROCEDURE — 99215 OFFICE O/P EST HI 40 MIN: CPT

## 2025-03-03 PROCEDURE — 250N000011 HC RX IP 250 OP 636: Performed by: INTERNAL MEDICINE

## 2025-03-03 PROCEDURE — G0463 HOSPITAL OUTPT CLINIC VISIT: HCPCS

## 2025-03-03 PROCEDURE — 82565 ASSAY OF CREATININE: CPT | Performed by: INTERNAL MEDICINE

## 2025-03-03 PROCEDURE — 85025 COMPLETE CBC W/AUTO DIFF WBC: CPT | Performed by: INTERNAL MEDICINE

## 2025-03-03 RX ORDER — HEPARIN SODIUM (PORCINE) LOCK FLUSH IV SOLN 100 UNIT/ML 100 UNIT/ML
5 SOLUTION INTRAVENOUS EVERY 8 HOURS
Start: 2025-03-03

## 2025-03-03 RX ORDER — HEPARIN SODIUM,PORCINE 10 UNIT/ML
5-20 VIAL (ML) INTRAVENOUS DAILY PRN
OUTPATIENT
Start: 2025-03-03

## 2025-03-03 RX ORDER — HEPARIN SODIUM (PORCINE) LOCK FLUSH IV SOLN 100 UNIT/ML 100 UNIT/ML
5 SOLUTION INTRAVENOUS EVERY 8 HOURS
Status: DISCONTINUED | OUTPATIENT
Start: 2025-03-03 | End: 2025-03-03 | Stop reason: HOSPADM

## 2025-03-03 RX ADMIN — HEPARIN 5 ML: 100 SYRINGE at 12:54

## 2025-03-03 ASSESSMENT — PAIN SCALES - GENERAL: PAINLEVEL_OUTOF10: NO PAIN (0)

## 2025-03-03 NOTE — PROGRESS NOTES
"Oncology Rooming Note    March 3, 2025 11:33 AM   Jasmyn Green is a 69 year old female who presents for:    Chief Complaint   Patient presents with    Oncology Clinic Visit     Return visit with labs and infusion     Initial Vitals: BP (!) 157/68 (BP Location: Left arm, Patient Position: Sitting, Cuff Size: Adult Small)   Pulse 116   Temp 97.4  F (36.3  C) (Tympanic)   Resp 20   Ht 1.626 m (5' 4\")   Wt 39.9 kg (88 lb)   LMP  (LMP Unknown)   SpO2 97%   PF (!) 4 L/min   BMI 15.11 kg/m   Estimated body mass index is 15.11 kg/m  as calculated from the following:    Height as of this encounter: 1.626 m (5' 4\").    Weight as of this encounter: 39.9 kg (88 lb). Body surface area is 1.34 meters squared.  No Pain (0) Comment: Data Unavailable   No LMP recorded (lmp unknown). Patient is postmenopausal.  Allergies reviewed: Yes  Medications reviewed: Yes    Medications: Medication refills not needed today.  Pharmacy name entered into Referrizer:    [facility] - [place], [place] - [address] [facility] - [place], [place] - [address]    Frailty Screening:   Is the patient here for a new oncology consult visit in cancer care? 2. No    PHQ9:  Did this patient require a PHQ9?: No      Clinical concerns: discharge from hospital yesterday. Still has bandages from IV on that we need to remove  Zayra was notified.      TAPAN GOTTLIEB CMA              "

## 2025-03-03 NOTE — PROGRESS NOTES
[facility] Hematology [facility] Oncology Progress Note    Patient: Jasmyn Green  MRN: [id number]  Date of Service: Mar 3, 2025      Oncologist: Dr. Stewart    Reason for Visit    Chief Complaint   Patient presents with    Oncology Clinic Visit     Return visit with labs and infusion       Assessment and Plan     Cancer Staging   No matching staging information was found for the patient.    Extensive stage small cell lung cancer with metastasis to pleura, mediastinal nodes, and brain  Multifactorial anemia, anemia of chronic disease and secondary to chemotherapy  Started on lurbinectedin after progression on maintenance atezolizumab. Completed two cycles. Hospitalized 2/28/25-3/2/25 for COPD exacerbation. CT 2/28/25 showing significant progression of disease in ARIANNE pleural nodule from 1.1 x 1 cm to 3.8 x 1.6 cm, increasing adenopathy in the chest and enlarged left supraclavicular node. Also had encephalopathy of unknown etiology that is improving since discharge. Discussed unfavorable CT results showing cancer progression. She does not have a lot of options for further treatment and prognosis is poor with her severe lung disease and limited performance status. Discussed next line treatment as possibly tarlatamab which is a bispecific T cell engaging therapy which involves hospitalization for the first two doses and has risks of CRS and ICANS. She was also advised that she may not be a candidate for treatment based on her performance status, poor lung function, and recent encephalopathy. She would need to be evaluated at the [facility] as they administer this treatment. Also discussed pursuing comfort cares as a reasonable option considering her aggressive cancer that has not responded to treatment. Discussed goals of care including quality of life. Patient would like to discuss further with her family. Provided printout information on Tarlatamab treatment. They will reach out in a week to relay her decision. Will  make referral to hospice or the [facility] depending on her decision. Will cancel PET scan at this time as it is no longer needed. All questions were answered. Patient was seen and discussed with Dr. Stewart.     COPD exacerbation  Started on prednisone taper and and an additional 5 days of doxycycline today. Breathing is improving from hospital stay. Continues on 4 L oxygen at rest which is up from her prior baseline of 2-3 L oxygen.     Brain metastasis  S/p whole brain radiation and steroid taper. Brain MRI January 2025 with slight edema, treatment effects vs worsening disease. Completed dex taper. Brain MRI with rad onc planned for tomorrow 3/4/25.     Bilateral Cerumen impaction  Bilateral hearing loss  Manually removed left cerumen impaction. Severely impacted on right TM. Encouraged pt to use debrox at home to remove wax buildup. Should help with hearing loss.     Anemia secondary to chemotherapy and anemia of chronic disease  Hgb is stable at 8.0. MCV is 99.     ECOG Performance    3 - Confined to bed/chair > 50% of time, capable of limited self care    Distress Screening (within last 30 days)    1. How concerned are you about your ability to eat? : 4  2. How concerned are you about unintended weight loss or your current weight? : 3  3. How concerned are you about feeling depressed or very sad? : 0  4. How concerned are you about feeling anxious or very scared? : 0  5. Do you struggle with the loss of meaning and nicole in your life? : Not at all  6. How concerned are you about work and home life issues that may be affected by your cancer? : 0  7. How concerned are you about knowing what resources are available to help you? : 0  8. Do you currently have what you would describe as Catholic or spiritual struggles?: Not at all       Pain  Pain Score: No Pain (0)    Problem List    Patient Active Problem List   Diagnosis    COPD exacerbation (H)    History of gout    Coronary artery calcification    Lymphocytosis     Essential hypertension    Asymptomatic hypertensive urgency    Age-related osteoporosis without current pathological fracture    Lung mass    Small cell lung cancer (H)    Chronic respiratory failure with hypoxia (H)    Anemia associated with chemotherapy    Hypoxia    Status post chemotherapy    Urinary tract infection with hematuria, site unspecified    Acute sepsis (H)    Splenic infarct    Brain lesion    Malignant neoplasm of middle lobe, bronchus or lung (H)    Secondary malignant neoplasm of brain (H)    Dehydration    Acute on chronic respiratory failure with hypoxia and hypercapnia (H)        ______________________________________________________________________________    Oncology History    Diagnosis:   Lung cancer, small cell, extensive stage. Presented to ER for SOB, coughing. Hx of COPD, on oxygen.   -2/8/24: CT: Bulky AP and left hilar adenopathy with irregular pleural-based anterior left upper lobe nodule with adjacent smaller satellite nodules. Findings suspicious for a primary lung cancer   -3/1/24: PET: Findings suspicious for a left upper lobe primary lung neoplasm with adjacent satellite lesion in the left upper lobe, extensive mediastinal lymph node involvement, and several pleural-based metastases in the left hemithorax   -3/14/24: Brain MRI: Findings concerning for a single 3 - 4 mm right occipital lobe cortex metastatic lesion   -5/3/24: repeat brain MRI shows lesion size decrease and appears more vascular   -7/3/24: PET showed completed metabolic response to therapy  -8/15/24: Brain MRI showed at least 12 nodular enhancing lesions within supratentorial and infratentorial compartments consistent with multiple new intracranial metastasis.      Treatment:   -3/25/24: start palliative treatment with Carboplatin, Etoposide, atezolizumab. Finished 3 cycles  -5/28/24-12/18/24: single agent atezolizumab  -8/22/2024 - 9/9/2024: Whole brain radiation   -1/20/25: lurbinectedin. Completed 2 cycles.  "Discontinued treatment due to significant progression.     Review of Systems    Pertinent items are noted in HPI.    Past History    Past Medical History:   Diagnosis Date    Age-related osteoporosis without current pathological fracture     Arthritis     COPD (chronic obstructive pulmonary disease) (H)     Coronary artery disease     Dependence on nocturnal oxygen therapy     Dyspnea on exertion     Emphysema lung (H)     Gout     HLD (hyperlipidemia)     Hypertension     Lung mass        Physical Exam        3/3/2025    11:07 AM   Vital Signs   Systolic 157   Diastolic 68   Pulse 116   Temperature 97.4  F (36.3  C)   Respirations 20   Weight (LB) 88 lb   Height 5' 4\"   BMI (Calculated) 15.1   Peak Flow 4 L/min   Pain Score 0 (None)   O2 97 %       General: fatigued. In wheelchair. With son.   HEENT: Head: Normal, normocephalic, atraumatic. Bilateral cerumen impaction. No erythema.   Chest: symmetric breaths. On 4 L oxygen.   Cardiac: no lower extremity edema  Abdomen: non distended  Skin: no rashes or lesions  CNS: grossly non focal. Some memory issues.     Lab Results    Recent Results (from the past week)   ECG 12-LEAD WITH MUSE (LHE)   Result Value Ref Range    Systolic Blood Pressure 97 mmHg    Diastolic Blood Pressure 64 mmHg    Ventricular Rate 134 BPM    Atrial Rate 134 BPM    NC Interval 124 ms    QRS Duration 50 ms     ms    QTc 430 ms    P Axis 80 degrees    R AXIS 93 degrees    T Axis 82 degrees    Interpretation ECG       Sinus tachycardia  Rightward axis  Nonspecific ST abnormality  Abnormal ECG  When compared with ECG of 16-Dec-2024 09:23,  Premature ventricular complexes are no longer Present  Confirmed by SEE ED PROVIDER NOTE FOR, ECG INTERPRETATION ([id number]),  Alejandra Lindsay ([id number]) on 2/28/2025 9:13:59 AM     Blood gas arterial   Result Value Ref Range    pH Arterial 7.32 (L) 7.35 - 7.45    pCO2 Arterial 71 (H) 35 - 45 mm Hg    pO2 Arterial 85 80 - 105 mm Hg    FIO2 35     Bicarbonate " Arterial 36 (H) 21 - 28 mmol/L    Base Excess/Deficit Arterial 8.2 (H) -3.0 - 3.0 mmol/L    Oxyhemoglobin Arterial 98 92 - 100 %    O2 Sat, Arterial 97.5 (H) 95.0 - 96.0 %    Peep 6 cm H2O   Basic metabolic panel   Result Value Ref Range    Sodium 142 135 - 145 mmol/L    Potassium 4.6 3.4 - 5.3 mmol/L    Chloride 100 98 - 107 mmol/L    Carbon Dioxide (CO2) 36 (H) 22 - 29 mmol/L    Anion Gap 6 (L) 7 - 15 mmol/L    Urea Nitrogen 12.8 8.0 - 23.0 mg/dL    Creatinine 0.81 0.51 - 0.95 mg/dL    GFR Estimate 77 >60 mL/min/1.73m2    Calcium 9.4 8.8 - 10.4 mg/dL    Glucose 214 (H) 70 - 99 mg/dL   Troponin T, High Sensitivity   Result Value Ref Range    Troponin T, High Sensitivity 24 (H) <=14 ng/L   Influenza A/B, RSV and SARS-CoV2 PCR (COVID-19) Nasopharyngeal    Specimen: Nasopharyngeal; Swab   Result Value Ref Range    Influenza A PCR Negative Negative    Influenza B PCR Negative Negative    RSV PCR Negative Negative    SARS CoV2 PCR Negative Negative   CBC with platelets and differential   Result Value Ref Range    WBC Count 10.6 4.0 - 11.0 10e3/uL    RBC Count 3.03 (L) 3.80 - 5.20 10e6/uL    Hemoglobin 8.8 (L) 11.7 - 15.7 g/dL    Hematocrit 29.8 (L) 35.0 - 47.0 %    MCV 98 78 - 100 fL    MCH 29.0 26.5 - 33.0 pg    MCHC 29.5 (L) 31.5 - 36.5 g/dL    RDW 14.9 10.0 - 15.0 %    Platelet Count 437 150 - 450 10e3/uL    % Neutrophils 87 %    % Lymphocytes 4 %    % Monocytes 6 %    % Eosinophils 3 %    % Basophils 0 %    % Immature Granulocytes 0 %    NRBCs per 100 WBC 0 <1 /100    Absolute Neutrophils 9.2 (H) 1.6 - 8.3 10e3/uL    Absolute Lymphocytes 0.4 (L) 0.8 - 5.3 10e3/uL    Absolute Monocytes 0.7 0.0 - 1.3 10e3/uL    Absolute Eosinophils 0.3 0.0 - 0.7 10e3/uL    Absolute Basophils 0.0 0.0 - 0.2 10e3/uL    Absolute Immature Granulocytes 0.0 <=0.4 10e3/uL    Absolute NRBCs 0.0 10e3/uL   Extra Blue Top Tube   Result Value Ref Range    Hold Specimen JIC    Extra Red Top Tube   Result Value Ref Range    Hold Specimen JIC     Extra Heparinized Syringe   Result Value Ref Range    Hold Specimen JIC    Troponin T, High Sensitivity   Result Value Ref Range    Troponin T, High Sensitivity 24 (H) <=14 ng/L   Glucose by meter   Result Value Ref Range    GLUCOSE BY METER POCT 142 (H) 70 - 99 mg/dL   Blood gas venous   Result Value Ref Range    pH Venous 7.32 7.32 - 7.43    pCO2 Venous 75 (H) 40 - 50 mm Hg    pO2 Venous 32 25 - 47 mm Hg    Bicarbonate Venous 39 (H) 21 - 28 mmol/L    Base Excess/Deficit Venous 10.6 (H) -3.0 - 3.0 mmol/L    FIO2 48     Oxyhemoglobin Venous 56 (L) 70 - 75 %    O2 Sat, Venous 57.2 (L) 70.0 - 75.0 %   Magnesium   Result Value Ref Range    Magnesium 2.0 1.7 - 2.3 mg/dL   Lactic acid whole blood   Result Value Ref Range    Lactic Acid 1.1 0.7 - 2.0 mmol/L   Extra Green Top (Lithium Heparin) Tube   Result Value Ref Range    Hold Specimen JIC    Glucose by meter   Result Value Ref Range    GLUCOSE BY METER POCT 266 (H) 70 - 99 mg/dL   Glucose by meter   Result Value Ref Range    GLUCOSE BY METER POCT 122 (H) 70 - 99 mg/dL   Glucose by meter   Result Value Ref Range    GLUCOSE BY METER POCT 115 (H) 70 - 99 mg/dL   CBC with platelets   Result Value Ref Range    WBC Count 7.8 4.0 - 11.0 10e3/uL    RBC Count 2.73 (L) 3.80 - 5.20 10e6/uL    Hemoglobin 8.0 (L) 11.7 - 15.7 g/dL    Hematocrit 27.1 (L) 35.0 - 47.0 %    MCV 99 78 - 100 fL    MCH 29.3 26.5 - 33.0 pg    MCHC 29.5 (L) 31.5 - 36.5 g/dL    RDW 14.9 10.0 - 15.0 %    Platelet Count 429 150 - 450 10e3/uL   Basic metabolic panel   Result Value Ref Range    Sodium 142 135 - 145 mmol/L    Potassium 4.8 3.4 - 5.3 mmol/L    Chloride 101 98 - 107 mmol/L    Carbon Dioxide (CO2) 36 (H) 22 - 29 mmol/L    Anion Gap 5 (L) 7 - 15 mmol/L    Urea Nitrogen 28.4 (H) 8.0 - 23.0 mg/dL    Creatinine 0.92 0.51 - 0.95 mg/dL    GFR Estimate 66 >60 mL/min/1.73m2    Calcium 9.2 8.8 - 10.4 mg/dL    Glucose 110 (H) 70 - 99 mg/dL   Blood gas venous   Result Value Ref Range    pH Venous 7.32 7.32 -  7.43    pCO2 Venous 76 (HH) 40 - 50 mm Hg    pO2 Venous 43 25 - 47 mm Hg    Bicarbonate Venous 39 (H) 21 - 28 mmol/L    Base Excess/Deficit Venous 11.5 (H) -3.0 - 3.0 mmol/L    FIO2 48     Oxyhemoglobin Venous 75 70 - 75 %    O2 Sat, Venous 75.9 (H) 70.0 - 75.0 %   Glucose by meter   Result Value Ref Range    GLUCOSE BY METER POCT 134 (H) 70 - 99 mg/dL   Glucose by meter   Result Value Ref Range    GLUCOSE BY METER POCT 160 (H) 70 - 99 mg/dL   Glucose by meter   Result Value Ref Range    GLUCOSE BY METER POCT 199 (H) 70 - 99 mg/dL   Glucose by meter   Result Value Ref Range    GLUCOSE BY METER POCT 206 (H) 70 - 99 mg/dL   Glucose by meter   Result Value Ref Range    GLUCOSE BY METER POCT 90 70 - 99 mg/dL   CBC with platelets   Result Value Ref Range    WBC Count 12.0 (H) 4.0 - 11.0 10e3/uL    RBC Count 2.74 (L) 3.80 - 5.20 10e6/uL    Hemoglobin 8.0 (L) 11.7 - 15.7 g/dL    Hematocrit 27.4 (L) 35.0 - 47.0 %     78 - 100 fL    MCH 29.2 26.5 - 33.0 pg    MCHC 29.2 (L) 31.5 - 36.5 g/dL    RDW 15.2 (H) 10.0 - 15.0 %    Platelet Count 473 (H) 150 - 450 10e3/uL   Basic metabolic panel   Result Value Ref Range    Sodium 143 135 - 145 mmol/L    Potassium 4.5 3.4 - 5.3 mmol/L    Chloride 101 98 - 107 mmol/L    Carbon Dioxide (CO2) 36 (H) 22 - 29 mmol/L    Anion Gap 6 (L) 7 - 15 mmol/L    Urea Nitrogen 37.5 (H) 8.0 - 23.0 mg/dL    Creatinine 0.89 0.51 - 0.95 mg/dL    GFR Estimate 69 >60 mL/min/1.73m2    Calcium 9.2 8.8 - 10.4 mg/dL    Glucose 98 70 - 99 mg/dL   Blood gas venous   Result Value Ref Range    pH Venous 7.32 7.32 - 7.43    pCO2 Venous 78 (HH) 40 - 50 mm Hg    pO2 Venous 43 25 - 47 mm Hg    Bicarbonate Venous 40 (H) 21 - 28 mmol/L    Base Excess/Deficit Venous 12.1 (H) -3.0 - 3.0 mmol/L    FIO2 40     Oxyhemoglobin Venous 72 70 - 75 %    O2 Sat, Venous 73.2 70.0 - 75.0 %   Comprehensive metabolic panel   Result Value Ref Range    Sodium 141 135 - 145 mmol/L    Potassium 3.9 3.4 - 5.3 mmol/L    Carbon Dioxide  (CO2) 33 (H) 22 - 29 mmol/L    Anion Gap 8 7 - 15 mmol/L    Urea Nitrogen 36.8 (H) 8.0 - 23.0 mg/dL    Creatinine 0.94 0.51 - 0.95 mg/dL    GFR Estimate 65 >60 mL/min/1.73m2    Calcium 9.6 8.8 - 10.4 mg/dL    Chloride 100 98 - 107 mmol/L    Glucose 432 (H) 70 - 99 mg/dL    Alkaline Phosphatase 97 40 - 150 U/L    AST 17 0 - 45 U/L    ALT 21 0 - 50 U/L    Protein Total 6.1 (L) 6.4 - 8.3 g/dL    Albumin 3.5 3.5 - 5.2 g/dL    Bilirubin Total <0.2 <=1.2 mg/dL   CBC with platelets and differential   Result Value Ref Range    WBC Count 18.9 (H) 4.0 - 11.0 10e3/uL    RBC Count 2.81 (L) 3.80 - 5.20 10e6/uL    Hemoglobin 8.0 (L) 11.7 - 15.7 g/dL    Hematocrit 27.9 (L) 35.0 - 47.0 %    MCV 99 78 - 100 fL    MCH 28.5 26.5 - 33.0 pg    MCHC 28.7 (L) 31.5 - 36.5 g/dL    RDW 15.2 (H) 10.0 - 15.0 %    Platelet Count 591 (H) 150 - 450 10e3/uL    % Neutrophils 81 %    % Lymphocytes 7 %    % Monocytes 9 %    % Eosinophils 1 %    % Basophils 0 %    % Immature Granulocytes 1 %    NRBCs per 100 WBC 0 <1 /100    Absolute Neutrophils 15.4 (H) 1.6 - 8.3 10e3/uL    Absolute Lymphocytes 1.4 0.8 - 5.3 10e3/uL    Absolute Monocytes 1.7 (H) 0.0 - 1.3 10e3/uL    Absolute Eosinophils 0.2 0.0 - 0.7 10e3/uL    Absolute Basophils 0.0 0.0 - 0.2 10e3/uL    Absolute Immature Granulocytes 0.2 <=0.4 10e3/uL    Absolute NRBCs 0.0 10e3/uL   RBC and Platelet Morphology   Result Value Ref Range    RBC Morphology Confirmed RBC Indices     Platelet Assessment  Automated Count Confirmed. Platelet morphology is normal.     Automated Count Confirmed. Platelet morphology is normal.    Elliptocytes Slight (A) None Seen    Teardrop Cells Slight (A) None Seen       Imaging    Head CT w/o contrast    Result Date: 2/28/2025  EXAM: CT HEAD W/O CONTRAST LOCATION: [facility] DATE: 2/28/2025 INDICATION: Confusion COMPARISON: None. TECHNIQUE: Routine CT Head without IV contrast. Multiplanar reformats. Dose reduction techniques were used. FINDINGS:  INTRACRANIAL CONTENTS: Redemonstration of a peripherally enhancing and centrally hypoattenuating lesion within the inferior aspect of the left cerebellar hemisphere which measures 21 x 18 mm (series 2, image 8), and displays a prominent degree of adjacent vasogenic edema. When compared to the 01/07/2025 exam this is and has increased in size. There is no discernible mass effect on the cerebral aqueduct or fourth ventricle. No supratentorial mass effect. VISUALIZED ORBITS/SINUSES/MASTOIDS: No intraorbital abnormality. No paranasal sinus mucosal disease. No middle ear or mastoid effusion. BONES/SOFT TISSUES: No acute abnormality.     IMPRESSION: Increased size of a peripherally enhancing lesion within the left cerebellar hemisphere when compared to the 01/07/2025 exam. No discernible degree of fourth ventricle effacement.    CT Chest Pulmonary Embolism w Contrast    Result Date: 2/28/2025  EXAM: CT CHEST PULMONARY EMBOLISM W CONTRAST LOCATION: [facility] DATE: 2/28/2025 INDICATION: Dyspnea, tachycardia, history of cancer COMPARISON: PET CT 11/25/2024, CT CAP 11/4/2024 and older studies TECHNIQUE: CT chest pulmonary angiogram during arterial phase injection of IV contrast. Multiplanar reformats and MIP reconstructions were performed. Dose reduction techniques were used. CONTRAST: ISOVUE 370 90mL FINDINGS: Slight respiratory motion artifact at the lung bases. ANGIOGRAM CHEST: Excellent opacification of pulmonary arteries and branches. No pulmonary emboli. Aorta and branches are normal caliber. LUNGS AND PLEURA: Notable increase in size of the pleural-based left upper lobe tumor. This now measures 3.8 x 1.6 cm (axial image 148), was 1.1 x 1 cm. Extensive emphysema with diffuse peribronchial thickening. No effusions. MEDIASTINUM/AXILLAE: Right IJ Port-A-Cath. Increased size of the left supraclavicular nodes (coronal image 35, axial image 28). This now measures 1.3 x 0.7 cm. Adenopathy in the AP  window has become confluent and measures 2.2 cm in transverse dimension. CORONARY ARTERY CALCIFICATION: Cannot evaluate. UPPER ABDOMEN: Non-FDG avid left adrenal nodule is stable.. MUSCULOSKELETAL: No suspicious bone lesions. No erosion of the ribs adjacent to the enlarging pleural-based left upper lobe tumor.     IMPRESSION: 1.  Progression of disease with increase in the left upper lobe pleural-based nodule, adenopathy in the AP window and left supraclavicular nodes as noted above. 2.  Extensive emphysema with evidence of chronic bronchitis. 3.  No pulmonary emboli.     The longitudinal plan of care for the diagnosis(es)/condition(s) as documented were addressed during this visit. Due to the added complexity in care, I will continue to support Jasmyn in the subsequent management and with ongoing continuity of care.    Total time 45 minutes, to include face to face visit, review of EMR, ordering, documentation and coordination of care on date of service.    Signed by: Zayra Pickard PA-C

## 2025-03-03 NOTE — PROGRESS NOTES
[facility]    Background: Transitional Care Management program identified per system criteria and reviewed by [facility] Resource [place] team for possible outreach.    Assessment: Upon chart review, [facility] Team member will not proceed with patient outreach related to this episode of Transitional Care Management program due to reason below:    Patient has a follow up appointment with an appropriate provider today for hospital discharge    Plan: Transitional Care Management episode addressed appropriately per reason noted above.      Jael Viera MA  [facility] [place]    *Connected Care Resource Team does NOT follow patient ongoing. Referrals are identified based on internal discharge reports and the outreach is to ensure patient has an understanding of their discharge instructions.

## 2025-03-03 NOTE — LETTER
3/3/2025      Jasmyn Green  [address] [place] [address]      Dear Colleague,    Thank you for referring your patient, Jasmyn Green, to the [facility] [place]. Please see a copy of my visit note below.    [facility] Hematology and Oncology Progress Note    Patient: Jasmyn Green  MRN: [id number]  Date of Service: Mar 3, 2025      Oncologist: Dr. Stewart    Reason for Visit    Chief Complaint   Patient presents with     Oncology Clinic Visit     Return visit with labs and infusion       Assessment and Plan     Cancer Staging   No matching staging information was found for the patient.    Extensive stage small cell lung cancer with metastasis to pleura, mediastinal nodes, and brain  Multifactorial anemia, anemia of chronic disease and secondary to chemotherapy  Started on lurbinectedin after progression on maintenance atezolizumab. Completed two cycles. Hospitalized 2/28/25-3/2/25 for COPD exacerbation. CT 2/28/25 showing significant progression of disease in ARIANNE pleural nodule from 1.1 x 1 cm to 3.8 x 1.6 cm, increasing adenopathy in the chest and enlarged left supraclavicular node. Also had encephalopathy of unknown etiology that is improving since discharge. Discussed unfavorable CT results showing cancer progression. She does not have a lot of options for further treatment and prognosis is poor with her severe lung disease and limited performance status. Discussed next line treatment as possibly tarlatamab which is a bispecific T cell engaging therapy which involves hospitalization for the first two doses and has risks of CRS and ICANS. She was also advised that she may not be a candidate for treatment based on her performance status, poor lung function, and recent encephalopathy. She would need to be evaluated at the  of  as they administer this treatment. Also discussed pursuing comfort cares as a reasonable option considering her aggressive cancer  that has not responded to treatment. Discussed goals of care including quality of life. Patient would like to discuss further with her family. Provided printout information on Tarlatamab treatment. They will reach out in a week to relay her decision. Will make referral to hospice or the [facility] depending on her decision. Will cancel PET scan at this time as it is no longer needed. All questions were answered. Patient was seen and discussed with Dr. Stewart.     COPD exacerbation  Started on prednisone taper and and an additional 5 days of doxycycline today. Breathing is improving from hospital stay. Continues on 4 L oxygen at rest which is up from her prior baseline of 2-3 L oxygen.     Brain metastasis  S/p whole brain radiation and steroid taper. Brain MRI January 2025 with slight edema, treatment effects vs worsening disease. Completed dex taper. Brain MRI with rad onc planned for tomorrow 3/4/25.     Bilateral Cerumen impaction  Bilateral hearing loss  Manually removed left cerumen impaction. Severely impacted on right TM. Encouraged pt to use debrox at home to remove wax buildup. Should help with hearing loss.     Anemia secondary to chemotherapy and anemia of chronic disease  Hgb is stable at 8.0. MCV is 99.     ECOG Performance    3 - Confined to bed/chair > 50% of time, capable of limited self care    Distress Screening (within last 30 days)    1. How concerned are you about your ability to eat? : 4  2. How concerned are you about unintended weight loss or your current weight? : 3  3. How concerned are you about feeling depressed or very sad? : 0  4. How concerned are you about feeling anxious or very scared? : 0  5. Do you struggle with the loss of meaning and nicole in your life? : Not at all  6. How concerned are you about work and home life issues that may be affected by your cancer? : 0  7. How concerned are you about knowing what resources are available to help you? : 0  8. Do you currently have what  you would describe as Spiritism or spiritual struggles?: Not at all       Pain  Pain Score: No Pain (0)    Problem List    Patient Active Problem List   Diagnosis     COPD exacerbation (H)     History of gout     Coronary artery calcification     Lymphocytosis     Essential hypertension     Asymptomatic hypertensive urgency     Age-related osteoporosis without current pathological fracture     Lung mass     Small cell lung cancer (H)     Chronic respiratory failure with hypoxia (H)     Anemia associated with chemotherapy     Hypoxia     Status post chemotherapy     Urinary tract infection with hematuria, site unspecified     Acute sepsis (H)     Splenic infarct     Brain lesion     Malignant neoplasm of middle lobe, bronchus or lung (H)     Secondary malignant neoplasm of brain (H)     Dehydration     Acute on chronic respiratory failure with hypoxia and hypercapnia (H)        ______________________________________________________________________________    Oncology History    Diagnosis:   Lung cancer, small cell, extensive stage. Presented to ER for SOB, coughing. Hx of COPD, on oxygen.   -2/8/24: CT: Bulky AP and left hilar adenopathy with irregular pleural-based anterior left upper lobe nodule with adjacent smaller satellite nodules. Findings suspicious for a primary lung cancer   -3/1/24: PET: Findings suspicious for a left upper lobe primary lung neoplasm with adjacent satellite lesion in the left upper lobe, extensive mediastinal lymph node involvement, and several pleural-based metastases in the left hemithorax   -3/14/24: Brain MRI: Findings concerning for a single 3 - 4 mm right occipital lobe cortex metastatic lesion   -5/3/24: repeat brain MRI shows lesion size decrease and appears more vascular   -7/3/24: PET showed completed metabolic response to therapy  -8/15/24: Brain MRI showed at least 12 nodular enhancing lesions within supratentorial and infratentorial compartments consistent with multiple new  "intracranial metastasis.      Treatment:   -3/25/24: start palliative treatment with Carboplatin, Etoposide, atezolizumab. Finished 3 cycles  -5/28/24-12/18/24: single agent atezolizumab  -8/22/2024 - 9/9/2024: Whole brain radiation   -1/20/25: lurbinectedin. Completed 2 cycles. Discontinued treatment due to significant progression.     Review of Systems    Pertinent items are noted in HPI.    Past History    Past Medical History:   Diagnosis Date     Age-related osteoporosis without current pathological fracture      Arthritis      COPD (chronic obstructive pulmonary disease) (H)      Coronary artery disease      Dependence on nocturnal oxygen therapy      Dyspnea on exertion      Emphysema lung (H)      Gout      HLD (hyperlipidemia)      Hypertension      Lung mass        Physical Exam        3/3/2025    11:07 AM   Vital Signs   Systolic 157   Diastolic 68   Pulse 116   Temperature 97.4  F (36.3  C)   Respirations 20   Weight (LB) 88 lb   Height 5' 4\"   BMI (Calculated) 15.1   Peak Flow 4 L/min   Pain Score 0 (None)   O2 97 %       General: fatigued. In wheelchair. With son.   HEENT: Head: Normal, normocephalic, atraumatic. Bilateral cerumen impaction. No erythema.   Chest: symmetric breaths. On 4 L oxygen.   Cardiac: no lower extremity edema  Abdomen: non distended  Skin: no rashes or lesions  CNS: grossly non focal. Some memory issues.     Lab Results    Recent Results (from the past week)   ECG 12-LEAD WITH MUSE (E)   Result Value Ref Range    Systolic Blood Pressure 97 mmHg    Diastolic Blood Pressure 64 mmHg    Ventricular Rate 134 BPM    Atrial Rate 134 BPM    DE Interval 124 ms    QRS Duration 50 ms     ms    QTc 430 ms    P Axis 80 degrees    R AXIS 93 degrees    T Axis 82 degrees    Interpretation ECG       Sinus tachycardia  Rightward axis  Nonspecific ST abnormality  Abnormal ECG  When compared with ECG of 16-Dec-2024 09:23,  Premature ventricular complexes are no longer Present  Confirmed by " SEE ED PROVIDER NOTE FOR, ECG INTERPRETATION ([id number]),  Lindsay Alejandra ([id number]) on 2/28/2025 9:13:59 AM     Blood gas arterial   Result Value Ref Range    pH Arterial 7.32 (L) 7.35 - 7.45    pCO2 Arterial 71 (H) 35 - 45 mm Hg    pO2 Arterial 85 80 - 105 mm Hg    FIO2 35     Bicarbonate Arterial 36 (H) 21 - 28 mmol/L    Base Excess/Deficit Arterial 8.2 (H) -3.0 - 3.0 mmol/L    Oxyhemoglobin Arterial 98 92 - 100 %    O2 Sat, Arterial 97.5 (H) 95.0 - 96.0 %    Peep 6 cm H2O   Basic metabolic panel   Result Value Ref Range    Sodium 142 135 - 145 mmol/L    Potassium 4.6 3.4 - 5.3 mmol/L    Chloride 100 98 - 107 mmol/L    Carbon Dioxide (CO2) 36 (H) 22 - 29 mmol/L    Anion Gap 6 (L) 7 - 15 mmol/L    Urea Nitrogen 12.8 8.0 - 23.0 mg/dL    Creatinine 0.81 0.51 - 0.95 mg/dL    GFR Estimate 77 >60 mL/min/1.73m2    Calcium 9.4 8.8 - 10.4 mg/dL    Glucose 214 (H) 70 - 99 mg/dL   Troponin T, High Sensitivity   Result Value Ref Range    Troponin T, High Sensitivity 24 (H) <=14 ng/L   Influenza A/B, RSV and SARS-CoV2 PCR (COVID-19) Nasopharyngeal    Specimen: Nasopharyngeal; Swab   Result Value Ref Range    Influenza A PCR Negative Negative    Influenza B PCR Negative Negative    RSV PCR Negative Negative    SARS CoV2 PCR Negative Negative   CBC with platelets and differential   Result Value Ref Range    WBC Count 10.6 4.0 - 11.0 10e3/uL    RBC Count 3.03 (L) 3.80 - 5.20 10e6/uL    Hemoglobin 8.8 (L) 11.7 - 15.7 g/dL    Hematocrit 29.8 (L) 35.0 - 47.0 %    MCV 98 78 - 100 fL    MCH 29.0 26.5 - 33.0 pg    MCHC 29.5 (L) 31.5 - 36.5 g/dL    RDW 14.9 10.0 - 15.0 %    Platelet Count 437 150 - 450 10e3/uL    % Neutrophils 87 %    % Lymphocytes 4 %    % Monocytes 6 %    % Eosinophils 3 %    % Basophils 0 %    % Immature Granulocytes 0 %    NRBCs per 100 WBC 0 <1 /100    Absolute Neutrophils 9.2 (H) 1.6 - 8.3 10e3/uL    Absolute Lymphocytes 0.4 (L) 0.8 - 5.3 10e3/uL    Absolute Monocytes 0.7 0.0 - 1.3 10e3/uL    Absolute Eosinophils 0.3  0.0 - 0.7 10e3/uL    Absolute Basophils 0.0 0.0 - 0.2 10e3/uL    Absolute Immature Granulocytes 0.0 <=0.4 10e3/uL    Absolute NRBCs 0.0 10e3/uL   Extra Blue Top Tube   Result Value Ref Range    Hold Specimen JIC    Extra Red Top Tube   Result Value Ref Range    Hold Specimen JIC    Extra Heparinized Syringe   Result Value Ref Range    Hold Specimen JIC    Troponin T, High Sensitivity   Result Value Ref Range    Troponin T, High Sensitivity 24 (H) <=14 ng/L   Glucose by meter   Result Value Ref Range    GLUCOSE BY METER POCT 142 (H) 70 - 99 mg/dL   Blood gas venous   Result Value Ref Range    pH Venous 7.32 7.32 - 7.43    pCO2 Venous 75 (H) 40 - 50 mm Hg    pO2 Venous 32 25 - 47 mm Hg    Bicarbonate Venous 39 (H) 21 - 28 mmol/L    Base Excess/Deficit Venous 10.6 (H) -3.0 - 3.0 mmol/L    FIO2 48     Oxyhemoglobin Venous 56 (L) 70 - 75 %    O2 Sat, Venous 57.2 (L) 70.0 - 75.0 %   Magnesium   Result Value Ref Range    Magnesium 2.0 1.7 - 2.3 mg/dL   Lactic acid whole blood   Result Value Ref Range    Lactic Acid 1.1 0.7 - 2.0 mmol/L   Extra Green Top (Lithium Heparin) Tube   Result Value Ref Range    Hold Specimen JIC    Glucose by meter   Result Value Ref Range    GLUCOSE BY METER POCT 266 (H) 70 - 99 mg/dL   Glucose by meter   Result Value Ref Range    GLUCOSE BY METER POCT 122 (H) 70 - 99 mg/dL   Glucose by meter   Result Value Ref Range    GLUCOSE BY METER POCT 115 (H) 70 - 99 mg/dL   CBC with platelets   Result Value Ref Range    WBC Count 7.8 4.0 - 11.0 10e3/uL    RBC Count 2.73 (L) 3.80 - 5.20 10e6/uL    Hemoglobin 8.0 (L) 11.7 - 15.7 g/dL    Hematocrit 27.1 (L) 35.0 - 47.0 %    MCV 99 78 - 100 fL    MCH 29.3 26.5 - 33.0 pg    MCHC 29.5 (L) 31.5 - 36.5 g/dL    RDW 14.9 10.0 - 15.0 %    Platelet Count 429 150 - 450 10e3/uL   Basic metabolic panel   Result Value Ref Range    Sodium 142 135 - 145 mmol/L    Potassium 4.8 3.4 - 5.3 mmol/L    Chloride 101 98 - 107 mmol/L    Carbon Dioxide (CO2) 36 (H) 22 - 29 mmol/L     Anion Gap 5 (L) 7 - 15 mmol/L    Urea Nitrogen 28.4 (H) 8.0 - 23.0 mg/dL    Creatinine 0.92 0.51 - 0.95 mg/dL    GFR Estimate 66 >60 mL/min/1.73m2    Calcium 9.2 8.8 - 10.4 mg/dL    Glucose 110 (H) 70 - 99 mg/dL   Blood gas venous   Result Value Ref Range    pH Venous 7.32 7.32 - 7.43    pCO2 Venous 76 (HH) 40 - 50 mm Hg    pO2 Venous 43 25 - 47 mm Hg    Bicarbonate Venous 39 (H) 21 - 28 mmol/L    Base Excess/Deficit Venous 11.5 (H) -3.0 - 3.0 mmol/L    FIO2 48     Oxyhemoglobin Venous 75 70 - 75 %    O2 Sat, Venous 75.9 (H) 70.0 - 75.0 %   Glucose by meter   Result Value Ref Range    GLUCOSE BY METER POCT 134 (H) 70 - 99 mg/dL   Glucose by meter   Result Value Ref Range    GLUCOSE BY METER POCT 160 (H) 70 - 99 mg/dL   Glucose by meter   Result Value Ref Range    GLUCOSE BY METER POCT 199 (H) 70 - 99 mg/dL   Glucose by meter   Result Value Ref Range    GLUCOSE BY METER POCT 206 (H) 70 - 99 mg/dL   Glucose by meter   Result Value Ref Range    GLUCOSE BY METER POCT 90 70 - 99 mg/dL   CBC with platelets   Result Value Ref Range    WBC Count 12.0 (H) 4.0 - 11.0 10e3/uL    RBC Count 2.74 (L) 3.80 - 5.20 10e6/uL    Hemoglobin 8.0 (L) 11.7 - 15.7 g/dL    Hematocrit 27.4 (L) 35.0 - 47.0 %     78 - 100 fL    MCH 29.2 26.5 - 33.0 pg    MCHC 29.2 (L) 31.5 - 36.5 g/dL    RDW 15.2 (H) 10.0 - 15.0 %    Platelet Count 473 (H) 150 - 450 10e3/uL   Basic metabolic panel   Result Value Ref Range    Sodium 143 135 - 145 mmol/L    Potassium 4.5 3.4 - 5.3 mmol/L    Chloride 101 98 - 107 mmol/L    Carbon Dioxide (CO2) 36 (H) 22 - 29 mmol/L    Anion Gap 6 (L) 7 - 15 mmol/L    Urea Nitrogen 37.5 (H) 8.0 - 23.0 mg/dL    Creatinine 0.89 0.51 - 0.95 mg/dL    GFR Estimate 69 >60 mL/min/1.73m2    Calcium 9.2 8.8 - 10.4 mg/dL    Glucose 98 70 - 99 mg/dL   Blood gas venous   Result Value Ref Range    pH Venous 7.32 7.32 - 7.43    pCO2 Venous 78 (HH) 40 - 50 mm Hg    pO2 Venous 43 25 - 47 mm Hg    Bicarbonate Venous 40 (H) 21 - 28 mmol/L     Base Excess/Deficit Venous 12.1 (H) -3.0 - 3.0 mmol/L    FIO2 40     Oxyhemoglobin Venous 72 70 - 75 %    O2 Sat, Venous 73.2 70.0 - 75.0 %   Comprehensive metabolic panel   Result Value Ref Range    Sodium 141 135 - 145 mmol/L    Potassium 3.9 3.4 - 5.3 mmol/L    Carbon Dioxide (CO2) 33 (H) 22 - 29 mmol/L    Anion Gap 8 7 - 15 mmol/L    Urea Nitrogen 36.8 (H) 8.0 - 23.0 mg/dL    Creatinine 0.94 0.51 - 0.95 mg/dL    GFR Estimate 65 >60 mL/min/1.73m2    Calcium 9.6 8.8 - 10.4 mg/dL    Chloride 100 98 - 107 mmol/L    Glucose 432 (H) 70 - 99 mg/dL    Alkaline Phosphatase 97 40 - 150 U/L    AST 17 0 - 45 U/L    ALT 21 0 - 50 U/L    Protein Total 6.1 (L) 6.4 - 8.3 g/dL    Albumin 3.5 3.5 - 5.2 g/dL    Bilirubin Total <0.2 <=1.2 mg/dL   CBC with platelets and differential   Result Value Ref Range    WBC Count 18.9 (H) 4.0 - 11.0 10e3/uL    RBC Count 2.81 (L) 3.80 - 5.20 10e6/uL    Hemoglobin 8.0 (L) 11.7 - 15.7 g/dL    Hematocrit 27.9 (L) 35.0 - 47.0 %    MCV 99 78 - 100 fL    MCH 28.5 26.5 - 33.0 pg    MCHC 28.7 (L) 31.5 - 36.5 g/dL    RDW 15.2 (H) 10.0 - 15.0 %    Platelet Count 591 (H) 150 - 450 10e3/uL    % Neutrophils 81 %    % Lymphocytes 7 %    % Monocytes 9 %    % Eosinophils 1 %    % Basophils 0 %    % Immature Granulocytes 1 %    NRBCs per 100 WBC 0 <1 /100    Absolute Neutrophils 15.4 (H) 1.6 - 8.3 10e3/uL    Absolute Lymphocytes 1.4 0.8 - 5.3 10e3/uL    Absolute Monocytes 1.7 (H) 0.0 - 1.3 10e3/uL    Absolute Eosinophils 0.2 0.0 - 0.7 10e3/uL    Absolute Basophils 0.0 0.0 - 0.2 10e3/uL    Absolute Immature Granulocytes 0.2 <=0.4 10e3/uL    Absolute NRBCs 0.0 10e3/uL   RBC and Platelet Morphology   Result Value Ref Range    RBC Morphology Confirmed RBC Indices     Platelet Assessment  Automated Count Confirmed. Platelet morphology is normal.     Automated Count Confirmed. Platelet morphology is normal.    Elliptocytes Slight (A) None Seen    Teardrop Cells Slight (A) None Seen       Imaging    Head CT w/o  contrast    Result Date: 2/28/2025  EXAM: CT HEAD W/O CONTRAST LOCATION: [facility] DATE: 2/28/2025 INDICATION: Confusion COMPARISON: None. TECHNIQUE: Routine CT Head without IV contrast. Multiplanar reformats. Dose reduction techniques were used. FINDINGS: INTRACRANIAL CONTENTS: Redemonstration of a peripherally enhancing and centrally hypoattenuating lesion within the inferior aspect of the left cerebellar hemisphere which measures 21 x 18 mm (series 2, image 8), and displays a prominent degree of adjacent vasogenic edema. When compared to the 01/07/2025 exam this is and has increased in size. There is no discernible mass effect on the cerebral aqueduct or fourth ventricle. No supratentorial mass effect. VISUALIZED ORBITS/SINUSES/MASTOIDS: No intraorbital abnormality. No paranasal sinus mucosal disease. No middle ear or mastoid effusion. BONES/SOFT TISSUES: No acute abnormality.     IMPRESSION: Increased size of a peripherally enhancing lesion within the left cerebellar hemisphere when compared to the 01/07/2025 exam. No discernible degree of fourth ventricle effacement.    CT Chest Pulmonary Embolism w Contrast    Result Date: 2/28/2025  EXAM: CT CHEST PULMONARY EMBOLISM W CONTRAST LOCATION: [facility] DATE: 2/28/2025 INDICATION: Dyspnea, tachycardia, history of cancer COMPARISON: PET CT 11/25/2024, CT CAP 11/4/2024 and older studies TECHNIQUE: CT chest pulmonary angiogram during arterial phase injection of IV contrast. Multiplanar reformats and MIP reconstructions were performed. Dose reduction techniques were used. CONTRAST: ISOVUE 370 90mL FINDINGS: Slight respiratory motion artifact at the lung bases. ANGIOGRAM CHEST: Excellent opacification of pulmonary arteries and branches. No pulmonary emboli. Aorta and branches are normal caliber. LUNGS AND PLEURA: Notable increase in size of the pleural-based left upper lobe tumor. This now measures 3.8 x 1.6 cm (axial  "image 148), was 1.1 x 1 cm. Extensive emphysema with diffuse peribronchial thickening. No effusions. MEDIASTINUM/AXILLAE: Right IJ Port-A-Cath. Increased size of the left supraclavicular nodes (coronal image 35, axial image 28). This now measures 1.3 x 0.7 cm. Adenopathy in the AP window has become confluent and measures 2.2 cm in transverse dimension. CORONARY ARTERY CALCIFICATION: Cannot evaluate. UPPER ABDOMEN: Non-FDG avid left adrenal nodule is stable.. MUSCULOSKELETAL: No suspicious bone lesions. No erosion of the ribs adjacent to the enlarging pleural-based left upper lobe tumor.     IMPRESSION: 1.  Progression of disease with increase in the left upper lobe pleural-based nodule, adenopathy in the AP window and left supraclavicular nodes as noted above. 2.  Extensive emphysema with evidence of chronic bronchitis. 3.  No pulmonary emboli.     The longitudinal plan of care for the diagnosis(es)/condition(s) as documented were addressed during this visit. Due to the added complexity in care, I will continue to support Jasmyn in the subsequent management and with ongoing continuity of care.    Total time 45 minutes, to include face to face visit, review of EMR, ordering, documentation and coordination of care on date of service.    Signed by: Zayra Pickard PA-C    Oncology Rooming Note    March 3, 2025 11:33 AM   Jasmyn Green is a 69 year old female who presents for:    Chief Complaint   Patient presents with     Oncology Clinic Visit     Return visit with labs and infusion     Initial Vitals: BP (!) 157/68 (BP Location: Left arm, Patient Position: Sitting, Cuff Size: Adult Small)   Pulse 116   Temp 97.4  F (36.3  C) (Tympanic)   Resp 20   Ht 1.626 m (5' 4\")   Wt 39.9 kg (88 lb)   LMP  (LMP Unknown)   SpO2 97%   PF (!) 4 L/min   BMI 15.11 kg/m   Estimated body mass index is 15.11 kg/m  as calculated from the following:    Height as of this encounter: 1.626 m (5' 4\").    Weight as of this " encounter: 39.9 kg (88 lb). Body surface area is 1.34 meters squared.  No Pain (0) Comment: Data Unavailable   No LMP recorded (lmp unknown). Patient is postmenopausal.  Allergies reviewed: Yes  Medications reviewed: Yes    Medications: Medication refills not needed today.  Pharmacy name entered into [employer]:    [facility] - [place], [place] - [address] [facility] - [place], [place] - [address]    Frailty Screening:   Is the patient here for a new oncology consult visit in cancer care? 2. No    PHQ9:  Did this patient require a PHQ9?: No      Clinical concerns: discharge from hospital yesterday. Still has bandages from IV on that we need to remove  Zayra was notified.      TAPAN GOTTLIEB CMA                Again, thank you for allowing me to participate in the care of your patient.        Sincerely,        Zayra Pickard PA-C    Electronically signed

## 2025-03-04 ENCOUNTER — HOSPITAL ENCOUNTER (OUTPATIENT)
Dept: MRI IMAGING | Facility: CLINIC | Age: 70
Discharge: HOME | End: 2025-03-04
Attending: STUDENT IN AN ORGANIZED HEALTH CARE EDUCATION/TRAINING PROGRAM
Payer: COMMERCIAL

## 2025-03-04 DIAGNOSIS — C79.31 SECONDARY MALIGNANT NEOPLASM OF BRAIN (H): ICD-10-CM

## 2025-03-04 PROCEDURE — A9585 GADOBUTROL INJECTION: HCPCS | Performed by: STUDENT IN AN ORGANIZED HEALTH CARE EDUCATION/TRAINING PROGRAM

## 2025-03-04 PROCEDURE — 255N000002 HC RX 255 OP 636: Performed by: STUDENT IN AN ORGANIZED HEALTH CARE EDUCATION/TRAINING PROGRAM

## 2025-03-04 PROCEDURE — 70553 MRI BRAIN STEM W/O & W/DYE: CPT

## 2025-03-04 RX ORDER — HEPARIN SODIUM,PORCINE 10 UNIT/ML
5-10 VIAL (ML) INTRAVENOUS EVERY 24 HOURS
Status: DISCONTINUED | OUTPATIENT
Start: 2025-03-04 | End: 2025-03-05 | Stop reason: HOSPADM

## 2025-03-04 RX ORDER — HEPARIN SODIUM,PORCINE 10 UNIT/ML
5-10 VIAL (ML) INTRAVENOUS
Status: DISCONTINUED | OUTPATIENT
Start: 2025-03-04 | End: 2025-03-05 | Stop reason: HOSPADM

## 2025-03-04 RX ORDER — GADOBUTROL 604.72 MG/ML
4 INJECTION INTRAVENOUS ONCE
Status: COMPLETED | OUTPATIENT
Start: 2025-03-04 | End: 2025-03-04

## 2025-03-04 RX ORDER — HEPARIN SODIUM (PORCINE) LOCK FLUSH IV SOLN 100 UNIT/ML 100 UNIT/ML
5-10 SOLUTION INTRAVENOUS
Status: DISCONTINUED | OUTPATIENT
Start: 2025-03-04 | End: 2025-03-05 | Stop reason: HOSPADM

## 2025-03-04 RX ADMIN — GADOBUTROL 4 ML: 604.72 INJECTION INTRAVENOUS at 13:53

## 2025-03-04 RX ADMIN — HEPARIN SODIUM (PORCINE) LOCK FLUSH IV SOLN 100 UNIT/ML 5 ML: 100 SOLUTION at 13:48

## 2025-03-05 ENCOUNTER — VIRTUAL VISIT (OUTPATIENT)
Dept: RADIATION ONCOLOGY | Facility: HOSPITAL | Age: 70
End: 2025-03-05
Payer: COMMERCIAL

## 2025-03-05 DIAGNOSIS — C79.31 SECONDARY MALIGNANT NEOPLASM OF BRAIN (H): Primary | ICD-10-CM

## 2025-03-05 NOTE — PROGRESS NOTES
[facility] Radiation Oncology Follow Up     Patient: Jasmyn Green  MRN: [id number]  Date of Service: 03/05/2025       DISEASE TREATED:  Extensive stage small cell lung cancer with brain metastases.       TYPE OF RADIATION THERAPY ADMINISTERED:    SITE TREATED: Whole brain  TOTAL DOSE: 3000 cGy  NUMBER OF FRACTIONS: 10  RADIATION TREATMENT 8/22/2024 - 9/9/2024   CONCURRENT CHEMOTHERAPY: No  ADJUVANT THERAPY: Yes- atezolizumab      INTERVAL SINCE COMPLETION OF RADIATION THERAPY: 7 mo      SUBJECTIVE:  Ms. Green is a 69 year old female who  initially presented with extensive stage small cell lung cancer involving left upper lobe primary, left hilar and mediastinal lymphadenopathy, several pleural based metastases in the left hemithorax.  Severe COPD- FEV1 of 29% and reduced diffusion capacity with DLCO of 30% . She is on 2-3 L of oxygen at rest.      2/16/2024 endobronchial ultrasound-guided biopsy, pathology: station 4L and 10L: Small cell lung cancer .     carboplatin, etoposide and atezolizumab x3 c -- significant side effects after third cycle requiring discontinuation carbo/etoposide continues on maintenance atezolizumab.      7/3/2024 PET/CT: Complete metabolic response to therapy of previous left lung, left pleura and thoracic gisela disease.     8/15/2024 MRI: at least 12 nodular enhancing lesions within the supratentorial infratentorial compartments consistent with multiple new progressive intracranial metastases.  Dominant lesions right parietal-occipital junction superiorly within the anterior left cerebellum demonstrate mild vasogenic edema without significant mass effect.     8/16/2024: She was started on dexamethasone.  8/18/2024: atezolizumab c8  8/22/2024 - 9/9/2024  WBRT 3000 cGy/ 10 fx    9/24/2022: atezolizumab c9  10/15/2024: atezolizumab c10  11/4/2024 MRI brain:  Metastatic lesion in the lateral left cerebellar hemisphere has decreased in size since previous, with resolution of  the additional metastatic lesions previously seen elsewhere in the brain.      11/4/2024 CT chest: 1.1 cm subpleural nodular density left upper lobe. This is new/increased compared with the prior study and is in the location of the patient's previous lung malignancy. This is suspicious for potential recurrent disease. New indeterminate 8 mm irregular nodular density right upper lobe.     11/25/2024 PET/CT: Irregular pulmonary nodule peripheral left upper lobe located at site of original primary with marked FDG uptake (SUV max 11.9) suspicious for locally recurrent malignancy.  Mildly enlarged FDG avid left upper lobar station 12L node measuring 1 cm (max SUV 3.8) suspicious for gisela metastasis.  No additional concerns for metastatic disease.     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     1/7/2025 MRI brain: Interval increase in the size of the centrally necrotic enhancing mass lesion in the left cerebellar hemisphere measuring 1.7 x 1.9 x 1.6 cm, previously 0.7 x 0.5 x 0.7 cm. Surrounding vasogenic edema also increased. The lesion currently demonstrates peripheral diffusion restriction.     1/20/25 began lurbinectedin (MD Stewart)     Admitted 2/28/2025 - 3/2/2025 for acute on chronic respiratory failure and acute encephalopathy.    2/28/25 chest CT: Increase in size of the pleural-based upper lobe tumor now measures 3.8 x 1.6 (previous 1.1 x 1 cm) increased size of left supraclavicular lymph nodes and adenopathy in AP window more confluent..    3/4/2025 MRI brain: Slight interval increase in size in the left posterior fossa mass that measures 2.2 x 2 x 2 cm (1.9 x 1.7 x 1.8 cm on 1/7/2025). The degree of surrounding T2/FLAIR hyperintense edema is not significantly changed.   The left cerebellar mass continues to demonstrate some restricted diffusion.    She returns today for routine follow-up visit.  She is seen by video visit due to  snoworm.  She reports headaches slightly more frequent than they were previously estimated 3-4 times per week.  Tylenol resolves her headaches.  No headaches in the morning typically they are noted midday and evening.  She also reports blurry vision.  No double vision.  She notes some difficulty with balance primarily standing to walk but less with walking with walker.  She has no falls.  Prior to her presentation and admission to the hospital she had notable confusion with jerky movements-this all resolved prior to discharge and has not returned.  She is on 4 L of oxygen at rest up from prior baseline.  Systemic treatment held due to progression, additional systemic therapy discussed and at this time they are leaning towards no further systemic therapy.  They meet with hospice on Wednesday to learn more.    Medications were reviewed and are up to date on EPIC.    The following portions of the patient's history were reviewed and updated as appropriate: allergies, current medications, past family history, past medical history, past social history, past surgical history and problem list.    Review of Systems:      General  Constitutional  Constitutional (WDL): Exceptions to WDL  Fatigue: Fatigue relieved by rest  EENT  Eye Disorders  Eye Disorder (WDL): Exceptions to WDL  Blurred Vision: Symptomatic OR moderate decrease in visual acuity (best corrected visual acuity 20/40 and better or 3 lines or less decreased vision from known baseline) OR limiting instrumental ADL  Ear Disorders  Ear Disorder (WDL): All ear disorder elements are within defined limits  Respiratory  Respiratory  Respiratory (WDL): Exceptions to WDL  Cough: Mild symptoms OR nonprescription intervention indicated  Dyspnea: Shortness of breath with minimal exertion OR limiting instrumental ADL  Hypoxia: Decreased oxygen saturation at rest (e.g., pulse oximeter less than 88% or PaO2 less than or equal to 55 mmHg) (supplemental  O2)  Cardiovascular  Cardiovascular  Cardiovascular (WDL): All cardiovascular elements are within defined limits  Gastrointestinal  Gastrointestinal  Gastrointestinal (WDL): Exceptions to WDL  Anorexia: Loss of appetite without alteration in eating habits  Musculoskeletal  Musculoskeletal and Connective Tissue Disorders  Musculoskeletal & Connective (WDL): Exceptions to WDL  Arthralgia: Mild pain  Integumentary  Integumentary  Integumentary (WDL): Exceptions to WDL  Alopecia: Hair loss of greater than or equal to 50% normal for that individual that is readily apparent to others OR a wig or hair piece is necessary if the patient desires to completely camouflage the hair loss OR associated with psychosocial impact  Neurological  Neurosensory  Neurosensory (WDL): Exceptions to WDL  Ataxia: Asymptomatic OR clinical or diagnostic observations only OR intervention not indicated (uses walker and w/c)  Confusion: Mild disorientation (comes and goes)  Dizziness: Mild unsteadiness or sensation of movement  Genitourinary/Reproductive  Genitourinary  Genitourinary (WDL): All genitourinary elements are within defined limits  Lymphatic  Lymph System Disorders  Lymph (WDL): Assessment not pertinent to visit  Pain  Pain Score: No Pain (0) (headaches come and go)  AUA Assessment                                                              Accompanied by  Accompanied By: family    Objective:        PHYSICAL EXAMINATION:    LMP  (LMP Unknown)     No physical exam as visit was conducted by video     Imaging: Imaging results 6 weeks:MRI Brain w & w/o contrast    Result Date: 3/4/2025  MR BRAIN W/O and W CONTRAST [facility] 3/4/2025 1:53 PM CST INDICATION: SCLC with brain mets status post RT, last MRI with susp RT necrosis vs progression. Evaluate for any evidence of progression. TECHNIQUE: Noncontrast and contrast enhanced MRI of the brain. CONTRAST: 4 mL of gadobutrol intravenous contrast. COMPARISON: Head CT  2/28/2025, brain MRI 1/7/2025. FINDINGS: Slight interval increase in size in the left posterior fossa mass that measures 2.2 cm transverse by 2 cm AP by 2 cm craniocaudal compared with 1.9 x 1.7 x 1.8 cm when measured in a similar manner on 1/7/2025. The degree of surrounding T2/FLAIR  hyperintense edema is not significantly changed. For reference when measured in a similar manner the mass was approximately 2.2 x 1.8 x 1.9 cm on the 2/28/2025 head CT. The left cerebellar mass continues to demonstrate some restricted diffusion. No restricted diffusion to indicate recent infarct. Mild mucosal thickening within the ethmoid air cells. Paranasal sinuses are otherwise free from significant disease. Patchy fluid within the bilateral mastoid air cells. Intraorbital contents are unremarkable. There is mild generalized prominence of the sulci and ventricles, consistent with underlying volume loss. Intracranial flow voids are intact. Mild periventricular T2 hyperintensity most consistent with chronic small vessel ischemic related gliosis. No evidence for acute or chronic intracranial blood products.     IMPRESSION: 1.  Slight interval increase in size of the left posterior fossa metastasis. The degree of surrounding edema is not significantly changed. 2.  No new metastasis. 3.  No acute intracranial finding. No evidence for recent ischemia, intracranial hemorrhage, or hydrocephalus. 4.  Mild atrophy and presumed chronic small vessel ischemic changes.     Head CT w/o contrast    Result Date: 2/28/2025  EXAM: CT HEAD W/O CONTRAST LOCATION: [facility] DATE: 2/28/2025 INDICATION: Confusion COMPARISON: None. TECHNIQUE: Routine CT Head without IV contrast. Multiplanar reformats. Dose reduction techniques were used. FINDINGS: INTRACRANIAL CONTENTS: Redemonstration of a peripherally enhancing and centrally hypoattenuating lesion within the inferior aspect of the left cerebellar hemisphere which measures 21 x  18 mm (series 2, image 8), and displays a prominent degree of adjacent vasogenic edema. When compared to the 01/07/2025 exam this is and has increased in size. There is no discernible mass effect on the cerebral aqueduct or fourth ventricle. No supratentorial mass effect. VISUALIZED ORBITS/SINUSES/MASTOIDS: No intraorbital abnormality. No paranasal sinus mucosal disease. No middle ear or mastoid effusion. BONES/SOFT TISSUES: No acute abnormality.     IMPRESSION: Increased size of a peripherally enhancing lesion within the left cerebellar hemisphere when compared to the 01/07/2025 exam. No discernible degree of fourth ventricle effacement.    CT Chest Pulmonary Embolism w Contrast    Result Date: 2/28/2025  EXAM: CT CHEST PULMONARY EMBOLISM W CONTRAST LOCATION: [facility] DATE: 2/28/2025 INDICATION: Dyspnea, tachycardia, history of cancer COMPARISON: PET CT 11/25/2024, CT CAP 11/4/2024 and older studies TECHNIQUE: CT chest pulmonary angiogram during arterial phase injection of IV contrast. Multiplanar reformats and MIP reconstructions were performed. Dose reduction techniques were used. CONTRAST: ISOVUE 370 90mL FINDINGS: Slight respiratory motion artifact at the lung bases. ANGIOGRAM CHEST: Excellent opacification of pulmonary arteries and branches. No pulmonary emboli. Aorta and branches are normal caliber. LUNGS AND PLEURA: Notable increase in size of the pleural-based left upper lobe tumor. This now measures 3.8 x 1.6 cm (axial image 148), was 1.1 x 1 cm. Extensive emphysema with diffuse peribronchial thickening. No effusions. MEDIASTINUM/AXILLAE: Right IJ Port-A-Cath. Increased size of the left supraclavicular nodes (coronal image 35, axial image 28). This now measures 1.3 x 0.7 cm. Adenopathy in the AP window has become confluent and measures 2.2 cm in transverse dimension. CORONARY ARTERY CALCIFICATION: Cannot evaluate. UPPER ABDOMEN: Non-FDG avid left adrenal nodule is stable..  MUSCULOSKELETAL: No suspicious bone lesions. No erosion of the ribs adjacent to the enlarging pleural-based left upper lobe tumor.     IMPRESSION: 1.  Progression of disease with increase in the left upper lobe pleural-based nodule, adenopathy in the AP window and left supraclavicular nodes as noted above. 2.  Extensive emphysema with evidence of chronic bronchitis. 3.  No pulmonary emboli.      Impression   69-year-old woman with stage IV small cell lung cancer with multiple brain metastases status post whole brain radiation therapy.      Good response to whole brain radiation, followed by some increase in size of necrotic left cerebellar lesion-true progression versus pseudo progression     Recovering from acute side effects of radiation     Intrathoracic progression    Visit Dx:    (C79.31) Secondary malignant neoplasm of brain (H)  (primary encounter diagnosis)       Cancer Staging   No matching staging information was found for the patient.      Assessment & Plan:   1.  Review MRI with neuroradiology and call patient regarding impression and any probabilities of this being pseudo progression versus true progression that can be gleaned.    Addendum: Called patient/enmanuel Costa and relayed conversation with neuroradiology: Given last MRI and this MRI findings no significant conviction in results representing either progression or pseudoprogression, could represent either.  Significant increase from good response initially and most recent small increase in size on most recent MRI.  Prior perfusion imaging nondiagnostic and no perfusion on recent MRI.  Given increase in size x 2 MRIs more worrisome for true progression but definitely not definitive.  Discussed options of proceeding with      Stereotactic radiosurgery versus short interval follow-up potentially after some steroid/dexamethasone use and an additional attempt at perfusion imaging.  Daughter Julissa and patient are going to discuss with the entire  family and notify us of how they wish to proceed.    Also considering transition to hospice after progression on most recent line of systemic therapy.  Her main goal is continued quality of life and she would want to consider additional radiation therapy if it prevents development of neurologic symptoms with intracranial tumor progression.  Discussed timing of any treatment and enrollment in hospice.  They are meeting with hospice on Wednesday to learn more.    Pain Management Plan: N/A    Total time of this visit, including time spent face-to-face with patient and or via video/audio, and also in preparing for today's visit for MDM and documentation. Medical decision-making included consideration and possible diagnoses, management options, complex record review, review of diagnostic tests and information, consideration and discussion of significant complications based on comorbidities, discussion with providers involved in the care of the patient.     35 Minutes spent.   Patient evaluated via a billable video visit.        Video-Visit Details     Type of service:  Video Visit    Originating Location (pt. Location): Home     Distant Location (provider location): [facility]   Time of visit 11:32-11:56 AM    This note has been dictated using voice recognition software and as a result may contain minor grammatical errors and unintended word substitutions.      Tata Nuñez MD  Department of Radiation Oncology   [facility] Radiation Oncology  Tel: [phone]  Page: [phone]    [facility]  [address] [place] [address]     [address] [place] [address]    CC:  Patient Care Team:  Ana Maria Bermudez MD as PCP - General (Internal Medicine)  Aidee Luna RT as Chronic Pulmonary Disease Specialist (Respiratory Therapy)  Rickie Pickens MD as Assigned Endocrinology Provider  Ruben Levin MD as MD (Critical Care)  Tata Lainez NP as Assigned  Pulmonology Provider  Amador Stewart MD as MD (Hematology)  Wilma Carmona, RN as Specialty Care Coordinator (Hematology & Oncology)  Ana Maria Bermudez MD as Assigned PCP  Evans Gramajo APRN CNP as Nurse Practitioner (Hospice And Palliative Care)  Evans Gramajo APRN CNP as Assigned Palliative Care Provider  Bryant Mckinney MD as Physician (Vascular Surgery)  Naheed Mulligan MD as MD (Cardiovascular Disease)  Tata Nuñez MD as MD (Radiation Oncology)  Amador Stewart MD as Assigned Cancer Care Provider  Natty Rodriguez MD as Assigned Heart and Vascular Provider

## 2025-03-05 NOTE — PROGRESS NOTES
"Oncology Rooming Note    March 5, 2025 11:21 AM   Jasmyn Green is a 69 year old female who presents for:    Chief Complaint   Patient presents with    Oncology Clinic Visit    Radiation Therapy     Follow up via video     Initial Vitals: LMP  (LMP Unknown)  Estimated body mass index is 15.11 kg/m  as calculated from the following:    Height as of 3/3/25: 1.626 m (5' 4\").    Weight as of 3/3/25: 39.9 kg (88 lb). There is no height or weight on file to calculate BSA.  No Pain (0) Comment: headaches come and go   No LMP recorded (lmp unknown). Patient is postmenopausal.  Allergies reviewed: Yes  Medications reviewed: Yes    Medications: Medication refills not needed today.  Pharmacy name entered into [employer]:    [facility] - [place], [place] - [address] [facility] - [facility] [address] [place] [address] AV    Frailty Screening:   Is the patient here for a new oncology consult visit in cancer care? 2. No    PHQ9:  Did this patient require a PHQ9?: No      Clinical concerns: Pt and daughter called prior to video visit. Pt recently hospitalized. Having headaches on/off and more blurred vision, MRI done 3/4.  Dr. Nuñez was notified.    Virtual Visit Details    Type of service:  Video Visit   Video Start Time: 11:30 AM  Video End Time: 11:55    Originating Location (pt. Location): Home    Distant Location (provider location):  On-site  Platform used for Video Visit: Jacey Blanca RN              "

## 2025-03-06 ENCOUNTER — MYC MEDICAL ADVICE (OUTPATIENT)
Dept: PULMONOLOGY | Facility: CLINIC | Age: 70
End: 2025-03-06
Payer: COMMERCIAL

## 2025-03-06 DIAGNOSIS — J44.9 COPD, GROUP E, BY GOLD 2023 CLASSIFICATION (H): Primary | ICD-10-CM

## 2025-03-11 ENCOUNTER — TELEPHONE (OUTPATIENT)
Dept: VASCULAR SURGERY | Facility: CLINIC | Age: 70
End: 2025-03-11
Payer: COMMERCIAL

## 2025-03-11 NOTE — TELEPHONE ENCOUNTER
Spoke with daughter, states patient is currently undergoing radiation and should be starting hispice in the next few weeks. She will call if she needs to schedule something in the future. [phone]  __________________________________  Jena Lopez RN  [facility]  Needs 6 month follow up around 6/11/25 with Dr. Rodriguez, no imaging.  6 month follow up, celiac and splenic artery thrombus.  Gets imaging through oncology.

## 2025-03-12 ENCOUNTER — OFFICE VISIT (OUTPATIENT)
Dept: RADIATION ONCOLOGY | Facility: HOSPITAL | Age: 70
End: 2025-03-12
Payer: COMMERCIAL

## 2025-03-12 ENCOUNTER — ALLIED HEALTH/NURSE VISIT (OUTPATIENT)
Dept: RADIATION ONCOLOGY | Facility: HOSPITAL | Age: 70
End: 2025-03-12
Payer: COMMERCIAL

## 2025-03-12 VITALS
BODY MASS INDEX: 15.72 KG/M2 | SYSTOLIC BLOOD PRESSURE: 134 MMHG | TEMPERATURE: 98 F | RESPIRATION RATE: 18 BRPM | WEIGHT: 91.6 LBS | HEART RATE: 115 BPM | OXYGEN SATURATION: 97 % | DIASTOLIC BLOOD PRESSURE: 63 MMHG

## 2025-03-12 DIAGNOSIS — C79.31 SECONDARY MALIGNANT NEOPLASM OF BRAIN (H): Primary | ICD-10-CM

## 2025-03-12 PROCEDURE — G0463 HOSPITAL OUTPT CLINIC VISIT: HCPCS | Performed by: STUDENT IN AN ORGANIZED HEALTH CARE EDUCATION/TRAINING PROGRAM

## 2025-03-12 PROCEDURE — 77334 RADIATION TREATMENT AID(S): CPT | Performed by: STUDENT IN AN ORGANIZED HEALTH CARE EDUCATION/TRAINING PROGRAM

## 2025-03-12 RX ORDER — DEXAMETHASONE 2 MG/1
TABLET ORAL
Qty: 20 TABLET | Refills: 0 | Status: SHIPPED | OUTPATIENT
Start: 2025-03-12

## 2025-03-12 ASSESSMENT — PAIN SCALES - GENERAL: PAINLEVEL_OUTOF10: NO PAIN (0)

## 2025-03-12 NOTE — LETTER
"3/12/2025      Jasmyn Green  [address] [place] [address]      Dear Colleague,    Thank you for referring your patient, Jasmyn Green, to the [facility] [place]. Please see a copy of my visit note below.    Oncology Rooming Note    March 12, 2025 2:19 PM   Jasmyn Green is a 69 year old female who presents for:    Chief Complaint   Patient presents with     Oncology Clinic Visit     Follow up with Dr. Nuñez and simulation     Initial Vitals: /63 (BP Location: Right arm, Patient Position: Sitting, Cuff Size: Adult Small)   Pulse 115   Temp 98  F (36.7  C) (Oral)   Resp 18   Wt 41.5 kg (91 lb 9.6 oz)   LMP  (LMP Unknown)   SpO2 97%   BMI 15.72 kg/m   Estimated body mass index is 15.72 kg/m  as calculated from the following:    Height as of 3/3/25: 1.626 m (5' 4\").    Weight as of this encounter: 41.5 kg (91 lb 9.6 oz). Body surface area is 1.37 meters squared.  No Pain (0) Comment: Data Unavailable   No LMP recorded (lmp unknown). Patient is postmenopausal.  Allergies reviewed: Yes  Medications reviewed: Yes    Medications: Medication refills not needed today.  Pharmacy name entered into [employer]:    [facility] - [place], [place] - [address] [facility] - [place], [place] - [address] [place] [address]    Frailty Screening:   Is the patient here for a new oncology consult visit in cancer care? 2. No    PHQ9:  Did this patient require a PHQ9?: No      Clinical concerns: Patient here per wheelchair accompanied by daughter for follow-up on her brain mets.  Per daughter and patient's state would like to go forward with the radiation for her brain mets and sign onto hospice following treatment.  Patient denies any complaints today.  Patient currently on 5 L of home oxygen.  CT simulation planned for after return visit.  Return to clinic as directed by provider.   Dr. Nuñez was notified.      Amber Wallace RN                [facility] " Radiation Oncology Follow Up     Patient: Jasmyn Green  MRN: [id number]  Date of Service: 03/12/2025       DISEASE TREATED:  Extensive stage small cell lung cancer with brain metastases s/p prior WBRT.   Good response to prior SBRT with increasing size of a single remaining metastasis on the last 2 MRIs.      TYPE OF RADIATION THERAPY ADMINISTERED:    SITE TREATED: Whole brain  TOTAL DOSE: 3000 cGy  NUMBER OF FRACTIONS: 10  RADIATION TREATMENT 8/22/2024 - 9/9/2024   CONCURRENT CHEMOTHERAPY: No  ADJUVANT THERAPY: Yes- atezolizumab      INTERVAL SINCE COMPLETION OF RADIATION THERAPY: 6 mo      SUBJECTIVE:  Ms. Green is a 69 year old female with extensive stage small cell lung cancer with brain metastases s/p prior WBRT.   Good response to prior SBRT with increasing size of a single remaining metastasis on the last 2 MRIs.  Slight interval increase in size in the left posterior fossa mass that measures 2.2 cm transverse by 2 cm AP by 2 cm craniocaudal compared with 1.9 x 1.7 x 1.8 cm when measured in a similar manner on 1/7/2025.      Family have considered options of observation versus proceeding with focal SRS treatment.    Medications were reviewed and are up to date on EPIC.    The following portions of the patient's history were reviewed and updated as appropriate: allergies, current medications, past family history, past medical history, past social history, past surgical history and problem list.    Review of Systems:      General  Constitutional  Constitutional (WDL): Exceptions to WDL  Fatigue: Fatigue relieved by rest  EENT  Eye Disorders  Eye Disorder (WDL): Exceptions to WDL  Blurred Vision: Symptomatic OR moderate decrease in visual acuity (best corrected visual acuity 20/40 and better or 3 lines or less decreased vision from known baseline) OR limiting instrumental ADL  Ear Disorders  Ear Disorder (WDL): All ear disorder elements are within defined limits  Respiratory  Respiratory  Respiratory  (WDL): Exceptions to WDL  Cough: Mild symptoms OR nonprescription intervention indicated  Dyspnea: Shortness of breath with minimal exertion OR limiting instrumental ADL  Hypoxia: Decreased oxygen saturation at rest (e.g., pulse oximeter less than 88% or PaO2 less than or equal to 55 mmHg) (supplemental O2, 5 liters home O2)  Cardiovascular  Cardiovascular  Cardiovascular (WDL): All cardiovascular elements are within defined limits (no pacemaker)  Gastrointestinal  Gastrointestinal  Gastrointestinal (WDL): Exceptions to WDL  Anorexia: Loss of appetite without alteration in eating habits  Musculoskeletal  Musculoskeletal and Connective Tissue Disorders  Musculoskeletal & Connective (WDL): Exceptions to WDL  Arthralgia: Mild pain  Integumentary  Integumentary  Integumentary (WDL): Exceptions to WDL  Alopecia: Hair loss of greater than or equal to 50% normal for that individual that is readily apparent to others OR a wig or hair piece is necessary if the patient desires to completely camouflage the hair loss OR associated with psychosocial impact  Neurological  Neurosensory  Neurosensory (WDL): Exceptions to WDL  Ataxia: Asymptomatic OR clinical or diagnostic observations only OR intervention not indicated (uses walker and w/c)  Confusion: Mild disorientation (comes and goes)  Dizziness: Mild unsteadiness or sensation of movement  Genitourinary/Reproductive  Genitourinary  Genitourinary (WDL): All genitourinary elements are within defined limits  Lymphatic  Lymph System Disorders  Lymph (WDL): Assessment not pertinent to visit  Pain  Pain Score: No Pain (0)  AUA Assessment                                                              Accompanied by  Accompanied By: family    Objective:        PHYSICAL EXAMINATION:    /63 (BP Location: Right arm, Patient Position: Sitting, Cuff Size: Adult Small)   Pulse 115   Temp 98  F (36.7  C) (Oral)   Resp 18   Wt 41.5 kg (91 lb 9.6 oz)   LMP  (LMP Unknown)   SpO2 97%    BMI 15.72 kg/m      Alert oriented no acute distress, resting comfortably in wheelchair with oxygen by nasal cannula    Imaging: Imaging results 6 weeks:MRI Brain w & w/o contrast    Result Date: 3/4/2025  MR BRAIN W/O and W CONTRAST [facility]  3/4/2025 1:53 PM CST INDICATION: SCLC with brain mets status post RT, last MRI with susp RT necrosis vs progression. Evaluate for any evidence of progression. TECHNIQUE: Noncontrast and contrast enhanced MRI of the brain. CONTRAST: 4 mL of gadobutrol intravenous contrast. COMPARISON: Head CT 2/28/2025, brain MRI 1/7/2025. FINDINGS: Slight interval increase in size in the left posterior fossa mass that measures 2.2 cm transverse by 2 cm AP by 2 cm craniocaudal compared with 1.9 x 1.7 x 1.8 cm when measured in a similar manner on 1/7/2025. The degree of surrounding T2/FLAIR  hyperintense edema is not significantly changed. For reference when measured in a similar manner the mass was approximately 2.2 x 1.8 x 1.9 cm on the 2/28/2025 head CT. The left cerebellar mass continues to demonstrate some restricted diffusion. No restricted diffusion to indicate recent infarct. Mild mucosal thickening within the ethmoid air cells. Paranasal sinuses are otherwise free from significant disease. Patchy fluid within the bilateral mastoid air cells. Intraorbital contents are unremarkable. There is mild generalized prominence of the sulci and ventricles, consistent with underlying volume loss. Intracranial flow voids are intact. Mild periventricular T2 hyperintensity most consistent with chronic small vessel ischemic related gliosis. No evidence for acute or chronic intracranial blood products.     IMPRESSION: 1.  Slight interval increase in size of the left posterior fossa metastasis. The degree of surrounding edema is not significantly changed. 2.  No new metastasis. 3.  No acute intracranial finding. No evidence for recent ischemia, intracranial hemorrhage, or  hydrocephalus. 4.  Mild atrophy and presumed chronic small vessel ischemic changes.     Head CT w/o contrast    Result Date: 2/28/2025  EXAM: CT HEAD W/O CONTRAST LOCATION: [facility] DATE: 2/28/2025 INDICATION: Confusion COMPARISON: None. TECHNIQUE: Routine CT Head without IV contrast. Multiplanar reformats. Dose reduction techniques were used. FINDINGS: INTRACRANIAL CONTENTS: Redemonstration of a peripherally enhancing and centrally hypoattenuating lesion within the inferior aspect of the left cerebellar hemisphere which measures 21 x 18 mm (series 2, image 8), and displays a prominent degree of adjacent vasogenic edema. When compared to the 01/07/2025 exam this is and has increased in size. There is no discernible mass effect on the cerebral aqueduct or fourth ventricle. No supratentorial mass effect. VISUALIZED ORBITS/SINUSES/MASTOIDS: No intraorbital abnormality. No paranasal sinus mucosal disease. No middle ear or mastoid effusion. BONES/SOFT TISSUES: No acute abnormality.     IMPRESSION: Increased size of a peripherally enhancing lesion within the left cerebellar hemisphere when compared to the 01/07/2025 exam. No discernible degree of fourth ventricle effacement.    CT Chest Pulmonary Embolism w Contrast    Result Date: 2/28/2025  EXAM: CT CHEST PULMONARY EMBOLISM W CONTRAST LOCATION: [facility] DATE: 2/28/2025 INDICATION: Dyspnea, tachycardia, history of cancer COMPARISON: PET CT 11/25/2024, CT CAP 11/4/2024 and older studies TECHNIQUE: CT chest pulmonary angiogram during arterial phase injection of IV contrast. Multiplanar reformats and MIP reconstructions were performed. Dose reduction techniques were used. CONTRAST: ISOVUE 370 90mL FINDINGS: Slight respiratory motion artifact at the lung bases. ANGIOGRAM CHEST: Excellent opacification of pulmonary arteries and branches. No pulmonary emboli. Aorta and branches are normal caliber. LUNGS AND PLEURA:  Notable increase in size of the pleural-based left upper lobe tumor. This now measures 3.8 x 1.6 cm (axial image 148), was 1.1 x 1 cm. Extensive emphysema with diffuse peribronchial thickening. No effusions. MEDIASTINUM/AXILLAE: Right IJ Port-A-Cath. Increased size of the left supraclavicular nodes (coronal image 35, axial image 28). This now measures 1.3 x 0.7 cm. Adenopathy in the AP window has become confluent and measures 2.2 cm in transverse dimension. CORONARY ARTERY CALCIFICATION: Cannot evaluate. UPPER ABDOMEN: Non-FDG avid left adrenal nodule is stable.. MUSCULOSKELETAL: No suspicious bone lesions. No erosion of the ribs adjacent to the enlarging pleural-based left upper lobe tumor.     IMPRESSION: 1.  Progression of disease with increase in the left upper lobe pleural-based nodule, adenopathy in the AP window and left supraclavicular nodes as noted above. 2.  Extensive emphysema with evidence of chronic bronchitis. 3.  No pulmonary emboli.      Impression   69-year-old woman with stage IV small cell lung cancer with multiple brain metastases status post whole brain radiation therapy.  Good response to whole brain radiation, followed by some increase in size of necrotic left cerebellar lesion on last 2 MRIs    Visit Dx:    (C79.31) Secondary malignant neoplasm of brain (H)  (primary encounter diagnosis)      Cancer Staging   No matching staging information was found for the patient.    Intent of Therapy: Palliative  Side effect slightly increased due to prior whole brain radiation therapy  Side effects that may occur during or within weeks after Radiation Therapy    Fatigue and general weakness  Headache and scalp irritation  Loss of hair  Nausea, vomiting, and decrease in appetite  Darkening, irritation, itchiness, redness, dryness, peeling, thickening, scabbing, and ulceration of the scalp, neck and forehead    Side effects that may occur months or years after Radiation Therapy    Development of another  tumor or cancer         Dizziness and balance problems  Decrease in hormone production  Brain inflammation or necrosis that may cause various neurologic symptoms  Seizures  Decrease in memory and thinking abilities  Decrease in hearing and feeling of ear congestion  Eye dryness and irritation  Loss of vision  Cataracts in the eyes  Poor healing after a trauma or surgery in the irradiated area  Facial numbness, pain and weakness    The risks, benefits and alternatives to radiation therapy were outlined with the patient. All questions were answered and a consent was signed.    Assessment & Plan:   Discussed palliative SRS  radiation therapy with patient and her family versus observation.    She is most focused on quality of life and interested in proceeding with SRS radiation therapy to avoid neurologic symptoms and intracranial progression on effect on her quality of life.    The indications for, process of, alternatives, potential side effects and complications of RT to the single enlarging metastasis were discussed.    They asked multiple questions which were answered to their satisfaction and they verbalized understanding.      They wish to proceed with SRS and consent is signed today.    Simulation for a course of SRS for SCLC today.  Anticipate potentially not following with routine MRIs following treatment and instead just monitoring clinically.      Discussed use of dexamethasone for symptoms of increased intracranial pressure/swelling related to treatment- Prescribed.    Thank you for allowing me to participate in the care of this patient. Feel free to contact me with all questions or concerns.        Pain Management Plan: N/A    Total time of this visit, including time spent face-to-face with patient and or via video/audio, and also in preparing for today's visit for MDM and documentation. Medical decision-making included consideration and possible diagnoses, management options, complex record review, review  of diagnostic tests and information, consideration and discussion of significant complications based on comorbidities, discussion with providers involved in the care of the patient.     30 Minutes spent.     This note has been dictated using voice recognition software and as a result may contain minor grammatical errors and unintended word substitutions.    Tata Nuñez MD  Department of Radiation Oncology   [facility] Radiation Oncology  Tel: [phone]  Page: [phone]    [facility]  [address] [place] [address]     [place] [id number] [facility]   [place] [address]    CC:  Patient Care Team:  Ana Maria Bermudez MD as PCP - General (Internal Medicine)  Aidee Luna RT as Chronic Pulmonary Disease Specialist (Respiratory Therapy)  Rickie Pickens MD as Assigned Endocrinology Provider  Ruben Levin MD as MD (Critical Care)  Tata Lainez NP as Assigned Pulmonology Provider  Amador Stewart MD as MD (Hematology)  Wilma Carmona, RN as Specialty Care Coordinator (Hematology & Oncology)  Ana Maria Bermudez MD as Assigned PCP  Evans Gramajo APRN CNP as Nurse Practitioner (Hospice And Palliative Care)  Evans Gramajo APRN CNP as Assigned Palliative Care Provider  Bryant Mckinney MD as Physician (Vascular Surgery)  Naheed Mulligan MD as MD (Cardiovascular Disease)  Tata Nuñez MD as MD (Radiation Oncology)  Amador Stewart MD as Assigned Cancer Care Provider  Natty Rodriguez MD as Assigned Heart and Vascular Provider      Again, thank you for allowing me to participate in the care of your patient.        Sincerely,        Tata Nuñez MD    Electronically signed

## 2025-03-12 NOTE — PROGRESS NOTES
[facility] Radiation Oncology Follow Up     Patient: Jasmyn Green  MRN: [id number]  Date of Service: 03/12/2025       DISEASE TREATED:  Extensive stage small cell lung cancer with brain metastases s/p prior WBRT.   Good response to prior SBRT with increasing size of a single remaining metastasis on the last 2 MRIs.      TYPE OF RADIATION THERAPY ADMINISTERED:    SITE TREATED: Whole brain  TOTAL DOSE: 3000 cGy  NUMBER OF FRACTIONS: 10  RADIATION TREATMENT 8/22/2024 - 9/9/2024   CONCURRENT CHEMOTHERAPY: No  ADJUVANT THERAPY: Yes- atezolizumab      INTERVAL SINCE COMPLETION OF RADIATION THERAPY: 6 mo      SUBJECTIVE:  Ms. Green is a 69 year old female with extensive stage small cell lung cancer with brain metastases s/p prior WBRT.   Good response to prior SBRT with increasing size of a single remaining metastasis on the last 2 MRIs.  Slight interval increase in size in the left posterior fossa mass that measures 2.2 cm transverse by 2 cm AP by 2 cm craniocaudal compared with 1.9 x 1.7 x 1.8 cm when measured in a similar manner on 1/7/2025.      Family have considered options of observation versus proceeding with focal SRS treatment.    Medications were reviewed and are up to date on EPIC.    The following portions of the patient's history were reviewed and updated as appropriate: allergies, current medications, past family history, past medical history, past social history, past surgical history and problem list.    Review of Systems:      General  Constitutional  Constitutional (WDL): Exceptions to WDL  Fatigue: Fatigue relieved by rest  EENT  Eye Disorders  Eye Disorder (WDL): Exceptions to WDL  Blurred Vision: Symptomatic OR moderate decrease in visual acuity (best corrected visual acuity 20/40 and better or 3 lines or less decreased vision from known baseline) OR limiting instrumental ADL  Ear Disorders  Ear Disorder (WDL): All ear disorder elements are within defined  limits  Respiratory  Respiratory  Respiratory (WDL): Exceptions to WDL  Cough: Mild symptoms OR nonprescription intervention indicated  Dyspnea: Shortness of breath with minimal exertion OR limiting instrumental ADL  Hypoxia: Decreased oxygen saturation at rest (e.g., pulse oximeter less than 88% or PaO2 less than or equal to 55 mmHg) (supplemental O2, 5 liters home O2)  Cardiovascular  Cardiovascular  Cardiovascular (WDL): All cardiovascular elements are within defined limits (no pacemaker)  Gastrointestinal  Gastrointestinal  Gastrointestinal (WDL): Exceptions to WDL  Anorexia: Loss of appetite without alteration in eating habits  Musculoskeletal  Musculoskeletal and Connective Tissue Disorders  Musculoskeletal & Connective (WDL): Exceptions to WDL  Arthralgia: Mild pain  Integumentary  Integumentary  Integumentary (WDL): Exceptions to WDL  Alopecia: Hair loss of greater than or equal to 50% normal for that individual that is readily apparent to others OR a wig or hair piece is necessary if the patient desires to completely camouflage the hair loss OR associated with psychosocial impact  Neurological  Neurosensory  Neurosensory (WDL): Exceptions to WDL  Ataxia: Asymptomatic OR clinical or diagnostic observations only OR intervention not indicated (uses walker and w/c)  Confusion: Mild disorientation (comes and goes)  Dizziness: Mild unsteadiness or sensation of movement  Genitourinary/Reproductive  Genitourinary  Genitourinary (WDL): All genitourinary elements are within defined limits  Lymphatic  Lymph System Disorders  Lymph (WDL): Assessment not pertinent to visit  Pain  Pain Score: No Pain (0)  AUA Assessment                                                              Accompanied by  Accompanied By: family    Objective:        PHYSICAL EXAMINATION:    /63 (BP Location: Right arm, Patient Position: Sitting, Cuff Size: Adult Small)   Pulse 115   Temp 98  F (36.7  C) (Oral)   Resp 18   Wt 41.5 kg (91  lb 9.6 oz)   LMP  (LMP Unknown)   SpO2 97%   BMI 15.72 kg/m      Alert oriented no acute distress, resting comfortably in wheelchair with oxygen by nasal cannula    Imaging: Imaging results 6 weeks:MRI Brain w & w/o contrast    Result Date: 3/4/2025  [facility]IN [facility] and [facility]  3/4/2025 1:53 PM CST INDICATION: SCLC with brain mets status post RT, last MRI with susp RT necrosis vs progression. Evaluate for any evidence of progression. TECHNIQUE: Noncontrast and contrast enhanced MRI of the brain. CONTRAST: 4 mL of gadobutrol intravenous contrast. COMPARISON: Head CT 2/28/2025, brain MRI 1/7/2025. FINDINGS: Slight interval increase in size in the left posterior fossa mass that measures 2.2 cm transverse by 2 cm AP by 2 cm craniocaudal compared with 1.9 x 1.7 x 1.8 cm when measured in a similar manner on 1/7/2025. The degree of surrounding T2/FLAIR  hyperintense edema is not significantly changed. For reference when measured in a similar manner the mass was approximately 2.2 x 1.8 x 1.9 cm on the 2/28/2025 head CT. The left cerebellar mass continues to demonstrate some restricted diffusion. No restricted diffusion to indicate recent infarct. Mild mucosal thickening within the ethmoid air cells. Paranasal sinuses are otherwise free from significant disease. Patchy fluid within the bilateral mastoid air cells. Intraorbital contents are unremarkable. There is mild generalized prominence of the sulci and ventricles, consistent with underlying volume loss. Intracranial flow voids are intact. Mild periventricular T2 hyperintensity most consistent with chronic small vessel ischemic related gliosis. No evidence for acute or chronic intracranial blood products.     IMPRESSION: 1.  Slight interval increase in size of the left posterior fossa metastasis. The degree of surrounding edema is not significantly changed. 2.  No new metastasis. 3.  No acute intracranial finding. No evidence for recent  ischemia, intracranial hemorrhage, or hydrocephalus. 4.  Mild atrophy and presumed chronic small vessel ischemic changes.     Head CT w/o contrast    Result Date: 2/28/2025  EXAM: CT HEAD W/O CONTRAST LOCATION: [facility] DATE: 2/28/2025 INDICATION: Confusion COMPARISON: None. TECHNIQUE: Routine CT Head without IV contrast. Multiplanar reformats. Dose reduction techniques were used. FINDINGS: INTRACRANIAL CONTENTS: Redemonstration of a peripherally enhancing and centrally hypoattenuating lesion within the inferior aspect of the left cerebellar hemisphere which measures 21 x 18 mm (series 2, image 8), and displays a prominent degree of adjacent vasogenic edema. When compared to the 01/07/2025 exam this is and has increased in size. There is no discernible mass effect on the cerebral aqueduct or fourth ventricle. No supratentorial mass effect. VISUALIZED ORBITS/SINUSES/MASTOIDS: No intraorbital abnormality. No paranasal sinus mucosal disease. No middle ear or mastoid effusion. BONES/SOFT TISSUES: No acute abnormality.     IMPRESSION: Increased size of a peripherally enhancing lesion within the left cerebellar hemisphere when compared to the 01/07/2025 exam. No discernible degree of fourth ventricle effacement.    CT Chest Pulmonary Embolism w Contrast    Result Date: 2/28/2025  EXAM: CT CHEST PULMONARY EMBOLISM W CONTRAST LOCATION: [facility] DATE: 2/28/2025 INDICATION: Dyspnea, tachycardia, history of cancer COMPARISON: PET CT 11/25/2024, CT CAP 11/4/2024 and older studies TECHNIQUE: CT chest pulmonary angiogram during arterial phase injection of IV contrast. Multiplanar reformats and MIP reconstructions were performed. Dose reduction techniques were used. CONTRAST: ISOVUE 370 90mL FINDINGS: Slight respiratory motion artifact at the lung bases. ANGIOGRAM CHEST: Excellent opacification of pulmonary arteries and branches. No pulmonary emboli. Aorta and branches are  normal caliber. LUNGS AND PLEURA: Notable increase in size of the pleural-based left upper lobe tumor. This now measures 3.8 x 1.6 cm (axial image 148), was 1.1 x 1 cm. Extensive emphysema with diffuse peribronchial thickening. No effusions. MEDIASTINUM/AXILLAE: Right IJ Port-A-Cath. Increased size of the left supraclavicular nodes (coronal image 35, axial image 28). This now measures 1.3 x 0.7 cm. Adenopathy in the AP window has become confluent and measures 2.2 cm in transverse dimension. CORONARY ARTERY CALCIFICATION: Cannot evaluate. UPPER ABDOMEN: Non-FDG avid left adrenal nodule is stable.. MUSCULOSKELETAL: No suspicious bone lesions. No erosion of the ribs adjacent to the enlarging pleural-based left upper lobe tumor.     IMPRESSION: 1.  Progression of disease with increase in the left upper lobe pleural-based nodule, adenopathy in the AP window and left supraclavicular nodes as noted above. 2.  Extensive emphysema with evidence of chronic bronchitis. 3.  No pulmonary emboli.      Impression   69-year-old woman with stage IV small cell lung cancer with multiple brain metastases status post whole brain radiation therapy.  Good response to whole brain radiation, followed by some increase in size of necrotic left cerebellar lesion on last 2 MRIs    Visit Dx:    (C79.31) Secondary malignant neoplasm of brain (H)  (primary encounter diagnosis)      Cancer Staging   No matching staging information was found for the patient.    Intent of Therapy: Palliative  Side effect slightly increased due to prior whole brain radiation therapy  Side effects that may occur during or within weeks after Radiation Therapy    Fatigue and general weakness  Headache and scalp irritation  Loss of hair  Nausea, vomiting, and decrease in appetite  Darkening, irritation, itchiness, redness, dryness, peeling, thickening, scabbing, and ulceration of the scalp, neck and forehead    Side effects that may occur months or years after Radiation  Therapy    Development of another tumor or cancer         Dizziness and balance problems  Decrease in hormone production  Brain inflammation or necrosis that may cause various neurologic symptoms  Seizures  Decrease in memory and thinking abilities  Decrease in hearing and feeling of ear congestion  Eye dryness and irritation  Loss of vision  Cataracts in the eyes  Poor healing after a trauma or surgery in the irradiated area  Facial numbness, pain and weakness    The risks, benefits and alternatives to radiation therapy were outlined with the patient. All questions were answered and a consent was signed.    Assessment & Plan:   Discussed palliative SRS  radiation therapy with patient and her family versus observation.    She is most focused on quality of life and interested in proceeding with SRS radiation therapy to avoid neurologic symptoms and intracranial progression on effect on her quality of life.    The indications for, process of, alternatives, potential side effects and complications of RT to the single enlarging metastasis were discussed.    They asked multiple questions which were answered to their satisfaction and they verbalized understanding.      They wish to proceed with SRS and consent is signed today.    Simulation for a course of SRS for SCLC today.  Anticipate potentially not following with routine MRIs following treatment and instead just monitoring clinically.      Discussed use of dexamethasone for symptoms of increased intracranial pressure/swelling related to treatment- Prescribed.    Thank you for allowing me to participate in the care of this patient. Feel free to contact me with all questions or concerns.        Pain Management Plan: N/A    Total time of this visit, including time spent face-to-face with patient and or via video/audio, and also in preparing for today's visit for MDM and documentation. Medical decision-making included consideration and possible diagnoses, management  options, complex record review, review of diagnostic tests and information, consideration and discussion of significant complications based on comorbidities, discussion with providers involved in the care of the patient.     30 Minutes spent.     This note has been dictated using voice recognition software and as a result may contain minor grammatical errors and unintended word substitutions.    Tata Nuñez MD  Department of Radiation Oncology   [facility] Radiation Oncology  Tel: [phone]  Page: [phone]    [facility]  [address] [place] [address]     [id number] [facility] [address]    CC:  Patient Care Team:  Ana Maria Bermudez MD as PCP - General (Internal Medicine)  Aidee Luna RT as Chronic Pulmonary Disease Specialist (Respiratory Therapy)  Rickie Pickens MD as Assigned Endocrinology Provider  Ruben Levin MD as MD (Critical Care)  Tata Lainez NP as Assigned Pulmonology Provider  Amador Stewart MD as MD (Hematology)  Wilma Carmona, RN as Specialty Care Coordinator (Hematology & Oncology)  Ana Maria Bermudez MD as Assigned PCP  Evans Gramajo APRN CNP as Nurse Practitioner (Hospice And Palliative Care)  Evans Gramajo APRN CNP as Assigned Palliative Care Provider  Bryant Mckinney MD as Physician (Vascular Surgery)  Naheed Mulligan MD as MD (Cardiovascular Disease)  Tata Nuñez MD as MD (Radiation Oncology)  Amador Stewart MD as Assigned Cancer Care Provider  Natty Rodriguez MD as Assigned Heart and Vascular Provider

## 2025-03-12 NOTE — PROGRESS NOTES
Comments:  DIAGNOSIS:  Extensive stage small cell lung cancer with brain metastases s/p prior WBRT.   Good response to prior SBRT with increasing size of a single remaining metastasis on the last 2 MRIs.  Slight interval increase in size in the left posterior fossa mass that measures 2.2 cm transverse by 2 cm AP by 2 cm craniocaudal compared with 1.9 x 1.7 x 1.8 cm when measured in a similar manner on 1/7/2025.     Simulation for a course of SRS for SCLC.   The patient was placed in the supine position with head rest on the CT simulator couch.  A encompass mask was made for immobilization.  A planning CT of the brain was done.   The procedure was well tolerated.    PRIOR RT:  SITE TREATED: Whole brain  TOTAL DOSE: 3000 cGy  NUMBER OF FRACTIONS: 10  RADIATION TREATMENT 8/22/2024 - 9/9/2024   CONCURRENT CHEMOTHERAPY: No  ADJUVANT THERAPY: Yes- atezolizumab     All field shaping will be accomplished with multileaf collimation.  Complete  documentation for simulation, devices, treatment planning, calculations,  , final prescription can be found in the paper chart stored in  the radiation oncology department.

## 2025-03-12 NOTE — PROGRESS NOTES
"Oncology Rooming Note    March 12, 2025 2:19 PM   Jasmyn Green is a 69 year old female who presents for:    Chief Complaint   Patient presents with    Oncology Clinic Visit     Follow up with Dr. Nuñez and simulation     Initial Vitals: /63 (BP Location: Right arm, Patient Position: Sitting, Cuff Size: Adult Small)   Pulse 115   Temp 98  F (36.7  C) (Oral)   Resp 18   Wt 41.5 kg (91 lb 9.6 oz)   LMP  (LMP Unknown)   SpO2 97%   BMI 15.72 kg/m   Estimated body mass index is 15.72 kg/m  as calculated from the following:    Height as of 3/3/25: 1.626 m (5' 4\").    Weight as of this encounter: 41.5 kg (91 lb 9.6 oz). Body surface area is 1.37 meters squared.  No Pain (0) Comment: Data Unavailable   No LMP recorded (lmp unknown). Patient is postmenopausal.  Allergies reviewed: Yes  Medications reviewed: Yes    Medications: Medication refills not needed today.  Pharmacy name entered into Raw[employer]:    [facility] - [place], [place] - [address] [facility] - [place], [place] - [address] [place] [address]    Frailty Screening:   Is the patient here for a new oncology consult visit in cancer care? 2. No    PHQ9:  Did this patient require a PHQ9?: No      Clinical concerns: Patient here per wheelchair accompanied by daughter for follow-up on her brain mets.  Per daughter and patient's state would like to go forward with the radiation for her brain mets and sign onto hospice following treatment.  Patient denies any complaints today.  Patient currently on 5 L of home oxygen.  CT simulation planned for after return visit.  Return to clinic as directed by provider.   Dr. Nuñez was notified.      Amber Wallace RN              "

## 2025-03-14 ENCOUNTER — APPOINTMENT (OUTPATIENT)
Dept: RADIATION ONCOLOGY | Facility: HOSPITAL | Age: 70
End: 2025-03-14
Attending: STUDENT IN AN ORGANIZED HEALTH CARE EDUCATION/TRAINING PROGRAM
Payer: COMMERCIAL

## 2025-03-14 NOTE — PROGRESS NOTES
Virtual Visit Details    Type of service:  Video Visit   Video Start Time:  10:41am  Video End Time: 10:48am    Originating Location (pt. 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    COPD - GOLD E  Chronic hypoxic respiratory failure  Emphysema  Severe obstructive lung disease (FEV1 29%), with hyperinflation and air-trapping.  Severe diffusion capacity defect (DLCO 30%).  Radiographic emphysema.  Utilizes pharmacy from [facility] for some prescriptions.   Previous visit we started daily azithromycin for ongoing symptoms of mucous production and frequent requests for action plan.  Last video visit she was quite altered, and was therefore encouraged to call 911 and present to ED for evaluation.  She was hospitalized for COPD exacerbation, and imaging showed progression of cancer.   She required 7L oxygen at discharge.   She will be signing onto hospice after her one time palliative whole brain radiation treatment.  Plan:  - continue Spiriva + Airduo.  Daughter recognizes that she may be switching to all nebulized treatment once she is on hospice, but she would like to continue these inhalers for now.  - continue Duonebs QID scheduled + flutter valve.   - continue Azithromycin 250mg daily, daughter states this has been beneficial.   - continue Combivent PRN.  - continue oxygen 7L with activity and at night, to keep oxygen saturations >88%.   - She is UTD with prevnar 20, annual influenza vaccine.   - Action plan: prednisone 40mg x3 days, 30mg x3 days, 20mg x3 days, 10mg x3 days + doxycycline 100mg BID x5 days.      Small cell lung cancer with brain metastases  Nicotine  dependence, in remission  Former smoker, quit in 2021.  Hospitalization in 2/2024, CT on admission showed bulky AP and left hilar adenopathy with irregular pleural-based anterior left upper lobe nodule with adjacent smaller satellite nodules.  EBUS with biopsy on 2/16/24, positive for small cell lung cancer.  She is now followed by Dr Nuñez in radiation oncology and Dr Stewart in oncology.  Last imaging shows progression of cancer 'progression of disease with increase in ARIANNE pleural based nodule, adenopathy in AP window, and left supraclavicular nodes. CT head showed increased size of peripherally enhancing lesion within the left cerebellar hemisphere'.  She will be receiving palliative radiation through Dr Nuñez, then moving onto hospice.  Plan:  - signing onto hospice shortly.    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    Since last visit, she was hospitalized 2/28 - 3/2/25 at [facility] with AMS, dyspnea.  CT chest showed progression of cancer (progression of disease with increase in ARIANNE pleural based nodule, adenopathy in AP window, and left supraclavicular nodes. CT head showed increased size of peripherally enhancing lesion within the left cerebellar hemisphere).  Now requires 7L oxygen with ambulation.     Things are improving respiratory-wise, since starting azithromycin.    Her hearing loss did increase, but daughter recognizes that this was a concern previously and she is receiving hearing aides tomorrow.  She does not want to stop azithromycin as patient has had less exacerbations and less complaints of mucous.   Radiation upcoming, then  signing onto hospice.  Wants to stay on inhalers for now, then to nebulized treatment through hospice.     Patient supplied answers from flow sheet for:  COPD Assessment Test (CAT)  2009 [employer]. All rights reserved.      10/4/2022     9:00 AM 4/21/2023     7:00 AM 11/8/2023     9:37 AM 3/22/2024     8:26 AM 9/23/2024     8:00 AM 12/31/2024     8:16 AM 2/26/2025     5:51 PM   COPD assessment test (CAT)   Cough 3 4 3 3 5 3 3   Phlegm 2 4 1 2 3 2 3   Chest tightness 0 2 0 0 1 0 2   Walk up hill 5 5 4 4 4 4 4   Limited activities 0 4 4 4 4 4 4   Leaving my home 0 0 0 2 2 0 2   Sleep 1 0 1 2 2 0 2   Energy 0 1 2 4 4 4 4   Total Score 11 20 15 21 25 17  24        Patient-reported      CAT Key:  The CAT consist of 8 items which are each scored 0-5. The total score ranges from 0-40 with higher scores representing a poorer health status. When interpreting CAT scores, the individual s disease severity should be considered.   Low impact  (1-9)  Medium impact  (10-20)  High impact  (21-30)  Very high impact  (31-40)     ROS:     6-point ROS performed and is negative aside from those listed in HPI.     Medical history:     Past Medical History:   Diagnosis Date    Age-related osteoporosis without current pathological fracture     Arthritis     COPD (chronic obstructive pulmonary disease) (H)     Coronary artery disease     Dependence on nocturnal oxygen therapy     Dyspnea on exertion     Emphysema lung (H)     Gout     HLD (hyperlipidemia)     Hypertension     Lung mass      Past Surgical History:   Procedure Laterality Date    BRONCHOSCOPY RIGID OR FLEXIBLE W/TRANSENDOSCOPIC ENDOBRONCHIAL ULTRASOUND GUIDED N/A 2/16/2024    Procedure: BRONCHOSCOPY, WITH ENDOBRONCHIAL ULTRASOUND;  Surgeon: Ruben Levin MD;  Location: [facility] Main OR    COLONOSCOPY N/A 10/21/2021    Procedure: COLONOSCOPY;  Surgeon: Wilma Hester DO;  Location: [facility] Main OR    IR CHEST PORT PLACEMENT > 5 YRS OF AGE  5/2/2024    VAGINAL DELIVERY      x 3  ; remote    WISDOM TOOTH EXTRACTION       Social History     Tobacco Use    Smoking status: Former     Current packs/day: 0.00     Types: Cigarettes     Quit date: 6/27/2021     Years since quitting: 3.7     Passive exposure: Past    Smokeless tobacco: Never    Tobacco comments:     updated 8/3/2021 by TTS   Vaping Use    Vaping status: Never Used   Substance Use Topics    Alcohol use: Not Currently    Drug use: Never     Current Outpatient Medications   Medication Sig Dispense Refill    acetaminophen (TYLENOL) 500 MG tablet Take 2 tablets (1,000 mg) by mouth every 8 hours as needed for pain      albuterol (PROAIR HFA/PROVENTIL HFA/VENTOLIN HFA) 108 (90 Base) MCG/ACT inhaler Inhale 1-2 puffs into the lungs every 6 hours as needed for shortness of breath, wheezing or cough. 36 g 5    aspirin 81 MG EC tablet Take 81 mg by mouth daily.      atorvastatin (LIPITOR) 40 MG tablet Take 1 tablet (40 mg) by mouth daily 90 tablet 3    azithromycin (ZITHROMAX) 250 MG tablet Take 1 tablet (250 mg) by mouth daily. 90 tablet 5    buprenorphine (BUTRANS) 20 MCG/HR WK patch Place 1 patch over 168 hours onto the skin every 7 days. 4 patch 1    calcium carbonate 600 mg-vitamin D 400 units (CALTRATE) 600-400 MG-UNIT per tablet Take 1 tablet by mouth 2 times daily With calcium      dexAMETHasone (DECADRON) 2 MG tablet Take 3 tabs by mouth daily x 1 days, then 2 tabs daily x 6 days, then 1 tab daily x 3 days, then 1/2 tab daily x 4 days. 20 tablet 0    ELIQUIS ANTICOAGULANT 5 MG tablet TAKE 1 TABLET (5 MG) BY MOUTH 2 TIMES DAILY 60 tablet 4    fexofenadine (ALLEGRA ALLERGY) 60 MG tablet Take 60 mg by mouth daily.      fluticasone-salmeterol (AIRDUO RESPICLICK) 232-14 MCG/ACT inhaler Inhale 1 puff into the lungs 2 times daily 1 each 11    hydrOXYzine HCl (ATARAX) 25 MG tablet Take 1-2 tablets (25-50 mg) by mouth every 8 hours as needed for anxiety (Insomnia) 30 tablet 3    ipratropium - albuterol 0.5 mg/2.5 mg/3 mL (DUONEB) 0.5-2.5 (3)  MG/3ML neb solution Take 1 vial (3 mLs) by nebulization every 6 hours as needed for shortness of breath, wheezing or cough. 360 mL 11    ipratropium-albuterol (COMBIVENT RESPIMAT)  MCG/ACT inhaler Inhale 1 puff into the lungs 4 times daily as needed for shortness of breath, wheezing or cough.      lactobacillus rhamnosus, GG, (CULTURELL) capsule Take 1 capsule by mouth 2 times daily      lidocaine-prilocaine (EMLA) 2.5-2.5 % external cream Apply topically as needed for moderate pain 30 g 5    lisinopril (ZESTRIL) 10 MG tablet Take 1 tablet (10 mg) by mouth daily (Patient not taking: Reported on 3/12/2025) 30 tablet 12    mirtazapine (REMERON) 7.5 MG tablet Take 1 tablet (7.5 mg) by mouth at bedtime 90 tablet 3    naloxone (NARCAN) 4 MG/0.1ML nasal spray Spray 1 spray (4 mg) into one nostril alternating nostrils as needed for opioid reversal every 2-3 minutes until assistance arrives (Patient not taking: Reported on 3/12/2025) 0.2 mL 1    ondansetron (ZOFRAN ODT) 8 MG ODT tab Take 1 tablet (8 mg) by mouth every 8 hours as needed for nausea. 30 tablet 1    oxyCODONE (ROXICODONE) 5 MG tablet Take 1.5-2 tablets (7.5-10 mg) by mouth every 3 hours as needed for pain or moderate to severe pain (PRN shortness of breath). 180 tablet 0    OXYGEN-HELIUM IN Inhale 3-4 L into the lungs continuous prn      sennosides (SENOKOT) 8.6 MG tablet Take 1 tablet by mouth daily as needed for constipation.      umeclidinium (INCRUSE ELLIPTA) 62.5 MCG/ACT inhaler Inhale 1 puff into the lungs daily 30 each 11     No current facility-administered medications for this visit.        Physical Exam:     No VS for video visit  Gen: adult female, appears in NAD, frail, chronically ill appearing.  Daughter present.  [place].  Resp:  Respirations even and unlabored.  On oxygen.  No cough.          Data:     Chest CT 2/8/24:  IMPRESSION:   1.  Bulky AP and left hilar adenopathy with irregular pleural-based anterior left upper lobe nodule with  adjacent smaller satellite nodules. Findings suspicious for a primary lung cancer.  2.  Nodular thickening of the left adrenal gland is likely unchanged.  3.  Patient needs PET/CT for further evaluation.  4.  Extensive emphysema.  5.  Findings discussed by the undersigned with Dr. Ramos at 1141.    PFTs 8/2021:  The FVC, FEV1 and FEV1/FVC ratio are reduced, but the CFY99-66% is within normal limits.  The inspiratory flow rates are reduced.  The FVC is reduced relative to the SVC indicating air trapping.  The TLC, RV, FRC and RV/TLC ratio are all increased   indicating overinflation and air trapping.  Following administration of bronchodilators, there is no significant response.  The diffusing capacity is severely reduced when corrected to hemoglobin.   IMPRESSION:   Very severe Airflow Obstruction. No significant postbronchodilator response.   Mild hyperinflation and moderate airtrapping.   Diffusion capacity was severely reduced when corrected to hemoglobin.

## 2025-03-17 ENCOUNTER — HOSPITAL ENCOUNTER (INPATIENT)
Facility: CLINIC | Age: 70
LOS: 1 days | Discharge: HOME | DRG: 054 | End: 2025-03-18
Attending: STUDENT IN AN ORGANIZED HEALTH CARE EDUCATION/TRAINING PROGRAM | Admitting: STUDENT IN AN ORGANIZED HEALTH CARE EDUCATION/TRAINING PROGRAM
Payer: COMMERCIAL

## 2025-03-17 ENCOUNTER — VIRTUAL VISIT (OUTPATIENT)
Dept: PULMONOLOGY | Facility: CLINIC | Age: 70
End: 2025-03-17
Attending: NURSE PRACTITIONER
Payer: COMMERCIAL

## 2025-03-17 ENCOUNTER — APPOINTMENT (OUTPATIENT)
Dept: CT IMAGING | Facility: CLINIC | Age: 70
DRG: 054 | End: 2025-03-17
Attending: STUDENT IN AN ORGANIZED HEALTH CARE EDUCATION/TRAINING PROGRAM
Payer: COMMERCIAL

## 2025-03-17 ENCOUNTER — APPOINTMENT (OUTPATIENT)
Dept: RADIOLOGY | Facility: CLINIC | Age: 70
DRG: 054 | End: 2025-03-17
Attending: STUDENT IN AN ORGANIZED HEALTH CARE EDUCATION/TRAINING PROGRAM
Payer: COMMERCIAL

## 2025-03-17 DIAGNOSIS — J96.11 CHRONIC RESPIRATORY FAILURE WITH HYPOXIA (H): ICD-10-CM

## 2025-03-17 DIAGNOSIS — C79.31 MALIGNANT NEOPLASM METASTATIC TO BRAIN (H): ICD-10-CM

## 2025-03-17 DIAGNOSIS — J44.9 COPD, SEVERE (H): Primary | ICD-10-CM

## 2025-03-17 DIAGNOSIS — C34.2 MALIGNANT NEOPLASM OF MIDDLE LOBE, BRONCHUS OR LUNG (H): ICD-10-CM

## 2025-03-17 DIAGNOSIS — J18.9 PNEUMONIA OF BOTH LUNGS DUE TO INFECTIOUS ORGANISM, UNSPECIFIED PART OF LUNG: ICD-10-CM

## 2025-03-17 DIAGNOSIS — J96.21 ACUTE AND CHRONIC RESPIRATORY FAILURE WITH HYPOXIA (H): ICD-10-CM

## 2025-03-17 DIAGNOSIS — I10 HYPERTENSION, UNSPECIFIED TYPE: ICD-10-CM

## 2025-03-17 DIAGNOSIS — J44.9 COPD, GROUP E, BY GOLD 2023 CLASSIFICATION (H): ICD-10-CM

## 2025-03-17 DIAGNOSIS — J43.8 OTHER EMPHYSEMA (H): ICD-10-CM

## 2025-03-17 DIAGNOSIS — C79.31 SECONDARY MALIGNANT NEOPLASM OF BRAIN (H): Primary | ICD-10-CM

## 2025-03-17 LAB
ABO + RH BLD: NORMAL
ALBUMIN SERPL BCG-MCNC: 3.3 G/DL (ref 3.5–5.2)
ALP SERPL-CCNC: 101 U/L (ref 40–150)
ALT SERPL W P-5'-P-CCNC: 29 U/L (ref 0–50)
ANION GAP SERPL CALCULATED.3IONS-SCNC: 9 MMOL/L (ref 7–15)
AST SERPL W P-5'-P-CCNC: 25 U/L (ref 0–45)
BASOPHILS # BLD AUTO: 0 10E3/UL (ref 0–0.2)
BASOPHILS NFR BLD AUTO: 0 %
BILIRUB DIRECT SERPL-MCNC: 0.12 MG/DL (ref 0–0.3)
BILIRUB SERPL-MCNC: 0.2 MG/DL
BLD GP AB SCN SERPL QL: NEGATIVE
BLD PROD TYP BPU: NORMAL
BLOOD COMPONENT TYPE: NORMAL
BUN SERPL-MCNC: 26.9 MG/DL (ref 8–23)
CALCIUM SERPL-MCNC: 8.7 MG/DL (ref 8.8–10.4)
CHLORIDE SERPL-SCNC: 105 MMOL/L (ref 98–107)
CODING SYSTEM: NORMAL
CREAT SERPL-MCNC: 0.88 MG/DL (ref 0.51–0.95)
CROSSMATCH: NORMAL
EGFRCR SERPLBLD CKD-EPI 2021: 70 ML/MIN/1.73M2
EOSINOPHIL # BLD AUTO: 0.2 10E3/UL (ref 0–0.7)
EOSINOPHIL NFR BLD AUTO: 1 %
ERYTHROCYTE [DISTWIDTH] IN BLOOD BY AUTOMATED COUNT: 18.5 % (ref 10–15)
GLUCOSE SERPL-MCNC: 147 MG/DL (ref 70–99)
HCO3 SERPL-SCNC: 30 MMOL/L (ref 22–29)
HCT VFR BLD AUTO: 21.5 % (ref 35–47)
HGB BLD-MCNC: 6.5 G/DL (ref 11.7–15.7)
IMM GRANULOCYTES # BLD: 0.1 10E3/UL
IMM GRANULOCYTES NFR BLD: 1 %
ISSUE DATE AND TIME: NORMAL
LACTATE SERPL-SCNC: 0.4 MMOL/L (ref 0.7–2)
LYMPHOCYTES # BLD AUTO: 0.5 10E3/UL (ref 0.8–5.3)
LYMPHOCYTES NFR BLD AUTO: 3 %
MAGNESIUM SERPL-MCNC: 2.1 MG/DL (ref 1.7–2.3)
MCH RBC QN AUTO: 30.4 PG (ref 26.5–33)
MCHC RBC AUTO-ENTMCNC: 30.2 G/DL (ref 31.5–36.5)
MCV RBC AUTO: 101 FL (ref 78–100)
MONOCYTES # BLD AUTO: 1 10E3/UL (ref 0–1.3)
MONOCYTES NFR BLD AUTO: 6 %
NEUTROPHILS # BLD AUTO: 14.9 10E3/UL (ref 1.6–8.3)
NEUTROPHILS NFR BLD AUTO: 89 %
NRBC # BLD AUTO: 0 10E3/UL
NRBC BLD AUTO-RTO: 0 /100
PLATELET # BLD AUTO: 283 10E3/UL (ref 150–450)
POTASSIUM SERPL-SCNC: 4.3 MMOL/L (ref 3.4–5.3)
PROCALCITONIN SERPL IA-MCNC: 1.23 NG/ML
PROT SERPL-MCNC: 5.7 G/DL (ref 6.4–8.3)
RBC # BLD AUTO: 2.14 10E6/UL (ref 3.8–5.2)
SODIUM SERPL-SCNC: 144 MMOL/L (ref 135–145)
SPECIMEN EXP DATE BLD: NORMAL
TROPONIN T SERPL HS-MCNC: 39 NG/L
TROPONIN T SERPL HS-MCNC: 45 NG/L
UNIT ABO/RH: NORMAL
UNIT NUMBER: NORMAL
UNIT STATUS: NORMAL
UNIT TYPE ISBT: 6200
WBC # BLD AUTO: 16.7 10E3/UL (ref 4–11)

## 2025-03-17 PROCEDURE — 96361 HYDRATE IV INFUSION ADD-ON: CPT

## 2025-03-17 PROCEDURE — 93005 ELECTROCARDIOGRAM TRACING: CPT | Performed by: STUDENT IN AN ORGANIZED HEALTH CARE EDUCATION/TRAINING PROGRAM

## 2025-03-17 PROCEDURE — 96365 THER/PROPH/DIAG IV INF INIT: CPT

## 2025-03-17 PROCEDURE — 85048 AUTOMATED LEUKOCYTE COUNT: CPT | Performed by: STUDENT IN AN ORGANIZED HEALTH CARE EDUCATION/TRAINING PROGRAM

## 2025-03-17 PROCEDURE — 82310 ASSAY OF CALCIUM: CPT | Performed by: STUDENT IN AN ORGANIZED HEALTH CARE EDUCATION/TRAINING PROGRAM

## 2025-03-17 PROCEDURE — 83735 ASSAY OF MAGNESIUM: CPT | Performed by: STUDENT IN AN ORGANIZED HEALTH CARE EDUCATION/TRAINING PROGRAM

## 2025-03-17 PROCEDURE — 258N000003 HC RX IP 258 OP 636: Performed by: STUDENT IN AN ORGANIZED HEALTH CARE EDUCATION/TRAINING PROGRAM

## 2025-03-17 PROCEDURE — 86900 BLOOD TYPING SEROLOGIC ABO: CPT | Performed by: STUDENT IN AN ORGANIZED HEALTH CARE EDUCATION/TRAINING PROGRAM

## 2025-03-17 PROCEDURE — 86923 COMPATIBILITY TEST ELECTRIC: CPT | Performed by: STUDENT IN AN ORGANIZED HEALTH CARE EDUCATION/TRAINING PROGRAM

## 2025-03-17 PROCEDURE — 250N000011 HC RX IP 250 OP 636: Performed by: STUDENT IN AN ORGANIZED HEALTH CARE EDUCATION/TRAINING PROGRAM

## 2025-03-17 PROCEDURE — 83605 ASSAY OF LACTIC ACID: CPT | Performed by: STUDENT IN AN ORGANIZED HEALTH CARE EDUCATION/TRAINING PROGRAM

## 2025-03-17 PROCEDURE — 1126F AMNT PAIN NOTED NONE PRSNT: CPT | Mod: 95 | Performed by: NURSE PRACTITIONER

## 2025-03-17 PROCEDURE — 87040 BLOOD CULTURE FOR BACTERIA: CPT | Performed by: STUDENT IN AN ORGANIZED HEALTH CARE EDUCATION/TRAINING PROGRAM

## 2025-03-17 PROCEDURE — 82248 BILIRUBIN DIRECT: CPT | Performed by: STUDENT IN AN ORGANIZED HEALTH CARE EDUCATION/TRAINING PROGRAM

## 2025-03-17 PROCEDURE — 98006 SYNCH AUDIO-VIDEO EST MOD 30: CPT | Performed by: NURSE PRACTITIONER

## 2025-03-17 PROCEDURE — 85004 AUTOMATED DIFF WBC COUNT: CPT | Performed by: STUDENT IN AN ORGANIZED HEALTH CARE EDUCATION/TRAINING PROGRAM

## 2025-03-17 PROCEDURE — 99285 EMERGENCY DEPT VISIT HI MDM: CPT | Mod: 25

## 2025-03-17 PROCEDURE — 36415 COLL VENOUS BLD VENIPUNCTURE: CPT | Performed by: STUDENT IN AN ORGANIZED HEALTH CARE EDUCATION/TRAINING PROGRAM

## 2025-03-17 PROCEDURE — 70450 CT HEAD/BRAIN W/O DYE: CPT

## 2025-03-17 PROCEDURE — 84484 ASSAY OF TROPONIN QUANT: CPT | Performed by: STUDENT IN AN ORGANIZED HEALTH CARE EDUCATION/TRAINING PROGRAM

## 2025-03-17 PROCEDURE — 84145 PROCALCITONIN (PCT): CPT | Performed by: STUDENT IN AN ORGANIZED HEALTH CARE EDUCATION/TRAINING PROGRAM

## 2025-03-17 PROCEDURE — 120N000001 HC R&B MED SURG/OB

## 2025-03-17 PROCEDURE — 71045 X-RAY EXAM CHEST 1 VIEW: CPT

## 2025-03-17 PROCEDURE — 82374 ASSAY BLOOD CARBON DIOXIDE: CPT | Performed by: STUDENT IN AN ORGANIZED HEALTH CARE EDUCATION/TRAINING PROGRAM

## 2025-03-17 RX ORDER — CALCIUM CARBONATE 500 MG/1
1000 TABLET, CHEWABLE ORAL 4 TIMES DAILY PRN
Status: DISCONTINUED | OUTPATIENT
Start: 2025-03-17 | End: 2025-03-18 | Stop reason: HOSPADM

## 2025-03-17 RX ORDER — ACETAMINOPHEN 650 MG/1
650 SUPPOSITORY RECTAL EVERY 4 HOURS PRN
Status: DISCONTINUED | OUTPATIENT
Start: 2025-03-17 | End: 2025-03-18 | Stop reason: HOSPADM

## 2025-03-17 RX ORDER — AMOXICILLIN 250 MG
2 CAPSULE ORAL 2 TIMES DAILY PRN
Status: DISCONTINUED | OUTPATIENT
Start: 2025-03-17 | End: 2025-03-18 | Stop reason: HOSPADM

## 2025-03-17 RX ORDER — DOXYCYCLINE 100 MG/10ML
100 INJECTION, POWDER, LYOPHILIZED, FOR SOLUTION INTRAVENOUS ONCE
Status: COMPLETED | OUTPATIENT
Start: 2025-03-17 | End: 2025-03-18

## 2025-03-17 RX ORDER — DEXAMETHASONE 2 MG/1
2 TABLET ORAL ONCE
Status: DISCONTINUED | OUTPATIENT
Start: 2025-03-17 | End: 2025-03-18

## 2025-03-17 RX ORDER — AMOXICILLIN 250 MG
1 CAPSULE ORAL 2 TIMES DAILY PRN
Status: DISCONTINUED | OUTPATIENT
Start: 2025-03-17 | End: 2025-03-18 | Stop reason: HOSPADM

## 2025-03-17 RX ORDER — CEFTRIAXONE 1 G/1
1 INJECTION, POWDER, FOR SOLUTION INTRAMUSCULAR; INTRAVENOUS EVERY 24 HOURS
Status: DISCONTINUED | OUTPATIENT
Start: 2025-03-18 | End: 2025-03-18 | Stop reason: HOSPADM

## 2025-03-17 RX ORDER — DOXYCYCLINE 100 MG/10ML
100 INJECTION, POWDER, LYOPHILIZED, FOR SOLUTION INTRAVENOUS EVERY 12 HOURS
Status: DISCONTINUED | OUTPATIENT
Start: 2025-03-18 | End: 2025-03-17

## 2025-03-17 RX ORDER — ONDANSETRON 2 MG/ML
4 INJECTION INTRAMUSCULAR; INTRAVENOUS EVERY 6 HOURS PRN
Status: DISCONTINUED | OUTPATIENT
Start: 2025-03-17 | End: 2025-03-18 | Stop reason: HOSPADM

## 2025-03-17 RX ORDER — ACETAMINOPHEN 325 MG/1
650 TABLET ORAL EVERY 4 HOURS PRN
Status: DISCONTINUED | OUTPATIENT
Start: 2025-03-17 | End: 2025-03-18 | Stop reason: HOSPADM

## 2025-03-17 RX ORDER — CEFTRIAXONE 1 G/1
1 INJECTION, POWDER, FOR SOLUTION INTRAMUSCULAR; INTRAVENOUS ONCE
Status: COMPLETED | OUTPATIENT
Start: 2025-03-17 | End: 2025-03-18

## 2025-03-17 RX ORDER — DOXYCYCLINE 100 MG/10ML
100 INJECTION, POWDER, LYOPHILIZED, FOR SOLUTION INTRAVENOUS EVERY 12 HOURS
Status: DISCONTINUED | OUTPATIENT
Start: 2025-03-18 | End: 2025-03-18 | Stop reason: HOSPADM

## 2025-03-17 RX ORDER — ONDANSETRON 4 MG/1
4 TABLET, ORALLY DISINTEGRATING ORAL EVERY 6 HOURS PRN
Status: DISCONTINUED | OUTPATIENT
Start: 2025-03-17 | End: 2025-03-18 | Stop reason: HOSPADM

## 2025-03-17 RX ADMIN — CEFTRIAXONE 1 G: 1 INJECTION, POWDER, FOR SOLUTION INTRAMUSCULAR; INTRAVENOUS at 22:26

## 2025-03-17 RX ADMIN — SODIUM CHLORIDE 500 ML: 9 INJECTION, SOLUTION INTRAVENOUS at 23:14

## 2025-03-17 RX ADMIN — SODIUM CHLORIDE 500 ML: 0.9 INJECTION, SOLUTION INTRAVENOUS at 20:49

## 2025-03-17 RX ADMIN — DOXYCYCLINE 100 MG: 100 INJECTION, POWDER, LYOPHILIZED, FOR SOLUTION INTRAVENOUS at 23:07

## 2025-03-17 ASSESSMENT — ACTIVITIES OF DAILY LIVING (ADL)
ADLS_ACUITY_SCORE: 60

## 2025-03-17 ASSESSMENT — COLUMBIA-SUICIDE SEVERITY RATING SCALE - C-SSRS
2. HAVE YOU ACTUALLY HAD ANY THOUGHTS OF KILLING YOURSELF IN THE PAST MONTH?: NO
6. HAVE YOU EVER DONE ANYTHING, STARTED TO DO ANYTHING, OR PREPARED TO DO ANYTHING TO END YOUR LIFE?: NO
1. IN THE PAST MONTH, HAVE YOU WISHED YOU WERE DEAD OR WISHED YOU COULD GO TO SLEEP AND NOT WAKE UP?: NO

## 2025-03-17 NOTE — NURSING NOTE
Current patient location: [address] [place] [address]    Is the patient currently in the state of [place]? YES    Visit mode: VIDEO    If the visit is dropped, the patient can be reconnected by:VIDEO VISIT: Text to cell phone:   Telephone Information:   Mobile [phone]       Will anyone else be joining the visit? NO  (If patient encounters technical issues they should call [phone]    Are changes needed to the allergy or medication list? No    Are refills needed on medications prescribed by this physician? NO    Rooming Documentation:  Not applicable    Reason for visit: RAY PAREDES

## 2025-03-17 NOTE — PATIENT INSTRUCTIONS
Continue Spiriva + Airduo.  Continue Duonebs four times daily.  Continue Azithromycin 250mg daily.  Continue 5-7L oxygen to keep saturations >88%.   Call my nurse, Jamie ([phone]) with any change or worsening of your breathing.  Follow-up in 3 months.     Tata Lainez CNP  Pulmonary Medicine  [facility]  [phone]

## 2025-03-17 NOTE — ED TRIAGE NOTES
Patient brought in by EMS, lives with daughter, states patient has been more confused today and slower to respond, generalized weakness, sleeping more and not eating. Has small cell carcinoma with kavya, spread to brain, had radiation last Wed and is supposed to have another tomorrow. Has chronic cough to baseline per daughter and uses 5-7 L of 02 at baseline, she has been turning it up recently.  She is supposed to set up for hospice, next week. Blood sugar 184.     Triage Assessment (Adult)       Row Name 03/17/25 1841          Triage Assessment    Airway WDL WDL        Respiratory WDL    Respiratory WDL cough     Cough Frequency frequent     Cough Type nonproductive        Skin Circulation/Temperature WDL    Skin Circulation/Temperature WDL WDL        Cardiac WDL    Cardiac WDL WDL        Peripheral/Neurovascular WDL    Peripheral Neurovascular WDL WDL        Cognitive/Neuro/Behavioral WDL    Cognitive/Neuro/Behavioral WDL orientation     Orientation oriented x 4

## 2025-03-18 VITALS
RESPIRATION RATE: 16 BRPM | HEIGHT: 63 IN | HEART RATE: 90 BPM | TEMPERATURE: 97.3 F | DIASTOLIC BLOOD PRESSURE: 91 MMHG | SYSTOLIC BLOOD PRESSURE: 174 MMHG | WEIGHT: 91 LBS | OXYGEN SATURATION: 94 % | BODY MASS INDEX: 16.12 KG/M2

## 2025-03-18 LAB
ANION GAP SERPL CALCULATED.3IONS-SCNC: 12 MMOL/L (ref 7–15)
ATRIAL RATE - MUSE: 105 BPM
BUN SERPL-MCNC: 20.3 MG/DL (ref 8–23)
CA-I BLD-MCNC: 4.8 MG/DL (ref 4.4–5.2)
CALCIUM SERPL-MCNC: 9 MG/DL (ref 8.8–10.4)
CHLORIDE SERPL-SCNC: 108 MMOL/L (ref 98–107)
CREAT SERPL-MCNC: 0.76 MG/DL (ref 0.51–0.95)
DIASTOLIC BLOOD PRESSURE - MUSE: 69 MMHG
EGFRCR SERPLBLD CKD-EPI 2021: 83 ML/MIN/1.73M2
ERYTHROCYTE [DISTWIDTH] IN BLOOD BY AUTOMATED COUNT: 19 % (ref 10–15)
GLUCOSE SERPL-MCNC: 137 MG/DL (ref 70–99)
HCO3 SERPL-SCNC: 26 MMOL/L (ref 22–29)
HCT VFR BLD AUTO: 26.3 % (ref 35–47)
HGB BLD-MCNC: 8.3 G/DL (ref 11.7–15.7)
INTERPRETATION ECG - MUSE: NORMAL
MCH RBC QN AUTO: 30 PG (ref 26.5–33)
MCHC RBC AUTO-ENTMCNC: 31.6 G/DL (ref 31.5–36.5)
MCV RBC AUTO: 95 FL (ref 78–100)
P AXIS - MUSE: 89 DEGREES
PLATELET # BLD AUTO: 268 10E3/UL (ref 150–450)
POTASSIUM SERPL-SCNC: 4 MMOL/L (ref 3.4–5.3)
PR INTERVAL - MUSE: 128 MS
QRS DURATION - MUSE: 68 MS
QT - MUSE: 324 MS
QTC - MUSE: 428 MS
R AXIS - MUSE: 87 DEGREES
RBC # BLD AUTO: 2.77 10E6/UL (ref 3.8–5.2)
SODIUM SERPL-SCNC: 146 MMOL/L (ref 135–145)
SYSTOLIC BLOOD PRESSURE - MUSE: 149 MMHG
T AXIS - MUSE: 72 DEGREES
VENTRICULAR RATE- MUSE: 105 BPM
WBC # BLD AUTO: 14.5 10E3/UL (ref 4–11)

## 2025-03-18 PROCEDURE — 99239 HOSP IP/OBS DSCHRG MGMT >30: CPT | Mod: GC

## 2025-03-18 PROCEDURE — 94640 AIRWAY INHALATION TREATMENT: CPT | Mod: 76

## 2025-03-18 PROCEDURE — 82330 ASSAY OF CALCIUM: CPT

## 2025-03-18 PROCEDURE — 94640 AIRWAY INHALATION TREATMENT: CPT

## 2025-03-18 PROCEDURE — 250N000012 HC RX MED GY IP 250 OP 636 PS 637

## 2025-03-18 PROCEDURE — 80048 BASIC METABOLIC PNL TOTAL CA: CPT

## 2025-03-18 PROCEDURE — 82565 ASSAY OF CREATININE: CPT

## 2025-03-18 PROCEDURE — 250N000011 HC RX IP 250 OP 636

## 2025-03-18 PROCEDURE — P9016 RBC LEUKOCYTES REDUCED: HCPCS | Performed by: STUDENT IN AN ORGANIZED HEALTH CARE EDUCATION/TRAINING PROGRAM

## 2025-03-18 PROCEDURE — 82374 ASSAY BLOOD CARBON DIOXIDE: CPT

## 2025-03-18 PROCEDURE — 250N000009 HC RX 250

## 2025-03-18 PROCEDURE — 36415 COLL VENOUS BLD VENIPUNCTURE: CPT

## 2025-03-18 PROCEDURE — 99222 1ST HOSP IP/OBS MODERATE 55: CPT | Performed by: INTERNAL MEDICINE

## 2025-03-18 PROCEDURE — 85027 COMPLETE CBC AUTOMATED: CPT

## 2025-03-18 PROCEDURE — 250N000013 HC RX MED GY IP 250 OP 250 PS 637

## 2025-03-18 PROCEDURE — 999N000157 HC STATISTIC RCP TIME EA 10 MIN

## 2025-03-18 RX ORDER — BUPRENORPHINE 20 UG/H
1 PATCH TRANSDERMAL WEEKLY
Status: DISCONTINUED | OUTPATIENT
Start: 2025-03-18 | End: 2025-03-18 | Stop reason: HOSPADM

## 2025-03-18 RX ORDER — FLUTICASONE FUROATE AND VILANTEROL 200; 25 UG/1; UG/1
1 POWDER RESPIRATORY (INHALATION) DAILY
Status: DISCONTINUED | OUTPATIENT
Start: 2025-03-18 | End: 2025-03-18 | Stop reason: HOSPADM

## 2025-03-18 RX ORDER — IPRATROPIUM BROMIDE AND ALBUTEROL SULFATE 2.5; .5 MG/3ML; MG/3ML
1 SOLUTION RESPIRATORY (INHALATION) EVERY 6 HOURS PRN
Status: DISCONTINUED | OUTPATIENT
Start: 2025-03-17 | End: 2025-03-18

## 2025-03-18 RX ORDER — HEPARIN SODIUM (PORCINE) LOCK FLUSH IV SOLN 100 UNIT/ML 100 UNIT/ML
5-10 SOLUTION INTRAVENOUS
Status: DISCONTINUED | OUTPATIENT
Start: 2025-03-18 | End: 2025-03-18 | Stop reason: HOSPADM

## 2025-03-18 RX ORDER — HEPARIN SODIUM,PORCINE 10 UNIT/ML
5-10 VIAL (ML) INTRAVENOUS EVERY 24 HOURS
Status: DISCONTINUED | OUTPATIENT
Start: 2025-03-18 | End: 2025-03-18 | Stop reason: HOSPADM

## 2025-03-18 RX ORDER — NALOXONE HYDROCHLORIDE 0.4 MG/ML
0.2 INJECTION, SOLUTION INTRAMUSCULAR; INTRAVENOUS; SUBCUTANEOUS
Status: DISCONTINUED | OUTPATIENT
Start: 2025-03-18 | End: 2025-03-18 | Stop reason: HOSPADM

## 2025-03-18 RX ORDER — LISINOPRIL 10 MG/1
10 TABLET ORAL DAILY
Qty: 30 TABLET | Refills: 12 | Status: SHIPPED | OUTPATIENT
Start: 2025-03-18 | End: 2025-03-18

## 2025-03-18 RX ORDER — DEXAMETHASONE 2 MG/1
2 TABLET ORAL DAILY
Status: DISCONTINUED | OUTPATIENT
Start: 2025-03-25 | End: 2025-03-18 | Stop reason: HOSPADM

## 2025-03-18 RX ORDER — NALOXONE HYDROCHLORIDE 0.4 MG/ML
0.4 INJECTION, SOLUTION INTRAMUSCULAR; INTRAVENOUS; SUBCUTANEOUS
Status: DISCONTINUED | OUTPATIENT
Start: 2025-03-18 | End: 2025-03-18 | Stop reason: HOSPADM

## 2025-03-18 RX ORDER — IPRATROPIUM BROMIDE AND ALBUTEROL SULFATE 2.5; .5 MG/3ML; MG/3ML
1 SOLUTION RESPIRATORY (INHALATION)
Status: DISCONTINUED | OUTPATIENT
Start: 2025-03-18 | End: 2025-03-18 | Stop reason: HOSPADM

## 2025-03-18 RX ORDER — FEXOFENADINE HCL 60 MG/1
60 TABLET, FILM COATED ORAL DAILY
Status: DISCONTINUED | OUTPATIENT
Start: 2025-03-18 | End: 2025-03-18 | Stop reason: HOSPADM

## 2025-03-18 RX ORDER — PROCHLORPERAZINE MALEATE 10 MG
10 TABLET ORAL EVERY 6 HOURS PRN
COMMUNITY

## 2025-03-18 RX ORDER — HEPARIN SODIUM,PORCINE 10 UNIT/ML
5-10 VIAL (ML) INTRAVENOUS
Status: DISCONTINUED | OUTPATIENT
Start: 2025-03-18 | End: 2025-03-18 | Stop reason: HOSPADM

## 2025-03-18 RX ORDER — DEXAMETHASONE 2 MG/1
4 TABLET ORAL DAILY
Status: DISCONTINUED | OUTPATIENT
Start: 2025-03-19 | End: 2025-03-18 | Stop reason: HOSPADM

## 2025-03-18 RX ORDER — HYDROXYZINE HYDROCHLORIDE 25 MG/1
25-50 TABLET, FILM COATED ORAL EVERY 8 HOURS PRN
Status: DISCONTINUED | OUTPATIENT
Start: 2025-03-17 | End: 2025-03-18 | Stop reason: HOSPADM

## 2025-03-18 RX ORDER — MIRTAZAPINE 7.5 MG/1
7.5 TABLET, FILM COATED ORAL AT BEDTIME
Status: DISCONTINUED | OUTPATIENT
Start: 2025-03-18 | End: 2025-03-18

## 2025-03-18 RX ORDER — LACTOBACILLUS RHAMNOSUS GG 10B CELL
1 CAPSULE ORAL 2 TIMES DAILY
Status: DISCONTINUED | OUTPATIENT
Start: 2025-03-18 | End: 2025-03-18 | Stop reason: HOSPADM

## 2025-03-18 RX ORDER — DOXYCYCLINE 100 MG/1
100 CAPSULE ORAL 2 TIMES DAILY
Qty: 10 CAPSULE | Refills: 0 | Status: SHIPPED | OUTPATIENT
Start: 2025-03-18 | End: 2025-03-23

## 2025-03-18 RX ORDER — DEXAMETHASONE 1 MG
1 TABLET ORAL DAILY
Status: DISCONTINUED | OUTPATIENT
Start: 2025-03-28 | End: 2025-03-18 | Stop reason: HOSPADM

## 2025-03-18 RX ORDER — FERROUS SULFATE 325(65) MG
324 TABLET ORAL DAILY
Status: DISCONTINUED | OUTPATIENT
Start: 2025-03-18 | End: 2025-03-18 | Stop reason: HOSPADM

## 2025-03-18 RX ORDER — LIDOCAINE AND PRILOCAINE 25; 25 MG/G; MG/G
CREAM TOPICAL DAILY PRN
Status: DISCONTINUED | OUTPATIENT
Start: 2025-03-17 | End: 2025-03-18 | Stop reason: HOSPADM

## 2025-03-18 RX ORDER — FERROUS SULFATE 324(65)MG
324 TABLET, DELAYED RELEASE (ENTERIC COATED) ORAL DAILY
COMMUNITY

## 2025-03-18 RX ORDER — LISINOPRIL 10 MG/1
10 TABLET ORAL DAILY
Status: DISCONTINUED | OUTPATIENT
Start: 2025-03-18 | End: 2025-03-18 | Stop reason: HOSPADM

## 2025-03-18 RX ORDER — ATORVASTATIN CALCIUM 40 MG/1
40 TABLET, FILM COATED ORAL DAILY
Status: DISCONTINUED | OUTPATIENT
Start: 2025-03-18 | End: 2025-03-18 | Stop reason: HOSPADM

## 2025-03-18 RX ORDER — AZITHROMYCIN 250 MG/1
250 TABLET, FILM COATED ORAL DAILY
Status: DISCONTINUED | OUTPATIENT
Start: 2025-03-18 | End: 2025-03-18 | Stop reason: HOSPADM

## 2025-03-18 RX ADMIN — FEXOFENADINE HYDROCHLORIDE 60 MG: 60 TABLET ORAL at 11:28

## 2025-03-18 RX ADMIN — ACETAMINOPHEN 650 MG: 325 TABLET ORAL at 11:41

## 2025-03-18 RX ADMIN — IPRATROPIUM BROMIDE AND ALBUTEROL SULFATE 3 ML: .5; 3 SOLUTION RESPIRATORY (INHALATION) at 08:53

## 2025-03-18 RX ADMIN — ACETAMINOPHEN 650 MG: 325 TABLET ORAL at 05:37

## 2025-03-18 RX ADMIN — ATORVASTATIN CALCIUM 40 MG: 40 TABLET, FILM COATED ORAL at 09:19

## 2025-03-18 RX ADMIN — AZITHROMYCIN DIHYDRATE 250 MG: 250 TABLET, FILM COATED ORAL at 09:19

## 2025-03-18 RX ADMIN — IPRATROPIUM BROMIDE AND ALBUTEROL SULFATE 3 ML: .5; 3 SOLUTION RESPIRATORY (INHALATION) at 15:48

## 2025-03-18 RX ADMIN — IPRATROPIUM BROMIDE AND ALBUTEROL SULFATE 3 ML: .5; 3 SOLUTION RESPIRATORY (INHALATION) at 11:14

## 2025-03-18 RX ADMIN — DEXAMETHASONE 6 MG: 2 TABLET ORAL at 09:18

## 2025-03-18 RX ADMIN — DOXYCYCLINE 100 MG: 100 INJECTION, POWDER, LYOPHILIZED, FOR SOLUTION INTRAVENOUS at 11:34

## 2025-03-18 RX ADMIN — Medication 1 CAPSULE: at 11:29

## 2025-03-18 RX ADMIN — BUPRENORPHINE 1 PATCH: 20 PATCH, EXTENDED RELEASE TRANSDERMAL at 09:20

## 2025-03-18 RX ADMIN — HEPARIN, PORCINE (PF) 10 UNIT/ML INTRAVENOUS SYRINGE 5 ML: at 18:07

## 2025-03-18 ASSESSMENT — ACTIVITIES OF DAILY LIVING (ADL)
ADLS_ACUITY_SCORE: 62
DEPENDENT_IADLS:: CLEANING;COOKING;LAUNDRY;SHOPPING;MEDICATION MANAGEMENT;MONEY MANAGEMENT;TRANSPORTATION
ADLS_ACUITY_SCORE: 62

## 2025-03-18 NOTE — ED PROVIDER NOTES
EMERGENCY DEPARTMENT ENCOUNTER      NAME: Jasmyn Green  AGE: 69 year old female  YOB: 1955  MRN: [id number]  EVALUATION DATE & TIME: 3/17/2025  6:31 PM    PCP: Ana Maria Bermudez    ED PROVIDER: Keith Arrington MD      Chief Complaint   Patient presents with    Altered Mental Status    Generalized Weakness         FINAL IMPRESSION:  1. Secondary malignant neoplasm of brain (H)    2. Malignant neoplasm of middle lobe, bronchus or lung (H)    3. Pneumonia of both lungs due to infectious organism, unspecified part of lung    4. Acute and chronic respiratory failure with hypoxia (H)    5. 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 (See chart for details)  69 year old female presents to the Emergency Department for evaluation of altered mental status.    ED Course as of 03/17/25 2312   Mon Mar 17, 2025   1959 Patient is a 69-year-old female with metastatic small cell lung cancer on 5 to 7 L at baseline who presents emergency department for worsening confusion.  Daughter states that this morning patient seen much more confused than usual and is having occasional which she describes as spastic type movements in the arms and legs that happens once every few minutes.  Patient does not seem to lose consciousness during these events and  and they are not described as persistent tonic-clonic type movements but more almost like myoclonic jerks.  Patient's otherwise been slower to respond.  No vomiting.  No falls or injuries.  Currently being evaluated for hospice care with plans for single treatment of palliative radiation to her brain metastases.    On exam here patient is awake in no acute distress.  She is little slow to respond but this could be because she is very hard of hearing.  No she is in the hospital and actively knows the month and year.   does not seem to have any facial droop, full EOMs, symmetric strength in  upper and lower extremities and normal coordination, during my time in the room does seem to have a few very brief periods of jerking type motion of her arms and legs but it is not persistent and resolves after single abnormal movement.  She is tachycardic on exam bilateral rhonchi/coarse breath sounds in the bases.      Initial differentials broad but includes not limited to infectious process including pneumonia, UTI.  Patient denies any abdominal pain or suspicion at this point time for acute intra-abdominal process.  She seems awake and alert at this point in time.  Will obtain a CT head to evaluate for new bleed.  Most recent head imaging did show worsening vasogenic edema and I suspect this is likely the cause of her worsening confusion and abnormal movements described by the daughter.       1958 Received call from lab.  Hemoglobin critically low at 6.5.   2217 Labs reviewed inter myself.  CBC notable for leukocytosis at 16.7 which is similar compared to 2 weeks ago.  No significant thrombocytopenia.  BMP shows potentially some prerenal azotemia with a BUN of 26 and creatinine of 0.88.  No significant metabolic derangements otherwise.  Normal LFTs.  Initial troponin is elevated at 45.  Patient denies any chest pain I suspect this is secondary to supply/demand mismatch in her hypoxia prior to arrival here.  Will repeat a troponin to evaluate for significant increase over time.   2232 Repeat troponin downtrending to 39.   2254 Chest x-ray does show potential developing pneumonia in the left mid upper lung and right base new from prior.  Procalcitonin also elevated and given worsening of foxy at home suspect pneumonia could be contributing to her symptoms and presentation today.    Additionally CT of the head does show increased vasogenic edema in the left cerebral hemisphere compared to prior CT on 2/28 no signs of hemorrhage.    Discussed findings with patient and family.  Discussed that pneumonia and her  worsening vasogenic edema are likely both contributing to her confusion today.  Ultimately she is back to her home O2 around 5 L saturating in the mid 90s here.  I discussed goals of care with patient and family at this point in time they would still like to pursue somewhat aggressive measures such for treatment including IV antibiotics and blood transfusions however they did specify that she is DNR/DNI.  Patient was consented for blood transfusion and was started on empiric ceftriaxone and doxycycline.  Additionally she was prescribed dexamethasone by radiation oncologist and she is not yet had a chance to start this and therefore we will give her a 2 mg dose oral dexamethasone.    Otherwise discussed with patient and family that we are somewhat limited in other options to treat her cerebral edema related to the metastases at this point in time outside the palliative radiation planned in the near future.  Family expresses understanding and states that the patient would not want any aggressive intervention such as surgery even if these were possible options.    Ultimately given the patient's new onset anemia and confusion in the context of pneumonia I do believe she could benefit from admission for ongoing monitoring treatment.  Discussed with patient and family options of admission for treatment versus first dose antibiotics and blood transfusion here.  Close outpatient monitoring with oral antibiotics but they would prefer inpatient admission at this point in time.       I discussed with the medicine service who agrees admit the patient to a medical telemetry bed at this point time for ongoing medical management.      8:00 PM I met and evaluated the patient.         Medical Decision Making  Obtained supplemental history:Supplemental history obtained?: Documented in chart and Family Member/Significant Other  Reviewed external records: External records reviewed?: Documented in chart and Inpatient Record: Prior  hospitalization  Care impacted by chronic illness:Cancer/Chemotherapy and Chronic Lung Disease  Did you consider but not order tests?: Work up considered but not performed and documented in chart, if applicable  Did you interpret images independently?: Independent interpretation of ECG and images noted in documentation, when applicable.  Consultation discussion with other provider:Did you involve another provider (consultant, , pharmacy, etc.)?: I discussed the care with another health care provider, see documentation for details.  Admit.    MIPS (CTPE, Dental pain, Vick, Sinusitis, Asthma/COPD, Head Trauma): Not Applicable    SEPSIS: None Leukocytosis but normal lactate and no hypotension           At the conclusion of the encounter I discussed the results of all of the tests and the disposition. The questions were answered. The patient or family acknowledged understanding and was agreeable with the care plan.     32 minutes of critical care time     MEDICATIONS GIVEN IN THE EMERGENCY:  Medications   doxycycline (VIBRAMYCIN) 100 mg vial to attach to  mL bag (100 mg Intravenous $New Bag 3/17/25 2303)   dexAMETHasone (DECADRON) tablet 2 mg (has no administration in time range)   sodium chloride 0.9% BOLUS 500 mL (has no administration in time range)   sodium chloride 0.9% BOLUS 500 mL (500 mLs Intravenous $New Bag 3/17/25 2049)   cefTRIAXone (ROCEPHIN) 1 g vial to attach to  mL bag for ADULTS or NS 50 mL bag for PEDS (1 g Intravenous $New Bag 3/17/25 2226)       NEW PRESCRIPTIONS STARTED AT TODAY'S ER VISIT  New Prescriptions    No medications on file          =================================================================    HPI    Patient information was obtained from: daughter    Use of : N/A         Jasmyn Green is a 69 year old female with a pertinent history of small cell lung cancer with brain metastases, and anemia associated with chemotherapy, COPD, emphysema, HLD, HTN, who  "presents to this ED by EMS for evaluation of altered mental state.     HPI is limited due to altered mental status.    The patient's daughter reports that the patient woke up this morning with confusion and \"involuntary jerking all over the body.\" She describes the jerking as brief whole body jerks that come every 10-15 minutes. Notes that when patient went to bed last night, she was \"normal and like her usual self\". Also notes that patient was \"out of it\" this morning with not much awareness and confusion. The same problem occurred two weeks ago and the patient was hospitalized, where the daughter stated that they \"did not give any answers.\" At 5:30pm today, the daughter called 911 to bring patient to [facility] ED. The daughter reports the patient has small cell lung cancer with brain metastases for which she will be having one time radiation treatment, followed with hospice care for comfort care. The patient uses 7 L of supplemental O2 and daughter states that yesterday, they increased patient's O2 to 8 L due to O2 sats dropping. Daughter denies any recent falls or head trauma. The patient denies any vomiting, headache, nausea, abdominal pain, and chest pain, or pain elsewhere. There were no other concerns/complaints at this time.     Per chart review,  3/17/25:  Patient had virtual visit with pulmonology PCP for hospital follow-up. She was hospitalized for COPD and progression of cancer. Upon discharge, she required 7 L of O2. Patient does have history of small cell lung cancer with brain metastases. Patient medications were made as follows: prednisone 40mg x3 days, 30mg x3 days, 20mg x3 days, 10mg x3 days + doxycycline 100mg BID x5 days. Advised to continue 7 of O2 with activity. Patient was planning to sign onto hospice and suggested to follow-up in 3 months.     REVIEW OF SYSTEMS   Refer to the HPI    PAST MEDICAL HISTORY:  Past Medical History:   Diagnosis Date    Age-related osteoporosis without current " pathological fracture     Arthritis     COPD (chronic obstructive pulmonary disease) (H)     Coronary artery disease     Dependence on nocturnal oxygen therapy     Dyspnea on exertion     Emphysema lung (H)     Gout     HLD (hyperlipidemia)     Hypertension     Lung mass     Pneumonia of both lungs due to infectious organism, unspecified part of lung 3/17/2025       PAST SURGICAL HISTORY:  Past Surgical History:   Procedure Laterality Date    BRONCHOSCOPY RIGID OR FLEXIBLE W/TRANSENDOSCOPIC ENDOBRONCHIAL ULTRASOUND GUIDED N/A 2/16/2024    Procedure: BRONCHOSCOPY, WITH ENDOBRONCHIAL ULTRASOUND;  Surgeon: Ruben Levin MD;  Location: [facility] Main OR    COLONOSCOPY N/A 10/21/2021    Procedure: COLONOSCOPY;  Surgeon: Wilma Hester DO;  Location: [place] Main OR    IR CHEST PORT PLACEMENT > 5 YRS OF AGE  5/2/2024    VAGINAL DELIVERY      x 3 ; remote    WISDOM TOOTH EXTRACTION             CURRENT MEDICATIONS:    acetaminophen (TYLENOL) 500 MG tablet  albuterol (PROAIR HFA/PROVENTIL HFA/VENTOLIN HFA) 108 (90 Base) MCG/ACT inhaler  aspirin 81 MG EC tablet  atorvastatin (LIPITOR) 40 MG tablet  azithromycin (ZITHROMAX) 250 MG tablet  buprenorphine (BUTRANS) 20 MCG/HR WK patch  calcium carbonate 600 mg-vitamin D 400 units (CALTRATE) 600-400 MG-UNIT per tablet  dexAMETHasone (DECADRON) 2 MG tablet  ELIQUIS ANTICOAGULANT 5 MG tablet  fexofenadine (ALLEGRA ALLERGY) 60 MG tablet  fluticasone-salmeterol (AIRDUO RESPICLICK) 232-14 MCG/ACT inhaler  hydrOXYzine HCl (ATARAX) 25 MG tablet  ipratropium - albuterol 0.5 mg/2.5 mg/3 mL (DUONEB) 0.5-2.5 (3) MG/3ML neb solution  ipratropium-albuterol (COMBIVENT RESPIMAT)  MCG/ACT inhaler  lactobacillus rhamnosus, GG, (CULTURELL) capsule  lidocaine-prilocaine (EMLA) 2.5-2.5 % external cream  lisinopril (ZESTRIL) 10 MG tablet  mirtazapine (REMERON) 7.5 MG tablet  naloxone (NARCAN) 4 MG/0.1ML nasal spray  ondansetron (ZOFRAN ODT) 8 MG ODT tab  oxyCODONE (ROXICODONE) 5 MG  tablet  OXYGEN-HELIUM IN  sennosides (SENOKOT) 8.6 MG tablet  umeclidinium (INCRUSE ELLIPTA) 62.5 MCG/ACT inhaler        ALLERGIES:  No Known Allergies    FAMILY HISTORY:  Family History   Problem Relation Age of Onset    Chronic Obstructive Pulmonary Disease Sister     Diabetes Mother     Heart Disease Mother     Lung Cancer Mother     Heart Disease Father        SOCIAL HISTORY:   Social History     Socioeconomic History    Marital status:      Spouse name: None    Number of children: None    Years of education: None    Highest education level: None   Tobacco Use    Smoking status: Former     Current packs/day: 0.00     Types: Cigarettes     Quit date: 6/27/2021     Years since quitting: 3.7     Passive exposure: Past    Smokeless tobacco: Never    Tobacco comments:     updated 8/3/2021 by [facility]TS   Vaping Use    Vaping status: Never Used   Substance and Sexual Activity    Alcohol use: Not Currently    Drug use: Never    Sexual activity: Not Currently   Social History Narrative     many years 3 grown children. 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 Last cigarette a week ago.non drinker  Her primary MD (Wilda) retired several years ago/ tends to avoid doctors/medical care in general       Social Drivers of Health     Financial Resource Strain: Low Risk  (2/28/2025)    Financial Resource Strain     Within the past 12 months, have you or your family members you live with been unable to get utilities (heat, electricity) when it was really needed?: No   Food Insecurity: Low Risk  (2/28/2025)    Food Insecurity     Within the past 12 months, did you worry that your food would run out before you got money to buy more?: No     Within the past 12 months, did the food you bought just not last and you didn t have money to get more?: No   Transportation Needs: Low Risk  (2/28/2025)    Transportation Needs     Within the past 12 months, has lack of transportation kept you from medical appointments, getting your  "medicines, non-medical meetings or appointments, work, or from getting things that you need?: No   Physical Activity: Sufficiently Active (11/10/2021)    Exercise Vital Sign     Days of Exercise per Week: 5 days     Minutes of Exercise per Session: 30 min   Stress: No Stress Concern Present (11/10/2021)    [employer] [employer] - Occupational Stress Questionnaire     Feeling of Stress : Not at all   Social Connections: Socially Isolated (11/10/2021)    Social Connection and Isolation Panel [NHANES]     Frequency of Communication with Friends and Family: More than three times a week     Frequency of Social Gatherings with Friends and Family: Once a week     Attends Christianity Services: Never     Active Member of Clubs or Organizations: No     Marital Status:    Interpersonal Safety: High Risk (3/1/2025)    Interpersonal Safety     Do you feel physically and emotionally safe where you currently live?: No     Within the past 12 months, have you been hit, slapped, kicked or otherwise physically hurt by someone?: No     Within the past 12 months, have you been humiliated or emotionally abused in other ways by your partner or ex-partner?: No   Housing Stability: Low Risk  (2/28/2025)    Housing Stability     Do you have housing? : Yes     Are you worried about losing your housing?: No       VITALS:  BP (!) 153/70   Pulse 103   Temp 98.4  F (36.9  C) (Oral)   Resp 29   Ht 1.6 m (5' 3\")   Wt 41.3 kg (91 lb)   LMP  (LMP Unknown)   SpO2 98%   BMI 16.12 kg/m      PHYSICAL EXAM    Constitutional: NAD. Frail, chronically ill appearing. Cachectic   HENT: Normocephalic, Atraumatic,  mucous membranes moist,   Neck- trachea midline, No stridor.    Eyes:EOMI, Conjunctiva normal, No discharge.   Respiratory: Normal breath sounds, No respiratory distress, No wheezing.  Fine rales at the bases bilaterally.  Cardiovascular: Normal heart rate, Regular rhythm,  No murmurs. Port in right upper chest "   Abdominal: Soft, No tenderness, No rebound or guarding.     Musculoskeletal: no deformity or malalignment   Integument: Warm, Dry, No erythema,    Neurologic: Alert & oriented x 3. Occasional myoclonic jerk type motions of the upper extremities, no facial droop, very hard of hearing but responds to questions appropriately.  Psychiatric: Affect normal, Cooperative.      LAB:  All pertinent labs reviewed and interpreted.  Results for orders placed or performed during the hospital encounter of 03/17/25   Head CT w/o contrast    Impression    IMPRESSION:    Increased degree of vasogenic edema within the left cerebral hemisphere when compared to the 02/28/2025 exam. No imaging evidence of intracranial hemorrhage.   XR Chest 1 View    Impression    IMPRESSION: Port-A-Cath tip in the SVC. Underlying emphysema with increased interstitial opacities in the left mid to upper lung and right lung base since previous. Favor pneumonia.   Basic metabolic panel   Result Value Ref Range    Sodium 144 135 - 145 mmol/L    Potassium 4.3 3.4 - 5.3 mmol/L    Chloride 105 98 - 107 mmol/L    Carbon Dioxide (CO2) 30 (H) 22 - 29 mmol/L    Anion Gap 9 7 - 15 mmol/L    Urea Nitrogen 26.9 (H) 8.0 - 23.0 mg/dL    Creatinine 0.88 0.51 - 0.95 mg/dL    GFR Estimate 70 >60 mL/min/1.73m2    Calcium 8.7 (L) 8.8 - 10.4 mg/dL    Glucose 147 (H) 70 - 99 mg/dL   Hepatic function panel   Result Value Ref Range    Protein Total 5.7 (L) 6.4 - 8.3 g/dL    Albumin 3.3 (L) 3.5 - 5.2 g/dL    Bilirubin Total 0.2 <=1.2 mg/dL    Alkaline Phosphatase 101 40 - 150 U/L    AST 25 0 - 45 U/L    ALT 29 0 - 50 U/L    Bilirubin Direct 0.12 0.00 - 0.30 mg/dL   Result Value Ref Range    Troponin T, High Sensitivity 45 (H) <=14 ng/L   Result Value Ref Range    Magnesium 2.1 1.7 - 2.3 mg/dL   CBC with platelets and differential   Result Value Ref Range    WBC Count 16.7 (H) 4.0 - 11.0 10e3/uL    RBC Count 2.14 (L) 3.80 - 5.20 10e6/uL    Hemoglobin 6.5 (LL) 11.7 - 15.7 g/dL     Hematocrit 21.5 (L) 35.0 - 47.0 %     (H) 78 - 100 fL    MCH 30.4 26.5 - 33.0 pg    MCHC 30.2 (L) 31.5 - 36.5 g/dL    RDW 18.5 (H) 10.0 - 15.0 %    Platelet Count 283 150 - 450 10e3/uL    % Neutrophils 89 %    % Lymphocytes 3 %    % Monocytes 6 %    % Eosinophils 1 %    % Basophils 0 %    % Immature Granulocytes 1 %    NRBCs per 100 WBC 0 <1 /100    Absolute Neutrophils 14.9 (H) 1.6 - 8.3 10e3/uL    Absolute Lymphocytes 0.5 (L) 0.8 - 5.3 10e3/uL    Absolute Monocytes 1.0 0.0 - 1.3 10e3/uL    Absolute Eosinophils 0.2 0.0 - 0.7 10e3/uL    Absolute Basophils 0.0 0.0 - 0.2 10e3/uL    Absolute Immature Granulocytes 0.1 <=0.4 10e3/uL    Absolute NRBCs 0.0 10e3/uL   Result Value Ref Range    Troponin T, High Sensitivity 39 (H) <=14 ng/L   Lactic Acid Whole Blood with 1X Repeat in 2 HR when >2   Result Value Ref Range    Lactic Acid, Initial 0.4 (L) 0.7 - 2.0 mmol/L   Result Value Ref Range    Procalcitonin 1.23 (H) <0.50 ng/mL   Adult Type and Screen   Result Value Ref Range    ABO/RH(D) A POS     Antibody Screen Negative Negative    SPECIMEN EXPIRATION DATE [id number]        RADIOLOGY:  Reviewed all pertinent imaging. Please see official radiology report.  XR Chest 1 View   Final Result   IMPRESSION: Port-A-Cath tip in the SVC. Underlying emphysema with increased interstitial opacities in the left mid to upper lung and right lung base since previous. Favor pneumonia.      Head CT w/o contrast   Final Result   IMPRESSION:      Increased degree of vasogenic edema within the left cerebral hemisphere when compared to the 02/28/2025 exam. No imaging evidence of intracranial hemorrhage.          EKG:    Performed at:     Impression: EKG shows a sinus tach 105 beats a minute, normal axis, normal NH, QRS and QTc durations, no ST elevations or acute ischemic T wave changes.        I have independently reviewed and interpreted the EKG(s) documented above.    PROCEDURES:   None      IParesh, am serving as a  scribe to document services personally performed by Keith Arrington MD based on my observation and the provider's statements to me. I, Keith Arrington MD, attest that Shabbirrashmijuan ramon Robinphani is acting in a scribe capacity, has observed my performance of the services and has documented them in accordance with my direction.    Keith Arrington MD  [facility] EMERGENCY ROOM  [address] [facility] [id number] [phone]      Keith Arrington MD  03/17/25 8667

## 2025-03-18 NOTE — PHARMACY-ADMISSION MEDICATION HISTORY
Pharmacy Intern Admission Medication History    Admission medication history is complete. The information provided in this note is only as accurate as the sources available at the time of the update.    Information Source(s): Family member and CareEverywhere/SureScripts via in-person    Pertinent Information: Patient hasn't started Decadron course, due today. Buprenorphine patch last taken last Tuesday, due today. Daughter to bring in home inhalers.    Changes made to PTA medication list:  Added: Ferrous sulfate, melatonin (strength unknown), prochlorperazine  Deleted: Lisinopril  Changed: Naloxone (confirmed on-hand), Buprenorphine (Tuesdays)    Allergies reviewed with patient and updates made in EHR: yes    Medication History Completed By: AAMIR WOOD 3/18/2025 7:58 AM    PTA Med List   Medication Sig Note Last Dose/Taking    acetaminophen (TYLENOL) 500 MG tablet Take 2 tablets (1,000 mg) by mouth every 8 hours as needed for pain  Past Week    albuterol (PROAIR HFA/PROVENTIL HFA/VENTOLIN HFA) 108 (90 Base) MCG/ACT inhaler Inhale 1-2 puffs into the lungs every 6 hours as needed for shortness of breath, wheezing or cough.  3/17/2025 Evening    aspirin 81 MG EC tablet Take 81 mg by mouth daily.  3/17/2025 Morning    atorvastatin (LIPITOR) 40 MG tablet Take 1 tablet (40 mg) by mouth daily  3/17/2025 Morning    azithromycin (ZITHROMAX) 250 MG tablet Take 1 tablet (250 mg) by mouth daily.  3/17/2025 Morning    buprenorphine (BUTRANS) 20 MCG/HR WK patch Place 1 patch over 168 hours onto the skin every 7 days. 3/18/2025: Takes on Tuesdays 3/11/2025    calcium carbonate 600 mg-vitamin D 400 units (CALTRATE) 600-400 MG-UNIT per tablet Take 1 tablet by mouth 2 times daily With calcium  3/17/2025 Morning    dexAMETHasone (DECADRON) 2 MG tablet Take 3 tabs by mouth daily x 1 days, then 2 tabs daily x 6 days, then 1 tab daily x 3 days, then 1/2 tab daily x 4 days. 3/18/2025: Not Started as of 03/18/2025 0750 Taking    ELIQUIS  ANTICOAGULANT 5 MG tablet TAKE 1 TABLET (5 MG) BY MOUTH 2 TIMES DAILY  3/17/2025 Morning    Ferrous Sulfate 324 (65 Fe) MG TBEC Take 324 mg by mouth daily.  3/17/2025 Morning    fexofenadine (ALLEGRA ALLERGY) 60 MG tablet Take 60 mg by mouth daily.  3/17/2025 Morning    fluticasone-salmeterol (AIRDUO RESPICLICK) 232-14 MCG/ACT inhaler Inhale 1 puff into the lungs 2 times daily  3/17/2025 Morning    hydrOXYzine HCl (ATARAX) 25 MG tablet Take 1-2 tablets (25-50 mg) by mouth every 8 hours as needed for anxiety (Insomnia)  Past Week    ipratropium - albuterol 0.5 mg/2.5 mg/3 mL (DUONEB) 0.5-2.5 (3) MG/3ML neb solution Take 1 vial (3 mLs) by nebulization every 6 hours as needed for shortness of breath, wheezing or cough.  3/17/2025 Evening    ipratropium-albuterol (COMBIVENT RESPIMAT)  MCG/ACT inhaler Inhale 1 puff into the lungs 4 times daily as needed for shortness of breath, wheezing or cough.  3/17/2025 Evening    lactobacillus rhamnosus, GG, (CULTURELL) capsule Take 1 capsule by mouth 2 times daily  3/17/2025 Morning    lidocaine-prilocaine (EMLA) 2.5-2.5 % external cream Apply topically as needed for moderate pain  Taking As Needed    MELATONIN PO Take 1 tablet by mouth nightly as needed (sleep).  3/16/2025 Bedtime    mirtazapine (REMERON) 7.5 MG tablet Take 1 tablet (7.5 mg) by mouth at bedtime  3/16/2025 Bedtime    naloxone (NARCAN) 4 MG/0.1ML nasal spray Spray 1 spray (4 mg) into one nostril alternating nostrils as needed for opioid reversal every 2-3 minutes until assistance arrives  Taking As Needed    ondansetron (ZOFRAN ODT) 8 MG ODT tab Take 1 tablet (8 mg) by mouth every 8 hours as needed for nausea.  Taking As Needed    oxyCODONE (ROXICODONE) 5 MG tablet Take 1.5-2 tablets (7.5-10 mg) by mouth every 3 hours as needed for pain or moderate to severe pain (PRN shortness of breath).  3/17/2025 Morning    prochlorperazine (COMPAZINE) 10 MG tablet Take 10 mg by mouth every 6 hours as needed for nausea or  vomiting.  Taking As Needed    sennosides (SENOKOT) 8.6 MG tablet Take 1 tablet by mouth daily as needed for constipation.  Taking As Needed    umeclidinium (INCRUSE ELLIPTA) 62.5 MCG/ACT inhaler Inhale 1 puff into the lungs daily  3/17/2025 Morning

## 2025-03-18 NOTE — ED NOTES
Patient refusing additional IV access, wants to only use port. Provider aware, plan is to start antibiotics and do blood after. Provider aware.

## 2025-03-18 NOTE — CONSULTS
Care Management Initial Consult    General Information  Assessment completed with: -chart review,    Type of CM/SW Visit: Initial Assessment    Primary Care Provider verified and updated as needed: Yes   Readmission within the last 30 days: no previous admission in last 30 days      Reason for Consult:  (ERS 31%)  Advance Care Planning: Advance Care Planning Reviewed: present on chart          Communication Assessment  Patient's communication style: spoken language (English or Bilingual)             Cognitive  Cognitive/Neuro/Behavioral: .WDL except, level of consciousness  Level of Consciousness: intermittent confusion  Arousal Level: arouses to voice, arouses to touch/gentle shaking  Orientation: oriented x 4             Living Environment:   People in home: child(jeremy), adult     Current living Arrangements: house      Able to return to prior arrangements: yes       Family/Social Support:  Care provided by: child(jeremy)  Provides care for: no one  Marital Status:   Support system: Children          Description of Support System: Supportive, Involved         Current Resources:   Patient receiving home care services: No        Community Resources: None  Equipment currently used at home: other (see comments) (supplmental oxygen)  Supplies currently used at home: None    Employment/Financial:  Employment Status: retired        Financial Concerns: none           Does the patient's insurance plan have a 3 day qualifying hospital stay waiver?  Yes     Which insurance plan 3 day waiver is available? Alternative insurance waiver    Will the waiver be used for post-acute placement? Undetermined at this time    Lifestyle & Psychosocial Needs:  Social Drivers of Health     Food Insecurity: Low Risk  (2/28/2025)    Food Insecurity     Within the past 12 months, did you worry that your food would run out before you got money to buy more?: No     Within the past 12 months, did the food you bought just not last and you  didn t have money to get more?: No   Depression: Not at risk (2/28/2025)    PHQ-2     PHQ-2 Score: 0   Housing Stability: Low Risk  (2/28/2025)    Housing Stability     Do you have housing? : Yes     Are you worried about losing your housing?: No   Tobacco Use: Medium Risk (3/17/2025)    Patient History     Smoking Tobacco Use: Former     Smokeless Tobacco Use: Never     Passive Exposure: Past   Financial Resource Strain: Low Risk  (2/28/2025)    Financial Resource Strain     Within the past 12 months, have you or your family members you live with been unable to get utilities (heat, electricity) when it was really needed?: No   Alcohol Use: Not At Risk (11/10/2021)    AUDIT-C     Frequency of Alcohol Consumption: Monthly or less     Average Number of Drinks: 1 or 2     Frequency of Binge Drinking: Never   Transportation Needs: Low Risk  (2/28/2025)    Transportation Needs     Within the past 12 months, has lack of transportation kept you from medical appointments, getting your medicines, non-medical meetings or appointments, work, or from getting things that you need?: No   Physical Activity: Sufficiently Active (11/10/2021)    Exercise Vital Sign     Days of Exercise per Week: 5 days     Minutes of Exercise per Session: 30 min   Interpersonal Safety: High Risk (3/1/2025)    Interpersonal Safety     Do you feel physically and emotionally safe where you currently live?: No     Within the past 12 months, have you been hit, slapped, kicked or otherwise physically hurt by someone?: No     Within the past 12 months, have you been humiliated or emotionally abused in other ways by your partner or ex-partner?: No   Stress: No Stress Concern Present (11/10/2021)    [employer] - Occupational Stress Questionnaire     Feeling of Stress : Not at all   Social Connections: Socially Isolated (11/10/2021)    Social Connection and Isolation Panel [NHANES]     Frequency of Communication with Friends and  Family: More than three times a week     Frequency of Social Gatherings with Friends and Family: Once a week     Attends Hindu Services: Never     Active Member of Clubs or Organizations: No     Attends Club or Organization Meetings: Not on file     Marital Status:    Health Literacy: Not on file       Functional Status:  Prior to admission patient needed assistance:   Dependent ADLs:: Ambulation-cane, Ambulation-walker, Bathing, Wheelchair-independent  Dependent IADLs:: Cleaning, Cooking, Laundry, Shopping, Medication Management, Money Management, Transportation  Assesssment of Functional Status: At functional baseline    Mental Health Status:  Mental Health Status: No Current Concerns       Chemical Dependency Status:  Chemical Dependency Status: No Current Concerns             Values/Beliefs:  Spiritual, Cultural Beliefs, Hindu Practices, Values that affect care:                 Discussed  Partnership in Safe Discharge Planning  document with patient/family: No    Additional Information:  SW completed review of EMR to complete consult for ERS. SID briefly met with patient and attempted outreach to pt dtr Karie. Pt presented with confusion and is extremely [place] with no hearing aids in during time in ED. Pt is currently having chemotherapy treatments with last appointment being last Wednesday. Pt confusion, weakness and increased coughing prompted family to call EMS for hospital transfer. Pt current Dx of lung cancer which has spread to brain is noted during this visit. Pt care team has noted family looking into hospice, which is unable to be confirmed at time of this note. Pt has palliative consult for this hospital admission pending.    SW will continue to follow for discharge planning.    Next Steps:     [x] Follow for discharge planning  [x] D-C Ride: coordinate with family  [] Internal hand off vs external hand off - [facility]  Hospital ED  Medical Social Worker - Care Management                       YURY Fuentes

## 2025-03-18 NOTE — PLAN OF CARE
"  Problem: Adult Inpatient Plan of Care  Goal: Plan of Care Review  Description: The Plan of Care Review/Shift note should be completed every shift.  The Outcome Evaluation is a brief statement about your assessment that the patient is improving, declining, or no change.  This information will be displayed automatically on your shift  note.  Outcome: Not Progressing  Flowsheets (Taken 3/18/2025 1111)  Outcome Evaluation: clear consults, clinical improvement  Overall Patient Progress: no change  Goal: Patient-Specific Goal (Individualized)  Description: You can add care plan individualizations to a care plan. Examples of Individualization might be:  \"Parent requests to be called daily at 9am for status\", \"I have a hard time hearing out of my right ear\", or \"Do not touch me to wake me up as it startles  me\".  Outcome: Not Progressing  Goal: Absence of Hospital-Acquired Illness or Injury  Outcome: Not Progressing  Goal: Optimal Comfort and Wellbeing  Outcome: Not Progressing  Goal: Readiness for Transition of Care  Outcome: Not Progressing  Intervention: Mutually Develop Transition Plan  Recent Flowsheet Documentation  Taken 3/18/2025 1100 by Maryam Grande LSW  Readmission Within the Last 30 Days: no previous admission in last 30 days  Equipment Currently Used at Home: (supplmental oxygen) other (see comments)   Goal Outcome Evaluation:           Overall Patient Progress: no changeOverall Patient Progress: no change    Outcome Evaluation: clear consults, clinical improvement      "

## 2025-03-18 NOTE — DISCHARGE SUMMARY
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    Weight as of this encounter: 41.3 kg (91 lb).       Follow-ups Needed After Discharge   Follow-up Appointments       Follow-up and recommended labs and tests       Follow up with primary care provider, Ana Maria Bermudez, within 7 days for hospital follow- up.  No follow up labs or test are needed.              Unresulted Labs Ordered in the Past 30 Days of this Admission       Date and Time Order Name Status Description    3/17/2025  9:47 PM Blood Culture Peripheral Blood Preliminary     3/17/2025  9:47 PM Blood Culture Peripheral Blood Preliminary             Discharge Disposition   Discharged to home  Condition at discharge: Stable    Hospital Course   Jasmyn Green was admitted on 3/17/2025 for AMS that is likely secondary to CAP and brain metastases.  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 The following problems were addressed during her hospitalization:    Altered mental status 2/2 CAP and/or brain metastases  Chronic hypoxic respiratory failure 2/2 small cell lung cancer with brain metastasis  COPD-Gold E  Emphysema  The patient presented to the hospital for an episode of acute altered mental status after waking up yesterday morning. Work up was notable for an elevated white count of 16.7. CXR showed increased interstitial opacities in the left " mid to upper lung and right lung base that favor pneumonia.  Head CT demonstrated increased degree of vasogenic edema within the left cerebral hemisphere.  No intracranial hemorrhage. Due to the patient's history of brain metastases, it is likely that the patient's confusion is secondary to her brain metastasis. During the hospital stay, the patient was on IV Ceftriaxone and Doxycycline. The patient continues to be vitally stable while continuing to rely on supplemental oxygen at   Pulmonary was consulted and recommended that considering that the patient is currently stable, it would be reasonable for the patient to be discharged home with Doxycycline PO for 5 days. The family reported that the patient and family plan for radiation on 3/19 AND THEN, hospice. Discussed the plan for discharge with patient's daughter, Julissa, who agrees with the plan.     NEW MEDICATIONS AT DISCHARGE:   - Doxycycline 100mg for 5 days (End date: 3/22).     TO DO IN OUTPATIENT SETTING:   -Follow up with PCP and oncology for hospital follow-up and medication change  - Continue with plan for radiation on 3/19/25  - Continue with hospice plans after radiation     Consultations This Hospital Stay   PULMONARY IP CONSULT  PALLIATIVE CARE ADULT IP CONSULT  CARE MANAGEMENT / SOCIAL WORK IP CONSULT    Code Status   No CPR- Do NOT Intubate     The patient was discussed with Dr. Timothy Valdivia MD.     Magalys Broussard DO, PGY1  [facility] EMERGENCY ROOM  [address] [place] [id number]  Phone: [phone]  Fax: [phone]  ______________________________________________________________________    Physical Exam   Vital Signs: Temp: 97.8  F (36.6  C) Temp src: Axillary BP: (!) 151/66 Pulse: 103   Resp: 16 SpO2: 97 % O2 Device: Nasal cannula Oxygen Delivery: 4 LPM  Weight: 91 lbs 0 oz  Constitutional: Awake, alert, cooperative, no apparent distress, and appears stated age  Eyes: Lids and lashes normal, pupils  equal, round and reactive to light, extra ocular muscles intact, sclera clear, conjunctiva normal  ENT: Normocephalic, without obvious abnormality, atraumatic.  Heme/Lymph: No cervical lymphadenopathy.  Respiratory: Expiratory wheezing in bilateral posterior lung exam.   Cardiovascular: Port located on right chest wall. Regular rate and rhythm, normal S1 and S2, no S3 or S4, and no murmur noted  GI: No scars, normal bowel sounds, soft, non-distended, non-tender, no masses palpated, no hepatosplenomegaly  Skin: normal skin color, texture, turgor  Musculoskeletal: There is no redness, warmth, or swelling of the joints.   Neurologic: Awake, alert, oriented to name, place and time.    Neuropsychiatric: General: normal, calm, and normal eye contact       Primary Care Physician   Ana Maria Bermudez    Discharge Orders      Primary Care - Care Coordination Referral      Reason for your hospital stay    Altered mental status secondary to PNA   History of small cell carcinoma of the lungs with brain metastasis     Follow-up and recommended labs and tests     Follow up with primary care provider, Ana Maria Bermudez, within 7 days for hospital follow- up.  No follow up labs or test are needed.     Activity    Your activity upon discharge: activity as tolerated     Tubes and Drains    Current Tubes and Drains:     CVC Line  Duration           Port a Cath 05/02/24 Single Lumen Right Chest wall 319 days              Diet    Follow this diet upon discharge: Combination Diet Regular Diet Adult       Significant Results and Procedures   Most Recent 3 CBC's:  Recent Labs   Lab Test 03/18/25  0539 03/17/25  1941 03/03/25  1022   WBC 14.5* 16.7* 18.9*   HGB 8.3* 6.5* 8.0*   MCV 95 101* 99    283 591*     Most Recent 3 BMP's:  Recent Labs   Lab Test 03/18/25  0539 03/17/25  1941 03/03/25  1022   * 144 141   POTASSIUM 4.0 4.3 3.9   CHLORIDE 108* 105 100   CO2 26 30* 33*   BUN 20.3 26.9* 36.8*   CR 0.76 0.88 0.94   ANIONGAP 12 9 8    FLOR 9.0 8.7* 9.6   * 147* 432*   ,   Results for orders placed or performed during the hospital encounter of 03/17/25   Head CT w/o contrast    Narrative    EXAM: CT HEAD W/O CONTRAST  LOCATION: [facility]  DATE: 3/17/2025    INDICATION: ams, eval for ICH  COMPARISON: 3/4/2025 and 2/28/2025  TECHNIQUE: Routine CT Head without IV contrast. Multiplanar reformats. Dose reduction techniques were used.    FINDINGS:  INTRACRANIAL CONTENTS: No intracranial hemorrhage, extraaxial collection, or mass effect.  No CT evidence of acute infarct. Prominent area of vasogenic edema within the left cerebellar hemisphere adjacent to the site of a known intracranial metastasis.   No discernible degree of mass effect on the fourth ventricle. Moderate degree of cerebral parenchymal volume loss and presumed small vessel ischemic disease.    VISUALIZED ORBITS/SINUSES/MASTOIDS: No intraorbital abnormality. No paranasal sinus mucosal disease. No middle ear or mastoid effusion.    BONES/SOFT TISSUES: No acute abnormality.      Impression    IMPRESSION:    Increased degree of vasogenic edema within the left cerebral hemisphere when compared to the 02/28/2025 exam. No imaging evidence of intracranial hemorrhage.   XR Chest 1 View    Narrative    EXAM: XR CHEST 1 VIEW  LOCATION: [facility]  DATE: 3/17/2025    INDICATION: Shortness of breath.  COMPARISON: 12/27/2024      Impression    IMPRESSION: Port-A-Cath tip in the SVC. Underlying emphysema with increased interstitial opacities in the left mid to upper lung and right lung base since previous. Favor pneumonia.       Discharge Medications   Current Discharge Medication List        START taking these medications    Details   doxycycline hyclate (VIBRAMYCIN) 100 MG capsule Take 1 capsule (100 mg) by mouth 2 times daily for 5 days.  Qty: 10 capsule, Refills: 0    Associated Diagnoses: Pneumonia of both lungs due to infectious organism,  unspecified part of lung           CONTINUE these medications which have CHANGED    Details   lisinopril (ZESTRIL) 10 MG tablet Take 1 tablet (10 mg) by mouth daily.  Qty: 30 tablet, Refills: 12    Associated Diagnoses: Hypertension, unspecified type           CONTINUE these medications which have NOT CHANGED    Details   acetaminophen (TYLENOL) 500 MG tablet Take 2 tablets (1,000 mg) by mouth every 8 hours as needed for pain    Associated Diagnoses: Cancer associated pain      albuterol (PROAIR HFA/PROVENTIL HFA/VENTOLIN HFA) 108 (90 Base) MCG/ACT inhaler Inhale 1-2 puffs into the lungs every 6 hours as needed for shortness of breath, wheezing or cough.  Qty: 36 g, Refills: 5    Comments: Pharmacy may dispense brand covered by insurance (Proair, or proventil or ventolin or generic albuterol inhaler)  please supply 2 inhalers if insurance will allow.  Associated Diagnoses: Chronic respiratory failure with hypoxia (H)      aspirin 81 MG EC tablet Take 81 mg by mouth daily.      atorvastatin (LIPITOR) 40 MG tablet Take 1 tablet (40 mg) by mouth daily  Qty: 90 tablet, Refills: 3    Associated Diagnoses: Dyslipidemia      azithromycin (ZITHROMAX) 250 MG tablet Take 1 tablet (250 mg) by mouth daily.  Qty: 90 tablet, Refills: 5    Associated Diagnoses: COPD, group E, by GOLD 2023 classification (H)      buprenorphine (BUTRANS) 20 MCG/HR WK patch Place 1 patch over 168 hours onto the skin every 7 days.  Qty: 4 patch, Refills: 1    Associated Diagnoses: COPD, group D, by GOLD 2017 classification (H); Malignant neoplasm of lung, unspecified laterality, unspecified part of lung (H); Cancer associated pain      calcium carbonate 600 mg-vitamin D 400 units (CALTRATE) 600-400 MG-UNIT per tablet Take 1 tablet by mouth 2 times daily With calcium      dexAMETHasone (DECADRON) 2 MG tablet Take 3 tabs by mouth daily x 1 days, then 2 tabs daily x 6 days, then 1 tab daily x 3 days, then 1/2 tab daily x 4 days.  Qty: 20 tablet, Refills:  0    Associated Diagnoses: Secondary malignant neoplasm of brain (H)      ELIQUIS ANTICOAGULANT 5 MG tablet TAKE 1 TABLET (5 MG) BY MOUTH 2 TIMES DAILY  Qty: 60 tablet, Refills: 4    Associated Diagnoses: Arterial thrombosis (H)      Ferrous Sulfate 324 (65 Fe) MG TBEC Take 324 mg by mouth daily.      fexofenadine (ALLEGRA ALLERGY) 60 MG tablet Take 60 mg by mouth daily.      fluticasone-salmeterol (AIRDUO RESPICLICK) 232-14 MCG/ACT inhaler Inhale 1 puff into the lungs 2 times daily  Qty: 1 each, Refills: 11    Associated Diagnoses: COPD, group D, by GOLD 2017 classification (H)      hydrOXYzine HCl (ATARAX) 25 MG tablet Take 1-2 tablets (25-50 mg) by mouth every 8 hours as needed for anxiety (Insomnia)  Qty: 30 tablet, Refills: 3    Associated Diagnoses: Anxiety; Primary insomnia      ipratropium - albuterol 0.5 mg/2.5 mg/3 mL (DUONEB) 0.5-2.5 (3) MG/3ML neb solution Take 1 vial (3 mLs) by nebulization every 6 hours as needed for shortness of breath, wheezing or cough.  Qty: 360 mL, Refills: 11    Associated Diagnoses: COPD, group E, by GOLD 2023 classification (H)      ipratropium-albuterol (COMBIVENT RESPIMAT)  MCG/ACT inhaler Inhale 1 puff into the lungs 4 times daily as needed for shortness of breath, wheezing or cough.      lactobacillus rhamnosus, GG, (CULTURELL) capsule Take 1 capsule by mouth 2 times daily      lidocaine-prilocaine (EMLA) 2.5-2.5 % external cream Apply topically as needed for moderate pain  Qty: 30 g, Refills: 5    Associated Diagnoses: Small cell lung cancer (H); Malignant neoplasm of middle lobe, bronchus or lung (H)      MELATONIN PO Take 1 tablet by mouth nightly as needed (sleep).      mirtazapine (REMERON) 7.5 MG tablet Take 1 tablet (7.5 mg) by mouth at bedtime  Qty: 90 tablet, Refills: 3    Associated Diagnoses: Anxiety; Primary insomnia      naloxone (NARCAN) 4 MG/0.1ML nasal spray Spray 1 spray (4 mg) into one nostril alternating nostrils as needed for opioid reversal every  2-3 minutes until assistance arrives  Qty: 0.2 mL, Refills: 1    Associated Diagnoses: Cancer associated pain      ondansetron (ZOFRAN ODT) 8 MG ODT tab Take 1 tablet (8 mg) by mouth every 8 hours as needed for nausea.  Qty: 30 tablet, Refills: 1    Associated Diagnoses: Malignant neoplasm of lung, unspecified laterality, unspecified part of lung (H); Nausea and vomiting, unspecified vomiting type      oxyCODONE (ROXICODONE) 5 MG tablet Take 1.5-2 tablets (7.5-10 mg) by mouth every 3 hours as needed for pain or moderate to severe pain (PRN shortness of breath).  Qty: 180 tablet, Refills: 0    Associated Diagnoses: COPD, group D, by GOLD 2017 classification (H); Malignant neoplasm of lung, unspecified laterality, unspecified part of lung (H); Cancer associated pain      prochlorperazine (COMPAZINE) 10 MG tablet Take 10 mg by mouth every 6 hours as needed for nausea or vomiting.      sennosides (SENOKOT) 8.6 MG tablet Take 1 tablet by mouth daily as needed for constipation.      umeclidinium (INCRUSE ELLIPTA) 62.5 MCG/ACT inhaler Inhale 1 puff into the lungs daily  Qty: 30 each, Refills: 11    Associated Diagnoses: COPD, severe (H)      OXYGEN-HELIUM IN Inhale 3-4 L into the lungs continuous prn           Allergies   No Known Allergies

## 2025-03-18 NOTE — ED NOTES
Informed provider HR of patient is 130-135, provider Murphy does not want an EKG. Patient does not have fever, temp Oral 98.1, provider put 500 ml bolus ns.

## 2025-03-18 NOTE — CONSULTS
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    Possible pneumonia: Changes on CXR from baseline are subtle if present at all. Subtly pneumonia cannot be ruled out, though the acute encephalopathy with delirium is likely related to brain mets. An empiric course of antibiotics in addition to steroids is reasonable. The patient is scheduled for palliative brain radiation tomorrow, which was to be followed by hospice enrollment.    Plan:  - an empiric course of antibiotics is reasonable; can transition to PO doxycycline to complete 5 days on discharge then resume her chronic daily low-dose azithromycin for severe COPD  - currently on dexamathasone per brain radiation dosing; this may need to be extended if her brain radiation treatment is delayed  - supplemental oxygen for palliation; currently 93% on 4 L/min; at home she is reportedly on 5-7 L/min per daughter  - continue PTA pulmonary regimen on discharge: [facility] Rona (pulmonary note states Spiriva, PTA med list has Incruse)  - DNR/DNI  - has plans to enroll in hospice after completing next course of brain radiation; the details of this chronology are unclear to me, will defer to outpatient oncology and palliative care providers  - okay from my standpoint to have her discharged today to facilitate brain radiation and hospice enrollment  - she can follow up with Kamryn Lainez in pulmonary clinic if desired; if  "she enrolls in hospice, this may add unnecessary burden; up to patient and family  - case discussed with Dr. Broussard  - will sign off but please call if needed    Dandre Vallejo MD  Pulmonary and Critical Care Medicine  [facility]  [facility]  Office [phone]  he/him                                                                                                                                                      CCx: encephalopathy, abnormal chest X-ray    HPI: 69 year old female former smoker with a history of extensive stage small cell lung cancer, progressive with brain metastases s/p palliative chemotherapy and brain radiation, severe oxygen-dependent COPD, cachexia, osteoporosis, anemia, CAD, gout, dyslipidemia, HTN, 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 Patient was confused and delirious yesterday morning. Came to ED. Possible increase in dyspnea and cough, minimal, patient says this is \"my norm.\" Possible subtle new CXR opacities. Now alert and oriented with abx and dexamethasone. Had edema on initial head CT. Patient/daughter want to discharge so she can get brain radiation tomorrow.                                                                                                                                                        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    Past Surgical History  Past Surgical History:   Procedure Laterality Date    BRONCHOSCOPY RIGID OR FLEXIBLE W/TRANSENDOSCOPIC ENDOBRONCHIAL ULTRASOUND GUIDED N/A 2/16/2024    Procedure: BRONCHOSCOPY, WITH ENDOBRONCHIAL " ULTRASOUND;  Surgeon: Ruben Levin MD;  Location: [facility]    COLONOSCOPY N/A 10/21/2021    Procedure: COLONOSCOPY;  Surgeon: Wilma Hester DO;  Location: [facility]    IR CHEST PORT PLACEMENT > 5 YRS OF AGE  5/2/2024    VAGINAL DELIVERY      x 3 ; remote    WISDOM TOOTH EXTRACTION         Allergies:  No Known Allergies    PTA medications:  (Not in a hospital admission)      Family Hx:  Family History   Problem Relation Age of Onset    Chronic Obstructive Pulmonary Disease Sister     Diabetes Mother     Heart Disease Mother     Lung Cancer Mother     Heart Disease Father        Social Hx:  Social History     Socioeconomic History    Marital status:      Spouse name: Not on file    Number of children: Not on file    Years of education: Not on file    Highest education level: Not on file   Occupational History    Not on file   Tobacco Use    Smoking status: Former     Current packs/day: 0.00     Types: Cigarettes     Quit date: 6/27/2021     Years since quitting: 3.7     Passive exposure: Past    Smokeless tobacco: Never    Tobacco comments:     updated 8/3/2021 by [facility]   Vaping Use    Vaping status: Never Used   Substance and Sexual Activity    Alcohol use: Not Currently    Drug use: Never    Sexual activity: Not Currently   Other Topics Concern    Not on file   Social History Narrative     many years 3 grown children. 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 Last cigarette a week ago.non drinker  Her primary MD (Wilda) retired several years ago/ tends to avoid doctors/medical care in general       Social Drivers of Health     Financial Resource Strain: Low Risk  (2/28/2025)    Financial Resource Strain     Within the past 12 months, have you or your family members you live with been unable to get utilities (heat, electricity) when it was really needed?: No   Food Insecurity: Low Risk  (2/28/2025)    Food Insecurity     Within the past 12 months, did you worry that your food  "would run out before you got money to buy more?: No     Within the past 12 months, did the food you bought just not last and you didn t have money to get more?: No   Transportation Needs: Low Risk  (2/28/2025)    Transportation Needs     Within the past 12 months, has lack of transportation kept you from medical appointments, getting your medicines, non-medical meetings or appointments, work, or from getting things that you need?: No   Physical Activity: Sufficiently Active (11/10/2021)    Exercise Vital Sign     Days of Exercise per Week: 5 days     Minutes of Exercise per Session: 30 min   Stress: No Stress Concern Present (11/10/2021)    [employer] - Occupational Stress Questionnaire     Feeling of Stress : Not at all   Social Connections: Socially Isolated (11/10/2021)    Social Connection and Isolation Panel [NHANES]     Frequency of Communication with Friends and Family: More than three times a week     Frequency of Social Gatherings with Friends and Family: Once a week     Attends [employer] Services: Never     Active Member of Clubs or Organizations: No     Attends Club or Organization Meetings: Not on file     Marital Status:    Interpersonal Safety: High Risk (3/1/2025)    Interpersonal Safety     Do you feel physically and emotionally safe where you currently live?: No     Within the past 12 months, have you been hit, slapped, kicked or otherwise physically hurt by someone?: No     Within the past 12 months, have you been humiliated or emotionally abused in other ways by your partner or ex-partner?: No   Housing Stability: Low Risk  (2/28/2025)    Housing Stability     Do you have housing? : Yes     Are you worried about losing your housing?: No       Exam/Data:     Vitals  BP (!) 151/66 (BP Location: Left leg)   Pulse 103   Temp 97.8  F (36.6  C) (Axillary)   Resp 16   Ht 1.6 m (5' 3\")   Wt 41.3 kg (91 lb)   LMP  (LMP Unknown)   SpO2 97%   BMI 16.12 kg/m    BP " "- Mean:  [] 93  I/O last 3 completed shifts:  In: 300   Out: -   Weight change:   [unfilled]  EXAM:  BP (!) 151/66 (BP Location: Left leg)   Pulse 103   Temp 97.8  F (36.6  C) (Axillary)   Resp 16   Ht 1.6 m (5' 3\")   Wt 41.3 kg (91 lb)   LMP  (LMP Unknown)   SpO2 97%   BMI 16.12 kg/m      Intake/Output last 3 shifts:  I/O last 3 completed shifts:  In: 300   Out: -   Intake/Output this shift:  No intake/output data recorded.    Physical Exam  Gen: alert, oriented, appears cachectic and chronically ill  HEENT: NT, no JANAE  CV: tachy, regular, no m/g/r, port cath in place right chest, accessed  Resp: markedly diminished bilaterally with prolonged expiratory phase, no focal crackles  Abd: soft, nontender, BS+  Skin: no rashes or lesions  Ext: no edema  Neuro: PERRL, nonfocal exam    ROS: A complete 10-system review of systems was obtained and is negative with the exception of what is noted in the history of present illness.    Medications:     Current Facility-Administered Medications   Medication Dose Route Frequency Provider Last Rate Last Admin    Patient is already receiving anticoagulation with heparin, enoxaparin (LOVENOX), warfarin (COUMADIN)  or other anticoagulant medication   Does not apply Continuous PRN Rickie Sarmiento DO         Current Facility-Administered Medications   Medication Dose Route Frequency Provider Last Rate Last Admin    atorvastatin (LIPITOR) tablet 40 mg  40 mg Oral Daily Rickie Sarmiento DO   40 mg at 03/18/25 0919    azithromycin (ZITHROMAX) tablet 250 mg  250 mg Oral Daily Rickie Sarmiento DO   250 mg at 03/18/25 0919    buprenorphine (BUTRANS) 20 MCG/HR WK patch 1 patch  1 patch Transdermal Weekly Rickie Sarmiento DO   1 patch at 03/18/25 0920    And    buprenorphine (BUTRANS) Patch in Place   Transdermal Q8H [facility] Rickie Sarmiento DO        cefTRIAXone (ROCEPHIN) 1 g vial to attach to  mL bag for ADULTS or NS 50 mL bag for PEDS  1 g Intravenous Q24H Rickie Sarmiento DO   "      [START ON 3/19/2025] dexAMETHasone (DECADRON) tablet 4 mg  4 mg Oral Daily Magalys Broussard DO        Followed by    [START ON 3/25/2025] dexAMETHasone (DECADRON) tablet 2 mg  2 mg Oral Daily Magalys Broussard DO        Followed by    [START ON 3/28/2025] dexAMETHasone (DECADRON) tablet 1 mg  1 mg Oral Daily Magalys Broussard DO        doxycycline (VIBRAMYCIN) 100 mg vial to attach to  mL bag  100 mg Intravenous Q12H Rickie Sarmiento DO   100 mg at 03/18/25 1134    [Held by provider] ferrous sulfate (FEROSUL) tablet 324 mg  324 mg Oral Daily Magalys Broussard DO        fexofenadine (ALLEGRA) tablet 60 mg  60 mg Oral Daily Rickie Sarmiento DO   60 mg at 03/18/25 1128    fluticasone-vilanterol (BREO ELLIPTA) 200-25 MCG/ACT inhaler 1 puff  1 puff Inhalation Daily Rickie Sarmiento DO        heparin lock flush 10 unit/mL injection 5-10 mL  5-10 mL Intracatheter Q24H Magalys Broussard DO        heparin lock flush 100 unit/mL injection 5-10 mL  5-10 mL Intracatheter Q28 Days Magalys Broussard DO        ipratropium - albuterol 0.5 mg/2.5 mg/3 mL (DUONEB) neb solution 3 mL  1 vial Nebulization 4x daily Magalys Broussard DO   3 mL at 03/18/25 1114    lactobacillus rhamnosus (GG) (CULTURELL) capsule 1 capsule  1 capsule Oral BID Rickie Sarmiento DO   1 capsule at 03/18/25 1129    lisinopril (ZESTRIL) tablet 10 mg  10 mg Oral Daily Rickie Sarmiento DO        sodium chloride (PF) 0.9% PF flush 10-20 mL  10-20 mL Intracatheter Q28 Days Magalys Broussard DO        umeclidinium (INCRUSE ELLIPTA) 62.5 MCG/ACT inhaler 1 puff  1 puff Inhalation Daily Rickie Sarmiento DO             DATA  All laboratory and radiology has been personally reviewed by myself today.  Recent Labs   Lab 03/18/25  0539   WBC 14.5*   HGB 8.3*   HCT 26.3*        Recent Labs   Lab 03/18/25  0539 03/17/25  1941   * 144   CO2 26 30*   BUN 20.3 26.9*   ALKPHOS  --  101   ALT  --  29   AST  --  25       Imaging:  Chest CTA (2/28/25):  - images directly reviewed, formal  interpretation follows:  FINDINGS: Slight respiratory motion artifact at the lung bases.     ANGIOGRAM CHEST: Excellent opacification of pulmonary arteries and branches. No pulmonary emboli. Aorta and branches are normal caliber.      LUNGS AND PLEURA: Notable increase in size of the pleural-based left upper lobe tumor. This now measures 3.8 x 1.6 cm (axial image 148), was 1.1 x 1 cm.     Extensive emphysema with diffuse peribronchial thickening. No effusions.     MEDIASTINUM/AXILLAE: Right IJ Port-A-Cath. Increased size of the left supraclavicular nodes (coronal image 35, axial image 28). This now measures 1.3 x 0.7 cm.  Adenopathy in the AP window has become confluent and measures 2.2 cm in transverse dimension.     CORONARY ARTERY CALCIFICATION: Cannot evaluate.      UPPER ABDOMEN: Non-FDG avid left adrenal nodule is stable..     MUSCULOSKELETAL: No suspicious bone lesions. No erosion of the ribs adjacent to the enlarging pleural-based left upper lobe tumor.                                                                      IMPRESSION:  1.  Progression of disease with increase in the left upper lobe pleural-based nodule, adenopathy in the AP window and left supraclavicular nodes as noted above.  2.  Extensive emphysema with evidence of chronic bronchitis.  3.  No pulmonary emboli.    CXR (3/17/25):  - images directly reviewed, formal interpretation follows:  IMPRESSION: Port-A-Cath tip in the SVC. Underlying emphysema with increased interstitial opacities in the left mid to upper lung and right lung base since previous. Favor pneumonia.     Pulmonary Function Testing  August 2021:  Very severe fixed obstruction  Hyperinflation and air trapping  Severe DLCOc reduction

## 2025-03-18 NOTE — PLAN OF CARE
Pt A&Ox4. Int confusion. VSS on 4L overnight. Chronic cough. LS w/ crackles. Received 1 unit of blood overnight, tolerated well. Denies any pain, CP, SOB, dizziness, lightheadedness, and n/v. R chest port accessed. Regular diet. Not OOB due to weakness. Palliative to see pt today.     Unable to obtain UA as pt is too weak to get out of bed. Purewick in place.  Problem: Adult Inpatient Plan of Care  Goal: Optimal Comfort and Wellbeing  Outcome: Progressing  Intervention: Provide Person-Centered Care  Recent Flowsheet Documentation  Taken 3/18/2025 0000 by Jennifer Senior, RN  Trust Relationship/Rapport:   care explained   choices provided   emotional support provided   empathic listening provided   questions answered   questions encouraged   reassurance provided   thoughts/feelings acknowledged     Problem: Delirium  Goal: Improved Sleep  Outcome: Progressing   Goal Outcome Evaluation:

## 2025-03-18 NOTE — H&P
[facility]    History and Physical - Hospitalist Service       Date of Admission:  3/17/2025    Assessment & Plan      Jasmyn Green is a 69 year old female admitted on 3/17/2025. 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    Altered mental status 2/2 CAP and/or brain metastases  Chronic hypoxic respiratory failure 2/2 small cell lung cancer with brain metastasis  COPD-Gold E  Emphysema  Jasmyn presents after experiencing an episode of acute altered mental status upon waking up this morning.  WBC at 16.7.  Pro-Sung elevated at 1.23.  Lactic low at 0.4. CXR demonstrated increased interstitial opacities in the left mid to upper lung and right lung base that favor pneumonia.  Head CT demonstrated increased degree of vasogenic edema within the left cerebral hemisphere.  No intracranial hemorrhage. Given her history of brain metastases and radiation, it is unclear if this is the primary  of her confusion, or if the suspected CAP is contributing.  Of note, she is in the process of setting up hospice care.  - Pulmonary consulted, appreciate recs and cares  - Palliative consulted, appreciate recs and cares  - Continue ceftriaxone 1 g IV every 24 hours  - Continue doxycycline 100 mg IV every 12 hours  - Continue PTA azithromycin 250 mg daily  - Initiate 14-day dexamethasone taper prescribed by radiologist on 3/12.  Patient has yet to pick this up   - 6 mg once daily for 1 day   - 4 mg once daily for 6 days   - 2 mg once daily for 3 days   - 1 mg once daily for 4 days   - Pulmonologist prescribed a prednisone taper at the telehealth visit earlier today, but will treat with the dexamethasone taper previously prescribed  - PTA Breo Ellipta 1 puff daily  - PTA umeclidinium 1 puff daily  - PTA DuoNebs  "every 4 times daily  - PTA Combivent inhaler 4 times daily as needed  - Continuous O2 via nasal cannula.  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  Consented for blood transfusion in the ED and 1 unit PRBCs ordered.  - AM CBC    Myoclonic jerking  Unknown etiology, but unlikely to be seizures or acute stroke.  No seizure history.  No neurodeficits on exam.  Likely secondary to brain mets and subsequent radiation therapy.  Will continue to monitor.    Chronic problems:  Celiac artery thrombus: Resume PTA Eliquis pending med rec  HLD: PTA Lipitor 40 mg pending med rec  Chronic pain secondary to cancer: PTA weekly Butrans patch (placed on Sundays), oxycodone 7.5-10 mg every 3 hours as needed  Allergic rhinitis: PTA Allegra 60 mg daily  HTN: PTA lisinopril 10 mg daily  Insomnia, Anxiety: PTA Remeron pending med rec: Hydroxyzine 25-50 mg 3 times daily as needed          Diet: Combination Diet Regular Diet Adult  DVT Prophylaxis: DOAC  Vick Catheter: Not present  Fluids: PO  Lines: PRESENT         Cardiac Monitoring: None  Code Status: No CPR- Do NOT Intubate      Clinically Significant Risk Factors Present on Admission   # Hypoalbuminemia: Lowest albumin = 3.3 g/dL at 3/17/2025  7:41 PM, will monitor as appropriate  # Drug Induced Coagulation Defect: home medication list includes an anticoagulant medication  # Drug Induced Platelet Defect: home medication list includes an antiplatelet medication   # Hypertension: Noted on problem list   # Anemia: based on hgb <11  # Cachexia: Estimated body mass index is 16.12 kg/m  as calculated from the following:    Height as of this encounter: 1.6 m (5' 3\").    Weight as of this encounter: 41.3 kg (91 lb).         # Financial/Environmental Concerns:    # COPD: noted on problem list        Disposition Plan      Expected Discharge Date: 03/19/2025                The patient will be seen by Attending Physician Timothy Valdivia MD in the " "morning.    Rickie Sarmiento, DO PGY-3  Hospitalist Service  [facility]  Securely message with [employer] (more info)  Text page via [employer] Paging/Directory   ______________________________________________________________________    Chief Complaint   AMS, increased oxygen demands    History is obtained from the patient and patient's children    History of Present Illness   Jasmyn Green is a 69 year old female who has a history of small cell lung cancer with brain metastases, and anemia associated with chemotherapy, COPD, emphysema, HLD, and HTN and is admitted for altered mental status and increasing oxygen requirements.     At the time of my encounter, patient's daughter was not present, but her son was.  The son corroborated the story that the daughter told the emergency physician on arrival.  Per patient's daughter, patient woke up this morning acutely confused and had intermittent \"jerking all over the body.\"  She states that patient was in her usual mental state prior to going to bed last night.  She experienced similar issue a few weeks ago when she was hospitalized.  Daughter also notes that she normally needs 3-4 L of O2 and increased to 5 L with activity.  However, today she was needing 5 L at rest and increased to 7-8 L with activity.    Patient was alert and oriented during my encounter.  She was very hard of hearing, but answered questions appropriately and knew where she was.  She denied any pain or respiratory distress.  No nausea, vomiting, or diarrhea.    ED course: Started on doxycycline and ceftriaxone for community-acquired pneumonia treatment.  Received two 500 cc boluses.  Consented for blood transfusion.  1 unit PRBC ordered.      Past Medical History    Past Medical History:   Diagnosis Date    Age-related osteoporosis without current pathological fracture     Arthritis     COPD (chronic obstructive pulmonary disease) (H)     Coronary artery disease     Dependence on " nocturnal oxygen therapy     Dyspnea on exertion     Emphysema lung (H)     Gout     HLD (hyperlipidemia)     Hypertension     Lung mass     Pneumonia of both lungs due to infectious organism, unspecified part of lung 3/17/2025     Past Surgical History   Past Surgical History:   Procedure Laterality Date    BRONCHOSCOPY RIGID OR FLEXIBLE W/TRANSENDOSCOPIC ENDOBRONCHIAL ULTRASOUND GUIDED N/A 2/16/2024    Procedure: BRONCHOSCOPY, WITH ENDOBRONCHIAL ULTRASOUND;  Surgeon: Ruben Levin MD;  Location: [facility] Main OR    COLONOSCOPY N/A 10/21/2021    Procedure: COLONOSCOPY;  Surgeon: Wilma Hester DO;  Location: [facility] Main OR    IR CHEST PORT PLACEMENT > 5 YRS OF AGE  5/2/2024    VAGINAL DELIVERY      x 3 ; remote    WISDOM TOOTH EXTRACTION       Prior to Admission Medications   Prior to Admission Medications   Prescriptions Last Dose Informant Patient Reported? Taking?   ELIQUIS ANTICOAGULANT 5 MG tablet   No No   Sig: TAKE 1 TABLET (5 MG) BY MOUTH 2 TIMES DAILY   OXYGEN-HELIUM IN   Yes No   Sig: Inhale 3-4 L into the lungs continuous prn   acetaminophen (TYLENOL) 500 MG tablet   Yes No   Sig: Take 2 tablets (1,000 mg) by mouth every 8 hours as needed for pain   albuterol (PROAIR HFA/PROVENTIL HFA/VENTOLIN HFA) 108 (90 Base) MCG/ACT inhaler   No No   Sig: Inhale 1-2 puffs into the lungs every 6 hours as needed for shortness of breath, wheezing or cough.   aspirin 81 MG EC tablet   Yes No   Sig: Take 81 mg by mouth daily.   atorvastatin (LIPITOR) 40 MG tablet   No No   Sig: Take 1 tablet (40 mg) by mouth daily   azithromycin (ZITHROMAX) 250 MG tablet   No No   Sig: Take 1 tablet (250 mg) by mouth daily.   buprenorphine (BUTRANS) 20 MCG/HR WK patch   No No   Sig: Place 1 patch over 168 hours onto the skin every 7 days.   calcium carbonate 600 mg-vitamin D 400 units (CALTRATE) 600-400 MG-UNIT per tablet   Yes No   Sig: Take 1 tablet by mouth 2 times daily With calcium   dexAMETHasone (DECADRON) 2 MG tablet   No  No   Sig: Take 3 tabs by mouth daily x 1 days, then 2 tabs daily x 6 days, then 1 tab daily x 3 days, then 1/2 tab daily x 4 days.   fexofenadine (ALLEGRA ALLERGY) 60 MG tablet   Yes No   Sig: Take 60 mg by mouth daily.   fluticasone-salmeterol (AIRDUO RESPICLICK) 232-14 MCG/ACT inhaler   No No   Sig: Inhale 1 puff into the lungs 2 times daily   hydrOXYzine HCl (ATARAX) 25 MG tablet   No No   Sig: Take 1-2 tablets (25-50 mg) by mouth every 8 hours as needed for anxiety (Insomnia)   ipratropium - albuterol 0.5 mg/2.5 mg/3 mL (DUONEB) 0.5-2.5 (3) MG/3ML neb solution   No No   Sig: Take 1 vial (3 mLs) by nebulization every 6 hours as needed for shortness of breath, wheezing or cough.   ipratropium-albuterol (COMBIVENT RESPIMAT)  MCG/ACT inhaler   Yes No   Sig: Inhale 1 puff into the lungs 4 times daily as needed for shortness of breath, wheezing or cough.   lactobacillus rhamnosus, GG, (CULTURELL) capsule   Yes No   Sig: Take 1 capsule by mouth 2 times daily   lidocaine-prilocaine (EMLA) 2.5-2.5 % external cream   No No   Sig: Apply topically as needed for moderate pain   lisinopril (ZESTRIL) 10 MG tablet   No No   Sig: Take 1 tablet (10 mg) by mouth daily   Patient not taking: Reported on 3/12/2025   mirtazapine (REMERON) 7.5 MG tablet   No No   Sig: Take 1 tablet (7.5 mg) by mouth at bedtime   naloxone (NARCAN) 4 MG/0.1ML nasal spray   No No   Sig: Spray 1 spray (4 mg) into one nostril alternating nostrils as needed for opioid reversal every 2-3 minutes until assistance arrives   Patient not taking: Reported on 3/12/2025   ondansetron (ZOFRAN ODT) 8 MG ODT tab   No No   Sig: Take 1 tablet (8 mg) by mouth every 8 hours as needed for nausea.   oxyCODONE (ROXICODONE) 5 MG tablet   No No   Sig: Take 1.5-2 tablets (7.5-10 mg) by mouth every 3 hours as needed for pain or moderate to severe pain (PRN shortness of breath).   sennosides (SENOKOT) 8.6 MG tablet   Yes No   Sig: Take 1 tablet by mouth daily as needed for  constipation.   umeclidinium (INCRUSE ELLIPTA) 62.5 MCG/ACT inhaler   No No   Sig: Inhale 1 puff into the lungs daily      Facility-Administered Medications: None      ROS: 10 point ROS neg other than the symptoms noted above in the HPI.      Physical Exam   Vital Signs: Temp: 98.1  F (36.7  C) Temp src: Oral BP: (!) 162/74 Pulse: (!) 133   Resp: 26 SpO2: 98 % O2 Device: Nasal cannula    Weight: 91 lbs 0 oz    Constitutional: NAD. Frail, chronically ill appearing. Cachectic   HENT: Normocephalic, Atraumatic, mucous membranes moist,   Eyes: EOMI, Conjunctiva normal, No discharge.   Respiratory: Overall diminished breath sounds, No respiratory distress, No wheezing. Fine rales at the bases bilaterally.  Cardiovascular: Tachycardia, Regular rhythm,  No murmurs. Port in right upper chest   Abdominal: Soft, No tenderness, No rebound or guarding.     Musculoskeletal: no deformity, able to move all extremities  Integument: Warm, Dry, No erythema,    Neurologic: Alert & oriented x 3. No facial droop, very hard of hearing but responds to questions appropriately.  Psychiatric: Affect normal, Cooperative    Data     I have personally reviewed the following data over the past 24 hrs:    16.7 (H)  \   6.5 (LL)   / 283     144 105 26.9 (H) /  147 (H)   4.3 30 (H) 0.88 \     ALT: 29 AST: 25 AP: 101 TBILI: 0.2   ALB: 3.3 (L) TOT PROTEIN: 5.7 (L) LIPASE: N/A     Trop: 39 (H) BNP: N/A     Procal: 1.23 (H) CRP: N/A Lactic Acid: 0.4 (L)         Imaging results reviewed over the past 24 hrs:   Recent Results (from the past 24 hours)   Head CT w/o contrast    Narrative    EXAM: CT HEAD W/O CONTRAST  LOCATION: [facility]  DATE: 3/17/2025    INDICATION: ams, eval for ICH  COMPARISON: 3/4/2025 and 2/28/2025  TECHNIQUE: Routine CT Head without IV contrast. Multiplanar reformats. Dose reduction techniques were used.    FINDINGS:  INTRACRANIAL CONTENTS: No intracranial hemorrhage, extraaxial collection, or mass  effect.  No CT evidence of acute infarct. Prominent area of vasogenic edema within the left cerebellar hemisphere adjacent to the site of a known intracranial metastasis.   No discernible degree of mass effect on the fourth ventricle. Moderate degree of cerebral parenchymal volume loss and presumed small vessel ischemic disease.    VISUALIZED ORBITS/SINUSES/MASTOIDS: No intraorbital abnormality. No paranasal sinus mucosal disease. No middle ear or mastoid effusion.    BONES/SOFT TISSUES: No acute abnormality.      Impression    IMPRESSION:    Increased degree of vasogenic edema within the left cerebral hemisphere when compared to the 02/28/2025 exam. No imaging evidence of intracranial hemorrhage.   XR Chest 1 View    Narrative    EXAM: XR CHEST 1 VIEW  LOCATION: [facility]  DATE: 3/17/2025    INDICATION: Shortness of breath.  COMPARISON: 12/27/2024      Impression    IMPRESSION: Port-A-Cath tip in the SVC. Underlying emphysema with increased interstitial opacities in the left mid to upper lung and right lung base since previous. Favor pneumonia.

## 2025-03-19 ENCOUNTER — OFFICE VISIT (OUTPATIENT)
Dept: RADIATION ONCOLOGY | Facility: HOSPITAL | Age: 70
End: 2025-03-19
Attending: STUDENT IN AN ORGANIZED HEALTH CARE EDUCATION/TRAINING PROGRAM
Payer: COMMERCIAL

## 2025-03-19 ENCOUNTER — PATIENT OUTREACH (OUTPATIENT)
Dept: CARE COORDINATION | Facility: CLINIC | Age: 70
End: 2025-03-19
Payer: COMMERCIAL

## 2025-03-19 VITALS — OXYGEN SATURATION: 94 % | HEART RATE: 120 BPM | RESPIRATION RATE: 30 BRPM

## 2025-03-19 DIAGNOSIS — C79.31 SECONDARY MALIGNANT NEOPLASM OF BRAIN (H): Primary | ICD-10-CM

## 2025-03-19 PROCEDURE — 77470 SPECIAL RADIATION TREATMENT: CPT | Performed by: STUDENT IN AN ORGANIZED HEALTH CARE EDUCATION/TRAINING PROGRAM

## 2025-03-19 PROCEDURE — 77372 SRS LINEAR BASED: CPT | Performed by: STUDENT IN AN ORGANIZED HEALTH CARE EDUCATION/TRAINING PROGRAM

## 2025-03-19 PROCEDURE — 77432 STEREOTACTIC RADIATION TRMT: CPT | Performed by: STUDENT IN AN ORGANIZED HEALTH CARE EDUCATION/TRAINING PROGRAM

## 2025-03-19 PROCEDURE — 77336 RADIATION PHYSICS CONSULT: CPT | Performed by: STUDENT IN AN ORGANIZED HEALTH CARE EDUCATION/TRAINING PROGRAM

## 2025-03-19 RX ORDER — LISINOPRIL 10 MG/1
10 TABLET ORAL DAILY
COMMUNITY
Start: 2025-03-18

## 2025-03-19 RX ORDER — POTASSIUM CHLORIDE 1.5 G/1.58G
POWDER, FOR SOLUTION ORAL
Status: DISPENSED
Start: 2025-03-19 | End: 2025-03-20

## 2025-03-19 NOTE — PROGRESS NOTES
RADIATION ONCOLOGY WEEKLY TREATMENT VISIT NOTE      Assessment / Impression       Visit Dx:  (C79.31) Secondary malignant neoplasm of brain (H)  (primary encounter diagnosis)       Cancer Staging   No matching staging information was found for the patient.       Tolerating radiation therapy well.  All questions and concerns addressed.  Feeling much better 24 hours after dexamethasone  SRS treatment completed today  Plan:     Continue radiation treatment as prescribed.  Radiation:   Site: L posterior fossa  Stereotactic Radiosurgery: Yes  Stereotactic Radiosurgery date: 03/19/25  Concurrent Therapy: No  Today's Dose: 1500  Total Dose for Brain: 1500  Today's Fraction/Total Fraction Brain: 1/1    Continue dexamethasone with taper as prescribed  Plans to enroll in hospice    Pain Management Plan: Per hospice    Subjective:      HPI: Jasmyn Green is a 69 year old female with  No primary diagnosis found.    She is feeling much better after initiation of dexamethasone.  Currently no headaches or delirium.  She plans to enroll in hospice.  Questions answered.    The following portions of the patient's history were reviewed and updated as appropriate: allergies, current medications, past family history, past medical history, past social history, past surgical history and problem list.    Assessment                  Body Site:  Brain Site: L posterior fossa  Stereotactic Radiosurgery: Yes  Stereotactic Radiosurgery date: 03/19/25  Concurrent Therapy: No  Today's Dose: 1500  Today's Fraction/Total Fraction Brain: 1/1  Ocular/Visual - other : 0: Normal  Tinnitus: 0: No  Depressed level of consciousness: 0: Normal  Oriented x of spheres: 2-3 (mild confusion)  Neuropathy - motor: 3: Objective weakness interfering with activities of daily living  Ataxia: 4: Bed ridden or disabling (cannot walk on her own, assit to transfer)  Speech Impairment (e.g. Dysphasia or aphasia): 0: Normal  Seizures: 0: None  Insomnia: 0: Normal                                    Sexuality Alteration                    Emotional Alteration    Copin: Effective  Comfort Alteration   KPS: 50% Requirws considerable assistance and frequent medical care  Fatigue (ONS scale): 8: Extreme Fatigue  Pain Location: denies   Nutrition Alteration   Anorexia: 2: Oral intake significantly decreased  Nausea: 0: None  Vomitin: None  Skin Alteration   Skin Sensation: 0: No problem  Skin Reaction: 0: None  Alopecia: 2: Pronounced hair loss  AUA Assessment                                           Accompanied by       Objective:     Exam:     Vitals:    25 0952   Pulse: 120   Resp: 30   SpO2: 94%       Wt Readings from Last 8 Encounters:   25 41.3 kg (91 lb)   25 41.5 kg (91 lb 9.6 oz)   25 39.9 kg (88 lb)   25 39.8 kg (87 lb 11.9 oz)   02/10/25 40.5 kg (89 lb 3.2 oz)   25 40.4 kg (89 lb)   25 40.5 kg (89 lb 3.2 oz)   25 40.5 kg (89 lb 3.2 oz)       General: Alert and oriented, in no acute distress  Jasmyn has no Erythema.    Treatment Summary to Date    Aria chart and setup information reviewed    Tata Nuñez MD

## 2025-03-19 NOTE — PROGRESS NOTES
Radiation Treatment Summary    Patient: Jasmyn Green   MRN: [id number]  : 1955  Care Provider: Tata Nuñez MD    Encounter Date: Mar 19, 2025      HISTORY: Jasmyn Green was treated with SBRT/SRS radiation therapy.    DX: (C79.31) Secondary malignant neoplasm of brain (H)  (primary encounter diagnosis)    SITE TREATED: L post fossa   TOTAL DOSE: 1500 cGy *prior WBRT 3000 cGy  NUMBER OF FRACTIONS: 1  RADIATION TREATMENT: 3/19/2025  CONCURRENT CHEMOTHERAPY: No  ADJUVANT THERAPY: No    Jasmyn tolerated the treatment without unexpected side effects.     PLAN: Discharge instructions were given and Jasmyn knows to call if questions/issues arise. Jasmyn will be seen in f/u PRN.     Pain Management Plan: Per hospice    Signed by: Tata Nuñez MD    cc:    Patient Care Team:  Ana Maria Bermudez MD as PCP - General (Internal Medicine)  Aidee Luna RT as Chronic Pulmonary Disease Specialist (Respiratory Therapy)  Rickie Pickens MD as Assigned Endocrinology Provider  Ruben Levin MD as MD (Critical Care)  Tata Lainez NP as Assigned Pulmonology Provider  Amador Stewart MD as MD (Hematology)  Wilma Carmona, RN as Specialty Care Coordinator (Hematology & Oncology)  Ana Maria Bermudez MD as Assigned PCP  Evans Gramajo APRN CNP as Nurse Practitioner (Hospice And Palliative Care)  Evans Gramajo APRN CNP as Assigned Palliative Care Provider  Bryant Mckinney MD as Physician (Vascular Surgery)  Naheed Mulligan MD as MD (Cardiovascular Disease)  Tata Nuñez MD as MD (Radiation Oncology)  Amador Stewart MD as Assigned Cancer Care Provider  Natty Rodriguez MD as Assigned Heart and Vascular Provider

## 2025-03-19 NOTE — PROGRESS NOTES
Pt here for their final radiation treatment. DC instructions given verbally and in writing, pt verbalized their understanding. Signing onto Hospice, so follow up PRN.   Pt's daughter has steroid taper and instructions at home.     Claudette Lopez RN  Rad Onc [facility]

## 2025-03-19 NOTE — LETTER
3/19/2025      Jasmyn Green  [address] [place] [address]      Dear Colleague,    Thank you for referring your patient, Jasmyn Green, to the [facility] RADIATION ONCOLOGY [facility]. Please see a copy of my visit note below.    RADIATION ONCOLOGY WEEKLY TREATMENT VISIT NOTE      Assessment / Impression       Visit Dx:  (C79.31) Secondary malignant neoplasm of brain (H)  (primary encounter diagnosis)       Cancer Staging   No matching staging information was found for the patient.       Tolerating radiation therapy well.  All questions and concerns addressed.  Feeling much better 24 hours after dexamethasone  SRS treatment completed today  Plan:     Continue radiation treatment as prescribed.  Radiation:   Site: L posterior fossa  Stereotactic Radiosurgery: Yes  Stereotactic Radiosurgery date: 03/19/25  Concurrent Therapy: No  Today's Dose: 1500  Total Dose for Brain: 1500  Today's Fraction/Total Fraction Brain: 1/1    Continue dexamethasone with taper as prescribed  Plans to enroll in hospice    Pain Management Plan: Per hospice    Subjective:      HPI: Jasmyn Green is a 69 year old female with  No primary diagnosis found.    She is feeling much better after initiation of dexamethasone.  Currently no headaches or delirium.  She plans to enroll in hospice.  Questions answered.    The following portions of the patient's history were reviewed and updated as appropriate: allergies, current medications, past family history, past medical history, past social history, past surgical history and problem list.    Assessment                  Body Site:  Brain Site: L posterior fossa  Stereotactic Radiosurgery: Yes  Stereotactic Radiosurgery date: 03/19/25  Concurrent Therapy: No  Today's Dose: 1500  Today's Fraction/Total Fraction Brain: 1/1  Ocular/Visual - other : 0: Normal  Tinnitus: 0: No  Depressed level of consciousness: 0: Normal  Oriented x of spheres: 2-3 (mild  confusion)  Neuropathy - motor: 3: Objective weakness interfering with activities of daily living  Ataxia: 4: Bed ridden or disabling (cannot walk on her own, assit to transfer)  Speech Impairment (e.g. Dysphasia or aphasia): 0: Normal  Seizures: 0: None  Insomnia: 0: Normal                                   Sexuality Alteration                    Emotional Alteration    Copin: Effective  Comfort Alteration   KPS: 50% Requirws considerable assistance and frequent medical care  Fatigue (ONS scale): 8: Extreme Fatigue  Pain Location: denies   Nutrition Alteration   Anorexia: 2: Oral intake significantly decreased  Nausea: 0: None  Vomitin: None  Skin Alteration   Skin Sensation: 0: No problem  Skin Reaction: 0: None  Alopecia: 2: Pronounced hair loss  AUA Assessment                                           Accompanied by       Objective:     Exam:     Vitals:    25 0952   Pulse: 120   Resp: 30   SpO2: 94%       Wt Readings from Last 8 Encounters:   25 41.3 kg (91 lb)   25 41.5 kg (91 lb 9.6 oz)   25 39.9 kg (88 lb)   25 39.8 kg (87 lb 11.9 oz)   02/10/25 40.5 kg (89 lb 3.2 oz)   25 40.4 kg (89 lb)   25 40.5 kg (89 lb 3.2 oz)   25 40.5 kg (89 lb 3.2 oz)       General: Alert and oriented, in no acute distress  Jasmyn has no Erythema.    Treatment Summary to Date    Aria chart and setup information reviewed    Tata Nuñez MD    Radiation Treatment Summary    Patient: Jasmyn Green   MRN: [id number]  : 1955  Care Provider: Tata Nuñez MD    Encounter Date: Mar 19, 2025      HISTORY: Jasmyn Green was treated with SBRT/SRS radiation therapy.    DX: (C79.31) Secondary malignant neoplasm of brain (H)  (primary encounter diagnosis)    SITE TREATED: L post fossa   TOTAL DOSE: 1500 cGy *prior WBRT 3000 cGy  NUMBER OF FRACTIONS: 1  RADIATION TREATMENT: 3/19/2025  CONCURRENT CHEMOTHERAPY: No  ADJUVANT THERAPY: No    Jasmyn tolerated the  treatment without unexpected side effects.     PLAN: Discharge instructions were given and Jasmyn knows to call if questions/issues arise. Jasmyn will be seen in f/u PRN.     Pain Management Plan: Per hospice    Signed by: Tata Nuñez MD    cc:    Patient Care Team:  Ana Maria Bermudez MD as PCP - General (Internal Medicine)  Aidee Luna RT as Chronic Pulmonary Disease Specialist (Respiratory Therapy)  Rickie Pickens MD as Assigned Endocrinology Provider  Ruben Levin MD as MD (Critical Care)  Tata Lainez NP as Assigned Pulmonology Provider  Amador Stewart MD as MD (Hematology)  Wilma Carmona, RN as Specialty Care Coordinator (Hematology & Oncology)  Ana Maria Bermudez MD as Assigned PCP  Evans Gramajo APRN CNP as Nurse Practitioner (Hospice And Palliative Care)  Evans Gramajo APRN CNP as Assigned Palliative Care Provider  Bryant Mckinney MD as Physician (Vascular Surgery)  Naheed Mulligan MD as MD (Cardiovascular Disease)  Tata Nuñez MD as MD (Radiation Oncology)  Amador Stewart MD as Assigned Cancer Care Provider  Natty Rodriguez MD as Assigned Heart and Vascular Provider      Again, thank you for allowing me to participate in the care of your patient.        Sincerely,        Tata Nuñez MD    Electronically signed

## 2025-03-21 LAB
BACTERIA BLD CULT: NORMAL
BACTERIA BLD CULT: NORMAL

## 2025-03-23 LAB
BACTERIA BLD CULT: NO GROWTH
BACTERIA BLD CULT: NO GROWTH

## (undated) DEVICE — ENDO NDL BX ASPIRATION EBUS 21GA VIZISHOT NA-201SX-4021

## (undated) RX ORDER — HEPARIN SODIUM (PORCINE) LOCK FLUSH IV SOLN 100 UNIT/ML 100 UNIT/ML
SOLUTION INTRAVENOUS
Status: DISPENSED
Start: 2024-11-04

## (undated) RX ORDER — LIDOCAINE HYDROCHLORIDE 10 MG/ML
INJECTION, SOLUTION EPIDURAL; INFILTRATION; INTRACAUDAL; PERINEURAL
Status: DISPENSED
Start: 2024-02-16

## (undated) RX ORDER — HEPARIN SODIUM (PORCINE) LOCK FLUSH IV SOLN 100 UNIT/ML 100 UNIT/ML
SOLUTION INTRAVENOUS
Status: DISPENSED
Start: 2025-03-04

## (undated) RX ORDER — LIDOCAINE HYDROCHLORIDE 10 MG/ML
INJECTION, SOLUTION INFILTRATION; PERINEURAL
Status: DISPENSED
Start: 2024-05-02

## (undated) RX ORDER — DEXAMETHASONE SODIUM PHOSPHATE 10 MG/ML
INJECTION, SOLUTION INTRAMUSCULAR; INTRAVENOUS
Status: DISPENSED
Start: 2024-02-16

## (undated) RX ORDER — CEFAZOLIN SODIUM/WATER 2 G/20 ML
SYRINGE (ML) INTRAVENOUS
Status: DISPENSED
Start: 2024-05-02

## (undated) RX ORDER — HEPARIN SODIUM (PORCINE) LOCK FLUSH IV SOLN 100 UNIT/ML 100 UNIT/ML
SOLUTION INTRAVENOUS
Status: DISPENSED
Start: 2024-12-27

## (undated) RX ORDER — HEPARIN SODIUM (PORCINE) LOCK FLUSH IV SOLN 100 UNIT/ML 100 UNIT/ML
SOLUTION INTRAVENOUS
Status: DISPENSED
Start: 2024-05-03

## (undated) RX ORDER — FENTANYL CITRATE 50 UG/ML
INJECTION, SOLUTION INTRAMUSCULAR; INTRAVENOUS
Status: DISPENSED
Start: 2024-12-27

## (undated) RX ORDER — LIDOCAINE HYDROCHLORIDE AND EPINEPHRINE 10; 10 MG/ML; UG/ML
INJECTION, SOLUTION INFILTRATION; PERINEURAL
Status: DISPENSED
Start: 2024-02-16

## (undated) RX ORDER — PROPOFOL 10 MG/ML
INJECTION, EMULSION INTRAVENOUS
Status: DISPENSED
Start: 2024-02-16

## (undated) RX ORDER — HEPARIN SODIUM (PORCINE) LOCK FLUSH IV SOLN 100 UNIT/ML 100 UNIT/ML
SOLUTION INTRAVENOUS
Status: DISPENSED
Start: 2024-05-02

## (undated) RX ORDER — FENTANYL CITRATE 50 UG/ML
INJECTION, SOLUTION INTRAMUSCULAR; INTRAVENOUS
Status: DISPENSED
Start: 2024-05-02

## (undated) RX ORDER — HEPARIN SODIUM (PORCINE) LOCK FLUSH IV SOLN 100 UNIT/ML 100 UNIT/ML
SOLUTION INTRAVENOUS
Status: DISPENSED
Start: 2024-08-15

## (undated) RX ORDER — LIDOCAINE HYDROCHLORIDE AND EPINEPHRINE 10; 10 MG/ML; UG/ML
INJECTION, SOLUTION INFILTRATION; PERINEURAL
Status: DISPENSED
Start: 2024-05-02

## (undated) RX ORDER — ONDANSETRON 2 MG/ML
INJECTION INTRAMUSCULAR; INTRAVENOUS
Status: DISPENSED
Start: 2024-02-16

## (undated) RX ORDER — HEPARIN SODIUM (PORCINE) LOCK FLUSH IV SOLN 100 UNIT/ML 100 UNIT/ML
SOLUTION INTRAVENOUS
Status: DISPENSED
Start: 2025-01-07